# Patient Record
Sex: FEMALE | Race: BLACK OR AFRICAN AMERICAN | NOT HISPANIC OR LATINO | URBAN - METROPOLITAN AREA
[De-identification: names, ages, dates, MRNs, and addresses within clinical notes are randomized per-mention and may not be internally consistent; named-entity substitution may affect disease eponyms.]

---

## 2019-03-26 ENCOUNTER — HOSPITAL ENCOUNTER (EMERGENCY)
Facility: HOSPITAL | Age: 54
Discharge: HOME | End: 2019-03-26
Attending: EMERGENCY MEDICINE
Payer: MEDICAID

## 2019-03-26 VITALS
WEIGHT: 152 LBS | OXYGEN SATURATION: 97 % | HEART RATE: 75 BPM | RESPIRATION RATE: 18 BRPM | DIASTOLIC BLOOD PRESSURE: 100 MMHG | TEMPERATURE: 99 F | BODY MASS INDEX: 25.95 KG/M2 | SYSTOLIC BLOOD PRESSURE: 159 MMHG | HEIGHT: 64 IN

## 2019-03-26 DIAGNOSIS — M54.30 SCIATICA, UNSPECIFIED LATERALITY: Primary | ICD-10-CM

## 2019-03-26 DIAGNOSIS — M25.559 HIP PAIN: ICD-10-CM

## 2019-03-26 DIAGNOSIS — R03.0 ELEVATED BLOOD PRESSURE READING: ICD-10-CM

## 2019-03-26 PROCEDURE — 63600175 PHARM REV CODE 636 W HCPCS: Performed by: PHYSICIAN ASSISTANT

## 2019-03-26 PROCEDURE — 96372 THER/PROPH/DIAG INJ SC/IM: CPT

## 2019-03-26 PROCEDURE — 99284 EMERGENCY DEPT VISIT MOD MDM: CPT | Mod: 25

## 2019-03-26 RX ORDER — TRAMADOL HYDROCHLORIDE 50 MG/1
50 TABLET ORAL EVERY 6 HOURS PRN
Qty: 12 TABLET | Refills: 0 | Status: ON HOLD | OUTPATIENT
Start: 2019-03-26 | End: 2019-06-30 | Stop reason: HOSPADM

## 2019-03-26 RX ORDER — HYDROMORPHONE HYDROCHLORIDE 2 MG/ML
0.5 INJECTION, SOLUTION INTRAMUSCULAR; INTRAVENOUS; SUBCUTANEOUS
Status: COMPLETED | OUTPATIENT
Start: 2019-03-26 | End: 2019-03-26

## 2019-03-26 RX ADMIN — HYDROMORPHONE HYDROCHLORIDE 0.5 MG: 2 INJECTION, SOLUTION INTRAMUSCULAR; INTRAVENOUS; SUBCUTANEOUS at 02:03

## 2019-03-26 NOTE — ED NOTES
"Pt. Daughter at side and states " I want my mother fully check, her speech has dolly slurred and she was c/o weakness to her left side on this weekend". The provider ( ANNIE Angel) made aware of families concerns. Mid-level spoke with pt. And family, re-assessed pt.  "

## 2019-03-26 NOTE — ED TRIAGE NOTES
"Pt c/o RIGHT hip pain, RIGHT shoulder pain x2 weeks. Denies trauma, fall. Also c/o RIGHT knee pain that is chronic (reports hx of torn ligaments in knee). Pt states "I have gas in my back". Pain is 10/10. Pt has been taking goodies and OTC pain pills to some relief but pain returns quickly  "

## 2019-03-26 NOTE — MEDICAL/APP STUDENT
History     Chief Complaint   Patient presents with    Hip Pain     Right hip pain x 1 week.  Denies injury.     CC: Right hip pain    HPI:  Patient is a 54 year old female with history of hypertension, hyperlipidemia, and diabetes mellitus who presents for evaluation of right hip pain. States the pain initially started approximately 2 weeks ago, but has increased in severity over the past two days. Describes the pain as a constant ache with intermittent bursts of sharp pain. She rates the pain as 10/10 today and states it is aggravated by walking and getting up from a sitting position or getting out of bed. Patient does not recall any recent falls or trauma. Reports associated numbness and tingling to the right anterior thigh, but states pain is localized to the right hip and does not radiate. No saddle paresthesia, bladder/bowel incontinence or retention. Denies any recent travel, hormone therapy, or recent surgery/trauma.    Past Medical History:   Diagnosis Date    Diabetes mellitus type I     Hyperlipidemia     Hypertension        Past Surgical History:   Procedure Laterality Date     SECTION         Family History   Problem Relation Age of Onset    Hypertension Mother     Stroke Mother     Hyperlipidemia Mother     Diabetes Mother     Hypertension Sister     Cancer Sister     Hyperlipidemia Sister     Diabetes Sister     Hypertension Brother     Hyperlipidemia Brother     Diabetes Brother     Heart disease Maternal Aunt     Diabetes Maternal Aunt        Social History     Tobacco Use    Smoking status: Current Every Day Smoker     Types: Cigarettes   Substance Use Topics    Alcohol use: Not on file    Drug use: Not on file       Review of Systems   Constitutional: Negative for chills, fatigue and fever.   HENT: Negative for congestion and sore throat.    Eyes: Negative for visual disturbance.   Respiratory: Negative for shortness of breath.    Cardiovascular: Negative for chest  "pain.   Gastrointestinal: Negative for nausea.   Genitourinary: Negative for decreased urine volume, difficulty urinating, dysuria, frequency and urgency.   Musculoskeletal: Positive for arthralgias (right lateral hip pain) and myalgias. Negative for back pain.   Skin: Negative for rash and wound.   Neurological: Negative for dizziness, weakness and light-headedness.   Hematological: Does not bruise/bleed easily.   Psychiatric/Behavioral: Negative.        Physical Exam   BP (!) 179/109 (BP Location: Right arm, Patient Position: Sitting) Comment: took BP meds 30 min pta  Pulse 94   Temp 98.7 °F (37.1 °C) (Oral)   Resp 18   Ht 5' 3.5" (1.613 m)   Wt 68.9 kg (152 lb)   LMP  (Exact Date)   SpO2 98%   Breastfeeding? No   BMI 26.50 kg/m²     Physical Exam    Nursing note and vitals reviewed.  Constitutional: She appears well-developed and well-nourished. No distress.   HENT:   Head: Normocephalic and atraumatic.   Eyes: EOM are normal. Pupils are equal, round, and reactive to light.   Cardiovascular: Normal rate, regular rhythm, normal heart sounds and intact distal pulses. Exam reveals no gallop and no friction rub.    No murmur heard.  Pulmonary/Chest: Breath sounds normal. No respiratory distress.   Abdominal: Soft. Bowel sounds are normal. She exhibits no distension and no mass. There is no tenderness. There is no rebound and no guarding.   Musculoskeletal:   Full active and passive ROM to BLE. 5/5 strength and equal sensation to BLE. No bony tenderness, deformity, or crepitus appreciated. TTP noted to right lateral hip joint. Positive right-sided straight leg raise. No edema, ecchymosis, or erythema appreciated on exam.    Neurological: She is alert and oriented to person, place, and time. She has normal strength.   Skin: Skin is warm and dry. Capillary refill takes less than 2 seconds. No erythema.   Psychiatric: She has a normal mood and affect.         ED Course         "

## 2019-03-26 NOTE — ED PROVIDER NOTES
Encounter Date: 3/26/2019       History     Chief Complaint   Patient presents with    Hip Pain     Right hip pain x 1 week.  Denies injury.     Patient is a 54 year old female with history of hypertension, hyperlipidemia, and diabetes mellitus who presents for evaluation of right hip pain. States the pain initially started approximately 2 weeks ago, but has increased in severity over the past two days. Describes the pain as a constant ache with intermittent bursts of sharp pain. She rates the pain as 10/10 today and states it is aggravated by walking and getting up from a sitting position or getting out of bed. Patient does not recall any recent falls or trauma. Reports associated numbness and tingling to the right anterior thigh, but states pain is localized to the right hip and does not radiate. No saddle paresthesia, bladder/bowel incontinence or retention. Denies any recent travel, hormone therapy, or recent surgery/trauma.        Review of patient's allergies indicates:   Allergen Reactions    Sulfur      Past Medical History:   Diagnosis Date    Diabetes mellitus type I     Hyperlipidemia     Hypertension      Past Surgical History:   Procedure Laterality Date     SECTION       Family History   Problem Relation Age of Onset    Hypertension Mother     Stroke Mother     Hyperlipidemia Mother     Diabetes Mother     Hypertension Sister     Cancer Sister     Hyperlipidemia Sister     Diabetes Sister     Hypertension Brother     Hyperlipidemia Brother     Diabetes Brother     Heart disease Maternal Aunt     Diabetes Maternal Aunt      Social History     Tobacco Use    Smoking status: Current Every Day Smoker     Types: Cigarettes   Substance Use Topics    Alcohol use: Not on file    Drug use: Not on file     Review of Systems  Constitutional: Negative for chills, fatigue and fever.   HENT: Negative for congestion and sore throat.    Eyes: Negative for visual disturbance.    Respiratory: Negative for shortness of breath.    Cardiovascular: Negative for chest pain.   Gastrointestinal: Negative for nausea.   Genitourinary: Negative for decreased urine volume, difficulty urinating, dysuria, frequency and urgency.   Musculoskeletal: Positive for arthralgias (right lateral hip pain) and myalgias. Negative for back pain.   Skin: Negative for rash and wound.   Neurological: Negative for dizziness, weakness and light-headedness.   Hematological: Does not bruise/bleed easily.   Psychiatric/Behavioral: Negative.        Physical Exam     Initial Vitals [03/26/19 1215]   BP Pulse Resp Temp SpO2   (!) 179/109 94 18 98.7 °F (37.1 °C) 98 %      MAP       --         Physical Exam  Nursing note and vitals reviewed.  Constitutional: She appears well-developed and well-nourished. No distress.   HENT:   Head: Normocephalic and atraumatic.   Eyes: EOM are normal. Pupils are equal, round, and reactive to light.   Cardiovascular: Normal rate, regular rhythm, normal heart sounds and intact distal pulses. Exam reveals no gallop and no friction rub.    No murmur heard.  Pulmonary/Chest: Breath sounds normal. No respiratory distress.   Abdominal: Soft. Bowel sounds are normal. She exhibits no distension and no mass. There is no tenderness. There is no rebound and no guarding.   Musculoskeletal:   Full active and passive ROM to BLE. 5/5 strength and equal sensation to BLE. No bony tenderness, deformity, or crepitus appreciated. TTP noted to right lateral hip joint. Positive right-sided straight leg raise. No edema, ecchymosis, or erythema appreciated on exam.    Neurological: She is alert and oriented to person, place, and time. She has normal strength.   Skin: Skin is warm and dry. Capillary refill takes less than 2 seconds. No erythema.   Psychiatric: She has a normal mood and affect.       ED Course   Procedures  Labs Reviewed - No data to display       Imaging Results          X-Ray Hip 2 View Right (Final  result)  Result time 03/26/19 13:38:56    Final result by Celio De Leon MD (03/26/19 13:38:56)                 Impression:      No acute displaced fracture-dislocation identified.      Electronically signed by: Celio De Leon MD  Date:    03/26/2019  Time:    13:38             Narrative:    EXAMINATION:  XR HIP 2 VIEW RIGHT    CLINICAL HISTORY:  Pain in unspecified hip    TECHNIQUE:  AP view of the pelvis and frog leg lateral view of the right hip were performed.    COMPARISON:  None    FINDINGS:  Bones are well mineralized.  Overall alignment is within normal limits.  No displaced fracture, dislocation or destructive osseous process.  Mild degenerative change at the pubic symphysis.  Pelvic phleboliths noted.  No subcutaneous emphysema or radiodense retained foreign body.                              X-Rays:   Independently Interpreted Readings:   Other Readings:  X-ray of the hip reveals no acute fracture, dislocation or bony destructive lesion.          APC / Resident Notes:   This is an urgent evaluation of a 54-year-old female presents to the emergency department complaining of atraumatic right hip pain.    The patient is currently afebrile and nontoxic in appearance.  Her blood pressure is elevated at 179/109.  This patient has a history of hypertension, hyperlipidemia and diabetes.  On physical exam, there is full active and passive ROM to BLE. 5/5 strength and equal sensation to BLE. No bony tenderness, deformity, or crepitus appreciated. TTP noted to right lateral hip joint. Positive right-sided straight leg raise. No edema, ecchymosis, or erythema appreciated on exam.  There is no saddle anesthesia.  There is no midline bony tenderness to palpation.  The remaining physical exam is unremarkable. I carefully considered but doubt acute disc herniation with deficit, cauda equina, spinal fracture.  An x-ray was performed of the right hip which revealed no acute fracture or dislocation.  I believe this patient  has sciatica type pain and I will treat her with pain medication in the emergency department.  I will send her home with a short prescription of pain medication.  She will need to follow up with her primary care physician.  This is discussed with the patient and she verbalized understanding and agreement.  She will also need to talk to her primary care doctor about her elevated blood pressure reading.  She is currently safe and stable for discharge at this time.                 Clinical Impression:       ICD-10-CM ICD-9-CM   1. Sciatica, unspecified laterality M54.30 724.3   2. Hip pain M25.559 719.45   3. Elevated blood pressure reading R03.0 796.2         Disposition:   Disposition: Discharged  Condition: Stable                        Lynette Frederick PA-C  03/26/19 1416

## 2019-04-01 ENCOUNTER — HOSPITAL ENCOUNTER (INPATIENT)
Facility: HOSPITAL | Age: 54
LOS: 8 days | Discharge: NURSING, REHAB OR LONG-TERM CARE | DRG: 064 | End: 2019-04-09
Attending: EMERGENCY MEDICINE | Admitting: PSYCHIATRY & NEUROLOGY
Payer: MEDICAID

## 2019-04-01 DIAGNOSIS — I63.9 EMBOLIC STROKE: ICD-10-CM

## 2019-04-01 DIAGNOSIS — E11.65 UNCONTROLLED TYPE 2 DIABETES MELLITUS WITH HYPERGLYCEMIA: ICD-10-CM

## 2019-04-01 DIAGNOSIS — I63.432 EMBOLIC STROKE INVOLVING LEFT POSTERIOR CEREBRAL ARTERY: Primary | ICD-10-CM

## 2019-04-01 DIAGNOSIS — E11.10 DIABETIC KETOACIDOSIS WITHOUT COMA ASSOCIATED WITH TYPE 2 DIABETES MELLITUS: ICD-10-CM

## 2019-04-01 DIAGNOSIS — F17.200 SMOKER: ICD-10-CM

## 2019-04-01 DIAGNOSIS — R53.1 WEAKNESS: ICD-10-CM

## 2019-04-01 DIAGNOSIS — R29.898 WEAKNESS OF BOTH LOWER EXTREMITIES: ICD-10-CM

## 2019-04-01 PROBLEM — A59.9 TRICHOMONAS INFECTION: Status: ACTIVE | Noted: 2019-04-01

## 2019-04-01 PROBLEM — I10 ESSENTIAL HYPERTENSION: Status: ACTIVE | Noted: 2019-04-01

## 2019-04-01 PROBLEM — E78.2 MIXED HYPERLIPIDEMIA: Status: ACTIVE | Noted: 2019-04-01

## 2019-04-01 PROBLEM — A59.03 TRICHOMONAL CYSTITIS: Status: ACTIVE | Noted: 2019-04-01

## 2019-04-01 LAB
ALBUMIN SERPL BCP-MCNC: 3.5 G/DL (ref 3.5–5.2)
ALP SERPL-CCNC: 116 U/L (ref 55–135)
ALT SERPL W/O P-5'-P-CCNC: 11 U/L (ref 10–44)
AMPHET+METHAMPHET UR QL: NEGATIVE
ANION GAP SERPL CALC-SCNC: 15 MMOL/L (ref 8–16)
AST SERPL-CCNC: 11 U/L (ref 10–40)
BACTERIA #/AREA URNS AUTO: ABNORMAL /HPF
BARBITURATES UR QL SCN>200 NG/ML: NEGATIVE
BASOPHILS # BLD AUTO: 0.07 K/UL (ref 0–0.2)
BASOPHILS NFR BLD: 0.6 % (ref 0–1.9)
BENZODIAZ UR QL SCN>200 NG/ML: NEGATIVE
BILIRUB SERPL-MCNC: 0.5 MG/DL (ref 0.1–1)
BILIRUB UR QL STRIP: NEGATIVE
BUN SERPL-MCNC: 11 MG/DL (ref 6–20)
BZE UR QL SCN: NORMAL
CALCIUM SERPL-MCNC: 10.4 MG/DL (ref 8.7–10.5)
CANNABINOIDS UR QL SCN: NEGATIVE
CHLORIDE SERPL-SCNC: 100 MMOL/L (ref 95–110)
CLARITY UR REFRACT.AUTO: CLEAR
CO2 SERPL-SCNC: 23 MMOL/L (ref 23–29)
COLOR UR AUTO: ABNORMAL
CREAT SERPL-MCNC: 1.3 MG/DL (ref 0.5–1.4)
CREAT UR-MCNC: 33 MG/DL (ref 15–325)
DIFFERENTIAL METHOD: NORMAL
EOSINOPHIL # BLD AUTO: 0 K/UL (ref 0–0.5)
EOSINOPHIL NFR BLD: 0.2 % (ref 0–8)
ERYTHROCYTE [DISTWIDTH] IN BLOOD BY AUTOMATED COUNT: 13.4 % (ref 11.5–14.5)
EST. GFR  (AFRICAN AMERICAN): 53 ML/MIN/1.73 M^2
EST. GFR  (NON AFRICAN AMERICAN): 46 ML/MIN/1.73 M^2
ETHANOL SERPL-MCNC: <10 MG/DL
GLUCOSE SERPL-MCNC: 491 MG/DL (ref 70–110)
GLUCOSE UR QL STRIP: ABNORMAL
HCT VFR BLD AUTO: 42.8 % (ref 37–48.5)
HGB BLD-MCNC: 14.3 G/DL (ref 12–16)
HGB UR QL STRIP: ABNORMAL
IMM GRANULOCYTES # BLD AUTO: 0.03 K/UL (ref 0–0.04)
IMM GRANULOCYTES NFR BLD AUTO: 0.3 % (ref 0–0.5)
INR PPP: 0.9 (ref 0.8–1.2)
KETONES UR QL STRIP: ABNORMAL
LEUKOCYTE ESTERASE UR QL STRIP: ABNORMAL
LYMPHOCYTES # BLD AUTO: 3 K/UL (ref 1–4.8)
LYMPHOCYTES NFR BLD: 27.4 % (ref 18–48)
MCH RBC QN AUTO: 28.1 PG (ref 27–31)
MCHC RBC AUTO-ENTMCNC: 33.4 G/DL (ref 32–36)
MCV RBC AUTO: 84 FL (ref 82–98)
METHADONE UR QL SCN>300 NG/ML: NEGATIVE
MICROSCOPIC COMMENT: ABNORMAL
MONOCYTES # BLD AUTO: 1 K/UL (ref 0.3–1)
MONOCYTES NFR BLD: 9.1 % (ref 4–15)
NEUTROPHILS # BLD AUTO: 6.7 K/UL (ref 1.8–7.7)
NEUTROPHILS NFR BLD: 62.4 % (ref 38–73)
NITRITE UR QL STRIP: NEGATIVE
NRBC BLD-RTO: 0 /100 WBC
OPIATES UR QL SCN: NEGATIVE
PCP UR QL SCN>25 NG/ML: NEGATIVE
PH UR STRIP: 6 [PH] (ref 5–8)
PLATELET # BLD AUTO: 302 K/UL (ref 150–350)
PMV BLD AUTO: 12.3 FL (ref 9.2–12.9)
POTASSIUM SERPL-SCNC: 4.3 MMOL/L (ref 3.5–5.1)
PROT SERPL-MCNC: 7.4 G/DL (ref 6–8.4)
PROT UR QL STRIP: NEGATIVE
PROTHROMBIN TIME: 9.9 SEC (ref 9–12.5)
RBC # BLD AUTO: 5.09 M/UL (ref 4–5.4)
RBC #/AREA URNS AUTO: 24 /HPF (ref 0–4)
SODIUM SERPL-SCNC: 138 MMOL/L (ref 136–145)
SP GR UR STRIP: 1.02 (ref 1–1.03)
SQUAMOUS #/AREA URNS AUTO: 6 /HPF
TOXICOLOGY INFORMATION: NORMAL
TRICHOMONAS UR QL COMP ASSIST: ABNORMAL
URN SPEC COLLECT METH UR: ABNORMAL
WBC # BLD AUTO: 10.77 K/UL (ref 3.9–12.7)
WBC #/AREA URNS AUTO: 22 /HPF (ref 0–5)
YEAST UR QL AUTO: ABNORMAL

## 2019-04-01 PROCEDURE — 80320 DRUG SCREEN QUANTALCOHOLS: CPT

## 2019-04-01 PROCEDURE — A9585 GADOBUTROL INJECTION: HCPCS | Performed by: EMERGENCY MEDICINE

## 2019-04-01 PROCEDURE — 12000002 HC ACUTE/MED SURGE SEMI-PRIVATE ROOM

## 2019-04-01 PROCEDURE — 93010 ELECTROCARDIOGRAM REPORT: CPT | Mod: ,,, | Performed by: INTERNAL MEDICINE

## 2019-04-01 PROCEDURE — 81001 URINALYSIS AUTO W/SCOPE: CPT

## 2019-04-01 PROCEDURE — 93005 ELECTROCARDIOGRAM TRACING: CPT

## 2019-04-01 PROCEDURE — 85025 COMPLETE CBC W/AUTO DIFF WBC: CPT

## 2019-04-01 PROCEDURE — 85610 PROTHROMBIN TIME: CPT

## 2019-04-01 PROCEDURE — 82962 GLUCOSE BLOOD TEST: CPT

## 2019-04-01 PROCEDURE — 25500020 PHARM REV CODE 255: Performed by: EMERGENCY MEDICINE

## 2019-04-01 PROCEDURE — 99291 PR CRITICAL CARE, E/M 30-74 MINUTES: ICD-10-PCS | Mod: ,,, | Performed by: EMERGENCY MEDICINE

## 2019-04-01 PROCEDURE — 99291 CRITICAL CARE FIRST HOUR: CPT | Mod: 25

## 2019-04-01 PROCEDURE — 25000003 PHARM REV CODE 250: Performed by: EMERGENCY MEDICINE

## 2019-04-01 PROCEDURE — 99223 1ST HOSP IP/OBS HIGH 75: CPT | Mod: ,,, | Performed by: PSYCHIATRY & NEUROLOGY

## 2019-04-01 PROCEDURE — 99223 PR INITIAL HOSPITAL CARE,LEVL III: ICD-10-PCS | Mod: ,,, | Performed by: PSYCHIATRY & NEUROLOGY

## 2019-04-01 PROCEDURE — 80053 COMPREHEN METABOLIC PANEL: CPT

## 2019-04-01 PROCEDURE — 99291 CRITICAL CARE FIRST HOUR: CPT | Mod: ,,, | Performed by: EMERGENCY MEDICINE

## 2019-04-01 PROCEDURE — 93010 EKG 12-LEAD: ICD-10-PCS | Mod: ,,, | Performed by: INTERNAL MEDICINE

## 2019-04-01 PROCEDURE — 80307 DRUG TEST PRSMV CHEM ANLYZR: CPT

## 2019-04-01 RX ORDER — PANTOPRAZOLE SODIUM 40 MG/1
40 TABLET, DELAYED RELEASE ORAL DAILY
Status: DISCONTINUED | OUTPATIENT
Start: 2019-04-02 | End: 2019-04-09 | Stop reason: HOSPADM

## 2019-04-01 RX ORDER — ATORVASTATIN CALCIUM 20 MG/1
40 TABLET, FILM COATED ORAL DAILY
Status: DISCONTINUED | OUTPATIENT
Start: 2019-04-02 | End: 2019-04-09 | Stop reason: HOSPADM

## 2019-04-01 RX ORDER — ONDANSETRON 2 MG/ML
4 INJECTION INTRAMUSCULAR; INTRAVENOUS EVERY 12 HOURS PRN
Status: DISCONTINUED | OUTPATIENT
Start: 2019-04-01 | End: 2019-04-09 | Stop reason: HOSPADM

## 2019-04-01 RX ORDER — ASPIRIN 81 MG/1
81 TABLET ORAL DAILY
Status: DISCONTINUED | OUTPATIENT
Start: 2019-04-02 | End: 2019-04-09 | Stop reason: HOSPADM

## 2019-04-01 RX ORDER — INSULIN ASPART 100 [IU]/ML
10 INJECTION, SOLUTION INTRAVENOUS; SUBCUTANEOUS
Status: DISCONTINUED | OUTPATIENT
Start: 2019-04-02 | End: 2019-04-02

## 2019-04-01 RX ORDER — INSULIN ASPART 100 [IU]/ML
1-10 INJECTION, SOLUTION INTRAVENOUS; SUBCUTANEOUS
Status: DISCONTINUED | OUTPATIENT
Start: 2019-04-02 | End: 2019-04-02

## 2019-04-01 RX ORDER — HYDRALAZINE HYDROCHLORIDE 20 MG/ML
10 INJECTION INTRAMUSCULAR; INTRAVENOUS EVERY 8 HOURS PRN
Status: DISCONTINUED | OUTPATIENT
Start: 2019-04-01 | End: 2019-04-09 | Stop reason: HOSPADM

## 2019-04-01 RX ORDER — ONDANSETRON 8 MG/1
8 TABLET, ORALLY DISINTEGRATING ORAL EVERY 8 HOURS PRN
Status: DISCONTINUED | OUTPATIENT
Start: 2019-04-01 | End: 2019-04-09 | Stop reason: HOSPADM

## 2019-04-01 RX ORDER — AMLODIPINE BESYLATE 10 MG/1
10 TABLET ORAL
Status: COMPLETED | OUTPATIENT
Start: 2019-04-01 | End: 2019-04-01

## 2019-04-01 RX ORDER — IBUPROFEN 200 MG
16 TABLET ORAL
Status: DISCONTINUED | OUTPATIENT
Start: 2019-04-01 | End: 2019-04-02

## 2019-04-01 RX ORDER — HEPARIN SODIUM 5000 [USP'U]/ML
5000 INJECTION, SOLUTION INTRAVENOUS; SUBCUTANEOUS EVERY 8 HOURS
Status: DISCONTINUED | OUTPATIENT
Start: 2019-04-02 | End: 2019-04-09 | Stop reason: HOSPADM

## 2019-04-01 RX ORDER — ACETAMINOPHEN 325 MG/1
650 TABLET ORAL EVERY 6 HOURS PRN
Status: DISCONTINUED | OUTPATIENT
Start: 2019-04-01 | End: 2019-04-09 | Stop reason: HOSPADM

## 2019-04-01 RX ORDER — GLUCAGON 1 MG
1 KIT INJECTION
Status: DISCONTINUED | OUTPATIENT
Start: 2019-04-01 | End: 2019-04-02

## 2019-04-01 RX ORDER — IBUPROFEN 200 MG
24 TABLET ORAL
Status: DISCONTINUED | OUTPATIENT
Start: 2019-04-01 | End: 2019-04-02

## 2019-04-01 RX ORDER — SODIUM CHLORIDE 0.9 % (FLUSH) 0.9 %
10 SYRINGE (ML) INJECTION
Status: DISCONTINUED | OUTPATIENT
Start: 2019-04-01 | End: 2019-04-09 | Stop reason: HOSPADM

## 2019-04-01 RX ORDER — IBUPROFEN 200 MG
1 TABLET ORAL DAILY
Status: DISCONTINUED | OUTPATIENT
Start: 2019-04-02 | End: 2019-04-09 | Stop reason: HOSPADM

## 2019-04-01 RX ORDER — GADOBUTROL 604.72 MG/ML
7 INJECTION INTRAVENOUS
Status: COMPLETED | OUTPATIENT
Start: 2019-04-01 | End: 2019-04-01

## 2019-04-01 RX ADMIN — AMLODIPINE BESYLATE 10 MG: 10 TABLET ORAL at 08:04

## 2019-04-01 RX ADMIN — GADOBUTROL 7 ML: 604.72 INJECTION INTRAVENOUS at 09:04

## 2019-04-01 NOTE — ED TRIAGE NOTES
Emily Martinez, a 54 y.o. female presents to the ED w/ complaint of AMS since found today by friend at 1430, pt having slurred speech and urinating on herself since Monday.  Pt denies numbness or weak ness    Triage note:  Chief Complaint   Patient presents with    Multiple Complaints     falling for few days, urinating on self for past few days, slurred speech since sat, seen in er on march 25     Review of patient's allergies indicates:   Allergen Reactions    Sulfur      Past Medical History:   Diagnosis Date    Diabetes mellitus type I     Hyperlipidemia     Hypertension      LOC: Patient name and date of birth verified. The patient is awake, alert and aware of environment with an appropriate affect, the patient is oriented x 1 person and not speaking appropriately.   APPEARANCE: Patient resting comfortably, patient is clean and well groomed, patient's clothing is properly fastened.  SKIN: The skin is warm and dry, color consistent with ethnicity, patient has normal skin turgor and moist mucus membranes, skin intact, no breakdown or bruising noted.  MUSCULOSKELETAL: Patient moving all extremities well, no obvious swelling or deformities noted.   RESPIRATORY: Respirations are spontaneous, patient has a normal effort and rate, no accessory muscle use noted.  CARDIAC: Patient has a normal rate and rhythm, no periphreal edema noted, capillary refill < 3 seconds.  ABDOMEN: Soft and non tender to palpation, no distention noted. Bowel sounds present in all four quadrants.  NEUROLOGIC: Eyes open spontaneously,  follows commands, facial expression symmetrical, bilateral hand grasp equal and even, purposeful motor response noted, normal sensation in all extremities when touched with a finger.

## 2019-04-02 PROBLEM — E11.10 DIABETIC KETOACIDOSIS WITHOUT COMA ASSOCIATED WITH TYPE 2 DIABETES MELLITUS: Status: ACTIVE | Noted: 2019-04-02

## 2019-04-02 PROBLEM — R29.898 WEAKNESS OF BOTH LOWER EXTREMITIES: Status: ACTIVE | Noted: 2019-04-02

## 2019-04-02 LAB
ALBUMIN SERPL BCP-MCNC: 3.4 G/DL (ref 3.5–5.2)
ALLENS TEST: ABNORMAL
ALP SERPL-CCNC: 112 U/L (ref 55–135)
ALT SERPL W/O P-5'-P-CCNC: 12 U/L (ref 10–44)
ANION GAP SERPL CALC-SCNC: 10 MMOL/L (ref 8–16)
ANION GAP SERPL CALC-SCNC: 11 MMOL/L (ref 8–16)
ANION GAP SERPL CALC-SCNC: 11 MMOL/L (ref 8–16)
ANION GAP SERPL CALC-SCNC: 6 MMOL/L (ref 8–16)
ANION GAP SERPL CALC-SCNC: 9 MMOL/L (ref 8–16)
APTT BLDCRRT: <21 SEC (ref 21–32)
ASCENDING AORTA: 2.89 CM
AST SERPL-CCNC: 10 U/L (ref 10–40)
AV INDEX (PROSTH): 0.87
AV MEAN GRADIENT: 4.95 MMHG
AV PEAK GRADIENT: 8.88 MMHG
AV VALVE AREA: 2.99 CM2
AV VELOCITY RATIO: 0.79
B-OH-BUTYR BLD STRIP-SCNC: 4.1 MMOL/L (ref 0–0.5)
BACTERIA #/AREA URNS AUTO: ABNORMAL /HPF
BASOPHILS # BLD AUTO: 0.08 K/UL (ref 0–0.2)
BASOPHILS NFR BLD: 0.7 % (ref 0–1.9)
BILIRUB SERPL-MCNC: 0.6 MG/DL (ref 0.1–1)
BILIRUB UR QL STRIP: NEGATIVE
BNP SERPL-MCNC: 17 PG/ML (ref 0–99)
BSA FOR ECHO PROCEDURE: 1.72 M2
BUN SERPL-MCNC: 5 MG/DL (ref 6–20)
BUN SERPL-MCNC: 6 MG/DL (ref 6–20)
BUN SERPL-MCNC: 7 MG/DL (ref 6–20)
BUN SERPL-MCNC: 9 MG/DL (ref 6–20)
BUN SERPL-MCNC: 9 MG/DL (ref 6–20)
CALCIUM SERPL-MCNC: 10 MG/DL (ref 8.7–10.5)
CALCIUM SERPL-MCNC: 10 MG/DL (ref 8.7–10.5)
CALCIUM SERPL-MCNC: 10.5 MG/DL (ref 8.7–10.5)
CALCIUM SERPL-MCNC: 10.5 MG/DL (ref 8.7–10.5)
CALCIUM SERPL-MCNC: 9.8 MG/DL (ref 8.7–10.5)
CHLORIDE SERPL-SCNC: 103 MMOL/L (ref 95–110)
CHLORIDE SERPL-SCNC: 105 MMOL/L (ref 95–110)
CHLORIDE SERPL-SCNC: 105 MMOL/L (ref 95–110)
CK MB SERPL-MCNC: 2.4 NG/ML (ref 0.1–6.5)
CK MB SERPL-RTO: 4 % (ref 0–5)
CK SERPL-CCNC: 60 U/L (ref 20–180)
CLARITY UR REFRACT.AUTO: CLEAR
CO2 SERPL-SCNC: 24 MMOL/L (ref 23–29)
CO2 SERPL-SCNC: 26 MMOL/L (ref 23–29)
CO2 SERPL-SCNC: 26 MMOL/L (ref 23–29)
CO2 SERPL-SCNC: 29 MMOL/L (ref 23–29)
CO2 SERPL-SCNC: 32 MMOL/L (ref 23–29)
COLOR UR AUTO: ABNORMAL
CREAT SERPL-MCNC: 0.9 MG/DL (ref 0.5–1.4)
CREAT SERPL-MCNC: 0.9 MG/DL (ref 0.5–1.4)
CREAT SERPL-MCNC: 1.2 MG/DL (ref 0.5–1.4)
CV ECHO LV RWT: 0.43 CM
DELSYS: ABNORMAL
DIFFERENTIAL METHOD: ABNORMAL
DOP CALC AO PEAK VEL: 1.49 M/S
DOP CALC AO VTI: 27.27 CM
DOP CALC LVOT AREA: 3.43 CM2
DOP CALC LVOT DIAMETER: 2.09 CM
DOP CALC LVOT PEAK VEL: 1.17 M/S
DOP CALC LVOT STROKE VOLUME: 81.47 CM3
DOP CALCLVOT PEAK VEL VTI: 23.76 CM
E WAVE DECELERATION TIME: 304.57 MSEC
E/A RATIO: 0.64
E/E' RATIO: 7.23
ECHO LV POSTERIOR WALL: 0.95 CM (ref 0.6–1.1)
EOSINOPHIL # BLD AUTO: 0.1 K/UL (ref 0–0.5)
EOSINOPHIL NFR BLD: 0.5 % (ref 0–8)
ERYTHROCYTE [DISTWIDTH] IN BLOOD BY AUTOMATED COUNT: 13.5 % (ref 11.5–14.5)
EST. GFR  (AFRICAN AMERICAN): 58.4 ML/MIN/1.73 M^2
EST. GFR  (AFRICAN AMERICAN): >60 ML/MIN/1.73 M^2
EST. GFR  (AFRICAN AMERICAN): >60 ML/MIN/1.73 M^2
EST. GFR  (NON AFRICAN AMERICAN): 50.6 ML/MIN/1.73 M^2
EST. GFR  (NON AFRICAN AMERICAN): >60 ML/MIN/1.73 M^2
EST. GFR  (NON AFRICAN AMERICAN): >60 ML/MIN/1.73 M^2
ESTIMATED AVG GLUCOSE: ABNORMAL MG/DL (ref 68–131)
ESTIMATED AVG GLUCOSE: ABNORMAL MG/DL (ref 68–131)
FRACTIONAL SHORTENING: 34 % (ref 28–44)
GLUCOSE SERPL-MCNC: 165 MG/DL (ref 70–110)
GLUCOSE SERPL-MCNC: 246 MG/DL (ref 70–110)
GLUCOSE SERPL-MCNC: 276 MG/DL (ref 70–110)
GLUCOSE SERPL-MCNC: 314 MG/DL (ref 70–110)
GLUCOSE SERPL-MCNC: 314 MG/DL (ref 70–110)
GLUCOSE UR QL STRIP: ABNORMAL
HBA1C MFR BLD HPLC: >14 % (ref 4–5.6)
HBA1C MFR BLD HPLC: >14 % (ref 4–5.6)
HCO3 UR-SCNC: 26.9 MMOL/L (ref 24–28)
HCT VFR BLD AUTO: 40.7 % (ref 37–48.5)
HGB BLD-MCNC: 13.3 G/DL (ref 12–16)
HGB UR QL STRIP: ABNORMAL
IMM GRANULOCYTES # BLD AUTO: 0.02 K/UL (ref 0–0.04)
IMM GRANULOCYTES NFR BLD AUTO: 0.2 % (ref 0–0.5)
INR PPP: 0.9 (ref 0.8–1.2)
INTERVENTRICULAR SEPTUM: 1 CM (ref 0.6–1.1)
IVRT: 0.13 MSEC
KETONES UR QL STRIP: ABNORMAL
LA MAJOR: 5.09 CM
LA MINOR: 5.3 CM
LA WIDTH: 3.28 CM
LEFT ATRIUM SIZE: 3.57 CM
LEFT ATRIUM VOLUME INDEX: 30.5 ML/M2
LEFT ATRIUM VOLUME: 51.69 CM3
LEFT INTERNAL DIMENSION IN SYSTOLE: 2.9 CM (ref 2.1–4)
LEFT VENTRICLE DIASTOLIC VOLUME INDEX: 51.9 ML/M2
LEFT VENTRICLE DIASTOLIC VOLUME: 87.84 ML
LEFT VENTRICLE MASS INDEX: 84.3 G/M2
LEFT VENTRICLE SYSTOLIC VOLUME INDEX: 19 ML/M2
LEFT VENTRICLE SYSTOLIC VOLUME: 32.19 ML
LEFT VENTRICULAR INTERNAL DIMENSION IN DIASTOLE: 4.4 CM (ref 3.5–6)
LEFT VENTRICULAR MASS: 142.76 G
LEUKOCYTE ESTERASE UR QL STRIP: ABNORMAL
LV LATERAL E/E' RATIO: 6.71
LV SEPTAL E/E' RATIO: 7.83
LYMPHOCYTES # BLD AUTO: 2.6 K/UL (ref 1–4.8)
LYMPHOCYTES NFR BLD: 22.8 % (ref 18–48)
MCH RBC QN AUTO: 27.8 PG (ref 27–31)
MCHC RBC AUTO-ENTMCNC: 32.7 G/DL (ref 32–36)
MCV RBC AUTO: 85 FL (ref 82–98)
MICROSCOPIC COMMENT: ABNORMAL
MODE: ABNORMAL
MONOCYTES # BLD AUTO: 1.2 K/UL (ref 0.3–1)
MONOCYTES NFR BLD: 10.3 % (ref 4–15)
MV PEAK A VEL: 0.74 M/S
MV PEAK E VEL: 0.47 M/S
NEUTROPHILS # BLD AUTO: 7.4 K/UL (ref 1.8–7.7)
NEUTROPHILS NFR BLD: 65.5 % (ref 38–73)
NITRITE UR QL STRIP: NEGATIVE
NRBC BLD-RTO: 0 /100 WBC
OSMOLALITY SERPL: 312 MOSM/KG (ref 275–295)
PCO2 BLDA: 48.1 MMHG (ref 35–45)
PH SMN: 7.36 [PH] (ref 7.35–7.45)
PH UR STRIP: 6 [PH] (ref 5–8)
PISA TR MAX VEL: 1.71 M/S
PLATELET # BLD AUTO: 363 K/UL (ref 150–350)
PMV BLD AUTO: 11.3 FL (ref 9.2–12.9)
PO2 BLDA: 54 MMHG (ref 40–60)
POC BE: 1 MMOL/L
POC SATURATED O2: 86 % (ref 95–100)
POC TCO2: 28 MMOL/L (ref 24–29)
POCT GLUCOSE: 189 MG/DL (ref 70–110)
POCT GLUCOSE: 201 MG/DL (ref 70–110)
POCT GLUCOSE: 226 MG/DL (ref 70–110)
POCT GLUCOSE: 239 MG/DL (ref 70–110)
POCT GLUCOSE: 266 MG/DL (ref 70–110)
POCT GLUCOSE: 279 MG/DL (ref 70–110)
POCT GLUCOSE: 291 MG/DL (ref 70–110)
POCT GLUCOSE: 299 MG/DL (ref 70–110)
POCT GLUCOSE: 314 MG/DL (ref 70–110)
POCT GLUCOSE: 327 MG/DL (ref 70–110)
POCT GLUCOSE: 329 MG/DL (ref 70–110)
POCT GLUCOSE: 333 MG/DL (ref 70–110)
POCT GLUCOSE: 444 MG/DL (ref 70–110)
POCT GLUCOSE: >500 MG/DL (ref 70–110)
POTASSIUM SERPL-SCNC: 3.4 MMOL/L (ref 3.5–5.1)
POTASSIUM SERPL-SCNC: 3.7 MMOL/L (ref 3.5–5.1)
POTASSIUM SERPL-SCNC: 3.9 MMOL/L (ref 3.5–5.1)
POTASSIUM SERPL-SCNC: 3.9 MMOL/L (ref 3.5–5.1)
POTASSIUM SERPL-SCNC: 4.1 MMOL/L (ref 3.5–5.1)
PROT SERPL-MCNC: 7.2 G/DL (ref 6–8.4)
PROT UR QL STRIP: NEGATIVE
PROTHROMBIN TIME: 9.5 SEC (ref 9–12.5)
PULM VEIN S/D RATIO: 1.84
PV PEAK D VEL: 0.25 M/S
PV PEAK S VEL: 0.46 M/S
RA MAJOR: 4.71 CM
RA PRESSURE: 3 MMHG
RA WIDTH: 3.64 CM
RBC # BLD AUTO: 4.79 M/UL (ref 4–5.4)
RBC #/AREA URNS AUTO: 3 /HPF (ref 0–4)
RIGHT VENTRICULAR END-DIASTOLIC DIMENSION: 3.06 CM
SAMPLE: ABNORMAL
SINUS: 2.8 CM
SITE: ABNORMAL
SODIUM SERPL-SCNC: 139 MMOL/L (ref 136–145)
SODIUM SERPL-SCNC: 140 MMOL/L (ref 136–145)
SODIUM SERPL-SCNC: 140 MMOL/L (ref 136–145)
SODIUM SERPL-SCNC: 141 MMOL/L (ref 136–145)
SODIUM SERPL-SCNC: 143 MMOL/L (ref 136–145)
SP GR UR STRIP: 1.02 (ref 1–1.03)
SQUAMOUS #/AREA URNS AUTO: 1 /HPF
STJ: 2.73 CM
TDI LATERAL: 0.07
TDI SEPTAL: 0.06
TDI: 0.07
TR MAX PG: 11.7 MMHG
TRICUSPID ANNULAR PLANE SYSTOLIC EXCURSION: 2.59 CM
TROPONIN I SERPL DL<=0.01 NG/ML-MCNC: 0.02 NG/ML (ref 0–0.03)
TSH SERPL DL<=0.005 MIU/L-ACNC: 0.99 UIU/ML (ref 0.4–4)
TV REST PULMONARY ARTERY PRESSURE: 15 MMHG
URN SPEC COLLECT METH UR: ABNORMAL
WBC # BLD AUTO: 11.29 K/UL (ref 3.9–12.7)
WBC #/AREA URNS AUTO: 7 /HPF (ref 0–5)
YEAST UR QL AUTO: ABNORMAL

## 2019-04-02 PROCEDURE — 82803 BLOOD GASES ANY COMBINATION: CPT

## 2019-04-02 PROCEDURE — 25000003 PHARM REV CODE 250: Performed by: NURSE PRACTITIONER

## 2019-04-02 PROCEDURE — S4991 NICOTINE PATCH NONLEGEND: HCPCS | Performed by: NURSE PRACTITIONER

## 2019-04-02 PROCEDURE — 96366 THER/PROPH/DIAG IV INF ADDON: CPT

## 2019-04-02 PROCEDURE — 96361 HYDRATE IV INFUSION ADD-ON: CPT

## 2019-04-02 PROCEDURE — 96360 HYDRATION IV INFUSION INIT: CPT | Mod: 59

## 2019-04-02 PROCEDURE — 97530 THERAPEUTIC ACTIVITIES: CPT

## 2019-04-02 PROCEDURE — 97165 OT EVAL LOW COMPLEX 30 MIN: CPT

## 2019-04-02 PROCEDURE — 84443 ASSAY THYROID STIM HORMONE: CPT

## 2019-04-02 PROCEDURE — 99233 PR SUBSEQUENT HOSPITAL CARE,LEVL III: ICD-10-PCS | Mod: ,,, | Performed by: PSYCHIATRY & NEUROLOGY

## 2019-04-02 PROCEDURE — 99222 1ST HOSP IP/OBS MODERATE 55: CPT | Mod: ,,, | Performed by: HOSPITALIST

## 2019-04-02 PROCEDURE — 99232 SBSQ HOSP IP/OBS MODERATE 35: CPT | Mod: ,,, | Performed by: NURSE PRACTITIONER

## 2019-04-02 PROCEDURE — 82550 ASSAY OF CK (CPK): CPT

## 2019-04-02 PROCEDURE — 82553 CREATINE MB FRACTION: CPT

## 2019-04-02 PROCEDURE — 99222 PR INITIAL HOSPITAL CARE,LEVL II: ICD-10-PCS | Mod: ,,, | Performed by: HOSPITALIST

## 2019-04-02 PROCEDURE — 63600175 PHARM REV CODE 636 W HCPCS: Performed by: NURSE PRACTITIONER

## 2019-04-02 PROCEDURE — 20600001 HC STEP DOWN PRIVATE ROOM

## 2019-04-02 PROCEDURE — 81001 URINALYSIS AUTO W/SCOPE: CPT

## 2019-04-02 PROCEDURE — 96365 THER/PROPH/DIAG IV INF INIT: CPT

## 2019-04-02 PROCEDURE — 85610 PROTHROMBIN TIME: CPT

## 2019-04-02 PROCEDURE — 83880 ASSAY OF NATRIURETIC PEPTIDE: CPT

## 2019-04-02 PROCEDURE — 80053 COMPREHEN METABOLIC PANEL: CPT

## 2019-04-02 PROCEDURE — 63600175 PHARM REV CODE 636 W HCPCS: Performed by: STUDENT IN AN ORGANIZED HEALTH CARE EDUCATION/TRAINING PROGRAM

## 2019-04-02 PROCEDURE — 99900035 HC TECH TIME PER 15 MIN (STAT)

## 2019-04-02 PROCEDURE — 99232 PR SUBSEQUENT HOSPITAL CARE,LEVL II: ICD-10-PCS | Mod: ,,, | Performed by: NURSE PRACTITIONER

## 2019-04-02 PROCEDURE — 80048 BASIC METABOLIC PNL TOTAL CA: CPT | Mod: 91

## 2019-04-02 PROCEDURE — 83036 HEMOGLOBIN GLYCOSYLATED A1C: CPT

## 2019-04-02 PROCEDURE — 82010 KETONE BODYS QUAN: CPT

## 2019-04-02 PROCEDURE — 36415 COLL VENOUS BLD VENIPUNCTURE: CPT

## 2019-04-02 PROCEDURE — 92523 SPEECH SOUND LANG COMPREHEN: CPT

## 2019-04-02 PROCEDURE — 82962 GLUCOSE BLOOD TEST: CPT

## 2019-04-02 PROCEDURE — 92610 EVALUATE SWALLOWING FUNCTION: CPT

## 2019-04-02 PROCEDURE — 97162 PT EVAL MOD COMPLEX 30 MIN: CPT

## 2019-04-02 PROCEDURE — 25000003 PHARM REV CODE 250: Performed by: STUDENT IN AN ORGANIZED HEALTH CARE EDUCATION/TRAINING PROGRAM

## 2019-04-02 PROCEDURE — 96376 TX/PRO/DX INJ SAME DRUG ADON: CPT

## 2019-04-02 PROCEDURE — 85730 THROMBOPLASTIN TIME PARTIAL: CPT

## 2019-04-02 PROCEDURE — 25500020 PHARM REV CODE 255: Performed by: EMERGENCY MEDICINE

## 2019-04-02 PROCEDURE — S5571 INSULIN DISPOS PEN 3 ML: HCPCS | Performed by: STUDENT IN AN ORGANIZED HEALTH CARE EDUCATION/TRAINING PROGRAM

## 2019-04-02 PROCEDURE — 84484 ASSAY OF TROPONIN QUANT: CPT

## 2019-04-02 PROCEDURE — 99233 SBSQ HOSP IP/OBS HIGH 50: CPT | Mod: ,,, | Performed by: PSYCHIATRY & NEUROLOGY

## 2019-04-02 PROCEDURE — 83930 ASSAY OF BLOOD OSMOLALITY: CPT

## 2019-04-02 PROCEDURE — 85025 COMPLETE CBC W/AUTO DIFF WBC: CPT

## 2019-04-02 RX ORDER — INSULIN ASPART 100 [IU]/ML
3 INJECTION, SOLUTION INTRAVENOUS; SUBCUTANEOUS
Status: DISCONTINUED | OUTPATIENT
Start: 2019-04-02 | End: 2019-04-02

## 2019-04-02 RX ORDER — IBUPROFEN 200 MG
16 TABLET ORAL
Status: DISCONTINUED | OUTPATIENT
Start: 2019-04-02 | End: 2019-04-09 | Stop reason: HOSPADM

## 2019-04-02 RX ORDER — IBUPROFEN 200 MG
24 TABLET ORAL
Status: DISCONTINUED | OUTPATIENT
Start: 2019-04-02 | End: 2019-04-09 | Stop reason: HOSPADM

## 2019-04-02 RX ORDER — SODIUM CHLORIDE, SODIUM LACTATE, POTASSIUM CHLORIDE, CALCIUM CHLORIDE 600; 310; 30; 20 MG/100ML; MG/100ML; MG/100ML; MG/100ML
INJECTION, SOLUTION INTRAVENOUS CONTINUOUS
Status: DISCONTINUED | OUTPATIENT
Start: 2019-04-02 | End: 2019-04-02

## 2019-04-02 RX ORDER — GLUCAGON 1 MG
1 KIT INJECTION
Status: DISCONTINUED | OUTPATIENT
Start: 2019-04-02 | End: 2019-04-09 | Stop reason: HOSPADM

## 2019-04-02 RX ORDER — DEXTROSE MONOHYDRATE 100 MG/ML
1000 INJECTION, SOLUTION INTRAVENOUS
Status: DISCONTINUED | OUTPATIENT
Start: 2019-04-02 | End: 2019-04-09 | Stop reason: HOSPADM

## 2019-04-02 RX ORDER — INSULIN ASPART 100 [IU]/ML
1-10 INJECTION, SOLUTION INTRAVENOUS; SUBCUTANEOUS
Status: DISCONTINUED | OUTPATIENT
Start: 2019-04-02 | End: 2019-04-09 | Stop reason: HOSPADM

## 2019-04-02 RX ORDER — IPRATROPIUM BROMIDE AND ALBUTEROL SULFATE 2.5; .5 MG/3ML; MG/3ML
3 SOLUTION RESPIRATORY (INHALATION) EVERY 6 HOURS PRN
Status: DISCONTINUED | OUTPATIENT
Start: 2019-04-02 | End: 2019-04-09 | Stop reason: HOSPADM

## 2019-04-02 RX ORDER — METRONIDAZOLE 500 MG/1
2000 TABLET ORAL ONCE
Status: COMPLETED | OUTPATIENT
Start: 2019-04-02 | End: 2019-04-02

## 2019-04-02 RX ORDER — AZITHROMYCIN 250 MG/1
1000 TABLET, FILM COATED ORAL ONCE
Status: COMPLETED | OUTPATIENT
Start: 2019-04-02 | End: 2019-04-02

## 2019-04-02 RX ADMIN — ATORVASTATIN CALCIUM 40 MG: 20 TABLET, FILM COATED ORAL at 09:04

## 2019-04-02 RX ADMIN — SODIUM CHLORIDE 1000 ML: 0.9 INJECTION, SOLUTION INTRAVENOUS at 12:04

## 2019-04-02 RX ADMIN — INSULIN ASPART 2 UNITS: 100 INJECTION, SOLUTION INTRAVENOUS; SUBCUTANEOUS at 05:04

## 2019-04-02 RX ADMIN — INSULIN ASPART 4 UNITS: 100 INJECTION, SOLUTION INTRAVENOUS; SUBCUTANEOUS at 11:04

## 2019-04-02 RX ADMIN — SODIUM CHLORIDE AND POTASSIUM CHLORIDE: .9; .15 SOLUTION INTRAVENOUS at 04:04

## 2019-04-02 RX ADMIN — HEPARIN SODIUM 5000 UNITS: 5000 INJECTION, SOLUTION INTRAVENOUS; SUBCUTANEOUS at 09:04

## 2019-04-02 RX ADMIN — HEPARIN SODIUM 5000 UNITS: 5000 INJECTION, SOLUTION INTRAVENOUS; SUBCUTANEOUS at 02:04

## 2019-04-02 RX ADMIN — METRONIDAZOLE 2000 MG: 500 TABLET ORAL at 03:04

## 2019-04-02 RX ADMIN — AZITHROMYCIN 1000 MG: 250 TABLET, FILM COATED ORAL at 04:04

## 2019-04-02 RX ADMIN — INSULIN DETEMIR 18 UNITS: 100 INJECTION, SOLUTION SUBCUTANEOUS at 04:04

## 2019-04-02 RX ADMIN — HEPARIN SODIUM 5000 UNITS: 5000 INJECTION, SOLUTION INTRAVENOUS; SUBCUTANEOUS at 07:04

## 2019-04-02 RX ADMIN — PANTOPRAZOLE SODIUM 40 MG: 40 TABLET, DELAYED RELEASE ORAL at 09:04

## 2019-04-02 RX ADMIN — INSULIN HUMAN 10 UNITS: 100 INJECTION, SOLUTION PARENTERAL at 12:04

## 2019-04-02 RX ADMIN — SODIUM CHLORIDE, SODIUM LACTATE, POTASSIUM CHLORIDE, AND CALCIUM CHLORIDE: .6; .31; .03; .02 INJECTION, SOLUTION INTRAVENOUS at 02:04

## 2019-04-02 RX ADMIN — IOHEXOL 75 ML: 350 INJECTION, SOLUTION INTRAVENOUS at 01:04

## 2019-04-02 RX ADMIN — ASPIRIN 81 MG: 81 TABLET, COATED ORAL at 09:04

## 2019-04-02 RX ADMIN — SODIUM CHLORIDE 4 UNITS/HR: 9 INJECTION, SOLUTION INTRAVENOUS at 03:04

## 2019-04-02 RX ADMIN — NICOTINE 1 PATCH: 21 PATCH, EXTENDED RELEASE TRANSDERMAL at 09:04

## 2019-04-02 NOTE — ED PROVIDER NOTES
Encounter Date: 2019       History     Chief Complaint   Patient presents with    Multiple Complaints     falling for few days, urinating on self for past few days, slurred speech since sat, seen in er on      This is a 53 yo female with PMHx of HTN, HLP, uncontrolled DM (Hgb A1c 14.4) presented here with daughter complaining of slurred speech since last week, urinary incontinence and lower extremity weakness and problem with walking starting from yesterday. She has been in [facility] ED with RSW and right hip pain problem and received tramadol for possible sciatica and plan to follow up with her primary care physician. Per daughter her pain has not changed. She also mentions weight loss (80#) over last 6 months  She was not able to ambulate and was brought on a WC today. She denies trauma, fever, chills, numbness, back pain, no urinary symptoms except polyuria which she refers to her DM.  She is alert and oriented and there is no FND at my physixcal exam.        Review of patient's allergies indicates:   Allergen Reactions    Sulfur      Past Medical History:   Diagnosis Date    Diabetes mellitus type I     Hyperlipidemia     Hypertension      Past Surgical History:   Procedure Laterality Date     SECTION       Family History   Problem Relation Age of Onset    Hypertension Mother     Stroke Mother     Hyperlipidemia Mother     Diabetes Mother     Hypertension Sister     Cancer Sister     Hyperlipidemia Sister     Diabetes Sister     Hypertension Brother     Hyperlipidemia Brother     Diabetes Brother     Heart disease Maternal Aunt     Diabetes Maternal Aunt      Social History     Tobacco Use    Smoking status: Current Every Day Smoker     Types: Cigarettes   Substance Use Topics    Alcohol use: Not on file    Drug use: Not on file     Review of Systems   Constitutional: Positive for activity change and unexpected weight change. Negative for appetite change, chills, diaphoresis,  fatigue and fever.   HENT: Negative for drooling, tinnitus, trouble swallowing and voice change.    Respiratory: Negative for cough, shortness of breath, wheezing and stridor.    Cardiovascular: Negative for chest pain, palpitations and leg swelling.   Gastrointestinal: Negative for abdominal distention, abdominal pain, constipation, diarrhea, nausea and vomiting.   Endocrine: Positive for polyuria.   Genitourinary: Negative for decreased urine volume, dysuria, flank pain, frequency, hematuria, pelvic pain and urgency.   Musculoskeletal: Positive for arthralgias (right hip) and gait problem. Negative for back pain, myalgias, neck pain and neck stiffness.   Skin: Negative for rash and wound.   Neurological: Positive for speech difficulty and weakness. Negative for dizziness, tremors, facial asymmetry, light-headedness, numbness and headaches.   Psychiatric/Behavioral: Negative for agitation, behavioral problems and confusion.       Physical Exam     Initial Vitals [04/01/19 1807]   BP Pulse Resp Temp SpO2   (!) 190/127 98 16 98.8 °F (37.1 °C) 99 %      MAP       --         Physical Exam    Constitutional: She appears well-developed. She is not diaphoretic. No distress.   HENT:   Head: Atraumatic.   Eyes: Conjunctivae and EOM are normal. Pupils are equal, round, and reactive to light.   Neck: Normal range of motion. Neck supple. No JVD present.   Cardiovascular: Normal rate, regular rhythm and normal heart sounds.   Pulmonary/Chest: Breath sounds normal. No stridor. No respiratory distress. She has no wheezes. She has no rales.   Abdominal: Soft. Bowel sounds are normal. She exhibits no distension. There is no tenderness.   Musculoskeletal: Normal range of motion. She exhibits no edema or tenderness.   Neurological: She is alert and oriented to person, place, and time. She has normal reflexes. She displays normal reflexes. No cranial nerve deficit or sensory deficit.   Motor weakness in lower extremity 4/5 BL   Skin:  Skin is warm and dry. Capillary refill takes less than 2 seconds. No rash noted. No erythema.         ED Course   Procedures  Labs Reviewed   CBC W/ AUTO DIFFERENTIAL   COMPREHENSIVE METABOLIC PANEL   ALCOHOL,MEDICAL (ETHANOL)   DRUG SCREEN PANEL, URINE EMERGENCY   PROTIME-INR     EKG Readings: (Independently Interpreted)   Initial Reading: No STEMI. Rhythm: Normal Sinus Rhythm.       Imaging Results    None       X-Rays:   Independently Interpreted Readings:   Head CT: No acute large vascular territory infarct or intracranial hemorrhage identified.  Further evaluation/follow-up as warranted.    Periventricular white matter hypoattenuation, likely sequela of chronic small vessel ischemic change.    Few more focal areas of hypoattenuation at the right basal ganglia, left internal capsule and left caudate suggesting age-indeterminate lacunar type infarcts.     Medical Decision Making:   Differential Diagnosis:   - Stroke  - UTI  - Delirium 2/2 UTI/opioid        Clinical Tests:   Lab Tests: Ordered and Reviewed  Radiological Study: Ordered and Reviewed                       Clinical Impression:       ICD-10-CM ICD-9-CM   1. Weakness R53.1 780.79         Disposition:   Disposition: Admitted                        Halima Whitfield MD  Resident  04/01/19 2799

## 2019-04-02 NOTE — PLAN OF CARE
Problem: Occupational Therapy Goal  Goal: Occupational Therapy Goal  Goals to be met by: 7 days (4/9/19)     Patient will increase functional independence with ADLs by performing:    UE Dressing with Stand-by Assistance.  LE Dressing with Contact Guard Assistance.  Grooming while standing at sink with Contact Guard Assistance.  Toileting from toilet with Minimal Assistance for hygiene and clothing management.   Sitting at edge of bed x10 minutes with Stand-by Assistance.  Supine to sit with Contact Guard Assistance.  Toilet transfer to toilet with Contact Guard Assistance.  Increased functional strength to WFL for ADLs.  Pt will follow 5/5 two-step commands to complete ADL task.    Outcome: Ongoing (interventions implemented as appropriate)  Eval and POC set 4/2/19

## 2019-04-02 NOTE — PROGRESS NOTES
Stroke book individualized and reviewed with pt and family members in room. Live in family member not present. Instructed pt and family on importance of quitting smoking to prevent future strokes and worsening of health.Also instructed on importance of low salt , low fat diet and medication compliance . Instructed on when to call 911 :sudden severe H/A, numbness, weakness, trouble seeing , speaking,understanding and with movement. Family verbalized understanding, pt needs reinforcement.stroke book at bedside.

## 2019-04-02 NOTE — ASSESSMENT & PLAN NOTE
-UA + for trichomonas.   -Pt received metronidazole 2 g x 1 dose  -Consider HIV test in this patient w/ STI, recent weight loss, UDS + for cocaine  -Discussed with patient importance of informing partners, not naming any to contact at this time

## 2019-04-02 NOTE — SUBJECTIVE & OBJECTIVE
Past Medical History:   Diagnosis Date    Diabetes mellitus type I     Hyperlipidemia     Hypertension      Past Surgical History:   Procedure Laterality Date     SECTION       Family History   Problem Relation Age of Onset    Hypertension Mother     Stroke Mother     Hyperlipidemia Mother     Diabetes Mother     Hypertension Sister     Cancer Sister     Hyperlipidemia Sister     Diabetes Sister     Hypertension Brother     Hyperlipidemia Brother     Diabetes Brother     Heart disease Maternal Aunt     Diabetes Maternal Aunt      Social History     Tobacco Use    Smoking status: Current Every Day Smoker     Packs/day: 1.50     Years: 35.00     Pack years: 52.50     Types: Cigarettes    Smokeless tobacco: Never Used   Substance Use Topics    Alcohol use: Not Currently    Drug use: Not Currently     Review of patient's allergies indicates:   Allergen Reactions    Sulfur        Medications: I have reviewed the current medication administration record.    Current Facility-Administered Medications on File Prior to Encounter   Medication Dose Route Frequency Provider Last Rate Last Dose    cefTRIAXone injection 500 mg  500 mg Intramuscular 1 time in Clinic/HOD Alireza Sosa MD        cefTRIAXone injection 500 mg  500 mg Intramuscular 1 time in Clinic/HOD Alireza Sosa MD        cefTRIAXone injection 500 mg  500 mg Intramuscular 1 time in Clinic/HOD Alireza Sosa MD         Current Outpatient Medications on File Prior to Encounter   Medication Sig Dispense Refill    albuterol (ACCUNEB) 1.25 mg/3 mL Nebu USE ONE VIAL in Nebulizer EVERY 6 HOURS AS NEEDED 75 mL 2    albuterol (ACCUNEB) 1.25 mg/3 mL Nebu INHALE ONE VIAL per Nebulizer EVERY 6 HOURS AS NEEDED 75 mL 1    albuterol (ACCUNEB) 1.25 mg/3 mL Nebu USE ONE VIAL in Nebulizer EVERY 6 HOURS AS NEEDED 75 mL 1    albuterol (ACCUNEB) 1.25 mg/3 mL Nebu INHALE ONE VIAL per Nebulizer EVERY 6 HOURS AS NEEDED 75 mL 1    albuterol  (PROAIR HFA) 90 mcg/actuation inhaler inhale ONE TO TWO puffs EVERY 6 HOURS into lungs AS NEEDED FOR WHEEZING AND SHORTNESS OF BREATH 8.5 g 1    albuterol (PROVENTIL) 2.5 mg /3 mL (0.083 %) nebulizer solution       albuterol (PROVENTIL) 2.5 mg /3 mL (0.083 %) nebulizer solution INHALE ONE VIAL per Nebulizer EVERY 6 HOURS AS NEEDED 180 mL 2    albuterol (VENTOLIN HFA) 90 mcg/actuation inhaler Inhale 1-2 puffs into the lungs every 6 (six) hours as needed for Wheezing or Shortness of Breath. 18 g 1    amlodipine-valsartan (EXFORGE)  mg per tablet Take 1 tablet by mouth once daily. 60 tablet 3    amlodipine-valsartan-hcthiazid -25 mg Tab Take 1 tablet by mouth once daily. 30 tablet 2    amLODIPine-valsartan-hcthiazid -25 mg Tab Take 1 tablet by mouth once daily. 30 tablet 3    azithromycin (Z-JEANNIE) 250 MG tablet Take 1 tablet (250 mg total) by mouth once daily. 1 pack, take as directed 6 tablet 0    azithromycin (Z-JEANNIE) 250 MG tablet Take 1 tablet (250 mg total) by mouth once daily. 1 pack, take as directed 6 tablet 0    azithromycin (Z-JEANNIE) 250 MG tablet Take 1 tablet (250 mg total) by mouth once daily. 1 pack, take as directed 6 tablet 0    blood sugar diagnostic (BLOOD GLUCOSE TEST) Strp 1 each by Misc.(Non-Drug; Combo Route) route 3 (three) times daily. 100 each 3    blood sugar diagnostic (BLOOD GLUCOSE TEST) Strp 1 each by Misc.(Non-Drug; Combo Route) route 3 (three) times daily. 100 each 3    blood sugar diagnostic (TRUE METRIX GLUCOSE TEST STRIP) Strp test THREE TIMES A  each 2    blood-glucose meter (FREESTYLE SYSTEM KIT) kit Use daily- TID 1 each 1    blood-glucose meter (FREESTYLE SYSTEM KIT) kit Use daily to TID as directed 1 each 1    blood-glucose meter (FREESTYLE SYSTEM KIT) kit Use tid as directed 1 each 1    blood-glucose meter (TRUE METRIX GLUCOSE METER) Misc Use as directed 30 each 1    BRISDELLE 7.5 mg Cap       dapagliflozin 5 mg Tab Take 5 mg by mouth once  daily. 30 tablet 3    dicyclomine (BENTYL) 20 mg tablet Take 1 tablet (20 mg total) by mouth every 6 (six) hours. 60 tablet 3    diphenoxylate-atropine 2.5-0.025 mg (LOMOTIL) 2.5-0.025 mg per tablet Take 1 tablet by mouth 3 (three) times daily as needed for Diarrhea. 30 tablet 2    dulaglutide (TRULICITY) 0.75 mg/0.5 mL PnIj Inject 0.75 mg into the skin every 7 days. 0.5 mL 1    etodolac (LODINE) 400 MG tablet Take 1 tablet (400 mg total) by mouth 2 (two) times daily. 30 tablet 0    fluoxetine (PROZAC) 20 MG capsule       fluoxetine (PROZAC) 20 MG tablet Take 1 tablet (20 mg total) by mouth once daily. 30 tablet 2    fluticasone (FLONASE) 50 mcg/actuation nasal spray ONE SPRAY EACH NOSTRIL EVERY DAY 16 g 1    fluticasone (FLONASE) 50 mcg/actuation nasal spray ONE TO TWO SPRAY EACH NOSTRIL EVERY DAY 16 g 0    fluticasone (FLONASE) 50 mcg/actuation nasal spray ONE TO TWO SPRAY EACH NOSTRIL EVERY DAY 16 g 1    fluticasone (FLONASE) 50 mcg/actuation nasal spray USE ONE TO TWO sprays in EACH NOSTRIL DAILY 15.8 mL 2    fluticasone-salmeterol 100-50 mcg/dose (ADVAIR DISKUS) 100-50 mcg/dose diskus inhaler Inhale 1 Puff  by mouth TWICE DAILY 60 each 1    glipiZIDE (GLUCOTROL) 10 MG tablet Take 1 tablet (10 mg total) by mouth 2 (two) times daily before meals. 60 tablet 2    glipiZIDE (GLUCOTROL) 10 MG tablet Take 1 tablet (10 mg total) by mouth 2 (two) times daily before meals. 60 tablet 2    glipiZIDE (GLUCOTROL) 10 MG tablet Take 1 tablet (10 mg total) by mouth 2 (two) times daily. 60 tablet 11    hydrocodone-acetaminophen 10-325mg (NORCO)  mg Tab       hydrocodone-acetaminophen 5-325mg (NORCO) 5-325 mg per tablet Take 1 tablet by mouth daily as needed for Pain. 13 tablet 0    hydrOXYzine HCl (ATARAX) 25 MG tablet Take 1 tablet (25 mg total) by mouth nightly as needed for Itching. 30 tablet 1    hydrOXYzine HCl (ATARAX) 25 MG tablet Take 1 tablet (25 mg total) by mouth nightly as needed for Itching.  "30 tablet 2    hydrOXYzine pamoate (VISTARIL) 50 MG Cap TAKE 1 CAPSULE BY MOUTH EVERY EVENING 30 capsule 2    hydrOXYzine pamoate (VISTARIL) 50 MG Cap Take 1 capsule (50 mg total) by mouth every evening. 30 capsule 1    ibuprofen (ADVIL,MOTRIN) 800 MG tablet Take 1 tablet (800 mg total) by mouth 3 (three) times daily. 30 tablet 2    ibuprofen (ADVIL,MOTRIN) 800 MG tablet TAKE 1 TABLET BY MOUTH THREE TIMES A DAY 30 tablet 1    insulin (LANTUS SOLOSTAR U-100 INSULIN) glargine 100 units/mL (3mL) SubQ pen Inject 82 Units into the skin every evening. 15 mL 2    insulin glargine (LANTUS) 100 unit/mL injection Inject 80 Units into the skin every evening. 10 mL 3    insulin lispro (HUMALOG KWIKPEN INSULIN) 100 unit/mL InPn pen INJECT 40 UNITS under the skin THREE TIMES A DAY WITH MEALS 36 mL 2    insulin lispro (HUMALOG KWIKPEN INSULIN) 100 unit/mL pen INJECT 40 UNITS under the skin THREE TIMES A DAY WITH MEALS 36 mL 2    insulin lispro (HUMALOG KWIKPEN) 100 unit/mL InPn pen INJECT 40 UNITS under the skin THREE TIMES A DAY WITH MEALS 36 mL 2    insulin lispro (HUMALOG KWIKPEN) 100 unit/mL InPn pen INJECT 40 UNITS under the skin THREE TIMES A DAY WITH MEALS 36 mL 2    insulin lispro protam-lispro 100 unit/mL (50-50) InPn Inject into the skin.      insulin needles, disposable, (NOVOFINE 30) 30 x 1/3 " Ndle Inject 1 each into the skin 3 (three) times daily. 100 each 2    INVOKANA 100 mg Tab TAKE 1 TABLET BY MOUTH EVERY DAY 30 tablet 3    ketoconazole (NIZORAL) 2 % cream Apply 1 application topically 2 (two) times daily. Apply to affected area 100 g 0    ketoconazole 2 % Foam Apply 1 application topically 2 (two) times daily. 100 g 0    latanoprost 0.005 % ophthalmic solution       loratadine (CLARITIN) 10 mg tablet Take 1 tablet (10 mg total) by mouth once daily. 30 tablet 2    loratadine (CLARITIN) 10 mg tablet TAKE 1 TABLET BY MOUTH DAILY 30 tablet 2    lorazepam (ATIVAN) 1 MG tablet   3    metFORMIN " "(GLUCOPHAGE) 1000 MG tablet Take 1 tablet (1,000 mg total) by mouth 2 (two) times daily with meals. 60 tablet 3    metFORMIN (GLUCOPHAGE) 1000 MG tablet Take 1 tablet (1,000 mg total) by mouth 2 (two) times daily with meals. 60 tablet 6    metoprolol succinate (TOPROL-XL) 50 MG 24 hr tablet Take 1 tablet (50 mg total) by mouth once daily. 30 tablet 2    naproxen (NAPROSYN) 500 MG tablet Take 1 tablet (500 mg total) by mouth 2 (two) times daily with meals. 30 tablet 1    nicotine (NICODERM CQ) 21 mg/24 hr Place 1 patch onto the skin once daily. 28 patch 1    omeprazole (PRILOSEC) 40 MG capsule Take 1 capsule (40 mg total) by mouth once daily. 30 capsule 2    omeprazole (PRILOSEC) 40 MG capsule Take 1 capsule (40 mg total) by mouth once daily. 30 capsule 2    ondansetron (ZOFRAN-ODT) 8 MG TbDL Take 1 tablet (8 mg total) by mouth every 12 (twelve) hours as needed. 30 tablet 0    oxycodone-acetaminophen (PERCOCET)  mg per tablet   0    promethazine (PHENERGAN) 25 MG tablet Take 1 tablet (25 mg total) by mouth every 8 (eight) hours as needed for Nausea. 30 tablet 2    promethazine (PHENERGAN) 25 MG tablet Take 1 tablet (25 mg total) by mouth every 6 (six) hours as needed for Nausea. 30 tablet 0    rosuvastatin (CRESTOR) 10 MG tablet Take 1 tablet (10 mg total) by mouth once daily. 30 tablet 2    rosuvastatin (CRESTOR) 10 MG tablet Take 1 tablet (10 mg total) by mouth once daily. 30 tablet 11    silver sulfADIAZINE 1% (SILVADENE) 1 % cream Apply topically 2 (two) times daily. 25 g 1    SURE COMFORT INSULIN SYRINGE 1/2 mL 30 x 1/2" Syrg       SURE COMFORT PEN NEEDLE 31 gauge x 3/16" Ndle   2    terconazole (TERAZOL 3) 0.8 % vaginal cream Place 1 applicator vaginally once daily. 20 g 1    TRADJENTA 5 mg Tab tablet Take 5 mg by mouth once daily.  2    traMADol (ULTRAM) 50 mg tablet Take 1 tablet (50 mg total) by mouth every 6 (six) hours as needed for Pain. 12 tablet 0    TRUE METRIX GLUCOSE TEST " "STRIP Strp TEST THREE TIMES A  each 1    ULTICARE PEN NEEDLE 32 gauge x 5/32" Ndle USE THREE TIMES A DAY 30 each 2    varenicline (CHANTIX JEANNIE) 0.5 mg (11)- 1 mg (42) tablet Take by mouth 2 (two) times daily. Take one 0.5mg tablet by mouth once daily for 3 days, then increase to one 0.5mg tablet twice daily for 4 days, then increase to one 1mg tablet twice daily.      diazePAM (VALIUM) 10 MG Tab Take 1 tablet (10 mg total) by mouth once daily. 30 tablet 0    diclofenac sodium 1 % Gel Apply 2 g topically 4 (four) times daily. 100 g 1    SITagliptin (JANUVIA) 100 MG Tab Take 1 tablet (100 mg total) by mouth once daily. 30 tablet 3         Review of Systems   Constitutional: Negative for chills and fever.   HENT: Negative for drooling.    Eyes: Negative for discharge, redness and visual disturbance.   Respiratory: Positive for cough (intermittent) and shortness of breath (intermittent).    Cardiovascular: Negative for chest pain, palpitations and leg swelling.   Gastrointestinal: Negative for abdominal pain, diarrhea, nausea and vomiting.   Endocrine: Negative for cold intolerance and heat intolerance.   Genitourinary: Negative for hematuria.        Urinary incontinence     Musculoskeletal: Positive for arthralgias, back pain and gait problem. Negative for neck pain and neck stiffness.   Skin: Negative for rash.   Allergic/Immunologic: Negative for environmental allergies and food allergies.   Neurological: Positive for facial asymmetry, speech difficulty and weakness. Negative for dizziness, tremors, light-headedness, numbness and headaches.   Hematological: Negative for adenopathy.   Psychiatric/Behavioral: Positive for agitation (intermittent).     Objective:     Vital Signs (Most Recent):  Temp: 98.8 °F (37.1 °C) (04/01/19 1807)  Pulse: 83 (04/01/19 2058)  Resp: 16 (04/01/19 2058)  BP: (!) 218/113 (04/01/19 2019)  SpO2: 98 % (04/01/19 2058)    Vital Signs Range (Last 24H):  Temp:  [98.8 °F (37.1 °C)] "   Pulse:  [83-98]   Resp:  [16-34]   BP: (190-223)/(113-127)   SpO2:  [93 %-99 %]     Physical Exam   Constitutional: She is oriented to person, place, and time. She appears well-developed and well-nourished. No distress.   HENT:   Head: Normocephalic and atraumatic.   Right Ear: External ear normal.   Left Ear: External ear normal.   Nose: Nose normal.   Mouth/Throat: Oropharynx is clear and moist. No oropharyngeal exudate.   Eyes: Pupils are equal, round, and reactive to light. Conjunctivae and EOM are normal. Right eye exhibits no discharge. Left eye exhibits no discharge. No scleral icterus.   Neck: Normal range of motion. Neck supple. No thyromegaly present.   Cardiovascular: Normal rate and regular rhythm.   Pulmonary/Chest: Effort normal and breath sounds normal. She has no rhonchi.   Abdominal: Soft. Bowel sounds are normal. She exhibits no distension. There is no hepatosplenomegaly. There is no tenderness.   Musculoskeletal: She exhibits no edema.   Lymphadenopathy:     She has no cervical adenopathy.   Neurological: She is alert and oriented to person, place, and time.   Skin: Skin is warm and dry. Capillary refill takes less than 2 seconds. She is not diaphoretic.   Psychiatric: She has a normal mood and affect.   Nursing note and vitals reviewed.      Neurological Exam:   LOC: alert  Attention Span: Good   Language: No aphasia  Articulation: Dysarthria  Orientation: Person, Place, Time   Visual Fields: Full  EOM (CN III, IV, VI): Full/intact  Pupils (CN II, III): PERRL  Facial Movement (CN VII): Lower facial weakness on the Right  Motor: Arm left  Normal 5/5  Leg left  Paresis: 4/5  Arm right  Normal 5/5  Leg right Paresis: 4/5  Sensation: Intact to light touch, temperature and vibration      Laboratory:  CMP:   Recent Labs   Lab 04/01/19 2053   CALCIUM 10.4   ALBUMIN 3.5   PROT 7.4      K 4.3   CO2 23      BUN 11   CREATININE 1.3   ALKPHOS 116   ALT 11   AST 11   BILITOT 0.5     CBC:    Recent Labs   Lab 04/01/19  1950   WBC 10.77   RBC 5.09   HGB 14.3   HCT 42.8      MCV 84   MCH 28.1   MCHC 33.4     Lipid Panel: No results for input(s): CHOL, LDLCALC, HDL, TRIG in the last 168 hours.  Coagulation:   Recent Labs   Lab 04/01/19 1950   INR 0.9     Hgb A1C: No results for input(s): HGBA1C in the last 168 hours.  TSH: No results for input(s): TSH in the last 168 hours.    Diagnostic Results:      Brain imaging:  CTH 04/01/2019 No acute large vascular territory infarct or intracranial hemorrhage identified.  Further evaluation/follow-up as warranted.  Periventricular white matter hypoattenuation, likely sequela of chronic small vessel ischemic change.  Few more focal areas of hypoattenuation at the right basal ganglia, left internal capsule and left caudate suggesting age-indeterminate lacunar type infarcts.  Correlate clinically.    MRI Brain 04/01/2019 Advanced involutional change.  Acute infarcts left putamen, posterolateral left thalamus and left corona radiata and deep white matter adjacent to the left ventricular trigone.  Remote lacunar infarcts left cerebellum, right thalamus, right external capsule, right corona radiata, bilateral basal ganglia and bilateral deep frontal white matter.  Supratentorial and pontine white matter T2/flair hyperintense signal foci suggesting sequela of chronic small vessel ischemic change.  Left sphenoid sinus disease.        Vessel Imaging:  CTA Head and Neck pending    Cardiac Evaluation:   2D Echo pending

## 2019-04-02 NOTE — PLAN OF CARE
Problem: SLP Goal  Goal: SLP Goal  Outcome: Ongoing (interventions implemented as appropriate)  Regular diet with thin liquids recommended.    Nikia Rodriguez MA/VALDEMAR-SLP  Speech Language Pathologist  Pager [phone]  4/2/2019

## 2019-04-02 NOTE — CONSULTS
Inpatient consult to Physical Medicine Rehab  Consult performed by: CRISTIANE Miguel  Consult ordered by: Carmen Garg, DIAZ, NP  Reason for consult: assess rehab needs      Consult received.  Reviewed patient history and current admission.  Rehab team following.  Full consult to follow.    DALE Gracia, YVONP-C  Physical Medicine & Rehabilitation   04/02/2019  Spectralink: [phone]

## 2019-04-02 NOTE — HPI
54 y.o. female with significant past medical history of DM, HTN, HLD presented to hospital with multiple medical complaints.  The patient has been having slurred speech, BLE weakness, and urinary incontinence since 3/23.  LE weakness became progressively worse with the patient having difficulty/inability to walk for the last 1-2 days.  MRI brain revealed acute infarctions in posterolateral thalamus, territory of PCA. No tPA due to the patient being outside of the treatment window.  No LVO, no intervention at this time. HM team was consulted because of high blood glucose level and co management.

## 2019-04-02 NOTE — HOSPITAL COURSE
04/01/19:  Admission to [facility].  UDS + for cocaine, +DKA, presents with sxs of dysarthria, BLE weakness.    04/02/19:  DKA ongoing, insulin gtt started.  Will get IM consult ordered o/n.  04/03/19:  Mild R pronator drift on exam. BG > 400, IM adjusting insulin. Restarted BP medications.   04/04/19:  Choreiform movements improving, asking movement disorder MD for input.  Plan for inpatient rehab.  04/05/19:  Pending inpatient rehab placement.  Concern for embolic infarcts, may consider ESUS study enrollment.  04/06/19:  Not a candidate for ESUS 2/2 cocaine.  Still working on BG control, discussed with Hospital Medicine team.  04/07/19:  Patient stable this am, pending inpatient rehab placement.  BG still upper 200s, IM titrating insulin.  04/08/19: No acute events overnight. Neuro exam stable. Blood glucose remain elevated. Patient counseled on the importance of adhering to diabetic diet restrictions.   04/09/19: Plan to discharge to rehab today. Ambulatory referral to Endocrinology ordered.

## 2019-04-02 NOTE — PT/OT/SLP EVAL
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  She presents with performance deficits including weakness, impaired endurance, impaired self care skills, impaired functional mobilty, impaired balance, decreased upper extremity function, decreased lower extremity function, decreased safety awareness, decreased coordination. Pt would continue to benefit from OT to increase functional independence and safety and return to PLOF.    Rehab Prognosis: Good; patient would benefit from acute skilled OT services to address these deficits and reach maximum level of function.       Plan:     Patient to be seen 4 x/week to address the above listed problems via self-care/home management, therapeutic activities, therapeutic exercises, neuromuscular re-education  · Plan of Care Expires: 05/02/19  · Plan of Care Reviewed with: patient, sibling    Subjective     Chief Complaint: Needing to have BM  Patient/Family Comments/goals: Rehab per family    Occupational Profile:  Living Environment: Pt lives with her step-father in apt with 15 steps and tub/shower combo.  Previous level of function: (I) with ADLs and mobility; cares for her step-father, does not drive or work  Equipment Used at Home:  none  Assistance upon Discharge: Unavailable as step-father unable to provide physical assistance and her children work; pt's brother recently moved to town to assist with step-father    Pain/Comfort:  · Pain Rating 1: 0/10  · Pain Rating Post-Intervention 1: 0/10    Patients  cultural, spiritual, Buddhism conflicts given the current situation: no    Objective:     Communicated with: RN prior to session.  Patient found left sidelying with telemetry, bed alarm, peripheral IV upon OT entry to room, brother present.    General Precautions: Standard, aspiration, fall, seizure   Orthopedic Precautions:N/A   Braces: N/A     Occupational Performance:    Bed Mobility:    · Patient completed Scooting with stand by assistance in supine to HOB; used B UEs and overhead bed rails  · Patient completed Supine to Sit with moderate assistance and increased time  · Patient completed Sit to Supine with minimum assistance    Functional Mobility/Transfers:  · Patient completed Sit <> Stand Transfer with moderate assistance with no assistive device from EOB   · Functional Mobility: Few side steps along EOB with moderate assistance hand-held assist; pt easily distracted, had difficulty following commands to take side steps; unsteady and perseverating on walking to the bathroom to have BM; further mobility deferred as pt was unsteady and needed return to bed to use bed pan    Activities of Daily Living:  · Upper Body Dressing: moderate assistance to don gown around back  · Lower Body Dressing: moderate assistance to don/doff socks    Cognitive/Visual Perceptual:  Cognitive/Psychosocial Skills:     -       Oriented to: Person, Place and Situation (not year)  -       Follows Commands/attention: Inattentive, Easily distracted and Follows one-step commands  -       Communication: tangential speech; delayed responses  -       Safety awareness/insight to disability: impaired   Visual/Perceptual: TRISTIAN    Physical Exam:  Balance:    -       fair-poor sitting and standing balance with multidirectional instability especially when sitting  Postural examination/scapula alignment:    -       Rounded shoulders  Motor Planning:    -       impaired  Upper Extremity Range of Motion:     -       Right Upper Extremity: WFL  -        Left Upper Extremity: WFL  Upper Extremity Strength:    -       Right Upper Extremity: WFL based on observation  -       Left Upper Extremity: WFL based on observation   Strength: WFL  Fine Motor Coordination: Impaired opposition; delayed    AMPAC 6 Click ADL:  AMPAC Total Score: 13    Treatment & Education:  OT eval; educated pt and family on OT role and POC, will require ongoing education  Education:    Patient left HOB elevated with all lines intact, call button in reach, bed alarm on, RN notified and family present    GOALS:   Multidisciplinary Problems     Occupational Therapy Goals        Problem: Occupational Therapy Goal    Goal Priority Disciplines Outcome Interventions   Occupational Therapy Goal     OT, PT/OT Ongoing (interventions implemented as appropriate)    Description:  Goals to be met by: 7 days (19)     Patient will increase functional independence with ADLs by performing:    UE Dressing with Stand-by Assistance.  LE Dressing with Contact Guard Assistance.  Grooming while standing at sink with Contact Guard Assistance.  Toileting from toilet with Minimal Assistance for hygiene and clothing management.   Sitting at edge of bed x10 minutes with Stand-by Assistance.  Supine to sit with Contact Guard Assistance.  Toilet transfer to toilet with Contact Guard Assistance.  Increased functional strength to WFL for ADLs.  Pt will follow 5/5 two-step commands to complete ADL task.                      History:     Past Medical History:   Diagnosis Date    Diabetes mellitus type I     Hyperlipidemia     Hypertension        Past Surgical History:   Procedure Laterality Date     SECTION         Time Tracking:     OT Date of Treatment: 19  OT Start Time: 1135  OT Stop Time: 1153  OT Total Time (min): 18 min    Billable Minutes:Evaluation 18 minutes    FLAKITO Lopez  2019

## 2019-04-02 NOTE — ASSESSMENT & PLAN NOTE
- HgA1c was non-measurable (>14.0). No gap, normal bicarb level, normal pH 7.35.  - Started on insulin gtt with POCT glu Q1h.  - Started on diabetic diet given no hx of dysphagia or aspiration.  - Will switch to detemir 18U BID and HDSSI.

## 2019-04-02 NOTE — CONSULTS
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  [facility] for slurred speech, BLE R>L weakness, and urinary incontinence since 3/23.  On arrival, evaluated by Vascular Neurology.  CTH concerning for L lacunar infarcts.  Not tPA or interventional candidate.  MRI brain revealed acute L putamen, L posterolateral thalamic, and L corona radiata infarcts.     Functional History: Patient lives in [place] with her father in a single story home with 6 steps to enter.  She was (I) with ADLs and mobility until 3/23.  Functional decline at that time 2/2 BLE weakness and gait instability with falls.  She is right handed.  DME: none.    Hospital Course: 19:  Evaluated by SLP.  Found to have dysarthria and cognitive-linguistic impairment.  SLP recommendation: regular diet and thin liquids.  PT and OT evaluations pending.      Past Medical History:   Diagnosis Date    Diabetes mellitus type I     Hyperlipidemia     Hypertension      Past Surgical History:   Procedure Laterality Date     SECTION       Review of patient's allergies indicates:   Allergen Reactions    Sulfur        Scheduled Medications:    aspirin  81 mg Oral Daily    atorvastatin  40 mg Oral Daily    heparin (porcine)   5,000 Units Subcutaneous Q8H    nicotine  1 patch Transdermal Daily    pantoprazole  40 mg Oral Daily       PRN Medications: acetaminophen, albuterol-ipratropium, dextrose 10 % in water (D10W), dextrose 10 % in water (D10W), dextrose 50%, dextrose 50%, hydrALAZINE, ondansetron, ondansetron, sodium chloride 0.9%, sodium chloride 0.9%    Family History     Problem Relation (Age of Onset)    Cancer Sister    Diabetes Mother, Sister, Brother, Maternal Aunt    Heart disease Maternal Aunt    Hyperlipidemia Mother, Sister, Brother    Hypertension Mother, Sister, Brother    Stroke Mother        Tobacco Use    Smoking status: Current Every Day Smoker     Packs/day: 1.50     Years: 35.00     Pack years: 52.50     Types: Cigarettes    Smokeless tobacco: Never Used   Substance and Sexual Activity    Alcohol use: Not Currently    Drug use: Not Currently    Sexual activity: Not on file     Review of Systems   Constitutional: Positive for activity change. Negative for chills, fatigue and fever.   HENT: Negative for drooling, hearing loss, trouble swallowing and voice change.    Eyes: Negative for pain and visual disturbance.   Respiratory: Negative for cough, shortness of breath and wheezing.    Cardiovascular: Negative for chest pain and palpitations.   Gastrointestinal: Negative for abdominal distention, nausea and vomiting.   Genitourinary: Negative for difficulty urinating and flank pain.   Musculoskeletal: Positive for gait problem and myalgias. Negative for back pain and neck pain.   Skin: Negative for rash and wound.   Neurological: Positive for speech difficulty and weakness. Negative for dizziness, numbness and headaches.   Psychiatric/Behavioral: Negative for agitation and hallucinations. The patient is not nervous/anxious.      Objective:     Vital Signs (Most Recent):  Temp: 97.2 °F (36.2 °C) (04/02/19 0826)  Pulse: 81 (04/02/19 0826)  Resp: 17 (04/02/19 0826)  BP: (!) 184/106 (04/02/19 0826)  SpO2: 97 %  (19 0804)    Vital Signs (24h Range):  Temp:  [97.2 °F (36.2 °C)-98.9 °F (37.2 °C)] 97.2 °F (36.2 °C)  Pulse:  [] 81  Resp:  [14-34] 17  SpO2:  [93 %-100 %] 97 %  BP: (150-223)/() 184/106     Body mass index is 25.89 kg/m².    Physical Exam   Constitutional: She is oriented to person, place, and time. She appears well-developed and well-nourished. No distress.   HENT:   Head: Normocephalic and atraumatic.   Right Ear: External ear normal.   Left Ear: External ear normal.   Nose: Nose normal.   Eyes: Right eye exhibits no discharge. Left eye exhibits no discharge. No scleral icterus.   Neck: Normal range of motion.   Cardiovascular: Normal rate, regular rhythm and intact distal pulses.   Pulmonary/Chest: Effort normal. No respiratory distress. She has no wheezes.   Abdominal: Soft. She exhibits no distension. There is no tenderness.   Musculoskeletal: Normal range of motion. She exhibits no edema or tenderness.   Neurological: She is alert and oriented to person, place, and time. No sensory deficit. She exhibits normal muscle tone.   -  Mental Status:  AAOx3.  Follows commands.  Answers correct age and .     -  Speech and language:  no aphasia, + dysarthria.    -  Vision:  no hemianopsia or ptosis.    -  Motor:  BUE: 5/5.  BLE weakness R>L.  Exam limited by effort and participation.    Skin: Skin is warm and dry. No rash noted.   Psychiatric: She has a normal mood and affect. Her behavior is normal. Cognition and memory are impaired.   Vitals reviewed.    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      Recommendations  -  Encourage mobility, OOB in chair, and early ambulation as appropriate   -  PT/OT evaluate and treat  -  SLP speech and cognitive evaluate and treat  -  Monitor mood and sleep disturbances  -  Establish consistent sleep-wake cycle  -  Monitor for bowel and bladder dysfunction  -  Monitor for shoulder pain and subluxation  -  Monitor for spasticity  -  DVT prophylaxis  -  Monitor for and prevent skin breakdown and pressure ulcers  · Early mobility, repositioning/weight shifting every 20-30 minutes when sitting, turn patient every 2 hours, proper mattress/overlay and chair cushioning, pressure relief/heel protector boots    Weakness of both lower extremities  -  2/2 stroke  Recommendations  -  PT and OT evaluate and treat  -  Monitor for shoulder pain and subluxation  -  Monitor for spasticity  -  Monitor for skin breakdown and pressure ulcers    Smoker  -  Stroke risk factor  -  Encourage cessation     Uncontrolled type 2 diabetes mellitus with hyperglycemia  -  Stroke risk factor  -  Management per Vascular Neurology--> on insulin gtt    PT and OT evaluations pending.  Will follow for final post-acute recommendation.    Thank you for your consult.     CRISTIANE Turcios  Department of Physical Medicine & Rehab  [facility]

## 2019-04-02 NOTE — PLAN OF CARE
04/02/19 1551   Post-Acute Status   Post-Acute Authorization Placement   Post-Acute Placement Status Referrals Sent       Pt and family chose [facility]. Referral sent through [facility].  SW in contact with CM and Medical staff. Will continue to follow and offer support as needed.     Dane Reed LMSW  [facility]   Ext. [phone]

## 2019-04-02 NOTE — ASSESSMENT & PLAN NOTE
-Patient w/BLE weakness that has gotten progressively worse.  +urinary incontinence, LBP.  -Tylenol prn for pain for now  -MRI L spine w/o contrast pending  -Consulted PT/OT/PM&R, appreciate recs

## 2019-04-02 NOTE — ASSESSMENT & PLAN NOTE
- Home meds list showed she is on Azithro and CTX. However, pt denies taking neither of them.  - Will give one dose of oral Azithro 1g.

## 2019-04-02 NOTE — H&P
[facility]  Vascular Neurology  Comprehensive Stroke Center  History & Physical    Inpatient consult to Vascular (Stroke) Neurology  Consult performed by: Carmen Garg DNP, NP  Consult ordered by: Carmen Garg DNP, NP  Reason for consult: dysarthria, LE weakness        Assessment/Plan:   * Embolic stroke involving left posterior cerebral artery  54 y.o. female with significant past medical history of DM, HTN, HLD presented to hospital with multiple medical complaints.  The patient has been having slurred speech, BLE weakness, and urinary incontinence since 3/23.  LE weakness became progressively worse with the patient having difficulty/inability to walk for the last 1-2 days.  MRI brain revealed acute infarctions.  No tPA due to the patient being outside of the treatment window.  No LVO, no intervention at this time.    Antithrombotics for secondary stroke prevention: Antiplatelets: Aspirin: 81 mg daily    Statins for secondary stroke prevention and hyperlipidemia, if present:   Statins: Atorvastatin- 40 mg daily    Aggressive risk factor modification: HTN, Smoking, DM, HLD, possible illicit substance use/abuse     Rehab efforts: PT/OT/SLP to evaluate and treat, PM&R consult     Diagnostics ordered/pending: CTA Head to assess vasculature , CTA Neck/Arch to assess vasculature, HgbA1C to assess blood glucose levels, Lipid Profile to assess cholesterol levels, TTE to assess cardiac function/status , TSH to assess thyroid function    VTE prophylaxis: Heparin 5000 units SQ every 8 hours  SCDs    BP parameters: Infarct: No intervention, SBP <220       -Hospital Medicine consulted for co-management in this patient with multiple co-morbidities.  Appreciate the consult and recs.        Uncontrolled type 2 diabetes mellitus with hyperglycemia  -Stroke risk factor.  Last A1c in August 2018 was 14.  Patient reports glucose at home has been >500.  -Glucose 491 in the ED.  1L NS bolus, 10 units  "insulin ordered.  -beta-hydroxybuterate, A1c pending  -Patient stated she takes 36 u Lantus in the AM w/Novolog pre meal and SSI (much higher doses noted on home med list)  -Lantus 18 u QAM, Novolog 3 u TID WM, moderate correction SSI.  Titrate prn.    Weakness of both lower extremities  -Patient w/BLE weakness that has gotten progressively worse.  +urinary incontinence and LBP.  -Patient c/o right hip and leg pain, currently prescribed tramadol  -Tylenol prn for pain for now  -MRI L spine pending  -PT/OT/PM&R eval pending      Trichomonas infection  -UA positive for trichomonas.  Review of the patient's medical record revealed the patient had a trichomonas infection in October 2015 with concern for possible resistant infection at that time.  -Flagyl 2g PO x1  -Consider HIV test in this patient w/STI, recent weight loss, UTox prelim + Cocaine    Mixed hyperlipidemia  -Stroke risk factor.  Last LDL in 2018 was 118.  Current LDL pending.  -Patient prescribed Crestor, non-compliant.  Atorvastatin 40 mg daily.    Smoker  -Stroke risk factor.  Patient endorses smoking 1.5 ppd x "at least" 30 years.  -Review of the patient's medical record revealed multiple medications for smoking cessation that the patient has not taken.  -Continue to encourage cessation  -Nicotine patch prn  -Patient had been prescribed Advair, Albuterol nebs/MDI in the past with no documentation of COPD  -SpO2>88%  -Duonebs prn    Essential hypertension  -Stroke risk factor.  SBP<220 for now.  -Resume home meds when appropriate        STROKE DOCUMENTATION          NIH Scale:  Interval: baseline  1a. Level of Consciousness: 0-->Alert, keenly responsive  1b. LOC Questions: 0-->Answers both questions correctly  1c. LOC Commands: 0-->Performs both tasks correctly  2. Best Gaze: 0-->Normal  3. Visual: 0-->No visual loss  4. Facial Palsy: 1-->Minor paralysis (flattened nasolabial fold, asymmetry on smiling)  5a. Motor Arm, Left: 0-->No drift, limb holds 90 " (or 45) degrees for full 10 secs  5b. Motor Arm, Right: 0-->No drift, limb holds 90 (or 45) degrees for full 10 secs  6a. Motor Leg, Left: 2-->Some effort against gravity, leg falls to bed by 5 secs, but has some effort against gravity  6b. Motor Leg, Right: 2-->Some effort against gravity, leg falls to bed by 5 secs, but has some effort against gravity  7. Limb Ataxia: 0-->Absent  8. Sensory: 0-->Normal, no sensory loss  9. Best Language: 0-->No aphasia, normal  10. Dysarthria: 1-->Mild-to-moderate dysarthria, patient slurs at least some words and, at worst, can be understood with some difficulty  11. Extinction and Inattention (formerly Neglect): 0-->No abnormality  Total (NIH Stroke Scale): 6     Modified Geronimo Score: 1  Doyle Coma Scale:15   ABCD2 Score:    [id number] Score:   HAS -BLED Score:   ICH Score:   Hunt & Mueller Classification:      Thrombolysis Candidate? No, Out of window       Interventional Revascularization Candidate?   Is the patient eligible for mechanical endovascular reperfusion (DUDLEY)?  No; No large vessel occlusion    Hemorrhagic change of an Ischemic Stroke: Does this patient have an ischemic stroke with hemorrhagic changes? No         Subjective:     History of Present Illness:  Patient is a 54 y.o. female with significant past medical history of DM, HTN, HLD presented to hospital with multiple medical complaints.  The patient has been having slurred speech, BLE weakness, and urinary incontinence since 3/23.  She was seen in the [facility] ED on 3/26 for RLE weakness/hip pain and received tramadol at that time with a plan to follow up with her PCP.  Her daughter states the patient's pain/weakness has progressed to the patient being unable to walk since yesterday.  The patient endorses having multiple falls prior to her presented to the ED for evaluation on 3/26.  She also c/o significant weight loss over the last 6 months.  The patient denies HA, dizziness, change in vision, numbness, CP,  "or N/V.  She endorses occasional SOB.  Nothing has alleviated her symptoms.    Currently the patient is c/o right hip/leg pain that she describes as "feels heavy".  ED workup included CTH that revealed findings concerning for left lacunar infarcts.  MRI brain obtained and revealed acute left PCA territory infarcts.  The patient will be admitted to Vascular Neurology.                Past Medical History:   Diagnosis Date    Diabetes mellitus type I     Hyperlipidemia     Hypertension      Past Surgical History:   Procedure Laterality Date     SECTION       Family History   Problem Relation Age of Onset    Hypertension Mother     Stroke Mother     Hyperlipidemia Mother     Diabetes Mother     Hypertension Sister     Cancer Sister     Hyperlipidemia Sister     Diabetes Sister     Hypertension Brother     Hyperlipidemia Brother     Diabetes Brother     Heart disease Maternal Aunt     Diabetes Maternal Aunt      Social History     Tobacco Use    Smoking status: Current Every Day Smoker     Packs/day: 1.50     Years: 35.00     Pack years: 52.50     Types: Cigarettes    Smokeless tobacco: Never Used   Substance Use Topics    Alcohol use: Not Currently    Drug use: Not Currently     Review of patient's allergies indicates:   Allergen Reactions    Sulfur        Medications: I have reviewed the current medication administration record.    Current Facility-Administered Medications on File Prior to Encounter   Medication Dose Route Frequency Provider Last Rate Last Dose    cefTRIAXone injection 500 mg  500 mg Intramuscular 1 time in Clinic/HOD Alireza Sosa MD        cefTRIAXone injection 500 mg  500 mg Intramuscular 1 time in Clinic/HOD Alireza Sosa MD        cefTRIAXone injection 500 mg  500 mg Intramuscular 1 time in Clinic/HOD Alireza Sosa MD         Current Outpatient Medications on File Prior to Encounter   Medication Sig Dispense Refill    albuterol (ACCUNEB) 1.25 mg/3 mL Nebu " USE ONE VIAL in Nebulizer EVERY 6 HOURS AS NEEDED 75 mL 2    albuterol (ACCUNEB) 1.25 mg/3 mL Nebu INHALE ONE VIAL per Nebulizer EVERY 6 HOURS AS NEEDED 75 mL 1    albuterol (ACCUNEB) 1.25 mg/3 mL Nebu USE ONE VIAL in Nebulizer EVERY 6 HOURS AS NEEDED 75 mL 1    albuterol (ACCUNEB) 1.25 mg/3 mL Nebu INHALE ONE VIAL per Nebulizer EVERY 6 HOURS AS NEEDED 75 mL 1    albuterol (PROAIR HFA) 90 mcg/actuation inhaler inhale ONE TO TWO puffs EVERY 6 HOURS into lungs AS NEEDED FOR WHEEZING AND SHORTNESS OF BREATH 8.5 g 1    albuterol (PROVENTIL) 2.5 mg /3 mL (0.083 %) nebulizer solution       albuterol (PROVENTIL) 2.5 mg /3 mL (0.083 %) nebulizer solution INHALE ONE VIAL per Nebulizer EVERY 6 HOURS AS NEEDED 180 mL 2    albuterol (VENTOLIN HFA) 90 mcg/actuation inhaler Inhale 1-2 puffs into the lungs every 6 (six) hours as needed for Wheezing or Shortness of Breath. 18 g 1    amlodipine-valsartan (EXFORGE)  mg per tablet Take 1 tablet by mouth once daily. 60 tablet 3    amlodipine-valsartan-hcthiazid -25 mg Tab Take 1 tablet by mouth once daily. 30 tablet 2    amLODIPine-valsartan-hcthiazid -25 mg Tab Take 1 tablet by mouth once daily. 30 tablet 3    azithromycin (Z-JEANNIE) 250 MG tablet Take 1 tablet (250 mg total) by mouth once daily. 1 pack, take as directed 6 tablet 0    azithromycin (Z-JEANNIE) 250 MG tablet Take 1 tablet (250 mg total) by mouth once daily. 1 pack, take as directed 6 tablet 0    azithromycin (Z-JEANNIE) 250 MG tablet Take 1 tablet (250 mg total) by mouth once daily. 1 pack, take as directed 6 tablet 0    blood sugar diagnostic (BLOOD GLUCOSE TEST) Strp 1 each by Misc.(Non-Drug; Combo Route) route 3 (three) times daily. 100 each 3    blood sugar diagnostic (BLOOD GLUCOSE TEST) Strp 1 each by Misc.(Non-Drug; Combo Route) route 3 (three) times daily. 100 each 3    blood sugar diagnostic (TRUE METRIX GLUCOSE TEST STRIP) Strp test THREE TIMES A  each 2    blood-glucose meter  (FREESTYLE SYSTEM KIT) kit Use daily- TID 1 each 1    blood-glucose meter (FREESTYLE SYSTEM KIT) kit Use daily to TID as directed 1 each 1    blood-glucose meter (FREESTYLE SYSTEM KIT) kit Use tid as directed 1 each 1    blood-glucose meter (TRUE METRIX GLUCOSE METER) Misc Use as directed 30 each 1    BRISDELLE 7.5 mg Cap       dapagliflozin 5 mg Tab Take 5 mg by mouth once daily. 30 tablet 3    dicyclomine (BENTYL) 20 mg tablet Take 1 tablet (20 mg total) by mouth every 6 (six) hours. 60 tablet 3    diphenoxylate-atropine 2.5-0.025 mg (LOMOTIL) 2.5-0.025 mg per tablet Take 1 tablet by mouth 3 (three) times daily as needed for Diarrhea. 30 tablet 2    dulaglutide (TRULICITY) 0.75 mg/0.5 mL PnIj Inject 0.75 mg into the skin every 7 days. 0.5 mL 1    etodolac (LODINE) 400 MG tablet Take 1 tablet (400 mg total) by mouth 2 (two) times daily. 30 tablet 0    fluoxetine (PROZAC) 20 MG capsule       fluoxetine (PROZAC) 20 MG tablet Take 1 tablet (20 mg total) by mouth once daily. 30 tablet 2    fluticasone (FLONASE) 50 mcg/actuation nasal spray ONE SPRAY EACH NOSTRIL EVERY DAY 16 g 1    fluticasone (FLONASE) 50 mcg/actuation nasal spray ONE TO TWO SPRAY EACH NOSTRIL EVERY DAY 16 g 0    fluticasone (FLONASE) 50 mcg/actuation nasal spray ONE TO TWO SPRAY EACH NOSTRIL EVERY DAY 16 g 1    fluticasone (FLONASE) 50 mcg/actuation nasal spray USE ONE TO TWO sprays in EACH NOSTRIL DAILY 15.8 mL 2    fluticasone-salmeterol 100-50 mcg/dose (ADVAIR DISKUS) 100-50 mcg/dose diskus inhaler Inhale 1 Puff  by mouth TWICE DAILY 60 each 1    glipiZIDE (GLUCOTROL) 10 MG tablet Take 1 tablet (10 mg total) by mouth 2 (two) times daily before meals. 60 tablet 2    glipiZIDE (GLUCOTROL) 10 MG tablet Take 1 tablet (10 mg total) by mouth 2 (two) times daily before meals. 60 tablet 2    glipiZIDE (GLUCOTROL) 10 MG tablet Take 1 tablet (10 mg total) by mouth 2 (two) times daily. 60 tablet 11    hydrocodone-acetaminophen 10-325mg  "(NORCO)  mg Tab       hydrocodone-acetaminophen 5-325mg (NORCO) 5-325 mg per tablet Take 1 tablet by mouth daily as needed for Pain. 13 tablet 0    hydrOXYzine HCl (ATARAX) 25 MG tablet Take 1 tablet (25 mg total) by mouth nightly as needed for Itching. 30 tablet 1    hydrOXYzine HCl (ATARAX) 25 MG tablet Take 1 tablet (25 mg total) by mouth nightly as needed for Itching. 30 tablet 2    hydrOXYzine pamoate (VISTARIL) 50 MG Cap TAKE 1 CAPSULE BY MOUTH EVERY EVENING 30 capsule 2    hydrOXYzine pamoate (VISTARIL) 50 MG Cap Take 1 capsule (50 mg total) by mouth every evening. 30 capsule 1    ibuprofen (ADVIL,MOTRIN) 800 MG tablet Take 1 tablet (800 mg total) by mouth 3 (three) times daily. 30 tablet 2    ibuprofen (ADVIL,MOTRIN) 800 MG tablet TAKE 1 TABLET BY MOUTH THREE TIMES A DAY 30 tablet 1    insulin (LANTUS SOLOSTAR U-100 INSULIN) glargine 100 units/mL (3mL) SubQ pen Inject 82 Units into the skin every evening. 15 mL 2    insulin glargine (LANTUS) 100 unit/mL injection Inject 80 Units into the skin every evening. 10 mL 3    insulin lispro (HUMALOG KWIKPEN INSULIN) 100 unit/mL InPn pen INJECT 40 UNITS under the skin THREE TIMES A DAY WITH MEALS 36 mL 2    insulin lispro (HUMALOG KWIKPEN INSULIN) 100 unit/mL pen INJECT 40 UNITS under the skin THREE TIMES A DAY WITH MEALS 36 mL 2    insulin lispro (HUMALOG KWIKPEN) 100 unit/mL InPn pen INJECT 40 UNITS under the skin THREE TIMES A DAY WITH MEALS 36 mL 2    insulin lispro (HUMALOG KWIKPEN) 100 unit/mL InPn pen INJECT 40 UNITS under the skin THREE TIMES A DAY WITH MEALS 36 mL 2    insulin lispro protam-lispro 100 unit/mL (50-50) InPn Inject into the skin.      insulin needles, disposable, (NOVOFINE 30) 30 x 1/3 " Ndle Inject 1 each into the skin 3 (three) times daily. 100 each 2    INVOKANA 100 mg Tab TAKE 1 TABLET BY MOUTH EVERY DAY 30 tablet 3    ketoconazole (NIZORAL) 2 % cream Apply 1 application topically 2 (two) times daily. Apply to " affected area 100 g 0    ketoconazole 2 % Foam Apply 1 application topically 2 (two) times daily. 100 g 0    latanoprost 0.005 % ophthalmic solution       loratadine (CLARITIN) 10 mg tablet Take 1 tablet (10 mg total) by mouth once daily. 30 tablet 2    loratadine (CLARITIN) 10 mg tablet TAKE 1 TABLET BY MOUTH DAILY 30 tablet 2    lorazepam (ATIVAN) 1 MG tablet   3    metFORMIN (GLUCOPHAGE) 1000 MG tablet Take 1 tablet (1,000 mg total) by mouth 2 (two) times daily with meals. 60 tablet 3    metFORMIN (GLUCOPHAGE) 1000 MG tablet Take 1 tablet (1,000 mg total) by mouth 2 (two) times daily with meals. 60 tablet 6    metoprolol succinate (TOPROL-XL) 50 MG 24 hr tablet Take 1 tablet (50 mg total) by mouth once daily. 30 tablet 2    naproxen (NAPROSYN) 500 MG tablet Take 1 tablet (500 mg total) by mouth 2 (two) times daily with meals. 30 tablet 1    nicotine (NICODERM CQ) 21 mg/24 hr Place 1 patch onto the skin once daily. 28 patch 1    omeprazole (PRILOSEC) 40 MG capsule Take 1 capsule (40 mg total) by mouth once daily. 30 capsule 2    omeprazole (PRILOSEC) 40 MG capsule Take 1 capsule (40 mg total) by mouth once daily. 30 capsule 2    ondansetron (ZOFRAN-ODT) 8 MG TbDL Take 1 tablet (8 mg total) by mouth every 12 (twelve) hours as needed. 30 tablet 0    oxycodone-acetaminophen (PERCOCET)  mg per tablet   0    promethazine (PHENERGAN) 25 MG tablet Take 1 tablet (25 mg total) by mouth every 8 (eight) hours as needed for Nausea. 30 tablet 2    promethazine (PHENERGAN) 25 MG tablet Take 1 tablet (25 mg total) by mouth every 6 (six) hours as needed for Nausea. 30 tablet 0    rosuvastatin (CRESTOR) 10 MG tablet Take 1 tablet (10 mg total) by mouth once daily. 30 tablet 2    rosuvastatin (CRESTOR) 10 MG tablet Take 1 tablet (10 mg total) by mouth once daily. 30 tablet 11    silver sulfADIAZINE 1% (SILVADENE) 1 % cream Apply topically 2 (two) times daily. 25 g 1    SURE COMFORT INSULIN SYRINGE 1/2 mL  "30 x 1/2" Syrg       SURE COMFORT PEN NEEDLE 31 gauge x 3/16" Ndle   2    terconazole (TERAZOL 3) 0.8 % vaginal cream Place 1 applicator vaginally once daily. 20 g 1    TRADJENTA 5 mg Tab tablet Take 5 mg by mouth once daily.  2    traMADol (ULTRAM) 50 mg tablet Take 1 tablet (50 mg total) by mouth every 6 (six) hours as needed for Pain. 12 tablet 0    TRUE METRIX GLUCOSE TEST STRIP Strp TEST THREE TIMES A  each 1    ULTICARE PEN NEEDLE 32 gauge x 5/32" Ndle USE THREE TIMES A DAY 30 each 2    varenicline (CHANTIX JEANNIE) 0.5 mg (11)- 1 mg (42) tablet Take by mouth 2 (two) times daily. Take one 0.5mg tablet by mouth once daily for 3 days, then increase to one 0.5mg tablet twice daily for 4 days, then increase to one 1mg tablet twice daily.      diazePAM (VALIUM) 10 MG Tab Take 1 tablet (10 mg total) by mouth once daily. 30 tablet 0    diclofenac sodium 1 % Gel Apply 2 g topically 4 (four) times daily. 100 g 1    SITagliptin (JANUVIA) 100 MG Tab Take 1 tablet (100 mg total) by mouth once daily. 30 tablet 3         Review of Systems   Constitutional: Negative for chills and fever.   HENT: Negative for drooling.    Eyes: Negative for discharge, redness and visual disturbance.   Respiratory: Positive for cough (intermittent) and shortness of breath (intermittent).    Cardiovascular: Negative for chest pain, palpitations and leg swelling.   Gastrointestinal: Negative for abdominal pain, diarrhea, nausea and vomiting.   Endocrine: Negative for cold intolerance and heat intolerance.   Genitourinary: Negative for hematuria.        Urinary incontinence     Musculoskeletal: Positive for arthralgias, back pain and gait problem. Negative for neck pain and neck stiffness.   Skin: Negative for rash.   Allergic/Immunologic: Negative for environmental allergies and food allergies.   Neurological: Positive for facial asymmetry, speech difficulty and weakness. Negative for dizziness, tremors, light-headedness, numbness " and headaches.   Hematological: Negative for adenopathy.   Psychiatric/Behavioral: Positive for agitation (intermittent).     Objective:     Vital Signs (Most Recent):  Temp: 98.8 °F (37.1 °C) (04/01/19 1807)  Pulse: 83 (04/01/19 2058)  Resp: 16 (04/01/19 2058)  BP: (!) 218/113 (04/01/19 2019)  SpO2: 98 % (04/01/19 2058)    Vital Signs Range (Last 24H):  Temp:  [98.8 °F (37.1 °C)]   Pulse:  [83-98]   Resp:  [16-34]   BP: (190-223)/(113-127)   SpO2:  [93 %-99 %]     Physical Exam   Constitutional: She is oriented to person, place, and time. She appears well-developed and well-nourished. No distress.   HENT:   Head: Normocephalic and atraumatic.   Right Ear: External ear normal.   Left Ear: External ear normal.   Nose: Nose normal.   Mouth/Throat: Oropharynx is clear and moist. No oropharyngeal exudate.   Eyes: Pupils are equal, round, and reactive to light. Conjunctivae and EOM are normal. Right eye exhibits no discharge. Left eye exhibits no discharge. No scleral icterus.   Neck: Normal range of motion. Neck supple. No thyromegaly present.   Cardiovascular: Normal rate and regular rhythm.   Pulmonary/Chest: Effort normal and breath sounds normal. She has no rhonchi.   Abdominal: Soft. Bowel sounds are normal. She exhibits no distension. There is no hepatosplenomegaly. There is no tenderness.   Musculoskeletal: She exhibits no edema.   Lymphadenopathy:     She has no cervical adenopathy.   Neurological: She is alert and oriented to person, place, and time.   Skin: Skin is warm and dry. Capillary refill takes less than 2 seconds. She is not diaphoretic.   Psychiatric: She has a normal mood and affect.   Nursing note and vitals reviewed.      Neurological Exam:   LOC: alert  Attention Span: Good   Language: No aphasia  Articulation: Dysarthria  Orientation: Person, Place, Time   Visual Fields: Full  EOM (CN III, IV, VI): Full/intact  Pupils (CN II, III): PERRL  Facial Movement (CN VII): Lower facial weakness on the  Right  Motor: Arm left  Normal 5/5  Leg left  Paresis: 4/5  Arm right  Normal 5/5  Leg right Paresis: 4/5  Sensation: Intact to light touch, temperature and vibration      Laboratory:  CMP:   Recent Labs   Lab 04/01/19 2053   CALCIUM 10.4   ALBUMIN 3.5   PROT 7.4      K 4.3   CO2 23      BUN 11   CREATININE 1.3   ALKPHOS 116   ALT 11   AST 11   BILITOT 0.5     CBC:   Recent Labs   Lab 04/01/19 1950   WBC 10.77   RBC 5.09   HGB 14.3   HCT 42.8      MCV 84   MCH 28.1   MCHC 33.4     Lipid Panel: No results for input(s): CHOL, LDLCALC, HDL, TRIG in the last 168 hours.  Coagulation:   Recent Labs   Lab 04/01/19 1950   INR 0.9     Hgb A1C: No results for input(s): HGBA1C in the last 168 hours.  TSH: No results for input(s): TSH in the last 168 hours.    Diagnostic Results:      Brain imaging:  CTH 04/01/2019 No acute large vascular territory infarct or intracranial hemorrhage identified.  Further evaluation/follow-up as warranted.  Periventricular white matter hypoattenuation, likely sequela of chronic small vessel ischemic change.  Few more focal areas of hypoattenuation at the right basal ganglia, left internal capsule and left caudate suggesting age-indeterminate lacunar type infarcts.  Correlate clinically.    MRI Brain 04/01/2019 Advanced involutional change.  Acute infarcts left putamen, posterolateral left thalamus and left corona radiata and deep white matter adjacent to the left ventricular trigone.  Remote lacunar infarcts left cerebellum, right thalamus, right external capsule, right corona radiata, bilateral basal ganglia and bilateral deep frontal white matter.  Supratentorial and pontine white matter T2/flair hyperintense signal foci suggesting sequela of chronic small vessel ischemic change.  Left sphenoid sinus disease.        Vessel Imaging:  CTA Head and Neck pending    Cardiac Evaluation:   2D Echo pending        Carmen Garg DNP, NP  [facility]  Department of  Vascular Neurology   [facility]

## 2019-04-02 NOTE — ASSESSMENT & PLAN NOTE
-B 4.1  -Insulin infusion protocol started  -Monitor, hospital medicine co-management consult placed

## 2019-04-02 NOTE — PT/OT/SLP EVAL
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    Recommendations:                  General Recommendations:  Speech/language therapy  Diet recommendations:  Regular, Thin   Aspiration Precautions: Standard aspiration precautions   General Precautions: Standard, aspiration, fall  Communication strategies:  provide increased time to answer    History:     Past Medical History:   Diagnosis Date    Diabetes mellitus type I     Hyperlipidemia     Hypertension        Past Surgical History:   Procedure Laterality Date     SECTION               Prior diet: regular with thin    Subjective     "I want to eat"  Per pt  Patient goals: eat     Pain/Comfort:  · Pain Rating 1: 0/10  · Pain Rating Post-Intervention 1: 0/10    Objective:     Cognitive Status:    Pt. was oriented x3 with fair-good recall of recent and remote temporal and general information.  Immediate/delayed verbal recall was impaired with pt. Repeating 5 digits and 4 words without difficulty.    Responses to hypothetical verbal problem solving tasks were accurate and complete for simple.  Pt. Did not Compare and contrast objects nor did she  generate multiple solutions to problems(however performance may have been impacted by fatigue).  Thought organization and categorization skills were impaired with pt. Naming 5 animals given one minute when 15-20 are wnl.       Receptive Language:   Comprehension:   She followed 2 step commands and responded to complex yes/no questions with 100% accuracy    Pragmatics:    Poor eye contact    Expressive Language:  Verbal:    Verbal language skilsl were wfl to communicate basic wants and needs.  She named objects with 100% accuracy and recalled nouns in response to questions with 100% accuracy  Motor Speech:  Mod dysarthria with decreased speech intelligibility in " conversation     Voice:   wfl    Visual-Spatial:  tba    Reading:   tba     Written Expression:   tba    Oral Musculature Evaluation  · Oral Musculature: WFL  · Dentition: present and adequate  · Secretion Management: adequate  · Mucosal Quality: good  · Mandibular Strength and Mobility: WFL  · Oral Labial Strength and Mobility: WFL  · Lingual Strength and Mobility: impaired strength, WFL  · Velar Elevation: WFL  · Buccal Strength and Mobility: WFL  · Volitional Cough: strong  · Volitional Swallow: no delay  · Voice Prior to PO Intake: wfl    Bedside Swallow Eval:   Consistencies Assessed:  · Thin liquids 2 teaspoons then self feed 4 ounces of water via cup   · 3teaspoons of applesauce  · 1 cracker self fed    Oral Phase:   · WFL    Pharyngeal Phase:   · no overt clinical signs/symptoms of aspiration  · no overt clinical signs/symptoms of pharyngeal dysphagia    Compensatory Strategies  · None        Assessment:     Emily Martinez is a 54 y.o. female with an SLP diagnosis of Dysarthria and Cognitive-Linguistic Impairment.  She presents with no s/s of aspiration.    Goals:   Multidisciplinary Problems     SLP Goals        Problem: SLP Goal    Goal Priority Disciplines Outcome   SLP Goal     SLP Ongoing (interventions implemented as appropriate)   Description:  Goals due 4/9  1.  Assess functional reading and writing skills  2.  Further assess problelm solving skills to determine need for therapy  3.  Recall speech strategies with min cues  4.  Repeat sentences with 100% intelligibility  5.  Implement speech strategies in simple conversation with mod cues                    Plan:     · Patient to be seen:  4 x/week   · Plan of Care expires:  05/01/19  · Plan of Care reviewed with:  patient, sibling   · SLP Follow-Up:  Yes       Discharge recommendations:  Discharge Facility/Level of Care Needs: rehabilitation facility       Time Tracking:     SLP Treatment Date:   04/02/19  Speech Start Time:  0855  Speech Stop  Time:  0912     Speech Total Time (min):  17 min    Billable Minutes: Eval 9  and Eval Swallow and Oral Function 8    Nikia Rodriguez MA, CCC-SLP  04/02/2019

## 2019-04-02 NOTE — ASSESSMENT & PLAN NOTE
-Patient w/BLE weakness that has gotten progressively worse.  +urinary incontinence and LBP.  -Patient c/o right hip and leg pain, currently prescribed tramadol  -Tylenol prn for pain for now  -MRI L spine pending  -PT/OT/PM&R eval pending

## 2019-04-02 NOTE — ASSESSMENT & PLAN NOTE
-Stroke risk factor.  Last LDL in 2018 was 118.  Current LDL pending.  -Patient prescribed Crestor, non-compliant.    -Continue atorvastatin 40 mg daily.

## 2019-04-02 NOTE — PLAN OF CARE
04/02/19 1115   Discharge Assessment   Assessment Type Discharge Planning Assessment   Confirmed/corrected address and phone number on facesheet? Yes   Assessment information obtained from? Patient   Expected Length of Stay (days)   (TBD)   Communicated expected length of stay with patient/caregiver yes   Prior to hospitilization cognitive status: Alert/Oriented;No Deficits   Prior to hospitalization functional status: Independent   Current cognitive status: Alert/Oriented   Current Functional Status: Assistive Equipment;Needs Assistance   Lives With child(sonali), adult   Able to Return to Prior Arrangements no   Is patient able to care for self after discharge? Unable to determine at this time (comments)   Who are your caregiver(s) and their phone number(s)? Aneta Martinez Daughter [phone]    Patient's perception of discharge disposition home or selfcare   Readmission Within the Last 30 Days no previous admission in last 30 days   Patient currently being followed by outpatient case management? No   Patient currently receives any other outside agency services? No   Equipment Currently Used at Home none   Do you have any problems affording any of your prescribed medications? No   Is the patient taking medications as prescribed? no   Does the patient have transportation home? Yes   Transportation Anticipated family or friend will provide   Does the patient receive services at the [facility]? No   Discharge Plan A Rehab   Discharge Plan B Skilled Nursing Facility   DME Needed Upon Discharge  none   Patient/Family in Agreement with Plan yes     PCP: Alireza Sosa MD  Pharmacy:   [facility] - [place] - [address]  [address]  [place] [address]  Phone: [phone] Fax: [phone]    [facility] [address] [address] [place] - [address] [place] [address]  [place] [address]  Phone: [phone] Fax:  [phone]

## 2019-04-02 NOTE — ASSESSMENT & PLAN NOTE
-  Presented for slurred speech, BLE R>L weakness, and urinary incontinence since 3/23  -  CTH concerning for L lacunar infarcts  -  Not tPA or interventional candidate  -  MRI brain revealed acute L putamen, L posterolateral thalamic, and L corona radiata infarcts  -  Management per Vascular Neurology   See hospital course for functional, cognitive/speech/language, and nutrition/swallow status.      Recommendations  -  Encourage mobility, OOB in chair, and early ambulation as appropriate   -  PT/OT evaluate and treat  -  SLP speech and cognitive evaluate and treat  -  Monitor mood and sleep disturbances  -  Establish consistent sleep-wake cycle  -  Monitor for bowel and bladder dysfunction  -  Monitor for shoulder pain and subluxation  -  Monitor for spasticity  -  DVT prophylaxis  -  Monitor for and prevent skin breakdown and pressure ulcers  · Early mobility, repositioning/weight shifting every 20-30 minutes when sitting, turn patient every 2 hours, proper mattress/overlay and chair cushioning, pressure relief/heel protector boots

## 2019-04-02 NOTE — SUBJECTIVE & OBJECTIVE
Neurologic Chief Complaint: Dysarthria, BLE weakness    Subjective:     Interval History: Patient is seen for follow-up neurological assessment and treatment recommendations:   Patient is having a lot of excess movements this am.  She was UDS + for cocaine, but denies drug use.  Talked to her about risk of strokes with cocaine and that we think this is what happened.  Also spoke to her with only phlebotomist in the room about Trichomonas positive result and need to treat any sexual partners--she denies any recent new sexual partners.  Will continue to assess when more able to cooperate with exam.  Family visiting throughout am.  Complaining of sore R hip, back.  No sharp/burning/electric shock type pains noted.  Mostly concerned about eating and drinking.    HPI, Past Medical, Family, and Social History remains the same as documented in the initial encounter.     Review of Systems   Constitutional: Negative for chills and fever.   HENT: Negative for rhinorrhea and sneezing.    Eyes: Negative for photophobia and visual disturbance.   Respiratory: Negative for cough and shortness of breath.    Gastrointestinal: Negative for nausea and vomiting.   Genitourinary:        Pt cites urinary incontinence--but describes urgency later, for at least 1 week   Musculoskeletal: Positive for arthralgias, gait problem and myalgias.   Skin: Negative for rash and wound.   Neurological: Positive for speech difficulty and weakness.   Hematological: Negative for adenopathy. Does not bruise/bleed easily.   Psychiatric/Behavioral: Negative for agitation and confusion.     Scheduled Meds:   aspirin  81 mg Oral Daily    atorvastatin  40 mg Oral Daily    heparin (porcine)  5,000 Units Subcutaneous Q8H    nicotine  1 patch Transdermal Daily    pantoprazole  40 mg Oral Daily     Continuous Infusions:   insulin (HUMAN R) infusion (adults) 2 Units/hr (04/02/19 0500)    custom IV infusion builder      sodium chloride 0.9%       PRN  Meds:acetaminophen, albuterol-ipratropium, dextrose 10 % in water (D10W), dextrose 10 % in water (D10W), dextrose 50%, dextrose 50%, hydrALAZINE, ondansetron, ondansetron, sodium chloride 0.9%, sodium chloride 0.9%    Objective:     Vital Signs (Most Recent):  Temp: 97.2 °F (36.2 °C) (04/02/19 0826)  Pulse: 83 (04/02/19 1100)  Resp: 17 (04/02/19 0826)  BP: (!) 184/106 (04/02/19 0826)  SpO2: 97 % (04/02/19 0826)  BP Location: Right arm    Vital Signs Range (Last 24H):  Temp:  [97.2 °F (36.2 °C)-98.9 °F (37.2 °C)]   Pulse:  []   Resp:  [14-34]   BP: (150-223)/()   SpO2:  [93 %-100 %]   BP Location: Right arm    Physical Exam   Constitutional: She is oriented to person, place, and time. She appears well-developed and well-nourished.   HENT:   Head: Normocephalic and atraumatic.   Mouth/Throat: Oropharynx is clear and moist. No oropharyngeal exudate.   Eyes: Pupils are equal, round, and reactive to light. Conjunctivae and EOM are normal.   Neck: Normal range of motion. Neck supple.   Cardiovascular: Intact distal pulses.   Pulmonary/Chest: Effort normal and breath sounds normal. No respiratory distress.   Abdominal: Soft. She exhibits no distension.   Musculoskeletal: She exhibits no edema or tenderness.   Neurological: She is alert and oriented to person, place, and time. No cranial nerve deficit.   +Abnormal movement, seems to be athetosis   Skin: Skin is warm and dry. She is not diaphoretic. No erythema.     Neurological Exam:   LOC: alert  Attention Span: Good   Language: No aphasia  Articulation: Dysarthria  Orientation: Person, Place, Time   Visual Fields: Full  EOM (CN III, IV, VI): Full/intact  Pupils (CN II, III): PERRL  Facial Sensation (CN V): Normal  Facial Movement (CN VII): Symmetric facial expression    Motor: Arm left  Normal 5/5  Leg left  Normal 5/5  Arm right  Normal 5/5  Leg right Paresis: 4/5  Cebellar: No evidence of appendicular or axial ataxia  Sensation: Tactile extinction to  bilateral simultaneous stimulation   Tone: Normal tone throughout    Laboratory:  CMP:   Recent Labs   Lab 04/02/19  0310  04/02/19  1200   CALCIUM 10.5  10.5   < > 10.0   ALBUMIN 3.4*  --   --    PROT 7.2  --   --      140   < > 141   K 3.9  3.9   < > 3.4*   CO2 26  26   < > 29     103   < > 103   BUN 9  9   < > 5*   CREATININE 1.2  1.2   < > 0.9   ALKPHOS 112  --   --    ALT 12  --   --    AST 10  --   --    BILITOT 0.6  --   --     < > = values in this interval not displayed.     CBC:   Recent Labs   Lab 04/02/19 0310   WBC 11.29   RBC 4.79   HGB 13.3   HCT 40.7   *   MCV 85   MCH 27.8   MCHC 32.7     Lipid Panel: No results for input(s): CHOL, LDLCALC, HDL, TRIG in the last 168 hours.  Coagulation:   Recent Labs   Lab 04/02/19 0310   INR 0.9   APTT <21.0     Hgb A1C:   Recent Labs   Lab 04/02/19  0042   HGBA1C >14.0*  >14.0*     TSH:   Recent Labs   Lab 04/02/19  0042   TSH 0.988     Diagnostic Results     Brain Imaging   04/01/19 MRI Brain w/ w/o contrast:  Advanced involutional change.  Acute infarcts left putamen, posterolateral left thalamus and left corona radiata and deep white matter adjacent to the left ventricular trigone.  Remote lacunar infarcts left cerebellum, right thalamus, right external capsule, right corona radiata, bilateral basal ganglia and bilateral deep frontal white matter.  Supratentorial and pontine white matter T2/flair hyperintense signal foci suggesting sequela of chronic small vessel ischemic change.  Left sphenoid sinus disease.    04/01/19 CT head w/o contrast:  No acute large vascular territory infarct or intracranial hemorrhage identified.  Further evaluation/follow-up as warranted.  Periventricular white matter hypoattenuation, likely sequela of chronic small vessel ischemic change.  Few more focal areas of hypoattenuation at the right basal ganglia, left internal capsule and left caudate suggesting age-indeterminate lacunar type infarcts.   Correlate clinically.    Vessel Imaging   04/02/19 CTA head, neck:  CT head:  1. Subtle hypoattenuation corresponding with areas of acute infarction identified on recent MRI involving the left basal ganglia, left thalamus, and periventricular white matter adjacent to the left ventricular trigone.  2. Multifocal remote lacunar type infarcts as above.  3. Generalized cerebral volume loss and chronic microvascular ischemic change.  4. Left sphenoid sinus disease.    CTA:  No hemodynamically significant stenosis or large vessel occlusion identified.    Cardiac Imaging   04/02/19 TTE Complete:  · Normal left ventricular systolic function. The estimated ejection fraction is 68%  · Concentric left ventricular remodeling.  · Normal LV diastolic function.  · No wall motion abnormalities.  · Normal right ventricular systolic function.  · Mild mitral sclerosis.  · Normal central venous pressure (3 mm Hg).  · The estimated PA systolic pressure is 15 mm Hg

## 2019-04-02 NOTE — ASSESSMENT & PLAN NOTE
-  2/2 stroke  Recommendations  -  PT and OT evaluate and treat  -  Monitor for shoulder pain and subluxation  -  Monitor for spasticity  -  Monitor for skin breakdown and pressure ulcers

## 2019-04-02 NOTE — SUBJECTIVE & OBJECTIVE
Past Medical History:   Diagnosis Date    Diabetes mellitus type I     Hyperlipidemia     Hypertension      Past Surgical History:   Procedure Laterality Date     SECTION       Review of patient's allergies indicates:   Allergen Reactions    Sulfur        Scheduled Medications:    aspirin  81 mg Oral Daily    atorvastatin  40 mg Oral Daily    heparin (porcine)  5,000 Units Subcutaneous Q8H    nicotine  1 patch Transdermal Daily    pantoprazole  40 mg Oral Daily       PRN Medications: acetaminophen, albuterol-ipratropium, dextrose 10 % in water (D10W), dextrose 10 % in water (D10W), dextrose 50%, dextrose 50%, hydrALAZINE, ondansetron, ondansetron, sodium chloride 0.9%, sodium chloride 0.9%    Family History     Problem Relation (Age of Onset)    Cancer Sister    Diabetes Mother, Sister, Brother, Maternal Aunt    Heart disease Maternal Aunt    Hyperlipidemia Mother, Sister, Brother    Hypertension Mother, Sister, Brother    Stroke Mother        Tobacco Use    Smoking status: Current Every Day Smoker     Packs/day: 1.50     Years: 35.00     Pack years: 52.50     Types: Cigarettes    Smokeless tobacco: Never Used   Substance and Sexual Activity    Alcohol use: Not Currently    Drug use: Not Currently    Sexual activity: Not on file     Review of Systems   Constitutional: Positive for activity change. Negative for chills, fatigue and fever.   HENT: Negative for drooling, hearing loss, trouble swallowing and voice change.    Eyes: Negative for pain and visual disturbance.   Respiratory: Negative for cough, shortness of breath and wheezing.    Cardiovascular: Negative for chest pain and palpitations.   Gastrointestinal: Negative for abdominal distention, nausea and vomiting.   Genitourinary: Negative for difficulty urinating and flank pain.   Musculoskeletal: Positive for gait problem and myalgias. Negative for back pain and neck pain.   Skin: Negative for rash and wound.   Neurological: Positive  for speech difficulty and weakness. Negative for dizziness, numbness and headaches.   Psychiatric/Behavioral: Negative for agitation and hallucinations. The patient is not nervous/anxious.      Objective:     Vital Signs (Most Recent):  Temp: 97.2 °F (36.2 °C) (19)  Pulse: 90 (19 1500)  Resp: 17 (19 08)  BP: (!) 184/106 (19)  SpO2: 97 % (19)    Vital Signs (24h Range):  Temp:  [97.2 °F (36.2 °C)-98.9 °F (37.2 °C)] 97.2 °F (36.2 °C)  Pulse:  [] 90  Resp:  [14-34] 17  SpO2:  [93 %-100 %] 97 %  BP: (150-223)/() 184/106     Body mass index is 25.89 kg/m².    Physical Exam   Constitutional: She is oriented to person, place, and time. She appears well-developed and well-nourished. No distress.   HENT:   Head: Normocephalic and atraumatic.   Right Ear: External ear normal.   Left Ear: External ear normal.   Nose: Nose normal.   Eyes: Right eye exhibits no discharge. Left eye exhibits no discharge. No scleral icterus.   Neck: Normal range of motion.   Cardiovascular: Normal rate, regular rhythm and intact distal pulses.   Pulmonary/Chest: Effort normal. No respiratory distress. She has no wheezes.   Abdominal: Soft. She exhibits no distension. There is no tenderness.   Musculoskeletal: Normal range of motion. She exhibits no edema or tenderness.   Neurological: She is alert and oriented to person, place, and time. No sensory deficit. She exhibits normal muscle tone.   -  Mental Status:  AAOx3.  Follows commands.  Answers correct age and .     -  Speech and language:  no aphasia, + dysarthria.    -  Vision:  no hemianopsia or ptosis.    -  Motor:  BUE: 5/5.  BLE weakness R>L.  Exam limited by effort and participation.    Skin: Skin is warm and dry. No rash noted.   Psychiatric: She has a normal mood and affect. Her behavior is normal. Cognition and memory are impaired.   Vitals reviewed.    NEUROLOGICAL EXAMINATION:     MENTAL STATUS   Oriented to person, place,  and time.       Diagnostic Results:   Labs: Reviewed  X-Ray: Reviewed  CT: Reviewed  MRI: Reviewed

## 2019-04-02 NOTE — ASSESSMENT & PLAN NOTE
-Stroke risk factor.  Last LDL in 2018 was 118.  Current LDL pending.  -Patient prescribed Crestor, non-compliant.  Atorvastatin 40 mg daily.

## 2019-04-02 NOTE — PLAN OF CARE
Problem: Physical Therapy Goal  Goal: Physical Therapy Goal  Outcome: Ongoing (interventions implemented as appropriate)  Initial eval completed.  Results, POC, and therapy recommendations discussed with patient and her brother.   Complete evaluation documentation to follow.    Mobility Recommendations: 1 person assistance for transfers  D/c recommendations: rehab     Jacy Forbes, PT  4/2/2019  [phone] (pager)

## 2019-04-02 NOTE — CONSULTS
[facility] Hospital Medicine  Consult Note    Patient Name: Emily Martinez  MRN: [id number]  Admission Date: 2019  Hospital Length of Stay: 1 days  Attending Physician: Robb Boateng MD   Primary Care Provider: Alireza Sosa MD     [facility] Medicine Team: Networked reference to record PCT  Irlanda Cifuentes MD      Patient information was obtained from patient, relative(s) and ER records.     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 female with significant past medical history of DM, HTN, HLD presented to hospital with multiple medical complaints.  The patient has been having slurred speech, BLE weakness, and urinary incontinence since 3/23.  LE weakness became progressively worse with the patient having difficulty/inability to walk for the last 1-2 days.  MRI brain revealed acute infarctions in posterolateral thalamus, territory of PCA. No tPA due to the patient being outside of the treatment window.  No LVO, no intervention at this time. HM team was consulted because of high blood glucose level and co management.    Past Medical History:   Diagnosis Date    Diabetes mellitus type I     Hyperlipidemia     Hypertension      Past Surgical History:   Procedure Laterality Date     SECTION       Review of patient's allergies indicates:   Allergen Reactions    Sulfur        Scheduled Medications:    aspirin  81 mg Oral Daily    atorvastatin  40 mg Oral Daily    heparin (porcine)  5,000 Units Subcutaneous Q8H    nicotine  1 patch Transdermal Daily     pantoprazole  40 mg Oral Daily       PRN Medications: acetaminophen, albuterol-ipratropium, dextrose 10 % in water (D10W), dextrose 10 % in water (D10W), dextrose 50%, dextrose 50%, hydrALAZINE, ondansetron, ondansetron, sodium chloride 0.9%, sodium chloride 0.9%    Family History     Problem Relation (Age of Onset)    Cancer Sister    Diabetes Mother, Sister, Brother, Maternal Aunt    Heart disease Maternal Aunt    Hyperlipidemia Mother, Sister, Brother    Hypertension Mother, Sister, Brother    Stroke Mother        Tobacco Use    Smoking status: Current Every Day Smoker     Packs/day: 1.50     Years: 35.00     Pack years: 52.50     Types: Cigarettes    Smokeless tobacco: Never Used   Substance and Sexual Activity    Alcohol use: Not Currently    Drug use: Not Currently    Sexual activity: Not on file     Review of Systems   Constitutional: Positive for activity change. Negative for chills, fatigue and fever.   HENT: Negative for drooling, hearing loss, trouble swallowing and voice change.    Eyes: Negative for pain and visual disturbance.   Respiratory: Negative for cough, shortness of breath and wheezing.    Cardiovascular: Negative for chest pain and palpitations.   Gastrointestinal: Negative for abdominal distention, nausea and vomiting.   Genitourinary: Negative for difficulty urinating and flank pain.   Musculoskeletal: Positive for gait problem and myalgias. Negative for back pain and neck pain.   Skin: Negative for rash and wound.   Neurological: Positive for speech difficulty and weakness. Negative for dizziness, numbness and headaches.   Psychiatric/Behavioral: Negative for agitation and hallucinations. The patient is not nervous/anxious.      Objective:     Vital Signs (Most Recent):  Temp: 97.2 °F (36.2 °C) (04/02/19 0826)  Pulse: 90 (04/02/19 1500)  Resp: 17 (04/02/19 0826)  BP: (!) 184/106 (04/02/19 0826)  SpO2: 97 % (04/02/19 0826)    Vital Signs (24h Range):  Temp:  [97.2 °F (36.2 °C)-98.9 °F  (37.2 °C)] 97.2 °F (36.2 °C)  Pulse:  [] 90  Resp:  [14-34] 17  SpO2:  [93 %-100 %] 97 %  BP: (150-223)/() 184/106     Body mass index is 25.89 kg/m².    Physical Exam   Constitutional: She is oriented to person, place, and time. She appears well-developed and well-nourished. No distress.   HENT:   Head: Normocephalic and atraumatic.   Right Ear: External ear normal.   Left Ear: External ear normal.   Nose: Nose normal.   Eyes: Right eye exhibits no discharge. Left eye exhibits no discharge. No scleral icterus.   Neck: Normal range of motion.   Cardiovascular: Normal rate, regular rhythm and intact distal pulses.   Pulmonary/Chest: Effort normal. No respiratory distress. She has no wheezes.   Abdominal: Soft. She exhibits no distension. There is no tenderness.   Musculoskeletal: Normal range of motion. She exhibits no edema or tenderness.   Neurological: She is alert and oriented to person, place, and time. No sensory deficit. She exhibits normal muscle tone.   -  Mental Status:  AAOx3.  Follows commands.  Answers correct age and .     -  Speech and language:  no aphasia, + dysarthria.    -  Vision:  no hemianopsia or ptosis.    -  Motor:  BUE: 5/5.  BLE weakness R>L.  Exam limited by effort and participation.    Skin: Skin is warm and dry. No rash noted.   Psychiatric: She has a normal mood and affect. Her behavior is normal. Cognition and memory are impaired.   Vitals reviewed.    NEUROLOGICAL EXAMINATION:     MENTAL STATUS   Oriented to person, place, and time.       Diagnostic Results:   Labs: Reviewed  X-Ray: Reviewed  CT: Reviewed  MRI: Reviewed    Assessment/Plan:     Trichomonas infection  - Home meds list showed she is on Azithro and CTX. However, pt denies taking neither of them.  - Will give one dose of oral Azithro 1g.    Uncontrolled type 2 diabetes mellitus with hyperglycemia  - HgA1c was non-measurable (>14.0). No gap, normal bicarb level, normal pH 7.35.  - Started on insulin gtt with  POCT glu Q1h.  - Started on diabetic diet given no hx of dysphagia or aspiration.  - Will switch to detemir 18U BID and HDSSI.      VTE Risk Mitigation (From admission, onward)        Ordered     heparin (porcine) injection 5,000 Units  Every 8 hours      04/01/19 2311     IP VTE HIGH RISK PATIENT  Once      04/01/19 2311     Place sequential compression device  Until discontinued      04/01/19 2311              Thank you for your consult. I will follow-up with patient. Please contact us if you have any additional questions.    Irlanda Cifuentes MD  Department of Hospital Medicine   [facility]

## 2019-04-02 NOTE — ASSESSMENT & PLAN NOTE
-Stroke risk factor. Patient reports glucose at home has been >500.  -Glucose 491 in the ED.  1L NS bolus, 10 units insulin ordered.  -Pt states she takes 40 U Lantus bid on rounds mid-morning (40 U tid per home med list)  -DKA confirmed on admission--pt on insulin gtt, IVFs, q4h BMPs with VBG to check pH   -Contacting Hospital Medicine to ensure they are aware of consult, 4/2

## 2019-04-02 NOTE — CARE UPDATE
Beta Hydroxybutyrate 4.1.  A1c >14.  Previously ordered insulin and glucose checks discontinued.  Insulin infusion protocol for DKA ordered.  Also, will need to have discussion with patient regarding positive trichomonas in urine, informing sexual partners, and safe sexual practices as well as further clarification of illicit substance use/abuse in patient who denies ETOH/illicit drug use w/Utox positive for cocaine.    Carmen Garg, DIAZ, NP  Vascular Neurology

## 2019-04-02 NOTE — SUBJECTIVE & OBJECTIVE
Past Medical History:   Diagnosis Date    Diabetes mellitus type I     Hyperlipidemia     Hypertension      Past Surgical History:   Procedure Laterality Date     SECTION       Review of patient's allergies indicates:   Allergen Reactions    Sulfur        Scheduled Medications:    aspirin  81 mg Oral Daily    atorvastatin  40 mg Oral Daily    heparin (porcine)  5,000 Units Subcutaneous Q8H    nicotine  1 patch Transdermal Daily    pantoprazole  40 mg Oral Daily       PRN Medications: acetaminophen, albuterol-ipratropium, dextrose 10 % in water (D10W), dextrose 10 % in water (D10W), dextrose 50%, dextrose 50%, hydrALAZINE, ondansetron, ondansetron, sodium chloride 0.9%, sodium chloride 0.9%    Family History     Problem Relation (Age of Onset)    Cancer Sister    Diabetes Mother, Sister, Brother, Maternal Aunt    Heart disease Maternal Aunt    Hyperlipidemia Mother, Sister, Brother    Hypertension Mother, Sister, Brother    Stroke Mother        Tobacco Use    Smoking status: Current Every Day Smoker     Packs/day: 1.50     Years: 35.00     Pack years: 52.50     Types: Cigarettes    Smokeless tobacco: Never Used   Substance and Sexual Activity    Alcohol use: Not Currently    Drug use: Not Currently    Sexual activity: Not on file     Review of Systems   Constitutional: Positive for activity change. Negative for chills, fatigue and fever.   HENT: Negative for drooling, hearing loss, trouble swallowing and voice change.    Eyes: Negative for pain and visual disturbance.   Respiratory: Negative for cough, shortness of breath and wheezing.    Cardiovascular: Negative for chest pain and palpitations.   Gastrointestinal: Negative for abdominal distention, nausea and vomiting.   Genitourinary: Negative for difficulty urinating and flank pain.   Musculoskeletal: Positive for gait problem and myalgias. Negative for back pain and neck pain.   Skin: Negative for rash and wound.   Neurological: Positive  for speech difficulty and weakness. Negative for dizziness, numbness and headaches.   Psychiatric/Behavioral: Negative for agitation and hallucinations. The patient is not nervous/anxious.      Objective:     Vital Signs (Most Recent):  Temp: 97.2 °F (36.2 °C) (19)  Pulse: 81 (19)  Resp: 17 (19)  BP: (!) 184/106 (19)  SpO2: 97 % (19)    Vital Signs (24h Range):  Temp:  [97.2 °F (36.2 °C)-98.9 °F (37.2 °C)] 97.2 °F (36.2 °C)  Pulse:  [] 81  Resp:  [14-34] 17  SpO2:  [93 %-100 %] 97 %  BP: (150-223)/() 184/106     Body mass index is 25.89 kg/m².    Physical Exam   Constitutional: She is oriented to person, place, and time. She appears well-developed and well-nourished. No distress.   HENT:   Head: Normocephalic and atraumatic.   Right Ear: External ear normal.   Left Ear: External ear normal.   Nose: Nose normal.   Eyes: Right eye exhibits no discharge. Left eye exhibits no discharge. No scleral icterus.   Neck: Normal range of motion.   Cardiovascular: Normal rate, regular rhythm and intact distal pulses.   Pulmonary/Chest: Effort normal. No respiratory distress. She has no wheezes.   Abdominal: Soft. She exhibits no distension. There is no tenderness.   Musculoskeletal: Normal range of motion. She exhibits no edema or tenderness.   Neurological: She is alert and oriented to person, place, and time. No sensory deficit. She exhibits normal muscle tone.   -  Mental Status:  AAOx3.  Follows commands.  Answers correct age and .     -  Speech and language:  no aphasia, + dysarthria.    -  Vision:  no hemianopsia or ptosis.    -  Motor:  BUE: 5/5.  BLE weakness R>L.  Exam limited by effort and participation.    Skin: Skin is warm and dry. No rash noted.   Psychiatric: She has a normal mood and affect. Her behavior is normal. Cognition and memory are impaired.   Vitals reviewed.    NEUROLOGICAL EXAMINATION:     MENTAL STATUS   Oriented to person, place,  and time.       Diagnostic Results:   Labs: Reviewed  X-Ray: Reviewed  CT: Reviewed  MRI: Reviewed

## 2019-04-02 NOTE — ASSESSMENT & PLAN NOTE
-Stroke risk factor.  Last A1c in August 2018 was 14.  Patient reports glucose at home has been >500.  -Glucose 491 in the ED.  1L NS bolus, 10 units insulin ordered.  -beta-hydroxybuterate, A1c pending  -Patient stated she takes 36 u Lantus in the AM w/Novolog pre meal and SSI (much higher doses noted on home med list)  -Lantus 18 u QAM, Novolog 3 u TID WM, moderate correction SSI.  Titrate prn.

## 2019-04-02 NOTE — ASSESSMENT & PLAN NOTE
-Stroke risk factor.  Patient endorses smoking 1.5 ppd x 30 yrs--45 pkyr hx  -Multiple medications prescribed for smoking cessation that pt has not taken.  -Continue to encourage cessation  -Nicotine patch prn  -Patient had been prescribed Advair, Albuterol nebs/MDI in the past with no documentation of COPD  -SpO2 > 88%, Duonebs prn

## 2019-04-02 NOTE — ASSESSMENT & PLAN NOTE
-UA positive for trichomonas.  Review of the patient's medical record revealed the patient had a trichomonas infection in October 2015 with concern for possible resistant infection at that time.  -Flagyl 2g PO x1  -Consider HIV test in this patient w/STI, recent weight loss, UTox prelim + Cocaine

## 2019-04-02 NOTE — HPI
"Patient is a 54 y.o. female with significant past medical history of DM, HTN, HLD presented to hospital with multiple medical complaints.  The patient has been having slurred speech, BLE weakness, and urinary incontinence since 3/23.  She was seen in the [facility] ED on 3/26 for RLE weakness/hip pain and received tramadol at that time with a plan to follow up with her PCP.  Her daughter states the patient's pain/weakness has progressed to the patient being unable to walk since yesterday.  The patient endorses having multiple falls prior to her presented to the ED for evaluation on 3/26.  She also c/o significant weight loss over the last 6 months.  The patient denies HA, dizziness, change in vision, numbness, CP, or N/V.  She endorses occasional SOB.  Nothing has alleviated her symptoms.    Currently the patient is c/o right hip/leg pain that she describes as "feels heavy".  ED workup included CTH that revealed findings concerning for left lacunar infarcts.  MRI brain obtained and revealed acute left PCA territory infarcts. The patient will be admitted to Vascular Neurology.  "

## 2019-04-02 NOTE — ASSESSMENT & PLAN NOTE
"-Stroke risk factor.  Patient endorses smoking 1.5 ppd x "at least" 30 years.  -Review of the patient's medical record revealed multiple medications for smoking cessation that the patient has not taken.  -Continue to encourage cessation  -Nicotine patch prn  -Patient had been prescribed Advair, Albuterol nebs/MDI in the past with no documentation of COPD  -SpO2>88%  -Duonebs prn  "

## 2019-04-02 NOTE — PROGRESS NOTES
[facility]  Vascular Neurology  Comprehensive Stroke Center  Progress Note    Assessment/Plan:     * Embolic stroke involving left posterior cerebral artery  54 y.o. female with significant past medical history of DM, HTN, HLD presented to hospital with multiple medical complaints.  The patient has been having slurred speech, BLE weakness, and urinary incontinence since 3/23.  LE weakness became progressively worse with the patient having difficulty/inability to walk for the last 1-2 days.  MRI brain revealed acute infarctions in posterolateral thalamus, territory of PCA.  No tPA due to the patient being outside of the treatment window.  No LVO, no intervention at this time.      Antithrombotics for secondary stroke prevention: Antiplatelets: Aspirin: 81 mg daily  Statins for secondary stroke prevention and hyperlipidemia, if present:  Atorvastatin- 40 mg daily  Aggressive risk factor modification: HTN, Smoking, DM, HLD, possible illicit substance use/abuse  Rehab efforts: PT/OT/SLP to evaluate and treat, PM&R consult   Diagnostics ordered/pending: MRI Lumbar Spine w/o contrast  VTE prophylaxis: Heparin 5000 units SQ every 8 hours, SCDs  BP parameters: Infarct: No intervention, SBP <220     -Hospital Medicine consulted for co-management in this patient with multiple co-morbidities.  Appreciate the consult and recs.    Diabetic ketoacidosis without coma associated with type 2 diabetes mellitus  -BHB 4.1  -Insulin infusion protocol started  -Monitor, hospital medicine co-management consult placed    Weakness of both lower extremities  -Patient w/BLE weakness that has gotten progressively worse.  +urinary incontinence, LBP.  -Tylenol prn for pain for now  -MRI L spine w/o contrast pending  -Consulted PT/OT/PM&R, appreciate recs    Trichomonas infection  -UA + for trichomonas.   -Pt received metronidazole 2 g x 1 dose  -Consider HIV test in this patient w/ STI, recent weight loss, UDS + for  cocaine  -Discussed with patient importance of informing partners, not naming any to contact at this time    Mixed hyperlipidemia  -Stroke risk factor.  Last LDL in 2018 was 118.  Current LDL pending.  -Patient prescribed Crestor, non-compliant.    -Continue atorvastatin 40 mg daily.    Smoker  -Stroke risk factor.  Patient endorses smoking 1.5 ppd x 30 yrs--45 pkyr hx  -Multiple medications prescribed for smoking cessation that pt has not taken.  -Continue to encourage cessation  -Nicotine patch prn  -Patient had been prescribed Advair, Albuterol nebs/MDI in the past with no documentation of COPD  -SpO2 > 88%, Duonebs prn    Essential hypertension  -Stroke risk factor.  SBP < 220 for now.  -Resume home meds when appropriate.    Uncontrolled type 2 diabetes mellitus with hyperglycemia  -Stroke risk factor. Patient reports glucose at home has been >500.  -Glucose 491 in the ED.  1L NS bolus, 10 units insulin ordered.  -Pt states she takes 40 U Lantus bid on rounds mid-morning (40 U tid per home med list)  -DKA confirmed on admission--pt on insulin gtt, IVFs, q4h BMPs with VBG to check pH   -Contacting Hospital Medicine to ensure they are aware of consult, 4/2 04/01/19:  Admission to [facility] [place].  UDS + for cocaine, +DKA, presents with sxs of dysarthria, BLE weakness.    04/02/19:  DKA ongoing, insulin gtt started.  Will get IM consult ordered o/n.    STROKE DOCUMENTATION      NIH Scale:  1a. Level of Consciousness: 0-->Alert, keenly responsive  1b. LOC Questions: 0-->Answers both questions correctly  1c. LOC Commands: 0-->Performs both tasks correctly  2. Best Gaze: 0-->Normal  3. Visual: 0-->No visual loss  4. Facial Palsy: 0-->Normal symmetrical movements  5a. Motor Arm, Left: 0-->No drift, limb holds 90 (or 45) degrees for full 10 secs  5b. Motor Arm, Right: 0-->No drift, limb holds 90 (or 45) degrees for full 10 secs  6a. Motor Leg, Left: 0-->No drift, leg holds 30 degree position for full 5 secs  6b. Motor Leg,  Right: 2-->Some effort against gravity, leg falls to bed by 5 secs, but has some effort against gravity  7. Limb Ataxia: 0-->Absent  8. Sensory: 0-->Normal, no sensory loss  9. Best Language: 0-->No aphasia, normal  10. Dysarthria: 1-->Mild-to-moderate dysarthria, patient slurs at least some words and, at worst, can be understood with some difficulty  11. Extinction and Inattention (formerly Neglect): 0-->No abnormality  Total (NIH Stroke Scale): 3     Modified Geronimo Score: 1  [place] Coma Scale:14   ABCD2 Score:    [id number] Score:   HAS -BLED Score:   ICH Score:   Hunt & Mueller Classification:      Hemorrhagic change of an Ischemic Stroke: Does this patient have an ischemic stroke with hemorrhagic changes? No     Neurologic Chief Complaint: Dysarthria, BLE weakness    Subjective:     Interval History: Patient is seen for follow-up neurological assessment and treatment recommendations:   Patient is having a lot of excess movements this am.  She was UDS + for cocaine, but denies drug use.  Talked to her about risk of strokes with cocaine and that we think this is what happened.  Also spoke to her with only phlebotomist in the room about Trichomonas positive result and need to treat any sexual partners--she denies any recent new sexual partners.  Will continue to assess when more able to cooperate with exam.  Family visiting throughout am.  Complaining of sore R hip, back.  No sharp/burning/electric shock type pains noted.  Mostly concerned about eating and drinking.    HPI, Past Medical, Family, and Social History remains the same as documented in the initial encounter.     Review of Systems   Constitutional: Negative for chills and fever.   HENT: Negative for rhinorrhea and sneezing.    Eyes: Negative for photophobia and visual disturbance.   Respiratory: Negative for cough and shortness of breath.    Gastrointestinal: Negative for nausea and vomiting.   Genitourinary:        Pt cites urinary incontinence--but  describes urgency later, for at least 1 week   Musculoskeletal: Positive for arthralgias, gait problem and myalgias.   Skin: Negative for rash and wound.   Neurological: Positive for speech difficulty and weakness.   Hematological: Negative for adenopathy. Does not bruise/bleed easily.   Psychiatric/Behavioral: Negative for agitation and confusion.     Scheduled Meds:   aspirin  81 mg Oral Daily    atorvastatin  40 mg Oral Daily    heparin (porcine)  5,000 Units Subcutaneous Q8H    nicotine  1 patch Transdermal Daily    pantoprazole  40 mg Oral Daily     Continuous Infusions:   insulin (HUMAN R) infusion (adults) 2 Units/hr (04/02/19 0500)    custom IV infusion builder      sodium chloride 0.9%       PRN Meds:acetaminophen, albuterol-ipratropium, dextrose 10 % in water (D10W), dextrose 10 % in water (D10W), dextrose 50%, dextrose 50%, hydrALAZINE, ondansetron, ondansetron, sodium chloride 0.9%, sodium chloride 0.9%    Objective:     Vital Signs (Most Recent):  Temp: 97.2 °F (36.2 °C) (04/02/19 0826)  Pulse: 83 (04/02/19 1100)  Resp: 17 (04/02/19 0826)  BP: (!) 184/106 (04/02/19 0826)  SpO2: 97 % (04/02/19 0826)  BP Location: Right arm    Vital Signs Range (Last 24H):  Temp:  [97.2 °F (36.2 °C)-98.9 °F (37.2 °C)]   Pulse:  []   Resp:  [14-34]   BP: (150-223)/()   SpO2:  [93 %-100 %]   BP Location: Right arm    Physical Exam   Constitutional: She is oriented to person, place, and time. She appears well-developed and well-nourished.   HENT:   Head: Normocephalic and atraumatic.   Mouth/Throat: Oropharynx is clear and moist. No oropharyngeal exudate.   Eyes: Pupils are equal, round, and reactive to light. Conjunctivae and EOM are normal.   Neck: Normal range of motion. Neck supple.   Cardiovascular: Intact distal pulses.   Pulmonary/Chest: Effort normal and breath sounds normal. No respiratory distress.   Abdominal: Soft. She exhibits no distension.   Musculoskeletal: She exhibits no edema or  tenderness.   Neurological: She is alert and oriented to person, place, and time. No cranial nerve deficit.   +Abnormal movement, seems to be athetosis   Skin: Skin is warm and dry. She is not diaphoretic. No erythema.     Neurological Exam:   LOC: alert  Attention Span: Good   Language: No aphasia  Articulation: Dysarthria  Orientation: Person, Place, Time   Visual Fields: Full  EOM (CN III, IV, VI): Full/intact  Pupils (CN II, III): PERRL  Facial Sensation (CN V): Normal  Facial Movement (CN VII): Symmetric facial expression    Motor: Arm left  Normal 5/5  Leg left  Normal 5/5  Arm right  Normal 5/5  Leg right Paresis: 4/5  Cebellar: No evidence of appendicular or axial ataxia  Sensation: Tactile extinction to bilateral simultaneous stimulation   Tone: Normal tone throughout    Laboratory:  CMP:   Recent Labs   Lab 04/02/19 0310 04/02/19  1200   CALCIUM 10.5  10.5   < > 10.0   ALBUMIN 3.4*  --   --    PROT 7.2  --   --      140   < > 141   K 3.9  3.9   < > 3.4*   CO2 26  26   < > 29     103   < > 103   BUN 9  9   < > 5*   CREATININE 1.2  1.2   < > 0.9   ALKPHOS 112  --   --    ALT 12  --   --    AST 10  --   --    BILITOT 0.6  --   --     < > = values in this interval not displayed.     CBC:   Recent Labs   Lab 04/02/19 0310   WBC 11.29   RBC 4.79   HGB 13.3   HCT 40.7   *   MCV 85   MCH 27.8   MCHC 32.7     Lipid Panel: No results for input(s): CHOL, LDLCALC, HDL, TRIG in the last 168 hours.  Coagulation:   Recent Labs   Lab 04/02/19 0310   INR 0.9   APTT <21.0     Hgb A1C:   Recent Labs   Lab 04/02/19 0042   HGBA1C >14.0*  >14.0*     TSH:   Recent Labs   Lab 04/02/19  0042   TSH 0.988     Diagnostic Results     Brain Imaging   04/01/19 MRI Brain w/ w/o contrast:  Advanced involutional change.  Acute infarcts left putamen, posterolateral left thalamus and left corona radiata and deep white matter adjacent to the left ventricular trigone.  Remote lacunar infarcts left cerebellum,  right thalamus, right external capsule, right corona radiata, bilateral basal ganglia and bilateral deep frontal white matter.  Supratentorial and pontine white matter T2/flair hyperintense signal foci suggesting sequela of chronic small vessel ischemic change.  Left sphenoid sinus disease.    04/01/19 CT head w/o contrast:  No acute large vascular territory infarct or intracranial hemorrhage identified.  Further evaluation/follow-up as warranted.  Periventricular white matter hypoattenuation, likely sequela of chronic small vessel ischemic change.  Few more focal areas of hypoattenuation at the right basal ganglia, left internal capsule and left caudate suggesting age-indeterminate lacunar type infarcts.  Correlate clinically.    Vessel Imaging   04/02/19 CTA head, neck:  CT head:  1. Subtle hypoattenuation corresponding with areas of acute infarction identified on recent MRI involving the left basal ganglia, left thalamus, and periventricular white matter adjacent to the left ventricular trigone.  2. Multifocal remote lacunar type infarcts as above.  3. Generalized cerebral volume loss and chronic microvascular ischemic change.  4. Left sphenoid sinus disease.    CTA:  No hemodynamically significant stenosis or large vessel occlusion identified.    Cardiac Imaging   04/02/19 TTE Complete:  · Normal left ventricular systolic function. The estimated ejection fraction is 68%  · Concentric left ventricular remodeling.  · Normal LV diastolic function.  · No wall motion abnormalities.  · Normal right ventricular systolic function.  · Mild mitral sclerosis.  · Normal central venous pressure (3 mm Hg).  · The estimated PA systolic pressure is 15 mm Hg    Delmi Corona MD  Comprehensive Stroke Center  Department of Vascular Neurology   [facility]

## 2019-04-02 NOTE — ASSESSMENT & PLAN NOTE
54 y.o. female with significant past medical history of DM, HTN, HLD presented to hospital with multiple medical complaints.  The patient has been having slurred speech, BLE weakness, and urinary incontinence since 3/23.  LE weakness became progressively worse with the patient having difficulty/inability to walk for the last 1-2 days.  MRI brain revealed acute infarctions.  No tPA due to the patient being outside of the treatment window.  No LVO, no intervention at this time.    Antithrombotics for secondary stroke prevention: Antiplatelets: Aspirin: 81 mg daily    Statins for secondary stroke prevention and hyperlipidemia, if present:   Statins: Atorvastatin- 40 mg daily    Aggressive risk factor modification: HTN, Smoking, DM, HLD, possible illicit substance use/abuse     Rehab efforts: PT/OT/SLP to evaluate and treat, PM&R consult     Diagnostics ordered/pending: CTA Head to assess vasculature , CTA Neck/Arch to assess vasculature, HgbA1C to assess blood glucose levels, Lipid Profile to assess cholesterol levels, TTE to assess cardiac function/status , TSH to assess thyroid function    VTE prophylaxis: Heparin 5000 units SQ every 8 hours  SCDs    BP parameters: Infarct: No intervention, SBP <220       -Hospital Medicine consulted for co-management in this patient with multiple co-morbidities.  Appreciate the consult and recs.

## 2019-04-02 NOTE — HOSPITAL COURSE
4/2/19:  Evaluated by PT, OT, and SLP.  Found to have dysarthria and cognitive-linguistic impairment.  SLP recommendation: regular diet and thin liquids.  Bed mobility SBA-modA.  EOB CGA.  Sit to stand and transfers min-modA.  Ambulated 35 ft modA.  UBD modA and LBD modA.

## 2019-04-02 NOTE — HPI
Emily Martinez is a 54-year-old female with PMHx of HTN, HLD, DMII, tobacco use, and recent ED visit on 3/26 for RLE weakness with associated R hip pain, gait instability, and falls.  Patient presented to [facility] on 4/1/19 for slurred speech, BLE R>L weakness, and urinary incontinence since 3/23.  On arrival, evaluated by Vascular Neurology.  CTH concerning for L lacunar infarcts.  Not tPA or interventional candidate.  MRI brain revealed acute L putamen, L posterolateral thalamic, and L corona radiata infarcts.     Functional History: Patient lives in [place] with her father in a single story home with 6 steps to enter.  She was (I) with ADLs and mobility until 3/23.  Functional decline at that time 2/2 BLE weakness and gait instability with falls.  She is right handed.  DME: none.

## 2019-04-02 NOTE — PT/OT/SLP EVAL
Physical Therapy Evaluation     Patient Name: Emily Martinez  MRN: [id number]   Diagnosis: Embolic stroke involving left posterior cerebral artery    Recommendations:   Discharge Recommendations:  rehabilitation facility   Discharge Equipment Recommendations: (TBD)   Barriers to Discharge: decreased caregiver assistance, fall risk, inaccessible home environment    Assessment:   Emily Martinez is a 54 y.o. female admitted with a medical diagnosis of Embolic stroke involving left posterior cerebral artery.  Prior to admit she was caring for her step father in an apartment with 15 steps.  she now presents with the following impairments/functional limitations: weakness, gait instability, impaired balance, impaired cognition, impaired self care skills, impaired functional mobilty, decreased safety awareness, decreased lower extremity function, decreased upper extremity function, impaired coordination, pain, impaired fine motor.   Due to these impairments, she now requires assistance with all OOB mobility.  She is unable to ambulate safely without assistance.  Recommend inpatient rehab to address her below impairments, improve her overall independence, and safely advance to gait and stair training in order to return to her home environment.       Problem List:  weakness, gait instability, impaired balance, impaired cognition, impaired self care skills, impaired functional mobilty, decreased safety awareness, decreased lower extremity function, decreased upper extremity function, impaired coordination, pain, impaired fine motor  Rehab Prognosis:  Fair to good.  The patient would benefit from acute skilled PT services to address these deficits and maximize their functional independence.    History:     Past Medical History:   Diagnosis Date    Diabetes mellitus type I     Hyperlipidemia     Hypertension        Past Surgical History:   Procedure Laterality Date     SECTION         Subjective   Patient  "comments/goals: "I was falling at home"  Pain/Comfort:  ·  Pain Rating 1: (R knee pain (chronic))  · Pain Rating Post-Intervention 1: (did not rate)     Recent Vital Signs: (Last documentation)  Pulse: 81 (04/02/19 0826)  BP: (!) 184/106 (04/02/19 0826)  SpO2: 97 % (04/02/19 0826)     Living Environment:  Home: The patient lives in an apartment with her step father and 15 steps with L railing.  Tub/shower combo.   PLOF:  Independent with mobility without an assistive device, cares for her step father, no driving  DME owned: none    Assistance Available: Upon discharge, patient will not have assistance.  Her step father cannot provide physical assistance and her children work.  Her brother recently moved to [facility] to help with her step father.     Objective:   The patient had peripheral IV, telemetry    General Precautions: aspiration, fall, seizure  Recent Surgery: * No surgery found *    Recent Vital Signs:   Pulse: 81 (04/02/19 0826)  BP: (!) 184/106 (04/02/19 0826)  SpO2: 97 % (04/02/19 0826)    Physical Examination:   The patient was found supine in bed in [facility].  She agreed to therapy and participated well.     Cognitive Function:  - Oriented to: person, place, time not year (2018)  - Level of Alertness: awake, tangential  - Follows Commands/attention: Follows one-step commands  - Communication: dysarthria  - Safety awareness/insight to disability: impaired  Musculoskeletal System  Upper Extremities:   PROM: WFL  Strength: WFL  Lower Extremities:  PROM: WFL  Strength:  Muscle Group R LE L LE Comments   Hip ext NT NT    Hip flexion  2/5 2/5       Knee flexion  4/5 4/5       Knee ext. 5/5 5/5    Ankle DF 5/5 5/5    Ankle PF 5/5 5/5    Neuromuscular System:  · Sensation: grossly intact LT  · Coordination:    Finger to thumb opposition: delayed BUEs   Finger to nose: RUE mild dysmetria   Heel to shin: unable to perform d/t hip flexor weakness  · Choreiform-like movements of RUE and RLE at rest which resolve during " movement.  Posture and gross symmetry: posterior lean  Vision:  Unable to test, did not follow instructions    BALANCE:  Sitting: CGA for balance and d/t frequent movements and concerns for stability/safety at EOB  Standing: min assistance for balance, wide ARISTIDES, high guard position, increased postural sway     FUNCTIONAL MOBILITY ASSESSMENT:  Bed Mobility: performed with HOB flat  · Rolling/Turning R: min assistance for awareness of LUE  · Rolling/Turning L: CGA for placement of LUE  · Supine > sit: min assistance from L sidelying  · Sit > supine: NT  · Scooting EOB: min assistance for safety    Transfers:  · Sit <> stand transfer: min assistance x 3 trials from bed/chair   · Bed <> chair transfer: min assistance stand pivot transfer with bilateral UE support on therapist    Gait:   Gait x 35 feet moderate assistance for balance and instability with gait abnormalities  · Patient demonstrated slightly ataxic gait pattern with wide base of support, high guard position, multidirectional LOB and decreased stability.  Abnormal trunk sway in static stance.   · PT facilitated: balance, safety, verbal cues for safe mobility techniques    Therapeutic Activities, Education, or Exercises:  The therapist educated the patient and her brother on the role of PT, POC, and therapy recommendations of rehab.  The therapist discussed the patients current mobility status, deficits, and level of assistance with patient.  They were educated on proper positioning in supine and in sitting with support of affected R upper extremity in order to increase awareness of extremity and to decrease the effects of immobility, edema, neglect and pain.  PT discussed use of chair alarm and need to call for assistance with mobility. Sit to stand was performed several times to place chair alarm.  Time was provided for active listening, discussion of health disposition, and discussion of safe discharge recommendations. Therapist answered questions to  patient/familys satisfaction within scope of practice.  Patient and family are aware of patient's deficits and therapy progression. White board updated to reflect current level of assistance.    FUNCTIONAL OUTCOME MEASURES:  [facility]  Turning over in bed (including adjusting bedclothes, sheets and blankets)?: 3  Sitting down on and standing up from a chair with arms (e.g., wheelchair, bedside commode, etc.): 3  Moving from lying on back to sitting on the side of the bed?: 3  Moving to and from a bed to a chair (including a wheelchair)?: 3  Need to walk in hospital room?: 2  Climbing 3-5 steps with a railing?: 2  Basic Mobility Total Score: 16    Goals:     Multidisciplinary Problems     Physical Therapy Goals        Problem: Physical Therapy Goal    Goal Priority Disciplines Outcome Goal Variances Interventions   Physical Therapy Goal     PT, PT/OT Ongoing (interventions implemented as appropriate)     Description:    Goals to be met by 4/12    1. Pt will perform rolling to the R and L with SBA.   2. Pt will perform supine to sit from both sides of the bed with SBA.  3. Pt will perform sit to supine with SBA.  4. Pt will perform sit to stand transfers with SBA.    5. Pt will perform bed <> chair transfers with SBA.  6. Pt will perform gait x 150 feet with CGA and least restrictive assistive device  7. Patient will ascend and descend 15 steps with L rail and CGA to safely access home environment.                       Plan:   During this hospitalization, patient will be seen 5 x/week for gait training, therapeutic activities, therapeutic exercises, neuromuscular re-education to address impairments and functional mobility deficits.   · Plan of Care Expires: 05/01/19   Plan of Care Reviewed with: patient, sibling    This plan of care has been discussed with the patient and/or family who were involved in its development and are in agreement with the identified goals and treatment plan.     Clinical Decision Making:    Level of Complexity:     Moderate - 65081     Time Tracking:   PT Received On:  04/02/19  PT Start Time:   0815    PT Stop Time:  0855  PT Total Time (min): 40 min      Billable Minutes: Evaluation 30 and Therapeutic Activity 15    Jacy Forbes, PT  10/02/2018  [phone] (pager)

## 2019-04-02 NOTE — ASSESSMENT & PLAN NOTE
54 y.o. female with significant past medical history of DM, HTN, HLD presented to hospital with multiple medical complaints.  The patient has been having slurred speech, BLE weakness, and urinary incontinence since 3/23.  LE weakness became progressively worse with the patient having difficulty/inability to walk for the last 1-2 days.  MRI brain revealed acute infarctions in posterolateral thalamus, territory of PCA.  No tPA due to the patient being outside of the treatment window.  No LVO, no intervention at this time.      Antithrombotics for secondary stroke prevention: Antiplatelets: Aspirin: 81 mg daily  Statins for secondary stroke prevention and hyperlipidemia, if present:  Atorvastatin- 40 mg daily  Aggressive risk factor modification: HTN, Smoking, DM, HLD, possible illicit substance use/abuse  Rehab efforts: PT/OT/SLP to evaluate and treat, PM&R consult   Diagnostics ordered/pending: MRI Lumbar Spine w/o contrast  VTE prophylaxis: Heparin 5000 units SQ every 8 hours, SCDs  BP parameters: Infarct: No intervention, SBP <220     -Hospital Medicine consulted for co-management in this patient with multiple co-morbidities.  Appreciate the consult and recs.

## 2019-04-03 LAB
ALBUMIN SERPL BCP-MCNC: 3 G/DL (ref 3.5–5.2)
ALP SERPL-CCNC: 99 U/L (ref 55–135)
ALT SERPL W/O P-5'-P-CCNC: 9 U/L (ref 10–44)
ANION GAP SERPL CALC-SCNC: 10 MMOL/L (ref 8–16)
AST SERPL-CCNC: 15 U/L (ref 10–40)
BASOPHILS # BLD AUTO: 0.04 K/UL (ref 0–0.2)
BASOPHILS NFR BLD: 0.5 % (ref 0–1.9)
BILIRUB SERPL-MCNC: 0.5 MG/DL (ref 0.1–1)
BUN SERPL-MCNC: 5 MG/DL (ref 6–20)
CALCIUM SERPL-MCNC: 9.8 MG/DL (ref 8.7–10.5)
CHLORIDE SERPL-SCNC: 103 MMOL/L (ref 95–110)
CO2 SERPL-SCNC: 27 MMOL/L (ref 23–29)
CREAT SERPL-MCNC: 1 MG/DL (ref 0.5–1.4)
DIFFERENTIAL METHOD: NORMAL
EOSINOPHIL # BLD AUTO: 0.1 K/UL (ref 0–0.5)
EOSINOPHIL NFR BLD: 0.8 % (ref 0–8)
ERYTHROCYTE [DISTWIDTH] IN BLOOD BY AUTOMATED COUNT: 13.2 % (ref 11.5–14.5)
EST. GFR  (AFRICAN AMERICAN): >60 ML/MIN/1.73 M^2
EST. GFR  (NON AFRICAN AMERICAN): >60 ML/MIN/1.73 M^2
GLUCOSE SERPL-MCNC: 223 MG/DL (ref 70–110)
HCT VFR BLD AUTO: 39.2 % (ref 37–48.5)
HGB BLD-MCNC: 13.1 G/DL (ref 12–16)
IMM GRANULOCYTES # BLD AUTO: 0.02 K/UL (ref 0–0.04)
IMM GRANULOCYTES NFR BLD AUTO: 0.2 % (ref 0–0.5)
LYMPHOCYTES # BLD AUTO: 3.4 K/UL (ref 1–4.8)
LYMPHOCYTES NFR BLD: 39.6 % (ref 18–48)
MCH RBC QN AUTO: 27.9 PG (ref 27–31)
MCHC RBC AUTO-ENTMCNC: 33.4 G/DL (ref 32–36)
MCV RBC AUTO: 84 FL (ref 82–98)
MONOCYTES # BLD AUTO: 0.8 K/UL (ref 0.3–1)
MONOCYTES NFR BLD: 9.1 % (ref 4–15)
NEUTROPHILS # BLD AUTO: 4.2 K/UL (ref 1.8–7.7)
NEUTROPHILS NFR BLD: 49.8 % (ref 38–73)
NRBC BLD-RTO: 0 /100 WBC
PLATELET # BLD AUTO: 344 K/UL (ref 150–350)
PMV BLD AUTO: 11.7 FL (ref 9.2–12.9)
POCT GLUCOSE: 252 MG/DL (ref 70–110)
POCT GLUCOSE: 255 MG/DL (ref 70–110)
POCT GLUCOSE: 297 MG/DL (ref 70–110)
POCT GLUCOSE: 316 MG/DL (ref 70–110)
POCT GLUCOSE: 411 MG/DL (ref 70–110)
POCT GLUCOSE: 421 MG/DL (ref 70–110)
POCT GLUCOSE: 428 MG/DL (ref 70–110)
POCT GLUCOSE: 429 MG/DL (ref 70–110)
POCT GLUCOSE: 451 MG/DL (ref 70–110)
POTASSIUM SERPL-SCNC: 3.2 MMOL/L (ref 3.5–5.1)
PROT SERPL-MCNC: 6.5 G/DL (ref 6–8.4)
RBC # BLD AUTO: 4.69 M/UL (ref 4–5.4)
SODIUM SERPL-SCNC: 140 MMOL/L (ref 136–145)
WBC # BLD AUTO: 8.45 K/UL (ref 3.9–12.7)

## 2019-04-03 PROCEDURE — 92507 TX SP LANG VOICE COMM INDIV: CPT

## 2019-04-03 PROCEDURE — 63600175 PHARM REV CODE 636 W HCPCS: Performed by: HOSPITALIST

## 2019-04-03 PROCEDURE — 80053 COMPREHEN METABOLIC PANEL: CPT

## 2019-04-03 PROCEDURE — S5571 INSULIN DISPOS PEN 3 ML: HCPCS | Performed by: HOSPITALIST

## 2019-04-03 PROCEDURE — 63600175 PHARM REV CODE 636 W HCPCS: Performed by: STUDENT IN AN ORGANIZED HEALTH CARE EDUCATION/TRAINING PROGRAM

## 2019-04-03 PROCEDURE — 36415 COLL VENOUS BLD VENIPUNCTURE: CPT

## 2019-04-03 PROCEDURE — 99233 PR SUBSEQUENT HOSPITAL CARE,LEVL III: ICD-10-PCS | Mod: ,,, | Performed by: PSYCHIATRY & NEUROLOGY

## 2019-04-03 PROCEDURE — 99232 SBSQ HOSP IP/OBS MODERATE 35: CPT | Mod: ,,, | Performed by: HOSPITALIST

## 2019-04-03 PROCEDURE — 99232 SBSQ HOSP IP/OBS MODERATE 35: CPT | Mod: ,,, | Performed by: NURSE PRACTITIONER

## 2019-04-03 PROCEDURE — 63600175 PHARM REV CODE 636 W HCPCS: Performed by: NURSE PRACTITIONER

## 2019-04-03 PROCEDURE — 99232 PR SUBSEQUENT HOSPITAL CARE,LEVL II: ICD-10-PCS | Mod: ,,, | Performed by: NURSE PRACTITIONER

## 2019-04-03 PROCEDURE — 99233 SBSQ HOSP IP/OBS HIGH 50: CPT | Mod: ,,, | Performed by: PSYCHIATRY & NEUROLOGY

## 2019-04-03 PROCEDURE — 20600001 HC STEP DOWN PRIVATE ROOM

## 2019-04-03 PROCEDURE — 63600175 PHARM REV CODE 636 W HCPCS: Performed by: PSYCHIATRY & NEUROLOGY

## 2019-04-03 PROCEDURE — 25000003 PHARM REV CODE 250: Performed by: NURSE PRACTITIONER

## 2019-04-03 PROCEDURE — 25000003 PHARM REV CODE 250: Performed by: STUDENT IN AN ORGANIZED HEALTH CARE EDUCATION/TRAINING PROGRAM

## 2019-04-03 PROCEDURE — S4991 NICOTINE PATCH NONLEGEND: HCPCS | Performed by: NURSE PRACTITIONER

## 2019-04-03 PROCEDURE — 99232 PR SUBSEQUENT HOSPITAL CARE,LEVL II: ICD-10-PCS | Mod: ,,, | Performed by: HOSPITALIST

## 2019-04-03 PROCEDURE — 85025 COMPLETE CBC W/AUTO DIFF WBC: CPT

## 2019-04-03 PROCEDURE — 97530 THERAPEUTIC ACTIVITIES: CPT

## 2019-04-03 RX ORDER — OLMESARTAN MEDOXOMIL 20 MG/1
40 TABLET ORAL DAILY
Status: DISCONTINUED | OUTPATIENT
Start: 2019-04-03 | End: 2019-04-09 | Stop reason: HOSPADM

## 2019-04-03 RX ORDER — HYDROCHLOROTHIAZIDE 25 MG/1
25 TABLET ORAL DAILY
Status: DISCONTINUED | OUTPATIENT
Start: 2019-04-03 | End: 2019-04-06

## 2019-04-03 RX ORDER — INSULIN ASPART 100 [IU]/ML
5 INJECTION, SOLUTION INTRAVENOUS; SUBCUTANEOUS ONCE
Status: COMPLETED | OUTPATIENT
Start: 2019-04-03 | End: 2019-04-03

## 2019-04-03 RX ORDER — POTASSIUM CHLORIDE 20 MEQ/1
40 TABLET, EXTENDED RELEASE ORAL 2 TIMES DAILY
Status: COMPLETED | OUTPATIENT
Start: 2019-04-03 | End: 2019-04-03

## 2019-04-03 RX ORDER — AMLODIPINE BESYLATE 10 MG/1
10 TABLET ORAL DAILY
Status: DISCONTINUED | OUTPATIENT
Start: 2019-04-03 | End: 2019-04-09 | Stop reason: HOSPADM

## 2019-04-03 RX ORDER — INSULIN ASPART 100 [IU]/ML
5 INJECTION, SOLUTION INTRAVENOUS; SUBCUTANEOUS
Status: DISCONTINUED | OUTPATIENT
Start: 2019-04-03 | End: 2019-04-04

## 2019-04-03 RX ADMIN — INSULIN ASPART 5 UNITS: 100 INJECTION, SOLUTION INTRAVENOUS; SUBCUTANEOUS at 04:04

## 2019-04-03 RX ADMIN — ASPIRIN 81 MG: 81 TABLET, COATED ORAL at 08:04

## 2019-04-03 RX ADMIN — INSULIN ASPART 5 UNITS: 100 INJECTION, SOLUTION INTRAVENOUS; SUBCUTANEOUS at 08:04

## 2019-04-03 RX ADMIN — NICOTINE 1 PATCH: 21 PATCH, EXTENDED RELEASE TRANSDERMAL at 08:04

## 2019-04-03 RX ADMIN — HEPARIN SODIUM 5000 UNITS: 5000 INJECTION, SOLUTION INTRAVENOUS; SUBCUTANEOUS at 10:04

## 2019-04-03 RX ADMIN — ATORVASTATIN CALCIUM 40 MG: 20 TABLET, FILM COATED ORAL at 08:04

## 2019-04-03 RX ADMIN — PANTOPRAZOLE SODIUM 40 MG: 40 TABLET, DELAYED RELEASE ORAL at 08:04

## 2019-04-03 RX ADMIN — POTASSIUM CHLORIDE 40 MEQ: 1500 TABLET, EXTENDED RELEASE ORAL at 01:04

## 2019-04-03 RX ADMIN — INSULIN ASPART 6 UNITS: 100 INJECTION, SOLUTION INTRAVENOUS; SUBCUTANEOUS at 04:04

## 2019-04-03 RX ADMIN — INSULIN ASPART 6 UNITS: 100 INJECTION, SOLUTION INTRAVENOUS; SUBCUTANEOUS at 11:04

## 2019-04-03 RX ADMIN — INSULIN DETEMIR 10 UNITS: 100 INJECTION, SOLUTION SUBCUTANEOUS at 11:04

## 2019-04-03 RX ADMIN — HEPARIN SODIUM 5000 UNITS: 5000 INJECTION, SOLUTION INTRAVENOUS; SUBCUTANEOUS at 05:04

## 2019-04-03 RX ADMIN — INSULIN DETEMIR 18 UNITS: 100 INJECTION, SOLUTION SUBCUTANEOUS at 08:04

## 2019-04-03 RX ADMIN — INSULIN ASPART 10 UNITS: 100 INJECTION, SOLUTION INTRAVENOUS; SUBCUTANEOUS at 08:04

## 2019-04-03 RX ADMIN — HEPARIN SODIUM 5000 UNITS: 5000 INJECTION, SOLUTION INTRAVENOUS; SUBCUTANEOUS at 01:04

## 2019-04-03 RX ADMIN — INSULIN DETEMIR 28 UNITS: 100 INJECTION, SOLUTION SUBCUTANEOUS at 10:04

## 2019-04-03 RX ADMIN — POTASSIUM CHLORIDE 40 MEQ: 1500 TABLET, EXTENDED RELEASE ORAL at 10:04

## 2019-04-03 RX ADMIN — AMLODIPINE BESYLATE 10 MG: 10 TABLET ORAL at 11:04

## 2019-04-03 RX ADMIN — HYDROCHLOROTHIAZIDE 25 MG: 25 TABLET ORAL at 11:04

## 2019-04-03 RX ADMIN — OLMESARTAN MEDOXOMIL 40 MG: 20 TABLET, FILM COATED ORAL at 11:04

## 2019-04-03 RX ADMIN — INSULIN ASPART 5 UNITS: 100 INJECTION, SOLUTION INTRAVENOUS; SUBCUTANEOUS at 11:04

## 2019-04-03 NOTE — ASSESSMENT & PLAN NOTE
- HgA1c was non-measurable (>14.0). No gap, normal bicarb level, normal pH 7.35.  - Started on insulin gtt with POCT glu Q1h.  - Started on diabetic diet given no hx of dysphagia or aspiration. No anion gap, bicarb wnl.  - was switched to detemir 18U BID and MDSSI on 4/2  - 4/3 increased to insulin detemir 28 u BID and aspart 5 u TID w/meals, MDSS  - cont to monitor POCT glc  - replaced K for goal ~4

## 2019-04-03 NOTE — PT/OT/SLP PROGRESS
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   Yes  Keep patient NPO? No   Current Respiratory Status: room air      Pt seen bedside for session. Alert and agreeable to therapy. Pt answered functional problem solving questions with 100% IND, including call light protocol. Connected speech tangential and speech intelligibility ~70% in unknown context. Education provided re: clear speech strategies. Pt able to utilize strategies to repeat 4-5 word sentences with 100% intelligibility. Occasional slurring noted, but did not affect intelligibiltiy. However, pt with poor carry over into conversational speech. At end of session, pt remained bedside with call light and all needs in reach. White board updated.      Assessment:     Emily Martinez is a 54 y.o. female with an SLP diagnosis of Dysarthria and Cognitive-Linguistic Impairment.      Goals:   Multidisciplinary Problems     SLP Goals        Problem: SLP Goal    Goal Priority Disciplines Outcome   SLP Goal     SLP Ongoing (interventions implemented as appropriate)   Description:  Goals due 4/9  1.  Assess functional reading and writing skills  2.  Further assess problelm solving skills to determine need for therapy  3.  Recall speech strategies with min cues  4.  Repeat sentences with 100% intelligibility  5.  Implement speech " strategies in simple conversation with mod cues                    Plan:     · Patient to be seen:  4 x/week   · Plan of Care expires:  05/01/19  · Plan of Care reviewed with:  patient   · SLP Follow-Up:  Yes       Discharge recommendations:  rehabilitation facility   Barriers to Discharge:  Level of Skilled Assistance Needed .    Time Tracking:     SLP Treatment Date:   04/03/19  Speech Start Time:  0845  Speech Stop Time:  0854     Speech Total Time (min):  9 min    Billable Minutes: Speech Therapy Individual 9 minutes    Ebonie Sawant CCC-SLP  04/03/2019

## 2019-04-03 NOTE — PLAN OF CARE
[facility] will come today to evaluate patient     04/03/19 0904   Post-Acute Status   Post-Acute Authorization Placement   Post-Acute Placement Status Patient Evaluation by Facility

## 2019-04-03 NOTE — PLAN OF CARE
Problem: SLP Goal  Goal: SLP Goal  Goals due 4/9  1.  Assess functional reading and writing skills  2.  Further assess problelm solving skills to determine need for therapy  3.  Recall speech strategies with min cues  4.  Repeat sentences with 100% intelligibility  5.  Implement speech strategies in simple conversation with mod cues   Outcome: Ongoing (interventions implemented as appropriate)  Goals remain appropriate. Cont ST per POC. Ebonie Sawant CCC-SLP 4/3/2019 12:27 PM

## 2019-04-03 NOTE — PLAN OF CARE
Problem: Occupational Therapy Goal  Goal: Occupational Therapy Goal  Goals to be met by: 7 days (4/9/19)     Patient will increase functional independence with ADLs by performing:    UE Dressing with Stand-by Assistance.  LE Dressing with Contact Guard Assistance.  Grooming while standing at sink with Contact Guard Assistance.  Toileting from toilet with Minimal Assistance for hygiene and clothing management.   Sitting at edge of bed x10 minutes with Stand-by Assistance.  Supine to sit with Contact Guard Assistance.-MET  Toilet transfer to toilet with Contact Guard Assistance.  Increased functional strength to WFL for ADLs.  Pt will follow 5/5 two-step commands to complete ADL task.     Outcome: Ongoing (interventions implemented as appropriate)  Continue OT plan of care.

## 2019-04-03 NOTE — SUBJECTIVE & OBJECTIVE
Past Medical History:   Diagnosis Date    Diabetes mellitus type I     Hyperlipidemia     Hypertension      Past Surgical History:   Procedure Laterality Date     SECTION       Review of patient's allergies indicates:   Allergen Reactions    Sulfur        Scheduled Medications:    amLODIPine  10 mg Oral Daily    aspirin  81 mg Oral Daily    atorvastatin  40 mg Oral Daily    heparin (porcine)  5,000 Units Subcutaneous Q8H    hydroCHLOROthiazide  25 mg Oral Daily    insulin aspart U-100  5 Units Subcutaneous TIDWM    insulin detemir U-100  28 Units Subcutaneous BID    nicotine  1 patch Transdermal Daily    olmesartan  40 mg Oral Daily    pantoprazole  40 mg Oral Daily    potassium chloride  40 mEq Oral BID       PRN Medications: acetaminophen, albuterol-ipratropium, dextrose 10 % in water (D10W), dextrose 10 % in water (D10W), dextrose 50%, dextrose 50%, glucagon (human recombinant), glucose, glucose, hydrALAZINE, insulin aspart U-100, ondansetron, ondansetron, sodium chloride 0.9%, sodium chloride 0.9%    Family History     Problem Relation (Age of Onset)    Cancer Sister    Diabetes Mother, Sister, Brother, Maternal Aunt    Heart disease Maternal Aunt    Hyperlipidemia Mother, Sister, Brother    Hypertension Mother, Sister, Brother    Stroke Mother        Tobacco Use    Smoking status: Current Every Day Smoker     Packs/day: 1.50     Years: 35.00     Pack years: 52.50     Types: Cigarettes    Smokeless tobacco: Never Used   Substance and Sexual Activity    Alcohol use: Not Currently    Drug use: Not Currently    Sexual activity: Not on file     Review of Systems   Constitutional: Positive for activity change. Negative for fatigue and fever.   HENT: Negative for congestion and trouble swallowing.    Eyes: Negative for pain and visual disturbance.   Respiratory: Negative for cough and shortness of breath.    Cardiovascular: Negative for chest pain and palpitations.   Gastrointestinal:  Negative for abdominal pain, nausea and vomiting.   Genitourinary: Negative for difficulty urinating and flank pain.   Musculoskeletal: Positive for gait problem and myalgias.   Skin: Negative for rash and wound.   Neurological: Positive for speech difficulty (chronic) and weakness. Negative for dizziness, numbness and headaches.   Psychiatric/Behavioral: Negative for agitation. The patient is not nervous/anxious.      Objective:     Vital Signs (Most Recent):  Temp: 96.7 °F (35.9 °C) (19 08)  Pulse: 82 (19 1100)  Resp: 17 (19 0805)  BP: (!) 131/103 (19 08)  SpO2: 97 % (19 08)    Vital Signs (24h Range):  Temp:  [96.7 °F (35.9 °C)-99.4 °F (37.4 °C)] 96.7 °F (35.9 °C)  Pulse:  [77-90] 82  Resp:  [17-18] 17  SpO2:  [95 %-97 %] 97 %  BP: (131-205)/() 131/103     Body mass index is 25.89 kg/m².    Physical Exam   Constitutional: She is oriented to person, place, and time. She appears well-developed and well-nourished. No distress.   HENT:   Head: Normocephalic and atraumatic.   Eyes: Right eye exhibits no discharge. Left eye exhibits no discharge. No scleral icterus.   Neck: Normal range of motion.   Cardiovascular: Normal rate, regular rhythm and intact distal pulses.   Pulmonary/Chest: Effort normal. No respiratory distress. She has no wheezes.   Abdominal: Soft. She exhibits no distension. There is no tenderness.   Musculoskeletal: Normal range of motion. She exhibits no edema.   Neurological: She is alert and oriented to person, place, and time.   -  Mental Status:  AAOx3.  Follows commands.  Answers correct age and .     -  Speech and language:  no aphasia, + dysarthria.    -  Vision:  no hemianopsia or ptosis.    -  Motor:  BUE: 5/5.  BLE weakness R>L.  Exam limited by effort and participation.    Skin: Skin is warm and dry. No rash noted. She is not diaphoretic.   Psychiatric: She has a normal mood and affect. Her behavior is normal. Cognition and memory are impaired.    Vitals reviewed.    NEUROLOGICAL EXAMINATION:     MENTAL STATUS   Oriented to person, place, and time.       Diagnostic Results:   Labs: Reviewed  X-Ray: Reviewed  CT: Reviewed  MRI: Reviewed

## 2019-04-03 NOTE — NURSING
Spoke with on call provider for stroke concerning pt's orders for every 4 hour blood glucose monitoring and MAR orders (AC/HS) for coverage are different. States that it is suppose to be AC/HS but she will address with primary provider. No insulin given at 0400 blood glucose reading.

## 2019-04-03 NOTE — ASSESSMENT & PLAN NOTE
-UA + for trichomonas.   -Pt received metronidazole 2 g x 1 dose  -Hospital medicine treated with Azithro 1g  -Consider HIV test in this patient w/ STI, recent weight loss, UDS + for cocaine  -Discussed with patient importance of informing partners, not naming any to contact at this time

## 2019-04-03 NOTE — ASSESSMENT & PLAN NOTE
54 y.o. female with significant past medical history of DM, HTN, HLD presented to hospital with multiple medical complaints.  The patient has been having slurred speech, BLE weakness, and urinary incontinence since 3/23.  LE weakness became progressively worse with the patient having difficulty/inability to walk for the last 1-2 days.  MRI brain revealed acute infarctions in posterolateral thalamus, territory of PCA.  No tPA due to the patient being outside of the treatment window.  No LVO, no intervention at this time.  Etiology thought to be small vessel disease.     Neurologically stable. Patient with high blood sugars - medically ready for discharge once improvement in glucose. [facility] to come and assess patient today.     Antithrombotics for secondary stroke prevention: Antiplatelets: Aspirin: 81 mg daily  Statins for secondary stroke prevention and hyperlipidemia, if present:  Atorvastatin- 40 mg daily  Aggressive risk factor modification: HTN, Smoking, DM, HLD, possible illicit substance use/abuse  Rehab efforts: PT/OT/SLP to evaluate and treat, PM&R consult  - rehab placement   Diagnostics ordered/pending: NA  VTE prophylaxis: Heparin 5000 units SQ every 8 hours, SCDs  BP parameters: Infarct: SBP <180     -Hospital Medicine consulted for co-management in this patient with multiple co-morbidities.  Appreciate the consult and recs.

## 2019-04-03 NOTE — SUBJECTIVE & OBJECTIVE
Interval History 4/3/2019:  Patient is seen for follow-up rehab evaluation and recommendations: Evaluated by therapy yesterday, good candidate for inpatient rehab.  Barriers for discharge: insurance and facility approval    HPI, Past Medical, Family, and Social History remains the same as documented in the initial encounter.    Scheduled Medications:    amLODIPine  10 mg Oral Daily    aspirin  81 mg Oral Daily    atorvastatin  40 mg Oral Daily    heparin (porcine)  5,000 Units Subcutaneous Q8H    hydroCHLOROthiazide  25 mg Oral Daily    insulin detemir U-100  10 Units Subcutaneous Once    insulin detemir U-100  28 Units Subcutaneous BID    nicotine  1 patch Transdermal Daily    pantoprazole  40 mg Oral Daily     PRN Medications: acetaminophen, albuterol-ipratropium, dextrose 10 % in water (D10W), dextrose 10 % in water (D10W), dextrose 50%, dextrose 50%, glucagon (human recombinant), glucose, glucose, hydrALAZINE, insulin aspart U-100, ondansetron, ondansetron, sodium chloride 0.9%, sodium chloride 0.9%    Review of Systems   Constitutional: Positive for activity change and fatigue. Negative for chills and fever.   HENT: Negative for drooling, hearing loss, trouble swallowing and voice change.    Eyes: Negative for pain and visual disturbance.   Respiratory: Negative for cough, shortness of breath and wheezing.    Cardiovascular: Negative for chest pain and palpitations.   Gastrointestinal: Negative for abdominal distention, nausea and vomiting.   Genitourinary: Negative for difficulty urinating and flank pain.   Musculoskeletal: Positive for gait problem and myalgias. Negative for back pain and neck pain.   Skin: Negative for rash and wound.   Neurological: Positive for speech difficulty and weakness. Negative for dizziness, numbness and headaches.   Psychiatric/Behavioral: Negative for agitation and hallucinations. The patient is not nervous/anxious.      Objective:     Vital Signs (Most Recent):  Temp:  99.3 °F (37.4 °C) (19 0316)  Pulse: 83 (19 0700)  Resp: 18 (19 2336)  BP: (!) 185/96 (19 0500)  SpO2: 95 % (19 0316)    Vital Signs (24h Range):  Temp:  [97.9 °F (36.6 °C)-99.4 °F (37.4 °C)] 99.3 °F (37.4 °C)  Pulse:  [77-90] 83  Resp:  [18] 18  SpO2:  [95 %-97 %] 95 %  BP: (180-205)/() 185/96     Physical Exam   Constitutional: She is oriented to person, place, and time. She appears well-developed and well-nourished. She is sleeping. She is easily aroused. No distress.   HENT:   Head: Normocephalic and atraumatic.   Right Ear: External ear normal.   Left Ear: External ear normal.   Nose: Nose normal.   Eyes: Right eye exhibits no discharge. Left eye exhibits no discharge. No scleral icterus.   Neck: Normal range of motion.   Cardiovascular: Normal rate, regular rhythm and intact distal pulses.   Pulmonary/Chest: Effort normal. No respiratory distress. She has no wheezes.   Abdominal: Soft. She exhibits no distension. There is no tenderness.   Musculoskeletal: Normal range of motion. She exhibits no edema or tenderness.   Neurological: She is oriented to person, place, and time and easily aroused. No sensory deficit. She exhibits normal muscle tone.   -  Mental Status:  Follows commands.  Answers correct age and .     -  Speech and language:  no aphasia, + dysarthria.    -  Motor:  RUE: 5/5.  LUE: 5/5.  RLE: 4/5.  LLE: 5/5.   Skin: Skin is warm and dry. No rash noted.   Psychiatric: She has a normal mood and affect. Her behavior is normal. Cognition and memory are impaired.   Vitals reviewed.    Diagnostic Results:   Labs: Reviewed  X-Ray: Reviewed  CT: Reviewed  MRI: Reviewed        NEUROLOGICAL EXAMINATION:     MENTAL STATUS   Oriented to person, place, and time.

## 2019-04-03 NOTE — PLAN OF CARE
Problem: Fall Injury Risk  Goal: Absence of Fall and Fall-Related Injury    Intervention: Identify and Manage Contributors to Fall Injury Risk  Patient remains A&O 4 with no changes in Neuro assessment.  Patient was educated on the importance of using call light and adhering to diabetic diet.  Patient has verbalized understanding but would need further teaching.  Patient is now quietly laying in bed with family at bed side.  Bed alarm is activated with call light within reach.  Will continue to monitor for changes

## 2019-04-03 NOTE — PROGRESS NOTES
[facility] Hospital Medicine  Progress Note    Patient Name: Emily Martinez  MRN: [id number]  Patient Class: IP- Inpatient   Admission Date: 2019  Length of Stay: 2 days  Attending Physician: Robb Boateng MD  Primary Care Provider: Alireza Sosa MD    [facility] Medicine Team: Networked reference to record PCT  Nimco Partida MD    Subjective:     Principal Problem:Embolic stroke involving left posterior cerebral artery    HPI:  54 y.o. female with significant past medical history of DM, HTN, HLD presented to hospital with multiple medical complaints.  The patient has been having slurred speech, BLE weakness, and urinary incontinence since 3/23.  LE weakness became progressively worse with the patient having difficulty/inability to walk for the last 1-2 days.  MRI brain revealed acute infarctions in posterolateral thalamus, territory of PCA. No tPA due to the patient being outside of the treatment window.  No LVO, no intervention at this time. HM team was consulted because of high blood glucose level and co management.    Hospital Course:  No notes on file    Past Medical History:   Diagnosis Date    Diabetes mellitus type I     Hyperlipidemia     Hypertension      Past Surgical History:   Procedure Laterality Date     SECTION       Review of patient's allergies indicates:   Allergen Reactions    Sulfur        Scheduled Medications:    amLODIPine  10 mg Oral Daily    aspirin  81 mg Oral Daily    atorvastatin  40 mg Oral Daily    heparin (porcine)  5,000 Units Subcutaneous Q8H    hydroCHLOROthiazide  25 mg Oral Daily    insulin aspart U-100  5 Units Subcutaneous TIDWM    insulin detemir U-100  28 Units Subcutaneous BID    nicotine  1 patch Transdermal Daily    olmesartan  40 mg Oral Daily    pantoprazole  40 mg Oral Daily    potassium chloride  40 mEq Oral BID       PRN Medications: acetaminophen, albuterol-ipratropium, dextrose 10 % in water (D10W), dextrose  10 % in water (D10W), dextrose 50%, dextrose 50%, glucagon (human recombinant), glucose, glucose, hydrALAZINE, insulin aspart U-100, ondansetron, ondansetron, sodium chloride 0.9%, sodium chloride 0.9%    Family History     Problem Relation (Age of Onset)    Cancer Sister    Diabetes Mother, Sister, Brother, Maternal Aunt    Heart disease Maternal Aunt    Hyperlipidemia Mother, Sister, Brother    Hypertension Mother, Sister, Brother    Stroke Mother        Tobacco Use    Smoking status: Current Every Day Smoker     Packs/day: 1.50     Years: 35.00     Pack years: 52.50     Types: Cigarettes    Smokeless tobacco: Never Used   Substance and Sexual Activity    Alcohol use: Not Currently    Drug use: Not Currently    Sexual activity: Not on file     Review of Systems   Constitutional: Positive for activity change. Negative for fatigue and fever.   HENT: Negative for congestion and trouble swallowing.    Eyes: Negative for pain and visual disturbance.   Respiratory: Negative for cough and shortness of breath.    Cardiovascular: Negative for chest pain and palpitations.   Gastrointestinal: Negative for abdominal pain, nausea and vomiting.   Genitourinary: Negative for difficulty urinating and flank pain.   Musculoskeletal: Positive for gait problem and myalgias.   Skin: Negative for rash and wound.   Neurological: Positive for speech difficulty (chronic) and weakness. Negative for dizziness, numbness and headaches.   Psychiatric/Behavioral: Negative for agitation. The patient is not nervous/anxious.      Objective:     Vital Signs (Most Recent):  Temp: 96.7 °F (35.9 °C) (04/03/19 0805)  Pulse: 82 (04/03/19 1100)  Resp: 17 (04/03/19 0805)  BP: (!) 131/103 (04/03/19 0805)  SpO2: 97 % (04/03/19 0805)    Vital Signs (24h Range):  Temp:  [96.7 °F (35.9 °C)-99.4 °F (37.4 °C)] 96.7 °F (35.9 °C)  Pulse:  [77-90] 82  Resp:  [17-18] 17  SpO2:  [95 %-97 %] 97 %  BP: (131-205)/() 131/103     Body mass index is 25.89  kg/m².    Physical Exam   Constitutional: She is oriented to person, place, and time. She appears well-developed and well-nourished. No distress.   HENT:   Head: Normocephalic and atraumatic.   Eyes: Right eye exhibits no discharge. Left eye exhibits no discharge. No scleral icterus.   Neck: Normal range of motion.   Cardiovascular: Normal rate, regular rhythm and intact distal pulses.   Pulmonary/Chest: Effort normal. No respiratory distress. She has no wheezes.   Abdominal: Soft. She exhibits no distension. There is no tenderness.   Musculoskeletal: Normal range of motion. She exhibits no edema.   Neurological: She is alert and oriented to person, place, and time.   -  Mental Status:  AAOx3.  Follows commands.  Answers correct age and .     -  Speech and language:  no aphasia, + dysarthria.    -  Vision:  no hemianopsia or ptosis.    -  Motor:  BUE: 5/5.  BLE weakness R>L.  Exam limited by effort and participation.    Skin: Skin is warm and dry. No rash noted. She is not diaphoretic.   Psychiatric: She has a normal mood and affect. Her behavior is normal. Cognition and memory are impaired.   Vitals reviewed.    NEUROLOGICAL EXAMINATION:     MENTAL STATUS   Oriented to person, place, and time.       Diagnostic Results:   Labs: Reviewed  X-Ray: Reviewed  CT: Reviewed  MRI: Reviewed    Assessment/Plan:      Trichomonas infection  - Home meds list showed she is on Azithro and CTX. However, pt denies taking neither of them.  - s/p one dose of oral Azithro 1g on     Uncontrolled type 2 diabetes mellitus with hyperglycemia  - HgA1c was non-measurable (>14.0). No gap, normal bicarb level, normal pH 7.35.  - Started on insulin gtt with POCT glu Q1h.  - Started on diabetic diet given no hx of dysphagia or aspiration. No anion gap, bicarb wnl.  - was switched to detemir 18U BID and MDSSI on   - 4/3 increased to insulin detemir 28 u BID and aspart 5 u TID w/meals, MDSS  - cont to monitor POCT glc  - replaced K for  goal ~4    VTE Risk Mitigation (From admission, onward)        Ordered     heparin (porcine) injection 5,000 Units  Every 8 hours      04/01/19 2311     IP VTE HIGH RISK PATIENT  Once      04/01/19 2311     Place sequential compression device  Until discontinued      04/01/19 2311              Nimco Partida MD  Department of Hospital Medicine   [facility]

## 2019-04-03 NOTE — PROGRESS NOTES
[facility]  Vascular Neurology  Comprehensive Stroke Center  Progress Note    Assessment/Plan:     * Embolic stroke involving left posterior cerebral artery  54 y.o. female with significant past medical history of DM, HTN, HLD presented to hospital with multiple medical complaints.  The patient has been having slurred speech, BLE weakness, and urinary incontinence since 3/23.  LE weakness became progressively worse with the patient having difficulty/inability to walk for the last 1-2 days.  MRI brain revealed acute infarctions in posterolateral thalamus, territory of PCA.  No tPA due to the patient being outside of the treatment window.  No LVO, no intervention at this time.  Etiology thought to be small vessel disease.     Neurologically stable. Patient with high blood sugars - medically ready for discharge once improvement in glucose. [facility] to come and assess patient today.     Antithrombotics for secondary stroke prevention: Antiplatelets: Aspirin: 81 mg daily  Statins for secondary stroke prevention and hyperlipidemia, if present:  Atorvastatin- 40 mg daily  Aggressive risk factor modification: HTN, Smoking, DM, HLD, possible illicit substance use/abuse  Rehab efforts: PT/OT/SLP to evaluate and treat, PM&R consult  - rehab placement   Diagnostics ordered/pending: NA  VTE prophylaxis: Heparin 5000 units SQ every 8 hours, SCDs  BP parameters: Infarct: SBP <180     -Hospital Medicine consulted for co-management in this patient with multiple co-morbidities.  Appreciate the consult and recs.    Diabetic ketoacidosis without coma associated with type 2 diabetes mellitus  -BHB 4.1  -Initially started on Insulin gtt; Currently on Detemir 28 and SSI  - Blood sugars > 400 this AM; Aspart 10 u given   -Hospital medicine following     Weakness of both lower extremities  -Patient w/BLE weakness that has gotten progressively worse.  +urinary incontinence, LBP.  -Tylenol prn for pain for now  -MRI L  spine w/o contrast completed - no significant findings   - Consider etiology likely from uncontrolled diabetes causing neuropathy attributing to bilateral lower extremity weakness  -Consulted PT/OT/PM&R, appreciate recs - recommending rehab       Trichomonas infection  -UA + for trichomonas.   -Pt received metronidazole 2 g x 1 dose  -Hospital medicine treated with Azithro 1g  -Consider HIV test in this patient w/ STI, recent weight loss, UDS + for cocaine  -Discussed with patient importance of informing partners, not naming any to contact at this time    Mixed hyperlipidemia  -Stroke risk factor.  Last LDL in 2018 was 118.  Current LDL pending.  -Patient prescribed Crestor, non-compliant.    -Continue atorvastatin 40 mg daily.    Smoker  -Stroke risk factor.  Patient endorses smoking 1.5 ppd x 30 yrs--45 pkyr hx  -Multiple medications prescribed for smoking cessation that pt has not taken.  -Continue to encourage cessation  -Nicotine patch prn  -Patient had been prescribed Advair, Albuterol nebs/MDI in the past with no documentation of COPD  -SpO2 > 88%, Duonebs prn    Essential hypertension  -Stroke risk factor.  SBP < 180  - Patient on combo medications of norvasc, valsartan, and HCTZ at home  - Resumed home medications (benicar chosen in place of valsartan due to recall)    Uncontrolled type 2 diabetes mellitus with hyperglycemia  -Stroke risk factor. Patient reports glucose at home has been >500.  -Glucose 491 in the ED.  1L NS bolus, 10 units insulin ordered.  -Pt states she takes 40 U Lantus bid on rounds mid-morning (40 U tid per home med list)  -DKA confirmed on admission--pt on insulin gtt, IVFs, q4h BMPs with VBG to check pH   -Hospital medicine consulted - Detemir 28u and SSI; given Aspart 10U this AM for blood sugars >400           04/01/19:  Admission to [facility].  UDS + for cocaine, +DKA, presents with sxs of dysarthria, BLE weakness.    04/02/19:  DKA ongoing, insulin gtt started.  Will get IM consult  ordered o/n.  4/3/19 - Mild right pronator drift on exam. BS eleavted this AM >400. HM adjusting insulin regimen. Patient on combo BP medications at home (norvasc/valsartan/HCTZ). Restared BP medications (benicar to replace valsartan due to recall).     STROKE DOCUMENTATION        NIH Scale:  1a. Level of Consciousness: 0-->Alert, keenly responsive  1b. LOC Questions: 0-->Answers both questions correctly  1c. LOC Commands: 0-->Performs both tasks correctly  2. Best Gaze: 0-->Normal  3. Visual: 0-->No visual loss  4. Facial Palsy: 1-->Minor paralysis (flattened nasolabial fold, asymmetry on smiling)  5a. Motor Arm, Left: 0-->No drift, limb holds 90 (or 45) degrees for full 10 secs  5b. Motor Arm, Right: 0-->No drift, limb holds 90 (or 45) degrees for full 10 secs  6a. Motor Leg, Left: 1-->Drift, leg falls by the end of the 5-sec period but does not hit bed  6b. Motor Leg, Right: 1-->Drift, leg falls by the end of the 5-sec period but does not hit bed  7. Limb Ataxia: 0-->Absent  8. Sensory: 0-->Normal, no sensory loss  9. Best Language: 0-->No aphasia, normal  10. Dysarthria: 1-->Mild-to-moderate dysarthria, patient slurs at least some words and, at worst, can be understood with some difficulty  11. Extinction and Inattention (formerly Neglect): 0-->No abnormality  Total (NIH Stroke Scale): 4       Modified Geronimo Score: 1  Doyle Coma Scale:    ABCD2 Score:    [id number] Score:   HAS -BLED Score:   ICH Score:   Hunt & Mueller Classification:      Hemorrhagic change of an Ischemic Stroke: Does this patient have an ischemic stroke with hemorrhagic changes? No     Neurologic Chief Complaint: right sided weakness     Subjective:     Interval History: Patient is seen for follow-up neurological assessment and treatment recommendations:     Mild right pronator drift on exam. BS eleavted this AM >400. HM adjusting insulin regimen. Patient on combo BP medications at home (norvasc/valsartan/HCTZ). Restared BP medications  (benicar to replace valsartan due to recall).     HPI, Past Medical, Family, and Social History remains the same as documented in the initial encounter.     Review of Systems   Constitutional: Negative for fever.   Respiratory: Negative for shortness of breath.    Cardiovascular: Negative for chest pain.   Gastrointestinal: Negative for nausea and vomiting.   Psychiatric/Behavioral: Negative for agitation and confusion.     Scheduled Meds:   amLODIPine  10 mg Oral Daily    aspirin  81 mg Oral Daily    atorvastatin  40 mg Oral Daily    heparin (porcine)  5,000 Units Subcutaneous Q8H    hydroCHLOROthiazide  25 mg Oral Daily    insulin detemir U-100  10 Units Subcutaneous Once    insulin detemir U-100  28 Units Subcutaneous BID    nicotine  1 patch Transdermal Daily    olmesartan  40 mg Oral Daily    pantoprazole  40 mg Oral Daily     Continuous Infusions:   sodium chloride 0.9%       PRN Meds:acetaminophen, albuterol-ipratropium, dextrose 10 % in water (D10W), dextrose 10 % in water (D10W), dextrose 50%, dextrose 50%, glucagon (human recombinant), glucose, glucose, hydrALAZINE, insulin aspart U-100, ondansetron, ondansetron, sodium chloride 0.9%, sodium chloride 0.9%    Objective:     Vital Signs (Most Recent):  Temp: 99.3 °F (37.4 °C) (04/03/19 0316)  Pulse: 83 (04/03/19 0700)  Resp: 18 (04/02/19 2336)  BP: (!) 185/96 (04/03/19 0500)  SpO2: 95 % (04/03/19 0316)  BP Location: Right leg    Vital Signs Range (Last 24H):  Temp:  [97.9 °F (36.6 °C)-99.4 °F (37.4 °C)]   Pulse:  [77-90]   Resp:  [18]   BP: (180-205)/()   SpO2:  [95 %-97 %]   BP Location: Right leg    Physical Exam   Constitutional: She is oriented to person, place, and time. She appears well-developed.   HENT:   Head: Normocephalic and atraumatic.   Eyes: Pupils are equal, round, and reactive to light. EOM are normal.   Pulmonary/Chest: Effort normal.   Abdominal: Soft.   Neurological: She is alert and oriented to person, place, and time.    Skin: Capillary refill takes less than 2 seconds.   Psychiatric: She has a normal mood and affect.       Neurological Exam:   LOC: alert  Attention Span: Good   Language: No aphasia  Articulation: Dysarthria  Orientation: Person, Place, Time   Visual Fields: Full  EOM (CN III, IV, VI): Full/intact  Pupils (CN II, III): PERRL  Facial Movement (CN VII): Lower facial weakness on the Right  Motor: Arm left  Normal 5/5  Leg left  Normal 5/5  Arm right  Normal -5/5  Leg right Normal 5/5  Cebellar: No evidence of appendicular or axial ataxia  Sensation: Intact to light touch, temperature and vibration    Laboratory:  CMP:   Recent Labs   Lab 04/03/19 0422   CALCIUM 9.8   ALBUMIN 3.0*   PROT 6.5      K 3.2*   CO2 27      BUN 5*   CREATININE 1.0   ALKPHOS 99   ALT 9*   AST 15   BILITOT 0.5     BMP:   Recent Labs   Lab 04/03/19 0422      K 3.2*      CO2 27   BUN 5*   CREATININE 1.0   CALCIUM 9.8     CBC:   Recent Labs   Lab 04/03/19 0422   WBC 8.45   RBC 4.69   HGB 13.1   HCT 39.2      MCV 84   MCH 27.9   MCHC 33.4     Lipid Panel: No results for input(s): CHOL, LDLCALC, HDL, TRIG in the last 168 hours.  Coagulation:   Recent Labs   Lab 04/02/19  0310   INR 0.9   APTT <21.0     Platelet Aggregation Study: No results for input(s): PLTAGG, PLTAGINTERP, PLTAGREGLACO, ADPPLTAGGREG in the last 168 hours.  Hgb A1C:   Recent Labs   Lab 04/02/19  0042   HGBA1C >14.0*  >14.0*     TSH:   Recent Labs   Lab 04/02/19  0042   TSH 0.988       Diagnostic Results     Brain Imaging   MRI brain w wo 4/1/2019    Advanced involutional change.    Acute infarcts left putamen, posterolateral left thalamus and left corona radiata and deep white matter adjacent to the left ventricular trigone.    Remote lacunar infarcts left cerebellum, right thalamus, right external capsule, right corona radiata, bilateral basal ganglia and bilateral deep frontal white matter.    Supratentorial and pontine white matter T2/flair  hyperintense signal foci suggesting sequela of chronic small vessel ischemic change.    Left sphenoid sinus disease.    Vessel Imaging   CTA head and neck 4/2/2019  CT head:    1. Subtle hypoattenuation corresponding with areas of acute infarction identified on recent MRI involving the left basal ganglia, left thalamus, and periventricular white matter adjacent to the left ventricular trigone.  2. Multifocal remote lacunar type infarcts as above.  3. Generalized cerebral volume loss and chronic microvascular ischemic change.  4. Left sphenoid sinus disease.  CTA:    No hemodynamically significant stenosis or large vessel occlusion identified.      Cardiac Imaging   TTE 4/1/2019  · Normal left ventricular systolic function. The estimated ejection fraction is 68%  · Concentric left ventricular remodeling.  · Normal LV diastolic function.  · No wall motion abnormalities.  · Normal right ventricular systolic function.  · Mild mitral sclerosis.  · Normal central venous pressure (3 mm Hg).  · The estimated PA systolic pressure is 15 mm Hg      Megan Ponce NP  Comprehensive Stroke Center  Department of Vascular Neurology   [facility]

## 2019-04-03 NOTE — ASSESSMENT & PLAN NOTE
-Stroke risk factor.  SBP < 180  - Patient on combo medications of norvasc, valsartan, and HCTZ at home  - Resumed home medications (benicar chosen in place of valsartan due to recall)

## 2019-04-03 NOTE — PT/OT/SLP PROGRESS
Occupational Therapy   Treatment    Name: Emily Martinez  MRN: [id number]  Admitting Diagnosis:  Embolic stroke involving left posterior cerebral artery       Recommendations:     Discharge Recommendations: rehabilitation facility  Discharge Equipment Recommendations:  (TBD pending progress/next level of care)  Barriers to discharge:  Inaccessible home environment, Decreased caregiver support    Assessment:     Emily Martinez is a 54 y.o. 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  She presents with performance deficits including weakness, impaired endurance, impaired self care skills, impaired functional mobilty, impaired balance, gait instability, decreased safety awareness, decreased coordination. Pt with increase command following and completion of ADLs and mobility this date. Pt progressing toward goals and would continue to benefit from OT to maximize functional independence and safety. Recommend rehab upon D/C.    Rehab Prognosis:  Good; patient would benefit from acute skilled OT services to address these deficits and reach maximum level of function.       Plan:     Patient to be seen 4 x/week to address the above listed problems via self-care/home management, therapeutic activities, therapeutic exercises, neuromuscular re-education  · Plan of Care Expires: 05/02/19  · Plan of Care Reviewed with: patient    Subjective     Pain/Comfort:  · Pain Rating 1: 0/10  · Pain Rating Post-Intervention 1: 0/10    Objective:     Communicated with: RN prior to session.  Patient found right sidelying with telemetry, bed alarm, peripheral IV upon OT entry to room.    General Precautions: Standard, aspiration, fall, seizure   Orthopedic Precautions:N/A   Braces: N/A     Occupational Performance:     Bed Mobility:    · Patient completed Supine to Sit with contact guard assistance  · Patient completed Sit to Supine with contact guard assistance     Functional  Mobility/Transfers:  · Patient completed Sit <> Stand Transfer with contact guard assistance with rolling walker from EOB and bedside commode  · Functional Mobility: Within room household distance with CGA-Min A using RW    Activities of Daily Living:  · Lower Body Dressing: moderate assistance to don socks while seated EOB; assist with balance and to reach B feet  · Toileting: moderate assistance for standing hygiene and balance; able to complete hygiene while seated on BSC      AMPAC 6 Click ADL: 15    Treatment & Education:  Pt with increased command following this date, although continues to require cues to stay on task and for safety awareness; seated EOB balance varied from close SBA-Min A with multidirectional instability; demo ability to complete full ROM shld flex with B UE x 8 reps with CGA for postural control; completed functional mobility with use of RW and toileting on BSC    Patient left HOB elevated with all lines intact, call button in reach, bed alarm on and RN notifiedEducation:      GOALS:   Multidisciplinary Problems     Occupational Therapy Goals        Problem: Occupational Therapy Goal    Goal Priority Disciplines Outcome Interventions   Occupational Therapy Goal     OT, PT/OT Ongoing (interventions implemented as appropriate)    Description:  Goals to be met by: 7 days (4/9/19)     Patient will increase functional independence with ADLs by performing:    UE Dressing with Stand-by Assistance.  LE Dressing with Contact Guard Assistance.  Grooming while standing at sink with Contact Guard Assistance.  Toileting from toilet with Minimal Assistance for hygiene and clothing management.   Sitting at edge of bed x10 minutes with Stand-by Assistance.  Supine to sit with Contact Guard Assistance.-MET  Toilet transfer to toilet with Contact Guard Assistance.  Increased functional strength to WFL for ADLs.  Pt will follow 5/5 two-step commands to complete ADL task.                       Time Tracking:      OT Date of Treatment: 04/03/19  OT Start Time: 0906  OT Stop Time: 0922  OT Total Time (min): 16 min    Billable Minutes:Therapeutic Activity 16 minutes    FLAKITO Lopez  4/3/2019

## 2019-04-03 NOTE — SUBJECTIVE & OBJECTIVE
Neurologic Chief Complaint: right sided weakness     Subjective:     Interval History: Patient is seen for follow-up neurological assessment and treatment recommendations:     Mild right pronator drift on exam. BS eleavted this AM >400. HM adjusting insulin regimen. Patient on combo BP medications at home (norvasc/valsartan/HCTZ). Restared BP medications (benicar to replace valsartan due to recall).     HPI, Past Medical, Family, and Social History remains the same as documented in the initial encounter.     Review of Systems   Constitutional: Negative for fever.   Respiratory: Negative for shortness of breath.    Cardiovascular: Negative for chest pain.   Gastrointestinal: Negative for nausea and vomiting.   Psychiatric/Behavioral: Negative for agitation and confusion.     Scheduled Meds:   amLODIPine  10 mg Oral Daily    aspirin  81 mg Oral Daily    atorvastatin  40 mg Oral Daily    heparin (porcine)  5,000 Units Subcutaneous Q8H    hydroCHLOROthiazide  25 mg Oral Daily    insulin detemir U-100  10 Units Subcutaneous Once    insulin detemir U-100  28 Units Subcutaneous BID    nicotine  1 patch Transdermal Daily    olmesartan  40 mg Oral Daily    pantoprazole  40 mg Oral Daily     Continuous Infusions:   sodium chloride 0.9%       PRN Meds:acetaminophen, albuterol-ipratropium, dextrose 10 % in water (D10W), dextrose 10 % in water (D10W), dextrose 50%, dextrose 50%, glucagon (human recombinant), glucose, glucose, hydrALAZINE, insulin aspart U-100, ondansetron, ondansetron, sodium chloride 0.9%, sodium chloride 0.9%    Objective:     Vital Signs (Most Recent):  Temp: 99.3 °F (37.4 °C) (04/03/19 0316)  Pulse: 83 (04/03/19 0700)  Resp: 18 (04/02/19 2336)  BP: (!) 185/96 (04/03/19 0500)  SpO2: 95 % (04/03/19 0316)  BP Location: Right leg    Vital Signs Range (Last 24H):  Temp:  [97.9 °F (36.6 °C)-99.4 °F (37.4 °C)]   Pulse:  [77-90]   Resp:  [18]   BP: (180-205)/()   SpO2:  [95 %-97 %]   BP Location:  Right leg    Physical Exam   Constitutional: She is oriented to person, place, and time. She appears well-developed.   HENT:   Head: Normocephalic and atraumatic.   Eyes: Pupils are equal, round, and reactive to light. EOM are normal.   Pulmonary/Chest: Effort normal.   Abdominal: Soft.   Neurological: She is alert and oriented to person, place, and time.   Skin: Capillary refill takes less than 2 seconds.   Psychiatric: She has a normal mood and affect.       Neurological Exam:   LOC: alert  Attention Span: Good   Language: No aphasia  Articulation: Dysarthria  Orientation: Person, Place, Time   Visual Fields: Full  EOM (CN III, IV, VI): Full/intact  Pupils (CN II, III): PERRL  Facial Movement (CN VII): Lower facial weakness on the Right  Motor: Arm left  Normal 5/5  Leg left  Normal 5/5  Arm right  Normal -5/5  Leg right Normal 5/5  Cebellar: No evidence of appendicular or axial ataxia  Sensation: Intact to light touch, temperature and vibration    Laboratory:  CMP:   Recent Labs   Lab 04/03/19 0422   CALCIUM 9.8   ALBUMIN 3.0*   PROT 6.5      K 3.2*   CO2 27      BUN 5*   CREATININE 1.0   ALKPHOS 99   ALT 9*   AST 15   BILITOT 0.5     BMP:   Recent Labs   Lab 04/03/19  0422      K 3.2*      CO2 27   BUN 5*   CREATININE 1.0   CALCIUM 9.8     CBC:   Recent Labs   Lab 04/03/19  0422   WBC 8.45   RBC 4.69   HGB 13.1   HCT 39.2      MCV 84   MCH 27.9   MCHC 33.4     Lipid Panel: No results for input(s): CHOL, LDLCALC, HDL, TRIG in the last 168 hours.  Coagulation:   Recent Labs   Lab 04/02/19  0310   INR 0.9   APTT <21.0     Platelet Aggregation Study: No results for input(s): PLTAGG, PLTAGINTERP, PLTAGREGLACO, ADPPLTAGGREG in the last 168 hours.  Hgb A1C:   Recent Labs   Lab 04/02/19  0042   HGBA1C >14.0*  >14.0*     TSH:   Recent Labs   Lab 04/02/19  0042   TSH 0.988       Diagnostic Results     Brain Imaging   MRI brain w wo 4/1/2019    Advanced involutional change.    Acute  infarcts left putamen, posterolateral left thalamus and left corona radiata and deep white matter adjacent to the left ventricular trigone.    Remote lacunar infarcts left cerebellum, right thalamus, right external capsule, right corona radiata, bilateral basal ganglia and bilateral deep frontal white matter.    Supratentorial and pontine white matter T2/flair hyperintense signal foci suggesting sequela of chronic small vessel ischemic change.    Left sphenoid sinus disease.    Vessel Imaging   CTA head and neck 4/2/2019  CT head:    1. Subtle hypoattenuation corresponding with areas of acute infarction identified on recent MRI involving the left basal ganglia, left thalamus, and periventricular white matter adjacent to the left ventricular trigone.  2. Multifocal remote lacunar type infarcts as above.  3. Generalized cerebral volume loss and chronic microvascular ischemic change.  4. Left sphenoid sinus disease.  CTA:    No hemodynamically significant stenosis or large vessel occlusion identified.      Cardiac Imaging   TTE 4/1/2019  · Normal left ventricular systolic function. The estimated ejection fraction is 68%  · Concentric left ventricular remodeling.  · Normal LV diastolic function.  · No wall motion abnormalities.  · Normal right ventricular systolic function.  · Mild mitral sclerosis.  · Normal central venous pressure (3 mm Hg).  · The estimated PA systolic pressure is 15 mm Hg

## 2019-04-03 NOTE — ASSESSMENT & PLAN NOTE
-BHB 4.1  -Initially started on Insulin gtt; Currently on Detemir 28 and SSI  - Blood sugars > 400 this AM; Aspart 10 u given   -Hospital medicine following

## 2019-04-03 NOTE — ASSESSMENT & PLAN NOTE
- Home meds list showed she is on Azithro and CTX. However, pt denies taking neither of them.  - s/p one dose of oral Azithro 1g on 4/2

## 2019-04-03 NOTE — ASSESSMENT & PLAN NOTE
-Stroke risk factor. Patient reports glucose at home has been >500.  -Glucose 491 in the ED.  1L NS bolus, 10 units insulin ordered.  -Pt states she takes 40 U Lantus bid on rounds mid-morning (40 U tid per home med list)  -DKA confirmed on admission--pt on insulin gtt, IVFs, q4h BMPs with VBG to check pH   -Hospital medicine consulted - Detemir 28u and SSI; given Aspart 10U this AM for blood sugars >400

## 2019-04-03 NOTE — PLAN OF CARE
Problem: Adult Inpatient Plan of Care  Goal: Plan of Care Review  Outcome: Ongoing (interventions implemented as appropriate)  Plan of care and stroke education reviewed with pt. Pt voiced understanding. Pt AAOX4. No c/o during the night. No apparent distress noted. Bed in lowest position and locked, call light within reach, side rails X2, and slip resistant socks on at this time. Will continue to monitor     Problem: Diabetes Comorbidity  Goal: Blood Glucose Level Within Desired Range  Outcome: Ongoing (interventions implemented as appropriate)  Diabetes education provided to pt with questions and concerns discussed. Instructed pt on orders for hyper/hypoglycemic control and blood glucose monitoring. Acknowledged understanding. Will cont to monitor.

## 2019-04-03 NOTE — PROGRESS NOTES
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  Found to have dysarthria and cognitive-linguistic impairment.  SLP recommendation: regular diet and thin liquids.  Bed mobility SBA-modA.  EOB CGA.  Sit to stand and transfers min-modA.  Ambulated 35 ft modA.  UBD modA and LBD modA.     Interval History 4/3/2019:  Patient is seen for follow-up rehab evaluation and recommendations: Evaluated by therapy yesterday, good candidate for inpatient rehab.  Barriers for discharge: insurance and facility approval    HPI, Past Medical, Family, and Social History remains the same as documented in the initial encounter.    Scheduled Medications:    amLODIPine  10 mg Oral Daily    aspirin  81 mg Oral Daily    atorvastatin  40 mg Oral Daily    heparin (porcine)  5,000 Units Subcutaneous Q8H    hydroCHLOROthiazide  25 mg Oral Daily    insulin detemir U-100  10 Units Subcutaneous Once    insulin detemir U-100  28 Units Subcutaneous BID    nicotine  1 patch Transdermal Daily    pantoprazole  40 mg Oral Daily     PRN Medications: acetaminophen, albuterol-ipratropium, dextrose 10 % in water (D10W), dextrose 10 % in water (D10W), dextrose 50%, dextrose 50%, glucagon (human recombinant), glucose, glucose, hydrALAZINE, insulin aspart U-100, ondansetron, ondansetron, sodium chloride 0.9%, sodium chloride 0.9%    Review of Systems   Constitutional: Positive for activity change and fatigue. Negative for chills and fever.   HENT: Negative for drooling, hearing loss, trouble swallowing and voice change.    Eyes: Negative for pain and visual disturbance.   Respiratory: Negative for cough, shortness of breath and wheezing.     Cardiovascular: Negative for chest pain and palpitations.   Gastrointestinal: Negative for abdominal distention, nausea and vomiting.   Genitourinary: Negative for difficulty urinating and flank pain.   Musculoskeletal: Positive for gait problem and myalgias. Negative for back pain and neck pain.   Skin: Negative for rash and wound.   Neurological: Positive for speech difficulty and weakness. Negative for dizziness, numbness and headaches.   Psychiatric/Behavioral: Negative for agitation and hallucinations. The patient is not nervous/anxious.      Objective:     Vital Signs (Most Recent):  Temp: 99.3 °F (37.4 °C) (19 0316)  Pulse: 83 (19 0700)  Resp: 18 (19 2336)  BP: (!) 185/96 (19 0500)  SpO2: 95 % (19 0316)    Vital Signs (24h Range):  Temp:  [97.9 °F (36.6 °C)-99.4 °F (37.4 °C)] 99.3 °F (37.4 °C)  Pulse:  [77-90] 83  Resp:  [18] 18  SpO2:  [95 %-97 %] 95 %  BP: (180-205)/() 185/96     Physical Exam   Constitutional: She is oriented to person, place, and time. She appears well-developed and well-nourished. She is sleeping. She is easily aroused. No distress.   HENT:   Head: Normocephalic and atraumatic.   Right Ear: External ear normal.   Left Ear: External ear normal.   Nose: Nose normal.   Eyes: Right eye exhibits no discharge. Left eye exhibits no discharge. No scleral icterus.   Neck: Normal range of motion.   Cardiovascular: Normal rate, regular rhythm and intact distal pulses.   Pulmonary/Chest: Effort normal. No respiratory distress. She has no wheezes.   Abdominal: Soft. She exhibits no distension. There is no tenderness.   Musculoskeletal: Normal range of motion. She exhibits no edema or tenderness.   Neurological: She is oriented to person, place, and time and easily aroused. No sensory deficit. She exhibits normal muscle tone.   -  Mental Status:  Follows commands.  Answers correct age and .     -  Speech and language:  no aphasia, + dysarthria.    -  Motor:   RUE: 5/5.  LUE: 5/5.  RLE: 4/5.  LLE: 5/5.   Skin: Skin is warm and dry. No rash noted.   Psychiatric: She has a normal mood and affect. Her behavior is normal. Cognition and memory are impaired.   Vitals reviewed.    Diagnostic Results:   Labs: Reviewed  X-Ray: Reviewed  CT: Reviewed  MRI: Reviewed    Assessment/Plan:      * Embolic stroke involving left posterior cerebral artery  -  Presented for slurred speech, BLE R>L weakness, and urinary incontinence since 3/23  -  CTH concerning for L lacunar infarcts  -  Not tPA or interventional candidate  -  MRI brain revealed acute L putamen, L posterolateral thalamic, and L corona radiata infarcts  -  Management per Vascular Neurology   See hospital course for functional, cognitive/speech/language, and nutrition/swallow status.      Recommendations  -  Encourage mobility, OOB in chair, and early ambulation as appropriate   -  PT/OT evaluate and treat  -  SLP speech and cognitive evaluate and treat  -  Monitor mood and sleep disturbances  -  Establish consistent sleep-wake cycle  -  Monitor for bowel and bladder dysfunction  -  Monitor for shoulder pain and subluxation  -  Monitor for spasticity  -  DVT prophylaxis  -  Monitor for and prevent skin breakdown and pressure ulcers  · Early mobility, repositioning/weight shifting every 20-30 minutes when sitting, turn patient every 2 hours, proper mattress/overlay and chair cushioning, pressure relief/heel protector boots    Weakness of both lower extremities  -  2/2 stroke  Recommendations  -  PT and OT evaluate and treat  -  Monitor for shoulder pain and subluxation  -  Monitor for spasticity  -  Monitor for skin breakdown and pressure ulcers    Smoker  -  Stroke risk factor  -  Encourage cessation     Uncontrolled type 2 diabetes mellitus with hyperglycemia  -  Stroke risk factor  -  Management per Vascular Neurology--> on insulin gtt    Recommend Inpatient Rehab when ready for discharge.  Patient and/or family prefer IRF  on [facility] (closer to home).  Continue therapy.  Will follow for change in post-acute recommendation.    CRISTIANE Turcios  Department of Physical Medicine & Rehab   [facility]

## 2019-04-03 NOTE — ASSESSMENT & PLAN NOTE
-Patient w/BLE weakness that has gotten progressively worse.  +urinary incontinence, LBP.  -Tylenol prn for pain for now  -MRI L spine w/o contrast completed - no significant findings   - Consider etiology likely from uncontrolled diabetes causing neuropathy attributing to bilateral lower extremity weakness  -Consulted PT/OT/PM&R, appreciate recs - recommending rehab

## 2019-04-03 NOTE — CONSULTS
Food & Nutrition  Education    Diet Education: Diabetic and Cardiac  Time Spent:  Learners: Pt and family members      Nutrition Education provided with handouts: Meal Planning Using the Plate Method; Cardiac-TLC Nutrition Therapy    Comments: Stroke pathway with uncontrolled diabetes. PMH: T2DM, HTN, HLD. Pt reports having diabetes classes a very long time ago. States that she follows a diabetic diet at home, checks BS, and takes insulin. Daughter will be cooking meals at home after D/C. Daughter not present at this visit.     Educated pt on Diabetic diet. Dicussed CHO serving sizes, non-starchy vegetables, and lean protein. Encouraged pt to stay hydrated, avoid skipping meals, and to eat fresh, unprocessed foods. Also dicussed foods recommend and not recommended on low fat diet. Pt verbalized understanding and states that she will give her daughter the handouts. Encouraged pt to visit with the outpatient diabetes educator if she had additional questions/concerns.    No questions or concerns at this time.     Follow-Up: Yes     Please re-consult as needed.

## 2019-04-04 LAB
ANION GAP SERPL CALC-SCNC: 10 MMOL/L (ref 8–16)
BUN SERPL-MCNC: 6 MG/DL (ref 6–20)
CALCIUM SERPL-MCNC: 9.9 MG/DL (ref 8.7–10.5)
CHLORIDE SERPL-SCNC: 104 MMOL/L (ref 95–110)
CO2 SERPL-SCNC: 27 MMOL/L (ref 23–29)
CREAT SERPL-MCNC: 0.9 MG/DL (ref 0.5–1.4)
EST. GFR  (AFRICAN AMERICAN): >60 ML/MIN/1.73 M^2
EST. GFR  (NON AFRICAN AMERICAN): >60 ML/MIN/1.73 M^2
GLUCOSE SERPL-MCNC: 183 MG/DL (ref 70–110)
POCT GLUCOSE: 274 MG/DL (ref 70–110)
POCT GLUCOSE: 291 MG/DL (ref 70–110)
POCT GLUCOSE: 296 MG/DL (ref 70–110)
POCT GLUCOSE: 311 MG/DL (ref 70–110)
POTASSIUM SERPL-SCNC: 3.8 MMOL/L (ref 3.5–5.1)
SODIUM SERPL-SCNC: 141 MMOL/L (ref 136–145)

## 2019-04-04 PROCEDURE — S4991 NICOTINE PATCH NONLEGEND: HCPCS | Performed by: NURSE PRACTITIONER

## 2019-04-04 PROCEDURE — 63600175 PHARM REV CODE 636 W HCPCS: Performed by: HOSPITALIST

## 2019-04-04 PROCEDURE — 36415 COLL VENOUS BLD VENIPUNCTURE: CPT

## 2019-04-04 PROCEDURE — 99232 PR SUBSEQUENT HOSPITAL CARE,LEVL II: ICD-10-PCS | Mod: ,,, | Performed by: HOSPITALIST

## 2019-04-04 PROCEDURE — 20600001 HC STEP DOWN PRIVATE ROOM

## 2019-04-04 PROCEDURE — 25000003 PHARM REV CODE 250: Performed by: NURSE PRACTITIONER

## 2019-04-04 PROCEDURE — 97535 SELF CARE MNGMENT TRAINING: CPT

## 2019-04-04 PROCEDURE — 99232 SBSQ HOSP IP/OBS MODERATE 35: CPT | Mod: ,,, | Performed by: HOSPITALIST

## 2019-04-04 PROCEDURE — 97116 GAIT TRAINING THERAPY: CPT

## 2019-04-04 PROCEDURE — S5571 INSULIN DISPOS PEN 3 ML: HCPCS | Performed by: HOSPITALIST

## 2019-04-04 PROCEDURE — 80048 BASIC METABOLIC PNL TOTAL CA: CPT

## 2019-04-04 PROCEDURE — 83701 LIPOPROTEIN BLD HR FRACTION: CPT

## 2019-04-04 PROCEDURE — 63600175 PHARM REV CODE 636 W HCPCS: Performed by: NURSE PRACTITIONER

## 2019-04-04 PROCEDURE — 92507 TX SP LANG VOICE COMM INDIV: CPT

## 2019-04-04 PROCEDURE — 99232 PR SUBSEQUENT HOSPITAL CARE,LEVL II: ICD-10-PCS | Mod: ,,, | Performed by: PSYCHIATRY & NEUROLOGY

## 2019-04-04 PROCEDURE — 99232 SBSQ HOSP IP/OBS MODERATE 35: CPT | Mod: ,,, | Performed by: PSYCHIATRY & NEUROLOGY

## 2019-04-04 RX ORDER — INSULIN ASPART 100 [IU]/ML
12 INJECTION, SOLUTION INTRAVENOUS; SUBCUTANEOUS
Status: DISCONTINUED | OUTPATIENT
Start: 2019-04-04 | End: 2019-04-05

## 2019-04-04 RX ADMIN — INSULIN ASPART 6 UNITS: 100 INJECTION, SOLUTION INTRAVENOUS; SUBCUTANEOUS at 03:04

## 2019-04-04 RX ADMIN — OLMESARTAN MEDOXOMIL 40 MG: 20 TABLET, FILM COATED ORAL at 10:04

## 2019-04-04 RX ADMIN — HEPARIN SODIUM 5000 UNITS: 5000 INJECTION, SOLUTION INTRAVENOUS; SUBCUTANEOUS at 05:04

## 2019-04-04 RX ADMIN — ATORVASTATIN CALCIUM 40 MG: 20 TABLET, FILM COATED ORAL at 10:04

## 2019-04-04 RX ADMIN — INSULIN DETEMIR 28 UNITS: 100 INJECTION, SOLUTION SUBCUTANEOUS at 10:04

## 2019-04-04 RX ADMIN — NICOTINE 1 PATCH: 21 PATCH, EXTENDED RELEASE TRANSDERMAL at 10:04

## 2019-04-04 RX ADMIN — INSULIN ASPART 5 UNITS: 100 INJECTION, SOLUTION INTRAVENOUS; SUBCUTANEOUS at 10:04

## 2019-04-04 RX ADMIN — HYDROCHLOROTHIAZIDE 25 MG: 25 TABLET ORAL at 10:04

## 2019-04-04 RX ADMIN — AMLODIPINE BESYLATE 10 MG: 10 TABLET ORAL at 10:04

## 2019-04-04 RX ADMIN — PANTOPRAZOLE SODIUM 40 MG: 40 TABLET, DELAYED RELEASE ORAL at 10:04

## 2019-04-04 RX ADMIN — HEPARIN SODIUM 5000 UNITS: 5000 INJECTION, SOLUTION INTRAVENOUS; SUBCUTANEOUS at 09:04

## 2019-04-04 RX ADMIN — INSULIN ASPART 8 UNITS: 100 INJECTION, SOLUTION INTRAVENOUS; SUBCUTANEOUS at 10:04

## 2019-04-04 RX ADMIN — INSULIN ASPART 12 UNITS: 100 INJECTION, SOLUTION INTRAVENOUS; SUBCUTANEOUS at 03:04

## 2019-04-04 RX ADMIN — HEPARIN SODIUM 5000 UNITS: 5000 INJECTION, SOLUTION INTRAVENOUS; SUBCUTANEOUS at 03:04

## 2019-04-04 RX ADMIN — INSULIN DETEMIR 28 UNITS: 100 INJECTION, SOLUTION SUBCUTANEOUS at 09:04

## 2019-04-04 RX ADMIN — INSULIN ASPART 5 UNITS: 100 INJECTION, SOLUTION INTRAVENOUS; SUBCUTANEOUS at 11:04

## 2019-04-04 RX ADMIN — ASPIRIN 81 MG: 81 TABLET, COATED ORAL at 10:04

## 2019-04-04 NOTE — PLAN OF CARE
Problem: Occupational Therapy Goal  Goal: Occupational Therapy Goal  Goals to be met by: 7 days (4/9/19)     Patient will increase functional independence with ADLs by performing:    UE Dressing with Stand-by Assistance.  LE Dressing with Contact Guard Assistance.  Grooming while standing at sink with Contact Guard Assistance.  Toileting from toilet with Minimal Assistance for hygiene and clothing management.   Sitting at edge of bed x10 minutes with Stand-by Assistance.  Supine to sit with Contact Guard Assistance.-MET  Toilet transfer to toilet with Contact Guard Assistance.  Increased functional strength to WFL for ADLs.  Pt will follow 5/5 two-step commands to complete ADL task.      Goals remain appropriate.  FLAKITO Thao  4/4/2019

## 2019-04-04 NOTE — ASSESSMENT & PLAN NOTE
- HgA1c was non-measurable (>14.0). No gap, normal bicarb level, normal pH 7.35.  - Started on insulin gtt with POCT glu Q1h. Was switched to detemir 18U BID and MDSSI on 4/2  - Started on diabetic diet given no hx of dysphagia or aspiration.  - 4/3 increased to insulin detemir 28 u BID and aspart 5 u TID w/meals, MDSS  - 4/4 increased to insulin detemir 28U BID and aspart 12U TIDWM in addition to MDSSI.  - cont to monitor POCT glc

## 2019-04-04 NOTE — ASSESSMENT & PLAN NOTE
-Patient w/BLE weakness that has gotten progressively worse.  +urinary incontinence, LBP.  -Tylenol prn for pain for now, stable and not requiring further prns  -MRI L spine w/o contrast completed--some R-sided disc protrusion L3-L4 with possible R L3 impingement  -Consulted PT/OT/PM&R, appreciate recs--recommending inpatient rehab

## 2019-04-04 NOTE — ASSESSMENT & PLAN NOTE
-Stroke risk factor. Patient reports glucose at home has been >500.  -DKA confirmed on admission, now with hyperglycemia without anion gap or ketosis   -Hospital medicine consulted--detemir 28 U bid, aspart 12 U tidwm started   -BG over past 24 hours 183-316 mg/dL

## 2019-04-04 NOTE — ASSESSMENT & PLAN NOTE
54 y.o. female with significant past medical history of DM, HTN, HLD presented to hospital with multiple medical complaints.  The patient has been having slurred speech, BLE weakness, and urinary incontinence since 3/23.  LE weakness became progressively worse with the patient having difficulty/inability to walk for the last 1-2 days.  MRI brain revealed acute infarctions in posterolateral thalamus, territory of PCA.  No tPA due to the patient being outside of the treatment window.  No LVO, no intervention at this time.  Etiology thought to be small vessel disease.     Neurologically stable. Patient with high blood sugars - medically ready for discharge once improvement in glucose. [facility] to come and assess patient today.     Antithrombotics for secondary stroke prevention: Antiplatelets: Aspirin: 81 mg daily  Statins for secondary stroke prevention and hyperlipidemia, if present:  Atorvastatin 40 mg daily  Aggressive risk factor modification: HTN, Smoking, DM, HLD, possible illicit substance use/abuse  Rehab efforts: PT/OT/SLP to evaluate and treat, PM&R consult  - rehab placement   Diagnostics ordered/pending: NA  VTE prophylaxis: Heparin 5000 units SQ every 8 hours, SCDs  BP parameters: Infarct: SBP <180

## 2019-04-04 NOTE — PLAN OF CARE
Problem: Adult Inpatient Plan of Care  Goal: Plan of Care Review  Pt AAOx4. Medications and POC reviewed with patient; questions encouraged & answered, patient verbalized understanding. BP remained below 220/100. Please see flowsheets for v/s information. NAEO. O2 & suction at bedside, bed locked in lowest position, siderails up x3, avasys in use, call light in reach, instructed to call for mobility assistance. WCTM.

## 2019-04-04 NOTE — PLAN OF CARE
Problem: SLP Goal  Goal: SLP Goal  Goals due 4/9  1.  Assess functional reading and writing skills  2.  Further assess problelm solving skills to determine need for therapy  3.  Recall speech strategies with min cues  4.  Repeat sentences with 100% intelligibility  5.  Implement speech strategies in simple conversation with mod cues   Outcome: Ongoing (interventions implemented as appropriate)  Goals remain appropriate. Cont ST per POC. Ebonie Sawant CCC-SLP 4/4/2019 1:48 PM

## 2019-04-04 NOTE — SUBJECTIVE & OBJECTIVE
Past Medical History:   Diagnosis Date    Diabetes mellitus type I     Hyperlipidemia     Hypertension      Past Surgical History:   Procedure Laterality Date     SECTION       Review of patient's allergies indicates:   Allergen Reactions    Sulfur        Scheduled Medications:    amLODIPine  10 mg Oral Daily    aspirin  81 mg Oral Daily    atorvastatin  40 mg Oral Daily    heparin (porcine)  5,000 Units Subcutaneous Q8H    hydroCHLOROthiazide  25 mg Oral Daily    insulin aspart U-100  12 Units Subcutaneous TIDWM    insulin detemir U-100  28 Units Subcutaneous BID    nicotine  1 patch Transdermal Daily    olmesartan  40 mg Oral Daily    pantoprazole  40 mg Oral Daily       PRN Medications: acetaminophen, albuterol-ipratropium, dextrose 10 % in water (D10W), dextrose 10 % in water (D10W), dextrose 50%, dextrose 50%, glucagon (human recombinant), glucose, glucose, hydrALAZINE, insulin aspart U-100, ondansetron, ondansetron, sodium chloride 0.9%, sodium chloride 0.9%    Family History     Problem Relation (Age of Onset)    Cancer Sister    Diabetes Mother, Sister, Brother, Maternal Aunt    Heart disease Maternal Aunt    Hyperlipidemia Mother, Sister, Brother    Hypertension Mother, Sister, Brother    Stroke Mother        Tobacco Use    Smoking status: Current Every Day Smoker     Packs/day: 1.50     Years: 35.00     Pack years: 52.50     Types: Cigarettes    Smokeless tobacco: Never Used   Substance and Sexual Activity    Alcohol use: Not Currently    Drug use: Not Currently    Sexual activity: Not on file     Review of Systems   Constitutional: Positive for activity change. Negative for fatigue and fever.   HENT: Negative for congestion and trouble swallowing.    Eyes: Negative for pain and visual disturbance.   Respiratory: Negative for cough and shortness of breath.    Cardiovascular: Negative for chest pain and palpitations.   Gastrointestinal: Negative for abdominal pain, nausea and  vomiting.   Genitourinary: Negative for difficulty urinating and flank pain.   Musculoskeletal: Positive for gait problem and myalgias.   Skin: Negative for rash and wound.   Neurological: Positive for speech difficulty (chronic) and weakness. Negative for dizziness, numbness and headaches.   Psychiatric/Behavioral: Negative for agitation. The patient is not nervous/anxious.      Objective:     Vital Signs (Most Recent):  Temp: 97.4 °F (36.3 °C) (19 1131)  Pulse: 92 (19 1131)  Resp: 16 (19 1131)  BP: 129/88 (19 1131)  SpO2: 98 % (19 113)    Vital Signs (24h Range):  Temp:  [97.4 °F (36.3 °C)-98.6 °F (37 °C)] 97.4 °F (36.3 °C)  Pulse:  [75-92] 92  Resp:  [16-18] 16  SpO2:  [96 %-99 %] 98 %  BP: (129-169)/() 129/88     Body mass index is 25.89 kg/m².    Physical Exam   Constitutional: She is oriented to person, place, and time. She appears well-developed and well-nourished. No distress.   HENT:   Head: Normocephalic and atraumatic.   Eyes: Right eye exhibits no discharge. Left eye exhibits no discharge. No scleral icterus.   Neck: Normal range of motion.   Cardiovascular: Normal rate, regular rhythm and intact distal pulses.   Pulmonary/Chest: Effort normal. No respiratory distress. She has no wheezes.   Abdominal: Soft. She exhibits no distension. There is no tenderness.   Musculoskeletal: Normal range of motion. She exhibits no edema.   Neurological: She is alert and oriented to person, place, and time.   -  Mental Status:  AAOx3.  Follows commands.  Answers correct age and .     -  Speech and language:  no aphasia, + dysarthria.    -  Vision:  no hemianopsia or ptosis.    -  Motor:  BUE: 5/5.  BLE weakness R>L.  Exam limited by effort and participation.    Skin: Skin is warm and dry. No rash noted. She is not diaphoretic.   Psychiatric: She has a normal mood and affect. Her behavior is normal. Cognition and memory are impaired.   Vitals reviewed.    NEUROLOGICAL EXAMINATION:      MENTAL STATUS   Oriented to person, place, and time.       Diagnostic Results:   Labs: Reviewed  X-Ray: Reviewed  CT: Reviewed  MRI: Reviewed

## 2019-04-04 NOTE — PT/OT/SLP PROGRESS
"Occupational Therapy   Treatment    Name: Emily Martinez  MRN: [id number]  Admitting Diagnosis:  Embolic stroke involving left posterior cerebral artery       Recommendations:     Discharge Recommendations: rehabilitation facility  Discharge Equipment Recommendations:  shower chair  Barriers to discharge:  Inaccessible home environment, Decreased caregiver support    Assessment:     Emily Martinez is a 54 y.o. 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  She presents with performance deficits affecting function are weakness, impaired self care skills, impaired balance, impaired endurance, impaired functional mobilty, decreased upper extremity function, impaired cognition, decreased lower extremity function, decreased coordination.     Rehab Prognosis:  Good; patient would benefit from acute skilled OT services to address these deficits and reach maximum level of function.       Plan:     Patient to be seen 4 x/week to address the above listed problems via self-care/home management, neuromuscular re-education, cognitive retraining, therapeutic exercises, therapeutic activities  · Plan of Care Expires: 04/30/19  · Plan of Care Reviewed with: patient    Subjective   Patient:  "I had a stroke.  I can't walk right."  Pain/Comfort:  · Pain Rating 1: 4/10  · Location:  Right hip  · Addressed by: repositioning  · Pain Rating Post-Intervention 1: 4/10    Objective:     Communicated with: Nurse prior to session.  Patient found supine with peripheral IV, bed alarm([employer] camera monitoring) upon OT entry to room.    General Precautions: Standard, aspiration, fall   Orthopedic Precautions:N/A   Braces: N/A     Occupational Performance:     Bed Mobility:    · Patient completed Rolling/Turning to Left with  minimum assistance  · Patient completed Scooting/Bridging with minimum assistance  · Patient completed Supine to Sit with moderate assistance  · Patient completed Sit to Supine " with minimum assistance     Functional Mobility/Transfers:  · Patient completed Sit <> Stand Transfer with minimum assistance  with  no assistive device   · Patient completed Bed <> Chair Transfer using Stand Pivot technique with minimum assistance with no assistive device    Activities of Daily Living:  · Feeding:  set up assistance    · Toileting: minimum assistance with use of 3 in1 commode    [facility] 6 Click ADL: 16    Treatment & Education:  Patient education provided for stroke warning signs, prevention guidelines and personal risk factors.Patient education provided on role of OT and need for rehab upon discharge.    Continued education, patient/ family training recommended.  Patient's functional status and disposition recommendation discussed with stroke team in daily rounds.  White board updated in patient's room.  OT asked if there were any other questions; patient had no further questions.     Patient left supine with all lines intact, call button in reach and bed alarm onEducation:      GOALS:   Multidisciplinary Problems     Occupational Therapy Goals        Problem: Occupational Therapy Goal    Goal Priority Disciplines Outcome Interventions   Occupational Therapy Goal     OT, PT/OT Ongoing (interventions implemented as appropriate)    Description:  Goals to be met by: 7 days (4/9/19)     Patient will increase functional independence with ADLs by performing:    UE Dressing with Stand-by Assistance.  LE Dressing with Contact Guard Assistance.  Grooming while standing at sink with Contact Guard Assistance.  Toileting from toilet with Minimal Assistance for hygiene and clothing management.   Sitting at edge of bed x10 minutes with Stand-by Assistance.  Supine to sit with Contact Guard Assistance.-MET  Toilet transfer to toilet with Contact Guard Assistance.  Increased functional strength to WFL for ADLs.  Pt will follow 5/5 two-step commands to complete ADL task.                       Time Tracking:     OT  Date of Treatment: 04/04/19  OT Start Time: 0748  OT Stop Time: 0759  OT Total Time (min): 11 min    Billable Minutes:Self Care/Home Management 11    FLAKITO Thao  4/4/2019

## 2019-04-04 NOTE — ASSESSMENT & PLAN NOTE
-Stroke risk factor.  Last LDL in 2018 was 118.  Current LDL pending.  -Patient prescribed rosuvastatin, non-compliant.    -Continue atorvastatin 40 mg daily.

## 2019-04-04 NOTE — ASSESSMENT & PLAN NOTE
-Stroke risk factor.  Patient endorses smoking 1.5 ppd x 30 yrs--45 pkyr hx  -Multiple medications prescribed for smoking cessation that pt has not taken.  -Continue to encourage cessation  -Nicotine patch prn

## 2019-04-04 NOTE — ASSESSMENT & PLAN NOTE
-UA + for trichomonas.   -Pt received metronidazole 2 g x 1 dose, IM then treated with Azithro 1 g x 1 dose  -Discussed with patient importance of informing partners, not naming any to contact at this time

## 2019-04-04 NOTE — ASSESSMENT & PLAN NOTE
-Stroke risk factor.  SBP < 180  -Patient on amlodipine, valsartan, HCTZ at home  -Olmesartan 40 mg qd, HCTZ 25 mg qd, amlodipine 10 mg qd started 04/03/19   -BP trending down, likely to continue to improve with resuming amlodipine

## 2019-04-04 NOTE — SUBJECTIVE & OBJECTIVE
Neurologic Chief Complaint: Dysarthria, BLE weakness    Subjective:     Interval History: Patient is seen for follow-up neurological assessment and treatment recommendations:   Patient is improved in terms of choreiform movements in RUE this am, will ask about any medications to use from Movement Disorder perspective.  Likely 2/2 stroke with possible confounding cocaine use recently.  In either case, resolving slowly.  Patient not wanting to do inpatient rehab, but insurance will not cover [facility].  Pt more understanding of need to improve and avoid risk of falls before going home.    HPI, Past Medical, Family, and Social History remains the same as documented in the initial encounter.     Review of Systems   Constitutional: Negative for chills and fever.   HENT: Negative for rhinorrhea and sneezing.    Eyes: Negative for photophobia and visual disturbance.   Respiratory: Negative for cough and shortness of breath.    Gastrointestinal: Negative for nausea and vomiting.   Genitourinary:        Pt cites urinary incontinence--but describes urgency later, for at least 1 week   Musculoskeletal: Positive for arthralgias, gait problem and myalgias.   Skin: Negative for rash and wound.   Neurological: Positive for speech difficulty and weakness.   Hematological: Negative for adenopathy. Does not bruise/bleed easily.   Psychiatric/Behavioral: Negative for agitation and confusion.     Scheduled Meds:   amLODIPine  10 mg Oral Daily    aspirin  81 mg Oral Daily    atorvastatin  40 mg Oral Daily    heparin (porcine)  5,000 Units Subcutaneous Q8H    hydroCHLOROthiazide  25 mg Oral Daily    insulin aspart U-100  12 Units Subcutaneous TIDWM    insulin detemir U-100  28 Units Subcutaneous BID    nicotine  1 patch Transdermal Daily    olmesartan  40 mg Oral Daily    pantoprazole  40 mg Oral Daily     Continuous Infusions:   sodium chloride 0.9%       PRN Meds:acetaminophen, albuterol-ipratropium, dextrose 10 % in water (D10W),  dextrose 10 % in water (D10W), dextrose 50%, dextrose 50%, glucagon (human recombinant), glucose, glucose, hydrALAZINE, insulin aspart U-100, ondansetron, ondansetron, sodium chloride 0.9%, sodium chloride 0.9%    Objective:     Vital Signs (Most Recent):  Temp: 98.6 °F (37 °C) (04/04/19 1536)  Pulse: 94 (04/04/19 1536)  Resp: 16 (04/04/19 1536)  BP: (!) 139/92 (04/04/19 1536)  SpO2: 98 % (04/04/19 1536)  BP Location: Right arm    Vital Signs Range (Last 24H):  Temp:  [97.4 °F (36.3 °C)-98.6 °F (37 °C)]   Pulse:  [75-94]   Resp:  [16-18]   BP: (129-169)/()   SpO2:  [96 %-99 %]   BP Location: Right arm    Physical Exam   Constitutional: She is oriented to person, place, and time. She appears well-developed and well-nourished.   HENT:   Head: Normocephalic and atraumatic.   Mouth/Throat: Oropharynx is clear and moist. No oropharyngeal exudate.   Eyes: Pupils are equal, round, and reactive to light. Conjunctivae and EOM are normal.   Neck: Normal range of motion. Neck supple.   Cardiovascular: Intact distal pulses.   Pulmonary/Chest: Effort normal and breath sounds normal. No respiratory distress.   Abdominal: Soft. She exhibits no distension.   Musculoskeletal: She exhibits no edema or tenderness.   Neurological: She is alert and oriented to person, place, and time. No cranial nerve deficit.   +some continuing choreiform movements in RUE.  Improved from initial exam in terms of decreased frequency, tend to be less pronounced movements as well.   Skin: Skin is warm and dry. She is not diaphoretic. No erythema.     Neurological Exam:   LOC: alert  Attention Span: Good   Language: No aphasia  Articulation: Dysarthria  Orientation: Person, Place, Time   Visual Fields: Full  EOM (CN III, IV, VI): Full/intact  Pupils (CN II, III): PERRL  Facial Sensation (CN V): Normal  Facial Movement (CN VII): Symmetric facial expression    Motor: Arm left  Normal 5/5  Leg left  Normal 5/5  Arm right  Normal 5/5  Leg right Paresis:  4/5  Cebellar: No evidence of appendicular or axial ataxia  Sensation: Tactile extinction to bilateral simultaneous stimulation   Tone: Normal tone throughout    Laboratory:  CMP:   Recent Labs   Lab 04/04/19  0309   CALCIUM 9.9      K 3.8   CO2 27      BUN 6   CREATININE 0.9     CBC:   Recent Labs   Lab 04/03/19  0422   WBC 8.45   RBC 4.69   HGB 13.1   HCT 39.2      MCV 84   MCH 27.9   MCHC 33.4     Lipid Panel: No results for input(s): CHOL, LDLCALC, HDL, TRIG in the last 168 hours.  Coagulation:   Recent Labs   Lab 04/02/19  0310   INR 0.9   APTT <21.0     Hgb A1C:   Recent Labs   Lab 04/02/19  0042   HGBA1C >14.0*  >14.0*     TSH:   Recent Labs   Lab 04/02/19  0042   TSH 0.988     Diagnostic Results     Brain Imaging   04/01/19 MRI Brain w/ w/o contrast:  Advanced involutional change.  Acute infarcts left putamen, posterolateral left thalamus and left corona radiata and deep white matter adjacent to the left ventricular trigone.  Remote lacunar infarcts left cerebellum, right thalamus, right external capsule, right corona radiata, bilateral basal ganglia and bilateral deep frontal white matter.  Supratentorial and pontine white matter T2/flair hyperintense signal foci suggesting sequela of chronic small vessel ischemic change.  Left sphenoid sinus disease.      04/01/19 CT head w/o contrast:  No acute large vascular territory infarct or intracranial hemorrhage identified.  Further evaluation/follow-up as warranted.  Periventricular white matter hypoattenuation, likely sequela of chronic small vessel ischemic change.  Few more focal areas of hypoattenuation at the right basal ganglia, left internal capsule and left caudate suggesting age-indeterminate lacunar type infarcts.  Correlate clinically.    Vessel Imaging   04/02/19 CTA head, neck:  CTA:  No hemodynamically significant stenosis or large vessel occlusion identified.    Cardiac Imaging   04/02/19 TTE Complete:  · Normal left ventricular  systolic function. The estimated ejection fraction is 68%  · Concentric left ventricular remodeling.  · Normal LV diastolic function.  · No wall motion abnormalities.  · Normal right ventricular systolic function.  · Mild mitral sclerosis.  · Normal central venous pressure (3 mm Hg).  · The estimated PA systolic pressure is 15 mm Hg

## 2019-04-04 NOTE — PROGRESS NOTES
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 female with significant past medical history of DM, HTN, HLD presented to hospital with multiple medical complaints.  The patient has been having slurred speech, BLE weakness, and urinary incontinence since 3/23.  LE weakness became progressively worse with the patient having difficulty/inability to walk for the last 1-2 days.  MRI brain revealed acute infarctions in posterolateral thalamus, territory of PCA. No tPA due to the patient being outside of the treatment window.  No LVO, no intervention at this time. HM team was consulted because of high blood glucose level and co management.    Hospital Course:  No notes on file    Past Medical History:   Diagnosis Date    Diabetes mellitus type I     Hyperlipidemia     Hypertension      Past Surgical History:   Procedure Laterality Date     SECTION       Review of patient's allergies indicates:   Allergen Reactions    Sulfur        Scheduled Medications:    amLODIPine  10 mg Oral Daily    aspirin  81 mg Oral Daily    atorvastatin  40 mg Oral Daily    heparin (porcine)  5,000 Units Subcutaneous Q8H    hydroCHLOROthiazide  25 mg Oral Daily    insulin aspart U-100  12 Units Subcutaneous TIDWM    insulin detemir U-100  28 Units Subcutaneous BID    nicotine  1 patch Transdermal Daily    olmesartan  40 mg Oral Daily    pantoprazole  40 mg Oral Daily       PRN Medications: acetaminophen, albuterol-ipratropium, dextrose 10 % in water (D10W), dextrose 10 % in water (D10W), dextrose 50%,  dextrose 50%, glucagon (human recombinant), glucose, glucose, hydrALAZINE, insulin aspart U-100, ondansetron, ondansetron, sodium chloride 0.9%, sodium chloride 0.9%    Family History     Problem Relation (Age of Onset)    Cancer Sister    Diabetes Mother, Sister, Brother, Maternal Aunt    Heart disease Maternal Aunt    Hyperlipidemia Mother, Sister, Brother    Hypertension Mother, Sister, Brother    Stroke Mother        Tobacco Use    Smoking status: Current Every Day Smoker     Packs/day: 1.50     Years: 35.00     Pack years: 52.50     Types: Cigarettes    Smokeless tobacco: Never Used   Substance and Sexual Activity    Alcohol use: Not Currently    Drug use: Not Currently    Sexual activity: Not on file     Review of Systems   Constitutional: Positive for activity change. Negative for fatigue and fever.   HENT: Negative for congestion and trouble swallowing.    Eyes: Negative for pain and visual disturbance.   Respiratory: Negative for cough and shortness of breath.    Cardiovascular: Negative for chest pain and palpitations.   Gastrointestinal: Negative for abdominal pain, nausea and vomiting.   Genitourinary: Negative for difficulty urinating and flank pain.   Musculoskeletal: Positive for gait problem and myalgias.   Skin: Negative for rash and wound.   Neurological: Positive for speech difficulty (chronic) and weakness. Negative for dizziness, numbness and headaches.   Psychiatric/Behavioral: Negative for agitation. The patient is not nervous/anxious.      Objective:     Vital Signs (Most Recent):  Temp: 97.4 °F (36.3 °C) (04/04/19 1131)  Pulse: 92 (04/04/19 1131)  Resp: 16 (04/04/19 1131)  BP: 129/88 (04/04/19 1131)  SpO2: 98 % (04/04/19 1131)    Vital Signs (24h Range):  Temp:  [97.4 °F (36.3 °C)-98.6 °F (37 °C)] 97.4 °F (36.3 °C)  Pulse:  [75-92] 92  Resp:  [16-18] 16  SpO2:  [96 %-99 %] 98 %  BP: (129-169)/() 129/88     Body mass index is 25.89 kg/m².    Physical Exam   Constitutional: She is  oriented to person, place, and time. She appears well-developed and well-nourished. No distress.   HENT:   Head: Normocephalic and atraumatic.   Eyes: Right eye exhibits no discharge. Left eye exhibits no discharge. No scleral icterus.   Neck: Normal range of motion.   Cardiovascular: Normal rate, regular rhythm and intact distal pulses.   Pulmonary/Chest: Effort normal. No respiratory distress. She has no wheezes.   Abdominal: Soft. She exhibits no distension. There is no tenderness.   Musculoskeletal: Normal range of motion. She exhibits no edema.   Neurological: She is alert and oriented to person, place, and time.   -  Mental Status:  AAOx3.  Follows commands.  Answers correct age and .     -  Speech and language:  no aphasia, + dysarthria.    -  Vision:  no hemianopsia or ptosis.    -  Motor:  BUE: 5/5.  BLE weakness R>L.  Exam limited by effort and participation.    Skin: Skin is warm and dry. No rash noted. She is not diaphoretic.   Psychiatric: She has a normal mood and affect. Her behavior is normal. Cognition and memory are impaired.   Vitals reviewed.    NEUROLOGICAL EXAMINATION:     MENTAL STATUS   Oriented to person, place, and time.       Diagnostic Results:   Labs: Reviewed  X-Ray: Reviewed  CT: Reviewed  MRI: Reviewed    Assessment/Plan:      Trichomonas infection  - Home meds list showed she is on Azithro and CTX. However, pt denies taking neither of them.  - s/p one dose of oral Azithro 1g on     Uncontrolled type 2 diabetes mellitus with hyperglycemia  - HgA1c was non-measurable (>14.0). No gap, normal bicarb level, normal pH 7.35.  - Started on insulin gtt with POCT glu Q1h. Was switched to detemir 18U BID and MDSSI on   - Started on diabetic diet given no hx of dysphagia or aspiration.  - /3 increased to insulin detemir 28 u BID and aspart 5 u TID w/meals, MDSS  -  increased to insulin detemir 28U BID and aspart 12U TIDWM in addition to MDSSI.  - cont to monitor POCT glc      VTE  Risk Mitigation (From admission, onward)        Ordered     heparin (porcine) injection 5,000 Units  Every 8 hours      04/01/19 2311     IP VTE HIGH RISK PATIENT  Once      04/01/19 2311     Place sequential compression device  Until discontinued      04/01/19 2311              Irlanda Cifuentes MD  Department of Hospital Medicine   [facility]

## 2019-04-04 NOTE — PROGRESS NOTES
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 female with significant past medical history of DM, HTN, HLD presented to hospital with multiple medical complaints.  The patient has been having slurred speech, BLE weakness, and urinary incontinence since 3/23.  LE weakness became progressively worse with the patient having difficulty/inability to walk for the last 1-2 days.  MRI brain revealed acute infarctions in posterolateral thalamus, territory of PCA.  No tPA due to the patient being outside of the treatment window.  No LVO, no intervention at this time.  Etiology thought to be small vessel disease.     Neurologically stable. Patient with high blood sugars - medically ready for discharge once improvement in glucose. [facility] to come and assess patient today.     Antithrombotics for secondary stroke prevention: Antiplatelets: Aspirin: 81 mg daily  Statins for secondary stroke prevention and hyperlipidemia, if present:  Atorvastatin 40 mg daily  Aggressive risk factor modification: HTN, Smoking, DM, HLD, possible illicit substance use/abuse  Rehab efforts: PT/OT/SLP to evaluate and treat, PM&R consult  - rehab placement   Diagnostics ordered/pending: NA  VTE prophylaxis: Heparin 5000 units SQ every 8 hours, SCDs  BP parameters: Infarct: SBP <180     Diabetic ketoacidosis without coma associated with type 2 diabetes mellitus  -BHB 4.1 on admission  -DKA resolved, IM 6 assisting with BG management    Weakness of both lower extremities  -Patient w/BLE weakness that has gotten progressively worse.  +urinary incontinence, LBP.  -Tylenol prn for pain for now, stable and not requiring further prns  -MRI L spine w/o contrast completed--some R-sided disc protrusion L3-L4 with possible R L3 impingement  -Consulted PT/OT/PM&R, appreciate recs--recommending inpatient rehab    Trichomonas  infection  -UA + for trichomonas.   -Pt received metronidazole 2 g x 1 dose, IM then treated with Azithro 1 g x 1 dose  -Discussed with patient importance of informing partners, not naming any to contact at this time    Mixed hyperlipidemia  -Stroke risk factor.  Last LDL in 2018 was 118.  Current LDL pending.  -Patient prescribed rosuvastatin, non-compliant.    -Continue atorvastatin 40 mg daily.    Smoker  -Stroke risk factor.  Patient endorses smoking 1.5 ppd x 30 yrs--45 pkyr hx  -Multiple medications prescribed for smoking cessation that pt has not taken.  -Continue to encourage cessation  -Nicotine patch prn    Essential hypertension  -Stroke risk factor.  SBP < 180  -Patient on amlodipine, valsartan, HCTZ at home  -Olmesartan 40 mg qd, HCTZ 25 mg qd, amlodipine 10 mg qd started 04/03/19   -BP trending down, likely to continue to improve with resuming amlodipine    Uncontrolled type 2 diabetes mellitus with hyperglycemia  -Stroke risk factor. Patient reports glucose at home has been >500.  -DKA confirmed on admission, now with hyperglycemia without anion gap or ketosis   -Hospital medicine consulted--detemir 28 U bid, aspart 12 U tidwm started   -BG over past 24 hours 183-316 mg/dL    04/01/19:  Admission to [facility].  UDS + for cocaine, +DKA, presents with sxs of dysarthria, BLE weakness.    04/02/19:  DKA ongoing, insulin gtt started.  Will get IM consult ordered o/n.  04/03/19:  Mild R pronator drift on exam. BG > 400, IM adjusting insulin. Restarted BP medications.   04/04/19:  Choreiform movements improving, asking movement disorder MD for input.  Plan for inpatient rehab.    STROKE DOCUMENTATION        NIH Scale:  1a. Level of Consciousness: 0-->Alert, keenly responsive  1b. LOC Questions: 0-->Answers both questions correctly  1c. LOC Commands: 0-->Performs both tasks correctly  2. Best Gaze: 0-->Normal  3. Visual: 0-->No visual loss  4. Facial Palsy: 1-->Minor paralysis (flattened nasolabial fold, asymmetry  on smiling)  5a. Motor Arm, Left: 0-->No drift, limb holds 90 (or 45) degrees for full 10 secs  5b. Motor Arm, Right: 0-->No drift, limb holds 90 (or 45) degrees for full 10 secs  6a. Motor Leg, Left: 0-->No drift, leg holds 30 degree position for full 5 secs  6b. Motor Leg, Right: 1-->Drift, leg falls by the end of the 5-sec period but does not hit bed  7. Limb Ataxia: 0-->Absent  8. Sensory: 0-->Normal, no sensory loss  9. Best Language: 0-->No aphasia, normal  10. Dysarthria: 1-->Mild-to-moderate dysarthria, patient slurs at least some words and, at worst, can be understood with some difficulty  11. Extinction and Inattention (formerly Neglect): 0-->No abnormality  Total (NIH Stroke Scale): 3     Modified Geronimo Score: 1  Doyle Coma Scale:15   ABCD2 Score:    [id number] Score:   HAS -BLED Score:   ICH Score:   Hunt & Mueller Classification:      Hemorrhagic change of an Ischemic Stroke: Does this patient have an ischemic stroke with hemorrhagic changes? No     Neurologic Chief Complaint: Dysarthria, BLE weakness    Subjective:     Interval History: Patient is seen for follow-up neurological assessment and treatment recommendations:   Patient is improved in terms of choreiform movements in RUE this am, will ask about any medications to use from Movement Disorder perspective.  Likely 2/2 stroke with possible confounding cocaine use recently.  In either case, resolving slowly.  Patient not wanting to do inpatient rehab, but insurance will not cover [facility].  Pt more understanding of need to improve and avoid risk of falls before going home.    HPI, Past Medical, Family, and Social History remains the same as documented in the initial encounter.     Review of Systems   Constitutional: Negative for chills and fever.   HENT: Negative for rhinorrhea and sneezing.    Eyes: Negative for photophobia and visual disturbance.   Respiratory: Negative for cough and shortness of breath.    Gastrointestinal: Negative for nausea and  vomiting.   Genitourinary:        Pt cites urinary incontinence--but describes urgency later, for at least 1 week   Musculoskeletal: Positive for arthralgias, gait problem and myalgias.   Skin: Negative for rash and wound.   Neurological: Positive for speech difficulty and weakness.   Hematological: Negative for adenopathy. Does not bruise/bleed easily.   Psychiatric/Behavioral: Negative for agitation and confusion.     Scheduled Meds:   amLODIPine  10 mg Oral Daily    aspirin  81 mg Oral Daily    atorvastatin  40 mg Oral Daily    heparin (porcine)  5,000 Units Subcutaneous Q8H    hydroCHLOROthiazide  25 mg Oral Daily    insulin aspart U-100  12 Units Subcutaneous TIDWM    insulin detemir U-100  28 Units Subcutaneous BID    nicotine  1 patch Transdermal Daily    olmesartan  40 mg Oral Daily    pantoprazole  40 mg Oral Daily     Continuous Infusions:   sodium chloride 0.9%       PRN Meds:acetaminophen, albuterol-ipratropium, dextrose 10 % in water (D10W), dextrose 10 % in water (D10W), dextrose 50%, dextrose 50%, glucagon (human recombinant), glucose, glucose, hydrALAZINE, insulin aspart U-100, ondansetron, ondansetron, sodium chloride 0.9%, sodium chloride 0.9%    Objective:     Vital Signs (Most Recent):  Temp: 98.6 °F (37 °C) (04/04/19 1536)  Pulse: 94 (04/04/19 1536)  Resp: 16 (04/04/19 1536)  BP: (!) 139/92 (04/04/19 1536)  SpO2: 98 % (04/04/19 1536)  BP Location: Right arm    Vital Signs Range (Last 24H):  Temp:  [97.4 °F (36.3 °C)-98.6 °F (37 °C)]   Pulse:  [75-94]   Resp:  [16-18]   BP: (129-169)/()   SpO2:  [96 %-99 %]   BP Location: Right arm    Physical Exam   Constitutional: She is oriented to person, place, and time. She appears well-developed and well-nourished.   HENT:   Head: Normocephalic and atraumatic.   Mouth/Throat: Oropharynx is clear and moist. No oropharyngeal exudate.   Eyes: Pupils are equal, round, and reactive to light. Conjunctivae and EOM are normal.   Neck: Normal range  of motion. Neck supple.   Cardiovascular: Intact distal pulses.   Pulmonary/Chest: Effort normal and breath sounds normal. No respiratory distress.   Abdominal: Soft. She exhibits no distension.   Musculoskeletal: She exhibits no edema or tenderness.   Neurological: She is alert and oriented to person, place, and time. No cranial nerve deficit.   +some continuing choreiform movements in RUE.  Improved from initial exam in terms of decreased frequency, tend to be less pronounced movements as well.   Skin: Skin is warm and dry. She is not diaphoretic. No erythema.     Neurological Exam:   LOC: alert  Attention Span: Good   Language: No aphasia  Articulation: Dysarthria  Orientation: Person, Place, Time   Visual Fields: Full  EOM (CN III, IV, VI): Full/intact  Pupils (CN II, III): PERRL  Facial Sensation (CN V): Normal  Facial Movement (CN VII): Symmetric facial expression    Motor: Arm left  Normal 5/5  Leg left  Normal 5/5  Arm right  Normal 5/5  Leg right Paresis: 4/5  Cebellar: No evidence of appendicular or axial ataxia  Sensation: Tactile extinction to bilateral simultaneous stimulation   Tone: Normal tone throughout    Laboratory:  CMP:   Recent Labs   Lab 04/04/19  0309   CALCIUM 9.9      K 3.8   CO2 27      BUN 6   CREATININE 0.9     CBC:   Recent Labs   Lab 04/03/19  0422   WBC 8.45   RBC 4.69   HGB 13.1   HCT 39.2      MCV 84   MCH 27.9   MCHC 33.4     Lipid Panel: No results for input(s): CHOL, LDLCALC, HDL, TRIG in the last 168 hours.  Coagulation:   Recent Labs   Lab 04/02/19  0310   INR 0.9   APTT <21.0     Hgb A1C:   Recent Labs   Lab 04/02/19  0042   HGBA1C >14.0*  >14.0*     TSH:   Recent Labs   Lab 04/02/19  0042   TSH 0.988     Diagnostic Results     Brain Imaging   04/01/19 MRI Brain w/ w/o contrast:  Advanced involutional change.  Acute infarcts left putamen, posterolateral left thalamus and left corona radiata and deep white matter adjacent to the left ventricular trigone.   Remote lacunar infarcts left cerebellum, right thalamus, right external capsule, right corona radiata, bilateral basal ganglia and bilateral deep frontal white matter.  Supratentorial and pontine white matter T2/flair hyperintense signal foci suggesting sequela of chronic small vessel ischemic change.  Left sphenoid sinus disease.      04/01/19 CT head w/o contrast:  No acute large vascular territory infarct or intracranial hemorrhage identified.  Further evaluation/follow-up as warranted.  Periventricular white matter hypoattenuation, likely sequela of chronic small vessel ischemic change.  Few more focal areas of hypoattenuation at the right basal ganglia, left internal capsule and left caudate suggesting age-indeterminate lacunar type infarcts.  Correlate clinically.    Vessel Imaging   04/02/19 CTA head, neck:  CTA:  No hemodynamically significant stenosis or large vessel occlusion identified.    Cardiac Imaging   04/02/19 TTE Complete:  · Normal left ventricular systolic function. The estimated ejection fraction is 68%  · Concentric left ventricular remodeling.  · Normal LV diastolic function.  · No wall motion abnormalities.  · Normal right ventricular systolic function.  · Mild mitral sclerosis.  · Normal central venous pressure (3 mm Hg).  · The estimated PA systolic pressure is 15 mm Hg    Delmi Corona MD  Comprehensive Stroke Center  Department of Vascular Neurology   [facility]

## 2019-04-04 NOTE — PLAN OF CARE
Problem: Physical Therapy Goal  Goal: Physical Therapy Goal    Goals to be met by 4/12    1. Pt will perform rolling to the R and L with SBA.   2. Pt will perform supine to sit from both sides of the bed with SBA.  3. Pt will perform sit to supine with SBA.  4. Pt will perform sit to stand transfers with SBA.    5. Pt will perform bed <> chair transfers with SBA.  6. Pt will perform gait x 150 feet with CGA and least restrictive assistive device  7. Patient will ascend and descend 15 steps with L rail and CGA to safely access home environment.      Outcome: Ongoing (interventions implemented as appropriate)  Patient participated well in therapy.  POC and goals remain appropriate.  Please refer to the progress note for functional mobility.     Pt is safe to perform transfers and gait with nursing using 1 person assistance for balance and safety.      Jacy Forbes, PT  4/4/2019  [phone] (pager)

## 2019-04-04 NOTE — PT/OT/SLP PROGRESS
"Physical Therapy Treatment    Patient Name: Emily Martinez  MRN: [id number]   Diagnosis: Embolic stroke involving left posterior cerebral artery    Recommendations:     Discharge Recommendations:  rehabilitation facility   Discharge Equipment Recommendations: walker, rolling, shower chair   Barriers to Discharge: decreased caregiver assistance, 13 AIDE    Assessment:   Emily Martinez is a 54 y.o. female admitted with a medical diagnosis of Embolic stroke involving left posterior cerebral artery.  She continues to demonstrate uncoordinated movements of her RUE and RLE which impair her balance and safety during OOB mobility.  She is not safe to walk unassisted.  The use of a Rw improved her stability, but she demonstrated difficulty steering in a straight path.  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  She states she con live with her youngest daughter, but this daughter works during the day.  I continue to recommend inpatient rehab to address her balance, mobility, coordination, and safety impairments.        Problem List:  gait instability, impaired balance, impaired self care skills, impaired functional mobilty, decreased coordination, decreased safety awareness, impaired coordination, decreased upper extremity function, decreased lower extremity function  Rehab Prognosis:  good; patient would benefit from acute skilled PT services to address these deficits and reach maximum level of function.      Subjective   PT communicated with Rn prior to therapy.     Patient comments/goals: "I want to go home.  I can go to my baby daughter."  Pain/Comfort:  · Pain Rating 1: 0/10  · Pain Rating Post-Intervention 1: 0/10    Recent Vital Signs: (Last documentation)  Pulse: 92 (04/04/19 1131)  BP: 129/88 (04/04/19 1131)  SpO2: 98 % (04/04/19 1131)     Objective:   General Precautions: aspiration, fall  Recent Surgery: * No surgery found *    The patient currently has telemetry, bed alarm(AVASYS).    The " patient was found supine in bed in NAD. She agreed to therapy.  She required 3-4 attempts to move from supine to sit d/t balance and UE coordination impairment. Supine to sit CGA for safety during transition.  Once sitting she required CGA for static sitting balance and bilateral UE weight bearing on the bed.  Bed to chair transfer moderate assistance for balance and safety as she performed a complete 360, instead of a stand pivot.  Therapist had to assist patient to lower herself safely in the chair d/t incomplete R step length at the end of the transfer.  Gait training performed in the lee with and without an assistive device. See below for details.  She was left sitting up in chair with chair alarm armed, call bell in hand, and PCT present.     Functional Mobility:    Transfers:   · Sit <> Stand Transfer:  Min assistance    · Bed <> Chair Transfer: moderate assistance d/t safety concerns (technique) and incomplete R step length    Gait:  Gait x 50 feet min assistance using RW  · Pt demonstrated improved balance with RW. Decreased UE coordination resulted in inability to advance RW along straight path. Consistent L drift with Rw. She did not remain centered in the walker, but consistently drifted to the L side until her feet were almost kicking the L leg of the walker.  · PT encouraged or facilitated safety and verbal cues for path and positioning within the walker    Gait x 50 feet min assistance (R hand held assistance) with no Ad  · Pt demonstrated increased postural sway, wider ARISTIDES, discontinuous steps, and increased ataxia    Gait x 50 feet min assistance (L hand held assistance) with no AD  · Pt demonstrated similar gait deviations to previous trial with addition of incomplete R step length (improves with verbal cues)    Gait x 50 feet min to moderate assistance without UE support  · Pt demonstrated gait deviations as described above with the addition of excessive posterior trunk lean and 2-3 episodes of  posterior LOB    Therapeutic Activities, Education, or Exercises:  Time was provided for active listening, discussion of health disposition, and discussion of safe discharge recommendations. Therapist answered questions to patient/familys satisfaction within scope of practice.  Patient and family are aware of patient's deficits and therapy progression. White board updated to reflect current level of assistance.      FUNCTIONAL OUTCOME MEASURES:  Turning over in bed (including adjusting bedclothes, sheets and blankets)?: 3  Sitting down on and standing up from a chair with arms (e.g., wheelchair, bedside commode, etc.): 3  Moving from lying on back to sitting on the side of the bed?: 3  Moving to and from a bed to a chair (including a wheelchair)?: 3  Need to walk in hospital room?: 3  Climbing 3-5 steps with a railing?: 2  Basic Mobility Total Score: 17    Goals:     Multidisciplinary Problems     Physical Therapy Goals        Problem: Physical Therapy Goal    Goal Priority Disciplines Outcome Goal Variances Interventions   Physical Therapy Goal     PT, PT/OT Ongoing (interventions implemented as appropriate)     Description:    Goals to be met by 4/12    1. Pt will perform rolling to the R and L with SBA.   2. Pt will perform supine to sit from both sides of the bed with SBA.  3. Pt will perform sit to supine with SBA.  4. Pt will perform sit to stand transfers with SBA.    5. Pt will perform bed <> chair transfers with SBA.  6. Pt will perform gait x 150 feet with CGA and least restrictive assistive device  7. Patient will ascend and descend 15 steps with L rail and CGA to safely access home environment.                       Plan:   During this hospitalization, patient to be seen 5 x/week to address their physical therapy related impairments and improve their overall level of function.   · Plan of Care Expires: 05/01/19   Plan of Care Reviewed with: patient    This plan of care has been discussed with the  patient and/or family who were involved in its development and are in agreement with the identified goals and treatment plan.     Time Tracking:     PT Received On:  04/04/19  PT Start Time:   1000    PT Stop Time:  1030  PT Total Time (min): 30 min     Billable Minutes: Gait Training 30     Jacy Forbes, PT  4/4/2019  [phone] (pager)

## 2019-04-04 NOTE — PT/OT/SLP PROGRESS
"Speech Language Pathology Treatment    Patient Name:  Emily Martinez   MRN:  [id number]  Admitting Diagnosis: Embolic stroke involving left posterior cerebral artery    Recommendations:                 General Recommendations:  Cognitive-linguistic therapy  Diet recommendations:  Regular, Liquid Diet Level: Thin   Aspiration Precautions: Standard aspiration precautions   General Precautions: Standard, aspiration, fall  Communication strategies:  go to room if call light pushed    Subjective     "I guess I have no choice but to go to rehab"    Pain/Comfort:  · Pain Rating 1: 0/10  · Pain Rating Post-Intervention 1: 0/10    Objective:     Has the patient been evaluated by SLP for swallowing?   Yes  Keep patient NPO? No   Current Respiratory Status: room air      Pt seen in bedside chair for session. Alert and agreeable to therapy. Speech intelligibility greatly improved from previous session. Required min-mod cues to recall clear speech strategies. Intelligibility at conversation level ~90% c/b occasional slurred speech. Answered functional problem solving questions with 100% given supervision. At end of session, pt remained in bedside chair with call light and all needs in reach. White board updated.      Assessment:     Emily Martinez is a 54 y.o. female with an SLP diagnosis of Dysarthria and Cognitive-Linguistic Impairment.      Goals:   Multidisciplinary Problems     SLP Goals        Problem: SLP Goal    Goal Priority Disciplines Outcome   SLP Goal     SLP Ongoing (interventions implemented as appropriate)   Description:  Goals due 4/9  1.  Assess functional reading and writing skills  2.  Further assess problelm solving skills to determine need for therapy  3.  Recall speech strategies with min cues  4.  Repeat sentences with 100% intelligibility  5.  Implement speech strategies in simple conversation with mod cues                    Plan:     · Patient to be seen:  4 x/week   · Plan of Care expires:  " 05/01/19  · Plan of Care reviewed with:  patient   · SLP Follow-Up:  Yes       Discharge recommendations:  rehabilitation facility   Barriers to Discharge:  Level of Skilled Assistance Needed .    Time Tracking:     SLP Treatment Date:   04/04/19  Speech Start Time:  1105  Speech Stop Time:  1113     Speech Total Time (min):  8 min    Billable Minutes: Speech Therapy Individual 8 minutes    Ebonie Sawant CCC-SLP  04/04/2019

## 2019-04-04 NOTE — PLAN OF CARE
Lo with [facility] here to evaluate patient     04/04/19 7058   Post-Acute Status   Post-Acute Authorization Placement   Post-Acute Placement Status Patient Evaluation by Facility

## 2019-04-05 PROBLEM — I63.412 CEREBROVASCULAR ACCIDENT (CVA) DUE TO EMBOLISM OF LEFT MIDDLE CEREBRAL ARTERY: Status: ACTIVE | Noted: 2019-04-01

## 2019-04-05 PROBLEM — I67.9 SMALL VESSEL DISEASE, CEREBROVASCULAR: Status: ACTIVE | Noted: 2019-04-05

## 2019-04-05 PROBLEM — I63.9 STROKE, SMALL VESSEL: Status: ACTIVE | Noted: 2019-04-01

## 2019-04-05 PROBLEM — F14.10 COCAINE ABUSE: Status: ACTIVE | Noted: 2019-04-05

## 2019-04-05 PROBLEM — R25.8 CHOREIFORM MOVEMENT: Status: ACTIVE | Noted: 2019-04-05

## 2019-04-05 LAB
ANION GAP SERPL CALC-SCNC: 9 MMOL/L (ref 8–16)
BUN SERPL-MCNC: 11 MG/DL (ref 6–20)
CALCIUM SERPL-MCNC: 10.1 MG/DL (ref 8.7–10.5)
CHLORIDE SERPL-SCNC: 100 MMOL/L (ref 95–110)
CO2 SERPL-SCNC: 30 MMOL/L (ref 23–29)
CREAT SERPL-MCNC: 1 MG/DL (ref 0.5–1.4)
EST. GFR  (AFRICAN AMERICAN): >60 ML/MIN/1.73 M^2
EST. GFR  (NON AFRICAN AMERICAN): >60 ML/MIN/1.73 M^2
GLUCOSE SERPL-MCNC: 237 MG/DL (ref 70–110)
POCT GLUCOSE: 198 MG/DL (ref 70–110)
POCT GLUCOSE: 213 MG/DL (ref 70–110)
POCT GLUCOSE: 315 MG/DL (ref 70–110)
POCT GLUCOSE: 371 MG/DL (ref 70–110)
POCT GLUCOSE: 67 MG/DL (ref 70–110)
POTASSIUM SERPL-SCNC: 4.4 MMOL/L (ref 3.5–5.1)
SODIUM SERPL-SCNC: 139 MMOL/L (ref 136–145)

## 2019-04-05 PROCEDURE — 25000003 PHARM REV CODE 250: Performed by: NURSE PRACTITIONER

## 2019-04-05 PROCEDURE — 36415 COLL VENOUS BLD VENIPUNCTURE: CPT

## 2019-04-05 PROCEDURE — S4991 NICOTINE PATCH NONLEGEND: HCPCS | Performed by: NURSE PRACTITIONER

## 2019-04-05 PROCEDURE — 80048 BASIC METABOLIC PNL TOTAL CA: CPT

## 2019-04-05 PROCEDURE — 92507 TX SP LANG VOICE COMM INDIV: CPT

## 2019-04-05 PROCEDURE — 20600001 HC STEP DOWN PRIVATE ROOM

## 2019-04-05 PROCEDURE — 97116 GAIT TRAINING THERAPY: CPT

## 2019-04-05 PROCEDURE — 63600175 PHARM REV CODE 636 W HCPCS: Performed by: NURSE PRACTITIONER

## 2019-04-05 PROCEDURE — 99232 SBSQ HOSP IP/OBS MODERATE 35: CPT | Mod: ,,, | Performed by: HOSPITALIST

## 2019-04-05 PROCEDURE — 99233 SBSQ HOSP IP/OBS HIGH 50: CPT | Mod: ,,, | Performed by: PSYCHIATRY & NEUROLOGY

## 2019-04-05 PROCEDURE — 99233 PR SUBSEQUENT HOSPITAL CARE,LEVL III: ICD-10-PCS | Mod: ,,, | Performed by: PSYCHIATRY & NEUROLOGY

## 2019-04-05 PROCEDURE — 63600175 PHARM REV CODE 636 W HCPCS: Performed by: STUDENT IN AN ORGANIZED HEALTH CARE EDUCATION/TRAINING PROGRAM

## 2019-04-05 PROCEDURE — 99232 PR SUBSEQUENT HOSPITAL CARE,LEVL II: ICD-10-PCS | Mod: ,,, | Performed by: HOSPITALIST

## 2019-04-05 PROCEDURE — 63600175 PHARM REV CODE 636 W HCPCS: Performed by: HOSPITALIST

## 2019-04-05 PROCEDURE — S5571 INSULIN DISPOS PEN 3 ML: HCPCS | Performed by: HOSPITALIST

## 2019-04-05 RX ORDER — INSULIN ASPART 100 [IU]/ML
15 INJECTION, SOLUTION INTRAVENOUS; SUBCUTANEOUS
Status: DISCONTINUED | OUTPATIENT
Start: 2019-04-05 | End: 2019-04-05

## 2019-04-05 RX ORDER — INSULIN ASPART 100 [IU]/ML
12 INJECTION, SOLUTION INTRAVENOUS; SUBCUTANEOUS
Status: DISCONTINUED | OUTPATIENT
Start: 2019-04-05 | End: 2019-04-06

## 2019-04-05 RX ORDER — RAMELTEON 8 MG/1
8 TABLET ORAL NIGHTLY PRN
Status: DISCONTINUED | OUTPATIENT
Start: 2019-04-05 | End: 2019-04-06

## 2019-04-05 RX ADMIN — INSULIN ASPART 12 UNITS: 100 INJECTION, SOLUTION INTRAVENOUS; SUBCUTANEOUS at 11:04

## 2019-04-05 RX ADMIN — INSULIN ASPART 4 UNITS: 100 INJECTION, SOLUTION INTRAVENOUS; SUBCUTANEOUS at 07:04

## 2019-04-05 RX ADMIN — OLMESARTAN MEDOXOMIL 40 MG: 20 TABLET, FILM COATED ORAL at 07:04

## 2019-04-05 RX ADMIN — INSULIN ASPART 12 UNITS: 100 INJECTION, SOLUTION INTRAVENOUS; SUBCUTANEOUS at 05:04

## 2019-04-05 RX ADMIN — INSULIN ASPART 5 UNITS: 100 INJECTION, SOLUTION INTRAVENOUS; SUBCUTANEOUS at 10:04

## 2019-04-05 RX ADMIN — NICOTINE 1 PATCH: 21 PATCH, EXTENDED RELEASE TRANSDERMAL at 07:04

## 2019-04-05 RX ADMIN — HEPARIN SODIUM 5000 UNITS: 5000 INJECTION, SOLUTION INTRAVENOUS; SUBCUTANEOUS at 06:04

## 2019-04-05 RX ADMIN — PANTOPRAZOLE SODIUM 40 MG: 40 TABLET, DELAYED RELEASE ORAL at 07:04

## 2019-04-05 RX ADMIN — HEPARIN SODIUM 5000 UNITS: 5000 INJECTION, SOLUTION INTRAVENOUS; SUBCUTANEOUS at 10:04

## 2019-04-05 RX ADMIN — HYDROCHLOROTHIAZIDE 25 MG: 25 TABLET ORAL at 07:04

## 2019-04-05 RX ADMIN — INSULIN DETEMIR 28 UNITS: 100 INJECTION, SOLUTION SUBCUTANEOUS at 10:04

## 2019-04-05 RX ADMIN — ASPIRIN 81 MG: 81 TABLET, COATED ORAL at 07:04

## 2019-04-05 RX ADMIN — INSULIN ASPART 2 UNITS: 100 INJECTION, SOLUTION INTRAVENOUS; SUBCUTANEOUS at 11:04

## 2019-04-05 RX ADMIN — INSULIN ASPART 12 UNITS: 100 INJECTION, SOLUTION INTRAVENOUS; SUBCUTANEOUS at 07:04

## 2019-04-05 RX ADMIN — HEPARIN SODIUM 5000 UNITS: 5000 INJECTION, SOLUTION INTRAVENOUS; SUBCUTANEOUS at 01:04

## 2019-04-05 RX ADMIN — INSULIN DETEMIR 28 UNITS: 100 INJECTION, SOLUTION SUBCUTANEOUS at 07:04

## 2019-04-05 RX ADMIN — AMLODIPINE BESYLATE 10 MG: 10 TABLET ORAL at 07:04

## 2019-04-05 RX ADMIN — ATORVASTATIN CALCIUM 40 MG: 20 TABLET, FILM COATED ORAL at 07:04

## 2019-04-05 NOTE — ASSESSMENT & PLAN NOTE
-Patient w/BLE weakness that has gotten progressively worse.  +urinary incontinence, LBP.  -Tylenol prn for pain for now, stable and not requiring further prns  -MRI L spine w/o contrast--some R-sided disc protrusion L3-L4 with possible R L3 impingement  -Consulted PT/OT/PM&R, appreciate recs--recommending inpatient rehab, pending acceptance

## 2019-04-05 NOTE — SUBJECTIVE & OBJECTIVE
Past Medical History:   Diagnosis Date    Diabetes mellitus type I     Hyperlipidemia     Hypertension      Past Surgical History:   Procedure Laterality Date     SECTION       Review of patient's allergies indicates:   Allergen Reactions    Sulfur        Scheduled Medications:    amLODIPine  10 mg Oral Daily    aspirin  81 mg Oral Daily    atorvastatin  40 mg Oral Daily    heparin (porcine)  5,000 Units Subcutaneous Q8H    hydroCHLOROthiazide  25 mg Oral Daily    insulin aspart U-100  12 Units Subcutaneous TIDWM    insulin detemir U-100  28 Units Subcutaneous BID    nicotine  1 patch Transdermal Daily    olmesartan  40 mg Oral Daily    pantoprazole  40 mg Oral Daily       PRN Medications: acetaminophen, albuterol-ipratropium, dextrose 10 % in water (D10W), dextrose 10 % in water (D10W), dextrose 50%, dextrose 50%, glucagon (human recombinant), glucose, glucose, hydrALAZINE, insulin aspart U-100, ondansetron, ondansetron, sodium chloride 0.9%, sodium chloride 0.9%    Family History     Problem Relation (Age of Onset)    Cancer Sister    Diabetes Mother, Sister, Brother, Maternal Aunt    Heart disease Maternal Aunt    Hyperlipidemia Mother, Sister, Brother    Hypertension Mother, Sister, Brother    Stroke Mother        Tobacco Use    Smoking status: Current Every Day Smoker     Packs/day: 1.50     Years: 35.00     Pack years: 52.50     Types: Cigarettes    Smokeless tobacco: Never Used   Substance and Sexual Activity    Alcohol use: Not Currently    Drug use: Not Currently    Sexual activity: Not on file     Review of Systems   Constitutional: Positive for activity change. Negative for fatigue and fever.   HENT: Negative for congestion and trouble swallowing.    Eyes: Negative for pain and visual disturbance.   Respiratory: Negative for cough and shortness of breath.    Cardiovascular: Negative for chest pain and palpitations.   Gastrointestinal: Negative for abdominal pain, nausea and  vomiting.   Genitourinary: Negative for difficulty urinating and flank pain.   Musculoskeletal: Positive for gait problem and myalgias.   Skin: Negative for rash and wound.   Neurological: Positive for speech difficulty (chronic) and weakness. Negative for dizziness, numbness and headaches.   Psychiatric/Behavioral: Negative for agitation. The patient is not nervous/anxious.      Objective:     Vital Signs (Most Recent):  Temp: 97.9 °F (36.6 °C) (19)  Pulse: 96 (19 1110)  Resp: 16 (19)  BP: (!) 147/102 (19)  SpO2: 97 % (19)    Vital Signs (24h Range):  Temp:  [96.8 °F (36 °C)-98.9 °F (37.2 °C)] 97.9 °F (36.6 °C)  Pulse:  [73-96] 96  Resp:  [14-18] 16  SpO2:  [95 %-98 %] 97 %  BP: (136-165)/() 147/102     Body mass index is 25.89 kg/m².    Physical Exam   Constitutional: She is oriented to person, place, and time. She appears well-developed and well-nourished. No distress.   HENT:   Head: Normocephalic and atraumatic.   Eyes: Right eye exhibits no discharge. Left eye exhibits no discharge. No scleral icterus.   Neck: Normal range of motion.   Cardiovascular: Normal rate, regular rhythm and intact distal pulses.   Pulmonary/Chest: Effort normal. No respiratory distress. She has no wheezes.   Abdominal: Soft. She exhibits no distension. There is no tenderness.   Musculoskeletal: Normal range of motion. She exhibits no edema.   Neurological: She is alert and oriented to person, place, and time.   -  Mental Status:  AAOx3.  Follows commands.  Answers correct age and .     -  Speech and language:  no aphasia, + dysarthria.    -  Vision:  no hemianopsia or ptosis.    -  Motor:  BUE: 5/5.  BLE weakness R>L.  Exam limited by effort and participation.    Skin: Skin is warm and dry. No rash noted. She is not diaphoretic.   Psychiatric: She has a normal mood and affect. Her behavior is normal. Cognition and memory are impaired.   Vitals reviewed.    NEUROLOGICAL  EXAMINATION:     MENTAL STATUS   Oriented to person, place, and time.       Diagnostic Results:   Labs: Reviewed  X-Ray: Reviewed  CT: Reviewed  MRI: Reviewed

## 2019-04-05 NOTE — ASSESSMENT & PLAN NOTE
- HgA1c was non-measurable (>14.0). No gap, normal bicarb level, normal pH 7.35.  - Started on insulin gtt with POCT glu Q1h. Was switched to detemir 18U BID and MDSSI on 4/2  - Started on diabetic diet given no hx of dysphagia or aspiration.  - 4/3 increased to insulin detemir 28 u BID and aspart 5 u TID w/meals, MDSS  - 4/4 increased to insulin detemir 28U BID and aspart 12U TIDWM in addition to MDSSI.  - cont to monitor POCT glc  - 4/5 continue on 28U detemir BID and increase Aspart to 15U TIDWM. Continue on MDSSI.

## 2019-04-05 NOTE — PLAN OF CARE
Problem: SLP Goal  Goal: SLP Goal  Goals due 4/9  1.  Assess functional reading and writing skills  2.  Further assess problelm solving skills to determine need for therapy  3.  Recall speech strategies with min cues  4.  Repeat sentences with 100% intelligibility  5.  Implement speech strategies in simple conversation with mod cues   Outcome: Ongoing (interventions implemented as appropriate)  Pt. Progressing towards goals    Nikia Rodriguez MA/VALDEMAR-SLP  Speech Language Pathologist  Pager [phone]  4/5/2019

## 2019-04-05 NOTE — PLAN OF CARE
Problem: Physical Therapy Goal  Goal: Physical Therapy Goal    Goals to be met by 4/12    1. Pt will perform rolling to the R and L with SBA.   2. Pt will perform supine to sit from both sides of the bed with SBA.  3. Pt will perform sit to supine with SBA.  4. Pt will perform sit to stand transfers with SBA.    5. Pt will perform bed <> chair transfers with SBA.  6. Pt will perform gait x 150 feet with CGA and least restrictive assistive device  7. Patient will ascend and descend 15 steps with L rail and CGA to safely access home environment.      Outcome: Ongoing (interventions implemented as appropriate)  Patient participated well in therapy.  POC and goals remain appropriate.  Please refer to the progress note for functional mobility.     Pt is safe to perform transfers with nursing using 1 person assistance for balance.      Jacy Forbes, PT  4/5/2019  [phone] (pager)

## 2019-04-05 NOTE — SUBJECTIVE & OBJECTIVE
Neurologic Chief Complaint: Dysarthria, BLE weakness    Subjective:     Interval History: Patient is seen for follow-up neurological assessment and treatment recommendations:   Patient with complaints of not sleeping well last night, states that she takes Valium at home.  Don't feel comfortable starting her on this given hx of substance abuse so will trial ramelteon tonight.  Pt knows that she cannot get HHC due to insurance, states that she has to go to inpatient rehab.  Had told [facility] person evaluating her yesterday that she did not want to go, can only do 1 hour of rehab.  She reaffirms that she does not want to go, but knows that she has no other choice.  Otherwise, no new complaints/concerns.  No family at bedside today.      HPI, Past Medical, Family, and Social History remains the same as documented in the initial encounter.  Review of Systems   Constitutional: Negative for chills and fever.   HENT: Negative for rhinorrhea and sneezing.    Eyes: Negative for photophobia and visual disturbance.   Respiratory: Negative for cough and shortness of breath.    Gastrointestinal: Negative for nausea and vomiting.   Genitourinary:        Pt cites urinary incontinence--but describes urgency later, for at least 1 week   Musculoskeletal: Positive for arthralgias, gait problem and myalgias.   Skin: Negative for rash and wound.   Neurological: Positive for speech difficulty and weakness.   Hematological: Negative for adenopathy. Does not bruise/bleed easily.   Psychiatric/Behavioral: Negative for agitation and confusion.     Scheduled Meds:   amLODIPine  10 mg Oral Daily    aspirin  81 mg Oral Daily    atorvastatin  40 mg Oral Daily    heparin (porcine)  5,000 Units Subcutaneous Q8H    hydroCHLOROthiazide  25 mg Oral Daily    insulin aspart U-100  15 Units Subcutaneous TIDWM    insulin detemir U-100  28 Units Subcutaneous BID    nicotine  1 patch Transdermal Daily    olmesartan  40 mg Oral Daily     pantoprazole  40 mg Oral Daily     Continuous Infusions:   sodium chloride 0.9%       PRN Meds:acetaminophen, albuterol-ipratropium, dextrose 10 % in water (D10W), dextrose 10 % in water (D10W), dextrose 50%, dextrose 50%, glucagon (human recombinant), glucose, glucose, hydrALAZINE, insulin aspart U-100, ondansetron, ondansetron, sodium chloride 0.9%, sodium chloride 0.9%    Objective:     Vital Signs (Most Recent):  Temp: 98.4 °F (36.9 °C) (04/05/19 1325)  Pulse: 80 (04/05/19 1325)  Resp: 18 (04/05/19 1325)  BP: 118/76 (04/05/19 1325)  SpO2: 96 % (04/05/19 1325)  BP Location: Left arm    Vital Signs Range (Last 24H):  Temp:  [96.8 °F (36 °C)-98.9 °F (37.2 °C)]   Pulse:  [73-96]   Resp:  [14-18]   BP: (118-165)/()   SpO2:  [95 %-98 %]   BP Location: Left arm    Physical Exam   Constitutional: She is oriented to person, place, and time. She appears well-developed and well-nourished.   HENT:   Head: Normocephalic and atraumatic.   Mouth/Throat: Oropharynx is clear and moist. No oropharyngeal exudate.   Eyes: Pupils are equal, round, and reactive to light. Conjunctivae and EOM are normal.   Neck: Normal range of motion. Neck supple.   Cardiovascular: Intact distal pulses.   Pulmonary/Chest: Effort normal and breath sounds normal. No respiratory distress.   Abdominal: Soft. She exhibits no distension.   Musculoskeletal: She exhibits no edema or tenderness.   Neurological: She is alert and oriented to person, place, and time. No cranial nerve deficit or sensory deficit. She exhibits normal muscle tone.   +very mild choreiform movements RUE > RLE.  Improving daily.  On motor testing, has some poor effort.  In RLE states it is 2/2 pain in low back/hip.   Skin: Skin is warm and dry. She is not diaphoretic. No erythema.     Neurological Exam:   LOC: alert  Attention Span: Good   Language: No aphasia  Articulation: Dysarthria  Orientation: Person, Place, Time   Visual Fields: Full  EOM (CN III, IV, VI):  Full/intact  Pupils (CN II, III): PERRL  Facial Sensation (CN V): Normal  Facial Movement (CN VII): Symmetric facial expression    Motor: Arm left  Normal 5/5  Leg left  Normal 5/5  Arm right  Normal 5/5  Leg right Paresis: 4/5  Cebellar: No evidence of appendicular or axial ataxia  Sensation: Tactile extinction to bilateral simultaneous stimulation   Tone: Normal tone throughout    Laboratory:  CMP:   Recent Labs   Lab 04/05/19  0442   CALCIUM 10.1      K 4.4   CO2 30*      BUN 11   CREATININE 1.0     CBC:   Recent Labs   Lab 04/03/19  0422   WBC 8.45   RBC 4.69   HGB 13.1   HCT 39.2      MCV 84   MCH 27.9   MCHC 33.4     Lipid Panel: No results for input(s): CHOL, LDLCALC, HDL, TRIG in the last 168 hours.  Coagulation:   Recent Labs   Lab 04/02/19  0310   INR 0.9   APTT <21.0     Hgb A1C:   Recent Labs   Lab 04/02/19  0042   HGBA1C >14.0*  >14.0*     TSH:   Recent Labs   Lab 04/02/19  0042   TSH 0.988     Diagnostic Results   Brain Imaging   04/01/19 MRI Brain w/ w/o contrast:  Advanced involutional change.  Acute infarcts left putamen, posterolateral left thalamus and left corona radiata and deep white matter adjacent to the left ventricular trigone.  Remote lacunar infarcts left cerebellum, right thalamus, right external capsule, right corona radiata, bilateral basal ganglia and bilateral deep frontal white matter.  Supratentorial and pontine white matter T2/flair hyperintense signal foci suggesting sequela of chronic small vessel ischemic change.  Left sphenoid sinus disease.           04/01/19 CT head w/o contrast:  No acute large vascular territory infarct or intracranial hemorrhage identified.  Further evaluation/follow-up as warranted.  Periventricular white matter hypoattenuation, likely sequela of chronic small vessel ischemic change.  Few more focal areas of hypoattenuation at the right basal ganglia, left internal capsule and left caudate suggesting age-indeterminate lacunar type  infarcts.  Correlate clinically.    Vessel Imaging   04/02/19 CTA head, neck:  CTA:  No hemodynamically significant stenosis or large vessel occlusion identified.    Cardiac Imaging   04/02/19 TTE Complete:  · Normal left ventricular systolic function. The estimated ejection fraction is 68%  · Concentric left ventricular remodeling.  · Normal LV diastolic function.  · No wall motion abnormalities.  · Normal right ventricular systolic function.  · Mild mitral sclerosis.  · Normal central venous pressure (3 mm Hg).  · The estimated PA systolic pressure is 15 mm Hg

## 2019-04-05 NOTE — PLAN OF CARE
KEITH spoke to Lo at  Rehab-their MD is reviewing patient at this time. KEITH spoke to patient and she states that she is agreeable to  Rehab. Spoke to patient's daughter Aneta as well and she is agreeable to  Rehab. KEITH notified Lo to proceed with insurance auth.     04/05/19 1412   Post-Acute Status   Post-Acute Authorization Placement   Post-Acute Placement Status Pending Post-Acute Clinical Review

## 2019-04-05 NOTE — PLAN OF CARE
Insurance has denied IP Rehab. Peer to peer to be scheduled      04/05/19 0842   Post-Acute Status   Post-Acute Authorization Placement   Post-Acute Placement Status Insurance Denial

## 2019-04-05 NOTE — PT/OT/SLP PROGRESS
"Speech Language Pathology Treatment    Patient Name:  Emily Martinez   MRN:  [id number]  Admitting Diagnosis: Stroke, small vessel    Recommendations:                 General Recommendations:  Speech/language therapy and Cognitive-linguistic therapy  Diet recommendations:  Regular, Liquid Diet Level: Thin   Aspiration Precautions: Standard aspiration precautions   General Precautions: Standard, fall  Communication strategies:  none    Subjective     "they tell me I have to go to rehab"  Per pt  Patient goals: goal  Pain/Comfort:  · Pain Rating 1: 0/10  · Pain Rating Post-Intervention 1: 0/10    Objective:     Has the patient been evaluated by SLP for swallowing?   Yes  Keep patient NPO? No   Current Respiratory Status: room air      Pt. Seen while sitting up in chair with good attention to task.  She was oriented x3 with good recall of recent events.  Pt. Responded to category exclusion tasks in field of 4 with 100% accuracy and responded to functional math and time calculations with 90% accuracy.  She responded to mental manipulation tasks given 3 items with 100% accuracy.  Speech was intelligible in conversation with careful listening.  Speech strategies were reviewed with pt. With good understanding verbalized.      Assessment:     Emily Martinez is a 54 y.o. female with an SLP diagnosis of Dysarthria and Cognitive-Linguistic Impairment.  Goals:   Multidisciplinary Problems     SLP Goals        Problem: SLP Goal    Goal Priority Disciplines Outcome   SLP Goal     SLP Ongoing (interventions implemented as appropriate)   Description:  Goals due 4/9  1.  Assess functional reading and writing skills  2.  Further assess problelm solving skills to determine need for therapy  3.  Recall speech strategies with min cues  4.  Repeat sentences with 100% intelligibility  5.  Implement speech strategies in simple conversation with mod cues                    Plan:     · Patient to be seen:  4 x/week   · Plan of Care expires: "  05/01/19  · Plan of Care reviewed with:  patient   · SLP Follow-Up:  Yes       Discharge recommendations:  rehabilitation facility     Time Tracking:     SLP Treatment Date:   04/05/19  Speech Start Time:  0815  Speech Stop Time:  0830     Speech Total Time (min):  15 min    Billable Minutes: Speech Therapy Individual 15    Nikia Rodriguez MA, CCC-SLP  04/05/2019

## 2019-04-05 NOTE — ASSESSMENT & PLAN NOTE
-Stroke risk factor.  SBP < 180.  -Patient on amlodipine, valsartan, HCTZ at home  -Olmesartan 40 mg qd, HCTZ 25 mg qd, amlodipine 10 mg qd started 04/03/19

## 2019-04-05 NOTE — PROGRESS NOTES
[facility] Hospital Medicine  Progress Note    Patient Name: Emily Martinez  MRN: [id number]  Patient Class: IP- Inpatient   Admission Date: 2019  Length of Stay: 4 days  Attending Physician: Kenton Vaughn MD  Primary Care Provider: Alireza Sosa MD    [facility] Medicine Team: Networked reference to record PCT  Irlanda Cifuentes MD    Subjective:     Principal Problem:Stroke, small vessel    HPI:  54 y.o. female with significant past medical history of DM, HTN, HLD presented to hospital with multiple medical complaints.  The patient has been having slurred speech, BLE weakness, and urinary incontinence since 3/23.  LE weakness became progressively worse with the patient having difficulty/inability to walk for the last 1-2 days.  MRI brain revealed acute infarctions in posterolateral thalamus, territory of PCA. No tPA due to the patient being outside of the treatment window.  No LVO, no intervention at this time. HM team was consulted because of high blood glucose level and co management.    Hospital Course:  No notes on file    Past Medical History:   Diagnosis Date    Diabetes mellitus type I     Hyperlipidemia     Hypertension      Past Surgical History:   Procedure Laterality Date     SECTION       Review of patient's allergies indicates:   Allergen Reactions    Sulfur        Scheduled Medications:    amLODIPine  10 mg Oral Daily    aspirin  81 mg Oral Daily    atorvastatin  40 mg Oral Daily    heparin (porcine)  5,000 Units Subcutaneous Q8H    hydroCHLOROthiazide  25 mg Oral Daily    insulin aspart U-100  12 Units Subcutaneous TIDWM    insulin detemir U-100  28 Units Subcutaneous BID    nicotine  1 patch Transdermal Daily    olmesartan  40 mg Oral Daily    pantoprazole  40 mg Oral Daily       PRN Medications: acetaminophen, albuterol-ipratropium, dextrose 10 % in water (D10W), dextrose 10 % in water (D10W), dextrose 50%, dextrose 50%, glucagon (human  recombinant), glucose, glucose, hydrALAZINE, insulin aspart U-100, ondansetron, ondansetron, sodium chloride 0.9%, sodium chloride 0.9%    Family History     Problem Relation (Age of Onset)    Cancer Sister    Diabetes Mother, Sister, Brother, Maternal Aunt    Heart disease Maternal Aunt    Hyperlipidemia Mother, Sister, Brother    Hypertension Mother, Sister, Brother    Stroke Mother        Tobacco Use    Smoking status: Current Every Day Smoker     Packs/day: 1.50     Years: 35.00     Pack years: 52.50     Types: Cigarettes    Smokeless tobacco: Never Used   Substance and Sexual Activity    Alcohol use: Not Currently    Drug use: Not Currently    Sexual activity: Not on file     Review of Systems   Constitutional: Positive for activity change. Negative for fatigue and fever.   HENT: Negative for congestion and trouble swallowing.    Eyes: Negative for pain and visual disturbance.   Respiratory: Negative for cough and shortness of breath.    Cardiovascular: Negative for chest pain and palpitations.   Gastrointestinal: Negative for abdominal pain, nausea and vomiting.   Genitourinary: Negative for difficulty urinating and flank pain.   Musculoskeletal: Positive for gait problem and myalgias.   Skin: Negative for rash and wound.   Neurological: Positive for speech difficulty (chronic) and weakness. Negative for dizziness, numbness and headaches.   Psychiatric/Behavioral: Negative for agitation. The patient is not nervous/anxious.      Objective:     Vital Signs (Most Recent):  Temp: 97.9 °F (36.6 °C) (04/05/19 0728)  Pulse: 96 (04/05/19 1110)  Resp: 16 (04/05/19 0728)  BP: (!) 147/102 (04/05/19 0728)  SpO2: 97 % (04/05/19 0728)    Vital Signs (24h Range):  Temp:  [96.8 °F (36 °C)-98.9 °F (37.2 °C)] 97.9 °F (36.6 °C)  Pulse:  [73-96] 96  Resp:  [14-18] 16  SpO2:  [95 %-98 %] 97 %  BP: (136-165)/() 147/102     Body mass index is 25.89 kg/m².    Physical Exam   Constitutional: She is oriented to person,  place, and time. She appears well-developed and well-nourished. No distress.   HENT:   Head: Normocephalic and atraumatic.   Eyes: Right eye exhibits no discharge. Left eye exhibits no discharge. No scleral icterus.   Neck: Normal range of motion.   Cardiovascular: Normal rate, regular rhythm and intact distal pulses.   Pulmonary/Chest: Effort normal. No respiratory distress. She has no wheezes.   Abdominal: Soft. She exhibits no distension. There is no tenderness.   Musculoskeletal: Normal range of motion. She exhibits no edema.   Neurological: She is alert and oriented to person, place, and time.   -  Mental Status:  AAOx3.  Follows commands.  Answers correct age and .     -  Speech and language:  no aphasia, + dysarthria.    -  Vision:  no hemianopsia or ptosis.    -  Motor:  BUE: 5/5.  BLE weakness R>L.  Exam limited by effort and participation.    Skin: Skin is warm and dry. No rash noted. She is not diaphoretic.   Psychiatric: She has a normal mood and affect. Her behavior is normal. Cognition and memory are impaired.   Vitals reviewed.    NEUROLOGICAL EXAMINATION:     MENTAL STATUS   Oriented to person, place, and time.       Diagnostic Results:   Labs: Reviewed  X-Ray: Reviewed  CT: Reviewed  MRI: Reviewed    Assessment/Plan:      Trichomonas infection  - Home meds list showed she is on Azithro and CTX. However, pt denies taking neither of them.  - s/p one dose of oral Azithro 1g on     Uncontrolled type 2 diabetes mellitus with hyperglycemia  - HgA1c was non-measurable (>14.0). No gap, normal bicarb level, normal pH 7.35.  - Started on insulin gtt with POCT glu Q1h. Was switched to detemir 18U BID and MDSSI on   - Started on diabetic diet given no hx of dysphagia or aspiration.  - /3 increased to insulin detemir 28 u BID and aspart 5 u TID w/meals, MDSS  -  increased to insulin detemir 28U BID and aspart 12U TIDWM in addition to MDSSI.  - cont to monitor POCT glc  -  continue on 28U detemir  BID and increase Aspart to 15U TIDWM. Continue on MDSSI.      VTE Risk Mitigation (From admission, onward)        Ordered     heparin (porcine) injection 5,000 Units  Every 8 hours      04/01/19 2311     IP VTE HIGH RISK PATIENT  Once      04/01/19 2311     Place sequential compression device  Until discontinued      04/01/19 2311              Irlanda Cifuentes MD  Department of Hospital Medicine   [facility]

## 2019-04-05 NOTE — PLAN OF CARE
04/05/2019      Emily Martinez  [address] [address]          Hospital Medicine Dept.  [facility] [address] [place] [address]  [phone] after hours  [phone] fax Emily Martinez has been hospitalized at the [facility] since 4/1/2019.  Please excuse Aneta Martinez (patient's daughter) from duties on 4/2/19 and 4/3/19.    Please contact me if you have any questions.                  __________________________  Julia Fermin RN Case Manager  04/05/2019

## 2019-04-05 NOTE — PT/OT/SLP PROGRESS
Physical Therapy      Patient Name:  Emily Martinez   MRN:  [id number]    Patient was eating lunch with RN at bedside.  Will follow up for physical therapy in the afternoon.     Jacy Forbes, PT   4/5/2019

## 2019-04-05 NOTE — NURSING
CBG 67, feels nauseous, zhou crackers and apple juice given, Delmi Corona MD notified, will modify orders for insulin as appropriate.

## 2019-04-05 NOTE — PT/OT/SLP PROGRESS
"Physical Therapy Treatment    Patient Name: Emily Martinez  MRN: [id number]   Diagnosis: Stroke, small vessel    Recommendations:     Discharge Recommendations:  rehabilitation facility   Discharge Equipment Recommendations: walker, rolling, shower chair   Barriers to Discharge: decreased caregiver assistance, 15 steps to enter, fall risk    Assessment:   Emily Martinez is a 54 y.o. female admitted with a medical diagnosis of Stroke, small vessel.  Her gait distance is improving, but she continues to demonstrate marked instability.  Her gait pattern is ataxic with discontinuous steps, varying step lengths, and frequent LOB.  She is only sometimes able to regain her balance without external support. Due to her abnormal gait pattern and her balance deficits, she is at high risk for falls.  She only scored 1/24 on Dynamic Gait index with scores <19/24 indicating high risk for falls.  She required a lot of verbal cues, min assistance and excessive time (>10-15 seconds on each step) to safely perform stairs.  She is not safe or independent with mobility at this time.  Therapy continues to recommend inpatient rehab to progress her mobility and decrease her fall risk.      Problem List:  weakness, impaired self care skills, impaired balance, decreased coordination, decreased safety awareness, impaired functional mobilty, decreased upper extremity function, decreased lower extremity function, gait instability, impaired fine motor, impaired coordination  Rehab Prognosis:  good; patient would benefit from acute skilled PT services to address these deficits and reach maximum level of function.      Subjective   PT communicated with RN prior to therapy.     Patient comments/goals: "I hate the steps.  I hat that I have steps at my house."  Pain/Comfort:  · Pain Rating 1: 0/10  · Pain Rating Post-Intervention 1: 0/10    Recent Vital Signs: (Last documentation)  Pulse: 80 (04/05/19 1325)  BP: 118/76 (04/05/19 1325)  SpO2: 96 % " (04/05/19 3180)     Objective:   General Precautions: aspiration, fall  Recent Surgery: * No surgery found *    The patient currently has telemetry, bed alarm(AVASYS).    The patient was found supine in bed in NAD.  She reported recently returning to bed after sitting up ~3hrs this morning. She agreed to therapy.  Supine to sit min assistance for safety and awareness of placement of LUE.  She donned socks sitting EOB with increased postural sway but no LOB during activity.  Sit to stand min assistance with posterior LOB in initial stance.  Min assistance stand pivot transfer to chair with wide ARISTIDES and patient exhibiting instability and high guard position.  Gait 75 feet min assistance, ataxic gait pattern, wide ARISTIDES, increased postural sway, path deviation and LOB.  Lateral instability self corrected, however posterior LOB was uncorrected and resulted in 2-3 bouts of posterior LOB.  Stair training performed to prepare for home environment, see below for details.  Gait x 75 feet min assistance with ataxic gait pattern and increased LOB now that she is fatigued.     Functional Mobility:  See above for gait and transfers    Dynamic Gait Index (DGI):  1. Gait level surface: (0) Severe Impairment: Cannot walk 20ft without assistance, severe gait deviations or imbalance  2. Change in gait speed: (0) Severe Impairment: Cannot change speeds, or loses balance and has to reach for wall or be caught.  3. Gait with horizontal head turns: (0) Severe Impairment: Performs task with severe disruption of gait, i.e., staggers outside 15 path, loses balance, stops, reaches for wall.  4. Gait with vertical head turns: (0) Severe Impairment: Performs task with severe disruption of gait, i.e., staggers outside 15 path, loses balance, stops, reaches for wall.  5. Gait and pivot turn: (0) Severe Impairment: Cannot turn safely, requires assistance to turn and stop.  6. Step over obstacle: (0) Severe Impairment: Cannot perform without  assistance..  7. Step around obstacles: (0) Severe Impairment: Unable to clear cones, walks into one or both cones, or requires physical assistance..  8. Steps: (1) Moderate Impairment: Two feet to a stair, must use rail..   TOTAL SCORE: 1 / 24   < 19/24 = predictive of falls in the elderly   > 22/24 = safe ambulators      Stairs:  The patient ascended 9 steps using bilateral rails with min assistance and step-to gait pattern.   · She had great difficulty placing RLE onto step  The patient descended 9 steps using bilateral rails, then both hands on R rail, then bilateral rails with min assistance and safety concerns.  · She required 6-10 seconds to remove RLE from previous step and place it on the next step (each time)   · Min assistance to control descent    Therapeutic Activities, Education, or Exercises:  Time was provided for active listening, discussion of health disposition, and discussion of safe discharge recommendations. Therapist answered questions to patient/familys satisfaction within scope of practice.  Patient and family are aware of patient's deficits and therapy progression. White board updated to reflect current level of assistance.    FUNCTIONAL OUTCOME MEASURES:  Turning over in bed (including adjusting bedclothes, sheets and blankets)?: 3  Sitting down on and standing up from a chair with arms (e.g., wheelchair, bedside commode, etc.): 3  Moving from lying on back to sitting on the side of the bed?: 3  Moving to and from a bed to a chair (including a wheelchair)?: 3  Need to walk in hospital room?: 3  Climbing 3-5 steps with a railing?: 3  Basic Mobility Total Score: 18    Goals:     Multidisciplinary Problems     Physical Therapy Goals        Problem: Physical Therapy Goal    Goal Priority Disciplines Outcome Goal Variances Interventions   Physical Therapy Goal     PT, PT/OT Ongoing (interventions implemented as appropriate)     Description:    Goals to be met by 4/12    1. Pt will perform  rolling to the R and L with SBA.   2. Pt will perform supine to sit from both sides of the bed with SBA.  3. Pt will perform sit to supine with SBA.  4. Pt will perform sit to stand transfers with SBA.    5. Pt will perform bed <> chair transfers with SBA.  6. Pt will perform gait x 150 feet with CGA and least restrictive assistive device  7. Patient will ascend and descend 15 steps with L rail and CGA to safely access home environment.                       Plan:   During this hospitalization, patient to be seen 5 x/week to address their physical therapy related impairments and improve their overall level of function.   · Plan of Care Expires: 05/01/19   Plan of Care Reviewed with: patient    This plan of care has been discussed with the patient and/or family who were involved in its development and are in agreement with the identified goals and treatment plan.     Time Tracking:     PT Received On:  04/05/19  PT Start Time:   1154    PT Stop Time:  1220  PT Total Time (min): 26 min     Billable Minutes: Gait Training 26     Jacy Forbes, PT  4/5/2019  [phone] (pager)

## 2019-04-05 NOTE — ASSESSMENT & PLAN NOTE
-Counseled on importance of cessation  -Community resource planning on discharge  -No consult to Addiction Psych as pt still denies cocaine use as an issue

## 2019-04-05 NOTE — ASSESSMENT & PLAN NOTE
-Pt with risk factors--HTN, HLD, poorly controlled DM II, cocaine abuse  -MRI findings of lacunes, multiple white matter hyperintensities, global atrophy  -No clear cerebral microbleeds on 2D echo planar imaging available  -Mgmt per above--ASA, atorvastatin, BP control, DM II control, cocaine abstinence, diet/exercise recs

## 2019-04-05 NOTE — ASSESSMENT & PLAN NOTE
-Stroke risk factor. Patient reports glucose at home has been >500.  -DKA confirmed on admission, now with hyperglycemia without anion gap or ketosis   -Hospital medicine consulted--detemir 28 U bid, aspart 15 U tidwm started   -BG over past 24 hours 198-296 mg/dL

## 2019-04-05 NOTE — ASSESSMENT & PLAN NOTE
54 y.o. female with significant past medical history of DM, HTN, HLD presented to hospital with multiple medical complaints.  The patient has been having slurred speech, BLE weakness, and urinary incontinence since 3/23.  LE weakness became progressively worse with the patient having difficulty/inability to walk for the last 1-2 days.  MRI brain revealed acute infarctions in posterolateral thalamus, territory of PCA.  No tPA due to the patient being outside of the treatment window.  No LVO, no intervention at this time.      Etiology unclear.  Pt has risk factors for small vessel disease as well as signs of chronic small vessel disease on imaging, but presentation of this infarct is inconsistent with an etiology of small vessel disease in terms of multiple non-adjacent areas of acute infarction.  Pattern more likely to be seen in patient with embolic etiology which can be associated with cocaine use.     Antithrombotics for secondary stroke prevention: Antiplatelets: Aspirin: 81 mg daily  Statins for secondary stroke prevention and hyperlipidemia, if present:  Atorvastatin 40 mg daily  Aggressive risk factor modification: HTN, Smoking, DM, HLD, illicit substance use  Rehab efforts: PT/OT/SLP to evaluate and treat, PM&R consult  - rehab placement   Diagnostics ordered/pending: NA  VTE prophylaxis: Heparin 5000 units SQ every 8 hours, SCDs  BP parameters: Infarct: SBP <180

## 2019-04-05 NOTE — ASSESSMENT & PLAN NOTE
-Localized to RUE > RLE  -Improving on a daily basis  -Discussed with Dr. Eubanks, will monitor.  Can consider aripiprazole if interfering with PT/OT.

## 2019-04-06 PROBLEM — G47.00 INSOMNIA: Status: ACTIVE | Noted: 2019-04-06

## 2019-04-06 LAB
ANION GAP SERPL CALC-SCNC: 9 MMOL/L (ref 8–16)
BUN SERPL-MCNC: 14 MG/DL (ref 6–20)
CALCIUM SERPL-MCNC: 9.6 MG/DL (ref 8.7–10.5)
CHLORIDE SERPL-SCNC: 101 MMOL/L (ref 95–110)
CO2 SERPL-SCNC: 29 MMOL/L (ref 23–29)
CREAT SERPL-MCNC: 1.2 MG/DL (ref 0.5–1.4)
EST. GFR  (AFRICAN AMERICAN): 58.4 ML/MIN/1.73 M^2
EST. GFR  (NON AFRICAN AMERICAN): 50.6 ML/MIN/1.73 M^2
GLUCOSE SERPL-MCNC: 333 MG/DL (ref 70–110)
POCT GLUCOSE: 286 MG/DL (ref 70–110)
POCT GLUCOSE: 320 MG/DL (ref 70–110)
POCT GLUCOSE: 333 MG/DL (ref 70–110)
POCT GLUCOSE: 430 MG/DL (ref 70–110)
POCT GLUCOSE: 70 MG/DL (ref 70–110)
POCT GLUCOSE: 98 MG/DL (ref 70–110)
POTASSIUM SERPL-SCNC: 4.2 MMOL/L (ref 3.5–5.1)
SODIUM SERPL-SCNC: 139 MMOL/L (ref 136–145)

## 2019-04-06 PROCEDURE — 25000003 PHARM REV CODE 250: Performed by: NURSE PRACTITIONER

## 2019-04-06 PROCEDURE — 99232 SBSQ HOSP IP/OBS MODERATE 35: CPT | Mod: ,,, | Performed by: HOSPITALIST

## 2019-04-06 PROCEDURE — 80048 BASIC METABOLIC PNL TOTAL CA: CPT

## 2019-04-06 PROCEDURE — 36415 COLL VENOUS BLD VENIPUNCTURE: CPT

## 2019-04-06 PROCEDURE — 63600175 PHARM REV CODE 636 W HCPCS: Performed by: NURSE PRACTITIONER

## 2019-04-06 PROCEDURE — 99233 PR SUBSEQUENT HOSPITAL CARE,LEVL III: ICD-10-PCS | Mod: ,,, | Performed by: PSYCHIATRY & NEUROLOGY

## 2019-04-06 PROCEDURE — 99232 PR SUBSEQUENT HOSPITAL CARE,LEVL II: ICD-10-PCS | Mod: ,,, | Performed by: HOSPITALIST

## 2019-04-06 PROCEDURE — S4991 NICOTINE PATCH NONLEGEND: HCPCS | Performed by: NURSE PRACTITIONER

## 2019-04-06 PROCEDURE — 20600001 HC STEP DOWN PRIVATE ROOM

## 2019-04-06 PROCEDURE — 99233 SBSQ HOSP IP/OBS HIGH 50: CPT | Mod: ,,, | Performed by: PSYCHIATRY & NEUROLOGY

## 2019-04-06 RX ORDER — RAMELTEON 8 MG/1
8 TABLET ORAL NIGHTLY
Status: DISCONTINUED | OUTPATIENT
Start: 2019-04-06 | End: 2019-04-09 | Stop reason: HOSPADM

## 2019-04-06 RX ORDER — HYDROCHLOROTHIAZIDE 25 MG/1
50 TABLET ORAL DAILY
Status: DISCONTINUED | OUTPATIENT
Start: 2019-04-06 | End: 2019-04-09 | Stop reason: HOSPADM

## 2019-04-06 RX ORDER — INSULIN ASPART 100 [IU]/ML
15 INJECTION, SOLUTION INTRAVENOUS; SUBCUTANEOUS
Status: DISCONTINUED | OUTPATIENT
Start: 2019-04-06 | End: 2019-04-07

## 2019-04-06 RX ADMIN — INSULIN ASPART 15 UNITS: 100 INJECTION, SOLUTION INTRAVENOUS; SUBCUTANEOUS at 05:04

## 2019-04-06 RX ADMIN — INSULIN ASPART 5 UNITS: 100 INJECTION, SOLUTION INTRAVENOUS; SUBCUTANEOUS at 11:04

## 2019-04-06 RX ADMIN — ACETAMINOPHEN 650 MG: 325 TABLET ORAL at 08:04

## 2019-04-06 RX ADMIN — NICOTINE 1 PATCH: 21 PATCH, EXTENDED RELEASE TRANSDERMAL at 08:04

## 2019-04-06 RX ADMIN — HEPARIN SODIUM 5000 UNITS: 5000 INJECTION, SOLUTION INTRAVENOUS; SUBCUTANEOUS at 01:04

## 2019-04-06 RX ADMIN — PANTOPRAZOLE SODIUM 40 MG: 40 TABLET, DELAYED RELEASE ORAL at 08:04

## 2019-04-06 RX ADMIN — ATORVASTATIN CALCIUM 40 MG: 20 TABLET, FILM COATED ORAL at 08:04

## 2019-04-06 RX ADMIN — OLMESARTAN MEDOXOMIL 40 MG: 20 TABLET, FILM COATED ORAL at 08:04

## 2019-04-06 RX ADMIN — HYDROCHLOROTHIAZIDE 25 MG: 25 TABLET ORAL at 08:04

## 2019-04-06 RX ADMIN — INSULIN DETEMIR 28 UNITS: 100 INJECTION, SOLUTION SUBCUTANEOUS at 08:04

## 2019-04-06 RX ADMIN — INSULIN ASPART 8 UNITS: 100 INJECTION, SOLUTION INTRAVENOUS; SUBCUTANEOUS at 12:04

## 2019-04-06 RX ADMIN — INSULIN ASPART 8 UNITS: 100 INJECTION, SOLUTION INTRAVENOUS; SUBCUTANEOUS at 08:04

## 2019-04-06 RX ADMIN — INSULIN ASPART 15 UNITS: 100 INJECTION, SOLUTION INTRAVENOUS; SUBCUTANEOUS at 12:04

## 2019-04-06 RX ADMIN — HEPARIN SODIUM 5000 UNITS: 5000 INJECTION, SOLUTION INTRAVENOUS; SUBCUTANEOUS at 06:04

## 2019-04-06 RX ADMIN — HEPARIN SODIUM 5000 UNITS: 5000 INJECTION, SOLUTION INTRAVENOUS; SUBCUTANEOUS at 08:04

## 2019-04-06 RX ADMIN — ASPIRIN 81 MG: 81 TABLET, COATED ORAL at 08:04

## 2019-04-06 RX ADMIN — INSULIN ASPART 12 UNITS: 100 INJECTION, SOLUTION INTRAVENOUS; SUBCUTANEOUS at 08:04

## 2019-04-06 RX ADMIN — AMLODIPINE BESYLATE 10 MG: 10 TABLET ORAL at 08:04

## 2019-04-06 NOTE — PLAN OF CARE
Problem: Adult Inpatient Plan of Care  Goal: Plan of Care Review  Outcome: Ongoing (interventions implemented as appropriate)  Pt lying in bed  Quietly with eyes closed. Family at bedside.VSS. Pt able to ambulate to bedside commode with X1 assist. Speech clear able to make needs known. Bed at lowest position, bed alarm set, call bell in reach, instructed to call when assistance is needed.

## 2019-04-06 NOTE — ASSESSMENT & PLAN NOTE
-Stroke risk factor.  Last LDL 08/09/18 was 118.  Current LDL pending--reordered w/ am labs 04/07/19.  -Patient prescribed rosuvastatin, non-compliant.    -Continue atorvastatin 40 mg daily.

## 2019-04-06 NOTE — SUBJECTIVE & OBJECTIVE
Neurologic Chief Complaint: Dysarthria, BLE weakness    Subjective:     Interval History: Patient is seen for follow-up neurological assessment and treatment recommendations:   Patient still complaining of not sleeping well last night, but did not ask for anything as no ramelteon was given.  Will try scheduled rather than prn.  Pt stating she takes diazepam 10 mg qHS to sleep at home.  Not comfortable with giving this due to habit-forming potential in any patient, increased in pt with hx of substance abuse.  One episode of hypoglycemia with pt reporting light-headedness, nausea.  Now BG back up to 300s--spoke to hospital medicine about this, concern for lab error vs fluctuation in diet.  Will watch today, IM team to increase insulin again.    HPI, Past Medical, Family, and Social History remains the same as documented in the initial encounter.  Review of Systems   Constitutional: Negative for chills and fever.   HENT: Negative for rhinorrhea and sneezing.    Eyes: Negative for photophobia and visual disturbance.   Respiratory: Negative for cough and shortness of breath.    Gastrointestinal: Negative for nausea and vomiting.   Genitourinary:        Pt cites urinary incontinence--but describes urgency later, for at least 1 week   Musculoskeletal: Positive for arthralgias, gait problem and myalgias.   Skin: Negative for rash and wound.   Neurological: Positive for speech difficulty and weakness.   Hematological: Negative for adenopathy. Does not bruise/bleed easily.   Psychiatric/Behavioral: Negative for agitation and confusion.     Scheduled Meds:   amLODIPine  10 mg Oral Daily    aspirin  81 mg Oral Daily    atorvastatin  40 mg Oral Daily    heparin (porcine)  5,000 Units Subcutaneous Q8H    hydroCHLOROthiazide  50 mg Oral Daily    insulin aspart U-100  15 Units Subcutaneous TIDWM    insulin detemir U-100  28 Units Subcutaneous BID    nicotine  1 patch Transdermal Daily    olmesartan  40 mg Oral Daily     pantoprazole  40 mg Oral Daily     Continuous Infusions:   sodium chloride 0.9%       PRN Meds:acetaminophen, albuterol-ipratropium, dextrose 10 % in water (D10W), dextrose 10 % in water (D10W), dextrose 50%, dextrose 50%, glucagon (human recombinant), glucose, glucose, hydrALAZINE, insulin aspart U-100, ondansetron, ondansetron, ramelteon, sodium chloride 0.9%, sodium chloride 0.9%    Objective:     Vital Signs (Most Recent):  Temp: 98.5 °F (36.9 °C) (04/06/19 0800)  Pulse: 76 (04/06/19 1108)  Resp: 18 (04/06/19 0800)  BP: (!) 175/88 (04/06/19 0800)  SpO2: 95 % (04/06/19 0800)  BP Location: Left arm    Vital Signs Range (Last 24H):  Temp:  [98.2 °F (36.8 °C)-98.6 °F (37 °C)]   Pulse:  [69-90]   Resp:  [16-20]   BP: (118-188)/(76-98)   SpO2:  [91 %-97 %]   BP Location: Left arm    Physical Exam   Constitutional: She is oriented to person, place, and time. She appears well-developed and well-nourished.   HENT:   Head: Normocephalic and atraumatic.   Mouth/Throat: Oropharynx is clear and moist. No oropharyngeal exudate.   Eyes: Pupils are equal, round, and reactive to light. Conjunctivae and EOM are normal.   Neck: Normal range of motion. Neck supple.   Cardiovascular: Intact distal pulses.   Pulmonary/Chest: Effort normal and breath sounds normal. No respiratory distress.   Abdominal: Soft. She exhibits no distension.   Musculoskeletal: She exhibits no edema or tenderness.   Neurological: She is alert and oriented to person, place, and time. No cranial nerve deficit or sensory deficit. She exhibits normal muscle tone.   Choreiform movements largely resolved.  On motor testing, has some poor effort. Notes pain on testing RLE.   Skin: Skin is warm and dry. She is not diaphoretic. No erythema.     Neurological Exam:   LOC: alert  Attention Span: Good   Language: No aphasia  Articulation: Dysarthria  Orientation: Person, Place, Time   Visual Fields: Full  EOM (CN III, IV, VI): Full/intact  Pupils (CN II, III):  PERRL  Facial Sensation (CN V): Normal  Facial Movement (CN VII): Symmetric facial expression    Motor: Arm left  Normal 5/5  Leg left  Normal 5/5  Arm right  Normal 5/5  Leg right Paresis: 4/5  Cebellar: No evidence of appendicular or axial ataxia  Sensation: Tactile extinction to bilateral simultaneous stimulation   Tone: Normal tone throughout    Laboratory:  CMP:   Recent Labs   Lab 04/06/19  0413   CALCIUM 9.6      K 4.2   CO2 29      BUN 14   CREATININE 1.2     CBC:   Recent Labs   Lab 04/03/19  0422   WBC 8.45   RBC 4.69   HGB 13.1   HCT 39.2      MCV 84   MCH 27.9   MCHC 33.4     Lipid Panel: No results for input(s): CHOL, LDLCALC, HDL, TRIG in the last 168 hours.  Coagulation:   Recent Labs   Lab 04/02/19  0310   INR 0.9   APTT <21.0     Hgb A1C:   Recent Labs   Lab 04/02/19  0042   HGBA1C >14.0*  >14.0*     TSH:   Recent Labs   Lab 04/02/19  0042   TSH 0.988     Diagnostic Results   Brain Imaging   04/01/19 MRI Brain w/ w/o contrast:  Advanced involutional change.  Acute infarcts left putamen, posterolateral left thalamus and left corona radiata and deep white matter adjacent to the left ventricular trigone.  Remote lacunar infarcts left cerebellum, right thalamus, right external capsule, right corona radiata, bilateral basal ganglia and bilateral deep frontal white matter.  Supratentorial and pontine white matter T2/flair hyperintense signal foci suggesting sequela of chronic small vessel ischemic change.  Left sphenoid sinus disease.           04/01/19 CT head w/o contrast:  No acute large vascular territory infarct or intracranial hemorrhage identified.  Further evaluation/follow-up as warranted.  Periventricular white matter hypoattenuation, likely sequela of chronic small vessel ischemic change.  Few more focal areas of hypoattenuation at the right basal ganglia, left internal capsule and left caudate suggesting age-indeterminate lacunar type infarcts.  Correlate  clinically.    Vessel Imaging   04/02/19 CTA head, neck:  CTA:  No hemodynamically significant stenosis or large vessel occlusion identified.    Cardiac Imaging   04/02/19 TTE Complete:  · Normal left ventricular systolic function. The estimated ejection fraction is 68%  · Concentric left ventricular remodeling.  · Normal LV diastolic function.  · No wall motion abnormalities.  · Normal right ventricular systolic function.  · Mild mitral sclerosis.  · Normal central venous pressure (3 mm Hg).  · The estimated PA systolic pressure is 15 mm Hg

## 2019-04-06 NOTE — ASSESSMENT & PLAN NOTE
-UA + for trichomonas.   -Pt received metronidazole 2 g x 1 dose, IM then treated with Azithro 1 g x 1 dose  -Discussed with patient importance of informing partners, not naming any to contact

## 2019-04-06 NOTE — PROGRESS NOTES
[facility]  Vascular Neurology  Comprehensive Stroke Center  Progress Note    Assessment/Plan:     * Cerebrovascular accident (CVA) due to embolism of left middle cerebral artery  54 y.o. female with significant past medical history of DM, HTN, HLD presented to hospital with multiple medical complaints.  The patient has been having slurred speech, BLE weakness, and urinary incontinence since 3/23.  LE weakness became progressively worse with the patient having difficulty/inability to walk for the last 1-2 days.  MRI brain revealed acute infarctions in posterolateral thalamus, territory of PCA.  No tPA due to the patient being outside of the treatment window.  No LVO, no intervention at this time.      Etiology unclear.  Pt has risk factors for small vessel disease as well as signs of chronic small vessel disease on imaging, but presentation of this infarct is inconsistent with an etiology of small vessel disease in terms of multiple non-adjacent areas of acute infarction.  Pattern more likely to be seen in patient with embolic etiology which can be associated with cocaine use. Not a candidate for ESUS study 2/2 exclusion criteria.    Antithrombotics for secondary stroke prevention: Antiplatelets: Aspirin: 81 mg daily  Statins for secondary stroke prevention and hyperlipidemia, if present:  Atorvastatin 40 mg daily  Aggressive risk factor modification: HTN, Smoking, DM, HLD, illicit substance use  Rehab efforts: PT/OT/SLP, PM&R consult --Inpatient Rehab, pending placement  Diagnostics ordered/pending: None  VTE prophylaxis: Heparin 5000 units SQ every 8 hours, SCDs  BP parameters: Infarct: SBP <180     Choreiform movement  -Localized to RUE > RLE. Improving on a daily basis, as of 04/06/19 nearly resolved.  -Discussed with Dr. Eubanks, will monitor.  Can consider aripiprazole if interfering with PT/OT.    Weakness of both lower extremities  -BLE weakness, +urinary incontinence, LBP reported per  pt on admission  -Tylenol prn for pain for now, stable and not requiring further prns  -MRI L spine w/o contrast--some R-sided disc protrusion L3-L4 with possible R L3 impingement  -Consulted PT/OT/PM&R, appreciate recs--recommending inpatient rehab, pending acceptance    Cocaine abuse  -Counseled on importance of cessation  -Community resource planning on discharge  -No consult to Addiction Psych as pt still denies cocaine use as an issue    Small vessel disease, cerebrovascular  -Pt with risk factors--HTN, HLD, poorly controlled DM II, cocaine abuse  -MRI findings of lacunes, multiple white matter hyperintensities, global atrophy  -No clear cerebral microbleeds on 2D echo planar imaging available  -Mgmt per above--ASA, atorvastatin, BP control, DM II control, cocaine abstinence, diet/exercise recs    Mixed hyperlipidemia  -Stroke risk factor.  Last LDL 08/09/18 was 118.  Current LDL pending--reordered w/ am labs 04/07/19.  -Patient prescribed rosuvastatin, non-compliant.    -Continue atorvastatin 40 mg daily.    Smoker  -Stroke risk factor.  Patient endorses smoking 1.5 ppd x 30 yrs--45 pkyr hx  -Multiple medications prescribed for smoking cessation that pt has not taken.  -Continue to encourage cessation  -Nicotine patch prn    Essential hypertension  -Stroke risk factor.  SBP < 180.  -Patient on amlodipine, valsartan, HCTZ at home  -Continue olmesartan 40 mg qd  -Continue amlodipine 10 mg qd   -SBP 170s-180s 04/06/19 am, increased HCTZ to 50 mg qd    Uncontrolled type 2 diabetes mellitus with hyperglycemia  -Stroke risk factor. Patient reports glucose at home has been >500.  -DKA confirmed on admission, now with hyperglycemia without anion gap or ketosis   -Hospital medicine consulted--detemir 31 U bid, aspart 15 U tidwm started   -Questionable hypoglycemic episode yesterday, will repeat POC glucose if this recurs    Trichomonas infection  -UA + for trichomonas.   -Pt received metronidazole 2 g x 1 dose, IM then  treated with Azithro 1 g x 1 dose  -Discussed with patient importance of informing partners, not naming any to contact    Diabetic ketoacidosis without coma associated with type 2 diabetes mellitus  -BHB 4.1 on admission  -DKA resolved, IM 6 assisting with BG management    Insomnia  -Pt states that she takes diazepam 10 mg pill nightly to sleep at home  -Will not prescribe due to habit-forming risk in pt with illicit substance use  -Will schedule ramelteon 8 mg tonight, pt did not ask for it when ordered prn 04/06/19 04/01/19:  [place] [facility] [place]  UDS + for cocaine, +DKA, presents with sxs of dysarthria, BLE weakness.    04/02/19:  DKA ongoing, insulin gtt started.  Will get IM consult ordered o/n.  04/03/19:  Mild R pronator drift on exam. BG > 400, IM adjusting insulin. Restarted BP medications.   04/04/19:  Choreiform movements improving, asking movement disorder MD for input.  Plan for inpatient rehab.  04/05/19:  Pending inpatient rehab placement.  Concern for embolic infarcts, may consider ESUS study enrollment.  04/06/19:  Not a candidate for ESUS 2/2 cocaine.  Still working on BG control, discussed with Hospital Medicine team.    STROKE DOCUMENTATION        NIH Scale:  1a. Level of Consciousness: 0-->Alert, keenly responsive  1b. LOC Questions: 0-->Answers both questions correctly  1c. LOC Commands: 0-->Performs both tasks correctly  2. Best Gaze: 0-->Normal  3. Visual: 0-->No visual loss  4. Facial Palsy: 0-->Normal symmetrical movements  5a. Motor Arm, Left: 0-->No drift, limb holds 90 (or 45) degrees for full 10 secs  5b. Motor Arm, Right: 0-->No drift, limb holds 90 (or 45) degrees for full 10 secs  6a. Motor Leg, Left: 0-->No drift, leg holds 30 degree position for full 5 secs  6b. Motor Leg, Right: 1-->Drift, leg falls by the end of the 5-sec period but does not hit bed  7. Limb Ataxia: 0-->Absent  8. Sensory: 0-->Normal, no sensory loss  9. Best Language: 0-->No aphasia, normal  10. Dysarthria:  0-->Normal  11. Extinction and Inattention (formerly Neglect): 0-->No abnormality  Total (NIH Stroke Scale): 1     Modified Millard Score: 1  Welton Coma Scale:15   ABCD2 Score:    [id number] Score:   HAS -BLED Score:   ICH Score:   Hunt & Mueller Classification:      Hemorrhagic change of an Ischemic Stroke: Does this patient have an ischemic stroke with hemorrhagic changes? No     Neurologic Chief Complaint: Dysarthria, BLE weakness    Subjective:     Interval History: Patient is seen for follow-up neurological assessment and treatment recommendations:   Patient still complaining of not sleeping well last night, but did not ask for anything as no ramelteon was given.  Will try scheduled rather than prn.  Pt stating she takes diazepam 10 mg qHS to sleep at home.  Not comfortable with giving this due to habit-forming potential in any patient, increased in pt with hx of substance abuse.  One episode of hypoglycemia with pt reporting light-headedness, nausea.  Now BG back up to 300s--spoke to hospital medicine about this, concern for lab error vs fluctuation in diet.  Will watch today, IM team to increase insulin again.    HPI, Past Medical, Family, and Social History remains the same as documented in the initial encounter.  Review of Systems   Constitutional: Negative for chills and fever.   HENT: Negative for rhinorrhea and sneezing.    Eyes: Negative for photophobia and visual disturbance.   Respiratory: Negative for cough and shortness of breath.    Gastrointestinal: Negative for nausea and vomiting.   Genitourinary:        Pt cites urinary incontinence--but describes urgency later, for at least 1 week   Musculoskeletal: Positive for arthralgias, gait problem and myalgias.   Skin: Negative for rash and wound.   Neurological: Positive for speech difficulty and weakness.   Hematological: Negative for adenopathy. Does not bruise/bleed easily.   Psychiatric/Behavioral: Negative for agitation and confusion.      Scheduled Meds:   amLODIPine  10 mg Oral Daily    aspirin  81 mg Oral Daily    atorvastatin  40 mg Oral Daily    heparin (porcine)  5,000 Units Subcutaneous Q8H    hydroCHLOROthiazide  50 mg Oral Daily    insulin aspart U-100  15 Units Subcutaneous TIDWM    insulin detemir U-100  28 Units Subcutaneous BID    nicotine  1 patch Transdermal Daily    olmesartan  40 mg Oral Daily    pantoprazole  40 mg Oral Daily     Continuous Infusions:   sodium chloride 0.9%       PRN Meds:acetaminophen, albuterol-ipratropium, dextrose 10 % in water (D10W), dextrose 10 % in water (D10W), dextrose 50%, dextrose 50%, glucagon (human recombinant), glucose, glucose, hydrALAZINE, insulin aspart U-100, ondansetron, ondansetron, ramelteon, sodium chloride 0.9%, sodium chloride 0.9%    Objective:     Vital Signs (Most Recent):  Temp: 98.5 °F (36.9 °C) (04/06/19 0800)  Pulse: 76 (04/06/19 1108)  Resp: 18 (04/06/19 0800)  BP: (!) 175/88 (04/06/19 0800)  SpO2: 95 % (04/06/19 0800)  BP Location: Left arm    Vital Signs Range (Last 24H):  Temp:  [98.2 °F (36.8 °C)-98.6 °F (37 °C)]   Pulse:  [69-90]   Resp:  [16-20]   BP: (118-188)/(76-98)   SpO2:  [91 %-97 %]   BP Location: Left arm    Physical Exam   Constitutional: She is oriented to person, place, and time. She appears well-developed and well-nourished.   HENT:   Head: Normocephalic and atraumatic.   Mouth/Throat: Oropharynx is clear and moist. No oropharyngeal exudate.   Eyes: Pupils are equal, round, and reactive to light. Conjunctivae and EOM are normal.   Neck: Normal range of motion. Neck supple.   Cardiovascular: Intact distal pulses.   Pulmonary/Chest: Effort normal and breath sounds normal. No respiratory distress.   Abdominal: Soft. She exhibits no distension.   Musculoskeletal: She exhibits no edema or tenderness.   Neurological: She is alert and oriented to person, place, and time. No cranial nerve deficit or sensory deficit. She exhibits normal muscle tone.    Choreiform movements largely resolved.  On motor testing, has some poor effort. Notes pain on testing RLE.   Skin: Skin is warm and dry. She is not diaphoretic. No erythema.     Neurological Exam:   LOC: alert  Attention Span: Good   Language: No aphasia  Articulation: Dysarthria  Orientation: Person, Place, Time   Visual Fields: Full  EOM (CN III, IV, VI): Full/intact  Pupils (CN II, III): PERRL  Facial Sensation (CN V): Normal  Facial Movement (CN VII): Symmetric facial expression    Motor: Arm left  Normal 5/5  Leg left  Normal 5/5  Arm right  Normal 5/5  Leg right Paresis: 4/5  Cebellar: No evidence of appendicular or axial ataxia  Sensation: Tactile extinction to bilateral simultaneous stimulation   Tone: Normal tone throughout    Laboratory:  CMP:   Recent Labs   Lab 04/06/19  0413   CALCIUM 9.6      K 4.2   CO2 29      BUN 14   CREATININE 1.2     CBC:   Recent Labs   Lab 04/03/19  0422   WBC 8.45   RBC 4.69   HGB 13.1   HCT 39.2      MCV 84   MCH 27.9   MCHC 33.4     Lipid Panel: No results for input(s): CHOL, LDLCALC, HDL, TRIG in the last 168 hours.  Coagulation:   Recent Labs   Lab 04/02/19  0310   INR 0.9   APTT <21.0     Hgb A1C:   Recent Labs   Lab 04/02/19  0042   HGBA1C >14.0*  >14.0*     TSH:   Recent Labs   Lab 04/02/19  0042   TSH 0.988     Diagnostic Results   Brain Imaging   04/01/19 MRI Brain w/ w/o contrast:  Advanced involutional change.  Acute infarcts left putamen, posterolateral left thalamus and left corona radiata and deep white matter adjacent to the left ventricular trigone.  Remote lacunar infarcts left cerebellum, right thalamus, right external capsule, right corona radiata, bilateral basal ganglia and bilateral deep frontal white matter.  Supratentorial and pontine white matter T2/flair hyperintense signal foci suggesting sequela of chronic small vessel ischemic change.  Left sphenoid sinus disease.           04/01/19 CT head w/o contrast:  No acute large  vascular territory infarct or intracranial hemorrhage identified.  Further evaluation/follow-up as warranted.  Periventricular white matter hypoattenuation, likely sequela of chronic small vessel ischemic change.  Few more focal areas of hypoattenuation at the right basal ganglia, left internal capsule and left caudate suggesting age-indeterminate lacunar type infarcts.  Correlate clinically.    Vessel Imaging   04/02/19 CTA head, neck:  CTA:  No hemodynamically significant stenosis or large vessel occlusion identified.    Cardiac Imaging   04/02/19 TTE Complete:  · Normal left ventricular systolic function. The estimated ejection fraction is 68%  · Concentric left ventricular remodeling.  · Normal LV diastolic function.  · No wall motion abnormalities.  · Normal right ventricular systolic function.  · Mild mitral sclerosis.  · Normal central venous pressure (3 mm Hg).  · The estimated PA systolic pressure is 15 mm Hg    Delmi Corona MD  Comprehensive Stroke Center  Department of Vascular Neurology   [facility]

## 2019-04-06 NOTE — SUBJECTIVE & OBJECTIVE
Past Medical History:   Diagnosis Date    Diabetes mellitus type I     Hyperlipidemia     Hypertension      Past Surgical History:   Procedure Laterality Date     SECTION       Review of patient's allergies indicates:   Allergen Reactions    Sulfur        Scheduled Medications:    amLODIPine  10 mg Oral Daily    aspirin  81 mg Oral Daily    atorvastatin  40 mg Oral Daily    heparin (porcine)  5,000 Units Subcutaneous Q8H    hydroCHLOROthiazide  50 mg Oral Daily    insulin aspart U-100  15 Units Subcutaneous TIDWM    insulin detemir U-100  31 Units Subcutaneous BID    nicotine  1 patch Transdermal Daily    olmesartan  40 mg Oral Daily    pantoprazole  40 mg Oral Daily       PRN Medications: acetaminophen, albuterol-ipratropium, dextrose 10 % in water (D10W), dextrose 10 % in water (D10W), dextrose 50%, dextrose 50%, glucagon (human recombinant), glucose, glucose, hydrALAZINE, insulin aspart U-100, ondansetron, ondansetron, ramelteon, sodium chloride 0.9%, sodium chloride 0.9%    Family History     Problem Relation (Age of Onset)    Cancer Sister    Diabetes Mother, Sister, Brother, Maternal Aunt    Heart disease Maternal Aunt    Hyperlipidemia Mother, Sister, Brother    Hypertension Mother, Sister, Brother    Stroke Mother        Tobacco Use    Smoking status: Current Every Day Smoker     Packs/day: 1.50     Years: 35.00     Pack years: 52.50     Types: Cigarettes    Smokeless tobacco: Never Used   Substance and Sexual Activity    Alcohol use: Not Currently    Drug use: Not Currently    Sexual activity: Not on file     Review of Systems   Constitutional: Positive for activity change. Negative for fatigue and fever.   HENT: Negative for congestion and trouble swallowing.    Eyes: Negative for pain and visual disturbance.   Respiratory: Negative for cough and shortness of breath.    Cardiovascular: Negative for chest pain and palpitations.   Gastrointestinal: Negative for abdominal pain,  nausea and vomiting.   Genitourinary: Negative for difficulty urinating and flank pain.   Musculoskeletal: Positive for gait problem and myalgias.   Skin: Negative for rash and wound.   Neurological: Positive for speech difficulty (chronic) and weakness. Negative for dizziness, numbness and headaches.   Psychiatric/Behavioral: Negative for agitation. The patient is not nervous/anxious.      Objective:     Vital Signs (Most Recent):  Temp: 98.4 °F (36.9 °C) (19 1329)  Pulse: 87 (19 1329)  Resp: 18 (19 1329)  BP: 128/78 (19 1329)  SpO2: 95 % (19 132)    Vital Signs (24h Range):  Temp:  [98.2 °F (36.8 °C)-98.6 °F (37 °C)] 98.4 °F (36.9 °C)  Pulse:  [69-90] 87  Resp:  [16-20] 18  SpO2:  [91 %-97 %] 95 %  BP: (128-188)/(78-98) 128/78     Body mass index is 25.89 kg/m².    Physical Exam   Constitutional: She is oriented to person, place, and time. She appears well-developed and well-nourished. No distress.   HENT:   Head: Normocephalic and atraumatic.   Eyes: Right eye exhibits no discharge. Left eye exhibits no discharge. No scleral icterus.   Neck: Normal range of motion.   Cardiovascular: Normal rate, regular rhythm and intact distal pulses.   Pulmonary/Chest: Effort normal. No respiratory distress. She has no wheezes.   Abdominal: Soft. She exhibits no distension. There is no tenderness.   Musculoskeletal: Normal range of motion. She exhibits no edema.   Neurological: She is alert and oriented to person, place, and time.   -  Mental Status:  AAOx3.  Follows commands.  Answers correct age and .     -  Speech and language:  no aphasia, + dysarthria.    -  Vision:  no hemianopsia or ptosis.    -  Motor:  BUE: 5/5.  BLE weakness R>L.  Exam limited by effort and participation.    Skin: Skin is warm and dry. She is not diaphoretic.   Psychiatric: She has a normal mood and affect. Her behavior is normal.   Vitals reviewed.    NEUROLOGICAL EXAMINATION:     MENTAL STATUS   Oriented to person,  place, and time.       Diagnostic Results:   Labs: Reviewed  X-Ray: Reviewed  CT: Reviewed  MRI: Reviewed

## 2019-04-06 NOTE — ASSESSMENT & PLAN NOTE
54 y.o. female with significant past medical history of DM, HTN, HLD presented to hospital with multiple medical complaints.  The patient has been having slurred speech, BLE weakness, and urinary incontinence since 3/23.  LE weakness became progressively worse with the patient having difficulty/inability to walk for the last 1-2 days.  MRI brain revealed acute infarctions in posterolateral thalamus, territory of PCA.  No tPA due to the patient being outside of the treatment window.  No LVO, no intervention at this time.      Etiology unclear.  Pt has risk factors for small vessel disease as well as signs of chronic small vessel disease on imaging, but presentation of this infarct is inconsistent with an etiology of small vessel disease in terms of multiple non-adjacent areas of acute infarction.  Pattern more likely to be seen in patient with embolic etiology which can be associated with cocaine use. Not a candidate for ESUS study 2/2 exclusion criteria.    Antithrombotics for secondary stroke prevention: Antiplatelets: Aspirin: 81 mg daily  Statins for secondary stroke prevention and hyperlipidemia, if present:  Atorvastatin 40 mg daily  Aggressive risk factor modification: HTN, Smoking, DM, HLD, illicit substance use  Rehab efforts: PT/OT/SLP, PM&R consult --Inpatient Rehab, pending placement  Diagnostics ordered/pending: None  VTE prophylaxis: Heparin 5000 units SQ every 8 hours, SCDs  BP parameters: Infarct: SBP <180

## 2019-04-06 NOTE — ASSESSMENT & PLAN NOTE
- pt poor historian, stated that she takes either 26 or 36 u long acting insulin daily. No meal time insulin. But pt unsure and telling providers different numbers.  - HgA1c was non-measurable (>14.0). No gap, normal bicarb level, normal pH 7.35.  - Started on insulin gtt with POCT glu Q1h. Was switched to detemir 18U BID and MDSSI on 4/2  - Started on diabetic diet given no hx of dysphagia or aspiration.  - increasing insulin regimen based on SS need. Increased to detemir 31 BID and aspart 15 TID w/meals.  - cont to monitor POCT glc

## 2019-04-06 NOTE — ASSESSMENT & PLAN NOTE
-Stroke risk factor.  SBP < 180.  -Patient on amlodipine, valsartan, HCTZ at home  -Continue olmesartan 40 mg qd  -Continue amlodipine 10 mg qd   -SBP 170s-180s 04/06/19 am, increased HCTZ to 50 mg qd

## 2019-04-06 NOTE — PROGRESS NOTES
[facility] Hospital Medicine  Progress Note    Patient Name: Emily Martinez  MRN: [id number]  Patient Class: IP- Inpatient   Admission Date: 2019  Length of Stay: 5 days  Attending Physician: Kenton Vaughn MD  Primary Care Provider: Alireza Sosa MD    [facility] Medicine Team: Networked reference to record PCT  Nimco Partida MD    Subjective:     Principal Problem:Cerebrovascular accident (CVA) due to embolism of left middle cerebral artery    HPI:  54 y.o. female with significant past medical history of DM, HTN, HLD presented to hospital with multiple medical complaints.  The patient has been having slurred speech, BLE weakness, and urinary incontinence since 3/23.  LE weakness became progressively worse with the patient having difficulty/inability to walk for the last 1-2 days.  MRI brain revealed acute infarctions in posterolateral thalamus, territory of PCA. No tPA due to the patient being outside of the treatment window.  No LVO, no intervention at this time. HM team was consulted because of high blood glucose level and co management.    Hospital Course:  No notes on file    Past Medical History:   Diagnosis Date    Diabetes mellitus type I     Hyperlipidemia     Hypertension      Past Surgical History:   Procedure Laterality Date     SECTION       Review of patient's allergies indicates:   Allergen Reactions    Sulfur        Scheduled Medications:    amLODIPine  10 mg Oral Daily    aspirin  81 mg Oral Daily    atorvastatin  40 mg Oral Daily    heparin (porcine)  5,000 Units Subcutaneous Q8H    hydroCHLOROthiazide  50 mg Oral Daily    insulin aspart U-100  15 Units Subcutaneous TIDWM    insulin detemir U-100  31 Units Subcutaneous BID    nicotine  1 patch Transdermal Daily    olmesartan  40 mg Oral Daily    pantoprazole  40 mg Oral Daily       PRN Medications: acetaminophen, albuterol-ipratropium, dextrose 10 % in water (D10W), dextrose 10 % in water (D10W),  dextrose 50%, dextrose 50%, glucagon (human recombinant), glucose, glucose, hydrALAZINE, insulin aspart U-100, ondansetron, ondansetron, ramelteon, sodium chloride 0.9%, sodium chloride 0.9%    Family History     Problem Relation (Age of Onset)    Cancer Sister    Diabetes Mother, Sister, Brother, Maternal Aunt    Heart disease Maternal Aunt    Hyperlipidemia Mother, Sister, Brother    Hypertension Mother, Sister, Brother    Stroke Mother        Tobacco Use    Smoking status: Current Every Day Smoker     Packs/day: 1.50     Years: 35.00     Pack years: 52.50     Types: Cigarettes    Smokeless tobacco: Never Used   Substance and Sexual Activity    Alcohol use: Not Currently    Drug use: Not Currently    Sexual activity: Not on file     Review of Systems   Constitutional: Positive for activity change. Negative for fatigue and fever.   HENT: Negative for congestion and trouble swallowing.    Eyes: Negative for pain and visual disturbance.   Respiratory: Negative for cough and shortness of breath.    Cardiovascular: Negative for chest pain and palpitations.   Gastrointestinal: Negative for abdominal pain, nausea and vomiting.   Genitourinary: Negative for difficulty urinating and flank pain.   Musculoskeletal: Positive for gait problem and myalgias.   Skin: Negative for rash and wound.   Neurological: Positive for speech difficulty (chronic) and weakness. Negative for dizziness, numbness and headaches.   Psychiatric/Behavioral: Negative for agitation. The patient is not nervous/anxious.      Objective:     Vital Signs (Most Recent):  Temp: 98.4 °F (36.9 °C) (04/06/19 1329)  Pulse: 87 (04/06/19 1329)  Resp: 18 (04/06/19 1329)  BP: 128/78 (04/06/19 1329)  SpO2: 95 % (04/06/19 1329)    Vital Signs (24h Range):  Temp:  [98.2 °F (36.8 °C)-98.6 °F (37 °C)] 98.4 °F (36.9 °C)  Pulse:  [69-90] 87  Resp:  [16-20] 18  SpO2:  [91 %-97 %] 95 %  BP: (128-188)/(78-98) 128/78     Body mass index is 25.89 kg/m².    Physical Exam    Constitutional: She is oriented to person, place, and time. She appears well-developed and well-nourished. No distress.   HENT:   Head: Normocephalic and atraumatic.   Eyes: Right eye exhibits no discharge. Left eye exhibits no discharge. No scleral icterus.   Neck: Normal range of motion.   Cardiovascular: Normal rate, regular rhythm and intact distal pulses.   Pulmonary/Chest: Effort normal. No respiratory distress. She has no wheezes.   Abdominal: Soft. She exhibits no distension. There is no tenderness.   Musculoskeletal: Normal range of motion. She exhibits no edema.   Neurological: She is alert and oriented to person, place, and time.   -  Mental Status:  AAOx3.  Follows commands.  Answers correct age and .     -  Speech and language:  no aphasia, + dysarthria.    -  Vision:  no hemianopsia or ptosis.    -  Motor:  BUE: 5/5.  BLE weakness R>L.  Exam limited by effort and participation.    Skin: Skin is warm and dry. She is not diaphoretic.   Psychiatric: She has a normal mood and affect. Her behavior is normal.   Vitals reviewed.    NEUROLOGICAL EXAMINATION:     MENTAL STATUS   Oriented to person, place, and time.       Diagnostic Results:   Labs: Reviewed  X-Ray: Reviewed  CT: Reviewed  MRI: Reviewed    Assessment/Plan:      Trichomonas infection  - Home meds list showed she is on Azithro and CTX. However, pt denies taking neither of them.  - s/p one dose of oral Azithro 1g on     Uncontrolled type 2 diabetes mellitus with hyperglycemia  - pt poor historian, stated that she takes either 26 or 36 u long acting insulin daily. No meal time insulin. But pt unsure and telling providers different numbers.  - HgA1c was non-measurable (>14.0). No gap, normal bicarb level, normal pH 7.35.  - Started on insulin gtt with POCT glu Q1h. Was switched to detemir 18U BID and MDSSI on   - Started on diabetic diet given no hx of dysphagia or aspiration.  - increasing insulin regimen based on SS need. Increased to  detemir 31 BID and aspart 15 TID w/meals.  - cont to monitor POCT glc    VTE Risk Mitigation (From admission, onward)        Ordered     heparin (porcine) injection 5,000 Units  Every 8 hours      04/01/19 2311     IP VTE HIGH RISK PATIENT  Once      04/01/19 2311     Place sequential compression device  Until discontinued      04/01/19 2311              Nimco Partida MD  Department of Hospital Medicine   [facility]

## 2019-04-06 NOTE — ASSESSMENT & PLAN NOTE
-BLE weakness, +urinary incontinence, LBP reported per pt on admission  -Tylenol prn for pain for now, stable and not requiring further prns  -MRI L spine w/o contrast--some R-sided disc protrusion L3-L4 with possible R L3 impingement  -Consulted PT/OT/PM&R, appreciate recs--recommending inpatient rehab, pending acceptance

## 2019-04-06 NOTE — ASSESSMENT & PLAN NOTE
-Localized to RUE > RLE. Improving on a daily basis, as of 04/06/19 nearly resolved.  -Discussed with Dr. Eubanks, will monitor.  Can consider aripiprazole if interfering with PT/OT.

## 2019-04-06 NOTE — ASSESSMENT & PLAN NOTE
-Stroke risk factor. Patient reports glucose at home has been >500.  -DKA confirmed on admission, now with hyperglycemia without anion gap or ketosis   -Hospital medicine consulted--detemir 31 U bid, aspart 15 U tidwm started   -Questionable hypoglycemic episode yesterday, will repeat POC glucose if this recurs

## 2019-04-06 NOTE — ASSESSMENT & PLAN NOTE
-Pt states that she takes diazepam 10 mg pill nightly to sleep at home  -Will not prescribe due to habit-forming risk in pt with illicit substance use  -Will schedule ramelteon 8 mg tonight, pt did not ask for it when ordered prn 04/06/19

## 2019-04-07 PROBLEM — R20.0 NUMBNESS AND TINGLING OF RIGHT LOWER EXTREMITY: Status: ACTIVE | Noted: 2019-04-07

## 2019-04-07 PROBLEM — R20.2 NUMBNESS AND TINGLING OF RIGHT LOWER EXTREMITY: Status: ACTIVE | Noted: 2019-04-07

## 2019-04-07 LAB
ANION GAP SERPL CALC-SCNC: 11 MMOL/L (ref 8–16)
BUN SERPL-MCNC: 17 MG/DL (ref 6–20)
CALCIUM SERPL-MCNC: 9.8 MG/DL (ref 8.7–10.5)
CHLORIDE SERPL-SCNC: 101 MMOL/L (ref 95–110)
CO2 SERPL-SCNC: 26 MMOL/L (ref 23–29)
CREAT SERPL-MCNC: 1.2 MG/DL (ref 0.5–1.4)
EST. GFR  (AFRICAN AMERICAN): 58.4 ML/MIN/1.73 M^2
EST. GFR  (NON AFRICAN AMERICAN): 50.6 ML/MIN/1.73 M^2
GLUCOSE SERPL-MCNC: 294 MG/DL (ref 70–110)
POCT GLUCOSE: 210 MG/DL (ref 70–110)
POCT GLUCOSE: 222 MG/DL (ref 70–110)
POCT GLUCOSE: 260 MG/DL (ref 70–110)
POCT GLUCOSE: 277 MG/DL (ref 70–110)
POTASSIUM SERPL-SCNC: 3.8 MMOL/L (ref 3.5–5.1)
SODIUM SERPL-SCNC: 138 MMOL/L (ref 136–145)

## 2019-04-07 PROCEDURE — 36415 COLL VENOUS BLD VENIPUNCTURE: CPT

## 2019-04-07 PROCEDURE — S4991 NICOTINE PATCH NONLEGEND: HCPCS | Performed by: NURSE PRACTITIONER

## 2019-04-07 PROCEDURE — 20600001 HC STEP DOWN PRIVATE ROOM

## 2019-04-07 PROCEDURE — 99233 SBSQ HOSP IP/OBS HIGH 50: CPT | Mod: ,,, | Performed by: PSYCHIATRY & NEUROLOGY

## 2019-04-07 PROCEDURE — 63600175 PHARM REV CODE 636 W HCPCS: Performed by: NURSE PRACTITIONER

## 2019-04-07 PROCEDURE — 25000003 PHARM REV CODE 250: Performed by: NURSE PRACTITIONER

## 2019-04-07 PROCEDURE — 80048 BASIC METABOLIC PNL TOTAL CA: CPT

## 2019-04-07 PROCEDURE — 99232 SBSQ HOSP IP/OBS MODERATE 35: CPT | Mod: ,,, | Performed by: HOSPITALIST

## 2019-04-07 PROCEDURE — 99233 PR SUBSEQUENT HOSPITAL CARE,LEVL III: ICD-10-PCS | Mod: ,,, | Performed by: PSYCHIATRY & NEUROLOGY

## 2019-04-07 PROCEDURE — 25000003 PHARM REV CODE 250: Performed by: STUDENT IN AN ORGANIZED HEALTH CARE EDUCATION/TRAINING PROGRAM

## 2019-04-07 PROCEDURE — 99232 PR SUBSEQUENT HOSPITAL CARE,LEVL II: ICD-10-PCS | Mod: ,,, | Performed by: HOSPITALIST

## 2019-04-07 RX ORDER — INSULIN ASPART 100 [IU]/ML
17 INJECTION, SOLUTION INTRAVENOUS; SUBCUTANEOUS
Status: DISCONTINUED | OUTPATIENT
Start: 2019-04-07 | End: 2019-04-08

## 2019-04-07 RX ORDER — DICLOFENAC SODIUM 10 MG/G
GEL TOPICAL 4 TIMES DAILY
Status: DISCONTINUED | OUTPATIENT
Start: 2019-04-07 | End: 2019-04-09 | Stop reason: HOSPADM

## 2019-04-07 RX ADMIN — INSULIN ASPART 4 UNITS: 100 INJECTION, SOLUTION INTRAVENOUS; SUBCUTANEOUS at 11:04

## 2019-04-07 RX ADMIN — HYDROCHLOROTHIAZIDE 50 MG: 25 TABLET ORAL at 09:04

## 2019-04-07 RX ADMIN — INSULIN ASPART 6 UNITS: 100 INJECTION, SOLUTION INTRAVENOUS; SUBCUTANEOUS at 09:04

## 2019-04-07 RX ADMIN — DICLOFENAC: 10 GEL TOPICAL at 01:04

## 2019-04-07 RX ADMIN — DICLOFENAC: 10 GEL TOPICAL at 05:04

## 2019-04-07 RX ADMIN — INSULIN ASPART 6 UNITS: 100 INJECTION, SOLUTION INTRAVENOUS; SUBCUTANEOUS at 05:04

## 2019-04-07 RX ADMIN — INSULIN ASPART 15 UNITS: 100 INJECTION, SOLUTION INTRAVENOUS; SUBCUTANEOUS at 11:04

## 2019-04-07 RX ADMIN — OLMESARTAN MEDOXOMIL 40 MG: 20 TABLET, FILM COATED ORAL at 09:04

## 2019-04-07 RX ADMIN — ATORVASTATIN CALCIUM 40 MG: 20 TABLET, FILM COATED ORAL at 09:04

## 2019-04-07 RX ADMIN — INSULIN ASPART 3 UNITS: 100 INJECTION, SOLUTION INTRAVENOUS; SUBCUTANEOUS at 09:04

## 2019-04-07 RX ADMIN — ASPIRIN 81 MG: 81 TABLET, COATED ORAL at 09:04

## 2019-04-07 RX ADMIN — INSULIN ASPART 17 UNITS: 100 INJECTION, SOLUTION INTRAVENOUS; SUBCUTANEOUS at 05:04

## 2019-04-07 RX ADMIN — HEPARIN SODIUM 5000 UNITS: 5000 INJECTION, SOLUTION INTRAVENOUS; SUBCUTANEOUS at 06:04

## 2019-04-07 RX ADMIN — AMLODIPINE BESYLATE 10 MG: 10 TABLET ORAL at 09:04

## 2019-04-07 RX ADMIN — INSULIN ASPART 15 UNITS: 100 INJECTION, SOLUTION INTRAVENOUS; SUBCUTANEOUS at 09:04

## 2019-04-07 RX ADMIN — PANTOPRAZOLE SODIUM 40 MG: 40 TABLET, DELAYED RELEASE ORAL at 09:04

## 2019-04-07 RX ADMIN — NICOTINE 1 PATCH: 21 PATCH, EXTENDED RELEASE TRANSDERMAL at 09:04

## 2019-04-07 RX ADMIN — DICLOFENAC: 10 GEL TOPICAL at 09:04

## 2019-04-07 RX ADMIN — RAMELTEON 8 MG: 8 TABLET, FILM COATED ORAL at 09:04

## 2019-04-07 RX ADMIN — HEPARIN SODIUM 5000 UNITS: 5000 INJECTION, SOLUTION INTRAVENOUS; SUBCUTANEOUS at 09:04

## 2019-04-07 RX ADMIN — HEPARIN SODIUM 5000 UNITS: 5000 INJECTION, SOLUTION INTRAVENOUS; SUBCUTANEOUS at 02:04

## 2019-04-07 NOTE — ASSESSMENT & PLAN NOTE
-Pt states that she takes diazepam 10 mg pill nightly to sleep at home  -Will not prescribe due to habit-forming risk in pt with illicit substance use  -Improved sleep on ramelteon 8 mg qHS, will continue as scheduled medication

## 2019-04-07 NOTE — SUBJECTIVE & OBJECTIVE
Interval History: NAEON. Sugars better controlled today; had one BGL >400 yesterday PM, suspect due to dietary indiscretions. Counseled patient on reporting intake to nursing for proper coverage.     Review of Systems   Constitutional: Negative for fatigue and fever.   Respiratory: Negative for shortness of breath, wheezing and stridor.    Cardiovascular: Negative for chest pain, palpitations and leg swelling.   Gastrointestinal: Negative for abdominal pain, blood in stool, constipation, diarrhea, nausea and vomiting.   Genitourinary: Negative for difficulty urinating.   Skin: Negative for rash and wound.   Allergic/Immunologic: Negative for immunocompromised state.   Neurological: Positive for speech difficulty and weakness. Negative for light-headedness and headaches.     Objective:     Vital Signs (Most Recent):  Temp: 99.1 °F (37.3 °C) (04/07/19 1225)  Pulse: 90 (04/07/19 1225)  Resp: 18 (04/07/19 1225)  BP: (!) 165/83 (04/07/19 1225)  SpO2: 96 % (04/07/19 1225) Vital Signs (24h Range):  Temp:  [98.3 °F (36.8 °C)-99.8 °F (37.7 °C)] 99.1 °F (37.3 °C)  Pulse:  [69-93] 90  Resp:  [18-20] 18  SpO2:  [96 %-98 %] 96 %  BP: (119-165)/(69-91) 165/83     Weight: 66.3 kg (146 lb 2.6 oz)  Body mass index is 25.89 kg/m².  No intake or output data in the 24 hours ending 04/07/19 1442   Physical Exam   Constitutional: She is oriented to person, place, and time. She appears well-developed and well-nourished. No distress.   HENT:   Head: Normocephalic and atraumatic.   Eyes: Right eye exhibits no discharge. Left eye exhibits no discharge. No scleral icterus.   Neck: Normal range of motion.   Cardiovascular: Normal rate, regular rhythm and intact distal pulses.   Pulmonary/Chest: Effort normal. No respiratory distress. She has no wheezes.   Abdominal: Soft. She exhibits no distension. There is no tenderness.   Musculoskeletal: Normal range of motion. She exhibits no edema.   Neurological: She is alert and oriented to person,  place, and time.   -  Mental Status:  AAOx3.  Follows commands.  Answers correct age and .     -  Speech and language:  no aphasia, + dysarthria.    -  Vision:  no hemianopsia or ptosis.    -  Motor:  BUE: 5/5.  BLE weakness R>L.  Exam limited by effort and participation.    Skin: Skin is warm and dry. No rash noted. She is not diaphoretic.   Psychiatric: She has a normal mood and affect. Her behavior is normal. Cognition and memory are impaired.   Vitals reviewed.      Significant Labs:   CBC: No results for input(s): WBC, HGB, HCT, PLT in the last 48 hours.  CMP:   Recent Labs   Lab 19  0413 19  0312    138   K 4.2 3.8    101   CO2 29 26   * 294*   BUN 14 17   CREATININE 1.2 1.2   CALCIUM 9.6 9.8   ANIONGAP 9 11   EGFRNONAA 50.6* 50.6*       Significant Imaging: I have reviewed and interpreted all pertinent imaging results/findings within the past 24 hours.

## 2019-04-07 NOTE — PROGRESS NOTES
[facility] Hospital Medicine  Progress Note    Patient Name: Emily Martinez  MRN: [id number]  Patient Class: IP- Inpatient   Admission Date: 4/1/2019  Length of Stay: 6 days  Attending Physician: Kenton Vaughn MD  Primary Care Provider: Alireza Sosa MD    [facility] Medicine Team: Networked reference to record PCT  Sonja Kimble MD    Subjective:     Principal Problem:Cerebrovascular accident (CVA) due to embolism of left middle cerebral artery    HPI:  54 y.o. female with significant past medical history of DM, HTN, HLD presented to hospital with multiple medical complaints.  The patient has been having slurred speech, BLE weakness, and urinary incontinence since 3/23.  LE weakness became progressively worse with the patient having difficulty/inability to walk for the last 1-2 days.  MRI brain revealed acute infarctions in posterolateral thalamus, territory of PCA. No tPA due to the patient being outside of the treatment window.  No LVO, no intervention at this time. HM team was consulted because of high blood glucose level and co management.    Hospital Course:  No notes on file    Interval History: NAEON. Sugars better controlled today; had one BGL >400 yesterday PM, suspect due to dietary indiscretions. Counseled patient on reporting intake to nursing for proper coverage.     Review of Systems   Constitutional: Negative for fatigue and fever.   Respiratory: Negative for shortness of breath, wheezing and stridor.    Cardiovascular: Negative for chest pain, palpitations and leg swelling.   Gastrointestinal: Negative for abdominal pain, blood in stool, constipation, diarrhea, nausea and vomiting.   Genitourinary: Negative for difficulty urinating.   Skin: Negative for rash and wound.   Allergic/Immunologic: Negative for immunocompromised state.   Neurological: Positive for speech difficulty and weakness. Negative for light-headedness and headaches.     Objective:     Vital Signs (Most  Recent):  Temp: 99.1 °F (37.3 °C) (19 1225)  Pulse: 90 (19 1225)  Resp: 18 (19 1225)  BP: (!) 165/83 (19 1225)  SpO2: 96 % (19 1225) Vital Signs (24h Range):  Temp:  [98.3 °F (36.8 °C)-99.8 °F (37.7 °C)] 99.1 °F (37.3 °C)  Pulse:  [69-93] 90  Resp:  [18-20] 18  SpO2:  [96 %-98 %] 96 %  BP: (119-165)/(69-91) 165/83     Weight: 66.3 kg (146 lb 2.6 oz)  Body mass index is 25.89 kg/m².  No intake or output data in the 24 hours ending 19 1442   Physical Exam   Constitutional: She is oriented to person, place, and time. She appears well-developed and well-nourished. No distress.   HENT:   Head: Normocephalic and atraumatic.   Eyes: Right eye exhibits no discharge. Left eye exhibits no discharge. No scleral icterus.   Neck: Normal range of motion.   Cardiovascular: Normal rate, regular rhythm and intact distal pulses.   Pulmonary/Chest: Effort normal. No respiratory distress. She has no wheezes.   Abdominal: Soft. She exhibits no distension. There is no tenderness.   Musculoskeletal: Normal range of motion. She exhibits no edema.   Neurological: She is alert and oriented to person, place, and time.   -  Mental Status:  AAOx3.  Follows commands.  Answers correct age and .     -  Speech and language:  no aphasia, + dysarthria.    -  Vision:  no hemianopsia or ptosis.    -  Motor:  BUE: 5/5.  BLE weakness R>L.  Exam limited by effort and participation.    Skin: Skin is warm and dry. No rash noted. She is not diaphoretic.   Psychiatric: She has a normal mood and affect. Her behavior is normal. Cognition and memory are impaired.   Vitals reviewed.      Significant Labs:   CBC: No results for input(s): WBC, HGB, HCT, PLT in the last 48 hours.  CMP:   Recent Labs   Lab 19  0413 19  0312    138   K 4.2 3.8    101   CO2 29 26   * 294*   BUN 14 17   CREATININE 1.2 1.2   CALCIUM 9.6 9.8   ANIONGAP 9 11   EGFRNONAA 50.6* 50.6*       Significant Imaging: I have  reviewed and interpreted all pertinent imaging results/findings within the past 24 hours.    Assessment/Plan:      Uncontrolled type 2 diabetes mellitus with hyperglycemia  - Poor historian with poor medical literacy. Reported she takes either 26 or 36 u long acting insulin daily and no meal time insulin. But pt unsure and telling providers different numbers.  - HgA1c has consistently been >14, indicating poor control generally. Currently with no gap, normal bicarb level, normal pH 7.35.  - Started on insulin gtt with POCT glu Q1h. Was switched to detemir 18U BID and MDSSI on 4/2  - Started on diabetic diet given no hx of dysphagia or aspiration.  - increasing insulin regimen based on SS need. Increased to detemir 33 BID and aspart 17 TID w/meals.  - cont to monitor POCT glc  - Will need close follow up upon discharge with PCP/Endocrinologist for further titration of insulin needs. Would also benefit from Diabetes boot camp/education upon discharge.     Trichomonas infection  - Home meds list showed she is on Azithro and CTX. However, pt denies taking neither of them.  - s/p one dose of oral Azithro 1g on 4/2    Smoker  Risk factors for heart disease, stroke, cancer, pulmonary disease discussed with patient. Smoking cessation encouraged. Interested in quitting.   Ambulatory referral for smoking cessation on discharge.    Essential hypertension  Controlled on current medications. Continue Amlodipine, Olmersartan and HCTZ at current dosages.    VTE Risk Mitigation (From admission, onward)        Ordered     heparin (porcine) injection 5,000 Units  Every 8 hours      04/01/19 2311     IP VTE HIGH RISK PATIENT  Once      04/01/19 2311     Place sequential compression device  Until discontinued      04/01/19 2311              Sonja Kimble MD  Department of Hospital Medicine   [facility]

## 2019-04-07 NOTE — ASSESSMENT & PLAN NOTE
Risk factors for heart disease, stroke, cancer, pulmonary disease discussed with patient. Smoking cessation encouraged. Interested in quitting.   Ambulatory referral for smoking cessation on discharge.

## 2019-04-07 NOTE — PROGRESS NOTES
[facility]  Vascular Neurology  Comprehensive Stroke Center  Progress Note    Assessment/Plan:     * Cerebrovascular accident (CVA) due to embolism of left middle cerebral artery  54 y.o. female with significant past medical history of DM, HTN, HLD presented to hospital with multiple medical complaints.  The patient has been having slurred speech, BLE weakness, and urinary incontinence since 3/23.  LE weakness became progressively worse with the patient having difficulty/inability to walk for the last 1-2 days.  MRI brain revealed acute infarctions in posterolateral thalamus, territory of PCA.  No tPA due to the patient being outside of the treatment window.  No LVO, no intervention at this time.      Etiology unclear.  Pt has risk factors for small vessel disease as well as signs of chronic small vessel disease on imaging, but presentation of this infarct is inconsistent with an etiology of small vessel disease in terms of multiple non-adjacent areas of acute infarction.  Pattern more likely to be seen in patient with embolic etiology which can be associated with cocaine use. Not a candidate for ESUS study 2/2 exclusion criteria.    Antithrombotics for secondary stroke prevention: Antiplatelets: Aspirin: 81 mg daily  Statins for secondary stroke prevention and hyperlipidemia, if present:  Atorvastatin 40 mg daily  Aggressive risk factor modification: HTN, Smoking, DM, HLD, illicit substance use  Rehab efforts: PT/OT/SLP, PM&R consult --Inpatient Rehab, pending placement  Diagnostics ordered/pending: None  VTE prophylaxis: Heparin 5000 units SQ every 8 hours, SCDs  BP parameters: Infarct: SBP <180     Choreiform movement  -Localized to RUE > RLE. Largely resolved.  -Discussed with Dr. Eubanks, will monitor.  Can consider aripiprazole if interfering with PT/OT.    Weakness of both lower extremities  -BLE weakness, +urinary urgency, LBP reported per pt on admission  -Tylenol prn for pain for now,  stable and not requiring further prns  -MRI L spine w/o contrast--some R-sided disc protrusion L3-L4 with possible R L3 impingement  -Consulted PT/OT/PM&R, appreciate recs--recommending inpatient rehab, pending acceptance    Cocaine abuse  -Counseled on importance of cessation  -Community resource planning on discharge  -No consult to Addiction Psych as pt still denies cocaine use as an issue    Small vessel disease, cerebrovascular  -Pt with risk factors--HTN, HLD, poorly controlled DM II, cocaine abuse  -MRI findings of lacunes, multiple white matter hyperintensities, global atrophy  -No clear cerebral microbleeds on 2D echo planar imaging available  -Mgmt per above--ASA, atorvastatin, BP control, DM II control, cocaine abstinence, diet/exercise recs    Mixed hyperlipidemia  -Stroke risk factor.  Last LDL 08/09/18 was 118.  Current LDL pending--reordered w/ am labs 04/07/19.  -Patient prescribed rosuvastatin, non-compliant.    -Continue atorvastatin 40 mg daily.    Smoker  -Stroke risk factor.  Patient endorses smoking 1.5 ppd x 30 yrs--45 pkyr hx  -Multiple medications prescribed for smoking cessation that pt has not taken.  -Continue to encourage cessation  -Nicotine patch prn    Essential hypertension  -Stroke risk factor.  SBP < 180.  -Patient on amlodipine, valsartan, HCTZ at home  -Continue olmesartan 40 mg qd  -Continue amlodipine 10 mg qd   -SBP 170s-180s 04/06/19 am, increased HCTZ to 50 mg qd with improved BP    Uncontrolled type 2 diabetes mellitus with hyperglycemia  -Stroke risk factor. Patient reports glucose at home has been >500.  -DKA confirmed on admission, now with hyperglycemia without anion gap or ketosis   -Hospital medicine consulted--detemir 31 U bid, aspart 15 U tidwm started    Trichomonas infection  -UA + for trichomonas.   -Pt received metronidazole 2 g x 1 dose, IM then treated with Azithro 1 g x 1 dose  -Discussed with patient importance of informing partners, not naming any to  contact    Diabetic ketoacidosis without coma associated with type 2 diabetes mellitus  -BHB 4.1 on admission  -DKA resolved, IM 6 assisting with BG management    Insomnia  -Pt states that she takes diazepam 10 mg pill nightly to sleep at home  -Will not prescribe due to habit-forming risk in pt with illicit substance use  -Improved sleep on ramelteon 8 mg qHS, will continue as scheduled medication    Numbness and tingling of right lower extremity  -Pt has subjective numbness in R anterior medial thigh, ~ L2-L3 dermatome  -Discussed symptoms likely corresponding to finding on recent MRI lumbar spine w/ R L3 compression  -Not painful currently    04/01/19:  Admission to [facility].  UDS + for cocaine, +DKA, presents with sxs of dysarthria, BLE weakness.    04/02/19:  DKA ongoing, insulin gtt started.  Will get IM consult ordered o/n.  04/03/19:  Mild R pronator drift on exam. BG > 400, IM adjusting insulin. Restarted BP medications.   04/04/19:  Choreiform movements improving, asking movement disorder MD for input.  Plan for inpatient rehab.  04/05/19:  Pending inpatient rehab placement.  Concern for embolic infarcts, may consider ESUS study enrollment.  04/06/19:  Not a candidate for ESUS 2/2 cocaine.  Still working on BG control, discussed with Hospital Medicine team.  04/07/19:  Patient stable this am, pending inpatient rehab placement.  BG still upper 200s, IM titrating insulin.    STROKE DOCUMENTATION      NIH Scale:  1a. Level of Consciousness: 0-->Alert, keenly responsive  1b. LOC Questions: 0-->Answers both questions correctly  1c. LOC Commands: 0-->Performs both tasks correctly  2. Best Gaze: 0-->Normal  3. Visual: 0-->No visual loss  4. Facial Palsy: 0-->Normal symmetrical movements  5a. Motor Arm, Left: 0-->No drift, limb holds 90 (or 45) degrees for full 10 secs  5b. Motor Arm, Right: 0-->No drift, limb holds 90 (or 45) degrees for full 10 secs  6a. Motor Leg, Left: 0-->No drift, leg holds 30 degree position for  full 5 secs  6b. Motor Leg, Right: 1-->Drift, leg falls by the end of the 5-sec period but does not hit bed  7. Limb Ataxia: 0-->Absent  8. Sensory: 0-->Normal, no sensory loss  9. Best Language: 0-->No aphasia, normal  10. Dysarthria: 0-->Normal  11. Extinction and Inattention (formerly Neglect): 0-->No abnormality  Total (NIH Stroke Scale): 1     Modified Crete Score: 1  Doyle Coma Scale:15   ABCD2 Score:    [id number] Score:   HAS -BLED Score:   ICH Score:   Hunt & Mueller Classification:      Hemorrhagic change of an Ischemic Stroke: Does this patient have an ischemic stroke with hemorrhagic changes? No     Neurologic Chief Complaint: Dysarthria, BLE weakness    Subjective:     Interval History: Patient is seen for follow-up neurological assessment and treatment recommendations:   Patient did have improved sleep last night with taking the ramelteon, is in good spirits this am.  Has new concern of some numbness over the medial R thigh, seems to be primarily L3 distribution.  We discussed that it seemed to correlate to the findings of possible pinched nerve on the MRI lumbar spine that was done.  She also notes spending most of the day in bed with hips flexed, knees flexed and has been having more back/hip pain that she thinks is due to positioning in bed as well as not being able to take ibuprofen--states she was taking 800 mg ibuprofen tablets a few times per day.  Discussed possible placement with inpatient rehab tomorrow if accepted, pt eager to go and knows that if she does really well and shows a lot of progress they will likely let her go home earlier.    HPI, Past Medical, Family, and Social History remains the same as documented in the initial encounter.  Review of Systems   Constitutional: Negative for chills and fever.   HENT: Negative for rhinorrhea and sneezing.    Eyes: Negative for photophobia and visual disturbance.   Respiratory: Negative for cough and shortness of breath.    Gastrointestinal:  Negative for nausea and vomiting.   Genitourinary: Positive for urgency. Negative for frequency.   Musculoskeletal: Positive for arthralgias, gait problem and myalgias.   Skin: Negative for rash and wound.   Neurological: Positive for speech difficulty, weakness and numbness (R anterior medial thigh).   Hematological: Negative for adenopathy. Does not bruise/bleed easily.   Psychiatric/Behavioral: Negative for agitation and confusion.     Scheduled Meds:   amLODIPine  10 mg Oral Daily    aspirin  81 mg Oral Daily    atorvastatin  40 mg Oral Daily    heparin (porcine)  5,000 Units Subcutaneous Q8H    hydroCHLOROthiazide  50 mg Oral Daily    insulin aspart U-100  15 Units Subcutaneous TIDWM    insulin detemir U-100  31 Units Subcutaneous BID    nicotine  1 patch Transdermal Daily    olmesartan  40 mg Oral Daily    pantoprazole  40 mg Oral Daily    ramelteon  8 mg Oral QHS     Continuous Infusions:   sodium chloride 0.9%       PRN Meds:acetaminophen, albuterol-ipratropium, dextrose 10 % in water (D10W), dextrose 10 % in water (D10W), dextrose 50%, dextrose 50%, glucagon (human recombinant), glucose, glucose, hydrALAZINE, insulin aspart U-100, ondansetron, ondansetron, sodium chloride 0.9%, sodium chloride 0.9%    Objective:     Vital Signs (Most Recent):  Temp: 98.4 °F (36.9 °C) (04/07/19 0458)  Pulse: 84 (04/07/19 0830)  Resp: 20 (04/07/19 0830)  BP: 125/74 (04/07/19 0830)  SpO2: 98 % (04/07/19 0830)  BP Location: Left arm    Vital Signs Range (Last 24H):  Temp:  [98.3 °F (36.8 °C)-99.8 °F (37.7 °C)]   Pulse:  [69-93]   Resp:  [18-20]   BP: (119-151)/(69-91)   SpO2:  [95 %-98 %]   BP Location: Left arm    Physical Exam   Constitutional: She is oriented to person, place, and time. She appears well-developed and well-nourished.   HENT:   Head: Normocephalic and atraumatic.   Mouth/Throat: Oropharynx is clear and moist. No oropharyngeal exudate.   Eyes: Pupils are equal, round, and reactive to light.  Conjunctivae and EOM are normal.   Neck: Normal range of motion. Neck supple.   Cardiovascular: Intact distal pulses.   Pulmonary/Chest: Effort normal and breath sounds normal. No respiratory distress.   Abdominal: Soft. She exhibits no distension.   Musculoskeletal: She exhibits no edema or tenderness.   Neurological: She is alert and oriented to person, place, and time. No cranial nerve deficit or sensory deficit. She exhibits normal muscle tone.   Choreiform movements seem to be resolved.  Some dysarthria persisting, may be close to patient's baseline.  RLE anterior medial subjective numbness/tingling in L2-L3 distribution.   Skin: Skin is warm and dry. No erythema.     Neurological Exam:   LOC: alert  Attention Span: Good   Language: No aphasia  Articulation: Dysarthria  Orientation: Person, Place, Time   Visual Fields: Full  EOM (CN III, IV, VI): Full/intact  Pupils (CN II, III): PERRL  Facial Sensation (CN V): Normal  Facial Movement (CN VII): Symmetric facial expression    Motor: Arm left  Normal 5/5  Leg left  Normal 5/5  Arm right  Normal 5/5  Leg right Paresis: 4/5  Cebellar: No evidence of appendicular or axial ataxia  Sensation: Tactile extinction to bilateral simultaneous stimulation   Tone: Normal tone throughout    Laboratory:  CMP:   Recent Labs   Lab 04/07/19  0312   CALCIUM 9.8      K 3.8   CO2 26      BUN 17   CREATININE 1.2     CBC:   Recent Labs   Lab 04/03/19  0422   WBC 8.45   RBC 4.69   HGB 13.1   HCT 39.2      MCV 84   MCH 27.9   MCHC 33.4     Lipid Panel: No results for input(s): CHOL, LDLCALC, HDL, TRIG in the last 168 hours.  Coagulation:   Recent Labs   Lab 04/02/19  0310   INR 0.9   APTT <21.0     Hgb A1C:   Recent Labs   Lab 04/02/19  0042   HGBA1C >14.0*  >14.0*     TSH:   Recent Labs   Lab 04/02/19  0042   TSH 0.988     Diagnostic Results   Brain Imaging   04/01/19 MRI Brain w/ w/o contrast:  Advanced involutional change.  Acute infarcts left putamen,  posterolateral left thalamus and left corona radiata and deep white matter adjacent to the left ventricular trigone.  Remote lacunar infarcts left cerebellum, right thalamus, right external capsule, right corona radiata, bilateral basal ganglia and bilateral deep frontal white matter.  Supratentorial and pontine white matter T2/flair hyperintense signal foci suggesting sequela of chronic small vessel ischemic change.  Left sphenoid sinus disease.           04/01/19 CT head w/o contrast:  No acute large vascular territory infarct or intracranial hemorrhage identified.  Further evaluation/follow-up as warranted.  Periventricular white matter hypoattenuation, likely sequela of chronic small vessel ischemic change.  Few more focal areas of hypoattenuation at the right basal ganglia, left internal capsule and left caudate suggesting age-indeterminate lacunar type infarcts.  Correlate clinically.    Vessel Imaging   04/02/19 CTA head, neck:  CTA:  No hemodynamically significant stenosis or large vessel occlusion identified.    Cardiac Imaging   04/02/19 TTE Complete:  · Normal left ventricular systolic function. The estimated ejection fraction is 68%  · Concentric left ventricular remodeling.  · Normal LV diastolic function.  · No wall motion abnormalities.  · Normal right ventricular systolic function.  · Mild mitral sclerosis.  · Normal central venous pressure (3 mm Hg).  · The estimated PA systolic pressure is 15 mm Hg    Delmi Corona MD  Comprehensive Stroke Center  Department of Vascular Neurology   [facility]

## 2019-04-07 NOTE — ASSESSMENT & PLAN NOTE
-Stroke risk factor. Patient reports glucose at home has been >500.  -DKA confirmed on admission, now with hyperglycemia without anion gap or ketosis   -Hospital medicine consulted--detemir 31 U bid, aspart 15 U tidwm started

## 2019-04-07 NOTE — ASSESSMENT & PLAN NOTE
-BLE weakness, +urinary urgency, LBP reported per pt on admission  -Tylenol prn for pain for now, stable and not requiring further prns  -MRI L spine w/o contrast--some R-sided disc protrusion L3-L4 with possible R L3 impingement  -Consulted PT/OT/PM&R, appreciate recs--recommending inpatient rehab, pending acceptance

## 2019-04-07 NOTE — ASSESSMENT & PLAN NOTE
- Poor historian with poor medical literacy. Reported she takes either 26 or 36 u long acting insulin daily and no meal time insulin. But pt unsure and telling providers different numbers.  - HgA1c has consistently been >14, indicating poor control generally. Currently with no gap, normal bicarb level, normal pH 7.35.  - Started on insulin gtt with POCT glu Q1h. Was switched to detemir 18U BID and MDSSI on 4/2  - Started on diabetic diet given no hx of dysphagia or aspiration.  - increasing insulin regimen based on SS need. Increased to detemir 33 BID and aspart 17 TID w/meals.  - cont to monitor POCT glc  - Will need close follow up upon discharge with PCP/Endocrinologist for further titration of insulin needs. Would also benefit from Diabetes boot camp/education upon discharge.

## 2019-04-07 NOTE — SUBJECTIVE & OBJECTIVE
Neurologic Chief Complaint: Dysarthria, BLE weakness    Subjective:     Interval History: Patient is seen for follow-up neurological assessment and treatment recommendations:   Patient did have improved sleep last night with taking the ramelteon, is in good spirits this am.  Has new concern of some numbness over the medial R thigh, seems to be primarily L3 distribution.  We discussed that it seemed to correlate to the findings of possible pinched nerve on the MRI lumbar spine that was done.  She also notes spending most of the day in bed with hips flexed, knees flexed and has been having more back/hip pain that she thinks is due to positioning in bed as well as not being able to take ibuprofen--states she was taking 800 mg ibuprofen tablets a few times per day.  Discussed possible placement with inpatient rehab tomorrow if accepted, pt eager to go and knows that if she does really well and shows a lot of progress they will likely let her go home earlier.    HPI, Past Medical, Family, and Social History remains the same as documented in the initial encounter.  Review of Systems   Constitutional: Negative for chills and fever.   HENT: Negative for rhinorrhea and sneezing.    Eyes: Negative for photophobia and visual disturbance.   Respiratory: Negative for cough and shortness of breath.    Gastrointestinal: Negative for nausea and vomiting.   Genitourinary: Positive for urgency. Negative for frequency.   Musculoskeletal: Positive for arthralgias, gait problem and myalgias.   Skin: Negative for rash and wound.   Neurological: Positive for speech difficulty, weakness and numbness (R anterior medial thigh).   Hematological: Negative for adenopathy. Does not bruise/bleed easily.   Psychiatric/Behavioral: Negative for agitation and confusion.     Scheduled Meds:   amLODIPine  10 mg Oral Daily    aspirin  81 mg Oral Daily    atorvastatin  40 mg Oral Daily    heparin (porcine)  5,000 Units Subcutaneous Q8H     hydroCHLOROthiazide  50 mg Oral Daily    insulin aspart U-100  15 Units Subcutaneous TIDWM    insulin detemir U-100  31 Units Subcutaneous BID    nicotine  1 patch Transdermal Daily    olmesartan  40 mg Oral Daily    pantoprazole  40 mg Oral Daily    ramelteon  8 mg Oral QHS     Continuous Infusions:   sodium chloride 0.9%       PRN Meds:acetaminophen, albuterol-ipratropium, dextrose 10 % in water (D10W), dextrose 10 % in water (D10W), dextrose 50%, dextrose 50%, glucagon (human recombinant), glucose, glucose, hydrALAZINE, insulin aspart U-100, ondansetron, ondansetron, sodium chloride 0.9%, sodium chloride 0.9%    Objective:     Vital Signs (Most Recent):  Temp: 98.4 °F (36.9 °C) (04/07/19 0458)  Pulse: 84 (04/07/19 0830)  Resp: 20 (04/07/19 0830)  BP: 125/74 (04/07/19 0830)  SpO2: 98 % (04/07/19 0830)  BP Location: Left arm    Vital Signs Range (Last 24H):  Temp:  [98.3 °F (36.8 °C)-99.8 °F (37.7 °C)]   Pulse:  [69-93]   Resp:  [18-20]   BP: (119-151)/(69-91)   SpO2:  [95 %-98 %]   BP Location: Left arm    Physical Exam   Constitutional: She is oriented to person, place, and time. She appears well-developed and well-nourished.   HENT:   Head: Normocephalic and atraumatic.   Mouth/Throat: Oropharynx is clear and moist. No oropharyngeal exudate.   Eyes: Pupils are equal, round, and reactive to light. Conjunctivae and EOM are normal.   Neck: Normal range of motion. Neck supple.   Cardiovascular: Intact distal pulses.   Pulmonary/Chest: Effort normal and breath sounds normal. No respiratory distress.   Abdominal: Soft. She exhibits no distension.   Musculoskeletal: She exhibits no edema or tenderness.   Neurological: She is alert and oriented to person, place, and time. No cranial nerve deficit or sensory deficit. She exhibits normal muscle tone.   Choreiform movements seem to be resolved.  Some dysarthria persisting, may be close to patient's baseline.  RLE anterior medial subjective numbness/tingling in L2-L3  distribution.   Skin: Skin is warm and dry. No erythema.     Neurological Exam:   LOC: alert  Attention Span: Good   Language: No aphasia  Articulation: Dysarthria  Orientation: Person, Place, Time   Visual Fields: Full  EOM (CN III, IV, VI): Full/intact  Pupils (CN II, III): PERRL  Facial Sensation (CN V): Normal  Facial Movement (CN VII): Symmetric facial expression    Motor: Arm left  Normal 5/5  Leg left  Normal 5/5  Arm right  Normal 5/5  Leg right Paresis: 4/5  Cebellar: No evidence of appendicular or axial ataxia  Sensation: Tactile extinction to bilateral simultaneous stimulation   Tone: Normal tone throughout    Laboratory:  CMP:   Recent Labs   Lab 04/07/19  0312   CALCIUM 9.8      K 3.8   CO2 26      BUN 17   CREATININE 1.2     CBC:   Recent Labs   Lab 04/03/19  0422   WBC 8.45   RBC 4.69   HGB 13.1   HCT 39.2      MCV 84   MCH 27.9   MCHC 33.4     Lipid Panel: No results for input(s): CHOL, LDLCALC, HDL, TRIG in the last 168 hours.  Coagulation:   Recent Labs   Lab 04/02/19  0310   INR 0.9   APTT <21.0     Hgb A1C:   Recent Labs   Lab 04/02/19  0042   HGBA1C >14.0*  >14.0*     TSH:   Recent Labs   Lab 04/02/19  0042   TSH 0.988     Diagnostic Results   Brain Imaging   04/01/19 MRI Brain w/ w/o contrast:  Advanced involutional change.  Acute infarcts left putamen, posterolateral left thalamus and left corona radiata and deep white matter adjacent to the left ventricular trigone.  Remote lacunar infarcts left cerebellum, right thalamus, right external capsule, right corona radiata, bilateral basal ganglia and bilateral deep frontal white matter.  Supratentorial and pontine white matter T2/flair hyperintense signal foci suggesting sequela of chronic small vessel ischemic change.  Left sphenoid sinus disease.           04/01/19 CT head w/o contrast:  No acute large vascular territory infarct or intracranial hemorrhage identified.  Further evaluation/follow-up as warranted.   Periventricular white matter hypoattenuation, likely sequela of chronic small vessel ischemic change.  Few more focal areas of hypoattenuation at the right basal ganglia, left internal capsule and left caudate suggesting age-indeterminate lacunar type infarcts.  Correlate clinically.    Vessel Imaging   04/02/19 CTA head, neck:  CTA:  No hemodynamically significant stenosis or large vessel occlusion identified.    Cardiac Imaging   04/02/19 TTE Complete:  · Normal left ventricular systolic function. The estimated ejection fraction is 68%  · Concentric left ventricular remodeling.  · Normal LV diastolic function.  · No wall motion abnormalities.  · Normal right ventricular systolic function.  · Mild mitral sclerosis.  · Normal central venous pressure (3 mm Hg).  · The estimated PA systolic pressure is 15 mm Hg

## 2019-04-07 NOTE — PLAN OF CARE
Problem: Adult Inpatient Plan of Care  Goal: Plan of Care Review  Outcome: Ongoing (interventions implemented as appropriate)  Pt Alert and awake. Pt resp even and unlabored with no signs of distress noted at this time. Pt received a shower by pct. Blood sugars were elevated during the night scheduled and prn insulin giving. Pt transferred X1 assist to bedside commode. Bed alarm set, bed at lowest position, will continue to monitor.

## 2019-04-07 NOTE — ASSESSMENT & PLAN NOTE
-Localized to RUE > RLE. Largely resolved.  -Discussed with Dr. Eubanks, will monitor.  Can consider aripiprazole if interfering with PT/OT.

## 2019-04-07 NOTE — ASSESSMENT & PLAN NOTE
-Stroke risk factor.  SBP < 180.  -Patient on amlodipine, valsartan, HCTZ at home  -Continue olmesartan 40 mg qd  -Continue amlodipine 10 mg qd   -SBP 170s-180s 04/06/19 am, increased HCTZ to 50 mg qd with improved BP

## 2019-04-07 NOTE — ASSESSMENT & PLAN NOTE
-Pt has subjective numbness in R anterior medial thigh, ~ L2-L3 dermatome  -Discussed symptoms likely corresponding to finding on recent MRI lumbar spine w/ R L3 compression  -Not painful currently

## 2019-04-08 LAB
LDLC SERPL-MCNC: 128 MG/DL
POCT GLUCOSE: 137 MG/DL (ref 70–110)
POCT GLUCOSE: 149 MG/DL (ref 70–110)
POCT GLUCOSE: 189 MG/DL (ref 70–110)
POCT GLUCOSE: 232 MG/DL (ref 70–110)
POCT GLUCOSE: 300 MG/DL (ref 70–110)
POCT GLUCOSE: <20 MG/DL (ref 70–110)

## 2019-04-08 PROCEDURE — 99233 PR SUBSEQUENT HOSPITAL CARE,LEVL III: ICD-10-PCS | Mod: ,,, | Performed by: PSYCHIATRY & NEUROLOGY

## 2019-04-08 PROCEDURE — 99232 SBSQ HOSP IP/OBS MODERATE 35: CPT | Mod: ,,, | Performed by: HOSPITALIST

## 2019-04-08 PROCEDURE — 99232 PR SUBSEQUENT HOSPITAL CARE,LEVL II: ICD-10-PCS | Mod: ,,, | Performed by: HOSPITALIST

## 2019-04-08 PROCEDURE — S4991 NICOTINE PATCH NONLEGEND: HCPCS | Performed by: NURSE PRACTITIONER

## 2019-04-08 PROCEDURE — 97535 SELF CARE MNGMENT TRAINING: CPT

## 2019-04-08 PROCEDURE — 25000003 PHARM REV CODE 250: Performed by: NURSE PRACTITIONER

## 2019-04-08 PROCEDURE — 25000003 PHARM REV CODE 250: Performed by: STUDENT IN AN ORGANIZED HEALTH CARE EDUCATION/TRAINING PROGRAM

## 2019-04-08 PROCEDURE — 20600001 HC STEP DOWN PRIVATE ROOM

## 2019-04-08 PROCEDURE — 97116 GAIT TRAINING THERAPY: CPT

## 2019-04-08 PROCEDURE — 63600175 PHARM REV CODE 636 W HCPCS: Performed by: NURSE PRACTITIONER

## 2019-04-08 PROCEDURE — 99233 SBSQ HOSP IP/OBS HIGH 50: CPT | Mod: ,,, | Performed by: PSYCHIATRY & NEUROLOGY

## 2019-04-08 RX ORDER — INSULIN ASPART 100 [IU]/ML
19 INJECTION, SOLUTION INTRAVENOUS; SUBCUTANEOUS
Status: DISCONTINUED | OUTPATIENT
Start: 2019-04-08 | End: 2019-04-09

## 2019-04-08 RX ADMIN — INSULIN ASPART 19 UNITS: 100 INJECTION, SOLUTION INTRAVENOUS; SUBCUTANEOUS at 04:04

## 2019-04-08 RX ADMIN — RAMELTEON 8 MG: 8 TABLET, FILM COATED ORAL at 08:04

## 2019-04-08 RX ADMIN — HYDROCHLOROTHIAZIDE 50 MG: 25 TABLET ORAL at 09:04

## 2019-04-08 RX ADMIN — ASPIRIN 81 MG: 81 TABLET, COATED ORAL at 09:04

## 2019-04-08 RX ADMIN — DICLOFENAC: 10 GEL TOPICAL at 09:04

## 2019-04-08 RX ADMIN — INSULIN ASPART 17 UNITS: 100 INJECTION, SOLUTION INTRAVENOUS; SUBCUTANEOUS at 09:04

## 2019-04-08 RX ADMIN — HEPARIN SODIUM 5000 UNITS: 5000 INJECTION, SOLUTION INTRAVENOUS; SUBCUTANEOUS at 10:04

## 2019-04-08 RX ADMIN — INSULIN ASPART 4 UNITS: 100 INJECTION, SOLUTION INTRAVENOUS; SUBCUTANEOUS at 12:04

## 2019-04-08 RX ADMIN — HEPARIN SODIUM 5000 UNITS: 5000 INJECTION, SOLUTION INTRAVENOUS; SUBCUTANEOUS at 01:04

## 2019-04-08 RX ADMIN — INSULIN ASPART 2 UNITS: 100 INJECTION, SOLUTION INTRAVENOUS; SUBCUTANEOUS at 09:04

## 2019-04-08 RX ADMIN — AMLODIPINE BESYLATE 10 MG: 10 TABLET ORAL at 09:04

## 2019-04-08 RX ADMIN — OLMESARTAN MEDOXOMIL 40 MG: 20 TABLET, FILM COATED ORAL at 09:04

## 2019-04-08 RX ADMIN — ATORVASTATIN CALCIUM 40 MG: 20 TABLET, FILM COATED ORAL at 09:04

## 2019-04-08 RX ADMIN — DICLOFENAC: 10 GEL TOPICAL at 12:04

## 2019-04-08 RX ADMIN — PANTOPRAZOLE SODIUM 40 MG: 40 TABLET, DELAYED RELEASE ORAL at 09:04

## 2019-04-08 RX ADMIN — DICLOFENAC: 10 GEL TOPICAL at 04:04

## 2019-04-08 RX ADMIN — HEPARIN SODIUM 5000 UNITS: 5000 INJECTION, SOLUTION INTRAVENOUS; SUBCUTANEOUS at 05:04

## 2019-04-08 RX ADMIN — INSULIN ASPART 17 UNITS: 100 INJECTION, SOLUTION INTRAVENOUS; SUBCUTANEOUS at 12:04

## 2019-04-08 RX ADMIN — DICLOFENAC: 10 GEL TOPICAL at 08:04

## 2019-04-08 RX ADMIN — NICOTINE 1 PATCH: 21 PATCH, EXTENDED RELEASE TRANSDERMAL at 09:04

## 2019-04-08 NOTE — SIGNIFICANT EVENT
Called by RN regarding patient having unapproved food in her room.  This has been happening when family is visiting.    Patient found to have [employer] candy, chocolate chip cookie, and pizza from restaurant in room.  Family has been bringing her food and she has been asking for specific things that she states she is aware are not part of her diabetic diet.  Counseled on risks of taking in excess sugar, in particular without making primary team aware.  She agrees to let us lock the candy and cookie in the charge RN room and put her pizza in the fridge for her to take on discharge.  She takes 3 miniature [employer] to have for tonight.  Discussed that patient can worsen her arteries and increase her risk for stroke by continuing to disregard diabetic diet, also that she may go back into DKA.  Patient states she is aware of risks and is agreeing to let us take some of the food away but states that she is keeping some.  She understands that this is her right, but she is doing so at her own risk.  Patient then states that she does not want any blood taken tonight, to which I responded that there would be her normal labs and blood glucose checks to ensure she is getting the proper amount of insulin and not having dangerous blood sugar levels.    Will alert IM team 6 that has been managing insulin in this patient to her non-compliance.  Will continue to provide education on risks of not controlling blood sugar. Pt is high risk for non-compliance on discharge.  Discussion witnessed by charge RN Ely.  Appreciate nursing staff's vigilance and alerting team to this issue.    Delmi Corona MD  PGY3, Neurology  Pager:  [phone]

## 2019-04-08 NOTE — PT/OT/SLP PROGRESS
"Occupational Therapy   Treatment    Name: Emily Martinez  MRN: [id number]  Admitting Diagnosis:  Cerebrovascular accident (CVA) due to embolism of left middle cerebral artery       Recommendations:     Discharge Recommendations: rehabilitation facility  Discharge Equipment Recommendations:  shower chair  Barriers to discharge:  Inaccessible home environment, Decreased caregiver support    Assessment:     Emily Martinez is a 54 y.o. female with a medical diagnosis of Cerebrovascular accident (CVA) due to embolism of left middle cerebral artery.  She presents with performance deficits affecting function are weakness, impaired self care skills, impaired endurance, impaired functional mobilty, gait instability, impaired cognition, decreased lower extremity function, decreased upper extremity function, impaired balance, decreased coordination, decreased safety awareness.     Rehab Prognosis:  Good; patient would benefit from acute skilled OT services to address these deficits and reach maximum level of function.       Plan:     Patient to be seen 4 x/week to address the above listed problems via self-care/home management, neuromuscular re-education, cognitive retraining, therapeutic activities, sensory integration, therapeutic exercises  · Plan of Care Expires: 04/30/19  · Plan of Care Reviewed with: patient    Subjective   Patient:  "You came at just the right time.  I was able to call the nurse because I need to go to the bathroom.""[facility] am [facility]."  Pain/Comfort:  · Pain Rating 1: 0/10  · Pain Rating Post-Intervention 1: 0/10    Objective:     Communicated with: Nurse prior to session.  Patient found supine with telemetry, bed alarm(Ava[employer] camera monitoring) upon OT entry to room.  Family not present.    General Precautions: Standard, aspiration, fall   Orthopedic Precautions:N/A   Braces: N/A     Occupational Performance:     Bed Mobility:    · Patient completed Rolling/Turning to " Left with  supervision  · Patient completed Scooting/Bridging with supervision  · Patient completed Supine to Sit with stand by assistance  · Patient completed Sit to Supine with stand by assistance     Functional Mobility/Transfers:  · Patient completed Sit <> Stand Transfer with contact guard assistance  with  no assistive device   · Patient completed Bed <> Chair Transfer using Stand Pivot technique with minimum assistance with no assistive device    Activities of Daily Living:  · Grooming: contact guard assistance while standing  · Upper Body Dressing: minimum assistance while standing to gather clothing  · Lower Body Dressing: minimum assistance    · Toileting: minimum assistance with use of 3 in1 commode      [facility] 6 Click ADL: 18    Treatment & Education:  Patient education provided for stroke warning signs, prevention guidelines and personal risk factors.Patient education provided on role of OT and need for rehab upon discharge.    Continued education, patient/ family training recommended.  Patient's functional status and disposition recommendation discussed with stroke team in daily rounds.  White board updated in patient's room.  OT asked if there were any other questions; patient had no further questions.     Patient left supine with all lines intact, call button in reach and bed alarm onEducation:      GOALS:   Multidisciplinary Problems     Occupational Therapy Goals        Problem: Occupational Therapy Goal    Goal Priority Disciplines Outcome Interventions   Occupational Therapy Goal     OT, PT/OT Ongoing (interventions implemented as appropriate)    Description:  Goals revised 4/8 to be addressed in 7 days with goals expiring 4/15:     Patient will increase functional independence with ADLs by performing:    UE Dressing with Stand-by Assistance.  LE Dressing with SBA.  Grooming while standing at sink with SBA.  Toileting from toilet with SBA for hygiene and clothing management.   Sitting at edge of  bed x10 minutes with Stand-by Assistance.  Toilet transfer to toilet with SBA.  Increased functional strength to WFL for ADLs.  Pt will follow 5/5 two-step commands to complete ADL task.                         Time Tracking:     OT Date of Treatment: 04/08/19  OT Start Time: 0615  OT Stop Time: 0638  OT Total Time (min): 23 min    Billable Minutes:Self Care/Home Management 23    FLAKITO Thao  4/8/2019

## 2019-04-08 NOTE — NURSING
1954 pt was helped to bedside commode. Pt family members were at bedside they left shortly after. After leaving snacks and a plate of food cooked from home for the pt was left at bedside. I educated pt that those snacks( per's. cheeto puffs, plate of corn bread and ribs, pizza, choclate chip cookies should not be eaten due to elevated blood sugars. Pt states that her sugars were in the 200s on today and that was good due to it usually being in the 400s.     2121 blood sugar level- 300. Pt was covered with 3 units of aspart and scheduled dose of levemir of 33 units. Doctor notified 2149 of blood sugar amount and of snacks brought in by family members at bedside.

## 2019-04-08 NOTE — PLAN OF CARE
KEITH spoke to Lo at  Rehab-they have accepted patient and are submitting insurance auth today.      04/08/19 1041   Post-Acute Status   Post-Acute Authorization Placement   Post-Acute Placement Status Pending Payor Review

## 2019-04-08 NOTE — PLAN OF CARE
Left message for Pooja at  Rehab to see if patient has been accepted. Awaiting return call.     04/08/19 0850   Post-Acute Status   Post-Acute Authorization Placement   Post-Acute Placement Status Pending Post-Acute Medical Review

## 2019-04-08 NOTE — PROGRESS NOTES
[facility] Hospital Medicine  Progress Note    Patient Name: Emily Martinez  MRN: [id number]  Patient Class: IP- Inpatient   Admission Date: 4/1/2019  Length of Stay: 7 days  Attending Physician: Kenton Vaughn MD  Primary Care Provider: Alireza Sosa MD    [facility] Medicine Team: Networked reference to record PCT  Solomon Hawk MD    Subjective:     Principal Problem:Cerebrovascular accident (CVA) due to embolism of left middle cerebral artery    HPI:  54 y.o. female with significant past medical history of DM, HTN, HLD presented to hospital with multiple medical complaints.  The patient has been having slurred speech, BLE weakness, and urinary incontinence since 3/23.  LE weakness became progressively worse with the patient having difficulty/inability to walk for the last 1-2 days.  MRI brain revealed acute infarctions in posterolateral thalamus, territory of PCA. No tPA due to the patient being outside of the treatment window.  No LVO, no intervention at this time. HM team was consulted because of high blood glucose level and co management.    Hospital Course:  No notes on file    Interval History: Patient noted to be eating candy, pizza, and snacks overnight and was advised that sugars would not be controlled with dietary indiscretion. BG was 300 last night and 189 this morning. Patient is alert and oriented but sleepy. She answers questions appropriately    Review of Systems   Constitutional: Negative for fatigue and fever.   Respiratory: Negative for shortness of breath, wheezing and stridor.    Cardiovascular: Negative for chest pain, palpitations and leg swelling.   Gastrointestinal: Negative for abdominal pain, blood in stool, constipation, diarrhea, nausea and vomiting.   Genitourinary: Negative for difficulty urinating.   Skin: Negative for rash and wound.   Allergic/Immunologic: Negative for immunocompromised state.   Neurological: Positive for speech difficulty and weakness.  Negative for light-headedness and headaches.     Objective:     Vital Signs (Most Recent):  Temp: 97.8 °F (36.6 °C) (19 07)  Pulse: 79 (19)  Resp: 14 (19)  BP: 127/74 (19)  SpO2: 97 % (19) Vital Signs (24h Range):  Temp:  [97.8 °F (36.6 °C)-99.4 °F (37.4 °C)] 97.8 °F (36.6 °C)  Pulse:  [] 79  Resp:  [14-18] 14  SpO2:  [95 %-99 %] 97 %  BP: (106-165)/(70-87) 127/74     Weight: 66.3 kg (146 lb 2.6 oz)  Body mass index is 25.89 kg/m².  No intake or output data in the 24 hours ending 19 1046   Physical Exam   Constitutional: She is oriented to person, place, and time. She appears well-developed and well-nourished. No distress.   HENT:   Head: Normocephalic and atraumatic.   Eyes: Right eye exhibits no discharge. Left eye exhibits no discharge. No scleral icterus.   Neck: Normal range of motion.   Cardiovascular: Normal rate, regular rhythm and intact distal pulses.   Pulmonary/Chest: Effort normal. No respiratory distress. She has no wheezes.   Abdominal: Soft. She exhibits no distension. There is no tenderness.   Musculoskeletal: Normal range of motion. She exhibits no edema.   Neurological: She is alert and oriented to person, place, and time.   -  Mental Status:  AAOx3.  Follows commands.  Answers correct age and .     -  Speech and language:  no aphasia, + dysarthria.    -  Vision:  no hemianopsia or ptosis.    -  Motor:  BUE: 5/5.  BLE weakness R>L.  Exam limited by effort and participation.    Skin: Skin is warm and dry. No rash noted. She is not diaphoretic.   Psychiatric: She has a normal mood and affect. Her behavior is normal. Cognition and memory are impaired.   Vitals reviewed.      Significant Labs: All pertinent labs within the past 24 hours have been reviewed.    Significant Imaging: I have reviewed all pertinent imaging results/findings within the past 24 hours.    Assessment/Plan:      Trichomonas infection  - Home meds list showed she  is on Azithro and CTX. However, pt denies taking neither of them.  - s/p one dose of oral Azithro 1g on 4/2    Smoker  Risk factors for heart disease, stroke, cancer, pulmonary disease discussed with patient. Smoking cessation encouraged. Interested in quitting.   Ambulatory referral for smoking cessation on discharge.    Essential hypertension  Controlled on current medications. Continue Amlodipine, Olmersartan and HCTZ at current dosages.    Uncontrolled type 2 diabetes mellitus with hyperglycemia  - Poor historian with poor medical literacy. Reported she takes either 26 or 36 u long acting insulin daily and no meal time insulin. But pt unsure and telling providers different numbers.  - HgA1c has consistently been >14, indicating poor control generally. Currently with no gap, normal bicarb level, normal pH 7.35.  - Started on insulin gtt with POCT glu Q1h. Was switched to detemir 18U BID and MDSSI on 4/2  - Started on diabetic diet given no hx of dysphagia or aspiration.  - Currently taking detemir 33 units BID and aspart 17 units TID. Based on insulin needs today, will consider increasing detemir to 35 BID and aspart 19 unit TID w/meals.  - cont to monitor POCT glc  - Will need close follow up upon discharge with PCP/Endocrinologist for further titration of insulin needs. Would also benefit from Diabetes boot camp/education upon discharge.       VTE Risk Mitigation (From admission, onward)        Ordered     heparin (porcine) injection 5,000 Units  Every 8 hours      04/01/19 2311     IP VTE HIGH RISK PATIENT  Once      04/01/19 2311     Place sequential compression device  Until discontinued      04/01/19 2311              Solomon Hawk MD  Department of Hospital Medicine   [facility]

## 2019-04-08 NOTE — SUBJECTIVE & OBJECTIVE
Neurologic Chief Complaint: dysarthria, BLE weakness    Subjective:     Interval History: Patient is seen for follow-up neurological assessment and treatment recommendations: No acute events overnight. Neuro exam stable. Blood glucose remain elevated. Patient counseled on the importance of adhering to diabetic diet restrictions.   HPI, Past Medical, Family, and Social History remains the same as documented in the initial encounter.     Review of Systems   Constitutional: Positive for fatigue.   HENT: Negative for trouble swallowing.    Respiratory: Negative for chest tightness and wheezing.    Cardiovascular: Negative for chest pain and palpitations.   Gastrointestinal: Negative for diarrhea, nausea and vomiting.   Musculoskeletal: Positive for arthralgias, gait problem and myalgias.   Neurological: Positive for speech difficulty, weakness and numbness.   Psychiatric/Behavioral: Positive for decreased concentration.     Scheduled Meds:   amLODIPine  10 mg Oral Daily    aspirin  81 mg Oral Daily    atorvastatin  40 mg Oral Daily    diclofenac sodium   Topical (Top) QID    heparin (porcine)  5,000 Units Subcutaneous Q8H    hydroCHLOROthiazide  50 mg Oral Daily    insulin aspart U-100  17 Units Subcutaneous TIDWM    insulin detemir U-100  33 Units Subcutaneous BID    nicotine  1 patch Transdermal Daily    olmesartan  40 mg Oral Daily    pantoprazole  40 mg Oral Daily    ramelteon  8 mg Oral QHS     Continuous Infusions:   sodium chloride 0.9%       PRN Meds:acetaminophen, albuterol-ipratropium, dextrose 10 % in water (D10W), dextrose 10 % in water (D10W), dextrose 50%, dextrose 50%, glucagon (human recombinant), glucose, glucose, hydrALAZINE, insulin aspart U-100, ondansetron, ondansetron, sodium chloride 0.9%, sodium chloride 0.9%    Objective:     Vital Signs (Most Recent):  Temp: 97.8 °F (36.6 °C) (04/08/19 0749)  Pulse: 87 (04/08/19 1108)  Resp: 14 (04/08/19 0749)  BP: 127/74 (04/08/19 0749)  SpO2: 97  % (04/08/19 0749)  BP Location: Left arm    Vital Signs Range (Last 24H):  Temp:  [97.8 °F (36.6 °C)-99.4 °F (37.4 °C)]   Pulse:  []   Resp:  [14-18]   BP: (106-127)/(70-87)   SpO2:  [95 %-99 %]   BP Location: Left arm    Physical Exam   Constitutional: She appears well-developed and well-nourished.   HENT:   Head: Normocephalic and atraumatic.   Right Ear: External ear normal.   Left Ear: External ear normal.   Nose: Nose normal.   Mouth/Throat: Uvula is midline and oropharynx is clear and moist.   Eyes: Pupils are equal, round, and reactive to light. Conjunctivae and EOM are normal.   Neck: Normal range of motion. Neck supple.   Cardiovascular: Normal rate.   Pulmonary/Chest: Effort normal.   Abdominal: Soft.   Psychiatric: She is inattentive.       Neurological Exam: Exam limited- patient not participative   LOC: drowsy  Attention Span: poor  Language: No aphasia  Articulation: Dysarthria  Orientation: Person, Place, Time   Visual Fields: Full  EOM (CN III, IV, VI): Full/intact  Pupils (CN II, III): PERRL  Facial Sensation (CN V): Normal  Facial Movement (CN VII): Symmetric facial expression    Motor: Arm left  Paresis: 4/5  Leg left  Paresis: 4/5  Arm right  Paresis: 4/5  Leg right Paresis: 4/5        Laboratory:  CMP: No results for input(s): GLUCOSE, CALCIUM, ALBUMIN, PROT, NA, K, CO2, CL, BUN, CREATININE, ALKPHOS, ALT, AST, BILITOT in the last 24 hours.  BMP:   Recent Labs   Lab 04/07/19  0312      K 3.8      CO2 26   BUN 17   CREATININE 1.2   CALCIUM 9.8     CBC:   Recent Labs   Lab 04/03/19  0422   WBC 8.45   RBC 4.69   HGB 13.1   HCT 39.2      MCV 84   MCH 27.9   MCHC 33.4     Lipid Panel: No results for input(s): CHOL, LDLCALC, HDL, TRIG in the last 168 hours.  Coagulation:   Recent Labs   Lab 04/02/19  0310   INR 0.9   APTT <21.0     Platelet Aggregation Study: No results for input(s): PLTAGG, PLTAGINTERP, PLTAGREGLACO, ADPPLTAGGREG in the last 168 hours.  Hgb A1C:   Recent Labs    Lab 04/02/19  0042   HGBA1C >14.0*  >14.0*     TSH:   Recent Labs   Lab 04/02/19  0042   TSH 0.988       Diagnostic Results     Brain Imaging   04/01/19 MRI Brain w/ w/o contrast:  Advanced involutional change.  Acute infarcts left putamen, posterolateral left thalamus and left corona radiata and deep white matter adjacent to the left ventricular trigone.  Remote lacunar infarcts left cerebellum, right thalamus, right external capsule, right corona radiata, bilateral basal ganglia and bilateral deep frontal white matter.  Supratentorial and pontine white matter T2/flair hyperintense signal foci suggesting sequela of chronic small vessel ischemic change.  Left sphenoid sinus disease.             04/01/19 CT head w/o contrast:  No acute large vascular territory infarct or intracranial hemorrhage identified.  Further evaluation/follow-up as warranted.  Periventricular white matter hypoattenuation, likely sequela of chronic small vessel ischemic change.  Few more focal areas of hypoattenuation at the right basal ganglia, left internal capsule and left caudate suggesting age-indeterminate lacunar type infarcts.  Correlate clinically.     Vessel Imaging   04/02/19 CTA head, neck:  CTA:  No hemodynamically significant stenosis or large vessel occlusion identified.     Cardiac Imaging   04/02/19 TTE Complete:  · Normal left ventricular systolic function. The estimated ejection fraction is 68%  · Concentric left ventricular remodeling.  · Normal LV diastolic function.  · No wall motion abnormalities.  · Normal right ventricular systolic function.  · Mild mitral sclerosis.  · Normal central venous pressure (3 mm Hg).  · The estimated PA systolic pressure is 15 mm Hg

## 2019-04-08 NOTE — ASSESSMENT & PLAN NOTE
-Stroke risk factor.  SBP < 180.  -Patient on amlodipine, valsartan, HCTZ at home  -Continue olmesartan 40 mg qd  -Continue amlodipine 10 mg qd   -SBP 170s-180s 04/06/19 am, increased HCTZ to 50 mg qd with improved BP    04/08/2019: Blood pressure at goal with current regimen

## 2019-04-08 NOTE — PT/OT/SLP PROGRESS
"Physical Therapy Treatment    Patient Name: Emily Martinez  MRN: [id number]   Diagnosis: Cerebrovascular accident (CVA) due to embolism of left middle cerebral artery    Recommendations:     Discharge Recommendations:  rehabilitation facility   Discharge Equipment Recommendations: shower chair   Barriers to Discharge: decreased caregiver assistance, inaccessible home environment.     Assessment:   Emily Martinez is a 54 y.o. female admitted with a medical diagnosis of Cerebrovascular accident (CVA) due to embolism of left middle cerebral artery.  She demonstrated increased gait deviations and required more assistance during dynamic gait tasks.  She was not able to initiate momentum for walking laterally to the R or backwards.  She continues to require intensive skilled PT services to improve her mobility and safety.  Recommend inpatient rehab to return to her PLOF.       Problem List:  gait instability, decreased upper extremity function, decreased lower extremity function, impaired balance, impaired coordination, decreased safety awareness, impaired self care skills, impaired functional mobilty, decreased coordination, impaired fine motor  Rehab Prognosis:  good; patient would benefit from acute skilled PT services to address these deficits and reach maximum level of function.      Subjective   PT communicated with RN prior to therapy.     Patient comments/goals: "I didn't sleep well last night. I want to sleep."   Pain/Comfort:  · Pain Rating 1: 0/10  · Pain Rating Post-Intervention 1: 0/10    Recent Vital Signs: (Last documentation)  Pulse: 79 (04/08/19 0749)  BP: 127/74 (04/08/19 0749)  SpO2: 97 % (04/08/19 0749)     Objective:   General Precautions: aspiration, fall  Recent Surgery: * No surgery found *    The patient currently has telemetry, bed alarm.    The patient was found supine in bed in [facility]. She agreed to therapy, but declined sitting in the chair after therapy.  Supine to sit stand by assistance.  " Patient donned gown in sitting.  Bed to BSC transfer CGA.  Patient performed valentino care with set up assistance. Dynamic gait activities were performed in the lee, including obstacle navigation, stepping over objects, changing directions, and walking in different directions. See below for details.     Functional Mobility:  Gait:  Gait 2 x 20 feet min to CGA for balance, ataxic gait pattern (truncal ataxia predominates) with multidirectional LOB.     Navigating obstacles 15 feet x 2 trials  · Min assistance  · Contacted 2/4 objects on the 1st trial  · Avoided 4/4 objects on 2nd trial    R lateral side stepping moderate assistance with very poor advancement of RLE, 5 feet    L lateral stepping min assistance    Retro walking 5-8 steps moderate assistance  · No progression (walking in place and unable to achieve momentum)  · PT had to provide assistance to initiate and maintain posterior progression    Pick 2 objects from the floor; min assistance for safety    Therapeutic Activities, Education, or Exercises:  Time was provided for active listening, discussion of health disposition, and discussion of safe discharge recommendations. Therapist answered questions to patient/familys satisfaction within scope of practice.  Patient and family are aware of patient's deficits and therapy progression. White board updated to reflect current level of assistance.    FUNCTIONAL OUTCOME MEASURES:  Turning over in bed (including adjusting bedclothes, sheets and blankets)?: 4  Sitting down on and standing up from a chair with arms (e.g., wheelchair, bedside commode, etc.): 3  Moving from lying on back to sitting on the side of the bed?: 3  Moving to and from a bed to a chair (including a wheelchair)?: 3  Need to walk in hospital room?: 3  Climbing 3-5 steps with a railing?: 2  Basic Mobility Total Score: 18    Goals:     Multidisciplinary Problems     Physical Therapy Goals        Problem: Physical Therapy Goal    Goal Priority  Disciplines Outcome Goal Variances Interventions   Physical Therapy Goal     PT, PT/OT Revised     Description:    Goals to be met by 4/12    1. Pt will perform rolling to the R and L with SBA. - MET  2. Pt will perform supine to sit from both sides of the bed with SBA. - MET  3. Pt will perform sit to supine with SBA. - MET  4. Pt will perform sit to stand transfers with SBA.    5. Pt will perform bed <> chair transfers with SBA.  6. Pt will perform gait x 150 feet with CGA and least restrictive assistive device  7. Patient will ascend and descend 15 steps with L rail and CGA to safely access home environment.                        Plan:   During this hospitalization, patient to be seen 5 x/week to address their physical therapy related impairments and improve their overall level of function.   · Plan of Care Expires: 05/01/19   Plan of Care Reviewed with: patient    This plan of care has been discussed with the patient and/or family who were involved in its development and are in agreement with the identified goals and treatment plan.     Time Tracking:     PT Received On:  04/08/19  PT Start Time:   0950    PT Stop Time:  1013  PT Total Time (min): 23 min     Billable Minutes: Gait Training 23     Jacy Forbes, PT  4/8/2019  [phone] (pager)

## 2019-04-08 NOTE — ASSESSMENT & PLAN NOTE
-Localized to RUE > RLE. Largely resolved.  -Discussed with Dr. Eubanks, will monitor.  Can consider aripiprazole if interfering with PT/OT<    04/08/19-Mostly resolved

## 2019-04-08 NOTE — SUBJECTIVE & OBJECTIVE
Interval History: Patient noted to be eating candy, pizza, and snacks overnight and was advised that sugars would not be controlled with dietary indiscretion. BG was 300 last night and 189 this morning. Patient is alert and oriented but sleepy. She answers questions appropriately    Review of Systems   Constitutional: Negative for fatigue and fever.   Respiratory: Negative for shortness of breath, wheezing and stridor.    Cardiovascular: Negative for chest pain, palpitations and leg swelling.   Gastrointestinal: Negative for abdominal pain, blood in stool, constipation, diarrhea, nausea and vomiting.   Genitourinary: Negative for difficulty urinating.   Skin: Negative for rash and wound.   Allergic/Immunologic: Negative for immunocompromised state.   Neurological: Positive for speech difficulty and weakness. Negative for light-headedness and headaches.     Objective:     Vital Signs (Most Recent):  Temp: 97.8 °F (36.6 °C) (04/08/19 0749)  Pulse: 79 (04/08/19 0749)  Resp: 14 (04/08/19 0749)  BP: 127/74 (04/08/19 0749)  SpO2: 97 % (04/08/19 0749) Vital Signs (24h Range):  Temp:  [97.8 °F (36.6 °C)-99.4 °F (37.4 °C)] 97.8 °F (36.6 °C)  Pulse:  [] 79  Resp:  [14-18] 14  SpO2:  [95 %-99 %] 97 %  BP: (106-165)/(70-87) 127/74     Weight: 66.3 kg (146 lb 2.6 oz)  Body mass index is 25.89 kg/m².  No intake or output data in the 24 hours ending 04/08/19 1046   Physical Exam   Constitutional: She is oriented to person, place, and time. She appears well-developed and well-nourished. No distress.   HENT:   Head: Normocephalic and atraumatic.   Eyes: Right eye exhibits no discharge. Left eye exhibits no discharge. No scleral icterus.   Neck: Normal range of motion.   Cardiovascular: Normal rate, regular rhythm and intact distal pulses.   Pulmonary/Chest: Effort normal. No respiratory distress. She has no wheezes.   Abdominal: Soft. She exhibits no distension. There is no tenderness.   Musculoskeletal: Normal range of  motion. She exhibits no edema.   Neurological: She is alert and oriented to person, place, and time.   -  Mental Status:  AAOx3.  Follows commands.  Answers correct age and .     -  Speech and language:  no aphasia, + dysarthria.    -  Vision:  no hemianopsia or ptosis.    -  Motor:  BUE: 5/5.  BLE weakness R>L.  Exam limited by effort and participation.    Skin: Skin is warm and dry. No rash noted. She is not diaphoretic.   Psychiatric: She has a normal mood and affect. Her behavior is normal. Cognition and memory are impaired.   Vitals reviewed.      Significant Labs: All pertinent labs within the past 24 hours have been reviewed.    Significant Imaging: I have reviewed all pertinent imaging results/findings within the past 24 hours.

## 2019-04-08 NOTE — PROGRESS NOTES
[facility]  Vascular Neurology  Comprehensive Stroke Center  Progress Note    Assessment/Plan:     * Cerebrovascular accident (CVA) due to embolism of left middle cerebral artery  54 y.o. female with significant past medical history of DM, HTN, HLD presented to hospital with multiple medical complaints.  The patient has been having slurred speech, BLE weakness, and urinary incontinence since 3/23.  LE weakness became progressively worse with the patient having difficulty/inability to walk for the last 1-2 days.  MRI brain revealed acute infarctions in posterolateral thalamus, territory of PCA.  No tPA due to the patient being outside of the treatment window.  No LVO, no intervention at this time.      Etiology unclear.  Pt has risk factors for small vessel disease as well as signs of chronic small vessel disease on imaging, but presentation of this infarct is inconsistent with an etiology of small vessel disease in terms of multiple non-adjacent areas of acute infarction.  Pattern more likely to be seen in patient with embolic etiology which can be associated with cocaine use. Not a candidate for ESUS study 2/2 exclusion criteria.    Antithrombotics for secondary stroke prevention: Antiplatelets: Aspirin: 81 mg daily  Statins for secondary stroke prevention and hyperlipidemia, if present:  Atorvastatin 40 mg daily  Aggressive risk factor modification: HTN, Smoking, DM, HLD, illicit substance use  Rehab efforts: PT/OT/SLP, PM&R consult --Inpatient Rehab, pending placement  Diagnostics ordered/pending: None  VTE prophylaxis: Heparin 5000 units SQ every 8 hours, SCDs  BP parameters: Infarct: SBP <180     Numbness and tingling of right lower extremity  -Pt has subjective numbness in R anterior medial thigh, ~ L2-L3 dermatome  -Discussed symptoms likely corresponding to finding on recent MRI lumbar spine w/ R L3 compression  -Not painful currently    Insomnia  -Pt states that she takes diazepam 10 mg  pill nightly to sleep at home  -Will not prescribe due to habit-forming risk in pt with illicit substance use  -Improved sleep on ramelteon 8 mg qHS, will continue as scheduled medication    Cocaine abuse  -Counseled on importance of cessation  -Community resource planning on discharge  -No consult to Addiction Psych as pt still denies cocaine use as an issue    Small vessel disease, cerebrovascular  -Pt with risk factors--HTN, HLD, poorly controlled DM II, cocaine abuse  -MRI findings of lacunes, multiple white matter hyperintensities, global atrophy  -No clear cerebral microbleeds on 2D echo planar imaging available  -Mgmt per above--ASA, atorvastatin, BP control, DM II control, cocaine abstinence, diet/exercise recs    Choreiform movement  -Localized to RUE > RLE. Largely resolved.  -Discussed with Dr. Eubanks, will monitor.  Can consider aripiprazole if interfering with PT/OT<    04/08/19-Mostly resolved    Diabetic ketoacidosis without coma associated with type 2 diabetes mellitus  -BHB 4.1 on admission  -DKA resolved, IM 6 assisting with BG management    Weakness of both lower extremities  -BLE weakness, +urinary urgency, LBP reported per pt on admission  -Tylenol prn for pain for now, stable and not requiring further prns  -MRI L spine w/o contrast--some R-sided disc protrusion L3-L4 with possible R L3 impingement  -Consulted PT/OT/PM&R, appreciate recs--recommending inpatient rehab, pending acceptance      Trichomonas infection  -UA + for trichomonas.   -Pt received metronidazole 2 g x 1 dose, IM then treated with Azithro 1 g x 1 dose  -Discussed with patient importance of informing partners, not naming any to contact    Mixed hyperlipidemia  -Stroke risk factor.  Last LDL 08/09/18 was 118.  Current LDL pending--reordered w/ am labs 04/07/19.  -Patient prescribed rosuvastatin, non-compliant.    -Continue atorvastatin 40 mg daily.    Smoker  -Stroke risk factor.  Patient endorses smoking 1.5 ppd x 30 yrs--45  pkyr hx  -Multiple medications prescribed for smoking cessation that pt has not taken.  -Continue to encourage cessation  -Nicotine patch prn    Essential hypertension  -Stroke risk factor.  SBP < 180.  -Patient on amlodipine, valsartan, HCTZ at home  -Continue olmesartan 40 mg qd  -Continue amlodipine 10 mg qd   -SBP 170s-180s 04/06/19 am, increased HCTZ to 50 mg qd with improved BP    04/08/2019: Blood pressure at goal with current regimen    Uncontrolled type 2 diabetes mellitus with hyperglycemia  -Stroke risk factor. Patient reports glucose at home has been >500.  -DKA confirmed on admission, now with hyperglycemia without anion gap or ketosis   -Hospital medicine consulted--detemir 31 U bid, aspart 15 U tidwm started    04/08/19: Blood glucose remain elevated. Patient counseled on the importance of adhering to diabetic diet restrictions.        04/01/19:  [place] [facility]  UDS + for cocaine, +DKA, presents with sxs of dysarthria, BLE weakness.    04/02/19:  DKA ongoing, insulin gtt started.  Will get IM consult ordered o/n.  04/03/19:  Mild R pronator drift on exam. BG > 400, IM adjusting insulin. Restarted BP medications.   04/04/19:  Choreiform movements improving, asking movement disorder MD for input.  Plan for inpatient rehab.  04/05/19:  Pending inpatient rehab placement.  Concern for embolic infarcts, may consider ESUS study enrollment.  04/06/19:  Not a candidate for ESUS 2/2 cocaine.  Still working on BG control, discussed with Hospital Medicine team.  04/07/19:  Patient stable this am, pending inpatient rehab placement.  BG still upper 200s, IM titrating insulin.  04/08/19: No acute events overnight. Neuro exam stable. Blood glucose remain elevated. Patient counseled on the importance of adhering to diabetic diet restrictions.     STROKE DOCUMENTATION        NIH Scale:  1a. Level of Consciousness: 0-->Alert, keenly responsive  1b. LOC Questions: 0-->Answers both questions correctly  1c. LOC  Commands: 0-->Performs both tasks correctly  2. Best Gaze: 0-->Normal  3. Visual: 0-->No visual loss  4. Facial Palsy: 0-->Normal symmetrical movements  5a. Motor Arm, Left: 0-->No drift, limb holds 90 (or 45) degrees for full 10 secs  5b. Motor Arm, Right: 0-->No drift, limb holds 90 (or 45) degrees for full 10 secs  6a. Motor Leg, Left: 0-->No drift, leg holds 30 degree position for full 5 secs  6b. Motor Leg, Right: 1-->Drift, leg falls by the end of the 5-sec period but does not hit bed  7. Limb Ataxia: 0-->Absent  8. Sensory: 0-->Normal, no sensory loss  9. Best Language: 0-->No aphasia, normal  10. Dysarthria: 0-->Normal  11. Extinction and Inattention (formerly Neglect): 0-->No abnormality  Total (NIH Stroke Scale): 1       Modified Dillon Score: 1  Doyle Coma Scale:    ABCD2 Score:    [id number] Score:   HAS -BLED Score:   ICH Score:   Hunt & Mueller Classification:      Hemorrhagic change of an Ischemic Stroke: Does this patient have an ischemic stroke with hemorrhagic changes? No     Neurologic Chief Complaint: dysarthria, BLE weakness    Subjective:     Interval History: Patient is seen for follow-up neurological assessment and treatment recommendations: No acute events overnight. Neuro exam stable. Blood glucose remain elevated. Patient counseled on the importance of adhering to diabetic diet restrictions.   HPI, Past Medical, Family, and Social History remains the same as documented in the initial encounter.     Review of Systems   Constitutional: Positive for fatigue.   HENT: Negative for trouble swallowing.    Respiratory: Negative for chest tightness and wheezing.    Cardiovascular: Negative for chest pain and palpitations.   Gastrointestinal: Negative for diarrhea, nausea and vomiting.   Musculoskeletal: Positive for arthralgias, gait problem and myalgias.   Neurological: Positive for speech difficulty, weakness and numbness.   Psychiatric/Behavioral: Positive for decreased concentration.      Scheduled Meds:   amLODIPine  10 mg Oral Daily    aspirin  81 mg Oral Daily    atorvastatin  40 mg Oral Daily    diclofenac sodium   Topical (Top) QID    heparin (porcine)  5,000 Units Subcutaneous Q8H    hydroCHLOROthiazide  50 mg Oral Daily    insulin aspart U-100  17 Units Subcutaneous TIDWM    insulin detemir U-100  33 Units Subcutaneous BID    nicotine  1 patch Transdermal Daily    olmesartan  40 mg Oral Daily    pantoprazole  40 mg Oral Daily    ramelteon  8 mg Oral QHS     Continuous Infusions:   sodium chloride 0.9%       PRN Meds:acetaminophen, albuterol-ipratropium, dextrose 10 % in water (D10W), dextrose 10 % in water (D10W), dextrose 50%, dextrose 50%, glucagon (human recombinant), glucose, glucose, hydrALAZINE, insulin aspart U-100, ondansetron, ondansetron, sodium chloride 0.9%, sodium chloride 0.9%    Objective:     Vital Signs (Most Recent):  Temp: 97.8 °F (36.6 °C) (04/08/19 0749)  Pulse: 87 (04/08/19 1108)  Resp: 14 (04/08/19 0749)  BP: 127/74 (04/08/19 0749)  SpO2: 97 % (04/08/19 0749)  BP Location: Left arm    Vital Signs Range (Last 24H):  Temp:  [97.8 °F (36.6 °C)-99.4 °F (37.4 °C)]   Pulse:  []   Resp:  [14-18]   BP: (106-127)/(70-87)   SpO2:  [95 %-99 %]   BP Location: Left arm    Physical Exam   Constitutional: She appears well-developed and well-nourished.   HENT:   Head: Normocephalic and atraumatic.   Right Ear: External ear normal.   Left Ear: External ear normal.   Nose: Nose normal.   Mouth/Throat: Uvula is midline and oropharynx is clear and moist.   Eyes: Pupils are equal, round, and reactive to light. Conjunctivae and EOM are normal.   Neck: Normal range of motion. Neck supple.   Cardiovascular: Normal rate.   Pulmonary/Chest: Effort normal.   Abdominal: Soft.   Psychiatric: She is inattentive.       Neurological Exam: Exam limited- patient not participative   LOC: drowsy  Attention Span: poor  Language: No aphasia  Articulation: Dysarthria  Orientation:  Person, Place, Time   Visual Fields: Full  EOM (CN III, IV, VI): Full/intact  Pupils (CN II, III): PERRL  Facial Sensation (CN V): Normal  Facial Movement (CN VII): Symmetric facial expression    Motor: Arm left  Paresis: 4/5  Leg left  Paresis: 4/5  Arm right  Paresis: 4/5  Leg right Paresis: 4/5        Laboratory:  CMP: No results for input(s): GLUCOSE, CALCIUM, ALBUMIN, PROT, NA, K, CO2, CL, BUN, CREATININE, ALKPHOS, ALT, AST, BILITOT in the last 24 hours.  BMP:   Recent Labs   Lab 04/07/19  0312      K 3.8      CO2 26   BUN 17   CREATININE 1.2   CALCIUM 9.8     CBC:   Recent Labs   Lab 04/03/19  0422   WBC 8.45   RBC 4.69   HGB 13.1   HCT 39.2      MCV 84   MCH 27.9   MCHC 33.4     Lipid Panel: No results for input(s): CHOL, LDLCALC, HDL, TRIG in the last 168 hours.  Coagulation:   Recent Labs   Lab 04/02/19  0310   INR 0.9   APTT <21.0     Platelet Aggregation Study: No results for input(s): PLTAGG, PLTAGINTERP, PLTAGREGLACO, ADPPLTAGGREG in the last 168 hours.  Hgb A1C:   Recent Labs   Lab 04/02/19  0042   HGBA1C >14.0*  >14.0*     TSH:   Recent Labs   Lab 04/02/19  0042   TSH 0.988       Diagnostic Results     Brain Imaging   04/01/19 MRI Brain w/ w/o contrast:  Advanced involutional change.  Acute infarcts left putamen, posterolateral left thalamus and left corona radiata and deep white matter adjacent to the left ventricular trigone.  Remote lacunar infarcts left cerebellum, right thalamus, right external capsule, right corona radiata, bilateral basal ganglia and bilateral deep frontal white matter.  Supratentorial and pontine white matter T2/flair hyperintense signal foci suggesting sequela of chronic small vessel ischemic change.  Left sphenoid sinus disease.             04/01/19 CT head w/o contrast:  No acute large vascular territory infarct or intracranial hemorrhage identified.  Further evaluation/follow-up as warranted.  Periventricular white matter hypoattenuation, likely sequela  of chronic small vessel ischemic change.  Few more focal areas of hypoattenuation at the right basal ganglia, left internal capsule and left caudate suggesting age-indeterminate lacunar type infarcts.  Correlate clinically.     Vessel Imaging   04/02/19 CTA head, neck:  CTA:  No hemodynamically significant stenosis or large vessel occlusion identified.     Cardiac Imaging   04/02/19 TTE Complete:  · Normal left ventricular systolic function. The estimated ejection fraction is 68%  · Concentric left ventricular remodeling.  · Normal LV diastolic function.  · No wall motion abnormalities.  · Normal right ventricular systolic function.  · Mild mitral sclerosis.  · Normal central venous pressure (3 mm Hg).  · The estimated PA systolic pressure is 15 mm Hg          Emilia Varghese NP  Comprehensive Stroke Center  Department of Vascular Neurology   [facility]

## 2019-04-08 NOTE — ASSESSMENT & PLAN NOTE
-Stroke risk factor. Patient reports glucose at home has been >500.  -DKA confirmed on admission, now with hyperglycemia without anion gap or ketosis   -Hospital medicine consulted--detemir 31 U bid, aspart 15 U tidwm started    04/08/19: Blood glucose remain elevated. Patient counseled on the importance of adhering to diabetic diet restrictions.

## 2019-04-08 NOTE — PLAN OF CARE
Problem: Physical Therapy Goal  Goal: Physical Therapy Goal    Goals to be met by 4/12    1. Pt will perform rolling to the R and L with SBA. - MET  2. Pt will perform supine to sit from both sides of the bed with SBA. - MET  3. Pt will perform sit to supine with SBA. - MET  4. Pt will perform sit to stand transfers with SBA.    5. Pt will perform bed <> chair transfers with SBA.  6. Pt will perform gait x 150 feet with CGA and least restrictive assistive device  7. Patient will ascend and descend 15 steps with L rail and CGA to safely access home environment.      Outcome: Revised  Patient participated well in therapy.  POC and goals remain appropriate.  Please refer to the progress note for functional mobility.     Pt is safe to perform transfers with nursing using 1 person assistance     Jacy Forbes, PT  4/8/2019  [phone] (pager)

## 2019-04-08 NOTE — PLAN OF CARE
Problem: Occupational Therapy Goal  Goal: Occupational Therapy Goal  Goals to be met by: 7 days (4/9/19)     Patient will increase functional independence with ADLs by performing:    UE Dressing with Stand-by Assistance.  LE Dressing with Contact Guard Assistance.  Grooming while standing at sink with Contact Guard Assistance.  Toileting from toilet with Minimal Assistance for hygiene and clothing management.   Sitting at edge of bed x10 minutes with Stand-by Assistance.  Supine to sit with Contact Guard Assistance.-MET  Toilet transfer to toilet with Contact Guard Assistance.  Increased functional strength to WFL for ADLs.  Pt will follow 5/5 two-step commands to complete ADL task.      Goals remain appropriate.  FLAKITO Thao  4/8/2019

## 2019-04-08 NOTE — ASSESSMENT & PLAN NOTE
- Poor historian with poor medical literacy. Reported she takes either 26 or 36 u long acting insulin daily and no meal time insulin. But pt unsure and telling providers different numbers.  - HgA1c has consistently been >14, indicating poor control generally. Currently with no gap, normal bicarb level, normal pH 7.35.  - Started on insulin gtt with POCT glu Q1h. Was switched to detemir 18U BID and MDSSI on 4/2  - Started on diabetic diet given no hx of dysphagia or aspiration.  - Currently taking detemir 33 units BID and aspart 17 units TID. Based on insulin needs today, will consider increasing detemir to 35 BID and aspart 19 unit TID w/meals.  - cont to monitor POCT glc  - Will need close follow up upon discharge with PCP/Endocrinologist for further titration of insulin needs. Would also benefit from Diabetes boot camp/education upon discharge.

## 2019-04-09 VITALS
HEART RATE: 95 BPM | OXYGEN SATURATION: 95 % | DIASTOLIC BLOOD PRESSURE: 76 MMHG | WEIGHT: 146.19 LBS | TEMPERATURE: 99 F | SYSTOLIC BLOOD PRESSURE: 125 MMHG | BODY MASS INDEX: 25.9 KG/M2 | RESPIRATION RATE: 18 BRPM | HEIGHT: 63 IN

## 2019-04-09 PROBLEM — R25.8 CHOREIFORM MOVEMENT: Status: RESOLVED | Noted: 2019-04-05 | Resolved: 2019-04-09

## 2019-04-09 PROBLEM — E11.10 DIABETIC KETOACIDOSIS WITHOUT COMA ASSOCIATED WITH TYPE 2 DIABETES MELLITUS: Status: RESOLVED | Noted: 2019-04-02 | Resolved: 2019-04-09

## 2019-04-09 PROBLEM — A59.9 TRICHOMONAS INFECTION: Status: RESOLVED | Noted: 2019-04-01 | Resolved: 2019-04-09

## 2019-04-09 LAB
POCT GLUCOSE: 168 MG/DL (ref 70–110)
POCT GLUCOSE: 200 MG/DL (ref 70–110)
POCT GLUCOSE: 304 MG/DL (ref 70–110)

## 2019-04-09 PROCEDURE — 25000003 PHARM REV CODE 250: Performed by: NURSE PRACTITIONER

## 2019-04-09 PROCEDURE — S4991 NICOTINE PATCH NONLEGEND: HCPCS | Performed by: NURSE PRACTITIONER

## 2019-04-09 PROCEDURE — 63600175 PHARM REV CODE 636 W HCPCS: Performed by: NURSE PRACTITIONER

## 2019-04-09 PROCEDURE — 99233 SBSQ HOSP IP/OBS HIGH 50: CPT | Mod: ,,, | Performed by: PSYCHIATRY & NEUROLOGY

## 2019-04-09 PROCEDURE — 25000003 PHARM REV CODE 250: Performed by: STUDENT IN AN ORGANIZED HEALTH CARE EDUCATION/TRAINING PROGRAM

## 2019-04-09 PROCEDURE — 99233 PR SUBSEQUENT HOSPITAL CARE,LEVL III: ICD-10-PCS | Mod: ,,, | Performed by: PSYCHIATRY & NEUROLOGY

## 2019-04-09 RX ORDER — ATORVASTATIN CALCIUM 40 MG/1
40 TABLET, FILM COATED ORAL DAILY
Qty: 90 TABLET | Refills: 3 | Status: ON HOLD | OUTPATIENT
Start: 2019-04-10 | End: 2019-06-30 | Stop reason: SDUPTHER

## 2019-04-09 RX ORDER — INSULIN ASPART 100 [IU]/ML
1-10 INJECTION, SOLUTION INTRAVENOUS; SUBCUTANEOUS
Qty: 1 BOX | Refills: 0 | Status: ON HOLD | OUTPATIENT
Start: 2019-04-09 | End: 2019-06-30 | Stop reason: HOSPADM

## 2019-04-09 RX ORDER — ASPIRIN 81 MG/1
81 TABLET ORAL DAILY
Refills: 0 | COMMUNITY
Start: 2019-04-10 | End: 2021-10-21 | Stop reason: SDUPTHER

## 2019-04-09 RX ORDER — RAMELTEON 8 MG/1
8 TABLET ORAL NIGHTLY
Qty: 30 TABLET | Refills: 0 | Status: SHIPPED | OUTPATIENT
Start: 2019-04-09

## 2019-04-09 RX ORDER — DICLOFENAC SODIUM 10 MG/G
GEL TOPICAL 4 TIMES DAILY
Qty: 1 TUBE | Refills: 0 | Status: SHIPPED | OUTPATIENT
Start: 2019-04-09 | End: 2021-10-21 | Stop reason: SDUPTHER

## 2019-04-09 RX ORDER — AMLODIPINE BESYLATE 10 MG/1
10 TABLET ORAL DAILY
Qty: 30 TABLET | Refills: 11 | Status: ON HOLD | OUTPATIENT
Start: 2019-04-10 | End: 2019-06-30 | Stop reason: SDUPTHER

## 2019-04-09 RX ORDER — INSULIN ASPART 100 [IU]/ML
19 INJECTION, SOLUTION INTRAVENOUS; SUBCUTANEOUS 3 TIMES DAILY
Qty: 1 BOX | Refills: 0 | Status: ON HOLD | OUTPATIENT
Start: 2019-04-09 | End: 2019-06-30 | Stop reason: HOSPADM

## 2019-04-09 RX ORDER — HYDROCHLOROTHIAZIDE 50 MG/1
50 TABLET ORAL DAILY
Qty: 30 TABLET | Refills: 11 | Status: SHIPPED | OUTPATIENT
Start: 2019-04-10 | End: 2019-05-07 | Stop reason: SDUPTHER

## 2019-04-09 RX ORDER — INSULIN ASPART 100 [IU]/ML
19 INJECTION, SOLUTION INTRAVENOUS; SUBCUTANEOUS
Status: DISCONTINUED | OUTPATIENT
Start: 2019-04-09 | End: 2019-04-09 | Stop reason: HOSPADM

## 2019-04-09 RX ORDER — INSULIN ASPART 100 [IU]/ML
18 INJECTION, SOLUTION INTRAVENOUS; SUBCUTANEOUS
Status: DISCONTINUED | OUTPATIENT
Start: 2019-04-09 | End: 2019-04-09

## 2019-04-09 RX ORDER — IBUPROFEN 200 MG
1 TABLET ORAL DAILY
Refills: 0 | Status: ON HOLD | COMMUNITY
Start: 2019-04-10 | End: 2019-06-30 | Stop reason: HOSPADM

## 2019-04-09 RX ORDER — OLMESARTAN MEDOXOMIL 40 MG/1
40 TABLET ORAL DAILY
Qty: 90 TABLET | Refills: 3 | Status: ON HOLD | OUTPATIENT
Start: 2019-04-10 | End: 2019-06-30 | Stop reason: HOSPADM

## 2019-04-09 RX ORDER — PANTOPRAZOLE SODIUM 40 MG/1
40 TABLET, DELAYED RELEASE ORAL DAILY
Qty: 30 TABLET | Refills: 11 | Status: SHIPPED | OUTPATIENT
Start: 2019-04-10 | End: 2020-04-09

## 2019-04-09 RX ADMIN — PANTOPRAZOLE SODIUM 40 MG: 40 TABLET, DELAYED RELEASE ORAL at 07:04

## 2019-04-09 RX ADMIN — DICLOFENAC: 10 GEL TOPICAL at 07:04

## 2019-04-09 RX ADMIN — HEPARIN SODIUM 5000 UNITS: 5000 INJECTION, SOLUTION INTRAVENOUS; SUBCUTANEOUS at 06:04

## 2019-04-09 RX ADMIN — INSULIN ASPART 19 UNITS: 100 INJECTION, SOLUTION INTRAVENOUS; SUBCUTANEOUS at 07:04

## 2019-04-09 RX ADMIN — INSULIN ASPART 18 UNITS: 100 INJECTION, SOLUTION INTRAVENOUS; SUBCUTANEOUS at 11:04

## 2019-04-09 RX ADMIN — ATORVASTATIN CALCIUM 40 MG: 20 TABLET, FILM COATED ORAL at 07:04

## 2019-04-09 RX ADMIN — DICLOFENAC: 10 GEL TOPICAL at 12:04

## 2019-04-09 RX ADMIN — AMLODIPINE BESYLATE 10 MG: 10 TABLET ORAL at 07:04

## 2019-04-09 RX ADMIN — ASPIRIN 81 MG: 81 TABLET, COATED ORAL at 07:04

## 2019-04-09 RX ADMIN — NICOTINE 1 PATCH: 21 PATCH, EXTENDED RELEASE TRANSDERMAL at 07:04

## 2019-04-09 RX ADMIN — HEPARIN SODIUM 5000 UNITS: 5000 INJECTION, SOLUTION INTRAVENOUS; SUBCUTANEOUS at 02:04

## 2019-04-09 RX ADMIN — HYDROCHLOROTHIAZIDE 50 MG: 25 TABLET ORAL at 07:04

## 2019-04-09 RX ADMIN — OLMESARTAN MEDOXOMIL 40 MG: 20 TABLET, FILM COATED ORAL at 07:04

## 2019-04-09 RX ADMIN — INSULIN ASPART 2 UNITS: 100 INJECTION, SOLUTION INTRAVENOUS; SUBCUTANEOUS at 11:04

## 2019-04-09 NOTE — PROGRESS NOTES
[facility] Hospital Medicine  Progress Note    Patient Name: Emily Martinez  MRN: [id number]  Patient Class: IP- Inpatient   Admission Date: 4/1/2019  Length of Stay: 8 days  Attending Physician: Kenton Vaughn MD  Primary Care Provider: Alireza Sosa MD    [facility] Medicine Team: Networked reference to record PCT  Solomon Hawk MD    Subjective:     Principal Problem:Cerebrovascular accident (CVA) due to embolism of left middle cerebral artery    HPI:  54 y.o. female with significant past medical history of DM, HTN, HLD presented to hospital with multiple medical complaints.  The patient has been having slurred speech, BLE weakness, and urinary incontinence since 3/23.  LE weakness became progressively worse with the patient having difficulty/inability to walk for the last 1-2 days.  MRI brain revealed acute infarctions in posterolateral thalamus, territory of PCA. No tPA due to the patient being outside of the treatment window.  No LVO, no intervention at this time. HM team was consulted because of high blood glucose level and co management.    Hospital Course:  No notes on file    Interval History:  this morning. Yesterday BG was as low as 134. Patient denied any dietary indiscretions overnight. Plan to d/c to rehab today     Review of Systems   Constitutional: Negative for fatigue and fever.   Respiratory: Negative for shortness of breath, wheezing and stridor.    Cardiovascular: Negative for chest pain, palpitations and leg swelling.   Gastrointestinal: Negative for abdominal pain, blood in stool, constipation, diarrhea, nausea and vomiting.   Genitourinary: Negative for difficulty urinating.   Skin: Negative for rash and wound.   Allergic/Immunologic: Negative for immunocompromised state.   Neurological: Positive for speech difficulty and weakness. Negative for light-headedness and headaches.     Objective:     Vital Signs (Most Recent):  Temp: 99.1 °F (37.3 °C) (04/09/19  07)  Pulse: 86 (19)  Resp: 18 (19)  BP: 122/79 (19)  SpO2: 96 % (19) Vital Signs (24h Range):  Temp:  [98.4 °F (36.9 °C)-99.4 °F (37.4 °C)] 99.1 °F (37.3 °C)  Pulse:  [86-98] 86  Resp:  [17-18] 18  SpO2:  [93 %-96 %] 96 %  BP: ()/(59-88) 122/79     Weight: 66.3 kg (146 lb 2.6 oz)  Body mass index is 25.89 kg/m².    Intake/Output Summary (Last 24 hours) at 2019  Last data filed at 2019 0600  Gross per 24 hour   Intake 240 ml   Output 1250 ml   Net -1010 ml      Physical Exam   Constitutional: She is oriented to person, place, and time. She appears well-developed and well-nourished. No distress.   HENT:   Head: Normocephalic and atraumatic.   Eyes: Right eye exhibits no discharge. Left eye exhibits no discharge. No scleral icterus.   Neck: Normal range of motion.   Cardiovascular: Normal rate, regular rhythm and intact distal pulses.   Pulmonary/Chest: Effort normal. No respiratory distress. She has no wheezes.   Abdominal: Soft. She exhibits no distension. There is no tenderness.   Musculoskeletal: Normal range of motion. She exhibits no edema.   Neurological: She is alert and oriented to person, place, and time.   -  Mental Status:  AAOx3.  Follows commands.  Answers correct age and .     -  Speech and language:  no aphasia, + dysarthria.    -  Vision:  no hemianopsia or ptosis.    -  Motor:  BUE: 5/5.  BLE weakness R>L.  Exam limited by effort and participation.    Skin: Skin is warm and dry. No rash noted. She is not diaphoretic.   Psychiatric: She has a normal mood and affect. Her behavior is normal. Cognition and memory are impaired.   Vitals reviewed.      Significant Labs: All pertinent labs within the past 24 hours have been reviewed.    Significant Imaging: I have reviewed all pertinent imaging results/findings within the past 24 hours.    Assessment/Plan:      Trichomonas infection  - Home meds list showed she is on Azithro and CTX.  However, pt denies taking neither of them.  - s/p one dose of oral Azithro 1g on 4/2    Smoker  Risk factors for heart disease, stroke, cancer, pulmonary disease discussed with patient. Smoking cessation encouraged. Interested in quitting.   Ambulatory referral for smoking cessation on discharge.    Essential hypertension  Controlled on current medications. Continue Amlodipine, Olmersartan and HCTZ at current dosages.  If BP low, would reduce HCTZ dose     Uncontrolled type 2 diabetes mellitus with hyperglycemia  - Poor historian with poor medical literacy. Reported she takes either 26 or 36 u long acting insulin daily and no meal time insulin. But pt unsure and telling providers different numbers.  - HgA1c has consistently been >14, indicating poor control generally. Currently with no gap, normal bicarb level, normal pH 7.35.  - Started on insulin gtt with POCT glu Q1h. Was switched to detemir 18U BID and MDSSI on 4/2  - Started on diabetic diet given no hx of dysphagia or aspiration.  - Due to high fasting BG, will increase detemir to 35 BID. Decrease aspart to 18 unit TID w/meals. Moderate dose SSI.  - At discharge, stop glipizide. Can continue metformin and januvia  - cont to monitor POCT glc  - Will need close follow up upon discharge with PCP/Endocrinologist for further titration of insulin needs. Would also benefit from Diabetes boot camp/education upon discharge.     VTE Risk Mitigation (From admission, onward)        Ordered     heparin (porcine) injection 5,000 Units  Every 8 hours      04/01/19 2311     IP VTE HIGH RISK PATIENT  Once      04/01/19 2311     Place sequential compression device  Until discontinued      04/01/19 2311              Solomon Hawk MD  Department of Hospital Medicine   [facility]

## 2019-04-09 NOTE — PLAN OF CARE
Problem: Adult Inpatient Plan of Care  Goal: Plan of Care Review  Outcome: Ongoing (interventions implemented as appropriate)  Patient educated on Diabetic diet management. Reinforcements needed.   Patient educated on maintaining safety by calling for assistance prior to ambulation. Reinforcement required.

## 2019-04-09 NOTE — PT/OT/SLP PROGRESS
Speech Language Pathology      Emily Martinez  MRN: [id number]    Patient not seen today secondary to Other (Comment).lethargy.   Will follow-up 4/10  Nikia Rodriguez MA, CCC-SLP

## 2019-04-09 NOTE — ASSESSMENT & PLAN NOTE
-BLE weakness, +urinary urgency, LBP reported per pt on admission  -Tylenol prn for pain for now, stable and not requiring further prns  -MRI L spine w/o contrast--some R-sided disc protrusion L3-L4 with possible R L3 impingement  - Discharging to rehab today

## 2019-04-09 NOTE — PROGRESS NOTES
[facility]  Vascular Neurology  Comprehensive Stroke Center  Progress Note    Assessment/Plan:     * Cerebrovascular accident (CVA) due to embolism of left middle cerebral artery  54 y.o. female with significant past medical history of DM, HTN, HLD presented to hospital with multiple medical complaints.  The patient has been having slurred speech, BLE weakness, and urinary incontinence since 3/23.  LE weakness became progressively worse with the patient having difficulty/inability to walk for the last 1-2 days.  MRI brain revealed acute infarctions in posterolateral thalamus, territory of PCA.  No tPA due to the patient being outside of the treatment window.  No LVO, no intervention at this time.      Etiology unclear.  Pt has risk factors for small vessel disease as well as signs of chronic small vessel disease on imaging, but presentation of this infarct is inconsistent with an etiology of small vessel disease in terms of multiple non-adjacent areas of acute infarction.  Pattern more likely to be seen in patient with embolic etiology which can be associated with cocaine use. Not a candidate for ESUS study 2/2 exclusion criteria.    Antithrombotics for secondary stroke prevention: Antiplatelets: Aspirin: 81 mg daily  Statins for secondary stroke prevention and hyperlipidemia, if present:  Atorvastatin 40 mg daily  Aggressive risk factor modification: HTN, Smoking, DM, HLD, illicit substance use  Rehab efforts: Discharging to Rehab today  Diagnostics ordered/pending: None  VTE prophylaxis: Heparin 5000 units SQ every 8 hours, SCDs  BP parameters: Infarct: SBP <180     Numbness and tingling of right lower extremity  -Pt has subjective numbness in R anterior medial thigh, ~ L2-L3 dermatome  -Discussed symptoms likely corresponding to finding on recent MRI lumbar spine w/ R L3 compression  -Not painful currently    Insomnia  -Pt states that she takes diazepam 10 mg pill nightly to sleep at  home  -Will not prescribe due to habit-forming risk in pt with illicit substance use  -Improved sleep on ramelteon 8 mg qHS, will continue as scheduled medication    Cocaine abuse  -Counseled on importance of cessation  -Community resource planning on discharge  -No consult to Addiction Psych as pt still denies cocaine use as an issue    Small vessel disease, cerebrovascular  -Pt with risk factors--HTN, HLD, poorly controlled DM II, cocaine abuse  -MRI findings of lacunes, multiple white matter hyperintensities, global atrophy  -No clear cerebral microbleeds on 2D echo planar imaging available  -Mgmt per above--ASA, atorvastatin, BP control, DM II control, cocaine abstinence, diet/exercise recs    Weakness of both lower extremities  -BLE weakness, +urinary urgency, LBP reported per pt on admission  -Tylenol prn for pain for now, stable and not requiring further prns  -MRI L spine w/o contrast--some R-sided disc protrusion L3-L4 with possible R L3 impingement  - Discharging to rehab today       Mixed hyperlipidemia  -Stroke risk factor.  Last LDL 08/09/18 was 118.  Current LDL pending--reordered w/ am labs 04/07/19.  -Patient prescribed rosuvastatin, non-compliant.    -Continue atorvastatin 40 mg daily.    Smoker  -Stroke risk factor.  Patient endorses smoking 1.5 ppd x 30 yrs--45 pkyr hx  -Multiple medications prescribed for smoking cessation that pt has not taken.  -Continue to encourage cessation  -Nicotine patch prn    Essential hypertension  -Stroke risk factor.  SBP < 180.  -Patient on amlodipine, valsartan, HCTZ at home  -Continue olmesartan 40 mg qd  -Continue amlodipine 10 mg qd   -SBP 170s-180s 04/06/19 am, increased HCTZ to 50 mg qd with improved BP    04/08/2019: Blood pressure at goal with current regimen    Uncontrolled type 2 diabetes mellitus with hyperglycemia  -Stroke risk factor. Patient reports glucose at home has been >500.  -DKA confirmed on admission, now with hyperglycemia without anion gap or  ketosis   -Hospital medicine consulted--detemir 31 U bid, aspart 15 U tidwm started    04/08/19: Blood glucose remain elevated. Patient counseled on the importance of adhering to diabetic diet restrictions.     04/09/19: Blood glucose better controlled overnight. Ambulatory referral for endocrinology ordered.          04/01/19:  [place] [facility]  UDS + for cocaine, +DKA, presents with sxs of dysarthria, BLE weakness.    04/02/19:  DKA ongoing, insulin gtt started.  Will get IM consult ordered o/n.  04/03/19:  Mild R pronator drift on exam. BG > 400, IM adjusting insulin. Restarted BP medications.   04/04/19:  Choreiform movements improving, asking movement disorder MD for input.  Plan for inpatient rehab.  04/05/19:  Pending inpatient rehab placement.  Concern for embolic infarcts, may consider ESUS study enrollment.  04/06/19:  Not a candidate for ESUS 2/2 cocaine.  Still working on BG control, discussed with Hospital Medicine team.  04/07/19:  Patient stable this am, pending inpatient rehab placement.  BG still upper 200s, IM titrating insulin.  04/08/19: No acute events overnight. Neuro exam stable. Blood glucose remain elevated. Patient counseled on the importance of adhering to diabetic diet restrictions.   04/09/19: Plan to discharge to rehab today. Ambulatory referral to Endocrinology ordered.     STROKE DOCUMENTATION        NIH Scale:  1a. Level of Consciousness: 0-->Alert, keenly responsive  1b. LOC Questions: 0-->Answers both questions correctly  1c. LOC Commands: 0-->Performs both tasks correctly  2. Best Gaze: 0-->Normal  3. Visual: 0-->No visual loss  4. Facial Palsy: 0-->Normal symmetrical movements  5a. Motor Arm, Left: 0-->No drift, limb holds 90 (or 45) degrees for full 10 secs  5b. Motor Arm, Right: 0-->No drift, limb holds 90 (or 45) degrees for full 10 secs  6a. Motor Leg, Left: 0-->No drift, leg holds 30 degree position for full 5 secs  6b. Motor Leg, Right: 1-->Drift, leg falls by the end of  the 5-sec period but does not hit bed  7. Limb Ataxia: 0-->Absent  8. Sensory: 0-->Normal, no sensory loss  9. Best Language: 1-->Mild-to-moderate aphasia, some obvious loss of fluency or facility of comprehension, without significant limitation on ideas expressed or form of expression. Reduction of speech and/or comprehension, however, makes conversation. . . (see row details)  10. Dysarthria: 1-->Mild-to-moderate dysarthria, patient slurs at least some words and, at worst, can be understood with some difficulty  11. Extinction and Inattention (formerly Neglect): 0-->No abnormality  Total (NIH Stroke Scale): 3       Modified Trousdale Score: 4  Doyle Coma Scale:    ABCD2 Score:    [id number] Score:   HAS -BLED Score:   ICH Score:   Hunt & Mueller Classification:      Hemorrhagic change of an Ischemic Stroke: Does this patient have an ischemic stroke with hemorrhagic changes? No     Neurologic Chief Complaint: dysarthria, BLE weakness    Subjective:     Interval History: Patient is seen for follow-up neurological assessment and treatment recommendations:   Plan to discharge to rehab today. Ambulatory referral to Endocrinology ordered.     HPI, Past Medical, Family, and Social History remains the same as documented in the initial encounter.     Review of Systems   HENT: Negative for trouble swallowing.    Respiratory: Negative for chest tightness and wheezing.    Cardiovascular: Negative for chest pain and palpitations.   Gastrointestinal: Negative for diarrhea, nausea and vomiting.   Musculoskeletal: Positive for arthralgias, gait problem and myalgias.   Neurological: Positive for speech difficulty, weakness and numbness.   Psychiatric/Behavioral: Positive for decreased concentration.     Scheduled Meds:   amLODIPine  10 mg Oral Daily    aspirin  81 mg Oral Daily    atorvastatin  40 mg Oral Daily    diclofenac sodium   Topical (Top) QID    heparin (porcine)  5,000 Units Subcutaneous Q8H    hydroCHLOROthiazide   50 mg Oral Daily    insulin aspart U-100  19 Units Subcutaneous TIDWM    insulin detemir U-100  35 Units Subcutaneous BID    nicotine  1 patch Transdermal Daily    olmesartan  40 mg Oral Daily    pantoprazole  40 mg Oral Daily    ramelteon  8 mg Oral QHS     Continuous Infusions:   sodium chloride 0.9%       PRN Meds:acetaminophen, albuterol-ipratropium, dextrose 10 % in water (D10W), dextrose 10 % in water (D10W), dextrose 50%, dextrose 50%, glucagon (human recombinant), glucose, glucose, hydrALAZINE, insulin aspart U-100, ondansetron, ondansetron, sodium chloride 0.9%, sodium chloride 0.9%    Objective:     Vital Signs (Most Recent):  Temp: 98.9 °F (37.2 °C) (04/09/19 1116)  Pulse: 95 (04/09/19 1119)  Resp: 18 (04/09/19 1116)  BP: 125/76 (04/09/19 1116)  SpO2: 95 % (04/09/19 1116)  BP Location: Left arm    Vital Signs Range (Last 24H):  Temp:  [98.4 °F (36.9 °C)-99.4 °F (37.4 °C)]   Pulse:  [86-98]   Resp:  [17-18]   BP: ()/(59-88)   SpO2:  [93 %-96 %]   BP Location: Left arm    Physical Exam   Constitutional: She appears well-developed and well-nourished.   HENT:   Head: Normocephalic and atraumatic.   Right Ear: External ear normal.   Left Ear: External ear normal.   Nose: Nose normal.   Mouth/Throat: Uvula is midline and oropharynx is clear and moist.   Eyes: Pupils are equal, round, and reactive to light. Conjunctivae and EOM are normal.   Neck: Normal range of motion. Neck supple.   Cardiovascular: Normal rate.   Pulmonary/Chest: Effort normal.   Abdominal: Soft.   Psychiatric: She is inattentive.       Neurological Exam: Exam limited- patient not participative   LOC: drowsy  Attention Span: poor  Language: No aphasia  Articulation: Dysarthria  Orientation: Person, Place, Time   Visual Fields: Full  EOM (CN III, IV, VI): Full/intact  Pupils (CN II, III): PERRL  Facial Sensation (CN V): Normal  Facial Movement (CN VII): Symmetric facial expression    Motor: Arm left  Paresis: 4/5  Leg left   Paresis: 4/5  Arm right  Paresis: 4/5  Leg right Paresis: 4/5        Laboratory:  CMP: No results for input(s): GLUCOSE, CALCIUM, ALBUMIN, PROT, NA, K, CO2, CL, BUN, CREATININE, ALKPHOS, ALT, AST, BILITOT in the last 24 hours.  BMP:   Recent Labs   Lab 04/07/19  0312      K 3.8      CO2 26   BUN 17   CREATININE 1.2   CALCIUM 9.8     CBC:   Recent Labs   Lab 04/03/19  0422   WBC 8.45   RBC 4.69   HGB 13.1   HCT 39.2      MCV 84   MCH 27.9   MCHC 33.4     Lipid Panel: No results for input(s): CHOL, LDLCALC, HDL, TRIG in the last 168 hours.  Coagulation:   No results for input(s): PT, INR, APTT in the last 168 hours.  Platelet Aggregation Study: No results for input(s): PLTAGG, PLTAGINTERP, PLTAGREGLACO, ADPPLTAGGREG in the last 168 hours.  Hgb A1C:   No results for input(s): HGBA1C in the last 168 hours.  TSH:   No results for input(s): TSH in the last 168 hours.    Diagnostic Results     Brain Imaging   04/01/19 MRI Brain w/ w/o contrast:  Advanced involutional change.  Acute infarcts left putamen, posterolateral left thalamus and left corona radiata and deep white matter adjacent to the left ventricular trigone.  Remote lacunar infarcts left cerebellum, right thalamus, right external capsule, right corona radiata, bilateral basal ganglia and bilateral deep frontal white matter.  Supratentorial and pontine white matter T2/flair hyperintense signal foci suggesting sequela of chronic small vessel ischemic change.  Left sphenoid sinus disease.             04/01/19 CT head w/o contrast:  No acute large vascular territory infarct or intracranial hemorrhage identified.  Further evaluation/follow-up as warranted.  Periventricular white matter hypoattenuation, likely sequela of chronic small vessel ischemic change.  Few more focal areas of hypoattenuation at the right basal ganglia, left internal capsule and left caudate suggesting age-indeterminate lacunar type infarcts.  Correlate clinically.     Vessel  Imaging   04/02/19 CTA head, neck:  CTA:  No hemodynamically significant stenosis or large vessel occlusion identified.     Cardiac Imaging   04/02/19 TTE Complete:  · Normal left ventricular systolic function. The estimated ejection fraction is 68%  · Concentric left ventricular remodeling.  · Normal LV diastolic function.  · No wall motion abnormalities.  · Normal right ventricular systolic function.  · Mild mitral sclerosis.  · Normal central venous pressure (3 mm Hg).  · The estimated PA systolic pressure is 15 mm Hg          Emilia Varghese NP  Comprehensive Stroke Center  Department of Vascular Neurology   [facility]

## 2019-04-09 NOTE — ASSESSMENT & PLAN NOTE
Controlled on current medications. Continue Amlodipine, Olmersartan and HCTZ at current dosages.  If BP low, would reduce HCTZ dose

## 2019-04-09 NOTE — PT/OT/SLP DISCHARGE
Occupational Therapy Discharge Summary    Emily Martinez  MRN: [id number]   Principal Problem: Cerebrovascular accident (CVA) due to embolism of left middle cerebral artery      Patient Discharged from acute Occupational Therapy on 4/9.  Please refer to prior OT note dated 4/9 for functional status.    Assessment:      Patient appropriate for care in another setting.    Objective:     GOALS:   Multidisciplinary Problems     Occupational Therapy Goals        Problem: Occupational Therapy Goal    Goal Priority Disciplines Outcome Interventions   Occupational Therapy Goal     OT, PT/OT Ongoing (interventions implemented as appropriate)    Description:  Goals revised 4/8 to be addressed in 7 days with goals expiring 4/15:     Patient will increase functional independence with ADLs by performing:    UE Dressing with Stand-by Assistance.  LE Dressing with SBA.  Grooming while standing at sink with SBA.  Toileting from toilet with SBA for hygiene and clothing management.   Sitting at edge of bed x10 minutes with Stand-by Assistance.  Toilet transfer to toilet with SBA.  Increased functional strength to WFL for ADLs.  Pt will follow 5/5 two-step commands to complete ADL task.                         Reasons for Discontinuation of Therapy Services  Transfer to alternate level of care.      Plan:     Patient Discharged to: Inpatient Rehab    FLAKITO Thao  4/9/2019

## 2019-04-09 NOTE — PLAN OF CARE
04/09/19 1525   Final Note   Assessment Type Final Discharge Note   Anticipated Discharge Disposition Rehab   Right Care Referral Info   Post Acute Recommendation IRF     [facility]

## 2019-04-09 NOTE — PLAN OF CARE
04/09/19 1447   Post-Acute Status   Post-Acute Authorization Placement   Post-Acute Placement Status Set-up Complete       Pt has been accepted by [facility].  Nurse can call report to [phone]  Rm [phone]. Transport setup for 4:30pm.      Dane Reed LMSW  [facility]   [phone]

## 2019-04-09 NOTE — PLAN OF CARE
Notified by [facility] at [facility] that insurance auth has been received. Notified team for d/c orders.     04/09/19 0800   Post-Acute Status   Post-Acute Authorization Placement   Post-Acute Placement Status Authorization Obtained

## 2019-04-09 NOTE — NURSING
1750: Discovered that patient did eat dinner prior to leaving. She was not covered with insulin because writer was not aware of her eating. AKASH Major at  called and notified.    1642: Transport arrived to transfer patient to . , patient did not receive dinner before leaving, no insulin given. Patient will want dinner when she arrives to facility. AKASH Major at  called and updated.    1512: To be transferred to  at 1630 via transport. Report called to AKASH Major.    0855:  this morning, ordered Pre-meal insulin 19 units, sliding scale of 8 units and detemir 33 units. Has a history of hypoglycemia on lower doses. Usually has sweets at bedside that she eats, does not have any at this time. Concern for hypoglycemia if all insulin given. Patient attempted to refuse all insulin administration, educated and recommended she at least take the pre-meal 19 units and the detemir; verbalized understanding and accepted compromise. Writer has been caring for patient on multiple occasions and believes her insulin regimen is too high. Discussed with Dr. Hawk that she would benefit more from a smaller pre-meal bolus, lower dose of detemir and a higher sliding scale based on blood sugar. States she was on up to 80 units of detemir at home. He decreased the pre-meal insulin from 19 units to 18 units and increased the detemir from 33 units to 35 units. Will continue to monitor.3  Refused labs for today, Dr. Hawk notified.

## 2019-04-09 NOTE — SUBJECTIVE & OBJECTIVE
Interval History:  this morning. Yesterday BG was as low as 134. Patient denied any dietary indiscretions overnight. Plan to d/c to rehab today     Review of Systems   Constitutional: Negative for fatigue and fever.   Respiratory: Negative for shortness of breath, wheezing and stridor.    Cardiovascular: Negative for chest pain, palpitations and leg swelling.   Gastrointestinal: Negative for abdominal pain, blood in stool, constipation, diarrhea, nausea and vomiting.   Genitourinary: Negative for difficulty urinating.   Skin: Negative for rash and wound.   Allergic/Immunologic: Negative for immunocompromised state.   Neurological: Positive for speech difficulty and weakness. Negative for light-headedness and headaches.     Objective:     Vital Signs (Most Recent):  Temp: 99.1 °F (37.3 °C) (04/09/19 0731)  Pulse: 86 (04/09/19 0731)  Resp: 18 (04/09/19 0731)  BP: 122/79 (04/09/19 0731)  SpO2: 96 % (04/09/19 0731) Vital Signs (24h Range):  Temp:  [98.4 °F (36.9 °C)-99.4 °F (37.4 °C)] 99.1 °F (37.3 °C)  Pulse:  [86-98] 86  Resp:  [17-18] 18  SpO2:  [93 %-96 %] 96 %  BP: ()/(59-88) 122/79     Weight: 66.3 kg (146 lb 2.6 oz)  Body mass index is 25.89 kg/m².    Intake/Output Summary (Last 24 hours) at 4/9/2019 0918  Last data filed at 4/9/2019 0600  Gross per 24 hour   Intake 240 ml   Output 1250 ml   Net -1010 ml      Physical Exam   Constitutional: She is oriented to person, place, and time. She appears well-developed and well-nourished. No distress.   HENT:   Head: Normocephalic and atraumatic.   Eyes: Right eye exhibits no discharge. Left eye exhibits no discharge. No scleral icterus.   Neck: Normal range of motion.   Cardiovascular: Normal rate, regular rhythm and intact distal pulses.   Pulmonary/Chest: Effort normal. No respiratory distress. She has no wheezes.   Abdominal: Soft. She exhibits no distension. There is no tenderness.   Musculoskeletal: Normal range of motion. She exhibits no edema.    Neurological: She is alert and oriented to person, place, and time.   -  Mental Status:  AAOx3.  Follows commands.  Answers correct age and .     -  Speech and language:  no aphasia, + dysarthria.    -  Vision:  no hemianopsia or ptosis.    -  Motor:  BUE: 5/5.  BLE weakness R>L.  Exam limited by effort and participation.    Skin: Skin is warm and dry. No rash noted. She is not diaphoretic.   Psychiatric: She has a normal mood and affect. Her behavior is normal. Cognition and memory are impaired.   Vitals reviewed.      Significant Labs: All pertinent labs within the past 24 hours have been reviewed.    Significant Imaging: I have reviewed all pertinent imaging results/findings within the past 24 hours.

## 2019-04-09 NOTE — PLAN OF CARE
[facility]    FACILITY TRANSFER ORDERS      Patient Name: Emily Martinez  YOB: 1965    PCP: Alireza Sosa MD   PCP Address: [address] [place] [address] / [facility]  PCP Phone Number: [phone]  PCP Fax: [phone]    Encounter Date: 04/09/2019    Admit to: [facility]    Vital Signs:  Routine    Diagnoses:   Active Hospital Problems    Diagnosis  POA    *Cerebrovascular accident (CVA) due to embolism of left middle cerebral artery [I63.412]  Yes    Numbness and tingling of right lower extremity [R20.0, R20.2]  No    Insomnia [G47.00]  Yes    Choreiform movement [R25.8]  Yes    Small vessel disease, cerebrovascular [I67.9]  Yes    Cocaine abuse [F14.10]  Yes    Weakness of both lower extremities [R29.898]  Yes    Diabetic ketoacidosis without coma associated with type 2 diabetes mellitus [E11.10]  Yes    Uncontrolled type 2 diabetes mellitus with hyperglycemia [E11.65]  Yes    Essential hypertension [I10]  Yes    Smoker [F17.200]  Yes    Mixed hyperlipidemia [E78.2]  Yes    Trichomonas infection [A59.9]  Yes      Resolved Hospital Problems   No resolved problems to display.       Allergies:  Review of patient's allergies indicates:   Allergen Reactions    Sulfur        Diet: diabetic diet: 2000 calorie    Activities: Activity as tolerated    Nursing: per facility protocol     Labs: per facility protocol     CONSULTS:    Physical Therapy to evaluate and treat. , Occupational Therapy to evaluate and treat., Speech Therapy to evaluate and treat for Language. and  to evaluate for community resources/long-range planning.    MISCELLANEOUS CARE:  Diabetes Care:   SN to perform and educate Diabetic management with blood glucose monitoring: and Fingerstick blood sugar AC and HS    WOUND CARE ORDERS  None    Medications: Review discharge medications with patient and family and provide education.      Current Discharge Medication List      START taking these  medications    Details   amLODIPine (NORVASC) 10 MG tablet Take 1 tablet (10 mg total) by mouth once daily.  Qty: 30 tablet, Refills: 11      aspirin (ECOTRIN) 81 MG EC tablet Take 1 tablet (81 mg total) by mouth once daily.  Refills: 0      atorvastatin (LIPITOR) 40 MG tablet Take 1 tablet (40 mg total) by mouth once daily.  Qty: 90 tablet, Refills: 3      hydroCHLOROthiazide (HYDRODIURIL) 50 MG tablet Take 1 tablet (50 mg total) by mouth once daily.  Qty: 30 tablet, Refills: 11      !! insulin aspart U-100 (NOVOLOG) 100 unit/mL InPn pen Inject 1-10 Units into the skin before meals and at bedtime as needed (Hyperglycemia).  Qty: 1 Box, Refills: 0      !! insulin aspart U-100 (NOVOLOG) 100 unit/mL InPn pen Inject 19 Units into the skin 3 (three) times daily.  Qty: 1 Box, Refills: 0      insulin detemir U-100 (LEVEMIR FLEXTOUCH) 100 unit/mL (3 mL) SubQ InPn pen Inject 35 Units into the skin 2 (two) times daily.  Qty: 1 Box, Refills: 0      !! nicotine (NICODERM CQ) 21 mg/24 hr Place 1 patch onto the skin once daily.  Refills: 0      olmesartan (BENICAR) 40 MG tablet Take 1 tablet (40 mg total) by mouth once daily.  Qty: 90 tablet, Refills: 3      pantoprazole (PROTONIX) 40 MG tablet Take 1 tablet (40 mg total) by mouth once daily.  Qty: 30 tablet, Refills: 11      ramelteon (ROZEREM) 8 mg tablet Take 1 tablet (8 mg total) by mouth every evening.  Qty: 30 tablet, Refills: 0       !! - Potential duplicate medications found. Please discuss with provider.      CONTINUE these medications which have CHANGED    Details   diclofenac sodium (VOLTAREN) 1 % Gel Apply topically 4 (four) times daily.  Qty: 1 Tube, Refills: 0         CONTINUE these medications which have NOT CHANGED    Details   albuterol (ACCUNEB) 1.25 mg/3 mL Nebu USE ONE VIAL in Nebulizer EVERY 6 HOURS AS NEEDED  Qty: 75 mL, Refills: 1      albuterol (PROAIR HFA) 90 mcg/actuation inhaler inhale ONE TO TWO puffs EVERY 6 HOURS into lungs AS NEEDED FOR WHEEZING  AND SHORTNESS OF BREATH  Qty: 8.5 g, Refills: 1      blood-glucose meter (FREESTYLE SYSTEM KIT) kit Use daily- TID  Qty: 1 each, Refills: 1      metFORMIN (GLUCOPHAGE) 1000 MG tablet Take 1 tablet (1,000 mg total) by mouth 2 (two) times daily with meals.  Qty: 60 tablet, Refills: 3      !! nicotine (NICODERM CQ) 21 mg/24 hr Place 1 patch onto the skin once daily.  Qty: 28 patch, Refills: 1    Associated Diagnoses: Needs smoking cessation education; Smoking      traMADol (ULTRAM) 50 mg tablet Take 1 tablet (50 mg total) by mouth every 6 (six) hours as needed for Pain.  Qty: 12 tablet, Refills: 0      SITagliptin (JANUVIA) 100 MG Tab Take 1 tablet (100 mg total) by mouth once daily.  Qty: 30 tablet, Refills: 3       !! - Potential duplicate medications found. Please discuss with provider.      STOP taking these medications       albuterol (PROVENTIL) 2.5 mg /3 mL (0.083 %) nebulizer solution Comments:   Reason for Stopping:         albuterol (PROVENTIL) 2.5 mg /3 mL (0.083 %) nebulizer solution Comments:   Reason for Stopping:         amlodipine-valsartan (EXFORGE)  mg per tablet Comments:   Reason for Stopping:         amlodipine-valsartan-hcthiazid -25 mg Tab Comments:   Reason for Stopping:         amLODIPine-valsartan-hcthiazid -25 mg Tab Comments:   Reason for Stopping:         azithromycin (Z-JEANNIE) 250 MG tablet Comments:   Reason for Stopping:         azithromycin (Z-JEANNIE) 250 MG tablet Comments:   Reason for Stopping:         azithromycin (Z-JEANNIE) 250 MG tablet Comments:   Reason for Stopping:         blood sugar diagnostic (BLOOD GLUCOSE TEST) Strp Comments:   Reason for Stopping:         blood sugar diagnostic (BLOOD GLUCOSE TEST) Strp Comments:   Reason for Stopping:         blood sugar diagnostic (TRUE METRIX GLUCOSE TEST STRIP) Strp Comments:   Reason for Stopping:         blood-glucose meter (TRUE METRIX GLUCOSE METER) Misc Comments:   Reason for Stopping:         BRISDELLE 7.5 mg Cap  Comments:   Reason for Stopping:         dapagliflozin 5 mg Tab Comments:   Reason for Stopping:         dicyclomine (BENTYL) 20 mg tablet Comments:   Reason for Stopping:         diphenoxylate-atropine 2.5-0.025 mg (LOMOTIL) 2.5-0.025 mg per tablet Comments:   Reason for Stopping:         dulaglutide (TRULICITY) 0.75 mg/0.5 mL PnIj Comments:   Reason for Stopping:         etodolac (LODINE) 400 MG tablet Comments:   Reason for Stopping:         fluoxetine (PROZAC) 20 MG capsule Comments:   Reason for Stopping:         fluoxetine (PROZAC) 20 MG tablet Comments:   Reason for Stopping:         fluticasone (FLONASE) 50 mcg/actuation nasal spray Comments:   Reason for Stopping:         fluticasone (FLONASE) 50 mcg/actuation nasal spray Comments:   Reason for Stopping:         fluticasone (FLONASE) 50 mcg/actuation nasal spray Comments:   Reason for Stopping:         fluticasone (FLONASE) 50 mcg/actuation nasal spray Comments:   Reason for Stopping:         fluticasone-salmeterol 100-50 mcg/dose (ADVAIR DISKUS) 100-50 mcg/dose diskus inhaler Comments:   Reason for Stopping:         glipiZIDE (GLUCOTROL) 10 MG tablet Comments:   Reason for Stopping:         glipiZIDE (GLUCOTROL) 10 MG tablet Comments:   Reason for Stopping:         glipiZIDE (GLUCOTROL) 10 MG tablet Comments:   Reason for Stopping:         hydrocodone-acetaminophen 10-325mg (NORCO)  mg Tab Comments:   Reason for Stopping:         hydrocodone-acetaminophen 5-325mg (NORCO) 5-325 mg per tablet Comments:   Reason for Stopping:         hydrOXYzine HCl (ATARAX) 25 MG tablet Comments:   Reason for Stopping:         hydrOXYzine HCl (ATARAX) 25 MG tablet Comments:   Reason for Stopping:         hydrOXYzine pamoate (VISTARIL) 50 MG Cap Comments:   Reason for Stopping:         hydrOXYzine pamoate (VISTARIL) 50 MG Cap Comments:   Reason for Stopping:         ibuprofen (ADVIL,MOTRIN) 800 MG tablet Comments:   Reason for Stopping:         ibuprofen (ADVIL,MOTRIN)  "800 MG tablet Comments:   Reason for Stopping:         insulin (LANTUS SOLOSTAR U-100 INSULIN) glargine 100 units/mL (3mL) SubQ pen Comments:   Reason for Stopping:         insulin glargine (LANTUS) 100 unit/mL injection Comments:   Reason for Stopping:         insulin lispro (HUMALOG KWIKPEN INSULIN) 100 unit/mL InPn pen Comments:   Reason for Stopping:         insulin lispro (HUMALOG KWIKPEN INSULIN) 100 unit/mL pen Comments:   Reason for Stopping:         insulin lispro (HUMALOG KWIKPEN) 100 unit/mL InPn pen Comments:   Reason for Stopping:         insulin lispro (HUMALOG KWIKPEN) 100 unit/mL InPn pen Comments:   Reason for Stopping:         insulin lispro protam-lispro 100 unit/mL (50-50) InPn Comments:   Reason for Stopping:         insulin needles, disposable, (NOVOFINE 30) 30 x 1/3 " Ndle Comments:   Reason for Stopping:         INVOKANA 100 mg Tab Comments:   Reason for Stopping:         ketoconazole (NIZORAL) 2 % cream Comments:   Reason for Stopping:         ketoconazole 2 % Foam Comments:   Reason for Stopping:         latanoprost 0.005 % ophthalmic solution Comments:   Reason for Stopping:         loratadine (CLARITIN) 10 mg tablet Comments:   Reason for Stopping:         loratadine (CLARITIN) 10 mg tablet Comments:   Reason for Stopping:         lorazepam (ATIVAN) 1 MG tablet Comments:   Reason for Stopping:         metoprolol succinate (TOPROL-XL) 50 MG 24 hr tablet Comments:   Reason for Stopping:         naproxen (NAPROSYN) 500 MG tablet Comments:   Reason for Stopping:         omeprazole (PRILOSEC) 40 MG capsule Comments:   Reason for Stopping:         omeprazole (PRILOSEC) 40 MG capsule Comments:   Reason for Stopping:         ondansetron (ZOFRAN-ODT) 8 MG TbDL Comments:   Reason for Stopping:         oxycodone-acetaminophen (PERCOCET)  mg per tablet Comments:   Reason for Stopping:         promethazine (PHENERGAN) 25 MG tablet Comments:   Reason for Stopping:         promethazine (PHENERGAN) " "25 MG tablet Comments:   Reason for Stopping:         rosuvastatin (CRESTOR) 10 MG tablet Comments:   Reason for Stopping:         rosuvastatin (CRESTOR) 10 MG tablet Comments:   Reason for Stopping:         silver sulfADIAZINE 1% (SILVADENE) 1 % cream Comments:   Reason for Stopping:         SURE COMFORT INSULIN SYRINGE 1/2 mL 30 x 1/2" Syrg Comments:   Reason for Stopping:         SURE COMFORT PEN NEEDLE 31 gauge x 3/16" Ndle Comments:   Reason for Stopping:         terconazole (TERAZOL 3) 0.8 % vaginal cream Comments:   Reason for Stopping:         TRADJENTA 5 mg Tab tablet Comments:   Reason for Stopping:         TRUE METRIX GLUCOSE TEST STRIP Strp Comments:   Reason for Stopping:         ULTICARE PEN NEEDLE 32 gauge x 5/32" Ndle Comments:   Reason for Stopping:         varenicline (CHANTIX JEANNIE) 0.5 mg (11)- 1 mg (42) tablet Comments:   Reason for Stopping:         diazePAM (VALIUM) 10 MG Tab Comments:   Reason for Stopping:                    _________________________________  Emilia Varghese NP  04/09/2019          "

## 2019-04-09 NOTE — ASSESSMENT & PLAN NOTE
54 y.o. female with significant past medical history of DM, HTN, HLD presented to hospital with multiple medical complaints.  The patient has been having slurred speech, BLE weakness, and urinary incontinence since 3/23.  LE weakness became progressively worse with the patient having difficulty/inability to walk for the last 1-2 days.  MRI brain revealed acute infarctions in posterolateral thalamus, territory of PCA.  No tPA due to the patient being outside of the treatment window.  No LVO, no intervention at this time.      Etiology unclear.  Pt has risk factors for small vessel disease as well as signs of chronic small vessel disease on imaging, but presentation of this infarct is inconsistent with an etiology of small vessel disease in terms of multiple non-adjacent areas of acute infarction.  Pattern more likely to be seen in patient with embolic etiology which can be associated with cocaine use. Not a candidate for ESUS study 2/2 exclusion criteria.    Antithrombotics for secondary stroke prevention: Antiplatelets: Aspirin: 81 mg daily  Statins for secondary stroke prevention and hyperlipidemia, if present:  Atorvastatin 40 mg daily  Aggressive risk factor modification: HTN, Smoking, DM, HLD, illicit substance use  Rehab efforts: Discharging to Rehab today  Diagnostics ordered/pending: None  VTE prophylaxis: Heparin 5000 units SQ every 8 hours, SCDs  BP parameters: Infarct: SBP <180

## 2019-04-09 NOTE — DISCHARGE SUMMARY
"[facility]  Vascular Neurology  Comprehensive Stroke Center  Discharge Summary     Summary:     Admit Date: 4/1/2019  6:30 PM    Discharge Date and Time:  04/09/2019 5:47 PM    Attending Physician: Kenton Vaughn MD     Discharge Provider: Emilia Varghese NP    History of Present Illness: Patient is a 54 y.o. female with significant past medical history of DM, HTN, HLD presented to hospital with multiple medical complaints.  The patient has been having slurred speech, BLE weakness, and urinary incontinence since 3/23.  She was seen in the [facility] ED on 3/26 for RLE weakness/hip pain and received tramadol at that time with a plan to follow up with her PCP.  Her daughter states the patient's pain/weakness has progressed to the patient being unable to walk since yesterday.  The patient endorses having multiple falls prior to her presented to the ED for evaluation on 3/26.  She also c/o significant weight loss over the last 6 months.  The patient denies HA, dizziness, change in vision, numbness, CP, or N/V.  She endorses occasional SOB.  Nothing has alleviated her symptoms.    Currently the patient is c/o right hip/leg pain that she describes as "feels heavy".  ED workup included CTH that revealed findings concerning for left lacunar infarcts.  MRI brain obtained and revealed acute left PCA territory infarcts. The patient will be admitted to Vascular Neurology.    Hospital Course (synopsis of major diagnoses, care, treatment, and services provided during the course of the hospital stay):     Ms. Emily Martinez was admitted to Vascular Neurology for acute stroke workup on 04/01/2019. On admission pt had BLE weakness and dysarthria. MRI brain revealed acute infarctions in posterolateral thalamus, territory of PCA.  No tPA due to the patient being outside of the treatment window.  No LVO on CTA completed on 04/02/19 , no intervention done. Stroke etiology unclear, patient has multiple risk factors for " small vessel disease, but presentation of this infarct is inconsistent with an etiology of small vessel disease in terms of multiple non-adjacent areas of acute infarction. Pattern more likely to be seen in patient with embolic etiology which can be associated with cocaine use. Hospital stay complicated by diagnosis of DKA on arrival. Blood glucose difficult to control given patient's non compliance with dietary restrictions. She received education on DM management. Patient to follow up with endocrinology as a outpatient.     Patient discharged with recommendations for admission to [facility] inpatient rehab. Family by phone amenable to plan.     Patient with improvement in stroke symptoms since admission. Inpatient acute stroke work up completed and patient stable for discharge. Please see all appropriate medication changes, imaging results, and necessary follow-up below.     04/01/19:  Admission to [facility].  UDS + for cocaine, +DKA, presents with sxs of dysarthria, BLE weakness.    04/02/19:  DKA ongoing, insulin gtt started.  Will get IM consult ordered o/n.  04/03/19:  Mild R pronator drift on exam. BG > 400, IM adjusting insulin. Restarted BP medications.   04/04/19:  Choreiform movements improving, asking movement disorder MD for input.  Plan for inpatient rehab.  04/05/19:  Pending inpatient rehab placement.  Concern for embolic infarcts, may consider ESUS study enrollment.  04/06/19:  Not a candidate for ESUS 2/2 cocaine.  Still working on BG control, discussed with Hospital Medicine team.  04/07/19:  Patient stable this am, pending inpatient rehab placement.  BG still upper 200s, IM titrating insulin.  04/08/19: No acute events overnight. Neuro exam stable. Blood glucose remain elevated. Patient counseled on the importance of adhering to diabetic diet restrictions.   04/09/19: Plan to discharge to rehab today. Ambulatory referral to Endocrinology ordered.     STROKE DOCUMENTATION         NIH Scale:  1a.  Level of Consciousness: 0-->Alert, keenly responsive  1b. LOC Questions: 0-->Answers both questions correctly  1c. LOC Commands: 0-->Performs both tasks correctly  2. Best Gaze: 0-->Normal  3. Visual: 0-->No visual loss  4. Facial Palsy: 0-->Normal symmetrical movements  5a. Motor Arm, Left: 0-->No drift, limb holds 90 (or 45) degrees for full 10 secs  5b. Motor Arm, Right: 0-->No drift, limb holds 90 (or 45) degrees for full 10 secs  6a. Motor Leg, Left: 0-->No drift, leg holds 30 degree position for full 5 secs  6b. Motor Leg, Right: 1-->Drift, leg falls by the end of the 5-sec period but does not hit bed  7. Limb Ataxia: 0-->Absent  8. Sensory: 0-->Normal, no sensory loss  9. Best Language: 1-->Mild-to-moderate aphasia, some obvious loss of fluency or facility of comprehension, without significant limitation on ideas expressed or form of expression. Reduction of speech and/or comprehension, however, makes conversation. . . (see row details)  10. Dysarthria: 1-->Mild-to-moderate dysarthria, patient slurs at least some words and, at worst, can be understood with some difficulty  11. Extinction and Inattention (formerly Neglect): 0-->No abnormality  Total (NIH Stroke Scale): 3        Modified Campbell Score: 4  Doyle Coma Scale:    ABCD2 Score:    [id number] Score:   HAS -BLED Score:   ICH Score:   Hunt & Mueller Classification:       Assessment/Plan:     Diagnostic Results:  Brain Imaging   04/01/19 MRI Brain w/ w/o contrast:  Advanced involutional change.  Acute infarcts left putamen, posterolateral left thalamus and left corona radiata and deep white matter adjacent to the left ventricular trigone.  Remote lacunar infarcts left cerebellum, right thalamus, right external capsule, right corona radiata, bilateral basal ganglia and bilateral deep frontal white matter.  Supratentorial and pontine white matter T2/flair hyperintense signal foci suggesting sequela of chronic small vessel ischemic change.  Left sphenoid  sinus disease.             04/01/19 CT head w/o contrast:  No acute large vascular territory infarct or intracranial hemorrhage identified.  Further evaluation/follow-up as warranted.  Periventricular white matter hypoattenuation, likely sequela of chronic small vessel ischemic change.  Few more focal areas of hypoattenuation at the right basal ganglia, left internal capsule and left caudate suggesting age-indeterminate lacunar type infarcts.  Correlate clinically.     Vessel Imaging   04/02/19 CTA head, neck:  CTA:  No hemodynamically significant stenosis or large vessel occlusion identified.     Cardiac Imaging   04/02/19 TTE Complete:  · Normal left ventricular systolic function. The estimated ejection fraction is 68%  · Concentric left ventricular remodeling.  · Normal LV diastolic function.  · No wall motion abnormalities.  · Normal right ventricular systolic function.  · Mild mitral sclerosis.  · Normal central venous pressure (3 mm Hg).  · The estimated PA systolic pressure is 15 mm Hg        Interventions: None    Complications: None    Disposition: Rehab Facility    Final Active Diagnoses:    Diagnosis Date Noted POA    PRINCIPAL PROBLEM:  Cerebrovascular accident (CVA) due to embolism of left middle cerebral artery [I63.412] 04/01/2019 Yes    Numbness and tingling of right lower extremity [R20.0, R20.2] 04/07/2019 No    Insomnia [G47.00] 04/06/2019 Yes    Small vessel disease, cerebrovascular [I67.9] 04/05/2019 Yes    Cocaine abuse [F14.10] 04/05/2019 Yes    Weakness of both lower extremities [R29.898] 04/02/2019 Yes    Uncontrolled type 2 diabetes mellitus with hyperglycemia [E11.65] 04/01/2019 Yes    Essential hypertension [I10] 04/01/2019 Yes    Smoker [F17.200] 04/01/2019 Yes    Mixed hyperlipidemia [E78.2] 04/01/2019 Yes      Problems Resolved During this Admission:    Diagnosis Date Noted Date Resolved POA    Choreiform movement [R25.8] 04/05/2019 04/09/2019 Yes    Diabetic ketoacidosis  without coma associated with type 2 diabetes mellitus [E11.10] 04/02/2019 04/09/2019 Yes    Trichomonas infection [A59.9] 04/01/2019 04/09/2019 Yes         Recommendations:     Post-discharge complication risks: Falls    Stroke Education given to: patient    Follow-up in Stroke Clinic in  4-6 weeks.     Discharge Plan:  Antithrombotics: Aspirin 81 mg  Statin: Atorvastatin 40 mg  Smoking Cessation  Aggresive risk factor modification:  Hypertension  Smoking  Diabetes  High Cholesterol    Follow Up:  Follow-up Information     Alireza Sosa MD.    Specialty:  Family Medicine  Why:  Outpatient Services  Contact information:  [address] [id number] [phone]             Alireza Sosa MD In 1 week.    Specialty:  Family Medicine  Why:  hospital follow up  Contact information:  [address] [id number] [phone]             [facility] VASCULAR NEUROLOGY In 4 weeks.    Specialty:  Vascular Neurology  Why:  hospital follow up  Contact information:  [address]  [facility] [id number] [phone]                 Patient Instructions:      Ambulatory Referral to Endocrinology   Referral Priority: Routine Referral Type: Consultation   Requested Specialty: Endocrinology   Number of Visits Requested: 1     Ambulatory referral to Endocrinology   Referral Priority: Routine Referral Type: Consultation   Requested Specialty: Endocrinology   Number of Visits Requested: 1     Notify your health care provider if you experience any of the following:  temperature >100.4     Notify your health care provider if you experience any of the following:  persistent nausea and vomiting or diarrhea     Notify your health care provider if you experience any of the following:  severe uncontrolled pain     Notify your health care provider if you experience any of the following:  difficulty breathing or increased cough     Notify your health care provider if you experience any of the  following:  severe persistent headache     Notify your health care provider if you experience any of the following:  worsening rash     Notify your health care provider if you experience any of the following:  persistent dizziness, light-headedness, or visual disturbances     Notify your health care provider if you experience any of the following:  increased confusion or weakness     Activity as tolerated       Medications:  Reconciled Home Medications:      Medication List      START taking these medications    amLODIPine 10 MG tablet  Commonly known as:  NORVASC  Take 1 tablet (10 mg total) by mouth once daily.  Start taking on:  4/10/2019     aspirin 81 MG EC tablet  Commonly known as:  ECOTRIN  Take 1 tablet (81 mg total) by mouth once daily.  Start taking on:  4/10/2019     atorvastatin 40 MG tablet  Commonly known as:  LIPITOR  Take 1 tablet (40 mg total) by mouth once daily.  Start taking on:  4/10/2019     hydroCHLOROthiazide 50 MG tablet  Commonly known as:  HYDRODIURIL  Take 1 tablet (50 mg total) by mouth once daily.  Start taking on:  4/10/2019     * insulin aspart U-100 100 unit/mL Inpn pen  Commonly known as:  NovoLOG  Inject 1-10 Units into the skin before meals and at bedtime as needed (Hyperglycemia).     * insulin aspart U-100 100 unit/mL Inpn pen  Commonly known as:  NovoLOG  Inject 19 Units into the skin 3 (three) times daily.     insulin detemir U-100 100 unit/mL (3 mL) Inpn pen  Commonly known as:  LEVEMIR FLEXTOUCH  Inject 35 Units into the skin 2 (two) times daily.     olmesartan 40 MG tablet  Commonly known as:  BENICAR  Take 1 tablet (40 mg total) by mouth once daily.  Start taking on:  4/10/2019     pantoprazole 40 MG tablet  Commonly known as:  PROTONIX  Take 1 tablet (40 mg total) by mouth once daily.  Start taking on:  4/10/2019     ramelteon 8 mg tablet  Commonly known as:  ROZEREM  Take 1 tablet (8 mg total) by mouth every evening.         * This list has 2 medication(s) that are the  same as other medications prescribed for you. Read the directions carefully, and ask your doctor or other care provider to review them with you.            CHANGE how you take these medications    * albuterol 90 mcg/actuation inhaler  Commonly known as:  PROAIR HFA  inhale ONE TO TWO puffs EVERY 6 HOURS into lungs AS NEEDED FOR WHEEZING AND SHORTNESS OF BREATH  What changed:  Another medication with the same name was removed. Continue taking this medication, and follow the directions you see here.     * albuterol 1.25 mg/3 mL Nebu  Commonly known as:  ACCUNEB  USE ONE VIAL in Nebulizer EVERY 6 HOURS AS NEEDED  What changed:  Another medication with the same name was removed. Continue taking this medication, and follow the directions you see here.     blood-glucose meter kit  Commonly known as:  FREESTYLE SYSTEM KIT  Use daily- TID  What changed:  Another medication with the same name was removed. Continue taking this medication, and follow the directions you see here.     * diclofenac sodium 1 % Gel  Commonly known as:  VOLTAREN  Apply 2 g topically 4 (four) times daily.  What changed:  Another medication with the same name was added. Make sure you understand how and when to take each.     * diclofenac sodium 1 % Gel  Commonly known as:  VOLTAREN  Apply topically 4 (four) times daily.  What changed:  You were already taking a medication with the same name, and this prescription was added. Make sure you understand how and when to take each.     metFORMIN 1000 MG tablet  Commonly known as:  GLUCOPHAGE  Take 1 tablet (1,000 mg total) by mouth 2 (two) times daily with meals.  What changed:  Another medication with the same name was removed. Continue taking this medication, and follow the directions you see here.     * nicotine 21 mg/24 hr  Commonly known as:  NICODERM CQ  Place 1 patch onto the skin once daily.  What changed:  Another medication with the same name was added. Make sure you understand how and when to take  each.     * nicotine 21 mg/24 hr  Commonly known as:  NICODERM CQ  Place 1 patch onto the skin once daily.  Start taking on:  4/10/2019  What changed:  You were already taking a medication with the same name, and this prescription was added. Make sure you understand how and when to take each.         * This list has 6 medication(s) that are the same as other medications prescribed for you. Read the directions carefully, and ask your doctor or other care provider to review them with you.            CONTINUE taking these medications    SITagliptin 100 MG Tab  Commonly known as:  JANUVIA  Take 1 tablet (100 mg total) by mouth once daily.     traMADol 50 mg tablet  Commonly known as:  ULTRAM  Take 1 tablet (50 mg total) by mouth every 6 (six) hours as needed for Pain.        STOP taking these medications    amlodipine-valsartan  mg per tablet  Commonly known as:  EXFORGE     amLODIPine-valsartan-hcthiazid -25 mg Tab  Commonly known as:  EXFORGE HCT     azithromycin 250 MG tablet  Commonly known as:  Z-JEANNIE     blood sugar diagnostic Strp  Commonly known as:  TRUE METRIX GLUCOSE TEST STRIP     BRISDELLE 7.5 mg Cap  Generic drug:  PARoxetine mesylate(menop.sym)     dapagliflozin 5 mg Tab tablet  Commonly known as:  FARXIGA     diazePAM 10 MG Tab  Commonly known as:  VALIUM     dicyclomine 20 mg tablet  Commonly known as:  BENTYL     diphenoxylate-atropine 2.5-0.025 mg 2.5-0.025 mg per tablet  Commonly known as:  LOMOTIL     dulaglutide 0.75 mg/0.5 mL Pnij  Commonly known as:  TRULICITY     etodolac 400 MG tablet  Commonly known as:  LODINE     FLUoxetine 20 MG capsule     FLUoxetine 20 MG tablet     fluticasone 50 mcg/actuation nasal spray  Commonly known as:  FLONASE     fluticasone-salmeterol 100-50 mcg/dose 100-50 mcg/dose diskus inhaler  Commonly known as:  ADVAIR DISKUS     glipiZIDE 10 MG tablet  Commonly known as:  GLUCOTROL     HYDROcodone-acetaminophen  mg per tablet  Commonly known as:  NORCO    "  HYDROcodone-acetaminophen 5-325 mg per tablet  Commonly known as:  NORCO     hydrOXYzine HCl 25 MG tablet  Commonly known as:  ATARAX     hydrOXYzine pamoate 50 MG Cap  Commonly known as:  VISTARIL     ibuprofen 800 MG tablet  Commonly known as:  ADVIL,MOTRIN     insulin glargine 100 unit/mL injection  Commonly known as:  LANTUS     insulin glargine 100 units/mL (3mL) SubQ pen  Commonly known as:  LANTUS SOLOSTAR U-100 INSULIN     insulin lispro 100 unit/mL pen  Commonly known as:  HumaLOG KwikPen Insulin     insulin lispro protamin-lispro 100 unit/mL (50-50) Inpn     INVOKANA 100 mg Tab  Generic drug:  canagliflozin     ketoconazole 2 % cream  Commonly known as:  NIZORAL     ketoconazole 2 % Foam     latanoprost 0.005 % ophthalmic solution     loratadine 10 mg tablet  Commonly known as:  CLARITIN     LORazepam 1 MG tablet  Commonly known as:  ATIVAN     metoprolol succinate 50 MG 24 hr tablet  Commonly known as:  TOPROL-XL     naproxen 500 MG tablet  Commonly known as:  NAPROSYN     omeprazole 40 MG capsule  Commonly known as:  PRILOSEC     ondansetron 8 MG Tbdl  Commonly known as:  ZOFRAN-ODT     oxyCODONE-acetaminophen  mg per tablet  Commonly known as:  PERCOCET     pen needle, diabetic 30 gauge x 1/3" Ndle  Commonly known as:  NOVOFINE 30     promethazine 25 MG tablet  Commonly known as:  PHENERGAN     rosuvastatin 10 MG tablet  Commonly known as:  CRESTOR     silver sulfADIAZINE 1% 1 % cream  Commonly known as:  SILVADENE     SURE COMFORT INSULIN SYRINGE 0.5 mL 30 gauge x 1/2" Syrg  Generic drug:  insulin syringe-needle U-100     SURE COMFORT PEN NEEDLE 31 gauge x 3/16" Ndle  Generic drug:  pen needle, diabetic     terconazole 0.8 % vaginal cream  Commonly known as:  TERAZOL 3     TRADJENTA 5 mg Tab tablet  Generic drug:  linagliptin     TRUE METRIX GLUCOSE TEST STRIP Strp  Generic drug:  blood sugar diagnostic     ULTICARE PEN NEEDLE 32 gauge x 5/32" Ndle  Generic drug:  pen needle, diabetic   "   varenicline 0.5 mg (11)- 1 mg (42) tablet  Commonly known as:  CHANTIX JEANNIE            Emilia Varghese NP  [facility]  Department of Vascular Neurology   [facility]

## 2019-04-09 NOTE — SUBJECTIVE & OBJECTIVE
Neurologic Chief Complaint: dysarthria, BLE weakness    Subjective:     Interval History: Patient is seen for follow-up neurological assessment and treatment recommendations:   Plan to discharge to rehab today. Ambulatory referral to Endocrinology ordered.     HPI, Past Medical, Family, and Social History remains the same as documented in the initial encounter.     Review of Systems   HENT: Negative for trouble swallowing.    Respiratory: Negative for chest tightness and wheezing.    Cardiovascular: Negative for chest pain and palpitations.   Gastrointestinal: Negative for diarrhea, nausea and vomiting.   Musculoskeletal: Positive for arthralgias, gait problem and myalgias.   Neurological: Positive for speech difficulty, weakness and numbness.   Psychiatric/Behavioral: Positive for decreased concentration.     Scheduled Meds:   amLODIPine  10 mg Oral Daily    aspirin  81 mg Oral Daily    atorvastatin  40 mg Oral Daily    diclofenac sodium   Topical (Top) QID    heparin (porcine)  5,000 Units Subcutaneous Q8H    hydroCHLOROthiazide  50 mg Oral Daily    insulin aspart U-100  19 Units Subcutaneous TIDWM    insulin detemir U-100  35 Units Subcutaneous BID    nicotine  1 patch Transdermal Daily    olmesartan  40 mg Oral Daily    pantoprazole  40 mg Oral Daily    ramelteon  8 mg Oral QHS     Continuous Infusions:   sodium chloride 0.9%       PRN Meds:acetaminophen, albuterol-ipratropium, dextrose 10 % in water (D10W), dextrose 10 % in water (D10W), dextrose 50%, dextrose 50%, glucagon (human recombinant), glucose, glucose, hydrALAZINE, insulin aspart U-100, ondansetron, ondansetron, sodium chloride 0.9%, sodium chloride 0.9%    Objective:     Vital Signs (Most Recent):  Temp: 98.9 °F (37.2 °C) (04/09/19 1116)  Pulse: 95 (04/09/19 1119)  Resp: 18 (04/09/19 1116)  BP: 125/76 (04/09/19 1116)  SpO2: 95 % (04/09/19 1116)  BP Location: Left arm    Vital Signs Range (Last 24H):  Temp:  [98.4 °F (36.9 °C)-99.4 °F  (37.4 °C)]   Pulse:  [86-98]   Resp:  [17-18]   BP: ()/(59-88)   SpO2:  [93 %-96 %]   BP Location: Left arm    Physical Exam   Constitutional: She appears well-developed and well-nourished.   HENT:   Head: Normocephalic and atraumatic.   Right Ear: External ear normal.   Left Ear: External ear normal.   Nose: Nose normal.   Mouth/Throat: Uvula is midline and oropharynx is clear and moist.   Eyes: Pupils are equal, round, and reactive to light. Conjunctivae and EOM are normal.   Neck: Normal range of motion. Neck supple.   Cardiovascular: Normal rate.   Pulmonary/Chest: Effort normal.   Abdominal: Soft.   Psychiatric: She is inattentive.       Neurological Exam: Exam limited- patient not participative   LOC: drowsy  Attention Span: poor  Language: No aphasia  Articulation: Dysarthria  Orientation: Person, Place, Time   Visual Fields: Full  EOM (CN III, IV, VI): Full/intact  Pupils (CN II, III): PERRL  Facial Sensation (CN V): Normal  Facial Movement (CN VII): Symmetric facial expression    Motor: Arm left  Paresis: 4/5  Leg left  Paresis: 4/5  Arm right  Paresis: 4/5  Leg right Paresis: 4/5        Laboratory:  CMP: No results for input(s): GLUCOSE, CALCIUM, ALBUMIN, PROT, NA, K, CO2, CL, BUN, CREATININE, ALKPHOS, ALT, AST, BILITOT in the last 24 hours.  BMP:   Recent Labs   Lab 04/07/19  0312      K 3.8      CO2 26   BUN 17   CREATININE 1.2   CALCIUM 9.8     CBC:   Recent Labs   Lab 04/03/19  0422   WBC 8.45   RBC 4.69   HGB 13.1   HCT 39.2      MCV 84   MCH 27.9   MCHC 33.4     Lipid Panel: No results for input(s): CHOL, LDLCALC, HDL, TRIG in the last 168 hours.  Coagulation:   No results for input(s): PT, INR, APTT in the last 168 hours.  Platelet Aggregation Study: No results for input(s): PLTAGG, PLTAGINTERP, PLTAGREGLACO, ADPPLTAGGREG in the last 168 hours.  Hgb A1C:   No results for input(s): HGBA1C in the last 168 hours.  TSH:   No results for input(s): TSH in the last 168  hours.    Diagnostic Results     Brain Imaging   04/01/19 MRI Brain w/ w/o contrast:  Advanced involutional change.  Acute infarcts left putamen, posterolateral left thalamus and left corona radiata and deep white matter adjacent to the left ventricular trigone.  Remote lacunar infarcts left cerebellum, right thalamus, right external capsule, right corona radiata, bilateral basal ganglia and bilateral deep frontal white matter.  Supratentorial and pontine white matter T2/flair hyperintense signal foci suggesting sequela of chronic small vessel ischemic change.  Left sphenoid sinus disease.             04/01/19 CT head w/o contrast:  No acute large vascular territory infarct or intracranial hemorrhage identified.  Further evaluation/follow-up as warranted.  Periventricular white matter hypoattenuation, likely sequela of chronic small vessel ischemic change.  Few more focal areas of hypoattenuation at the right basal ganglia, left internal capsule and left caudate suggesting age-indeterminate lacunar type infarcts.  Correlate clinically.     Vessel Imaging   04/02/19 CTA head, neck:  CTA:  No hemodynamically significant stenosis or large vessel occlusion identified.     Cardiac Imaging   04/02/19 TTE Complete:  · Normal left ventricular systolic function. The estimated ejection fraction is 68%  · Concentric left ventricular remodeling.  · Normal LV diastolic function.  · No wall motion abnormalities.  · Normal right ventricular systolic function.  · Mild mitral sclerosis.  · Normal central venous pressure (3 mm Hg).  · The estimated PA systolic pressure is 15 mm Hg

## 2019-04-09 NOTE — ASSESSMENT & PLAN NOTE
- Poor historian with poor medical literacy. Reported she takes either 26 or 36 u long acting insulin daily and no meal time insulin. But pt unsure and telling providers different numbers.  - HgA1c has consistently been >14, indicating poor control generally. Currently with no gap, normal bicarb level, normal pH 7.35.  - Started on insulin gtt with POCT glu Q1h. Was switched to detemir 18U BID and MDSSI on 4/2  - Started on diabetic diet given no hx of dysphagia or aspiration.  - Due to high fasting BG, will increase detemir to 35 BID. Decrease aspart to 18 unit TID w/meals. Moderate dose SSI.  - At discharge, stop glipizide. Can continue metformin and januvia  - cont to monitor POCT glc  - Will need close follow up upon discharge with PCP/Endocrinologist for further titration of insulin needs. Would also benefit from Diabetes boot camp/education upon discharge.

## 2019-04-09 NOTE — ASSESSMENT & PLAN NOTE
-Stroke risk factor. Patient reports glucose at home has been >500.  -DKA confirmed on admission, now with hyperglycemia without anion gap or ketosis   -Hospital medicine consulted--detemir 31 U bid, aspart 15 U tidwm started    04/08/19: Blood glucose remain elevated. Patient counseled on the importance of adhering to diabetic diet restrictions.     04/09/19: Blood glucose better controlled overnight. Ambulatory referral for endocrinology ordered.

## 2019-04-11 NOTE — PT/OT/SLP DISCHARGE
Physical Therapy Discharge Summary    Name: Emily Martinez  MRN: [id number]   Principal Problem: Cerebrovascular accident (CVA) due to embolism of left middle cerebral artery     Patient Discharged from acute Physical Therapy on 4/9/2019.  Please refer to prior PT noted date on 4/8/2019 for functional status.     Assessment:     Patient appropriate for care in another setting.    Objective:     GOALS:   Multidisciplinary Problems     Physical Therapy Goals        Problem: Physical Therapy Goal    Goal Priority Disciplines Outcome Goal Variances Interventions   Physical Therapy Goal     PT, PT/OT Revised     Description:    Goals to be met by 4/12    1. Pt will perform rolling to the R and L with SBA. - MET  2. Pt will perform supine to sit from both sides of the bed with SBA. - MET  3. Pt will perform sit to supine with SBA. - MET  4. Pt will perform sit to stand transfers with SBA.    5. Pt will perform bed <> chair transfers with SBA.  6. Pt will perform gait x 150 feet with CGA and least restrictive assistive device  7. Patient will ascend and descend 15 steps with L rail and CGA to safely access home environment.                        Reasons for Discontinuation of Therapy Services  Transfer to alternate level of care.      Plan:     Patient Discharged to: Inpatient Rehab.    Jacy Forbes, PT  4/11/2019

## 2019-04-24 ENCOUNTER — TELEPHONE (OUTPATIENT)
Dept: NEUROLOGY | Facility: CLINIC | Age: 54
End: 2019-04-24

## 2019-04-24 NOTE — TELEPHONE ENCOUNTER
----- Message from Julia Fermin RN sent at 4/10/2019  8:00 AM CDT -----  Please schedule a neuro followup appt for 4-6 weeks. Patient was inpatient and seen by Dr Vaughn. Please contact patient with date and time of appt.

## 2019-04-29 ENCOUNTER — HOME CARE VISIT (OUTPATIENT)
Dept: NEUROLOGY | Facility: HOSPITAL | Age: 54
End: 2019-04-29

## 2019-05-05 VITALS — DIASTOLIC BLOOD PRESSURE: 90 MMHG | OXYGEN SATURATION: 97 % | SYSTOLIC BLOOD PRESSURE: 160 MMHG | HEART RATE: 77 BPM

## 2019-05-05 NOTE — PROGRESS NOTES
Ms. Martinez BP is slightly elevated, she has not taken her yet. She denies any personal history of stroke has familial history of stroke. CBG this am was 128. PMH of uncontrolled HTN, DM, And smoking. She still smokes 3-5 cigarettes a day, prior usage was a 1/2-1 pack a day. LHE educated patient on purpose of SM program, stroke recovery of 1-2 yrs, smoking cessation to reduce stroke reoccurrence,  and stroke S/S including what to do if they occur.

## 2019-05-30 ENCOUNTER — HOME CARE VISIT (OUTPATIENT)
Dept: NEUROLOGY | Facility: HOSPITAL | Age: 54
End: 2019-05-30

## 2019-06-04 VITALS
SYSTOLIC BLOOD PRESSURE: 130 MMHG | BODY MASS INDEX: 25.51 KG/M2 | HEART RATE: 79 BPM | DIASTOLIC BLOOD PRESSURE: 90 MMHG | OXYGEN SATURATION: 99 % | WEIGHT: 144 LBS

## 2019-06-04 NOTE — PROGRESS NOTES
Ms. Martinez VS are WNL on today's visit. She denies any ER visits or hospital stays. Patient continues to smoke daily. She has outpatient therapy for PT, OT, and ST services. Patient is careless with medication and forgets to take medications sometimes. LHE reinforced education on medication compliance.

## 2019-06-27 ENCOUNTER — HOSPITAL ENCOUNTER (INPATIENT)
Facility: HOSPITAL | Age: 54
LOS: 3 days | Discharge: HOME HEALTH | DRG: 064 | End: 2019-06-30
Attending: EMERGENCY MEDICINE | Admitting: PSYCHIATRY & NEUROLOGY
Payer: MEDICAID

## 2019-06-27 DIAGNOSIS — N17.9 AKI (ACUTE KIDNEY INJURY): ICD-10-CM

## 2019-06-27 DIAGNOSIS — I63.311 THROMBOTIC STROKE INVOLVING RIGHT MIDDLE CEREBRAL ARTERY: Primary | ICD-10-CM

## 2019-06-27 DIAGNOSIS — I10 ESSENTIAL HYPERTENSION: ICD-10-CM

## 2019-06-27 DIAGNOSIS — I63.9 STROKE: ICD-10-CM

## 2019-06-27 DIAGNOSIS — Z91.199 NONCOMPLIANCE WITH DIET AND MEDICATION REGIMEN: ICD-10-CM

## 2019-06-27 DIAGNOSIS — R73.9 HYPERGLYCEMIA: ICD-10-CM

## 2019-06-27 DIAGNOSIS — E11.65 UNCONTROLLED TYPE 2 DIABETES MELLITUS WITH HYPERGLYCEMIA: ICD-10-CM

## 2019-06-27 DIAGNOSIS — Z91.148 NONCOMPLIANCE WITH DIET AND MEDICATION REGIMEN: ICD-10-CM

## 2019-06-27 DIAGNOSIS — E83.52 HYPERCALCEMIA: ICD-10-CM

## 2019-06-27 PROBLEM — R53.1 RIGHT SIDED WEAKNESS: Status: ACTIVE | Noted: 2019-06-27

## 2019-06-27 LAB
ALBUMIN SERPL BCP-MCNC: 3.7 G/DL (ref 3.5–5.2)
ALLENS TEST: ABNORMAL
ALP SERPL-CCNC: 99 U/L (ref 55–135)
ALT SERPL W/O P-5'-P-CCNC: 15 U/L (ref 10–44)
ANION GAP SERPL CALC-SCNC: 15 MMOL/L (ref 8–16)
AST SERPL-CCNC: 16 U/L (ref 10–40)
BACTERIA #/AREA URNS AUTO: ABNORMAL /HPF
BASOPHILS # BLD AUTO: 0.04 K/UL (ref 0–0.2)
BASOPHILS NFR BLD: 0.5 % (ref 0–1.9)
BILIRUB SERPL-MCNC: 0.4 MG/DL (ref 0.1–1)
BILIRUB UR QL STRIP: NEGATIVE
BUN SERPL-MCNC: 27 MG/DL (ref 6–20)
CALCIUM SERPL-MCNC: 11 MG/DL (ref 8.7–10.5)
CHLORIDE SERPL-SCNC: 88 MMOL/L (ref 95–110)
CHOLEST SERPL-MCNC: 176 MG/DL (ref 120–199)
CHOLEST/HDLC SERPL: 3.1 {RATIO} (ref 2–5)
CLARITY UR REFRACT.AUTO: ABNORMAL
CO2 SERPL-SCNC: 29 MMOL/L (ref 23–29)
COLOR UR AUTO: YELLOW
CREAT SERPL-MCNC: 3.8 MG/DL (ref 0.5–1.4)
CREAT SERPL-MCNC: 4.2 MG/DL (ref 0.5–1.4)
DELSYS: ABNORMAL
DIFFERENTIAL METHOD: ABNORMAL
EOSINOPHIL # BLD AUTO: 0.1 K/UL (ref 0–0.5)
EOSINOPHIL NFR BLD: 0.6 % (ref 0–8)
ERYTHROCYTE [DISTWIDTH] IN BLOOD BY AUTOMATED COUNT: 12.6 % (ref 11.5–14.5)
EST. GFR  (AFRICAN AMERICAN): 12.8 ML/MIN/1.73 M^2
EST. GFR  (NON AFRICAN AMERICAN): 11.1 ML/MIN/1.73 M^2
ESTIMATED AVG GLUCOSE: ABNORMAL MG/DL (ref 68–131)
GLUCOSE SERPL-MCNC: 512 MG/DL (ref 70–110)
GLUCOSE UR QL STRIP: ABNORMAL
HBA1C MFR BLD HPLC: >14 % (ref 4–5.6)
HCO3 UR-SCNC: 28.6 MMOL/L (ref 24–28)
HCT VFR BLD AUTO: 44.8 % (ref 37–48.5)
HDLC SERPL-MCNC: 56 MG/DL (ref 40–75)
HDLC SERPL: 31.8 % (ref 20–50)
HGB BLD-MCNC: 14.9 G/DL (ref 12–16)
HGB UR QL STRIP: NEGATIVE
HYALINE CASTS UR QL AUTO: 10 /LPF
IMM GRANULOCYTES # BLD AUTO: 0.02 K/UL (ref 0–0.04)
IMM GRANULOCYTES NFR BLD AUTO: 0.2 % (ref 0–0.5)
INR PPP: 0.9 (ref 0.8–1.2)
KETONES UR QL STRIP: NEGATIVE
LDLC SERPL CALC-MCNC: 84.6 MG/DL (ref 63–159)
LEUKOCYTE ESTERASE UR QL STRIP: ABNORMAL
LYMPHOCYTES # BLD AUTO: 2.6 K/UL (ref 1–4.8)
LYMPHOCYTES NFR BLD: 30.7 % (ref 18–48)
MAGNESIUM SERPL-MCNC: 1.9 MG/DL (ref 1.6–2.6)
MCH RBC QN AUTO: 28.9 PG (ref 27–31)
MCHC RBC AUTO-ENTMCNC: 33.3 G/DL (ref 32–36)
MCV RBC AUTO: 87 FL (ref 82–98)
MICROSCOPIC COMMENT: ABNORMAL
MONOCYTES # BLD AUTO: 0.6 K/UL (ref 0.3–1)
MONOCYTES NFR BLD: 7.3 % (ref 4–15)
NEUTROPHILS # BLD AUTO: 5.1 K/UL (ref 1.8–7.7)
NEUTROPHILS NFR BLD: 60.7 % (ref 38–73)
NITRITE UR QL STRIP: NEGATIVE
NONHDLC SERPL-MCNC: 120 MG/DL
NRBC BLD-RTO: 0 /100 WBC
OSMOLALITY SERPL: 306 MOSM/KG (ref 275–295)
PCO2 BLDA: 48.3 MMHG (ref 35–45)
PH SMN: 7.38 [PH] (ref 7.35–7.45)
PH UR STRIP: 5 [PH] (ref 5–8)
PLATELET # BLD AUTO: 353 K/UL (ref 150–350)
PMV BLD AUTO: 11.6 FL (ref 9.2–12.9)
PO2 BLDA: 19 MMHG (ref 80–100)
POC BE: 4 MMOL/L
POC PTINR: 0.9 (ref 0.9–1.2)
POC PTWBT: 11 SEC (ref 9.7–14.3)
POC SATURATED O2: 28 % (ref 95–100)
POC TCO2: 30 MMOL/L (ref 23–27)
POTASSIUM SERPL-SCNC: 4.3 MMOL/L (ref 3.5–5.1)
PROT SERPL-MCNC: 7.8 G/DL (ref 6–8.4)
PROT UR QL STRIP: ABNORMAL
PROTHROMBIN TIME: 9.6 SEC (ref 9–12.5)
RBC # BLD AUTO: 5.16 M/UL (ref 4–5.4)
RBC #/AREA URNS AUTO: 3 /HPF (ref 0–4)
SAMPLE: ABNORMAL
SAMPLE: ABNORMAL
SAMPLE: NORMAL
SITE: ABNORMAL
SODIUM SERPL-SCNC: 132 MMOL/L (ref 136–145)
SP GR UR STRIP: 1.02 (ref 1–1.03)
SQUAMOUS #/AREA URNS AUTO: 12 /HPF
TRIGL SERPL-MCNC: 177 MG/DL (ref 30–150)
TSH SERPL DL<=0.005 MIU/L-ACNC: 0.91 UIU/ML (ref 0.4–4)
URN SPEC COLLECT METH UR: ABNORMAL
WBC # BLD AUTO: 8.36 K/UL (ref 3.9–12.7)
WBC #/AREA URNS AUTO: 8 /HPF (ref 0–5)
YEAST UR QL AUTO: ABNORMAL

## 2019-06-27 PROCEDURE — 83930 ASSAY OF BLOOD OSMOLALITY: CPT

## 2019-06-27 PROCEDURE — 36600 WITHDRAWAL OF ARTERIAL BLOOD: CPT

## 2019-06-27 PROCEDURE — 93010 EKG 12-LEAD: ICD-10-PCS | Mod: ,,, | Performed by: INTERNAL MEDICINE

## 2019-06-27 PROCEDURE — 93010 ELECTROCARDIOGRAM REPORT: CPT | Mod: ,,, | Performed by: INTERNAL MEDICINE

## 2019-06-27 PROCEDURE — 83735 ASSAY OF MAGNESIUM: CPT

## 2019-06-27 PROCEDURE — 80061 LIPID PANEL: CPT

## 2019-06-27 PROCEDURE — 94761 N-INVAS EAR/PLS OXIMETRY MLT: CPT

## 2019-06-27 PROCEDURE — 82565 ASSAY OF CREATININE: CPT

## 2019-06-27 PROCEDURE — 99232 SBSQ HOSP IP/OBS MODERATE 35: CPT | Mod: ,,, | Performed by: PSYCHIATRY & NEUROLOGY

## 2019-06-27 PROCEDURE — 80053 COMPREHEN METABOLIC PANEL: CPT

## 2019-06-27 PROCEDURE — 83036 HEMOGLOBIN GLYCOSYLATED A1C: CPT

## 2019-06-27 PROCEDURE — 25000003 PHARM REV CODE 250: Performed by: PHYSICIAN ASSISTANT

## 2019-06-27 PROCEDURE — 99900035 HC TECH TIME PER 15 MIN (STAT)

## 2019-06-27 PROCEDURE — 63600175 PHARM REV CODE 636 W HCPCS: Performed by: PSYCHIATRY & NEUROLOGY

## 2019-06-27 PROCEDURE — 84443 ASSAY THYROID STIM HORMONE: CPT

## 2019-06-27 PROCEDURE — 85025 COMPLETE CBC W/AUTO DIFF WBC: CPT

## 2019-06-27 PROCEDURE — 85610 PROTHROMBIN TIME: CPT

## 2019-06-27 PROCEDURE — 99291 CRITICAL CARE FIRST HOUR: CPT | Mod: 25

## 2019-06-27 PROCEDURE — 20600001 HC STEP DOWN PRIVATE ROOM

## 2019-06-27 PROCEDURE — 99232 PR SUBSEQUENT HOSPITAL CARE,LEVL II: ICD-10-PCS | Mod: ,,, | Performed by: PSYCHIATRY & NEUROLOGY

## 2019-06-27 PROCEDURE — 82803 BLOOD GASES ANY COMBINATION: CPT

## 2019-06-27 PROCEDURE — 81001 URINALYSIS AUTO W/SCOPE: CPT

## 2019-06-27 PROCEDURE — 99291 PR CRITICAL CARE, E/M 30-74 MINUTES: ICD-10-PCS | Mod: ,,, | Performed by: EMERGENCY MEDICINE

## 2019-06-27 PROCEDURE — 99291 CRITICAL CARE FIRST HOUR: CPT | Mod: ,,, | Performed by: EMERGENCY MEDICINE

## 2019-06-27 PROCEDURE — 93005 ELECTROCARDIOGRAM TRACING: CPT

## 2019-06-27 PROCEDURE — 82962 GLUCOSE BLOOD TEST: CPT

## 2019-06-27 RX ORDER — CLOPIDOGREL BISULFATE 75 MG/1
75 TABLET ORAL DAILY
Status: DISCONTINUED | OUTPATIENT
Start: 2019-06-28 | End: 2019-06-30 | Stop reason: HOSPADM

## 2019-06-27 RX ORDER — ASPIRIN 81 MG/1
81 TABLET ORAL DAILY
Status: DISCONTINUED | OUTPATIENT
Start: 2019-06-28 | End: 2019-06-30 | Stop reason: HOSPADM

## 2019-06-27 RX ORDER — SODIUM CHLORIDE 9 MG/ML
INJECTION, SOLUTION INTRAVENOUS CONTINUOUS
Status: DISCONTINUED | OUTPATIENT
Start: 2019-06-27 | End: 2019-06-30 | Stop reason: HOSPADM

## 2019-06-27 RX ORDER — ATORVASTATIN CALCIUM 20 MG/1
40 TABLET, FILM COATED ORAL DAILY
Status: DISCONTINUED | OUTPATIENT
Start: 2019-06-28 | End: 2019-06-30 | Stop reason: HOSPADM

## 2019-06-27 RX ORDER — SODIUM CHLORIDE 0.9 % (FLUSH) 0.9 %
10 SYRINGE (ML) INJECTION
Status: DISCONTINUED | OUTPATIENT
Start: 2019-06-27 | End: 2019-06-30 | Stop reason: HOSPADM

## 2019-06-27 RX ADMIN — INSULIN HUMAN 7 UNITS: 100 INJECTION, SOLUTION PARENTERAL at 06:06

## 2019-06-27 RX ADMIN — SODIUM CHLORIDE 1000 ML: 0.9 INJECTION, SOLUTION INTRAVENOUS at 07:06

## 2019-06-27 NOTE — PROGRESS NOTES
Pt returned to MRI for additional testing / NAD and no acute change noted / pt changed from portable monitor to MRI monitor

## 2019-06-27 NOTE — ASSESSMENT & PLAN NOTE
54 y.o. female with significant past medical history of DM, HTN, HLD, left PCA territory infarcts last April with residual mild R sided weakness and dysarthria, presents to the ED with complains of worsening speech since around 3 pm yesterday and increasing R leg weakness that became apparent today and interferes with her ability to ambulate with her walker.  Neuro exam at times inconsistent.  Concern for a new L subcortical infarct vs recrudescence of previous deficits in the context of occult UTI or metabolic derangements.  Recommend MRI brain to better elucidate her syndrome.'  Plan: admit to our stroke service if MRI confirms acute infarct. Otherwise, consider admission to medicine with general neuro and PT consults as she reports inability to ambulate.

## 2019-06-27 NOTE — ED NOTES
Unable to get blood return from IV for beta hydroxy and VBG. JUAN Diamond looking to obtain blood.

## 2019-06-27 NOTE — ED NOTES
Still unable to obtain bloodwork. Discussed with ALEXIA Pickens, will change order to ABG. Dara grabbing ultrasound machine to obtain IV access and bloodwork.

## 2019-06-27 NOTE — SUBJECTIVE & OBJECTIVE
Past Medical History:   Diagnosis Date    Diabetes mellitus type I     Hyperlipidemia     Hypertension      Past Surgical History:   Procedure Laterality Date     SECTION       Family History   Problem Relation Age of Onset    Hypertension Mother     Stroke Mother     Hyperlipidemia Mother     Diabetes Mother     Hypertension Sister     Cancer Sister     Hyperlipidemia Sister     Diabetes Sister     Hypertension Brother     Hyperlipidemia Brother     Diabetes Brother     Heart disease Maternal Aunt     Diabetes Maternal Aunt      Social History     Tobacco Use    Smoking status: Current Every Day Smoker     Packs/day: 1.50     Years: 35.00     Pack years: 52.50     Types: Cigarettes    Smokeless tobacco: Never Used   Substance Use Topics    Alcohol use: Not Currently    Drug use: Not Currently     Review of patient's allergies indicates:   Allergen Reactions    Sulfur        Medications: I have reviewed the current medication administration record.      (Not in a hospital admission)    Review of Systems   Constitutional: Negative for chills, diaphoresis and fever.   HENT: Negative for hearing loss, tinnitus and trouble swallowing.    Eyes: Negative for photophobia and visual disturbance.   Respiratory: Negative for shortness of breath.    Cardiovascular: Negative for chest pain, palpitations and leg swelling.   Gastrointestinal: Positive for vomiting. Negative for abdominal pain.   Endocrine: Negative for cold intolerance.   Genitourinary: Negative for hematuria.   Musculoskeletal: Positive for gait problem. Negative for neck pain and neck stiffness.   Allergic/Immunologic: Negative for immunocompromised state.   Neurological: Positive for facial asymmetry, speech difficulty and weakness. Negative for dizziness, numbness and headaches.   Hematological: Does not bruise/bleed easily.   Psychiatric/Behavioral: Negative for agitation, behavioral problems and confusion.     Objective:      Vital Signs (Most Recent):  Temp: 98.6 °F (37 °C) (06/27/19 1448)  Pulse: 75 (06/27/19 1533)  Resp: (!) 38 (06/27/19 1533)  BP: 110/70 (06/27/19 1533)  SpO2: 98 % (06/27/19 1533)    Vital Signs Range (Last 24H):  Temp:  [96.8 °F (36 °C)-98.6 °F (37 °C)]   Pulse:  [74-77]   Resp:  [16-38]   BP: ()/(70-77)   SpO2:  [95 %-98 %]     Physical Exam   Constitutional: She is oriented to person, place, and time. She appears well-developed and well-nourished.   HENT:   Head: Normocephalic and atraumatic.   Eyes: Pupils are equal, round, and reactive to light. EOM are normal.   Neck: Neck supple.   Cardiovascular: Normal rate and regular rhythm.   Pulmonary/Chest: No respiratory distress.   Abdominal: She exhibits no mass.   Genitourinary:   Genitourinary Comments: No performed   Musculoskeletal: She exhibits no edema or deformity.   Neurological: She is alert and oriented to person, place, and time. A cranial nerve deficit is present. No sensory deficit. She exhibits normal muscle tone. Coordination normal.   Skin: No rash noted. She is not diaphoretic. No erythema.   Psychiatric: She has a normal mood and affect. Her behavior is normal.   Nursing note and vitals reviewed.      Neurological Exam:   LOC: alert  Attention Span: Good   Language: No aphasia  Articulation: Dysarthria  Orientation: Person, Place, Time   Visual Fields: Full  EOM (CN III, IV, VI): Full/intact  Pupils (CN II, III): PERRL  Facial Sensation (CN V): Normal  Facial Movement (CN VII): Lower facial weakness on the Right  Gag Reflex: present  Reflexes: 2+ throughout  Motor: Arm left  Normal 5/5  Leg left  Normal 5/5  Arm right  Normal 5/5  Leg right Paresis: 4/5  Cebellar: No evidence of appendicular or axial ataxia  Sensation: Intact to light touch, temperature and vibration  Tone: Normal tone throughout      Laboratory:  CMP: No results for input(s): GLUCOSE, CALCIUM, ALBUMIN, PROT, NA, K, CO2, CL, BUN, CREATININE, ALKPHOS, ALT, AST, BILITOT in  the last 24 hours.  CBC: No results for input(s): WBC, RBC, HGB, HCT, PLT, MCV, MCH, MCHC in the last 168 hours.  Lipid Panel: No results for input(s): CHOL, LDLCALC, HDL, TRIG in the last 168 hours.  Coagulation: No results for input(s): PT, INR, APTT in the last 168 hours.  Hgb A1C: No results for input(s): HGBA1C in the last 168 hours.  TSH: No results for input(s): TSH in the last 168 hours.    Diagnostic Results:      Brain imaging:  CTH pending    Vessel Imaging:  None    Cardiac Evaluation:   NSR

## 2019-06-27 NOTE — ED TRIAGE NOTES
Emily Martinez, a 54 y.o. female presents to the ED w/ complaint of right sided weakness and slurred speech around 3pm yesterday, dtrs state pt was slurred when talking on the phone yesterday. CVA in April.    Triage note:  Chief Complaint   Patient presents with    Extremity Weakness     right leg weakness and pain also slurred speech that started at 3pm yesterday per son.  Pt also reports that she has not urinated since yesterday.  Pt with past cva.     Review of patient's allergies indicates:   Allergen Reactions    Sulfur      Past Medical History:   Diagnosis Date    Diabetes mellitus type I     Hyperlipidemia     Hypertension     Right sided weakness 6/27/2019     Adult Physical Assessment  LOC: Emily Martinez, 54 y.o. female verified via two identifiers.  The patient is awake, alert, oriented and speech slurred at this time.   APPEARANCE: Patient resting comfortably and appears to be in no acute distress at this time. Patient is clean and well groomed, patient's clothing is properly fastened.  SKIN:The skin is warm and dry, color consistent with ethnicity, patient has normal skin turgor and moist mucus membranes, skin intact, no breakdown or brusing noted.  MUSCULOSKELETAL: Patient moving all extremities, no obvious swelling or deformities noted. Weakness to left arm and right leg- see neuro note.  RESPIRATORY: Airway is open and patent, respirations are spontaneous, patient has a normal effort and rate, no accessory muscle use noted.  CARDIAC: Patient has a normal rate and rhythm, no periphreal edema noted in any extremity, capillary refill < 3 seconds in all extremities  ABDOMEN: Soft and non tender to palpation, no abdominal distention noted. Bowel sounds present in all four quadrants.  NEUROLOGIC: Eyes open spontaneously, behavior appropriate to situation, follows commands, facial expression asymmetrical, left facial droop, bilateral hand grasp equal and even, purposeful motor response noted,  normal sensation in all extremities when touched with a finger.

## 2019-06-27 NOTE — HPI
Mrs Martinez is a 54 y.o. female with significant past medical history of DM, HTN, HLD, left PCA territory infarcts last April with residual mild R sided weakness and dysarthria, presents to the ED with complains of worsening speech since around 3 pm yesterday and increasing R leg weakness that became apparent today and interferes with her ability to ambulate with her walker.  Denies associated HA, vertigo, double vision, trouble swallowing, confusion, or visual disturbances.  Further, no recent fever or UTI symptoms.

## 2019-06-27 NOTE — ED TRIAGE NOTES
Emily Martinez, a 54 y.o. female presents to the ED w/ complaint of right leg and left arm weakness. Pt has flat nasolabial fold and slurred speech. Left facial droop and slurred speech, stated onset 3pm yesterday. Pt dtr states she spoke on the phone with pt yesterday and could not understand slurred speech.  Pt has difficulty walking. Previous stroke in April.    Triage note:  Chief Complaint   Patient presents with    Extremity Weakness     right leg weakness and pain also slurred speech that started at 3pm yesterday per son.  Pt also reports that she has not urinated since yesterday.  Pt with past cva.     Review of patient's allergies indicates:   Allergen Reactions    Sulfur      Past Medical History:   Diagnosis Date    Diabetes mellitus type I     Hyperlipidemia     Hypertension      Adult Physical Assessment  LOC: Emily Martinez, 54 y.o. female verified via two identifiers.  The patient is awake, alert, oriented and speaking appropriately at this time.  APPEARANCE: Patient resting comfortably and appears to be in no acute distress at this time. Patient is clean and well groomed, patient's clothing is properly fastened.  SKIN:The skin is warm and dry, color consistent with ethnicity, patient has normal skin turgor and moist mucus membranes, skin intact, no breakdown or brusing noted.  MUSCULOSKELETAL: Patient moving all extremities well, no obvious swelling or deformities noted. See neuro assessment for left arm and right leg weakness. Good +5 and equal  strength. Inability to stand alone.  RESPIRATORY: Airway is open and patent, respirations are spontaneous, patient has a normal effort and rate, no accessory muscle use noted.  CARDIAC: Patient has a normal rate and rhythm, no periphreal edema noted in any extremity, capillary refill < 3 seconds in all extremities  ABDOMEN: Soft and non tender to palpation, no abdominal distention noted. Bowel sounds present in all four quadrants.  NEUROLOGIC:  Eyes open spontaneously, behavior appropriate to situation, follows commands, facial expression asymmetrical, left facial droop, bilateral hand grasp equal and even, purposeful motor response noted, normal sensation in all extremities when touched with a finger.Pt notes tingling to right thigh.

## 2019-06-27 NOTE — ED PROVIDER NOTES
Encounter Date: 2019       History     Chief Complaint   Patient presents with    Extremity Weakness     right leg weakness and pain also slurred speech that started at 3pm yesterday per son.  Pt also reports that she has not urinated since yesterday.  Pt with past cva.     54 year old female with medical history of T2DM, HLD, HTN, Hx of CVA presenting to the ED with the chief complaint of slurred speech and extremity weakness. History provided by patient and family members at bedside. Patient's daughter reports first noticing patient's slurred speech yesterday around 3pm while talking to her on the phone. Patient reports developing bilateral arm weakness and right leg weakness yesterday evening. She reports associated difficulty ambulating and right anterior thigh pain. She saw her PCP today for a routine follow-up who referred her to the ED for further evaluation. Patient reports having bilateral leg weakness and slurred speech with her previous stroke in 2019 that resolved. Patient's family members state she appeared at her baseline health 2 days ago and that her speech was normal at that time. She reports falling 3 days ago, but not clear if she experienced head trauma. Patient reports having difficulty urinating today, but states she was able to urinate without difficulties after being placed in her ED room.         Review of patient's allergies indicates:   Allergen Reactions    Sulfur      Past Medical History:   Diagnosis Date    Diabetes mellitus type I     Hyperlipidemia     Hypertension     Right sided weakness 2019     Past Surgical History:   Procedure Laterality Date     SECTION       Family History   Problem Relation Age of Onset    Hypertension Mother     Stroke Mother     Hyperlipidemia Mother     Diabetes Mother     Hypertension Sister     Cancer Sister     Hyperlipidemia Sister     Diabetes Sister     Hypertension Brother     Hyperlipidemia Brother      Diabetes Brother     Heart disease Maternal Aunt     Diabetes Maternal Aunt      Social History     Tobacco Use    Smoking status: Current Every Day Smoker     Packs/day: 1.50     Years: 35.00     Pack years: 52.50     Types: Cigarettes    Smokeless tobacco: Never Used   Substance Use Topics    Alcohol use: Not Currently    Drug use: Not Currently     Review of Systems   Constitutional: Negative for chills, diaphoresis and fever.   HENT: Negative for congestion, sore throat and trouble swallowing.    Eyes: Negative for pain and redness.   Respiratory: Negative for cough and shortness of breath.    Cardiovascular: Negative for chest pain.   Gastrointestinal: Negative for abdominal pain, constipation, diarrhea, nausea and vomiting.   Genitourinary: Positive for difficulty urinating. Negative for dysuria and hematuria.   Musculoskeletal: Positive for myalgias (R thigh).   Skin: Negative for wound.   Neurological: Positive for speech difficulty and weakness (B/L arms, R leg). Negative for light-headedness and headaches.     Physical Exam     Initial Vitals [06/27/19 1448]   BP Pulse Resp Temp SpO2   106/77 77 16 98.6 °F (37 °C) 98 %      MAP       --         Physical Exam    Constitutional: She appears well-developed and well-nourished. She is not diaphoretic. No distress.   HENT:   Head: Normocephalic and atraumatic.   Mouth/Throat: Oropharynx is clear and moist. No oropharyngeal exudate.   Eyes: EOM are normal. Pupils are equal, round, and reactive to light.   Neck: Normal range of motion. Neck supple.   Cardiovascular: Normal rate and regular rhythm.   Pulmonary/Chest: Breath sounds normal. No respiratory distress. She has no wheezes.   Abdominal: Soft. There is no tenderness.   Neurological: She is alert and oriented to person, place, and time.   Follows commands appropriately. Slurred speech. No facial droop. Slight dysdiadochokinesia with L hand movements. 3/5 strength BLE. Negative pronator drift.   Skin:  Skin is warm and dry.       ED Course   Procedures  Labs Reviewed   CBC W/ AUTO DIFFERENTIAL - Abnormal; Notable for the following components:       Result Value    Platelets 353 (*)     All other components within normal limits   COMPREHENSIVE METABOLIC PANEL - Abnormal; Notable for the following components:    Sodium 132 (*)     Chloride 88 (*)     Glucose 512 (*)     BUN, Bld 27 (*)     Creatinine 4.2 (*)     Calcium 11.0 (*)     eGFR if  12.8 (*)     eGFR if non  11.1 (*)     All other components within normal limits   LIPID PANEL - Abnormal; Notable for the following components:    Triglycerides 177 (*)     All other components within normal limits   URINALYSIS, REFLEX TO URINE CULTURE - Abnormal; Notable for the following components:    Appearance, UA Cloudy (*)     Protein, UA 1+ (*)     Glucose, UA 3+ (*)     Leukocytes, UA Trace (*)     All other components within normal limits    Narrative:     Preferred Collection Type->Urine, Clean Catch  yellow and grey   URINALYSIS MICROSCOPIC - Abnormal; Notable for the following components:    WBC, UA 8 (*)     Hyaline Casts, UA 10 (*)     All other components within normal limits    Narrative:     Preferred Collection Type->Urine, Clean Catch  yellow and grey   HEMOGLOBIN A1C - Abnormal; Notable for the following components:    Hemoglobin A1C >14.0 (*)     All other components within normal limits    Narrative:     add on ghgb [id number] per emeka tello  06/27/2019  17:05   add on OSMO #[id number] per Celio Rodrigues PA-C @ 17:36  06/27/2019   (green used)   OSMOLALITY, SERUM - Abnormal; Notable for the following components:    Osmolality 306 (*)     All other components within normal limits    Narrative:     add on ghgb [id number] per emeka tello  06/27/2019  17:05   add on OSMO #[id number] per Celio Rodrigues PA-C @ 17:36  06/27/2019   (green used)   ISTAT CREATININE - Abnormal; Notable for the following components:    POC  Creatinine 3.8 (*)     All other components within normal limits   ISTAT PROCEDURE - Abnormal; Notable for the following components:    POC PCO2 48.3 (*)     POC PO2 19 (*)     POC HCO3 28.6 (*)     POC SATURATED O2 28 (*)     POC TCO2 30 (*)     All other components within normal limits   MAGNESIUM   PROTIME-INR   TSH   OSMOLALITY, SERUM   BETA - HYDROXYBUTYRATE, SERUM   URINALYSIS   HEMOGLOBIN A1C   POCT GLUCOSE, HAND-HELD DEVICE   ISTAT PROCEDURE        ECG Results          ECG 12 lead (In process)  Result time 06/27/19 15:25:44    In process by Interface, Lab In [facility] (06/27/19 15:25:44)                 Narrative:    Test Reason : I63.9,    Vent. Rate : 072 BPM     Atrial Rate : 072 BPM     P-R Int : 128 ms          QRS Dur : 080 ms      QT Int : 434 ms       P-R-T Axes : 063 022 037 degrees     QTc Int : 475 ms    Normal sinus rhythm  Normal ECG  When compared with ECG of 01-APR-2019 20:00,  No significant change was found    Referred By: System System           Confirmed By:                             Imaging Results          MRA Brain (In process)  Result time 06/27/19 18:35:32               MRA Neck (In process)                MRI Brain Without Contrast (Final result)  Result time 06/27/19 16:37:58    Final result by Johny Valle DO (06/27/19 16:37:58)                 Impression:      Interval development of scattered foci of new signal abnormality most compatible with acute/recent infarcts as detailed above.  Largest focus in the right centrum semiovale extending to the basal ganglia with punctate foci within the left centrum semiovale and left posterior temporal occipital periventricular white matter.    Probable evolving infarcts bilateral basal ganglia with superimposed T2 flair signal abnormality elsewhere supratentorial white matter and vega suggestive for underlying chronic microvascular ischemic change.    Small remote left cerebellar infarction again seen.    No evidence for hemorrhagic  conversion or hydrocephalus.  Clinical correlation and follow-up advised.      Electronically signed by: Johny Valle DO  Date:    06/27/2019  Time:    16:37             Narrative:    EXAMINATION:  MRI BRAIN WITHOUT CONTRAST    CLINICAL HISTORY:  Stroke;    TECHNIQUE:  Sagittal and axial T1, axial T2, axial FLAIR, axial gradient and axial diffusion imaging of the whole brain without contrast.    COMPARISON:  04/01/2019    FINDINGS:  Interval development of a small sized focus of restricted diffusion within the right centrum semiovale extending to the basal ganglia measuring approximately 1.4 cm in greatest transverse dimension.  There is corresponding T2 flair signal hyperintensity most compatible with acute/recent infarction.  There is a smaller focus of restricted diffusion within the left deep parietal white matter and left occipital white matter.  In light of multiplicity concerning for embolic infarcts.  There is evolution of previous prior basal gangliar and left thalamic infarcts with heterogeneous diffusion hyperintensity and T2 FLAIR hyperintensity.  There is no significant mass effect or midline shift.  Alternative differential including new and prior areas of demyelination felt less likely.  There is superimposed numerous punctate and confluent regions of T2 FLAIR signal hyperintensity elsewhere in the supratentorial white matter with ill-defined component in the vega concerning for underlying mild moderate degree of chronic ischemic change.  There is a small remote left cerebellar infarction which is unchanged.    Major intracranial T2 flow voids are present.  No abnormal intra or extra-axial fluid collection.  No abnormal parenchymal susceptibility to suggest parenchymal hemorrhage.    In addition there are several scattered remote basal gangliar and right thalamic lacunar type infarcts.    Case discussed with ANNIE Rodrigues on 06/27/2019 at 16:36                               X-Ray Chest AP Portable (Final  result)  Result time 06/27/19 15:55:11    Final result by Florentino Sepulveda MD (06/27/19 15:55:11)                 Impression:      No acute abnormality.      Electronically signed by: Florentino Sepulveda MD  Date:    06/27/2019  Time:    15:55             Narrative:    EXAMINATION:  XR CHEST AP PORTABLE    CLINICAL HISTORY:  Stroke;.    TECHNIQUE:  Single frontal portable view of the chest was performed.    COMPARISON:  04/01/2019    FINDINGS:  Support devices: None    The lungs are clear, with normal appearance of pulmonary vasculature and no pleural effusion or pneumothorax.    The cardiac silhouette is normal in size. The hilar and mediastinal contours are unremarkable.    Bones are intact.                                 Medical Decision Making:   History:   Old Medical Records: I decided to obtain old medical records.  Old Records Summarized: records from clinic visits and records from previous admission(s).  Clinical Tests:   Lab Tests: Ordered and Reviewed  Radiological Study: Ordered and Reviewed  Medical Tests: Ordered and Reviewed  Other:   I have discussed this case with another health care provider.       <> Summary of the Discussion: Vascular Neurology       APC / Resident Notes:   54 year old female with medical history of T2DM, HLD, HTN, Hx of CVA presenting to the ED c/o extremity weakness and slurred speech. Onset of symptoms yesterday at 3pm. DDx includes but not limited to  ischemic stroke, TIA, intracranial hemorrhage, SDH, brain mass, intracranial hypertension, cerebral hypoperfusion, complex migraine, electrolyte disturbance, DKA, HHS, UTI. Discussed with Vascular Neurology on patient's arrival and they will come see the patient. She is not a TPA candidate as she is outside the administration window. Recommending MRI brain initially instead of CT head.     Work-up shows WBC 8.3, H/H 15/44, Plt 353, Na 132, K 4.3, Calcium 11, CO2 29, Glu elevated 512, Crt elevated 4.2 (baseline 1.2)  MRI brain shows  interval development of scattered foci of new signal abnormality most compatible with acute/recent infarcts     Patient will be admitted to stroke service for ongoing management. Patient given fluids and insulin IV in the ED for hyperglycemia. Patient expresses understanding and agreeable to the plan. I have discussed the care of this patient with my supervising physician.            Attending Attestation:     Physician Attestation Statement for NP/PA:   I have conducted a face to face encounter with this patient in addition to the NP/PA, due to Medical Complexity    Other NP/PA Attestation Additions:    History of Present Illness: 54-year-old female with history of CVA in 2019 with presentation at that time right-sided weakness family noticed new onset of slurred speech last night at 4:00 p.m. went to see her primary care physician today and she was referred to the emergency department  No headache, no fever    She reports she fell on Wednesday, unknown head trauma    Past medical history of hypertension, diabetes, CVA, hypercholesterolemia   Physical Exam: Patient comfortable  Slurred speech, no facial droop  5/5 muscle strength in the upper extremities  3/5 muscle strength in both lower extremity is she complains of pain in the thighs  Sensation grossly intact  Clear to auscultation bilaterally  Regular rate and rhythm  Abdomen soft nondistended nontender   Medical Decision Makin-year-old female with new onset slurred speech approximately 4:00 p.m. Yesterday  Vascular Neurology consulted recs for MRI no CT    Lab review with creatinine elevated at 4.2, calcium 11  Blood glucose 512, VBG pH of 7.3, anion gap 15 bicarbonate 29 plasma osmolarity 306.  Hemoglobin A1c more than 14.   UA negative for ketones trace leukocytes and negative nitrites  Suspicion at 80 eye is induced by hyperglycemia.  IV regular insulin bolus, IV fluids  MRI brain with Interval development of scattered foci of new signal  abnormality most compatible with acute/recent infarcts as detailed above.  Largest focus in the right centrum semiovale extending to the basal ganglia with punctate foci within the left centrum semiovale and left posterior temporal occipital periventricular white matter.    Patient admitted to vascular Neurology    Diagnosis  Acute CVA  YAJAIRA  Hyperglycemia  Hypercalcemia     Attending Critical Care:   Critical Care Times:   Direct Patient Care (initial evaluation, reassessments, and time considering the case)................................................................10 minutes.   Additional History from reviewing old medical records or taking additional history from the family, EMS, PCP, etc.......................5 minutes.   Ordering, Reviewing, and Interpreting Diagnostic Studies...............................................................................................................5 minutes.   Documentation..................................................................................................................................................................................5 minutes.   Consultation with other Physicians. .................................................................................................................................................5 minutes.   ==============================================================  · Total Critical Care Time - exclusive of procedural time: 30 minutes.  ==============================================================  Critical care reasons: CVA, Yajaira, Hyperglycemia.   Critical care was time spent personally by me on the following activities: obtaining history from patient or relative, examination of patient, review of old charts, development of treatment plan with patient or relative, ordering and performing treatments and interventions, evaluation of patient's response to treatment, discussion with consultants and re-evaluation of patient's  marcie.   Critical Care Condition: potentially life-threatening                  Clinical Impression:       ICD-10-CM ICD-9-CM   1. Stroke I63.9 434.91   2. Hyperglycemia R73.9 790.29   3. Hypercalcemia E83.52 275.42   4. NICOLASA (acute kidney injury) N17.9 584.9         Disposition:   Disposition: Admitted  Condition: Critical                        Celio Rodrigues PA-C  06/27/19 [id number]       Mally Rocha MD  06/28/19 1011

## 2019-06-28 PROBLEM — G93.6 CYTOTOXIC CEREBRAL EDEMA: Status: ACTIVE | Noted: 2019-06-28

## 2019-06-28 PROBLEM — I63.311 THROMBOTIC STROKE INVOLVING RIGHT MIDDLE CEREBRAL ARTERY: Status: ACTIVE | Noted: 2019-06-27

## 2019-06-28 PROBLEM — N17.9 AKI (ACUTE KIDNEY INJURY): Status: ACTIVE | Noted: 2019-06-28

## 2019-06-28 LAB
ALBUMIN SERPL BCP-MCNC: 3.3 G/DL (ref 3.5–5.2)
ALP SERPL-CCNC: 87 U/L (ref 55–135)
ALT SERPL W/O P-5'-P-CCNC: 13 U/L (ref 10–44)
AMPHET+METHAMPHET UR QL: NEGATIVE
ANION GAP SERPL CALC-SCNC: 12 MMOL/L (ref 8–16)
ANION GAP SERPL CALC-SCNC: 13 MMOL/L (ref 8–16)
APTT BLDCRRT: 21.7 SEC (ref 21–32)
AST SERPL-CCNC: 14 U/L (ref 10–40)
BARBITURATES UR QL SCN>200 NG/ML: NEGATIVE
BASOPHILS # BLD AUTO: 0.04 K/UL (ref 0–0.2)
BASOPHILS NFR BLD: 0.5 % (ref 0–1.9)
BENZODIAZ UR QL SCN>200 NG/ML: NEGATIVE
BILIRUB SERPL-MCNC: 0.4 MG/DL (ref 0.1–1)
BUN SERPL-MCNC: 27 MG/DL (ref 6–20)
BUN SERPL-MCNC: 28 MG/DL (ref 6–20)
BZE UR QL SCN: NEGATIVE
CALCIUM SERPL-MCNC: 10.2 MG/DL (ref 8.7–10.5)
CALCIUM SERPL-MCNC: 10.5 MG/DL (ref 8.7–10.5)
CANNABINOIDS UR QL SCN: NEGATIVE
CHLORIDE SERPL-SCNC: 102 MMOL/L (ref 95–110)
CHLORIDE SERPL-SCNC: 96 MMOL/L (ref 95–110)
CK MB SERPL-MCNC: 1.5 NG/ML (ref 0.1–6.5)
CK MB SERPL-RTO: 3.9 % (ref 0–5)
CK SERPL-CCNC: 38 U/L (ref 20–180)
CO2 SERPL-SCNC: 24 MMOL/L (ref 23–29)
CO2 SERPL-SCNC: 25 MMOL/L (ref 23–29)
CREAT SERPL-MCNC: 2.3 MG/DL (ref 0.5–1.4)
CREAT SERPL-MCNC: 3.4 MG/DL (ref 0.5–1.4)
CREAT UR-MCNC: 54 MG/DL (ref 15–325)
DIFFERENTIAL METHOD: NORMAL
EOSINOPHIL # BLD AUTO: 0.1 K/UL (ref 0–0.5)
EOSINOPHIL NFR BLD: 0.8 % (ref 0–8)
ERYTHROCYTE [DISTWIDTH] IN BLOOD BY AUTOMATED COUNT: 12.5 % (ref 11.5–14.5)
EST. GFR  (AFRICAN AMERICAN): 16.6 ML/MIN/1.73 M^2
EST. GFR  (AFRICAN AMERICAN): 26.6 ML/MIN/1.73 M^2
EST. GFR  (NON AFRICAN AMERICAN): 14.4 ML/MIN/1.73 M^2
EST. GFR  (NON AFRICAN AMERICAN): 23.1 ML/MIN/1.73 M^2
ETHANOL UR-MCNC: <10 MG/DL
GLUCOSE SERPL-MCNC: 309 MG/DL (ref 70–110)
GLUCOSE SERPL-MCNC: 376 MG/DL (ref 70–110)
HCT VFR BLD AUTO: 40.1 % (ref 37–48.5)
HGB BLD-MCNC: 13.4 G/DL (ref 12–16)
IMM GRANULOCYTES # BLD AUTO: 0.01 K/UL (ref 0–0.04)
IMM GRANULOCYTES NFR BLD AUTO: 0.1 % (ref 0–0.5)
INR PPP: 0.9 (ref 0.8–1.2)
LYMPHOCYTES # BLD AUTO: 3 K/UL (ref 1–4.8)
LYMPHOCYTES NFR BLD: 36.6 % (ref 18–48)
MAGNESIUM SERPL-MCNC: 2 MG/DL (ref 1.6–2.6)
MCH RBC QN AUTO: 28.4 PG (ref 27–31)
MCHC RBC AUTO-ENTMCNC: 33.4 G/DL (ref 32–36)
MCV RBC AUTO: 85 FL (ref 82–98)
METHADONE UR QL SCN>300 NG/ML: NEGATIVE
MONOCYTES # BLD AUTO: 0.7 K/UL (ref 0.3–1)
MONOCYTES NFR BLD: 8.6 % (ref 4–15)
NEUTROPHILS # BLD AUTO: 4.4 K/UL (ref 1.8–7.7)
NEUTROPHILS NFR BLD: 53.4 % (ref 38–73)
NRBC BLD-RTO: 0 /100 WBC
OPIATES UR QL SCN: NEGATIVE
PCP UR QL SCN>25 NG/ML: NEGATIVE
PHOSPHATE SERPL-MCNC: 4.1 MG/DL (ref 2.7–4.5)
PLATELET # BLD AUTO: 316 K/UL (ref 150–350)
PMV BLD AUTO: 11.5 FL (ref 9.2–12.9)
POCT GLUCOSE: 135 MG/DL (ref 70–110)
POCT GLUCOSE: 207 MG/DL (ref 70–110)
POCT GLUCOSE: 212 MG/DL (ref 70–110)
POCT GLUCOSE: 231 MG/DL (ref 70–110)
POCT GLUCOSE: 323 MG/DL (ref 70–110)
POCT GLUCOSE: 331 MG/DL (ref 70–110)
POCT GLUCOSE: 377 MG/DL (ref 70–110)
POCT GLUCOSE: 430 MG/DL (ref 70–110)
POCT GLUCOSE: 447 MG/DL (ref 70–110)
POCT GLUCOSE: 480 MG/DL (ref 70–110)
POCT GLUCOSE: 491 MG/DL (ref 70–110)
POCT GLUCOSE: 492 MG/DL (ref 70–110)
POCT GLUCOSE: >500 MG/DL (ref 70–110)
POTASSIUM SERPL-SCNC: 3.9 MMOL/L (ref 3.5–5.1)
POTASSIUM SERPL-SCNC: 5.3 MMOL/L (ref 3.5–5.1)
PROT SERPL-MCNC: 6.5 G/DL (ref 6–8.4)
PROTHROMBIN TIME: 9.6 SEC (ref 9–12.5)
RBC # BLD AUTO: 4.72 M/UL (ref 4–5.4)
SODIUM SERPL-SCNC: 134 MMOL/L (ref 136–145)
SODIUM SERPL-SCNC: 138 MMOL/L (ref 136–145)
TOXICOLOGY INFORMATION: NORMAL
TROPONIN I SERPL DL<=0.01 NG/ML-MCNC: <0.006 NG/ML (ref 0–0.03)
WBC # BLD AUTO: 8.28 K/UL (ref 3.9–12.7)

## 2019-06-28 PROCEDURE — 85730 THROMBOPLASTIN TIME PARTIAL: CPT

## 2019-06-28 PROCEDURE — 63600175 PHARM REV CODE 636 W HCPCS: Performed by: NURSE PRACTITIONER

## 2019-06-28 PROCEDURE — 92610 EVALUATE SWALLOWING FUNCTION: CPT

## 2019-06-28 PROCEDURE — 85610 PROTHROMBIN TIME: CPT

## 2019-06-28 PROCEDURE — 99232 PR SUBSEQUENT HOSPITAL CARE,LEVL II: ICD-10-PCS | Mod: ,,, | Performed by: NURSE PRACTITIONER

## 2019-06-28 PROCEDURE — 97162 PT EVAL MOD COMPLEX 30 MIN: CPT

## 2019-06-28 PROCEDURE — 80053 COMPREHEN METABOLIC PANEL: CPT

## 2019-06-28 PROCEDURE — 85025 COMPLETE CBC W/AUTO DIFF WBC: CPT

## 2019-06-28 PROCEDURE — 97166 OT EVAL MOD COMPLEX 45 MIN: CPT

## 2019-06-28 PROCEDURE — 80307 DRUG TEST PRSMV CHEM ANLYZR: CPT

## 2019-06-28 PROCEDURE — 92523 SPEECH SOUND LANG COMPREHEN: CPT

## 2019-06-28 PROCEDURE — 20600001 HC STEP DOWN PRIVATE ROOM

## 2019-06-28 PROCEDURE — 99232 SBSQ HOSP IP/OBS MODERATE 35: CPT | Mod: ,,, | Performed by: NURSE PRACTITIONER

## 2019-06-28 PROCEDURE — 84484 ASSAY OF TROPONIN QUANT: CPT

## 2019-06-28 PROCEDURE — 97535 SELF CARE MNGMENT TRAINING: CPT

## 2019-06-28 PROCEDURE — 80048 BASIC METABOLIC PNL TOTAL CA: CPT

## 2019-06-28 PROCEDURE — 82553 CREATINE MB FRACTION: CPT

## 2019-06-28 PROCEDURE — 84100 ASSAY OF PHOSPHORUS: CPT

## 2019-06-28 PROCEDURE — 25000003 PHARM REV CODE 250: Performed by: PSYCHIATRY & NEUROLOGY

## 2019-06-28 PROCEDURE — 25000003 PHARM REV CODE 250: Performed by: NURSE PRACTITIONER

## 2019-06-28 PROCEDURE — 36415 COLL VENOUS BLD VENIPUNCTURE: CPT

## 2019-06-28 PROCEDURE — 99233 SBSQ HOSP IP/OBS HIGH 50: CPT | Mod: ,,, | Performed by: PSYCHIATRY & NEUROLOGY

## 2019-06-28 PROCEDURE — 83735 ASSAY OF MAGNESIUM: CPT

## 2019-06-28 PROCEDURE — 99233 PR SUBSEQUENT HOSPITAL CARE,LEVL III: ICD-10-PCS | Mod: ,,, | Performed by: PSYCHIATRY & NEUROLOGY

## 2019-06-28 PROCEDURE — 82550 ASSAY OF CK (CPK): CPT

## 2019-06-28 RX ORDER — GLUCAGON 1 MG
1 KIT INJECTION
Status: DISCONTINUED | OUTPATIENT
Start: 2019-06-28 | End: 2019-06-30

## 2019-06-28 RX ORDER — IBUPROFEN 200 MG
16 TABLET ORAL
Status: DISCONTINUED | OUTPATIENT
Start: 2019-06-28 | End: 2019-06-30

## 2019-06-28 RX ORDER — INSULIN ASPART 100 [IU]/ML
5 INJECTION, SOLUTION INTRAVENOUS; SUBCUTANEOUS
Status: DISCONTINUED | OUTPATIENT
Start: 2019-06-28 | End: 2019-06-28

## 2019-06-28 RX ORDER — IBUPROFEN 200 MG
24 TABLET ORAL
Status: DISCONTINUED | OUTPATIENT
Start: 2019-06-28 | End: 2019-06-28

## 2019-06-28 RX ORDER — IBUPROFEN 200 MG
24 TABLET ORAL
Status: DISCONTINUED | OUTPATIENT
Start: 2019-06-28 | End: 2019-06-30

## 2019-06-28 RX ORDER — INSULIN ASPART 100 [IU]/ML
0-5 INJECTION, SOLUTION INTRAVENOUS; SUBCUTANEOUS
Status: DISCONTINUED | OUTPATIENT
Start: 2019-06-28 | End: 2019-06-30

## 2019-06-28 RX ORDER — IBUPROFEN 200 MG
16 TABLET ORAL
Status: DISCONTINUED | OUTPATIENT
Start: 2019-06-28 | End: 2019-06-28

## 2019-06-28 RX ORDER — GLUCAGON 1 MG
1 KIT INJECTION
Status: DISCONTINUED | OUTPATIENT
Start: 2019-06-28 | End: 2019-06-28

## 2019-06-28 RX ORDER — INSULIN ASPART 100 [IU]/ML
4-6 INJECTION, SOLUTION INTRAVENOUS; SUBCUTANEOUS
Status: DISCONTINUED | OUTPATIENT
Start: 2019-06-29 | End: 2019-06-29

## 2019-06-28 RX ORDER — INSULIN ASPART 100 [IU]/ML
0-5 INJECTION, SOLUTION INTRAVENOUS; SUBCUTANEOUS
Status: DISCONTINUED | OUTPATIENT
Start: 2019-06-28 | End: 2019-06-28

## 2019-06-28 RX ORDER — IBUPROFEN 200 MG
1 TABLET ORAL DAILY
Status: DISCONTINUED | OUTPATIENT
Start: 2019-06-29 | End: 2019-06-30 | Stop reason: HOSPADM

## 2019-06-28 RX ADMIN — SODIUM CHLORIDE 1.5 UNITS/HR: 9 INJECTION, SOLUTION INTRAVENOUS at 09:06

## 2019-06-28 RX ADMIN — SODIUM CHLORIDE 2.7 UNITS/HR: 9 INJECTION, SOLUTION INTRAVENOUS at 11:06

## 2019-06-28 RX ADMIN — CLOPIDOGREL 75 MG: 75 TABLET, FILM COATED ORAL at 09:06

## 2019-06-28 RX ADMIN — ASPIRIN 81 MG: 81 TABLET, COATED ORAL at 09:06

## 2019-06-28 RX ADMIN — SODIUM CHLORIDE 0.3 UNITS/HR: 9 INJECTION, SOLUTION INTRAVENOUS at 08:06

## 2019-06-28 RX ADMIN — ATORVASTATIN CALCIUM 40 MG: 20 TABLET, FILM COATED ORAL at 09:06

## 2019-06-28 NOTE — PROGRESS NOTES
[facility]  Vascular Neurology  Comprehensive Stroke Center  Progress Note    Assessment/Plan:     * Thrombotic stroke involving right middle cerebral artery  54 y.o. female with significant past medical history of DM, HTN, HLD, left PCA territory infarcts last April with residual mild R sided weakness and dysarthria, presents to the ED with complains of worsening speech since around 3 pm yesterday and increasing R leg weakness that became apparent today and interferes with her ability to ambulate with her walker.  Neuro exam at times inconsistent.  Concern for a new L subcortical infarct vs recrudescence of previous deficits in the context of occult UTI or metabolic derangements.    Stroke work up completed and patient with scattered foci within the right hemisphere primarily R centrum semi ovale extending to the basal ganglia w/ punctate foci in the L hemisphere. On exam left upper extremity pronator drift, left leg drift, and dysarthria. Left sided weakness more notable than right sided weakness. Etiology likely small vessel disease in this patient with multiple stroke risk factors.     Antithrombotics for secondary stroke prevention: Antiplatelets: Aspirin: 81 mg daily  Clopidogrel: 75 mg daily X 30 days     Statins for secondary stroke prevention and hyperlipidemia, if present:   Statins: Atorvastatin- 40 mg daily    Aggressive risk factor modification: HTN, Smoking, HLD, drug abuse     Rehab efforts: The patient has been evaluated by a stroke team provider and the therapy needs have been fully considered based off the presenting complaints and exam findings. The following therapy evaluations are needed: PT evaluate and treat, OT evaluate and treat, SLP evaluate and treat, PM&R evaluate for appropriate placement    Diagnostics ordered/pending: Other: Tox screen     VTE prophylaxis: Heparin 5000 units SQ every 8 hours    BP parameters: Infarct: SBP <180        NICOLASA (acute kidney injury)  NICOLASA  on admission   Creatinine on admission 4.2   Fluids ordered trending down now 3.4  Will repeat BMP this afternoon if trending up will order FENA labs   Creatinine last admission 1.2     Cytotoxic cerebral edema  Area of cytotoxic cerebral edema identified when reviewing brain imaging in the territory of the R middle cerebral artery. There is not mass effect associated with it. We will continue to monitor the patients clinical exam for any worsening of symptoms which may indicate expansion of the stroke or the area of the edema resulting in the clinical change. The pattern is suggestive of small vessel etiology      Cocaine abuse  Stroke risk factor  Tox screen last admission positive for cocaine - resources given at discharge  Pt states she no longer uses  Tox screen pending results from this admission     Mixed hyperlipidemia  Stroke risk factor  LDL 84.6  Continue Atorvastatin 40 mg   Per chart review questionable medication compliance     Essential hypertension  Stroke risk factor   SBP <180   BP within goal past 24 hours     Uncontrolled type 2 diabetes mellitus with hyperglycemia  Stroke risk factor  Hgb A1C > 14  Endocrine consult - insulin gtt ordered          6/29 - Patient with generalized weakness all extremities; decreased left hand  strength. Hgb A1C >14. Endocrine consulted and patient placed on Insulin gtt. Creatinine trending down 4.2 -->3.4 - continuing IV fluids.     STROKE DOCUMENTATION   Acute Stroke Times   Last Known Normal Date: 06/26/19  Last Known Normal Time: 1500  Symptom Onset Date: 06/26/19  Symptom Onset Time: 1500  Stroke Team Called Date: 06/27/19  Stroke Team Called Time: 1520  Stroke Team Arrival Date: 06/27/19  Stroke Team Arrival Time: 1530  CT Interpretation Time: (CT pending)  Decision to Treat Time for Alteplase: (No iv alteplase candidate)  Decision to Treat Time for IR: (No IR candidate)    NIH Scale:  1a. Level of Consciousness: 0-->Alert, keenly responsive  1b. LOC  Questions: 0-->Answers both questions correctly  1c. LOC Commands: 0-->Performs both tasks correctly  2. Best Gaze: 0-->Normal  3. Visual: 0-->No visual loss  4. Facial Palsy: 1-->Minor paralysis (flattened nasolabial fold, asymmetry on smiling)  5a. Motor Arm, Left: 1-->Drift, limb holds 90 (or 45) degrees, but drifts down before full 10 seconds, does not hit bed or other support  5b. Motor Arm, Right: 1-->Drift, limb holds 90 (or 45) degrees, but drifts down before full 10 secs, does not hit bed or other support  6a. Motor Leg, Left: 1-->Drift, leg falls by the end of the 5-sec period but does not hit bed  6b. Motor Leg, Right: 1-->Drift, leg falls by the end of the 5-sec period but does not hit bed  7. Limb Ataxia: 0-->Absent  8. Sensory: 0-->Normal, no sensory loss  9. Best Language: 0-->No aphasia, normal  10. Dysarthria: 1-->Mild-to-moderate dysarthria, patient slurs at least some words and, at worst, can be understood with some difficulty  11. Extinction and Inattention (formerly Neglect): 0-->No abnormality  Total (NIH Stroke Scale): 6       Modified Robards Score: 3  Doyle Coma Scale:    ABCD2 Score:    [id number] Score:   HAS -BLED Score:   ICH Score:   Hunt & Mueller Classification:      Hemorrhagic change of an Ischemic Stroke: Does this patient have an ischemic stroke with hemorrhagic changes? No     Neurologic Chief Complaint: right sided weakness     Subjective:     Interval History: Patient is seen for follow-up neurological assessment and treatment recommendations:     Patient with generalized weakness all extremities; decreased left hand  strength. Hgb A1C >14. Endocrine consulted and patient placed on Insulin gtt. Creatinine trending down 4.2 -->3.4 - continuing IV fluids.     HPI, Past Medical, Family, and Social History remains the same as documented in the initial encounter.     Review of Systems   Constitutional: Negative for fever.   Respiratory: Negative for shortness of breath.     Cardiovascular: Negative for chest pain.   Gastrointestinal: Negative for nausea and vomiting.   Neurological: Positive for speech difficulty and weakness.   Psychiatric/Behavioral: Negative for agitation and confusion.     Scheduled Meds:   aspirin  81 mg Oral Daily    atorvastatin  40 mg Oral Daily    clopidogrel  75 mg Oral Daily     Continuous Infusions:   sodium chloride 0.9%      insulin (HUMAN R) infusion (adults) 1.5 Units/hr (06/28/19 0935)    sodium chloride 0.9%       PRN Meds:Dextrose 10% Bolus, Dextrose 10% Bolus, sodium chloride 0.9%, sodium chloride 0.9%    Objective:     Vital Signs (Most Recent):  Temp: 98 °F (36.7 °C) (06/28/19 0745)  Pulse: 69 (06/28/19 0745)  Resp: 18 (06/28/19 0745)  BP: (!) 122/94 (06/28/19 0745)  SpO2: 95 % (06/28/19 0745)  BP Location: Right arm    Vital Signs Range (Last 24H):  Temp:  [96.8 °F (36 °C)-98.7 °F (37.1 °C)]   Pulse:  [69-83]   Resp:  [16-19]   BP: ()/(60-94)   SpO2:  [95 %-100 %]   BP Location: Right arm    Physical Exam   Constitutional: She is oriented to person, place, and time. She appears well-developed.   HENT:   Head: Normocephalic.   Eyes: Pupils are equal, round, and reactive to light. EOM are normal.   Cardiovascular: Normal rate.   Pulmonary/Chest: Effort normal.   Neurological: She is alert and oriented to person, place, and time.   Skin: Skin is warm. Capillary refill takes less than 2 seconds.   Psychiatric: She has a normal mood and affect.   Vitals reviewed.      Neurological Exam:   LOC: alert  Attention Span: Good   Language: No aphasia  Articulation: Dysarthria  Orientation: Person, Place, Time   Visual Fields: Full  EOM (CN III, IV, VI): Full/intact  Pupils (CN II, III): PERRL  Facial Movement (CN VII): Symmetric facial expression    Motor: Arm left  Paresis: 4/5  Leg left  Paresis: 4/5  Arm right  Paresis: 4/5  Leg right Paresis: 4/5  Cebellar: No evidence of appendicular or axial ataxia  Sensation: Intact to light touch,  temperature and vibration    Laboratory:  CMP:   Recent Labs   Lab 06/28/19  0608   CALCIUM 10.2   ALBUMIN 3.3*   PROT 6.5   *   K 3.9   CO2 25   CL 96   BUN 28*   CREATININE 3.4*   ALKPHOS 87   ALT 13   AST 14   BILITOT 0.4     BMP:   Recent Labs   Lab 06/28/19  0608   *   K 3.9   CL 96   CO2 25   BUN 28*   CREATININE 3.4*   CALCIUM 10.2     CBC:   Recent Labs   Lab 06/28/19  0608   WBC 8.28   RBC 4.72   HGB 13.4   HCT 40.1      MCV 85   MCH 28.4   MCHC 33.4     Lipid Panel:   Recent Labs   Lab 06/27/19  1520   CHOL 176   LDLCALC 84.6   HDL 56   TRIG 177*     Coagulation:   Recent Labs   Lab 06/28/19  0608   INR 0.9   APTT 21.7     Platelet Aggregation Study: No results for input(s): PLTAGG, PLTAGINTERP, PLTAGREGLACO, ADPPLTAGGREG in the last 168 hours.  Hgb A1C:   Recent Labs   Lab 06/27/19  1520   HGBA1C >14.0*     TSH:   Recent Labs   Lab 06/27/19  1520   TSH 0.908       Diagnostic Results     Brain Imaging   MRI brain 6/28  Interval development of scattered foci of new signal abnormality most compatible with acute/recent infarcts as detailed above.  Largest focus in the right centrum semiovale extending to the basal ganglia with punctate foci within the left centrum semiovale and left posterior temporal occipital periventricular white matter.    Probable evolving infarcts bilateral basal ganglia with superimposed T2 flair signal abnormality elsewhere supratentorial white matter and vega suggestive for underlying chronic microvascular ischemic change.    Small remote left cerebellar infarction again seen.    No evidence for hemorrhagic conversion or hydrocephalus.  Clinical correlation and follow-up advised.      Vessel Imaging   MRA H/N  Significantly limited examination secondary to patient motion artifact.  No significant arterial abnormalities within the confines of the study.  Please consider CTA of the head and neck for further evaluation.    Cardiac Imaging   Echo 4/2/19 (previous  admission)  · Normal left ventricular systolic function. The estimated ejection fraction is 68%  · Concentric left ventricular remodeling.  · Normal LV diastolic function.  · No wall motion abnormalities.  · Normal right ventricular systolic function.  · Mild mitral sclerosis.  · Normal central venous pressure (3 mm Hg).  · The estimated PA systolic pressure is 15 mm Hg           Megan Ponce NP  [facility]  Department of Vascular Neurology   [facility]

## 2019-06-28 NOTE — ED NOTES
Pt refusing CBG at this time before transport to new room. Pt insisting on food at this time, pt instructed that she still requires FADI screening.

## 2019-06-28 NOTE — ASSESSMENT & PLAN NOTE
54 y.o. female with significant past medical history of DM, HTN, HLD, left PCA territory infarcts last April with residual mild R sided weakness and dysarthria, presents to the ED with complains of worsening speech since around 3 pm yesterday and increasing R leg weakness that became apparent today and interferes with her ability to ambulate with her walker.  Neuro exam at times inconsistent.  Concern for a new L subcortical infarct vs recrudescence of previous deficits in the context of occult UTI or metabolic derangements.    Stroke work up completed and patient with scattered foci within the right hemisphere primarily R centrum semi ovale extending to the basal ganglia w/ punctate foci in the L hemisphere. On exam left upper extremity pronator drift, left leg drift, and dysarthria. Left sided weakness more notable than right sided weakness. Etiology likely small vessel disease in this patient with multiple stroke risk factors.     Antithrombotics for secondary stroke prevention: Antiplatelets: Aspirin: 81 mg daily  Clopidogrel: 75 mg daily X 30 days     Statins for secondary stroke prevention and hyperlipidemia, if present:   Statins: Atorvastatin- 40 mg daily    Aggressive risk factor modification: HTN, Smoking, HLD, drug abuse     Rehab efforts: The patient has been evaluated by a stroke team provider and the therapy needs have been fully considered based off the presenting complaints and exam findings. The following therapy evaluations are needed: PT evaluate and treat, OT evaluate and treat, SLP evaluate and treat, PM&R evaluate for appropriate placement    Diagnostics ordered/pending: Other: Tox screen     VTE prophylaxis: Heparin 5000 units SQ every 8 hours    BP parameters: Infarct: SBP <180

## 2019-06-28 NOTE — ASSESSMENT & PLAN NOTE
Stroke risk factor  Tox screen last admission positive for cocaine - resources given at discharge  Pt states she no longer uses  Tox screen pending results from this admission

## 2019-06-28 NOTE — PLAN OF CARE
Problem: Occupational Therapy Goal  Goal: Occupational Therapy Goal  Goals to be met by: 7/5     Patient will increase functional independence with ADLs by performing:    UE Dressing with Set-up Assistance and Contact Guard Assistance.  LE Dressing (pants, brief) with Set-up Assistance and Contact Guard Assistance.  Grooming while standing at sink with Contact Guard Assistance.  Toileting from toilet with Stand-by Assistance for hygiene and clothing management.   Toilet transfer to toilet with Contact Guard Assistance.  Functional mobility at short household distance for ADL task with Supervision and RW.     Outcome: Ongoing (interventions implemented as appropriate)  OT eval completed. Rec outpatient OT for d/c planning. FLAKITO Lance 6/28/2019

## 2019-06-28 NOTE — ASSESSMENT & PLAN NOTE
NICOALSA on admission   Creatinine on admission 4.2   Fluids ordered trending down now 3.4  Will repeat BMP this afternoon if trending up will order FENA labs   Creatinine last admission 1.2

## 2019-06-28 NOTE — HOSPITAL COURSE
Emily Martinez is a 54 y.o. female with PMHx of DM, HTN, HLD, left PCA territory infarcts (last April, residual mild R sided weakness and dysarthria) who was admitted to stroke unit for new R MCA small vessel infarct. She was evaluated by PT/OT and SLP who recommended discharge home with outpatient therapy. SLP approved her for a regular consistency diet with thin liquids, which she tolerated. Her hospital stay was complicated by NICOLASA and BG >500 (A1C >14). NICOLASA was treated with gentle fluids and improved within 48 hours (Cr 4.2>1.1). Endocrinology was consulted for glucose management and patient was placed on an insulin gtt. She was transitioned off insulin to aspart 12 TID WM, detemir 30 daily, and SSI. Patient was educated on insulin administration, diabetes management, and dietary changes. Ambulatory referral order sent for diabetes education. She was also educated on her stroke risk factors and the importance of medication compliance for stroke prevention. Patient was counseled on the importance of smoking cessation for stroke prevention. For secondary stroke prevention she will continue DAPT for 30 days then asa monotherapy and atorvastatin 40 daily. She will be discharged home with outpatient therapy and will follow up in stroke clinic and endocrinology clinic.

## 2019-06-28 NOTE — PLAN OF CARE
06/28/19 1141   Post-Acute Status   Post-Acute Authorization Other   Other Status No Post-Acute Service Needs       Pt has no needs noted. Can't get HH due to Medicaid. Can only have outpt therapy.  SW in contact with CM and Medical staff. Will continue to follow and offer support as needed.     Dane Reed, SILVER  [facility]   Ext. [phone]

## 2019-06-28 NOTE — ASSESSMENT & PLAN NOTE
"-Per stroke team, "scattered foci within the right hemisphere primarily R centrum semi ovale extending to the basal ganglia w/ punctate foci in the L hemisphere."    See hospital course for functional, cognitive/speech/language, and nutrition/swallow status.      Recommendations  -  Encourage mobility, OOB in chair at least 3 hours per day, and early ambulation as appropriate   -  PT/OT evaluate and treat  -  SLP speech and cognitive evaluate and treat  -  Monitor sleep disturbances and establish consistent sleep-wake cycle  -  Monitor for bowel and bladder dysfunction  -  Monitor for shoulder pain, subluxation, & spasticity  -  Monitor for and prevent skin breakdown and pressure ulcers  · Early mobility, repositioning/weight shifting every 20-30 minutes when sitting, turn patient every 2 hours, proper mattress/overlay and chair cushioning, pressure relief/heel protector boots  -  DVT prophylaxis (if appropriate)  -  Reviewed discharge options (IP rehab, SNF, HH therapy, and OP therapy)    "

## 2019-06-28 NOTE — ED NOTES
Telemetry Verification   Patient placed on Telemetry Box  Verified with War Room  Box # [id number]   Monitor Tech War room   Rate 78   Rhythm NSR

## 2019-06-28 NOTE — SUBJECTIVE & OBJECTIVE
Neurologic Chief Complaint: right sided weakness     Subjective:     Interval History: Patient is seen for follow-up neurological assessment and treatment recommendations:     Patient with generalized weakness all extremities; decreased left hand  strength. Hgb A1C >14. Endocrine consulted and patient placed on Insulin gtt. Creatinine trending down 4.2 -->3.4 - continuing IV fluids.     HPI, Past Medical, Family, and Social History remains the same as documented in the initial encounter.     Review of Systems   Constitutional: Negative for fever.   Respiratory: Negative for shortness of breath.    Cardiovascular: Negative for chest pain.   Gastrointestinal: Negative for nausea and vomiting.   Neurological: Positive for speech difficulty and weakness.   Psychiatric/Behavioral: Negative for agitation and confusion.     Scheduled Meds:   aspirin  81 mg Oral Daily    atorvastatin  40 mg Oral Daily    clopidogrel  75 mg Oral Daily     Continuous Infusions:   sodium chloride 0.9%      insulin (HUMAN R) infusion (adults) 1.5 Units/hr (06/28/19 0935)    sodium chloride 0.9%       PRN Meds:Dextrose 10% Bolus, Dextrose 10% Bolus, sodium chloride 0.9%, sodium chloride 0.9%    Objective:     Vital Signs (Most Recent):  Temp: 98 °F (36.7 °C) (06/28/19 0745)  Pulse: 69 (06/28/19 0745)  Resp: 18 (06/28/19 0745)  BP: (!) 122/94 (06/28/19 0745)  SpO2: 95 % (06/28/19 0745)  BP Location: Right arm    Vital Signs Range (Last 24H):  Temp:  [96.8 °F (36 °C)-98.7 °F (37.1 °C)]   Pulse:  [69-83]   Resp:  [16-19]   BP: ()/(60-94)   SpO2:  [95 %-100 %]   BP Location: Right arm    Physical Exam   Constitutional: She is oriented to person, place, and time. She appears well-developed.   HENT:   Head: Normocephalic.   Eyes: Pupils are equal, round, and reactive to light. EOM are normal.   Cardiovascular: Normal rate.   Pulmonary/Chest: Effort normal.   Neurological: She is alert and oriented to person, place, and time.   Skin:  Skin is warm. Capillary refill takes less than 2 seconds.   Psychiatric: She has a normal mood and affect.   Vitals reviewed.      Neurological Exam:   LOC: alert  Attention Span: Good   Language: No aphasia  Articulation: Dysarthria  Orientation: Person, Place, Time   Visual Fields: Full  EOM (CN III, IV, VI): Full/intact  Pupils (CN II, III): PERRL  Facial Movement (CN VII): Symmetric facial expression    Motor: Arm left  Paresis: 4/5  Leg left  Paresis: 4/5  Arm right  Paresis: 4/5  Leg right Paresis: 4/5  Cebellar: No evidence of appendicular or axial ataxia  Sensation: Intact to light touch, temperature and vibration    Laboratory:  CMP:   Recent Labs   Lab 06/28/19  0608   CALCIUM 10.2   ALBUMIN 3.3*   PROT 6.5   *   K 3.9   CO2 25   CL 96   BUN 28*   CREATININE 3.4*   ALKPHOS 87   ALT 13   AST 14   BILITOT 0.4     BMP:   Recent Labs   Lab 06/28/19  0608   *   K 3.9   CL 96   CO2 25   BUN 28*   CREATININE 3.4*   CALCIUM 10.2     CBC:   Recent Labs   Lab 06/28/19  0608   WBC 8.28   RBC 4.72   HGB 13.4   HCT 40.1      MCV 85   MCH 28.4   MCHC 33.4     Lipid Panel:   Recent Labs   Lab 06/27/19  1520   CHOL 176   LDLCALC 84.6   HDL 56   TRIG 177*     Coagulation:   Recent Labs   Lab 06/28/19  0608   INR 0.9   APTT 21.7     Platelet Aggregation Study: No results for input(s): PLTAGG, PLTAGINTERP, PLTAGREGLACO, ADPPLTAGGREG in the last 168 hours.  Hgb A1C:   Recent Labs   Lab 06/27/19  1520   HGBA1C >14.0*     TSH:   Recent Labs   Lab 06/27/19  1520   TSH 0.908       Diagnostic Results     Brain Imaging   MRI brain 6/28  Interval development of scattered foci of new signal abnormality most compatible with acute/recent infarcts as detailed above.  Largest focus in the right centrum semiovale extending to the basal ganglia with punctate foci within the left centrum semiovale and left posterior temporal occipital periventricular white matter.    Probable evolving infarcts bilateral basal ganglia with  superimposed T2 flair signal abnormality elsewhere supratentorial white matter and vega suggestive for underlying chronic microvascular ischemic change.    Small remote left cerebellar infarction again seen.    No evidence for hemorrhagic conversion or hydrocephalus.  Clinical correlation and follow-up advised.      Vessel Imaging   MRA H/N  Significantly limited examination secondary to patient motion artifact.  No significant arterial abnormalities within the confines of the study.  Please consider CTA of the head and neck for further evaluation.    Cardiac Imaging   Echo 4/2/19 (previous admission)  · Normal left ventricular systolic function. The estimated ejection fraction is 68%  · Concentric left ventricular remodeling.  · Normal LV diastolic function.  · No wall motion abnormalities.  · Normal right ventricular systolic function.  · Mild mitral sclerosis.  · Normal central venous pressure (3 mm Hg).  · The estimated PA systolic pressure is 15 mm Hg

## 2019-06-28 NOTE — CONSULTS
Inpatient consult to Physical Medicine Rehab  Consult performed by: Daja Marsh NP  Consult ordered by: Doug Erazo MD  Reason for consult: assess rehab needs        Reviewed patient history and current admission.  Rehab team following.  Full consult to follow.    DALE Saul, FNP-C  Physical Medicine & Rehabilitation   06/28/2019  Spectralink: [id number]

## 2019-06-28 NOTE — CONSULTS
"[facility]  Endocrinology  Diabetes Consult Note    Consult Requested by: Jacy Lehman MD   Reason for admit: Thrombotic stroke involving right middle cerebral artery    HISTORY OF PRESENT ILLNESS:  Reason for Consult: Management of T2DM, Hyperglycemia     Surgical Procedure and Date:   N/A  Diabetes diagnosis year:   ~ 10 years ago.  Home Diabetes Medications:    Patient seems to be non-complaint with DM regimen, and is unsure on correct administration. She reports taking and provides the following medication:     -Basaglar 19 HS    -Admelog 10 BID  How often checking glucose at home? 1-3 x day   BG readings on regimen: 300's - 500's  Hypoglycemia on the regimen?  Yes "about once a month."  Missed doses on regimen?  No    Diabetes Complications include:     Hyperglycemia, Hypoglycemia  and Diabetic retinopathy     Complicating diabetes co morbidities:   History of CVA and Residual deficits from CVA       HPI:   Patient is a 54 y.o. female with a diagnosis of DM, HTN, HLD, left PCA territory infarcts last April with residual mild R sided weakness and dysarthria.  Patient presented to [facility] on  with worsening dysarthria and RLE weakness.  MRI brain revealed concern for left centrum semiovale and left posterior temporal occipital periventricular white matter possible infarcts. Stroke team concerned for a new L subcortical infarct vs recrudescence of previous deficits in the context of occult UTI or metabolic derangements.  Patient lives in [place] with daughter in a single story home with 15 steps to enter. Endocrinology consulted to manage DM2/Hyperglycemia.      Lab Results   Component Value Date    LABA1C 13.0 (H) 2016    HGBA1C >14.0 (H) 2019       Interval HPI:   Overnight events:   BG >500 overnight and at time of consult on SQ insulin regimen. Patient denies symptoms at time of consult.     Eatin%  Nausea: No  Hypoglycemia and intervention: No  Fever: No  TPN and/or " TF: No  If yes, type of TF/TPN and rate: None    PMH, PSH, FH, SH  reviewed     Review of Systems  Constitutional: Negative for weight changes.  Eyes: Positive for visual disturbance.  Respiratory: Negative for cough.   Cardiovascular: Negative for chest pain.  Gastrointestinal: Negative for nausea.  Endocrine: Positive for polyuria, polydipsia.  Musculoskeletal: Negative for back pain.  Skin: Negative for rash.  Neurological: Negative for syncope.  Psychiatric/Behavioral: Negative for depression.      Current Medications and/or Treatments Impacting Glycemic Control  Immunotherapy:    Immunosuppressants     None        Steroids:   Hormones (From admission, onward)    None        Pressors:    Autonomic Drugs (From admission, onward)    None        Hyperglycemia/Diabetes Medications:   Antihyperglycemics (From admission, onward)    Start     Stop Route Frequency Ordered    06/28/19 0930  insulin regular (Humulin R) 100 Units in sodium chloride 0.9% 100 mL infusion     Question:  Insulin Rate Adjustment (DO NOT MODIFY ANSWER)  Answer:  \\[email or web address]\Images\Pharmacy\InsulinInfusions\InsulinRegAdj [id number].pdf    -- IV Continuous 06/28/19 0822             PHYSICAL EXAMINATION:  Vitals:    06/28/19 1130   BP:    Pulse: 88   Resp:    Temp:      Body mass index is 26.57 kg/m².    Physical Exam   Constitutional: Well developed, well nourished, NAD.  ENT:  normal hearing.  Neck: Supple; trachea midline;   Cardiovascular: Normal heart sounds, no LE edema. DP +2 bilaterally.  Lungs: Normal effort; lungs anterior bilaterally clear to auscultation.  Abdomen: Soft, no masses, no hernias.  MS: No clubbing or cyanosis of nails noted;  Skin: No rashes, lesions, or ulcers; no nodules. Injection sites are ok. No lipo hypertropthy or atrophy.  Psychiatric: Good judgement and insight; normal mood and affect.  Neurological: Cranial nerves are grossly intact.  Foot: nails in good condition, no amputations noted.        Labs Reviewed and  Include   Recent Labs   Lab 06/28/19  0608   *   CALCIUM 10.2   ALBUMIN 3.3*   PROT 6.5   *   K 3.9   CO2 25   CL 96   BUN 28*   CREATININE 3.4*   ALKPHOS 87   ALT 13   AST 14   BILITOT 0.4     Lab Results   Component Value Date    WBC 8.28 06/28/2019    HGB 13.4 06/28/2019    HCT 40.1 06/28/2019    MCV 85 06/28/2019     06/28/2019     Recent Labs   Lab 06/27/19  1520   TSH 0.908     Lab Results   Component Value Date    HGBA1C >14.0 (H) 06/27/2019       Nutritional status:   Body mass index is 26.57 kg/m².  Lab Results   Component Value Date    ALBUMIN 3.3 (L) 06/28/2019    ALBUMIN 3.7 06/27/2019    ALBUMIN 3.0 (L) 04/03/2019     No results found for: PREALBUMIN    Estimated Creatinine Clearance: 17.1 mL/min (A) (based on SCr of 3.4 mg/dL (H)).    Accu-Checks  Recent Labs     06/27/19  1521 06/27/19  1714 06/27/19  1812 06/28/19  0928 06/28/19  1039 06/28/19  1138 06/28/19  1246 06/28/19  1339   POCTGLUCOSE >500* >500* >500* 491* 492* 480* 447* 430*        ASSESSMENT and PLAN    * Thrombotic stroke involving right middle cerebral artery  Managed per primary team  Avoid hypoglycemia        Uncontrolled type 2 diabetes mellitus with hyperglycemia  BG goal 140 - 180   No signs of DKA. No GAP. Patient tolerating po intake. Electrolytes WNL. Will continue to monitor.     Continue IV insulin infusion protocol. Will switch to transition IV insulin infusion once BG is trending closer towards goal.   Requires intensive BG monitoring while on protocol.     Discharge planning: TBD        NICOLASA (acute kidney injury)  Caution with insulin stacking          Plan discussed with patient and RN at bedside.     Justus Ignacio NP  Endocrinology  [facility]

## 2019-06-28 NOTE — HOSPITAL COURSE
06/28/2019: OT-Sit to stand CGA & RW and transfers Richard & RW.  UBD and LBD Richard. Toileting CGA. Feeding Mod (I). Passed bedside swallow evaluation.  SLP recommending regular diet and thin liquids.  PT/SLP cog eval pending.

## 2019-06-28 NOTE — PT/OT/SLP EVAL
"Physical Therapy Evaluation    Patient Name:  Emily Martinez   MRN:  [id number]    Recommendations:     Discharge Recommendations:  outpatient PT   Discharge Equipment Recommendations: walker, rolling   Barriers to discharge: None    Assessment:     Emily Martinez is a 54 y.o. female admitted with a medical diagnosis of Thrombotic stroke involving right middle cerebral artery.  She presents with the following impairments/functional limitations:  weakness, impaired endurance, impaired self care skills, gait instability, impaired functional mobilty, impaired balance, decreased coordination, decreased upper extremity function, decreased lower extremity function, decreased safety awareness, impaired coordination, impaired fine motor. The patient demonstrates L hemiparesis, dysarthria, with impaired safety awareness and balance increasing her risk of falls. She ambulated 30' with RW and contact guard assist. She is safe to ambulate with RN assist using RW and would benefit from RN mobility protocol.     Rehab Prognosis: Good; patient would benefit from acute skilled PT services to address these deficits and reach maximum level of function.    Recent Surgery: * No surgery found *      Plan:     During this hospitalization, patient to be seen 3 x/week to address the identified rehab impairments via gait training, therapeutic activities, therapeutic exercises, neuromuscular re-education and progress toward the following goals:    · Plan of Care Expires:  07/28/19    Subjective     Chief Complaint: "I need to get up and go to the bathroom"  Patient/Family Comments/goals:   Pain/Comfort:  · Pain Rating 1: 0/10    Patients cultural, spiritual, Confucianist conflicts given the current situation: no    Living Environment:  The patient will be staying with her daughter, granddaughter and grandson upon discharge, they live in a [facility] with 15 steps to enter with rails on both sides. Her daughter doesn't work and typically assists the " patient with the stairs. The patient reports 3-4 falls in the past 3 months.  Prior to admission, patients level of function was independent with use of rollator.  Equipment used at home: rollator, bedside commode(shower chair does not fit in shower).  DME owned (not currently used): none.  Upon discharge, patient will have assistance from daughter.    Objective:     Communicated with RN prior to session.  Patient found patient sitting on the edge of the bed with telemetry, peripheral IV, PureWick  upon PT entry to room.    General Precautions: Standard, aspiration, fall, NPO   Orthopedic Precautions:N/A   Braces: N/A     Exams:    Cognitive Exam  Patient is A&O x4 and follows 100% of one -step commands    Fine Motor Coordination   -       Impaired accuracy and speed L leg vs R, L ataxia upper and lower extremity noted     Postural Exam Patient presented with the following abnormalities:    -       Rounded shoulders  -       Forward head  -       Kyphosis   Sensation    -       Light touch intact bilateral lower extremities   Skin Integrity/Edema     -       Skin integrity: visibly intact  -       Edema: NA   R LE ROM WFL   R LE Strength 4/5 hip flexion, knee ext/flex, and ankle DF/PF   L LE ROM WFL   L LE Strength  3-/5 hip flexion, 3+/5 knee ext/flex, and 4-/5 ankle DF/PF      Balance          Static Sitting supervision assistance   Dynamic Sitting stand by assistance   Static Standing contact guard assist    Dynamic Standing contact guard assist  Wide ARISTIDES eyes open: WFL  Wide ARIST[place]ES EC: 3 sec, unable to maintain EC, increased postural sway  NBOS EO: unable to maintain, increased postural sway             Functional Mobility:    Bed Mobility  Rolling to r: contact guard assist   Supine to Sit:  contact guard assist   Transfers Sit to Stand:  contact guard assist, from low toilet minimum assistance, cues for hand placement and sequencing   Gait  Gait Distance: 30 ft with RW  Assistance Level: contact guard  assist  Description: L hip externally rotated, decreased L stride length, decreased weight shift to L side, decreased L foot progression with turns. Cues for sequencing, cues for posture, patient unable to follow cues for sequencing        Therapeutic Activities and Exercises:   Patient educated on role of therapy, goals of session, benefits of out of bed mobility. Patient agreeable to mobilize with therapy.  Discussed PT plan of care during hospitalization. Patient educated that they need to call for assistance to mobilize out of bed. Whiteboard updated as appropriate. Patient educated on how their diagnosis impacts their mobility within PT scope of practice.   Patient educated on discharge recommendations- home with outpatient therapy, patient in agreement with plan of care.     AM-PAC 6 CLICK MOBILITY  Total Score:21     Patient left up in chair with all lines intact, call button in reach, chair alarm on and RN notified.    GOALS:   Multidisciplinary Problems     Physical Therapy Goals        Problem: Physical Therapy Goal    Goal Priority Disciplines Outcome Goal Variances Interventions   Physical Therapy Goal     PT, PT/OT Ongoing (interventions implemented as appropriate)     Description:  Goals to be met by: 2019 (10 days)     Patient will increase functional independence with mobility by performin. Supine to sit with Modified [place]  2. Sit to supine with Modified [place]  3. Sit to stand transfer with Modified [place]  4. Gait  x 200 feet with Supervision using least restrictive device.   5. Ascend/Descend 15 steps with bilateral rails with Contact Guard Assistance with least restrictive device.   6. Stand for 30 sec in narrow ARISTIDES with contact guard assist with no loss of balance to improve balance and decrease risk of falls.                        History:     Past Medical History:   Diagnosis Date    Diabetes mellitus type I     Hyperlipidemia     Hypertension     Right  sided weakness 2019       Past Surgical History:   Procedure Laterality Date     SECTION         Time Tracking:     PT Received On: 19  PT Start Time: 820     PT Stop Time: 846  PT Total Time (min): 26 min     Billable Minutes: Evaluation 20 Co-eval with OT      Sirena Hair, PT  2019

## 2019-06-28 NOTE — PLAN OF CARE
PCP: Alireza Sosa MD  Pharmacy:   [facility] [address] [place] [address] AT [facility] & [address] [address]  Phone: [phone] Fax: [phone]         06/28/19 1045   Discharge Assessment   Assessment Type Discharge Planning Assessment   Confirmed/corrected address and phone number on facesheet? Yes   Assessment information obtained from? Patient   Expected Length of Stay (days)   (TBD)   Communicated expected length of stay with patient/caregiver yes   Prior to hospitilization cognitive status: Alert/Oriented;No Deficits   Prior to hospitalization functional status: Independent   Current cognitive status: Alert/Oriented;No Deficits   Current Functional Status: Independent   Lives With child(sonali), adult   Able to Return to Prior Arrangements yes   Is patient able to care for self after discharge? Yes   Who are your caregiver(s) and their phone number(s)? Aneta Martinez [phone]    Patient's perception of discharge disposition home or selfcare   Readmission Within the Last 30 Days no previous admission in last 30 days   Patient currently being followed by outpatient case management? No   Patient currently receives any other outside agency services? No   Equipment Currently Used at Home none   Do you have any problems affording any of your prescribed medications? No   Is the patient taking medications as prescribed? yes   Does the patient have transportation home? Yes   Transportation Anticipated family or friend will provide   Does the patient receive services at the [facility]mad[facility] Clinic? No   Discharge Plan A Home;Home with family   Discharge Plan B Home;Home with family   DME Needed Upon Discharge  none   Patient/Family in Agreement with Plan yes

## 2019-06-28 NOTE — ASSESSMENT & PLAN NOTE
BG goal 140 - 180   No signs of DKA. No GAP. Patient tolerating po intake. Electrolytes WNL. Will continue to monitor.     Continue IV insulin infusion protocol. Will switch to transition IV insulin infusion once BG is trending closer towards goal.   Requires intensive BG monitoring while on protocol.     Discharge planning: TBD
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
## 2019-06-28 NOTE — PLAN OF CARE
Problem: Physical Therapy Goal  Goal: Physical Therapy Goal  Goals to be met by: 2019 (10 days)     Patient will increase functional independence with mobility by performin. Supine to sit with Modified [place]  2. Sit to supine with Modified [place]  3. Sit to stand transfer with Modified [place]  4. Gait  x 200 feet with Supervision using least restrictive device.   5. Ascend/Descend 15 steps with bilateral rails with Contact Guard Assistance with least restrictive device.   6. Stand for 30 sec in narrow ARISTIDES with contact guard assist with no loss of balance to improve balance and decrease risk of falls.      Outcome: Ongoing (interventions implemented as appropriate)  Evaluation completed, initiated plan of care.   Sirena Hair, PT  2019

## 2019-06-28 NOTE — ASSESSMENT & PLAN NOTE
Stroke risk factor  LDL 84.6  Continue Atorvastatin 40 mg   Per chart review questionable medication compliance

## 2019-06-28 NOTE — PLAN OF CARE
Problem: SLP Goal  Goal: SLP Goal  Outcome: Ongoing (interventions implemented as appropriate)  Regular diet with thin liquids recommended.    Nikia Rodriguez MA/VALDEMAR-SLP  Speech Language Pathologist  Pager [phone]  6/28/2019

## 2019-06-28 NOTE — SUBJECTIVE & OBJECTIVE
Interval HPI:   Overnight events:   BG >500 overnight and at time of consult on SQ insulin regimen. Patient denies symptoms at time of consult.     Eatin%  Nausea: No  Hypoglycemia and intervention: No  Fever: No  TPN and/or TF: No  If yes, type of TF/TPN and rate: None    PMH, PSH, FH, SH  reviewed     Review of Systems  Constitutional: Negative for weight changes.  Eyes: Positive for visual disturbance.  Respiratory: Negative for cough.   Cardiovascular: Negative for chest pain.  Gastrointestinal: Negative for nausea.  Endocrine: Positive for polyuria, polydipsia.  Musculoskeletal: Negative for back pain.  Skin: Negative for rash.  Neurological: Negative for syncope.  Psychiatric/Behavioral: Negative for depression.      Current Medications and/or Treatments Impacting Glycemic Control  Immunotherapy:    Immunosuppressants     None        Steroids:   Hormones (From admission, onward)    None        Pressors:    Autonomic Drugs (From admission, onward)    None        Hyperglycemia/Diabetes Medications:   Antihyperglycemics (From admission, onward)    Start     Stop Route Frequency Ordered    19 0930  insulin regular (Humulin R) 100 Units in sodium chloride 0.9% 100 mL infusion     Question:  Insulin Rate Adjustment (DO NOT MODIFY ANSWER)  Answer:  \\[email or web address]\epic\Images\Pharmacy\InsulinInfusions\InsulinRegAdj [id number].pdf    -- IV Continuous 19 0822             PHYSICAL EXAMINATION:  Vitals:    19 1130   BP:    Pulse: 88   Resp:    Temp:      Body mass index is 26.57 kg/m².    Physical Exam   Constitutional: Well developed, well nourished, NAD.  ENT:  normal hearing.  Neck: Supple; trachea midline;   Cardiovascular: Normal heart sounds, no LE edema. DP +2 bilaterally.  Lungs: Normal effort; lungs anterior bilaterally clear to auscultation.  Abdomen: Soft, no masses, no hernias.  MS: No clubbing or cyanosis of nails noted;  Skin: No rashes, lesions, or ulcers; no nodules. Injection sites  are ok. No lipo hypertropthy or atrophy.  Psychiatric: Good judgement and insight; normal mood and affect.  Neurological: Cranial nerves are grossly intact.  Foot: nails in good condition, no amputations noted.

## 2019-06-28 NOTE — HPI
Emily Martinez is a 54-year-old female with PMHx of DM, HTN, HLD, left PCA territory infarcts last April with residual mild R sided weakness and dysarthria.  Patient presented to [facility] on 6/27 with worsening dysarthria and RLE weakness.  MRI brain revealed concern for left centrum semiovale and left posterior temporal occipital periventricular white matter possible infarcts. Stroke team concerned for a new L subcortical infarct vs recrudescence of previous deficits in the context of occult UTI or metabolic derangements. Stroke team following.     Functional History: Patient lives in [place] with daughter in a single story home with 15 steps to enter.  Prior to admission, prn (A) with ADLs and Mod (I) with rollator for mobility. DME: rollator.

## 2019-06-28 NOTE — PT/OT/SLP EVAL
"Speech Language Pathology Evaluation  Cognitive/Bedside Swallow    Patient Name:  Emily Martinez   MRN:  [id number]  Admitting Diagnosis: <principal problem not specified>    Recommendations:                  General Recommendations:  Speech/language therapy  Diet recommendations:  Regular, Thin   Aspiration Precautions: HOB to 90 degrees and Standard aspiration precautions   General Precautions: Standard, aspiration, fall  Communication strategies:      History:     Past Medical History:   Diagnosis Date    Diabetes mellitus type I     Hyperlipidemia     Hypertension     Right sided weakness 2019       Past Surgical History:   Procedure Laterality Date     SECTION         Social History: Patient lives with family    Prior diet: regular with thin        Subjective     "I am hungry"  Patient goals: eat     Pain/Comfort:  · Pain Rating 1: 0/10  · Pain Rating Post-Intervention 1: 0/10    Objective:     Cognitive Status:    Pt. was oriented x3 with good recall of recent and remote temporal and general information.  Immediate/delayed verbal recall was wfl with pt. Repeating 6 digits and 5 words without difficulty.    Responses to hypothetical verbal problem solving tasks were accurate and complete.  Pt. Compared and contrasted objects and generated multiple solutions to problems.  Thought organization and categorization skills were wfl with pt. Naming 16 animals given one minute when 15-20 are wnl.  Pt. Responded to functional math and time calculations with 100% accuracy       Receptive Language:   Comprehension:   Pt. Responded to complex yes/no questions and multi step commands with 100% accuracy and no delays in responding.        Pragmatics:    wfl    Expressive Language:  Verbal:    Verbal language skills were wfl with no evidence of aphasia.  Pt. Expressed their thoughts coherently in conversation with no evidence of confusion or word finding deficits'      Motor Speech:  Moderate dysarthria " characterized by imprecise articulation with fair intelligibility in conversation.      Voice:   wfl    Visual-Spatial:  wfl    Reading:   Pt. Orally read sentences at midline with 100% accuracy     Written Expression:   tba    Oral Musculature Evaluation  · Oral Musculature: general weakness  · Dentition: scattered dentition  · Mucosal Quality: good  · Mandibular Strength and Mobility: WFL  · Oral Labial Strength and Mobility: WFL  · Lingual Strength and Mobility: impaired strength  · Velar Elevation: WFL  · Buccal Strength and Mobility: WFL  · Volitional Cough: strong  · Volitional Swallow: no delay  · Voice Prior to PO Intake: wfl    Bedside Swallow Eval:   Consistencies Assessed:  · Thin liquids 1 cup of water self fed with and without a straw  · Puree 4 ounces of applesauce self fed  · Solids 1/3 cracker     Oral Phase:   · WFL    Pharyngeal Phase:   · no overt clinical signs/symptoms of aspiration  · no overt clinical signs/symptoms of pharyngeal dysphagia    Compensatory Strategies  · None    Treatment:     Assessment:     Emily Martinez is a 54 y.o. female with an SLP diagnosis of Dysphagia and Dysarthria.    Goals:   Multidisciplinary Problems     SLP Goals        Problem: SLP Goal    Goal Priority Disciplines Outcome   SLP Goal     SLP Ongoing (interventions implemented as appropriate)   Description:  Goals due 7/5  1.  Tolerate regular diet with thin liquids with no s/s of aspiration  2.  Recall speech strategies with min-no cues  3.  Require min cues to implement speech strategies in conversation  4.  Repeat sentences with 100% intelligibility.                      Plan:     · Patient to be seen:  3 x/week   · Plan of Care expires:  07/27/19  · Plan of Care reviewed with:  patient   · SLP Follow-Up:  Yes       Discharge recommendations:  Discharge Facility/Level of Care Needs: home health speech therapy   Barriers to Discharge:  None    Time Tracking:     SLP Treatment Date:   06/28/19  Speech Start  Time:  0732  Speech Stop Time:  0750     Speech Total Time (min):  18 min    Billable Minutes: Eval 10  and Eval Swallow and Oral Function 8    Nikia Rodriguez MA, CCC-SLP  06/28/2019

## 2019-06-28 NOTE — CONSULTS
[facility]  Physical Medicine & Rehab  Consult Note    Patient Name: Emily Martinez  MRN: [id number]  Admission Date: 2019  Hospital Length of Stay: 1 days  Attending Physician: Jacy Lehman MD     Inpatient consult to Physical Medicine & Rehabilitation  Consult performed by: Daja Marsh NP  Consult requested by:  Jacy Lehman MD    Reason for Consult:  assess rehabilitation needs  Consults  Subjective:     Principal Problem: Thrombotic stroke involving right middle cerebral artery    HPI: Emily Martinez is a 54-year-old female with PMHx of DM, HTN, HLD, left PCA territory infarcts last April with residual mild R sided weakness and dysarthria.  Patient presented to [facility] on  with worsening dysarthria and RLE weakness.  MRI brain revealed concern for left centrum semiovale and left posterior temporal occipital periventricular white matter possible infarcts. Stroke team concerned for a new L subcortical infarct vs recrudescence of previous deficits in the context of occult UTI or metabolic derangements. Stroke team following.     Functional History: Patient lives in [place] with daughter in a single story home with 15 steps to enter.  Prior to admission, prn (A) with ADLs and Mod (I) with rollator for mobility. DME: rollator.    Hospital Course:   2019: OT-Sit to stand CGA & RW and transfers Richard & RW.  UBD and LBD Richard. Toileting CGA. Feeding Mod (I). Passed bedside swallow evaluation.  SLP recommending regular diet and thin liquids.  PT/SLP cog eval pending.     Past Medical History:   Diagnosis Date    Diabetes mellitus type I     Hyperlipidemia     Hypertension     Right sided weakness 2019     Past Surgical History:   Procedure Laterality Date     SECTION       Review of patient's allergies indicates:   Allergen Reactions    Sulfur        Scheduled Medications:    aspirin  81 mg Oral Daily    atorvastatin  40 mg Oral Daily    clopidogrel  75 mg Oral  Daily       PRN Medications: Dextrose 10% Bolus, Dextrose 10% Bolus, sodium chloride 0.9%, sodium chloride 0.9%    Family History     Problem Relation (Age of Onset)    Cancer Sister    Diabetes Mother, Sister, Brother, Maternal Aunt    Heart disease Maternal Aunt    Hyperlipidemia Mother, Sister, Brother    Hypertension Mother, Sister, Brother    Stroke Mother        Tobacco Use    Smoking status: Current Every Day Smoker     Packs/day: 1.50     Years: 35.00     Pack years: 52.50     Types: Cigarettes    Smokeless tobacco: Never Used   Substance and Sexual Activity    Alcohol use: Not Currently    Drug use: Not Currently    Sexual activity: Not on file     Review of Systems   Reason unable to perform ROS: limited 2/2 dysarthria.   Constitutional: Positive for activity change.   HENT: Positive for trouble swallowing.    Musculoskeletal: Positive for gait problem.   Neurological: Positive for speech difficulty and weakness.     Objective:     Vital Signs (Most Recent):  Temp: 98 °F (36.7 °C) (06/28/19 1122)  Pulse: 88 (06/28/19 1130)  Resp: 18 (06/28/19 1122)  BP: 126/82 (06/28/19 1122)  SpO2: 96 % (06/28/19 1122)    Vital Signs (24h Range):  Temp:  [96.8 °F (36 °C)-98.7 °F (37.1 °C)] 98 °F (36.7 °C)  Pulse:  [69-88] 88  Resp:  [16-19] 18  SpO2:  [95 %-100 %] 96 %  BP: ()/(60-94) 126/82     Body mass index is 26.57 kg/m².    Physical Exam   Constitutional: She is oriented to person, place, and time. She appears well-developed and well-nourished.   HENT:   Head: Normocephalic and atraumatic.   Eyes: Right eye exhibits no discharge. Left eye exhibits no discharge.   Neck: Neck supple.   Cardiovascular: Normal rate and intact distal pulses.   Pulmonary/Chest: Effort normal. No respiratory distress.   Abdominal: Soft. There is no tenderness.   Musculoskeletal: She exhibits no edema or deformity.   L> R sided weakness    Neurological: She is alert and oriented to person, place, and time.   Follows  "commands  dysarthria   Skin: Skin is warm and dry.   Psychiatric: Her speech is slurred. She expresses impulsivity.   Vitals reviewed.      Diagnostic Results:   Labs: Reviewed  ECG: Reviewed  X-Ray: Reviewed  MRI: Reviewed    Assessment/Plan:     * Thrombotic stroke involving right middle cerebral artery  -Per stroke team, "scattered foci within the right hemisphere primarily R centrum semi ovale extending to the basal ganglia w/ punctate foci in the L hemisphere."    See hospital course for functional, cognitive/speech/language, and nutrition/swallow status.      Recommendations  -  Encourage mobility, OOB in chair at least 3 hours per day, and early ambulation as appropriate   -  PT/OT evaluate and treat  -  SLP speech and cognitive evaluate and treat  -  Monitor sleep disturbances and establish consistent sleep-wake cycle  -  Monitor for bowel and bladder dysfunction  -  Monitor for shoulder pain, subluxation, & spasticity  -  Monitor for and prevent skin breakdown and pressure ulcers  · Early mobility, repositioning/weight shifting every 20-30 minutes when sitting, turn patient every 2 hours, proper mattress/overlay and chair cushioning, pressure relief/heel protector boots  -  DVT prophylaxis (if appropriate)  -  Reviewed discharge options (IP rehab, SNF, HH therapy, and OP therapy)    NICOLASA (acute kidney injury)  -IVFs  -stroke team managing     Mixed hyperlipidemia  --stroke risk factor    Essential hypertension  -stroke risk factor      PT evaluation pending. Will follow progress and discuss with rehab team for post acute care/rehab recommendation.      Thank you for your consult.     Daja Marsh NP  Department of Physical Medicine & Rehab  [facility]    "

## 2019-06-28 NOTE — HPI
"Reason for Consult: Management of T2DM, Hyperglycemia     Surgical Procedure and Date:   N/A  Diabetes diagnosis year:   ~ 10 years ago.  Home Diabetes Medications:    Patient seems to be non-complaint with DM regimen, and is unsure on correct administration. She reports taking and provides the following medication:   Metfromin 1000 mg BID    -Basaglar 19 HS    -Admelog 10 BID  How often checking glucose at home? 1-3 x day   BG readings on regimen: 300's - 500's  Hypoglycemia on the regimen?  Yes "about once a month."  Missed doses on regimen?  No    Diabetes Complications include:     Hyperglycemia, Hypoglycemia  and Diabetic retinopathy     Complicating diabetes co morbidities:   History of CVA and Residual deficits from CVA       HPI:   Patient is a 54 y.o. female with a diagnosis of DM, HTN, HLD, left PCA territory infarcts last April with residual mild R sided weakness and dysarthria.  Patient [place] to [facility] [place]27 with worsening dysarthria and RLE weakness.  MRI brain revealed concern for left centrum semiovale and left posterior temporal occipital periventricular white matter possible infarcts. Stroke team concerned for a new L subcortical infarct vs recrudescence of previous deficits in the context of occult UTI or metabolic derangements.  Patient lives in [place] with daughter in a single story home with 15 steps to enter. Endocrinology consulted to manage DM2/Hyperglycemia.      Lab Results   Component Value Date    LABA1C 13.0 (H) 04/13/2016    HGBA1C >14.0 (H) 06/27/2019       "

## 2019-06-28 NOTE — PT/OT/SLP EVAL
Occupational Therapy   Evaluation    Name: Emily Martinez  MRN: [id number]  Admitting Diagnosis:  Right sided weakness    Recommendations:     Discharge Recommendations: outpatient OT vs home health (depending on ride)  Discharge Equipment Recommendations:  walker, rolling  Barriers to discharge:  Inaccessible home environment    Assessment:     Emily Martinez is a 54 y.o. female whom was admitted with Right sided weakness.  She presents with L hemiparesis and impaired safety with mobility and dynamic ADLs/self care tasks. She is a fall risk. Pt reported she is at her baseline prior to this admit. She would greatly benefit from cont therapy services at post acute level of care to max functional outcomes and best safety. Performance deficits affecting function: impaired endurance, impaired sensation, impaired self care skills, impaired functional mobilty, gait instability, impaired balance, decreased safety awareness, decreased upper extremity function, decreased lower extremity function, impaired coordination, impaired fine motor.      Rehab Prognosis: Good; patient would benefit from acute skilled OT services to address these deficits and reach maximum level of function.       Plan:     Patient to be seen 3 x/week to address the above listed problems via self-care/home management, therapeutic activities, therapeutic exercises, neuromuscular re-education, cognitive retraining  · Plan of Care Expires: 07/27/19  · Plan of Care Reviewed with: patient    Subjective     Chief Complaint: they won't let me get up  Patient/Family Comments/goals: home    Occupational Profile:  Living Environment: Pt reported she will stay with daughter upon d/c. ~15 AIDE. Tub/shower combo.   Previous level of function: L residual weakness. Using rollator for functional mobility. Requiring occasional assist for ADLs/self care. Mainly home bound- enjoys being outside with her grandchildren. Reported 3-4 falls in past few months. No longer  driving- daughters drive her to MD appointment   Roles and Routines: mother, grand mother, care taker to self, home and community dweller  Equipment Used at Home:  bedside commode, rollator, shower chair  Assistance upon Discharge: yes, reported daughters and grand children     Pain/Comfort:  · Pain Rating 1: 0/10  · Pain Rating Post-Intervention 1: 0/10    Patients cultural, spiritual, Amish conflicts given the current situation: no    Objective:     Communicated with: RN prior to session.  Completed with PT. Patient found seated EOB with peripheral IV, telemetry, PureWick upon OT entry to room.    General Precautions: Standard, aspiration, fall, NPO   Orthopedic Precautions:N/A   Braces: N/A     Occupational Performance:  Functional Mobility/Transfers:  · Patient completed Sit <> Stand Transfer with contact guard assistance  with  rolling walker   · Patient completed Bed <> Chair Transfer using Step Transfer technique with contact guard assistance with rolling walker  · Patient completed Toilet Transfer Step Transfer technique with minimum assistance with  rolling walker and grab bars   · Requiring min(A) to stand from toilet (low) with RW and grab bar  · Functional Mobility: household distances with rolling walker and CGA  · Verbal cues for best safety  · Mild impulsivity     Activities of Daily Living:  · Feeding:  modified independence with set up  · Grooming: seated in chair with set up and supervision     · Upper Body Dressing: minimum assistance to don gown as jacket  · Lower Body Dressing: minimum assistance to don pull up brief  · Toileting: contact guard assistance and set up - perineal care from toilet    Cognitive/Visual Perceptual:  Cognitive/Psychosocial Skills:     -       Oriented to: Person, Place, Time and Situation   -       Follows Commands/attention:Follows multistep  commands  -       Communication: dysarthria  -       Memory: No Deficits noted  -       Safety awareness/insight to  disability: Impulsivity; decline in overall insight and safety   -       Mood/Affect/Coping skills/emotional control: Cooperative and Anxious  Visual/Perceptual:      -Intact  tracking /scanning; mild L inattention noted    Physical Exam:  Postural examination/scapula alignment:    -       Rounded shoulders  -       Forward head  Skin integrity: Dry  Edema:  None noted  Sensation:    -       Impaired  proprioception L UE  Motor Planning:    -       Impaired L UE; mild drift  Dominant hand:    -       R  Upper Extremity Range of Motion:     -       Right Upper Extremity: WFL  -       Left Upper Extremity: WFL  Upper Extremity Strength:    -       Right Upper Extremity: 4+/5  -       Left Upper Extremity: 3+/5   Strength:    -       Right Upper Extremity: WFL  -       Left Upper Extremity: 3+/5; poor sustained grasp  Fine Motor Coordination:    -       Intact  Left hand, finger to nose (delayed), Right hand, finger to nose, Left hand, manipulation of objects (delayed; requiring added time) and Right hand, manipulation of objects  Gross motor coordination:   L hemiplegia/paresis    AMPAC 6 Click ADL:  AMPAC Total Score: 18   Body mass index is 26.57 kg/m².  Vitals:    06/28/19 0745   BP: (!) 122/94   Pulse: 69   Resp: 18   Temp: 98 °F (36.7 °C)       Treatment & Education:  -Pt alert and agreeable to therapy eval ; edu on OT role in care  -edu on safety in acute setting; use of call light for staff assistance  -edu on safety with mobility and toileting tasks  -Communication board updated; questions/concerns addressed within OT scope of practice  Education:    Patient left up in chair with all lines intact, call button in reach, chair alarm on and RN notified    GOALS:   Multidisciplinary Problems     Occupational Therapy Goals        Problem: Occupational Therapy Goal    Goal Priority Disciplines Outcome Interventions   Occupational Therapy Goal     OT, PT/OT Ongoing (interventions implemented as appropriate)     Description:  Goals to be met by:      Patient will increase functional independence with ADLs by performing:    UE Dressing with Set-up Assistance and Contact Guard Assistance.  LE Dressing (pants, brief) with Set-up Assistance and Contact Guard Assistance.  Grooming while standing at sink with Contact Guard Assistance.  Toileting from toilet with Stand-by Assistance for hygiene and clothing management.   Toilet transfer to toilet with Contact Guard Assistance.  Functional mobility at short household distance for ADL task with Supervision and RW.                       History:     Past Medical History:   Diagnosis Date    Diabetes mellitus type I     Hyperlipidemia     Hypertension     Right sided weakness 2019       Past Surgical History:   Procedure Laterality Date     SECTION         Time Tracking:     OT Date of Treatment: 19  OT Start Time: 819  OT Stop Time: 843  OT Total Time (min): 24 min    Billable Minutes:Evaluation 16  Self Care/Home Management 8    FLAKITO Lance  2019

## 2019-06-28 NOTE — ASSESSMENT & PLAN NOTE
Area of cytotoxic cerebral edema identified when reviewing brain imaging in the territory of the R middle cerebral artery. There is not mass effect associated with it. We will continue to monitor the patients clinical exam for any worsening of symptoms which may indicate expansion of the stroke or the area of the edema resulting in the clinical change. The pattern is suggestive of small vessel etiology

## 2019-06-28 NOTE — SUBJECTIVE & OBJECTIVE
Past Medical History:   Diagnosis Date    Diabetes mellitus type I     Hyperlipidemia     Hypertension     Right sided weakness 2019     Past Surgical History:   Procedure Laterality Date     SECTION       Review of patient's allergies indicates:   Allergen Reactions    Sulfur        Scheduled Medications:    aspirin  81 mg Oral Daily    atorvastatin  40 mg Oral Daily    clopidogrel  75 mg Oral Daily       PRN Medications: Dextrose 10% Bolus, Dextrose 10% Bolus, sodium chloride 0.9%, sodium chloride 0.9%    Family History     Problem Relation (Age of Onset)    Cancer Sister    Diabetes Mother, Sister, Brother, Maternal Aunt    Heart disease Maternal Aunt    Hyperlipidemia Mother, Sister, Brother    Hypertension Mother, Sister, Brother    Stroke Mother        Tobacco Use    Smoking status: Current Every Day Smoker     Packs/day: 1.50     Years: 35.00     Pack years: 52.50     Types: Cigarettes    Smokeless tobacco: Never Used   Substance and Sexual Activity    Alcohol use: Not Currently    Drug use: Not Currently    Sexual activity: Not on file     Review of Systems   Reason unable to perform ROS: limited 2/2 dysarthria.   Constitutional: Positive for activity change.   HENT: Positive for trouble swallowing.    Musculoskeletal: Positive for gait problem.   Neurological: Positive for speech difficulty and weakness.     Objective:     Vital Signs (Most Recent):  Temp: 98 °F (36.7 °C) (19 1122)  Pulse: 88 (19 1130)  Resp: 18 (19 1122)  BP: 126/82 (19 1122)  SpO2: 96 % (19 1122)    Vital Signs (24h Range):  Temp:  [96.8 °F (36 °C)-98.7 °F (37.1 °C)] 98 °F (36.7 °C)  Pulse:  [69-88] 88  Resp:  [16-19] 18  SpO2:  [95 %-100 %] 96 %  BP: ()/(60-94) 126/82     Body mass index is 26.57 kg/m².    Physical Exam   Constitutional: She is oriented to person, place, and time. She appears well-developed and well-nourished.   HENT:   Head: Normocephalic and atraumatic.    Eyes: Right eye exhibits no discharge. Left eye exhibits no discharge.   Neck: Neck supple.   Cardiovascular: Normal rate and intact distal pulses.   Pulmonary/Chest: Effort normal. No respiratory distress.   Abdominal: Soft. There is no tenderness.   Musculoskeletal: She exhibits no edema or deformity.   R sided weakness    Neurological: She is alert and oriented to person, place, and time.   Follows commands  dysarthria   Skin: Skin is warm and dry.   Psychiatric: She has a normal mood and affect. Her behavior is normal. Her speech is slurred. She expresses impulsivity.   Vitals reviewed.    NEUROLOGICAL EXAMINATION:     MENTAL STATUS   Oriented to person, place, and time.   Speech: slurred       Diagnostic Results:   Labs: Reviewed  ECG: Reviewed  X-Ray: Reviewed  MRI: Reviewed

## 2019-06-29 LAB
ALBUMIN SERPL BCP-MCNC: 3 G/DL (ref 3.5–5.2)
ALP SERPL-CCNC: 80 U/L (ref 55–135)
ALT SERPL W/O P-5'-P-CCNC: 12 U/L (ref 10–44)
ANION GAP SERPL CALC-SCNC: 8 MMOL/L (ref 8–16)
AST SERPL-CCNC: 18 U/L (ref 10–40)
BASOPHILS # BLD AUTO: 0.05 K/UL (ref 0–0.2)
BASOPHILS NFR BLD: 0.7 % (ref 0–1.9)
BILIRUB SERPL-MCNC: 0.3 MG/DL (ref 0.1–1)
BUN SERPL-MCNC: 17 MG/DL (ref 6–20)
CALCIUM SERPL-MCNC: 10.1 MG/DL (ref 8.7–10.5)
CHLORIDE SERPL-SCNC: 102 MMOL/L (ref 95–110)
CO2 SERPL-SCNC: 29 MMOL/L (ref 23–29)
CREAT SERPL-MCNC: 1.3 MG/DL (ref 0.5–1.4)
DIFFERENTIAL METHOD: NORMAL
EOSINOPHIL # BLD AUTO: 0 K/UL (ref 0–0.5)
EOSINOPHIL NFR BLD: 0.6 % (ref 0–8)
ERYTHROCYTE [DISTWIDTH] IN BLOOD BY AUTOMATED COUNT: 12.5 % (ref 11.5–14.5)
EST. GFR  (AFRICAN AMERICAN): 53 ML/MIN/1.73 M^2
EST. GFR  (NON AFRICAN AMERICAN): 46 ML/MIN/1.73 M^2
GLUCOSE SERPL-MCNC: 217 MG/DL (ref 70–110)
HCT VFR BLD AUTO: 37.7 % (ref 37–48.5)
HGB BLD-MCNC: 12.9 G/DL (ref 12–16)
IMM GRANULOCYTES # BLD AUTO: 0.01 K/UL (ref 0–0.04)
IMM GRANULOCYTES NFR BLD AUTO: 0.1 % (ref 0–0.5)
LYMPHOCYTES # BLD AUTO: 3 K/UL (ref 1–4.8)
LYMPHOCYTES NFR BLD: 43 % (ref 18–48)
MCH RBC QN AUTO: 28.6 PG (ref 27–31)
MCHC RBC AUTO-ENTMCNC: 34.2 G/DL (ref 32–36)
MCV RBC AUTO: 84 FL (ref 82–98)
MONOCYTES # BLD AUTO: 0.6 K/UL (ref 0.3–1)
MONOCYTES NFR BLD: 8.7 % (ref 4–15)
NEUTROPHILS # BLD AUTO: 3.3 K/UL (ref 1.8–7.7)
NEUTROPHILS NFR BLD: 46.9 % (ref 38–73)
NRBC BLD-RTO: 0 /100 WBC
PLATELET # BLD AUTO: 342 K/UL (ref 150–350)
PMV BLD AUTO: 11.1 FL (ref 9.2–12.9)
POCT GLUCOSE: 162 MG/DL (ref 70–110)
POCT GLUCOSE: 197 MG/DL (ref 70–110)
POCT GLUCOSE: 199 MG/DL (ref 70–110)
POCT GLUCOSE: 204 MG/DL (ref 70–110)
POCT GLUCOSE: 224 MG/DL (ref 70–110)
POCT GLUCOSE: 280 MG/DL (ref 70–110)
POCT GLUCOSE: 281 MG/DL (ref 70–110)
POCT GLUCOSE: 313 MG/DL (ref 70–110)
POCT GLUCOSE: 321 MG/DL (ref 70–110)
POCT GLUCOSE: 361 MG/DL (ref 70–110)
POCT GLUCOSE: 389 MG/DL (ref 70–110)
POCT GLUCOSE: 409 MG/DL (ref 70–110)
POCT GLUCOSE: 92 MG/DL (ref 70–110)
POCT GLUCOSE: 95 MG/DL (ref 70–110)
POTASSIUM SERPL-SCNC: 3.8 MMOL/L (ref 3.5–5.1)
PROT SERPL-MCNC: 6.3 G/DL (ref 6–8.4)
RBC # BLD AUTO: 4.51 M/UL (ref 4–5.4)
SODIUM SERPL-SCNC: 139 MMOL/L (ref 136–145)
WBC # BLD AUTO: 7.05 K/UL (ref 3.9–12.7)

## 2019-06-29 PROCEDURE — 25000003 PHARM REV CODE 250: Performed by: PSYCHIATRY & NEUROLOGY

## 2019-06-29 PROCEDURE — 63600175 PHARM REV CODE 636 W HCPCS: Performed by: NURSE PRACTITIONER

## 2019-06-29 PROCEDURE — 80053 COMPREHEN METABOLIC PANEL: CPT

## 2019-06-29 PROCEDURE — 20600001 HC STEP DOWN PRIVATE ROOM

## 2019-06-29 PROCEDURE — 99233 SBSQ HOSP IP/OBS HIGH 50: CPT | Mod: ,,, | Performed by: NURSE PRACTITIONER

## 2019-06-29 PROCEDURE — 99233 PR SUBSEQUENT HOSPITAL CARE,LEVL III: ICD-10-PCS | Mod: ,,, | Performed by: NURSE PRACTITIONER

## 2019-06-29 PROCEDURE — 99233 PR SUBSEQUENT HOSPITAL CARE,LEVL III: ICD-10-PCS | Mod: ,,, | Performed by: PSYCHIATRY & NEUROLOGY

## 2019-06-29 PROCEDURE — 36415 COLL VENOUS BLD VENIPUNCTURE: CPT

## 2019-06-29 PROCEDURE — 25000003 PHARM REV CODE 250: Performed by: NURSE PRACTITIONER

## 2019-06-29 PROCEDURE — S4991 NICOTINE PATCH NONLEGEND: HCPCS | Performed by: NURSE PRACTITIONER

## 2019-06-29 PROCEDURE — 85025 COMPLETE CBC W/AUTO DIFF WBC: CPT

## 2019-06-29 PROCEDURE — 99233 SBSQ HOSP IP/OBS HIGH 50: CPT | Mod: ,,, | Performed by: PSYCHIATRY & NEUROLOGY

## 2019-06-29 PROCEDURE — 94761 N-INVAS EAR/PLS OXIMETRY MLT: CPT

## 2019-06-29 RX ORDER — INSULIN ASPART 100 [IU]/ML
10 INJECTION, SOLUTION INTRAVENOUS; SUBCUTANEOUS
Status: DISCONTINUED | OUTPATIENT
Start: 2019-06-29 | End: 2019-06-30

## 2019-06-29 RX ADMIN — INSULIN ASPART 6 UNITS: 100 INJECTION, SOLUTION INTRAVENOUS; SUBCUTANEOUS at 08:06

## 2019-06-29 RX ADMIN — INSULIN ASPART 5 UNITS: 100 INJECTION, SOLUTION INTRAVENOUS; SUBCUTANEOUS at 11:06

## 2019-06-29 RX ADMIN — SODIUM CHLORIDE 1.4 UNITS/HR: 9 INJECTION, SOLUTION INTRAVENOUS at 05:06

## 2019-06-29 RX ADMIN — SODIUM CHLORIDE: 0.9 INJECTION, SOLUTION INTRAVENOUS at 08:06

## 2019-06-29 RX ADMIN — INSULIN ASPART 10 UNITS: 100 INJECTION, SOLUTION INTRAVENOUS; SUBCUTANEOUS at 04:06

## 2019-06-29 RX ADMIN — SODIUM CHLORIDE: 0.9 INJECTION, SOLUTION INTRAVENOUS at 10:06

## 2019-06-29 RX ADMIN — INSULIN ASPART 1 UNITS: 100 INJECTION, SOLUTION INTRAVENOUS; SUBCUTANEOUS at 08:06

## 2019-06-29 RX ADMIN — INSULIN ASPART 3 UNITS: 100 INJECTION, SOLUTION INTRAVENOUS; SUBCUTANEOUS at 11:06

## 2019-06-29 RX ADMIN — ASPIRIN 81 MG: 81 TABLET, COATED ORAL at 08:06

## 2019-06-29 RX ADMIN — ATORVASTATIN CALCIUM 40 MG: 20 TABLET, FILM COATED ORAL at 08:06

## 2019-06-29 RX ADMIN — SODIUM CHLORIDE 1.4 UNITS/HR: 9 INJECTION, SOLUTION INTRAVENOUS at 03:06

## 2019-06-29 RX ADMIN — INSULIN ASPART 10 UNITS: 100 INJECTION, SOLUTION INTRAVENOUS; SUBCUTANEOUS at 11:06

## 2019-06-29 RX ADMIN — NICOTINE 1 PATCH: 21 PATCH, EXTENDED RELEASE TRANSDERMAL at 08:06

## 2019-06-29 RX ADMIN — CLOPIDOGREL 75 MG: 75 TABLET, FILM COATED ORAL at 08:06

## 2019-06-29 NOTE — ASSESSMENT & PLAN NOTE
BG goal 140 - 180   No signs of DKA. No GAP. Patient tolerating po intake. Electrolytes WNL. Will continue to monitor.     Increase transition IV insulin infusion with stepdown parameters. Initial rate starting at 1.5  Increase novolog to 10 units TIDWM  Low Dose SQ Insulin Correction Scale.  BG monitoring every 2 hours.     Discharge planning: SANDRA

## 2019-06-29 NOTE — PROGRESS NOTES
"[facility]  Endocrinology  Progress Note    Admit Date: 2019     Reason for Consult: Management of T2DM, Hyperglycemia     Surgical Procedure and Date:   N/A  Diabetes diagnosis year:   ~ 10 years ago.  Home Diabetes Medications:    Patient seems to be non-complaint with DM regimen, and is unsure on correct administration. She reports taking and provides the following medication:     -Basaglar 19 HS    -Admelog 10 BID  How often checking glucose at home? 1-3 x day   BG readings on regimen: 300's - 500's  Hypoglycemia on the regimen?  Yes "about once a month."  Missed doses on regimen?  No    Diabetes Complications include:     Hyperglycemia, Hypoglycemia  and Diabetic retinopathy     Complicating diabetes co morbidities:   History of CVA and Residual deficits from CVA       HPI:   Patient is a 54 y.o. female with a diagnosis of DM, HTN, HLD, left PCA territory infarcts last April with residual mild R sided weakness and dysarthria.  Patient presented to [facility] on  with worsening dysarthria and RLE weakness.  MRI brain revealed concern for left centrum semiovale and left posterior temporal occipital periventricular white matter possible infarcts. Stroke team concerned for a new L subcortical infarct vs recrudescence of previous deficits in the context of occult UTI or metabolic derangements.  Patient lives in [place] with daughter in a single story home with 15 steps to enter. Endocrinology consulted to manage DM2/Hyperglycemia.      Lab Results   Component Value Date    LABA1C 13.0 (H) 2016    HGBA1C >14.0 (H) 2019       Interval HPI:   Overnight events:  BG remains labile and uncontrolled on current IV insulin infusion rate. Otherwise NAEON.     Eatin%  Nausea: No  Hypoglycemia and intervention: No  Fever: No  TPN and/or TF: No  If yes, type of TF/TPN and rate: None    /66 (BP Location: Right arm, Patient Position: Lying)   Pulse 76   Temp 97.8 °F (36.6 °C) " "(Oral)   Resp 18   Ht 5' 3" (1.6 m)   Wt 68 kg (150 lb)   SpO2 96%   Breastfeeding? No   BMI 26.57 kg/m²      Labs Reviewed and Include    Recent Labs   Lab 06/29/19  0656   *   CALCIUM 10.1   ALBUMIN 3.0*   PROT 6.3      K 3.8   CO2 29      BUN 17   CREATININE 1.3   ALKPHOS 80   ALT 12   AST 18   BILITOT 0.3     Lab Results   Component Value Date    WBC 7.05 06/29/2019    HGB 12.9 06/29/2019    HCT 37.7 06/29/2019    MCV 84 06/29/2019     06/29/2019     Recent Labs   Lab 06/27/19  1520   TSH 0.908     Lab Results   Component Value Date    HGBA1C >14.0 (H) 06/27/2019       Nutritional status:   Body mass index is 26.57 kg/m².  Lab Results   Component Value Date    ALBUMIN 3.0 (L) 06/29/2019    ALBUMIN 3.3 (L) 06/28/2019    ALBUMIN 3.7 06/27/2019     No results found for: PREALBUMIN    Estimated Creatinine Clearance: 44.7 mL/min (based on SCr of 1.3 mg/dL).    Accu-Checks  Recent Labs     06/28/19  1737 06/28/19  1839 06/28/19  1944 06/28/19  2050 06/28/19  2248 06/29/19  0103 06/29/19  0303 06/29/19  0500 06/29/19  0652 06/29/19  0852   POCTGLUCOSE 331* 231* 207* 135* 212* 313* 321* 280* 224* 409*       Current Medications and/or Treatments Impacting Glycemic Control  Immunotherapy:    Immunosuppressants     None        Steroids:   Hormones (From admission, onward)    None        Pressors:    Autonomic Drugs (From admission, onward)    None        Hyperglycemia/Diabetes Medications:   Antihyperglycemics (From admission, onward)    Start     Stop Route Frequency Ordered    06/29/19 0715  insulin aspart U-100 pen 4-6 Units      -- SubQ 3 times daily with meals 06/28/19 2034 06/28/19 2138  insulin aspart U-100 pen 0-5 Units      -- SubQ As needed (PRN) 06/28/19 2038 06/28/19 2039  insulin regular (Humulin R) 100 Units in sodium chloride 0.9% 100 mL infusion      -- IV Continuous 06/28/19 2039          ASSESSMENT and PLAN    * Thrombotic stroke involving right middle cerebral " artery  Managed per primary team  Avoid hypoglycemia        Uncontrolled type 2 diabetes mellitus with hyperglycemia  BG goal 140 - 180   No signs of DKA. No GAP. Patient tolerating po intake. Electrolytes WNL. Will continue to monitor.     Increase transition IV insulin infusion with stepdown parameters. Initial rate starting at 1.5  Increase novolog to 10 units TIDWM  Low Dose SQ Insulin Correction Scale.  BG monitoring every 2 hours.     Discharge planning: TBD        NICOLASA (acute kidney injury)  Caution with insulin stacking            Justus Ignacio NP  Endocrinology  [facility]

## 2019-06-29 NOTE — SUBJECTIVE & OBJECTIVE
"Interval HPI:   Overnight events:  BG remains labile and uncontrolled on current IV insulin infusion rate. Otherwise NAEON.     Eatin%  Nausea: No  Hypoglycemia and intervention: No  Fever: No  TPN and/or TF: No  If yes, type of TF/TPN and rate: None    /66 (BP Location: Right arm, Patient Position: Lying)   Pulse 76   Temp 97.8 °F (36.6 °C) (Oral)   Resp 18   Ht 5' 3" (1.6 m)   Wt 68 kg (150 lb)   SpO2 96%   Breastfeeding? No   BMI 26.57 kg/m²     Labs Reviewed and Include    Recent Labs   Lab 19  0656   *   CALCIUM 10.1   ALBUMIN 3.0*   PROT 6.3      K 3.8   CO2 29      BUN 17   CREATININE 1.3   ALKPHOS 80   ALT 12   AST 18   BILITOT 0.3     Lab Results   Component Value Date    WBC 7.05 2019    HGB 12.9 2019    HCT 37.7 2019    MCV 84 2019     2019     Recent Labs   Lab 19  1520   TSH 0.908     Lab Results   Component Value Date    HGBA1C >14.0 (H) 2019       Nutritional status:   Body mass index is 26.57 kg/m².  Lab Results   Component Value Date    ALBUMIN 3.0 (L) 2019    ALBUMIN 3.3 (L) 2019    ALBUMIN 3.7 2019     No results found for: PREALBUMIN    Estimated Creatinine Clearance: 44.7 mL/min (based on SCr of 1.3 mg/dL).    Accu-Checks  Recent Labs     19  1737 19  1839 19  1944 19  2050 19  2248 19  0103 19  0303 19  0500 19  0652 19  0852   POCTGLUCOSE 331* 231* 207* 135* 212* 313* 321* 280* 224* 409*       Current Medications and/or Treatments Impacting Glycemic Control  Immunotherapy:    Immunosuppressants     None        Steroids:   Hormones (From admission, onward)    None        Pressors:    Autonomic Drugs (From admission, onward)    None        Hyperglycemia/Diabetes Medications:   Antihyperglycemics (From admission, onward)    Start     Stop Route Frequency Ordered    19 0715  insulin aspart U-100 pen 4-6 Units      -- " SubQ 3 times daily with meals 06/28/19 2034 06/28/19 2138  insulin aspart U-100 pen 0-5 Units      -- SubQ As needed (PRN) 06/28/19 2038 06/28/19 2039  insulin regular (Humulin R) 100 Units in sodium chloride 0.9% 100 mL infusion      -- IV Continuous 06/28/19 2039

## 2019-06-29 NOTE — ASSESSMENT & PLAN NOTE
Stroke risk factor  Hgb A1C > 14  Endocrine consult, appreciate assistance  Currently on transition insulin gtt

## 2019-06-29 NOTE — SUBJECTIVE & OBJECTIVE
Neurologic Chief Complaint: right sided weakness     Subjective:     Interval History: Patient is seen for follow-up neurological assessment and treatment recommendations: NAEON, no new complaints    HPI, Past Medical, Family, and Social History remains the same as documented in the initial encounter.     Review of Systems   Constitutional: Negative for fever.   Respiratory: Negative for shortness of breath.    Cardiovascular: Negative for chest pain.   Gastrointestinal: Negative for nausea and vomiting.   Neurological: Positive for speech difficulty and weakness.     Scheduled Meds:   aspirin  81 mg Oral Daily    atorvastatin  40 mg Oral Daily    clopidogrel  75 mg Oral Daily    insulin aspart U-100  10 Units Subcutaneous TIDWM    nicotine  1 patch Transdermal Daily     Continuous Infusions:   sodium chloride 0.9% 75 mL/hr at 06/29/19 0825    insulin (HUMAN R) infusion (adults) 1.5 Units/hr (06/29/19 1011)    sodium chloride 0.9%       PRN Meds:Dextrose 10% Bolus, Dextrose 10% Bolus, glucagon (human recombinant), glucose, glucose, insulin aspart U-100, sodium chloride 0.9%, sodium chloride 0.9%    Objective:     Vital Signs (Most Recent):  Temp: 97.8 °F (36.6 °C) (06/29/19 0756)  Pulse: 76 (06/29/19 0756)  Resp: 18 (06/29/19 0756)  BP: 130/66 (06/29/19 0756)  SpO2: 96 % (06/29/19 0756)  BP Location: Right arm    Vital Signs Range (Last 24H):  Temp:  [97.2 °F (36.2 °C)-98.6 °F (37 °C)]   Pulse:  [65-88]   Resp:  [18-20]   BP: (126-154)/(66-94)   SpO2:  [93 %-96 %]   BP Location: Right arm    Physical Exam   Constitutional: She is oriented to person, place, and time. She appears well-developed and well-nourished. No distress.   HENT:   Head: Normocephalic and atraumatic.   Eyes: Pupils are equal, round, and reactive to light. EOM are normal.   Cardiovascular: Normal rate.   Pulmonary/Chest: Effort normal.   Neurological: She is alert and oriented to person, place, and time.   Skin: Skin is warm and dry.    Vitals reviewed.      Neurological Exam:   LOC: alert  Attention Span: Good   Language: No aphasia  Articulation: Dysarthria  Orientation: Person, Place, Time   Visual Fields: Full  EOM (CN III, IV, VI): Full/intact  Pupils (CN II, III): PERRL  Facial Movement (CN VII): Lower facial weakness on the Left  Motor: Arm left  Paresis: 4/5  Leg left  Paresis: 4/5  Arm right  Normal 5/5  Leg right Normal 5/5  Cebellar: No evidence of appendicular or axial ataxia  Sensation: Intact to light touch, temperature and vibration    Laboratory:  CMP:   Recent Labs   Lab 06/29/19  0656   CALCIUM 10.1   ALBUMIN 3.0*   PROT 6.3      K 3.8   CO2 29      BUN 17   CREATININE 1.3   ALKPHOS 80   ALT 12   AST 18   BILITOT 0.3     BMP:   Recent Labs   Lab 06/29/19  0656      K 3.8      CO2 29   BUN 17   CREATININE 1.3   CALCIUM 10.1     CBC:   Recent Labs   Lab 06/29/19  0656   WBC 7.05   RBC 4.51   HGB 12.9   HCT 37.7      MCV 84   MCH 28.6   MCHC 34.2     Lipid Panel:   Recent Labs   Lab 06/27/19  1520   CHOL 176   LDLCALC 84.6   HDL 56   TRIG 177*     Coagulation:   Recent Labs   Lab 06/28/19  0608   INR 0.9   APTT 21.7     Platelet Aggregation Study: No results for input(s): PLTAGG, PLTAGINTERP, PLTAGREGLACO, ADPPLTAGGREG in the last 168 hours.  Hgb A1C:   Recent Labs   Lab 06/27/19  1520   HGBA1C >14.0*     TSH:   Recent Labs   Lab 06/27/19  1520   TSH 0.908       Diagnostic Results     Brain Imaging   MRI brain 6/28  Interval development of scattered foci of new signal abnormality most compatible with acute/recent infarcts as detailed above.  Largest focus in the right centrum semiovale extending to the basal ganglia with punctate foci within the left centrum semiovale and left posterior temporal occipital periventricular white matter.    Probable evolving infarcts bilateral basal ganglia with superimposed T2 flair signal abnormality elsewhere supratentorial white matter and vega suggestive for  underlying chronic microvascular ischemic change.    Small remote left cerebellar infarction again seen.    No evidence for hemorrhagic conversion or hydrocephalus.  Clinical correlation and follow-up advised.      Vessel Imaging   MRA H/N  Significantly limited examination secondary to patient motion artifact.  No significant arterial abnormalities within the confines of the study.  Please consider CTA of the head and neck for further evaluation.    Cardiac Imaging   Echo 4/2/19 (previous admission)  · Normal left ventricular systolic function. The estimated ejection fraction is 68%  · Concentric left ventricular remodeling.  · Normal LV diastolic function.  · No wall motion abnormalities.  · Normal right ventricular systolic function.  · Mild mitral sclerosis.  · Normal central venous pressure (3 mm Hg).  · The estimated PA systolic pressure is 15 mm Hg

## 2019-06-29 NOTE — NURSING
Notified on Call endocrinology NP of pt's current BG of 135. New orders to decrease insulin gtt to 0.3units and check BG Q2 hours.

## 2019-06-29 NOTE — ASSESSMENT & PLAN NOTE
Stroke risk factor  Tox screen last admission positive for cocaine - resources given at discharge  Pt states she no longer uses  Tox screen negative this admission

## 2019-06-29 NOTE — ASSESSMENT & PLAN NOTE
54 y.o. female with significant past medical history of DM, HTN, HLD, left PCA territory infarcts last April with residual mild R sided weakness and dysarthria, presents to the ED with complains of worsening speech since around 3 pm yesterday and increasing R leg weakness that became apparent today and interferes with her ability to ambulate with her walker.    MRI with scattered foci within the right hemisphere primarily R centrum semi ovale extending to the basal ganglia w/ punctate foci in the L hemisphere. On exam left upper extremity pronator drift, left leg drift, and dysarthria. Left sided weakness more notable than right sided weakness. Etiology likely small vessel disease in this patient with multiple stroke risk factors.     Antithrombotics for secondary stroke prevention: Antiplatelets: Aspirin: 81 mg daily  Clopidogrel: 75 mg daily X 30 days     Statins for secondary stroke prevention and hyperlipidemia, if present:   Statins: Atorvastatin- 40 mg daily    Aggressive risk factor modification: HTN, Smoking, HLD, drug abuse     Rehab efforts: The patient has been evaluated by a stroke team provider and the therapy needs have been fully considered based off the presenting complaints and exam findings. The following therapy evaluations are needed: PT evaluate and treat, OT evaluate and treat, SLP evaluate and treat, PM&R evaluate for appropriate placement    Diagnostics ordered/pending: None     VTE prophylaxis: Heparin 5000 units SQ every 8 hours    BP parameters: Infarct: SBP <180

## 2019-06-29 NOTE — CARE UPDATE
Discontinue iip      - BG rapidly trending downward.     Start  transition IV insulin infusion with stepdown parameters. Initial rate starting at 1 unit/hr. 05.units/kg    Novolog 4-6 units TIDWM. 0.5 units/kg    Low Dose SQ Insulin Correction Scale.    BG monitoring every 2 hours.     ADDENDUM:    0237: BG now trending above goal.        -Increase  transition IV insulin infusion with stepdown parameters. Initial rate starting at 0.8 (aprox 0.5 units/kg).

## 2019-06-30 ENCOUNTER — TELEPHONE (OUTPATIENT)
Dept: ENDOCRINOLOGY | Facility: HOSPITAL | Age: 54
End: 2019-06-30

## 2019-06-30 VITALS
OXYGEN SATURATION: 94 % | DIASTOLIC BLOOD PRESSURE: 101 MMHG | SYSTOLIC BLOOD PRESSURE: 131 MMHG | HEART RATE: 78 BPM | BODY MASS INDEX: 26.57 KG/M2 | HEIGHT: 63 IN | WEIGHT: 149.94 LBS | RESPIRATION RATE: 18 BRPM | TEMPERATURE: 98 F

## 2019-06-30 DIAGNOSIS — E11.65 UNCONTROLLED TYPE 2 DIABETES MELLITUS WITH HYPERGLYCEMIA: Primary | ICD-10-CM

## 2019-06-30 PROBLEM — Z91.148 NONCOMPLIANCE WITH DIET AND MEDICATION REGIMEN: Status: ACTIVE | Noted: 2019-06-30

## 2019-06-30 PROBLEM — Z91.199 NONCOMPLIANCE WITH DIET AND MEDICATION REGIMEN: Status: ACTIVE | Noted: 2019-06-30

## 2019-06-30 LAB
ALBUMIN SERPL BCP-MCNC: 3.1 G/DL (ref 3.5–5.2)
ALP SERPL-CCNC: 77 U/L (ref 55–135)
ALT SERPL W/O P-5'-P-CCNC: 17 U/L (ref 10–44)
ANION GAP SERPL CALC-SCNC: 10 MMOL/L (ref 8–16)
AST SERPL-CCNC: 30 U/L (ref 10–40)
BASOPHILS # BLD AUTO: 0.05 K/UL (ref 0–0.2)
BASOPHILS NFR BLD: 0.6 % (ref 0–1.9)
BILIRUB SERPL-MCNC: 0.3 MG/DL (ref 0.1–1)
BUN SERPL-MCNC: 11 MG/DL (ref 6–20)
CALCIUM SERPL-MCNC: 9.8 MG/DL (ref 8.7–10.5)
CHLORIDE SERPL-SCNC: 105 MMOL/L (ref 95–110)
CO2 SERPL-SCNC: 24 MMOL/L (ref 23–29)
CREAT SERPL-MCNC: 1.1 MG/DL (ref 0.5–1.4)
DIFFERENTIAL METHOD: NORMAL
EOSINOPHIL # BLD AUTO: 0.1 K/UL (ref 0–0.5)
EOSINOPHIL NFR BLD: 0.8 % (ref 0–8)
ERYTHROCYTE [DISTWIDTH] IN BLOOD BY AUTOMATED COUNT: 12.3 % (ref 11.5–14.5)
EST. GFR  (AFRICAN AMERICAN): >60 ML/MIN/1.73 M^2
EST. GFR  (NON AFRICAN AMERICAN): 56.3 ML/MIN/1.73 M^2
GLUCOSE SERPL-MCNC: 205 MG/DL (ref 70–110)
HCT VFR BLD AUTO: 38.7 % (ref 37–48.5)
HGB BLD-MCNC: 13.6 G/DL (ref 12–16)
IMM GRANULOCYTES # BLD AUTO: 0.04 K/UL (ref 0–0.04)
IMM GRANULOCYTES NFR BLD AUTO: 0.5 % (ref 0–0.5)
LYMPHOCYTES # BLD AUTO: 3 K/UL (ref 1–4.8)
LYMPHOCYTES NFR BLD: 35.3 % (ref 18–48)
MCH RBC QN AUTO: 28.9 PG (ref 27–31)
MCHC RBC AUTO-ENTMCNC: 35.1 G/DL (ref 32–36)
MCV RBC AUTO: 82 FL (ref 82–98)
MONOCYTES # BLD AUTO: 0.7 K/UL (ref 0.3–1)
MONOCYTES NFR BLD: 8.1 % (ref 4–15)
NEUTROPHILS # BLD AUTO: 4.7 K/UL (ref 1.8–7.7)
NEUTROPHILS NFR BLD: 54.7 % (ref 38–73)
NRBC BLD-RTO: 0 /100 WBC
PLATELET # BLD AUTO: 329 K/UL (ref 150–350)
PMV BLD AUTO: 11.3 FL (ref 9.2–12.9)
POCT GLUCOSE: 149 MG/DL (ref 70–110)
POCT GLUCOSE: 152 MG/DL (ref 70–110)
POCT GLUCOSE: 185 MG/DL (ref 70–110)
POCT GLUCOSE: 232 MG/DL (ref 70–110)
POCT GLUCOSE: 234 MG/DL (ref 70–110)
POTASSIUM SERPL-SCNC: 4.1 MMOL/L (ref 3.5–5.1)
PROT SERPL-MCNC: 6.7 G/DL (ref 6–8.4)
RBC # BLD AUTO: 4.71 M/UL (ref 4–5.4)
SODIUM SERPL-SCNC: 139 MMOL/L (ref 136–145)
WBC # BLD AUTO: 8.53 K/UL (ref 3.9–12.7)

## 2019-06-30 PROCEDURE — S4991 NICOTINE PATCH NONLEGEND: HCPCS | Performed by: NURSE PRACTITIONER

## 2019-06-30 PROCEDURE — 25000003 PHARM REV CODE 250: Performed by: PSYCHIATRY & NEUROLOGY

## 2019-06-30 PROCEDURE — 80053 COMPREHEN METABOLIC PANEL: CPT

## 2019-06-30 PROCEDURE — 85025 COMPLETE CBC W/AUTO DIFF WBC: CPT

## 2019-06-30 PROCEDURE — 25000003 PHARM REV CODE 250: Performed by: HOSPITALIST

## 2019-06-30 PROCEDURE — 99232 SBSQ HOSP IP/OBS MODERATE 35: CPT | Mod: ,,, | Performed by: NURSE PRACTITIONER

## 2019-06-30 PROCEDURE — 99233 PR SUBSEQUENT HOSPITAL CARE,LEVL III: ICD-10-PCS | Mod: ,,, | Performed by: PSYCHIATRY & NEUROLOGY

## 2019-06-30 PROCEDURE — 63600175 PHARM REV CODE 636 W HCPCS: Performed by: NURSE PRACTITIONER

## 2019-06-30 PROCEDURE — S5571 INSULIN DISPOS PEN 3 ML: HCPCS | Performed by: NURSE PRACTITIONER

## 2019-06-30 PROCEDURE — 36415 COLL VENOUS BLD VENIPUNCTURE: CPT

## 2019-06-30 PROCEDURE — 63600175 PHARM REV CODE 636 W HCPCS: Performed by: HOSPITALIST

## 2019-06-30 PROCEDURE — 25000003 PHARM REV CODE 250: Performed by: NURSE PRACTITIONER

## 2019-06-30 PROCEDURE — 25000003 PHARM REV CODE 250: Performed by: PHYSICIAN ASSISTANT

## 2019-06-30 PROCEDURE — 99232 PR SUBSEQUENT HOSPITAL CARE,LEVL II: ICD-10-PCS | Mod: ,,, | Performed by: NURSE PRACTITIONER

## 2019-06-30 PROCEDURE — 99233 SBSQ HOSP IP/OBS HIGH 50: CPT | Mod: ,,, | Performed by: PSYCHIATRY & NEUROLOGY

## 2019-06-30 RX ORDER — INSULIN ASPART 100 [IU]/ML
0-5 INJECTION, SOLUTION INTRAVENOUS; SUBCUTANEOUS
Status: DISCONTINUED | OUTPATIENT
Start: 2019-06-30 | End: 2019-06-30 | Stop reason: HOSPADM

## 2019-06-30 RX ORDER — INSULIN ASPART 100 [IU]/ML
12 INJECTION, SOLUTION INTRAVENOUS; SUBCUTANEOUS 3 TIMES DAILY
Qty: 10.8 ML | Refills: 1 | Status: SHIPPED | OUTPATIENT
Start: 2019-06-30 | End: 2020-06-29

## 2019-06-30 RX ORDER — INSULIN ASPART 100 [IU]/ML
0-5 INJECTION, SOLUTION INTRAVENOUS; SUBCUTANEOUS
Qty: 1 BOX | Refills: 0 | Status: SHIPPED | OUTPATIENT
Start: 2019-06-30 | End: 2019-07-11

## 2019-06-30 RX ORDER — OLMESARTAN MEDOXOMIL 20 MG/1
20 TABLET ORAL DAILY
Status: DISCONTINUED | OUTPATIENT
Start: 2019-07-01 | End: 2019-06-30 | Stop reason: HOSPADM

## 2019-06-30 RX ORDER — INSULIN PUMP SYRINGE, 3 ML
EACH MISCELLANEOUS
Qty: 1 EACH | Refills: 0 | Status: SHIPPED | OUTPATIENT
Start: 2019-06-30 | End: 2020-06-29

## 2019-06-30 RX ORDER — AMLODIPINE BESYLATE 10 MG/1
5 TABLET ORAL DAILY
Qty: 30 TABLET | Refills: 1 | Status: SHIPPED | OUTPATIENT
Start: 2019-06-30 | End: 2019-06-30 | Stop reason: SDUPTHER

## 2019-06-30 RX ORDER — ACETAMINOPHEN 325 MG/1
650 TABLET ORAL EVERY 6 HOURS PRN
Status: DISCONTINUED | OUTPATIENT
Start: 2019-06-30 | End: 2019-06-30 | Stop reason: HOSPADM

## 2019-06-30 RX ORDER — IBUPROFEN 200 MG
16 TABLET ORAL
Status: DISCONTINUED | OUTPATIENT
Start: 2019-06-30 | End: 2019-06-30 | Stop reason: HOSPADM

## 2019-06-30 RX ORDER — AMLODIPINE BESYLATE 10 MG/1
5 TABLET ORAL DAILY
Qty: 30 TABLET | Refills: 1 | Status: SHIPPED | OUTPATIENT
Start: 2019-06-30 | End: 2019-07-11

## 2019-06-30 RX ORDER — IBUPROFEN 200 MG
24 TABLET ORAL
Status: DISCONTINUED | OUTPATIENT
Start: 2019-06-30 | End: 2019-06-30 | Stop reason: HOSPADM

## 2019-06-30 RX ORDER — OLMESARTAN MEDOXOMIL 20 MG/1
40 TABLET ORAL DAILY
Status: DISCONTINUED | OUTPATIENT
Start: 2019-06-30 | End: 2019-06-30

## 2019-06-30 RX ORDER — GLUCAGON 1 MG
1 KIT INJECTION
Status: DISCONTINUED | OUTPATIENT
Start: 2019-06-30 | End: 2019-06-30 | Stop reason: HOSPADM

## 2019-06-30 RX ORDER — ATORVASTATIN CALCIUM 40 MG/1
40 TABLET, FILM COATED ORAL DAILY
Qty: 30 TABLET | Refills: 1 | Status: SHIPPED | OUTPATIENT
Start: 2019-06-30 | End: 2019-06-30 | Stop reason: SDUPTHER

## 2019-06-30 RX ORDER — CLOPIDOGREL BISULFATE 75 MG/1
75 TABLET ORAL DAILY
Qty: 30 TABLET | Refills: 1 | Status: SHIPPED | OUTPATIENT
Start: 2019-07-01 | End: 2019-07-11

## 2019-06-30 RX ORDER — INSULIN ASPART 100 [IU]/ML
12 INJECTION, SOLUTION INTRAVENOUS; SUBCUTANEOUS
Status: DISCONTINUED | OUTPATIENT
Start: 2019-06-30 | End: 2019-06-30 | Stop reason: HOSPADM

## 2019-06-30 RX ORDER — OLMESARTAN MEDOXOMIL 20 MG/1
20 TABLET ORAL DAILY
Qty: 30 TABLET | Refills: 1 | Status: SHIPPED | OUTPATIENT
Start: 2019-07-01 | End: 2019-07-11

## 2019-06-30 RX ORDER — LANCETS
EACH MISCELLANEOUS
Qty: 200 EACH | Refills: 0 | Status: SHIPPED | OUTPATIENT
Start: 2019-06-30

## 2019-06-30 RX ORDER — INSULIN ASPART 100 [IU]/ML
10 INJECTION, SOLUTION INTRAVENOUS; SUBCUTANEOUS
Status: DISCONTINUED | OUTPATIENT
Start: 2019-06-30 | End: 2019-06-30

## 2019-06-30 RX ORDER — ATORVASTATIN CALCIUM 40 MG/1
40 TABLET, FILM COATED ORAL DAILY
Qty: 30 TABLET | Refills: 1 | Status: SHIPPED | OUTPATIENT
Start: 2019-06-30 | End: 2021-08-04

## 2019-06-30 RX ADMIN — INSULIN ASPART 2 UNITS: 100 INJECTION, SOLUTION INTRAVENOUS; SUBCUTANEOUS at 11:06

## 2019-06-30 RX ADMIN — OLMESARTAN MEDOXOMIL 40 MG: 20 TABLET, FILM COATED ORAL at 08:06

## 2019-06-30 RX ADMIN — INSULIN ASPART 10 UNITS: 100 INJECTION, SOLUTION INTRAVENOUS; SUBCUTANEOUS at 07:06

## 2019-06-30 RX ADMIN — INSULIN ASPART 2 UNITS: 100 INJECTION, SOLUTION INTRAVENOUS; SUBCUTANEOUS at 01:06

## 2019-06-30 RX ADMIN — ATORVASTATIN CALCIUM 40 MG: 20 TABLET, FILM COATED ORAL at 08:06

## 2019-06-30 RX ADMIN — CLOPIDOGREL 75 MG: 75 TABLET, FILM COATED ORAL at 08:06

## 2019-06-30 RX ADMIN — ACETAMINOPHEN 650 MG: 325 TABLET ORAL at 03:06

## 2019-06-30 RX ADMIN — INSULIN ASPART 10 UNITS: 100 INJECTION, SOLUTION INTRAVENOUS; SUBCUTANEOUS at 11:06

## 2019-06-30 RX ADMIN — INSULIN DETEMIR 30 UNITS: 100 INJECTION, SOLUTION SUBCUTANEOUS at 08:06

## 2019-06-30 RX ADMIN — INSULIN ASPART 1 UNITS: 100 INJECTION, SOLUTION INTRAVENOUS; SUBCUTANEOUS at 03:06

## 2019-06-30 RX ADMIN — NICOTINE 1 PATCH: 21 PATCH, EXTENDED RELEASE TRANSDERMAL at 08:06

## 2019-06-30 RX ADMIN — ASPIRIN 81 MG: 81 TABLET, COATED ORAL at 08:06

## 2019-06-30 RX ADMIN — SODIUM CHLORIDE 1.3 UNITS/HR: 9 INJECTION, SOLUTION INTRAVENOUS at 12:06

## 2019-06-30 NOTE — NURSING
Pt was discharged via wheelchair to home with family. Discharge instructions given and diabetes videos and instructions given. Patient IV and tele box removed. No further questions or complaints.

## 2019-06-30 NOTE — PLAN OF CARE
Problem: Adult Inpatient Plan of Care  Goal: Plan of Care Review  Outcome: Ongoing (interventions implemented as appropriate)  POC reviewed with pt and family (phone). All questions and concerns addressed. Pt and family verbalized understanding. Vital signs stable and Neuro assessment remains unchanged. Pt taken off insulin drip.  Pt progressing towards goals. Ready to leave. Individualized stroke book in view at bedside. For more info see flowsheets. Will continue to monitor.

## 2019-06-30 NOTE — PROGRESS NOTES
"[facility]  Endocrinology  Progress Note    Admit Date: 2019     Reason for Consult: Management of T2DM, Hyperglycemia     Surgical Procedure and Date:   N/A  Diabetes diagnosis year:   ~ 10 years ago.  Home Diabetes Medications:    Patient seems to be non-complaint with DM regimen, and is unsure on correct administration. She reports taking and provides the following medication:   Metfromin 1000 mg BID    -Basaglar 19 HS    -Admelog 10 BID  How often checking glucose at home? 1-3 x day   BG readings on regimen: 300's - 500's  Hypoglycemia on the regimen?  Yes "about once a month."  Missed doses on regimen?  No    Diabetes Complications include:     Hyperglycemia, Hypoglycemia  and Diabetic retinopathy     Complicating diabetes co morbidities:   History of CVA and Residual deficits from CVA       HPI:   Patient is a 54 y.o. female with a diagnosis of DM, HTN, HLD, left PCA territory infarcts last April with residual mild R sided weakness and dysarthria.  Patient presented to [facility] on  with worsening dysarthria and RLE weakness.  MRI brain revealed concern for left centrum semiovale and left posterior temporal occipital periventricular white matter possible infarcts. Stroke team concerned for a new L subcortical infarct vs recrudescence of previous deficits in the context of occult UTI or metabolic derangements.  Patient lives in [place] with daughter in a single story home with 15 steps to enter. Endocrinology consulted to manage DM2/Hyperglycemia.      Lab Results   Component Value Date    LABA1C 13.0 (H) 2016    HGBA1C >14.0 (H) 2019       Interval HPI:   Overnight events:   BG is now within or slightly above goal after receiving IV insulin therapy. NAEON.     Eatin%  Nausea: No  Hypoglycemia and intervention: No  Fever: No  TPN and/or TF: No  If yes, type of TF/TPN and rate: None    BP (!) 165/104   Pulse 83   Temp 97.8 °F (36.6 °C)   Resp 18   Ht 5' 3" (1.6 m)  "  Wt 68 kg (149 lb 14.6 oz)   LMP  (LMP Unknown)   SpO2 96%   Breastfeeding? No   BMI 26.56 kg/m²      Labs Reviewed and Include    No results for input(s): GLU, CALCIUM, ALBUMIN, PROT, NA, K, CO2, CL, BUN, CREATININE, ALKPHOS, ALT, AST, BILITOT in the last 24 hours.  Lab Results   Component Value Date    WBC 7.05 06/29/2019    HGB 12.9 06/29/2019    HCT 37.7 06/29/2019    MCV 84 06/29/2019     06/29/2019     Recent Labs   Lab 06/27/19  1520   TSH 0.908     Lab Results   Component Value Date    HGBA1C >14.0 (H) 06/27/2019       Nutritional status:   Body mass index is 26.56 kg/m².  Lab Results   Component Value Date    ALBUMIN 3.0 (L) 06/29/2019    ALBUMIN 3.3 (L) 06/28/2019    ALBUMIN 3.7 06/27/2019     No results found for: PREALBUMIN    Estimated Creatinine Clearance: 44.7 mL/min (based on SCr of 1.3 mg/dL).    Accu-Checks  Recent Labs     06/29/19  1603 06/29/19  1747 06/29/19  2017 06/29/19  2207 06/29/19  2241 06/29/19  2354 06/30/19  0157 06/30/19  0357 06/30/19  0550 06/30/19  0728   POCTGLUCOSE 199* 204* 162* 95 92 281* 232* 185* 149* 152*       Current Medications and/or Treatments Impacting Glycemic Control  Immunotherapy:    Immunosuppressants     None        Steroids:   Hormones (From admission, onward)    None        Pressors:    Autonomic Drugs (From admission, onward)    None        Hyperglycemia/Diabetes Medications:   Antihyperglycemics (From admission, onward)    Start     Stop Route Frequency Ordered    06/29/19 1130  insulin aspart U-100 pen 10 Units      -- SubQ 3 times daily with meals 06/29/19 0911    06/28/19 2138  insulin aspart U-100 pen 0-5 Units      -- SubQ As needed (PRN) 06/28/19 2038 06/28/19 2039  insulin regular (Humulin R) 100 Units in sodium chloride 0.9% 100 mL infusion      -- IV Continuous 06/28/19 2039          ASSESSMENT and PLAN    * Thrombotic stroke involving right middle cerebral artery  Managed per primary team  Avoid hypoglycemia        Uncontrolled type  2 diabetes mellitus with hyperglycemia  BG goal 140 - 180   No signs of DKA. No GAP. Patient tolerating po intake. Electrolytes WNL. Will continue to monitor.     discontinue transition IV insulin infusion with stepdown parameters. Initial rate starting at 1.5  Start Levemir 30 units daily.   Increase novolog to 12 units TIDWM  Low Dose SQ Insulin Correction Scale.  BG monitoring every 2 hours.     Bedside nurse to instruct on insulin pen use and blood sugar monitor. Have patient administer own injections after education completed supervised by nurse.    Have patient watch insulin educatoin video's via i-pad if available on unit (care plan template 349)      Discharge planning:     Discharge Teaching:     Reviewed topics related to DM including: the need for insulin, how insulin works, what makes it a high risk medication, the importance of immediate follow up with either PCP or endocrine provider, importance of and how to check BG, how to record BG on logs, how to administer insulin, appropriate insulin administration sites, importance of rotating injection sites, hyper/hypoglycemia, how and when to treat hypoglycemia, when to hold insulin, how the correction scale works, importance of storing unused insulin in the refrigerator, and when to seek medical attention.  Patient verbalized understanding, answered all questions to patient's satisfaction.       - Basaglar 30 units daily   - Admelog 12 units TIDWM   - Metformin 1000 mg BID   - [facility] DM education appointment   - [facility] Discharge clinic follow-up appointment.     After discussing evaluation results, patient agrees with proposed plan of care, has no further concerns, and agrees to keep BG logs and follow-up with [facility] discharge clinic.         Noncompliance with diet and medication regimen  Most likely cause of BG excursions during admission and elevated A1c.     Lab Results   Component Value Date    LABA1C 13.0 (H) 04/13/2016    HGBA1C >14.0 (H) 06/27/2019                Justus Ignacio NP  Endocrinology  [facility]

## 2019-06-30 NOTE — ASSESSMENT & PLAN NOTE
Most likely cause of BG excursions during admission and elevated A1c.     Lab Results   Component Value Date    LABA1C 13.0 (H) 04/13/2016    HGBA1C >14.0 (H) 06/27/2019

## 2019-06-30 NOTE — PROGRESS NOTES
[facility]  Vascular Neurology  Comprehensive Stroke Center  Progress Note    Assessment/Plan:     * Thrombotic stroke involving right middle cerebral artery  54 y.o. female with significant past medical history of DM, HTN, HLD, left PCA territory infarcts last April with residual mild R sided weakness and dysarthria, presents to the ED with complains of worsening speech since around 3 pm yesterday and increasing R leg weakness that became apparent today and interferes with her ability to ambulate with her walker.    MRI with scattered foci within the right hemisphere primarily R centrum semi ovale extending to the basal ganglia w/ punctate foci in the L hemisphere. On exam left upper extremity pronator drift, left leg drift, and dysarthria. Left sided weakness more notable than right sided weakness. Etiology likely small vessel disease in this patient with multiple stroke risk factors.     Antithrombotics for secondary stroke prevention: Antiplatelets: Aspirin: 81 mg daily  Clopidogrel: 75 mg daily X 30 days     Statins for secondary stroke prevention and hyperlipidemia, if present:   Statins: Atorvastatin- 40 mg daily    Aggressive risk factor modification: HTN, Smoking, HLD, drug abuse     Rehab efforts: The patient has been evaluated by a stroke team provider and the therapy needs have been fully considered based off the presenting complaints and exam findings. The following therapy evaluations are needed: PT evaluate and treat, OT evaluate and treat, SLP evaluate and treat, PM&R evaluate for appropriate placement    Diagnostics ordered/pending: None     VTE prophylaxis: Heparin 5000 units SQ every 8 hours    BP parameters: Infarct: SBP <160        NICOLASA (acute kidney injury)  NICOLASA on admission   Initial Cr 4.2   Remains on fluids  Resolving, Cr 1.3 on 6/29    Cytotoxic cerebral edema  Area of cytotoxic cerebral edema identified when reviewing brain imaging in the territory of the R middle  cerebral artery. There is not mass effect associated with it. We will continue to monitor the patients clinical exam for any worsening of symptoms which may indicate expansion of the stroke or the area of the edema resulting in the clinical change. The pattern is suggestive of small vessel etiology      Cocaine abuse  Stroke risk factor  Tox screen last admission positive for cocaine - resources given at discharge  Pt states she no longer uses  Tox screen negative this admission     Mixed hyperlipidemia  Stroke risk factor  LDL 84.6  Continue Atorvastatin 40 mg   Per chart review questionable medication compliance     Essential hypertension  Stroke risk factor   Starting home olmesartan    Uncontrolled type 2 diabetes mellitus with hyperglycemia  Stroke risk factor  Hgb A1C > 14  Endocrine consult, appreciate assistance  Currently on transition insulin gtt           6/28 - Patient with generalized weakness all extremities; decreased left hand  strength. Hgb A1C >14. Endocrine consulted and patient placed on Insulin gtt. Creatinine trending down 4.2 -->3.4 - continuing IV fluids.   6/29 - NICOLASA improving, remains on insulin gtt  6/30 - possible discharge today pending endocrine recs    STROKE DOCUMENTATION   Acute Stroke Times   Last Known Normal Date: 06/26/19  Last Known Normal Time: 1500  Symptom Onset Date: 06/26/19  Symptom Onset Time: 1500  Stroke Team Called Date: 06/27/19  Stroke Team Called Time: 1520  Stroke Team Arrival Date: 06/27/19  Stroke Team Arrival Time: 1530  CT Interpretation Time: (CT pending)  Decision to Treat Time for Alteplase: (No iv alteplase candidate)  Decision to Treat Time for IR: (No IR candidate)    NIH Scale:  1a. Level of Consciousness: 0-->Alert, keenly responsive  1b. LOC Questions: 0-->Answers both questions correctly  1c. LOC Commands: 0-->Performs both tasks correctly  2. Best Gaze: 0-->Normal  3. Visual: 0-->No visual loss  4. Facial Palsy: 1-->Minor paralysis (flattened  nasolabial fold, asymmetry on smiling)  5a. Motor Arm, Left: 1-->Drift, limb holds 90 (or 45) degrees, but drifts down before full 10 seconds, does not hit bed or other support  5b. Motor Arm, Right: 0-->No drift, limb holds 90 (or 45) degrees for full 10 secs  6a. Motor Leg, Left: 1-->Drift, leg falls by the end of the 5-sec period but does not hit bed  6b. Motor Leg, Right: 0-->No drift, leg holds 30 degree position for full 5 secs  7. Limb Ataxia: 0-->Absent  8. Sensory: 0-->Normal, no sensory loss  9. Best Language: 0-->No aphasia, normal  10. Dysarthria: 1-->Mild-to-moderate dysarthria, patient slurs at least some words and, at worst, can be understood with some difficulty  11. Extinction and Inattention (formerly Neglect): 0-->No abnormality  Total (NIH Stroke Scale): 4       Modified Geronimo Score: 3  [place] Coma Scale:    ABCD2 Score:    [id number] Score:   HAS -BLED Score:   ICH Score:   Hunt & Mueller Classification:      Hemorrhagic change of an Ischemic Stroke: Does this patient have an ischemic stroke with hemorrhagic changes? No     Neurologic Chief Complaint: right sided weakness     Subjective:     Interval History: Patient is seen for follow-up neurological assessment and treatment recommendations: NAEON, no new complaints    HPI, Past Medical, Family, and Social History remains the same as documented in the initial encounter.     Review of Systems   Constitutional: Negative for fever.   Respiratory: Negative for shortness of breath.    Cardiovascular: Negative for chest pain.   Gastrointestinal: Negative for nausea and vomiting.   Neurological: Positive for speech difficulty and weakness.     Scheduled Meds:   aspirin  81 mg Oral Daily    atorvastatin  40 mg Oral Daily    clopidogrel  75 mg Oral Daily    insulin aspart U-100  10 Units Subcutaneous TIDWM    insulin detemir U-100  30 Units Subcutaneous Daily    nicotine  1 patch Transdermal Daily     Continuous Infusions:   sodium chloride 0.9%  75 mL/hr at 06/29/19 2208    sodium chloride 0.9%       PRN Meds:acetaminophen, Dextrose 10% Bolus, Dextrose 10% Bolus, Dextrose 10% Bolus, Dextrose 10% Bolus, glucagon (human recombinant), glucose, glucose, insulin aspart U-100, sodium chloride 0.9%, sodium chloride 0.9%    Objective:     Vital Signs (Most Recent):  Temp: 97.8 °F (36.6 °C) (06/30/19 0732)  Pulse: 83 (06/30/19 0733)  Resp: 18 (06/30/19 0732)  BP: (!) 165/104 (06/30/19 0732)  SpO2: 96 % (06/30/19 0732)  BP Location: Right arm    Vital Signs Range (Last 24H):  Temp:  [96.4 °F (35.8 °C)-99.4 °F (37.4 °C)]   Pulse:  [68-89]   Resp:  [18]   BP: (127-165)/()   SpO2:  [94 %-98 %]   BP Location: Right arm    Physical Exam   Constitutional: She is oriented to person, place, and time. She appears well-developed and well-nourished. No distress.   HENT:   Head: Normocephalic and atraumatic.   Eyes: Pupils are equal, round, and reactive to light. EOM are normal.   Cardiovascular: Normal rate.   Pulmonary/Chest: Effort normal.   Neurological: She is alert and oriented to person, place, and time.   Skin: Skin is warm and dry.   Vitals reviewed.      Neurological Exam:   LOC: alert  Attention Span: Good   Language: No aphasia  Articulation: Dysarthria  Orientation: Person, Place, Time   Visual Fields: Full  EOM (CN III, IV, VI): Full/intact  Pupils (CN II, III): PERRL  Facial Movement (CN VII): Lower facial weakness on the Left  Motor: Arm left  Paresis: 4/5  Leg left  Paresis: 4/5  Arm right  Normal 5/5  Leg right Normal 5/5  Cebellar: No evidence of appendicular or axial ataxia  Sensation: Intact to light touch, temperature and vibration    Laboratory:  CMP:   No results for input(s): GLUCOSE, CALCIUM, ALBUMIN, PROT, NA, K, CO2, CL, BUN, CREATININE, ALKPHOS, ALT, AST, BILITOT in the last 24 hours.  BMP:   Recent Labs   Lab 06/29/19  0656      K 3.8      CO2 29   BUN 17   CREATININE 1.3   CALCIUM 10.1     CBC:   Recent Labs   Lab 06/29/19  0656    WBC 7.05   RBC 4.51   HGB 12.9   HCT 37.7      MCV 84   MCH 28.6   MCHC 34.2     Lipid Panel:   Recent Labs   Lab 06/27/19  1520   CHOL 176   LDLCALC 84.6   HDL 56   TRIG 177*     Coagulation:   Recent Labs   Lab 06/28/19  0608   INR 0.9   APTT 21.7     Platelet Aggregation Study: No results for input(s): PLTAGG, PLTAGINTERP, PLTAGREGLACO, ADPPLTAGGREG in the last 168 hours.  Hgb A1C:   Recent Labs   Lab 06/27/19  1520   HGBA1C >14.0*     TSH:   Recent Labs   Lab 06/27/19  1520   TSH 0.908       Diagnostic Results     Brain Imaging   MRI brain 6/28  Interval development of scattered foci of new signal abnormality most compatible with acute/recent infarcts as detailed above.  Largest focus in the right centrum semiovale extending to the basal ganglia with punctate foci within the left centrum semiovale and left posterior temporal occipital periventricular white matter.    Probable evolving infarcts bilateral basal ganglia with superimposed T2 flair signal abnormality elsewhere supratentorial white matter and vega suggestive for underlying chronic microvascular ischemic change.    Small remote left cerebellar infarction again seen.    No evidence for hemorrhagic conversion or hydrocephalus.  Clinical correlation and follow-up advised.      Vessel Imaging   MRA H/N  Significantly limited examination secondary to patient motion artifact.  No significant arterial abnormalities within the confines of the study.  Please consider CTA of the head and neck for further evaluation.    Cardiac Imaging   Echo 4/2/19 (previous admission)  · Normal left ventricular systolic function. The estimated ejection fraction is 68%  · Concentric left ventricular remodeling.  · Normal LV diastolic function.  · No wall motion abnormalities.  · Normal right ventricular systolic function.  · Mild mitral sclerosis.  · Normal central venous pressure (3 mm Hg).  · The estimated PA systolic pressure is 15 mm Hg                 Elvia M  ALEXIA Vann  Comprehensive Stroke Center  Department of Vascular Neurology   [facility]

## 2019-06-30 NOTE — TELEPHONE ENCOUNTER
Lab Results   Component Value Date    LABA1C 13.0 (H) 04/13/2016    HGBA1C >14.0 (H) 06/27/2019     Patient is noncompliant and uncontrolled. She was admitted for stroke. Changes were made to MDI regimen. Will need to establish care with outpatient provider. Will see in discharge clinic if not able to obtain appointment within 2 weeks post discharge.

## 2019-06-30 NOTE — ASSESSMENT & PLAN NOTE
BG goal 140 - 180   No signs of DKA. No GAP. Patient tolerating po intake. Electrolytes WNL. Will continue to monitor.     discontinue transition IV insulin infusion with stepdown parameters. Initial rate starting at 1.5  Start Levemir 30 units daily.   Increase novolog to 12 units TIDWM  Low Dose SQ Insulin Correction Scale.  BG monitoring every 2 hours.     Bedside nurse to instruct on insulin pen use and blood sugar monitor. Have patient administer own injections after education completed supervised by nurse.    Have patient watch insulin educatoin video's via i-pad if available on unit (care plan template 349)      Discharge planning:     Discharge Teaching:     Reviewed topics related to DM including: the need for insulin, how insulin works, what makes it a high risk medication, the importance of immediate follow up with either PCP or endocrine provider, importance of and how to check BG, how to record BG on logs, how to administer insulin, appropriate insulin administration sites, importance of rotating injection sites, hyper/hypoglycemia, how and when to treat hypoglycemia, when to hold insulin, how the correction scale works, importance of storing unused insulin in the refrigerator, and when to seek medical attention.  Patient verbalized understanding, answered all questions to patient's satisfaction.       - Basaglar 30 units daily   - Admelog 12 units TIDWM   - Metformin 1000 mg BID   - [facility] DM education appointment   - [facility] Discharge clinic follow-up appointment.     After discussing evaluation results, patient agrees with proposed plan of care, has no further concerns, and agrees to keep BG logs and follow-up with [facility] discharge clinic.

## 2019-06-30 NOTE — ASSESSMENT & PLAN NOTE
54 y.o. female with significant past medical history of DM, HTN, HLD, left PCA territory infarcts last April with residual mild R sided weakness and dysarthria, presents to the ED with complains of worsening speech since around 3 pm yesterday and increasing R leg weakness that became apparent today and interferes with her ability to ambulate with her walker.    MRI with scattered foci within the right hemisphere primarily R centrum semi ovale extending to the basal ganglia w/ punctate foci in the L hemisphere. On exam left upper extremity pronator drift, left leg drift, and dysarthria. Left sided weakness more notable than right sided weakness. Etiology likely small vessel disease in this patient with multiple stroke risk factors.     Antithrombotics for secondary stroke prevention: Antiplatelets: Aspirin: 81 mg daily  Clopidogrel: 75 mg daily X 30 days     Statins for secondary stroke prevention and hyperlipidemia, if present:   Statins: Atorvastatin- 40 mg daily    Aggressive risk factor modification: HTN, Smoking, HLD, drug abuse     Rehab efforts: The patient has been evaluated by a stroke team provider and the therapy needs have been fully considered based off the presenting complaints and exam findings. The following therapy evaluations are needed: PT evaluate and treat, OT evaluate and treat, SLP evaluate and treat, PM&R evaluate for appropriate placement    Diagnostics ordered/pending: None     VTE prophylaxis: Heparin 5000 units SQ every 8 hours    BP parameters: Infarct: SBP <160

## 2019-06-30 NOTE — SUBJECTIVE & OBJECTIVE
"Interval HPI:   Overnight events:   BG is now within or slightly above goal after receiving IV insulin therapy. NAEON.     Eatin%  Nausea: No  Hypoglycemia and intervention: No  Fever: No  TPN and/or TF: No  If yes, type of TF/TPN and rate: None    BP (!) 165/104   Pulse 83   Temp 97.8 °F (36.6 °C)   Resp 18   Ht 5' 3" (1.6 m)   Wt 68 kg (149 lb 14.6 oz)   LMP  (LMP Unknown)   SpO2 96%   Breastfeeding? No   BMI 26.56 kg/m²     Labs Reviewed and Include    No results for input(s): GLU, CALCIUM, ALBUMIN, PROT, NA, K, CO2, CL, BUN, CREATININE, ALKPHOS, ALT, AST, BILITOT in the last 24 hours.  Lab Results   Component Value Date    WBC 7.05 2019    HGB 12.9 2019    HCT 37.7 2019    MCV 84 2019     2019     Recent Labs   Lab 19  1520   TSH 0.908     Lab Results   Component Value Date    HGBA1C >14.0 (H) 2019       Nutritional status:   Body mass index is 26.56 kg/m².  Lab Results   Component Value Date    ALBUMIN 3.0 (L) 2019    ALBUMIN 3.3 (L) 2019    ALBUMIN 3.7 2019     No results found for: PREALBUMIN    Estimated Creatinine Clearance: 44.7 mL/min (based on SCr of 1.3 mg/dL).    Accu-Checks  Recent Labs     19  1603 19  1747 19  2017 19  2207 19  2241 19  2354 19  0157 19  0357 19  0550 19  0728   POCTGLUCOSE 199* 204* 162* 95 92 281* 232* 185* 149* 152*       Current Medications and/or Treatments Impacting Glycemic Control  Immunotherapy:    Immunosuppressants     None        Steroids:   Hormones (From admission, onward)    None        Pressors:    Autonomic Drugs (From admission, onward)    None        Hyperglycemia/Diabetes Medications:   Antihyperglycemics (From admission, onward)    Start     Stop Route Frequency Ordered    19 1130  insulin aspart U-100 pen 10 Units      -- SubQ 3 times daily with meals 19 0911    19 2138  insulin aspart U-100 pen 0-5 " Units      -- SubQ As needed (PRN) 06/28/19 2038 06/28/19 2039  insulin regular (Humulin R) 100 Units in sodium chloride 0.9% 100 mL infusion      -- IV Continuous 06/28/19 2039

## 2019-06-30 NOTE — PLAN OF CARE
Problem: Adult Inpatient Plan of Care  Goal: Plan of Care Review  Outcome: Ongoing (interventions implemented as appropriate)  POC reviewed with pt and family (phone). All questions and concerns addressed. Pt and family verbalized understanding. Vital signs stable and Neuro assessment remains unchanged. Pt still on insulin drip but BS better controlled although unable to titrate down. Pt progressing towards goals. Individualized stroke book in view at bedside. For more info see flowsheets. Will continue to monitor.

## 2019-06-30 NOTE — DISCHARGE SUMMARY
[facility]  Vascular Neurology  Comprehensive Stroke Center  Discharge Summary     Summary:     Admit Date: 6/27/2019  2:50 PM    Discharge Date and Time: 6/30/2019  1:10 PM    Attending Physician: Jacy Lehman MD    Discharge Provider: Elvia Vann PA-C    History of Present Illness: Mrs Martinez is a 54 y.o. female with significant past medical history of DM, HTN, HLD, left PCA territory infarcts last April with residual mild R sided weakness and dysarthria, presents to the ED with complains of worsening speech since around 3 pm yesterday and increasing R leg weakness that became apparent today and interferes with her ability to ambulate with her walker.  Denies associated HA, vertigo, double vision, trouble swallowing, confusion, or visual disturbances.  Further, no recent fever or UTI symptoms.      Hospital Course (synopsis of major diagnoses, care, treatment, and services provided during the course of the hospital stay): Emily Martinez is a 54 y.o. female with PMHx of DM, HTN, HLD, left PCA territory infarcts (last April, residual mild R sided weakness and dysarthria) who was admitted to stroke unit for new R MCA small vessel infarct. She was evaluated by PT/OT and SLP who recommended discharge home with outpatient therapy. SLP approved her for a regular consistency diet with thin liquids, which she tolerated. Her hospital stay was complicated by NICOLASA and BG >500 (A1C >14). NICOLASA was treated with gentle fluids and improved within 48 hours (Cr 4.2>1.1). Endocrinology was consulted for glucose management and patient was placed on an insulin gtt. She was transitioned off insulin to aspart 12 TID WM, detemir 30 daily, and SSI. Patient was educated on insulin administration, diabetes management, and dietary changes. Ambulatory referral order sent for diabetes education. She was also educated on her stroke risk factors and the importance of medication compliance for stroke prevention. Patient  was counseled on the importance of smoking cessation for stroke prevention. For secondary stroke prevention she will continue DAPT for 30 days then asa monotherapy and atorvastatin 40 daily. She will be discharged home with outpatient therapy and will follow up in stroke clinic and endocrinology clinic.             Stroke Etiology: Probable Small Artery Occlusion (CANDIE)    STROKE DOCUMENTATION   Acute Stroke Times   Last Known Normal Date: 06/26/19  Last Known Normal Time: 1500  Symptom Onset Date: 06/26/19  Symptom Onset Time: 1500  Stroke Team Called Date: 06/27/19  Stroke Team Called Time: 1520  Stroke Team Arrival Date: 06/27/19  Stroke Team Arrival Time: 1530  CT Interpretation Time: (CT pending)  Decision to Treat Time for Alteplase: (No iv alteplase candidate)  Decision to Treat Time for IR: (No IR candidate)     NIH Scale:  1a. Level of Consciousness: 0-->Alert, keenly responsive  1b. LOC Questions: 0-->Answers both questions correctly  1c. LOC Commands: 0-->Performs both tasks correctly  2. Best Gaze: 0-->Normal  3. Visual: 0-->No visual loss  4. Facial Palsy: 1-->Minor paralysis (flattened nasolabial fold, asymmetry on smiling)  5a. Motor Arm, Left: 1-->Drift, limb holds 90 (or 45) degrees, but drifts down before full 10 seconds, does not hit bed or other support  5b. Motor Arm, Right: 0-->No drift, limb holds 90 (or 45) degrees for full 10 secs  6a. Motor Leg, Left: 1-->Drift, leg falls by the end of the 5-sec period but does not hit bed  6b. Motor Leg, Right: 0-->No drift, leg holds 30 degree position for full 5 secs  7. Limb Ataxia: 0-->Absent  8. Sensory: 0-->Normal, no sensory loss  9. Best Language: 0-->No aphasia, normal  10. Dysarthria: 1-->Mild-to-moderate dysarthria, patient slurs at least some words and, at worst, can be understood with some difficulty  11. Extinction and Inattention (formerly Neglect): 0-->No abnormality  Total (NIH Stroke Scale): 4        Modified Geronimo Score: 3  Doyle Coma  Scale:    ABCD2 Score:    [id number] Score:   HAS -BLED Score:   ICH Score:   Hunt & Mueller Classification:       Assessment/Plan:     Diagnostic Results:    Brain Imaging   MRI brain 6/28  Interval development of scattered foci of new signal abnormality most compatible with acute/recent infarcts as detailed above.  Largest focus in the right centrum semiovale extending to the basal ganglia with punctate foci within the left centrum semiovale and left posterior temporal occipital periventricular white matter.    Probable evolving infarcts bilateral basal ganglia with superimposed T2 flair signal abnormality elsewhere supratentorial white matter and vega suggestive for underlying chronic microvascular ischemic change.    Small remote left cerebellar infarction again seen.    No evidence for hemorrhagic conversion or hydrocephalus.  Clinical correlation and follow-up advised.       Vessel Imaging   MRA H/N  Significantly limited examination secondary to patient motion artifact.  No significant arterial abnormalities within the confines of the study.  Please consider CTA of the head and neck for further evaluation.     Cardiac Imaging   Echo 4/2/19 (previous admission)  · Normal left ventricular systolic function. The estimated ejection fraction is 68%  · Concentric left ventricular remodeling.  · Normal LV diastolic function.  · No wall motion abnormalities.  · Normal right ventricular systolic function.  · Mild mitral sclerosis.  · Normal central venous pressure (3 mm Hg).  · The estimated PA systolic pressure is 15 mm Hg     Interventions: None    Complications: None    Disposition: Home-Health Care [facility]    Final Active Diagnoses:    Diagnosis Date Noted POA    PRINCIPAL PROBLEM:  Thrombotic stroke involving right middle cerebral artery [I63.311] 06/27/2019 Unknown    Noncompliance with diet and medication regimen [Z91.11, Z91.14] 06/30/2019 Not Applicable    Cytotoxic cerebral edema [G93.6] 06/28/2019 Yes    NICOLASA  (acute kidney injury) [N17.9] 06/28/2019 Yes    Cocaine abuse [F14.10] 04/05/2019 Yes    Uncontrolled type 2 diabetes mellitus with hyperglycemia [E11.65] 04/01/2019 Yes    Mixed hyperlipidemia [E78.2] 04/01/2019 Yes    Essential hypertension [I10] 04/01/2019 Yes      Problems Resolved During this Admission:     No new Assessment & Plan notes have been filed under this hospital service since the last note was generated.  Service: Vascular Neurology      Recommendations:     Post-discharge complication risks: Falls, Seizure, Urinary tract infections    Stroke Education given to: patient and family    Follow-up in Stroke Clinic in 4-6 weeks.     Discharge Plan:  Antithrombotics: Aspirin 81mg, Clopidogrel 75mg for 30 days then asa monotherapy  Statin: Atorvastatin 40mg  Aggresive risk factor modification:  Hypertension  Smoking  Diabetes  High Cholesterol    Follow Up:  Follow-up Information     Alireza Sosa MD.    Specialty:  Family Medicine  Why:  Outpatient Services  Contact information:  [address] [facility] [address] [phone]             [facility] VASCULAR NEUROLOGY In 4 weeks.    Specialty:  Vascular Neurology  Why:  Someone will contact you to schedule your stroke follow up appointment  Contact information:  [address]  [facility] [id number] [phone]           [facility] ENDOCRINOLOGY.    Specialty:  Endocrinology  Why:  Someone will contact you to schedule your diabetes follow up appointment  Contact information:  [address]  [facility] [id number] [phone]                 Patient Instructions:      Ambulatory consult to Diabetic Education   Referral Priority: Routine Referral Type: Consultation   Referral Reason: Specialty Services Required   Requested Specialty: Diabetes   Number of Visits Requested: 1 Expiration Date: 06/30/20     Diet diabetic     Call 911 for any of the following:   Order Comments: Call 911  right away if any of the following  warning signs come on suddenly, even if the symptoms only last for a few minutes. With stroke, timing is very important.   - Warning Signs of Stroke:  - Weakness: You may feel a sudden weakness, tingling or loss of feeling on one side of your face or body.  - Vision Problems: You may have sudden double vision or trouble seeing in one or both eyes.  - Speech Problems: You may have sudden trouble talking, slured speech, or problems understanding others.  - Headache: You may have sudden, severe headache.  - Movement Problems: You may experience dizziness, a feeling of spinning, a loss of balance, a feeling of falling or blackouts.       Medications:  Reconciled Home Medications:      Medication List      START taking these medications    amLODIPine 10 MG tablet  Commonly known as:  NORVASC  Take 0.5 tablets (5 mg total) by mouth once daily.     atorvastatin 40 MG tablet  Commonly known as:  LIPITOR  Take 1 tablet (40 mg total) by mouth once daily.     blood sugar diagnostic Strp  To check BG 3 times daily, to use with insurance preferred meter     clopidogrel 75 mg tablet  Commonly known as:  PLAVIX  Take 1 tablet (75 mg total) by mouth once daily.  Start taking on:  7/1/2019     [facility]  To check BG 3 times daily, to use with insurance preferred meter        CHANGE how you take these medications    * blood-glucose meter kit  Commonly known as:  FREESTYLE SYSTEM KIT  Use daily- TID  What changed:  Another medication with the same name was added. Make sure you understand how and when to take each.     * blood-glucose meter kit  To check BG 3 times daily, to use with insurance preferred meter  What changed:  You were already taking a medication with the same name, and this prescription was added. Make sure you understand how and when to take each.     * insulin aspart U-100 100 unit/mL (3 mL) Inpn pen  Commonly known as:  NovoLOG  Inject 12 Units into the skin 3 (three) times daily.  What changed:    · how much to  take  · when to take this  · reasons to take this     * insulin aspart U-100 100 unit/mL (3 mL) Inpn pen  Commonly known as:  NovoLOG  Inject 0-5 Units into the skin before meals and at bedtime as needed (Hyperglycemia).  What changed:    · how much to take  · when to take this  · reasons to take this     insulin detemir U-100 100 unit/mL (3 mL) Inpn pen  Commonly known as:  LEVEMIR FLEXTOUCH  Inject 30 Units into the skin once daily.  Start taking on:  7/1/2019  What changed:    · how much to take  · when to take this     nicotine 21 mg/24 hr  Commonly known as:  NICODERM CQ  Place 1 patch onto the skin once daily.  What changed:  Another medication with the same name was removed. Continue taking this medication, and follow the directions you see here.     olmesartan 20 MG tablet  Commonly known as:  BENICAR  Take 1 tablet (20 mg total) by mouth once daily.  Start taking on:  7/1/2019  What changed:    · medication strength  · how much to take         * This list has 4 medication(s) that are the same as other medications prescribed for you. Read the directions carefully, and ask your doctor or other care provider to review them with you.            CONTINUE taking these medications    * albuterol 90 mcg/actuation inhaler  Commonly known as:  PROAIR HFA  inhale ONE TO TWO puffs EVERY 6 HOURS into lungs AS NEEDED FOR WHEEZING AND SHORTNESS OF BREATH     * albuterol 1.25 mg/3 mL Nebu  Commonly known as:  ACCUNEB  USE ONE VIAL in Nebulizer EVERY 6 HOURS AS NEEDED     aspirin 81 MG EC tablet  Commonly known as:  ECOTRIN  Take 1 tablet (81 mg total) by mouth once daily.     diclofenac sodium 1 % Gel  Commonly known as:  VOLTAREN  Apply topically 4 (four) times daily.     gabapentin 100 MG capsule  Commonly known as:  NEURONTIN  Take 1 capsule (100 mg total) by mouth every evening.     metFORMIN 1000 MG tablet  Commonly known as:  GLUCOPHAGE  TAKE 1 TABLET BY MOUTH TWICE DAILY     pantoprazole 40 MG tablet  Commonly known  as:  PROTONIX  Take 1 tablet (40 mg total) by mouth once daily.     ramelteon 8 mg tablet  Commonly known as:  ROZEREM  Take 1 tablet (8 mg total) by mouth every evening.         * This list has 2 medication(s) that are the same as other medications prescribed for you. Read the directions carefully, and ask your doctor or other care provider to review them with you.            STOP taking these medications    diphenhydrAMINE 25 mg capsule  Commonly known as:  BENADRYL     hydroCHLOROthiazide 50 MG tablet  Commonly known as:  HYDRODIURIL     insulin glargine 100 units/mL (3mL) SubQ pen  Commonly known as:  BASAGLAR KWIKPEN U-100 INSULIN     insulin lispro 100 unit/mL pen  Commonly known as:  ADMELOG SOLOSTAR U-100 INSULIN     losartan 100 MG tablet  Commonly known as:  COZAAR     SITagliptin 100 MG Tab  Commonly known as:  DINESHUVIA     traMADol 50 mg tablet  Commonly known as:  ANTONIO Vann PA-C  Comprehensive Stroke Center  Department of Vascular Neurology   [facility]

## 2019-06-30 NOTE — PLAN OF CARE
06/30/2019      Emily Martinez  [address] [address]          Vascular Neurology Dept.  [facility] [address] [place] [address] [phone] after hours   Diagnosis:  Thrombotic stroke involving right middle cerebral artery                               Please evaluate and treat patient for her occupational therapy and physical therapy needs.         __________________________  Elvia Vann PA-C  06/30/2019

## 2019-06-30 NOTE — PLAN OF CARE
Problem: Adult Inpatient Plan of Care  Goal: Plan of Care Review  Outcome: Ongoing (interventions implemented as appropriate)  Pt remains free from falls and injuries. Insulin gtts infusing. Blood glucose monitored; supplemental insulin given/gtt adjusted as ordered. Pt repositioned independently. No neuro changes noted. Plan of care discussed with pt. VSS, no acute issues overnight.

## 2019-06-30 NOTE — SUBJECTIVE & OBJECTIVE
Neurologic Chief Complaint: right sided weakness     Subjective:     Interval History: Patient is seen for follow-up neurological assessment and treatment recommendations: NAEON, no new complaints    HPI, Past Medical, Family, and Social History remains the same as documented in the initial encounter.     Review of Systems   Constitutional: Negative for fever.   Respiratory: Negative for shortness of breath.    Cardiovascular: Negative for chest pain.   Gastrointestinal: Negative for nausea and vomiting.   Neurological: Positive for speech difficulty and weakness.     Scheduled Meds:   aspirin  81 mg Oral Daily    atorvastatin  40 mg Oral Daily    clopidogrel  75 mg Oral Daily    insulin aspart U-100  10 Units Subcutaneous TIDWM    insulin detemir U-100  30 Units Subcutaneous Daily    nicotine  1 patch Transdermal Daily     Continuous Infusions:   sodium chloride 0.9% 75 mL/hr at 06/29/19 2208    sodium chloride 0.9%       PRN Meds:acetaminophen, Dextrose 10% Bolus, Dextrose 10% Bolus, Dextrose 10% Bolus, Dextrose 10% Bolus, glucagon (human recombinant), glucose, glucose, insulin aspart U-100, sodium chloride 0.9%, sodium chloride 0.9%    Objective:     Vital Signs (Most Recent):  Temp: 97.8 °F (36.6 °C) (06/30/19 0732)  Pulse: 83 (06/30/19 0733)  Resp: 18 (06/30/19 0732)  BP: (!) 165/104 (06/30/19 0732)  SpO2: 96 % (06/30/19 0732)  BP Location: Right arm    Vital Signs Range (Last 24H):  Temp:  [96.4 °F (35.8 °C)-99.4 °F (37.4 °C)]   Pulse:  [68-89]   Resp:  [18]   BP: (127-165)/()   SpO2:  [94 %-98 %]   BP Location: Right arm    Physical Exam   Constitutional: She is oriented to person, place, and time. She appears well-developed and well-nourished. No distress.   HENT:   Head: Normocephalic and atraumatic.   Eyes: Pupils are equal, round, and reactive to light. EOM are normal.   Cardiovascular: Normal rate.   Pulmonary/Chest: Effort normal.   Neurological: She is alert and oriented to person, place,  and time.   Skin: Skin is warm and dry.   Vitals reviewed.      Neurological Exam:   LOC: alert  Attention Span: Good   Language: No aphasia  Articulation: Dysarthria  Orientation: Person, Place, Time   Visual Fields: Full  EOM (CN III, IV, VI): Full/intact  Pupils (CN II, III): PERRL  Facial Movement (CN VII): Lower facial weakness on the Left  Motor: Arm left  Paresis: 4/5  Leg left  Paresis: 4/5  Arm right  Normal 5/5  Leg right Normal 5/5  Cebellar: No evidence of appendicular or axial ataxia  Sensation: Intact to light touch, temperature and vibration    Laboratory:  CMP:   No results for input(s): GLUCOSE, CALCIUM, ALBUMIN, PROT, NA, K, CO2, CL, BUN, CREATININE, ALKPHOS, ALT, AST, BILITOT in the last 24 hours.  BMP:   Recent Labs   Lab 06/29/19  0656      K 3.8      CO2 29   BUN 17   CREATININE 1.3   CALCIUM 10.1     CBC:   Recent Labs   Lab 06/29/19  0656   WBC 7.05   RBC 4.51   HGB 12.9   HCT 37.7      MCV 84   MCH 28.6   MCHC 34.2     Lipid Panel:   Recent Labs   Lab 06/27/19  1520   CHOL 176   LDLCALC 84.6   HDL 56   TRIG 177*     Coagulation:   Recent Labs   Lab 06/28/19  0608   INR 0.9   APTT 21.7     Platelet Aggregation Study: No results for input(s): PLTAGG, PLTAGINTERP, PLTAGREGLACO, ADPPLTAGGREG in the last 168 hours.  Hgb A1C:   Recent Labs   Lab 06/27/19  1520   HGBA1C >14.0*     TSH:   Recent Labs   Lab 06/27/19  1520   TSH 0.908       Diagnostic Results     Brain Imaging   MRI brain 6/28  Interval development of scattered foci of new signal abnormality most compatible with acute/recent infarcts as detailed above.  Largest focus in the right centrum semiovale extending to the basal ganglia with punctate foci within the left centrum semiovale and left posterior temporal occipital periventricular white matter.    Probable evolving infarcts bilateral basal ganglia with superimposed T2 flair signal abnormality elsewhere supratentorial white matter and vega suggestive for underlying  chronic microvascular ischemic change.    Small remote left cerebellar infarction again seen.    No evidence for hemorrhagic conversion or hydrocephalus.  Clinical correlation and follow-up advised.      Vessel Imaging   MRA H/N  Significantly limited examination secondary to patient motion artifact.  No significant arterial abnormalities within the confines of the study.  Please consider CTA of the head and neck for further evaluation.    Cardiac Imaging   Echo 4/2/19 (previous admission)  · Normal left ventricular systolic function. The estimated ejection fraction is 68%  · Concentric left ventricular remodeling.  · Normal LV diastolic function.  · No wall motion abnormalities.  · Normal right ventricular systolic function.  · Mild mitral sclerosis.  · Normal central venous pressure (3 mm Hg).  · The estimated PA systolic pressure is 15 mm Hg

## 2019-07-01 LAB
AMPHETAMINES SERPL QL: NEGATIVE
BARBITURATES SERPL QL SCN: NEGATIVE
BENZODIAZ SERPL QL SCN: NEGATIVE
BZE SERPL QL: NEGATIVE
CARBOXYTHC SERPL QL SCN: NEGATIVE
ETHANOL SERPL QL SCN: NEGATIVE
METHADONE SERPL QL SCN: NEGATIVE
OPIATES SERPL QL SCN: NEGATIVE
PCP SERPL QL SCN: NEGATIVE
PROPOXYPH SERPL QL: NEGATIVE

## 2019-07-01 NOTE — PLAN OF CARE
07/01/19 0749   Final Note   Assessment Type Final Discharge Note   Anticipated Discharge Disposition Home   Right Care Referral Info   Post Acute Recommendation No Care

## 2019-07-08 ENCOUNTER — HOME CARE VISIT (OUTPATIENT)
Dept: NEUROLOGY | Facility: HOSPITAL | Age: 54
End: 2019-07-08

## 2019-07-08 VITALS — OXYGEN SATURATION: 98 % | DIASTOLIC BLOOD PRESSURE: 84 MMHG | HEART RATE: 84 BPM | SYSTOLIC BLOOD PRESSURE: 140 MMHG

## 2019-07-08 NOTE — PROGRESS NOTES
Patient VS are WNL on today's visit. Recent hospital stay for 2nd stroke. Daughter is present today for in home stroke visit. Daughter reports patient has been non compliant with diet, smoking cessation, and ETOH use. LHE reinforced in great detail present stroke RF, stroke reoccurrence, and consequences of non compliance.

## 2019-07-14 NOTE — PROGRESS NOTES
Subjective:      Patient ID: Emily Martinez is a 54 y.o. female.    Chief Complaint:  No chief complaint on file.      History of Present Illness  This is a follow up visit after recent hospitalization  Endocrinology was consulted for management of hyperglycemia:    Discharge date: 6/30/19  Discharge regimen specific to hyperglycemia: Basaglar 30 units daily, Admelog 12 units with meals, Metformin 1000 mg BID    Was able to fill prescription for medications and supplies - yes    Etiology of the hyperglycemia: known T2DM     Diabetes was diagnosed: 2009 (approximately)  Known complications: Hyperglycemia, Hypoglycemia  and Diabetic retinopathy      Current regimen: Basaglar 20 units q HS, Admelog 15 units plus correction scale 150/50, Metformin 1000 mg BID    Missed doses? No    Prior medications not tolerated or Failed treatments: Metformin previously - but wasn't taking appropriately       # times a day testing - 3-4x per day      No log provided - patient reported to be:     Pre breakfast - 180s  Pre lunch - low 200s  Pre dinner - 190-200  qhs - 190s    Hypoglycemic event? None     Typical meals:   Breakfast - grits, eggs, sausage, and toast  Lunch - varies; home cooked meals  Dinner  - varies; home cooked meals  Snacks - chips      Education - last visit: scheduled 7/30/19 with Rosalind Rai    No Polyuria polydipsia nocturia or vision changes    With regards to reason for admit:  Doing better       Review of Systems   Gastrointestinal: Negative for nausea.   Endocrine: Negative for polydipsia.       Objective:   Physical Exam   Skin:   Injection sites are ok. No lipo hypertropthy or atrophy.   Vitals reviewed.      There is no height or weight on file to calculate BMI.    Lab Review:   Lab Results   Component Value Date    HGBA1C >14.0 (H) 06/27/2019     Lab Results   Component Value Date    CHOL 176 06/27/2019    HDL 56 06/27/2019    LDLCALC 84.6 06/27/2019    TRIG 177 (H) 06/27/2019    CHOLHDL 31.8  06/27/2019     Lab Results   Component Value Date     06/30/2019    K 4.1 06/30/2019     06/30/2019    CO2 24 06/30/2019     (H) 06/30/2019    BUN 11 06/30/2019    CREATININE 1.1 06/30/2019    CALCIUM 9.8 06/30/2019    PROT 6.7 06/30/2019    ALBUMIN 3.1 (L) 06/30/2019    BILITOT 0.3 06/30/2019    ALKPHOS 77 06/30/2019    AST 30 06/30/2019    ALT 17 06/30/2019    ANIONGAP 10 06/30/2019    ESTGFRAFRICA >60.0 06/30/2019    EGFRNONAA 56.3 (A) 06/30/2019    TSH 0.908 06/27/2019       Assessment and Plan     Problem List Items Addressed This Visit        Neuro    Cerebrovascular accident (CVA) due to embolism of left middle cerebral artery - Primary    Current Assessment & Plan     Optimize BG control            Endocrine    Uncontrolled type 2 diabetes mellitus with hyperglycemia    Current Assessment & Plan     -- Reviewed goals of therapy are to get the best control we can without hypoglycemia  -- Refer to diabetes education  Medication changes: Increase Basaglar to 25 units q HS, Increase Admelog to 18 units with meals plus correction scale 150/50, continue Metformin 1000 mg BID    Reviewed hyper/hypoglycemia, patient teaching provided on how to appropriate identify and treat hypoglycemia - verbalized understanding    Patient to follow up with DM education on 7/30/19 with Rosalind Rai    Patient to follow up with PCP for ongoing DM management, will get A1C in 3 months  -- Reviewed patient's current insulin regimen. Clarified proper insulin dose and timing in relation to meals, etc. Insulin injection sites and proper rotation instructed.    -- Advised frequent self blood glucose monitoring.  Patient encouraged to document glucose results and bring them to every clinic visit    -- Hypoglycemia precautions discussed. Instructed on precautions before driving.    -- Call for Bg repeatedly < 90 or > 180.   -- Close adherence to lifestyle changes recommended.   -- Periodic follow ups for eye evaluations,  foot care and dental care suggested.               Overweight (BMI 25.0-29.9)    Current Assessment & Plan     may contribute to insulin resistance

## 2019-07-14 NOTE — ASSESSMENT & PLAN NOTE
-- Reviewed goals of therapy are to get the best control we can without hypoglycemia  -- Refer to diabetes education  Medication changes: Increase Basaglar to 25 units q HS, Increase Admelog to 18 units with meals plus correction scale 150/50, continue Metformin 1000 mg BID    Reviewed hyper/hypoglycemia, patient teaching provided on how to appropriate identify and treat hypoglycemia - verbalized understanding    Patient to follow up with DM education on 7/30/19 with Rosalind Rai    Patient to follow up with PCP for ongoing DM management, will get A1C in 3 months  -- Reviewed patient's current insulin regimen. Clarified proper insulin dose and timing in relation to meals, etc. Insulin injection sites and proper rotation instructed.    -- Advised frequent self blood glucose monitoring.  Patient encouraged to document glucose results and bring them to every clinic visit    -- Hypoglycemia precautions discussed. Instructed on precautions before driving.    -- Call for Bg repeatedly < 90 or > 180.   -- Close adherence to lifestyle changes recommended.   -- Periodic follow ups for eye evaluations, foot care and dental care suggested.

## 2019-07-15 ENCOUNTER — OFFICE VISIT (OUTPATIENT)
Dept: ENDOCRINOLOGY | Facility: CLINIC | Age: 54
End: 2019-07-15
Payer: MEDICAID

## 2019-07-15 VITALS
RESPIRATION RATE: 16 BRPM | DIASTOLIC BLOOD PRESSURE: 90 MMHG | SYSTOLIC BLOOD PRESSURE: 122 MMHG | HEART RATE: 78 BPM | HEIGHT: 63 IN | BODY MASS INDEX: 25.78 KG/M2 | WEIGHT: 145.5 LBS

## 2019-07-15 DIAGNOSIS — I63.412 CEREBROVASCULAR ACCIDENT (CVA) DUE TO EMBOLISM OF LEFT MIDDLE CEREBRAL ARTERY: Primary | ICD-10-CM

## 2019-07-15 DIAGNOSIS — E11.65 UNCONTROLLED TYPE 2 DIABETES MELLITUS WITH HYPERGLYCEMIA: ICD-10-CM

## 2019-07-15 DIAGNOSIS — E66.3 OVERWEIGHT (BMI 25.0-29.9): ICD-10-CM

## 2019-07-15 PROCEDURE — 99214 OFFICE O/P EST MOD 30 MIN: CPT | Mod: S$PBB,,, | Performed by: INTERNAL MEDICINE

## 2019-07-15 PROCEDURE — 99213 OFFICE O/P EST LOW 20 MIN: CPT | Mod: PBBFAC

## 2019-07-15 PROCEDURE — 99999 PR PBB SHADOW E&M-EST. PATIENT-LVL III: CPT | Mod: PBBFAC,,,

## 2019-07-15 PROCEDURE — 99999 PR PBB SHADOW E&M-EST. PATIENT-LVL III: ICD-10-PCS | Mod: PBBFAC,,,

## 2019-07-15 PROCEDURE — 99214 PR OFFICE/OUTPT VISIT, EST, LEVL IV, 30-39 MIN: ICD-10-PCS | Mod: S$PBB,,, | Performed by: INTERNAL MEDICINE

## 2019-07-18 ENCOUNTER — TELEPHONE (OUTPATIENT)
Dept: NEUROLOGY | Facility: CLINIC | Age: 54
End: 2019-07-18

## 2019-07-18 NOTE — TELEPHONE ENCOUNTER
----- Message from Julia Fermin RN sent at 7/1/2019  7:49 AM CDT -----  Please schedule a neuro followup appt for 4-6 weeks. Patient was inpatient and seen by Dr Lehman.

## 2019-08-09 ENCOUNTER — HOME CARE VISIT (OUTPATIENT)
Dept: NEUROLOGY | Facility: HOSPITAL | Age: 54
End: 2019-08-09

## 2019-08-12 NOTE — PROGRESS NOTES
Ms. Martinez denies any ER visits or hospital stays since last SM visit. Recent PCP visit completed pain addressed. Reinforced education on stroke RF.

## 2019-09-11 ENCOUNTER — HOME CARE VISIT (OUTPATIENT)
Dept: NEUROLOGY | Facility: HOSPITAL | Age: 54
End: 2019-09-11

## 2019-09-11 VITALS — HEART RATE: 79 BPM | OXYGEN SATURATION: 97 % | SYSTOLIC BLOOD PRESSURE: 140 MMHG | DIASTOLIC BLOOD PRESSURE: 82 MMHG

## 2019-09-11 NOTE — PROGRESS NOTES
Patient seen at [facility], due to 3rd stroke. Anticipated discharge on Friday 9/13. Ms. Martinez's participation is distant and mildly confused.  nurse reassured the team will continue to perform monthly visits.

## 2019-10-15 ENCOUNTER — HOME CARE VISIT (OUTPATIENT)
Dept: NEUROLOGY | Facility: HOSPITAL | Age: 54
End: 2019-10-15

## 2019-10-18 VITALS
BODY MASS INDEX: 24.94 KG/M2 | SYSTOLIC BLOOD PRESSURE: 138 MMHG | WEIGHT: 140.81 LBS | DIASTOLIC BLOOD PRESSURE: 80 MMHG | OXYGEN SATURATION: 98 % | HEART RATE: 95 BPM

## 2019-10-18 NOTE — PROGRESS NOTES
Mrs. Martinez VS are WNL on today's visit. She denies any ER visits or hospital stays. LHE reviewed education on medication compliance.

## 2019-11-18 ENCOUNTER — HOME CARE VISIT (OUTPATIENT)
Dept: NEUROLOGY | Facility: HOSPITAL | Age: 54
End: 2019-11-18

## 2019-11-18 VITALS
SYSTOLIC BLOOD PRESSURE: 140 MMHG | DIASTOLIC BLOOD PRESSURE: 92 MMHG | HEART RATE: 75 BPM | WEIGHT: 144.63 LBS | BODY MASS INDEX: 25.61 KG/M2 | OXYGEN SATURATION: 98 %
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
## 2019-11-20 DIAGNOSIS — I63.9 IMPENDING CEREBROVASCULAR ACCIDENT: Primary | ICD-10-CM

## 2019-11-20 DIAGNOSIS — R53.1 WEAKNESS GENERALIZED: ICD-10-CM

## 2019-11-20 DIAGNOSIS — R53.81 DEBILITY: ICD-10-CM

## 2019-11-27 ENCOUNTER — DOCUMENTATION ONLY (OUTPATIENT)
Dept: REHABILITATION | Facility: HOSPITAL | Age: 54
End: 2019-11-27

## 2019-12-18 ENCOUNTER — DOCUMENTATION ONLY (OUTPATIENT)
Dept: REHABILITATION | Facility: HOSPITAL | Age: 54
End: 2019-12-18

## 2019-12-18 ENCOUNTER — HOME CARE VISIT (OUTPATIENT)
Dept: NEUROLOGY | Facility: HOSPITAL | Age: 54
End: 2019-12-18

## 2019-12-18 NOTE — PROGRESS NOTES
Emily did not arrive for her w/c eval today.  Vendor contacted patient re: missed visit and the need to rescheduled.  2nd no show.
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
## 2020-01-08 NOTE — PROGRESS NOTES
[facility] Occupational Therapy  Wheelchair Evaluation     Date: 1/9/2020  Patient: Emily Martinez  Chart Number: [id number]    Therapy Diagnosis:  1. Decreased strength, endurance, and mobility       Physician: Alireza Sosa MD    Physician Orders: Power Wheelchair Evaluation   Medical Diagnosis:   Impending cerebrovascular accident   Weakness generalized    Debility     Evaluation Date: 1/9/2020  Plan of Care Expiration Period: 1/9/2020  Insurance Authorization period Expiration: 1/9/2020  Date of Return to MD: unknown  Visit # / Visits Authortized: 1 / 1    Time In:11:20 am  Time Out: 12:22 am  Total Billable (one on one) Time: 62 minutes    Precautions: Standard, Diabetes and HTN      Subjective     History of Current Condition: Pt reports having several strokes since April 2019 affecting her left side, she has since moved in with her daughter. Pt states that she frequently falls approx 2 x a week.     Involved Side: left   Dominant Side: Right  Date of Onset: April 2019  Surgical Procedure: n/a  Imaging: CT scan films   Narrative     EXAMINATION:  CTA HEAD AND NECK (XPD)    CLINICAL HISTORY:  Focal neuro deficit, new, fixed or worsening, >6 hours    TECHNIQUE:  Non contrast low dose axial images were obtained through the head. CT angiogram was performed from the level of the jaimee to the top of the head following the IV administration of 75mL of Omnipaque 350.   Sagittal and coronal reconstructions and maximum intensity projection reconstructions were performed. Arterial stenosis percentages are based on NASCET measurement criteria.    COMPARISON:  MRI brain and CT head 04/01/2019    FINDINGS:  Intracranial Compartment:    Generalized cerebral volume loss with compensatory widening of the sulci and ventricular system.  No evidence of hydrocephalus.  No extra-axial blood or fluid collections.    Subtle hypoattenuation corresponding to areas of acute infarction identified on recent MRI  involving the left basal ganglia, left thalamus, and periventricular white matter adjacent to the left ventricular trigone.    Punctate hypodensities consistent with remote lacunar type infarcts of the bilateral basal ganglia, right thalamus, and left cerebellum.  Patchy hypoattenuation of the supratentorial white matter consistent with chronic microvascular ischemic change.    No evidence of acute intracranial hemorrhage.    Skull/Extracranial Contents (limited evaluation): No fracture. Moderate mucosal thickening of the left sphenoid sinus.  Remaining paranasal sinuses are clear.  Mastoid air cells are clear.    Non-Vascular Structures of the Neck/Thoracic Inlet (limited evaluation): Age-appropriate degenerative changes of the cervical spine.    Aorta: Normal 3 vessel arch.    Extracranial carotid circulation: Punctate area of calcific atherosclerosis of the left carotid bifurcation.  No hemodynamically significant stenosis, aneurysmal dilatation, or dissection.    Extracranial vertebral circulation: No hemodynamically significant stenosis, aneurysmal dilatation, or dissection.    Intracranial Arteries: No focal high-grade stenosis, occlusion, or aneurysm.    Venous structures (limited evaluation): Normal.      Impression       CT head:    1. Subtle hypoattenuation corresponding with areas of acute infarction identified on recent MRI involving the left basal ganglia, left thalamus, and periventricular white matter adjacent to the left ventricular trigone.  2. Multifocal remote lacunar type infarcts as above.  3. Generalized cerebral volume loss and chronic microvascular ischemic change.  4. Left sphenoid sinus disease.  CTA:    No hemodynamically significant stenosis or large vessel occlusion identified.    Electronically signed by resident: Krishna Trammell  Date: 04/02/2019  Time: 01:45    Electronically signed by: Barrie Zhou MD  Date: 04/02/2019  Time: 02:54                    Previous Therapy: Pt reports she  rec'd outpatient therapy at [facility] for 6 months inclusive of PT, OT and speech    Patient's Goals for Therapy: Pt would like to be able to move about in the community                                                    Safe and efficient use of a wheelchair.    Pain:  Pain Related Behaviors Observed: yes antalgic gait right hip pain  Functional Pain Scale Rating 0-10:   10/10 at worst  Location: right hip  Description: just a pain  Aggravating Factors: results from falls  Easing Factors: takes pain medicine    Occupation:  Pt has not ever worked    Functional Limitations/Social History:    Prior Level of Function: was living alone and performed all household work, cooking  Current Level of Function: She performs self care with intermittent help to get out of the tub, able to complete her self care, limited with regular pace of ambulation    Home/Living environment : lives with their daughter and her family   Home Access: she is presently living with her youngest daughter apt with 15 steps to enter, she is planing to move in with her older daughter into a one story home, no steps to enter  DME: rollator, [employer]     Leisure: go to [employer]  Driving status: never licensed to drive    Past Medical History/Physical Systems Review:   Emily Martinez  has a past medical history of Diabetes mellitus type I, Hyperlipidemia, Hypertension, and Right sided weakness.  Hx of long term smoking.    Emily Martinez  has a past surgical history that includes  section.    Emily has a current medication list which includes the following prescription(s): albuterol, albuterol, amitriptyline, amlodipine, amlodipine, aspirin, atorvastatin, atorvastatin, blood sugar diagnostic, blood-glucose meter, blood-glucose meter, clopidogrel, diaper,brief,adult,disposable, diazepam, diclofenac sodium, food supplemt, lactose-reduced, gabapentin, hydroxyzine hcl, hydroxyzine hcl, insulin aspart u-100, insulin aspart u-100, insulin  detemir u-100, lancets, metformin, nicotine, nicotine, olmesartan, pantoprazole, ramelteon, silver sulfadiazine 1%, ticagrelor, tramadol, and wheelchair.    Review of patient's allergies indicates:   Allergen Reactions    Sulfur         Objective     Cognitive Exam:  Oriented: Person, Place, Time and Situation  Behaviors: normal, cooperative  Memory: able to recall 2 words only after 1 and 3 minutes   Safety awareness/insight to disability: aware of diagnosis, treatment, and prognosis  Comments:     Visual/Perceptual: tracking is intact, she states she needs to f/u with opthalmology     Physical Exam:  Postural examination/scapula alignment/trunk/head: Head forward  Obliquity/scoliosis: none noted  Head control: WFL  Spacticity: none noted   Joint integrity: intact no subluxation   Skin integrity: intact  Edema: none noted     Upper Extremity ROM:  WFL bilateral UE's    Fist: normal opposition to all tips and 5th MCP    Upper Extremity Strength:   Strength 1/9/2020 1/9/2020   **within available ROM** Left Right   Shoulder flex 4/5 4/5   Shoulder abd 4/5 4/5   Shoulder ER 3/5 4/5   Shoulder IR 3+/5 4/5   Shoulder Extension 4+/5 4+/5   Shoulder Horizontal adduction 3/5 3/5   Elbow flex 4+/5 4+/5   Elbow ext 3+/5 4-/5   Wrist flex 4/5 4+/5   Wrist ext 4+/5 4+/5   Supination 4+/5 3+/5   Pronation 4+/5 3+/5            Lower Extremity ROM: AROM of BLE WFL    Lower Extremity Strength:   Right LE  Left LE    Knee extension: 5/5 Knee extension: 4/5   Knee flexion: 4+/5 Knee flexion: 4/5   Hip flexion: 4+/5 Hip flexion: 4/5   Hip extension:  3/5 Hip extension: 4-/5   Hip abduction: 4+/5 Hip abduction: 4+/5   Hip adduction: 4/5 Hip adduction 4/5   Ankle dorsiflexion: 4+/5 Ankle dorsiflexion: 3+/5   Ankle plantarflexion: 4+/5 Ankle plantarflexion: 4/5       Gross motor coordination:   - RONALDO: impaired  - Finger to Nose: intact   - Finger Flicks: impaired left     Tone:  Modified Ludy Scale:   0 - No increase in muscle  tone    Comments:     Sensation:  Emily  reports no sensory deficits at this time    Balance:   Static Sit - GOOD: Takes MODERATE challenges from all directions  Dynamic sit- GOOD: Takes MODERATE challenges from all directions  Static Stand - FAIR+: Able to take MINIMAL challenges from all directions  Dynamic stand - POOR+: Needs MINIMAL assist to maintain    Endurance Deficit: moderate       Pt.will use  wheelchair 4-6 hours a day when she is experiencing fatigue. Emily Martinez is able to do pressure reliefs.      Ruben  from [employer] performed seating measures in my presence.                    Functional Status      Functional Mobility:  Bed mobility: Mod I  Roll to left: Mod I  Roll to right: Mod I  Supine to sit: Mod I  Sit to supine: Mod I  Transfers to bed: Mod I  Transfers to toilet: Mod I  Car transfers: Mod I  Wheelchair mobility: can propel with right leg and arm some difficulty with directional changes    ADL's:  Feeding: Mod I  Grooming:I  Hygiene: I  UB Dressing: I  LB Dressing: Mod I  Toileting: Mod I  Bathing: Independent with intermittent assistance to egress from tub if fatigued    IADL's:  Homecare: assists with some tasks  Cooking: minimally performing   Laundry: Mod I  Yard work: n/a  Use of telephone: I  Money management: I  Medication management: nurse sets a week at a time but is able to complete herself  Handwriting: nt  Technology Use:n/a    Comments:       Today's Treatment   Wheelchair eval only.     Patient Education provided:   - WC Eval process  - Role of OT, goals for OT, insurance limitations with patient.  - Additional Education provided: Pt was educated re: her insurance only covering a standard w/c or if qualified a power wheelchair.   -It is important for her to continue ambulating with her assistive device but cautioned her re: her frequent falls when a wheelchair could improve her safe mobility around the house.    Assessment     Emily Martinez is a 54 y.o. with a  medical diagnosis of impending cerebrovascular accident, weakness generalized and debility   referred to occupational therapy for a power wheelchair.     Patient has mobility limitation that significantly impairs safe, timely, consistent participation in one or more MRADL. yes  A mobility assistive device will effectively improve ability to participate in or aid participation in MRADL's.  yes   A cane or walker will provide patient the ability to safely and consistently perform MRADLs in a timely manner for limited time frame due to fatigue   The patient's home environment will support use of recommended mobility device. yes  The patient has sufficient UE strength to effectively self propel a manual wheelchair for al MRADL's. No but able to propel with bilateral LE and right UE  The patient demonstrates ability/potential ability to use recommended equipment. yes  The patient is willing and motivated to use the recommended equipment. yes     A lightweight  manual w/c will be required secondary to her strength limitations,  Adjustable axle plate to decrease the base will improve her ability to propel with her present abilities.  Is recommended to meet the following goals:  Improve mobility  Improve postural alignment  Decrease chance of injury;Improve safety  Decrease stress on pulmonary system  Improve patient's ability to go into community    Accessories needed to extend life expectancy of this product      Plan of care discussed with patient: Yes  Pt's spiritual, cultural and educational needs considered and patient is agreeable to the plan of care and goals as stated below:       Medical Necessity is demonstrated by the following  Profile and History Assessment of Occupational Performance Level of Clinical Decision Making Complexity Score   Occupational Profile:   Emily Martinez is a 54 y.o. female who lives with their family and is currently unemployed-has never worked. Emily Martinez has difficulty  with  bathing  shopping and housework/household chores  affecting his/her daily functional abilities. His/her main goal for therapy is assist with mobility.     Comorbidities:   diabetes, history of CVA and HTN    Medical and Therapy History Review:   Brief               Performance Deficits    Physical:  Muscle Power/Strength  Muscle Endurance  Pain    Cognitive:  Safety Awareness/Insight to Disability    Psychosocial:    No Deficits     Clinical Decision Making:  low    Assessment Process:  Detailed Assessments    Modification/Need for Assistance:  Minimal-Moderate Modifications/Assistance    Intervention Selection:  Limited Treatment Options       low  Based on PMHX, co morbidities , data from assessments and functional level of assistance required with task and clinical presentation directly impacting function.       The following goals were discussed with the patient and patient is in agreement with them as to be addressed in the treatment plan.     Goals:   1.  Patient will verbalize knowledge and understanding of W/C evaluation process.   2.  Patient will verbalize knowledge and understanding of purpose, function, and functional benefits of recommended W/C.       Plan     OT to work with vendorRuben from [employer] to obtain recommended wheelchair.     FLAKITO Cazares

## 2020-01-09 ENCOUNTER — CLINICAL SUPPORT (OUTPATIENT)
Dept: REHABILITATION | Facility: HOSPITAL | Age: 55
End: 2020-01-09
Payer: MEDICAID

## 2020-01-09 DIAGNOSIS — R68.89 DECREASED STRENGTH, ENDURANCE, AND MOBILITY: ICD-10-CM

## 2020-01-09 DIAGNOSIS — Z74.09 DECREASED STRENGTH, ENDURANCE, AND MOBILITY: ICD-10-CM

## 2020-01-09 DIAGNOSIS — R53.1 DECREASED STRENGTH, ENDURANCE, AND MOBILITY: ICD-10-CM

## 2020-01-09 PROCEDURE — 97165 OT EVAL LOW COMPLEX 30 MIN: CPT | Mod: PN

## 2020-01-22 ENCOUNTER — HOME CARE VISIT (OUTPATIENT)
Dept: NEUROLOGY | Facility: HOSPITAL | Age: 55
End: 2020-01-22

## 2020-02-21 ENCOUNTER — HOME CARE VISIT (OUTPATIENT)
Dept: NEUROLOGY | Facility: HOSPITAL | Age: 55
End: 2020-02-21

## 2020-02-24 NOTE — PROGRESS NOTES
Patient denies any ER visits or hospital stays since last SM visit. SM  nurse reinforced medication compliance.

## 2020-03-24 ENCOUNTER — HOME CARE VISIT (OUTPATIENT)
Dept: NEUROLOGY | Facility: HOSPITAL | Age: 55
End: 2020-03-24

## 2020-03-24 VITALS
WEIGHT: 150.63 LBS | SYSTOLIC BLOOD PRESSURE: 134 MMHG | OXYGEN SATURATION: 97 % | HEART RATE: 74 BPM | BODY MASS INDEX: 26.68 KG/M2 | DIASTOLIC BLOOD PRESSURE: 70 MMHG

## 2020-03-24 NOTE — PROGRESS NOTES
Ms. Martinez VS are WNL on today's visit. She denies any ER visits or hospital stays. SM nurse reinforced S/S to report to medical professionals.

## 2020-04-28 ENCOUNTER — HOME CARE VISIT (OUTPATIENT)
Dept: NEUROLOGY | Facility: HOSPITAL | Age: 55
End: 2020-04-28

## 2020-04-28 NOTE — PROGRESS NOTES
Ms. Martinez Phone Visit completed. She denies any ER visits or hospital stays. Patient reports constipation. PCP called in medication to address constipation. Positive BM on today.  nurse reinforced education on daily hydration with water to reduce constipation occurrences.

## 2020-05-28 ENCOUNTER — HOME CARE VISIT (OUTPATIENT)
Dept: NEUROLOGY | Facility: HOSPITAL | Age: 55
End: 2020-05-28

## 2020-05-28 NOTE — PROGRESS NOTES
Patient D/C from  program. Educated on stroke S/S, RF present for stroke, behavioral modifications needed to manage stroke RF, and medication compliance as it relates to stroke occurrence.

## 2022-04-04 DIAGNOSIS — M25.561 RIGHT KNEE PAIN, UNSPECIFIED CHRONICITY: Primary | ICD-10-CM

## 2022-04-05 ENCOUNTER — OFFICE VISIT (OUTPATIENT)
Dept: ORTHOPEDICS | Facility: CLINIC | Age: 57
End: 2022-04-05
Payer: MEDICAID

## 2022-04-05 ENCOUNTER — HOSPITAL ENCOUNTER (OUTPATIENT)
Dept: RADIOLOGY | Facility: HOSPITAL | Age: 57
Discharge: HOME | End: 2022-04-05
Attending: ORTHOPAEDIC SURGERY
Payer: MEDICAID

## 2022-04-05 VITALS
HEIGHT: 63 IN | WEIGHT: 194 LBS | HEART RATE: 75 BPM | DIASTOLIC BLOOD PRESSURE: 101 MMHG | SYSTOLIC BLOOD PRESSURE: 168 MMHG | BODY MASS INDEX: 34.38 KG/M2

## 2022-04-05 DIAGNOSIS — M25.569 KNEE PAIN, UNSPECIFIED CHRONICITY, UNSPECIFIED LATERALITY: ICD-10-CM

## 2022-04-05 DIAGNOSIS — M25.519 SHOULDER PAIN, UNSPECIFIED CHRONICITY, UNSPECIFIED LATERALITY: ICD-10-CM

## 2022-04-05 DIAGNOSIS — M25.511 RIGHT SHOULDER PAIN, UNSPECIFIED CHRONICITY: Primary | ICD-10-CM

## 2022-04-05 DIAGNOSIS — M25.561 CHRONIC PAIN OF RIGHT KNEE: ICD-10-CM

## 2022-04-05 DIAGNOSIS — G89.29 CHRONIC PAIN OF RIGHT KNEE: ICD-10-CM

## 2022-04-05 DIAGNOSIS — M25.561 RIGHT KNEE PAIN, UNSPECIFIED CHRONICITY: ICD-10-CM

## 2022-04-05 DIAGNOSIS — M17.11 PRIMARY OSTEOARTHRITIS OF RIGHT KNEE: Primary | ICD-10-CM

## 2022-04-05 DIAGNOSIS — M25.511 RIGHT SHOULDER PAIN, UNSPECIFIED CHRONICITY: ICD-10-CM

## 2022-04-05 DIAGNOSIS — M62.81 QUADRICEPS WEAKNESS: ICD-10-CM

## 2022-04-05 PROCEDURE — 73030 X-RAY EXAM OF SHOULDER: CPT | Mod: TC,FY,RT

## 2022-04-05 PROCEDURE — 3080F PR MOST RECENT DIASTOLIC BLOOD PRESSURE >= 90 MM HG: ICD-10-PCS | Mod: CPTII,,, | Performed by: ORTHOPAEDIC SURGERY

## 2022-04-05 PROCEDURE — 73562 X-RAY EXAM OF KNEE 3: CPT | Mod: 26,LT,, | Performed by: RADIOLOGY

## 2022-04-05 PROCEDURE — 1159F MED LIST DOCD IN RCRD: CPT | Mod: CPTII,,, | Performed by: ORTHOPAEDIC SURGERY

## 2022-04-05 PROCEDURE — 3077F PR MOST RECENT SYSTOLIC BLOOD PRESSURE >= 140 MM HG: ICD-10-PCS | Mod: CPTII,,, | Performed by: ORTHOPAEDIC SURGERY

## 2022-04-05 PROCEDURE — 20610 DRAIN/INJ JOINT/BURSA W/O US: CPT | Mod: S$PBB,RT,, | Performed by: ORTHOPAEDIC SURGERY

## 2022-04-05 PROCEDURE — 3077F SYST BP >= 140 MM HG: CPT | Mod: CPTII,,, | Performed by: ORTHOPAEDIC SURGERY

## 2022-04-05 PROCEDURE — 99999 PR PBB SHADOW E&M-EST. PATIENT-LVL V: ICD-10-PCS | Mod: PBBFAC,,, | Performed by: ORTHOPAEDIC SURGERY

## 2022-04-05 PROCEDURE — 20610 DRAIN/INJ JOINT/BURSA W/O US: CPT | Mod: PBBFAC,RT | Performed by: ORTHOPAEDIC SURGERY

## 2022-04-05 PROCEDURE — 73030 XR SHOULDER COMPLETE 2 OR MORE VIEWS RIGHT: ICD-10-PCS | Mod: 26,RT,, | Performed by: RADIOLOGY

## 2022-04-05 PROCEDURE — 73562 XR KNEE 3 VIEW LEFT: ICD-10-PCS | Mod: 26,LT,, | Performed by: RADIOLOGY

## 2022-04-05 PROCEDURE — 73030 X-RAY EXAM OF SHOULDER: CPT | Mod: 26,RT,, | Performed by: RADIOLOGY

## 2022-04-05 PROCEDURE — 1159F PR MEDICATION LIST DOCUMENTED IN MEDICAL RECORD: ICD-10-PCS | Mod: CPTII,,, | Performed by: ORTHOPAEDIC SURGERY

## 2022-04-05 PROCEDURE — 99204 OFFICE O/P NEW MOD 45 MIN: CPT | Mod: 25,S$PBB,, | Performed by: ORTHOPAEDIC SURGERY

## 2022-04-05 PROCEDURE — 73562 X-RAY EXAM OF KNEE 3: CPT | Mod: TC,FY,LT

## 2022-04-05 PROCEDURE — 3080F DIAST BP >= 90 MM HG: CPT | Mod: CPTII,,, | Performed by: ORTHOPAEDIC SURGERY

## 2022-04-05 PROCEDURE — 3008F BODY MASS INDEX DOCD: CPT | Mod: CPTII,,, | Performed by: ORTHOPAEDIC SURGERY

## 2022-04-05 PROCEDURE — 3008F PR BODY MASS INDEX (BMI) DOCUMENTED: ICD-10-PCS | Mod: CPTII,,, | Performed by: ORTHOPAEDIC SURGERY

## 2022-04-05 PROCEDURE — 99215 OFFICE O/P EST HI 40 MIN: CPT | Mod: PBBFAC,PN,25 | Performed by: ORTHOPAEDIC SURGERY

## 2022-04-05 PROCEDURE — 20610 LARGE JOINT ASPIRATION/INJECTION: R KNEE: ICD-10-PCS | Mod: S$PBB,RT,, | Performed by: ORTHOPAEDIC SURGERY

## 2022-04-05 PROCEDURE — 99204 PR OFFICE/OUTPT VISIT, NEW, LEVL IV, 45-59 MIN: ICD-10-PCS | Mod: 25,S$PBB,, | Performed by: ORTHOPAEDIC SURGERY

## 2022-04-05 PROCEDURE — 99999 PR PBB SHADOW E&M-EST. PATIENT-LVL V: CPT | Mod: PBBFAC,,, | Performed by: ORTHOPAEDIC SURGERY

## 2022-04-05 RX ORDER — BUPIVACAINE HYDROCHLORIDE 5 MG/ML
5 INJECTION, SOLUTION PERINEURAL
Status: DISCONTINUED | OUTPATIENT
Start: 2022-04-05 | End: 2022-04-05 | Stop reason: HOSPADM

## 2022-04-05 RX ORDER — OLOPATADINE HYDROCHLORIDE 1 MG/ML
1 SOLUTION/ DROPS OPHTHALMIC 2 TIMES DAILY
COMMUNITY
Start: 2021-11-12

## 2022-04-05 RX ORDER — KETOROLAC TROMETHAMINE 30 MG/ML
30 INJECTION, SOLUTION INTRAMUSCULAR; INTRAVENOUS
Status: DISCONTINUED | OUTPATIENT
Start: 2022-04-05 | End: 2022-04-05 | Stop reason: HOSPADM

## 2022-04-05 RX ORDER — DICLOFENAC SODIUM 75 MG/1
75 TABLET, DELAYED RELEASE ORAL 2 TIMES DAILY
Qty: 28 TABLET | Refills: 0 | Status: SHIPPED | OUTPATIENT
Start: 2022-04-05 | End: 2022-05-26 | Stop reason: SDUPTHER

## 2022-04-05 RX ORDER — LIDOCAINE HYDROCHLORIDE 10 MG/ML
4 INJECTION INFILTRATION; PERINEURAL
Status: DISCONTINUED | OUTPATIENT
Start: 2022-04-05 | End: 2022-04-05 | Stop reason: HOSPADM

## 2022-04-05 RX ADMIN — KETOROLAC TROMETHAMINE 30 MG: 30 INJECTION, SOLUTION INTRAMUSCULAR; INTRAVENOUS at 01:04

## 2022-04-05 RX ADMIN — LIDOCAINE HYDROCHLORIDE 4 ML: 10 INJECTION, SOLUTION INFILTRATION; PERINEURAL at 01:04

## 2022-04-05 RX ADMIN — BUPIVACAINE HYDROCHLORIDE 5 ML: 5 INJECTION, SOLUTION PERINEURAL at 01:04

## 2022-04-05 NOTE — PROGRESS NOTES
Subjective:      Patient ID: Emily Martinez is a 57 y.o. female.    Chief Complaint: Pain of the Right Knee      Patient ID: Emily Martinez is a 57 y.o. female.    Chief Complaint: Pain of the Right Knee      KNEE PAIN: Complains of pain to the rightknee.   PAIN LOCATED: lateral and posterior  ONSET: 3 years ago.  QUALITY:  Patient states the pain is worsening  HISTORY: Previous knee injury/surgery: no  Hx:   ASSOCIATED SYMPTOM AND TRIGGERS:Standing/Weightbearing, walking, trouble w stairs, stiffness, swelling, limping, stiffness w sitting  and Knee gives way  USES ASSISTIVE DEVICE: none  RELIEVED BY:rest, heat, ice, medication: Aleve, Tylenol used but not effective  PATIENT DENIES: bruising, redness, deformity         Social History     Occupational History    Not on file   Tobacco Use    Smoking status: Current Every Day Smoker     Packs/day: 1.50     Years: 35.00     Pack years: 52.50     Types: Cigarettes    Smokeless tobacco: Never Used   Substance and Sexual Activity    Alcohol use: Not Currently    Drug use: Not Currently    Sexual activity: Not on file      Review of Systems   Constitutional: Negative for diaphoresis.   HENT: Negative for ear discharge, nosebleeds and stridor.    Eyes: Negative for photophobia.   Cardiovascular: Negative for syncope.   Respiratory: Negative for hemoptysis, shortness of breath and wheezing.    Neurological: Negative for tremors.   Psychiatric/Behavioral: Negative.          Objective:    General    Constitutional: She is oriented to person, place, and time. She appears well-developed.   HENT:   Head: Normocephalic and atraumatic.   Right Ear: External ear normal.   Left Ear: External ear normal.   Eyes: EOM are normal.   Cardiovascular: Intact distal pulses.    Neurological: She is alert and oriented to person, place, and time.   Psychiatric: She has a normal mood and affect. Her behavior is normal. Judgment and thought content normal.     General Musculoskeletal  Exam   Gait: antalgic and abnormal       Right Knee Exam     Inspection   Swelling: present  Effusion: present    Tenderness   The patient is tender to palpation of the medial joint line.    Crepitus   The patient has crepitus of the patella.    Range of Motion   Extension: 0   Flexion:  120 abnormal     Tests   Meniscus   Dru:  Medial - negative Lateral - negative  Ligament Examination   MCL - Valgus: normal (0 to 2mm)  LCL - Varus: normal    Other   Sensation: normal    Left Knee Exam     Range of Motion   Extension: 0   Flexion: 120     Muscle Strength   Right Lower Extremity   Quadriceps:  4/5   Hamstrin/5          Assessment:       1. Primary osteoarthritis of right knee    2. Knee pain, unspecified chronicity, unspecified laterality    3. Shoulder pain, unspecified chronicity, unspecified laterality          Plan:       we injected the right knee w 1cc of ketorolac, marcaine and lidocaine under sterile conditions with the patient's informed consent for moderate knee oa. we will try a course of PT before ordering an MRI. Patient should f/u with me after approx 1 mo of PT to consider an MRI at that point for possible meniscal pathology

## 2022-04-05 NOTE — PROCEDURES
Large Joint Aspiration/Injection: R knee    Date/Time: 4/5/2022 1:00 PM  Performed by: Anmol Muller MD  Authorized by: Anmol Muller MD     Consent Done?:  Yes (Verbal)  Indications:  Arthritis  Site marked: the procedure site was marked    Timeout: prior to procedure the correct patient, procedure, and site was verified    Prep: patient was prepped and draped in usual sterile fashion      Local anesthesia used?: Yes    Local anesthetic:  Topical anesthetic and lidocaine 1% without epinephrine    Details:  Needle Size:  22 G  Ultrasonic Guidance for needle placement?: No    Approach:  Anteromedial  Location:  Knee  Site:  R knee  Medications:  4 mL LIDOcaine HCL 10 mg/ml (1%) 10 mg/mL (1 %); 5 mL BUPivacaine 0.5 % (5 mg/mL); 30 mg ketorolac 30 mg/mL (1 mL)  Patient tolerance:  Patient tolerated the procedure well with no immediate complications

## 2022-05-04 PROBLEM — M25.561 RIGHT KNEE PAIN: Chronic | Status: ACTIVE | Noted: 2022-04-05

## 2022-05-04 PROBLEM — R26.9 GAIT DIFFICULTY: Status: ACTIVE | Noted: 2022-05-04

## 2022-05-04 PROBLEM — M62.81 MUSCLE WEAKNESS OF LOWER EXTREMITY: Status: ACTIVE | Noted: 2020-01-09

## 2024-12-30 ENCOUNTER — HOSPITAL ENCOUNTER (INPATIENT)
Facility: HOSPITAL | Age: 59
LOS: 19 days | Discharge: NURSING, REHAB OR LONG-TERM CARE | DRG: 377 | End: 2025-01-18
Attending: EMERGENCY MEDICINE | Admitting: FAMILY MEDICINE
Payer: MEDICAID

## 2024-12-30 DIAGNOSIS — A04.72 C. DIFFICILE COLITIS: ICD-10-CM

## 2024-12-30 DIAGNOSIS — T14.8XXA MULTIPLE WOUNDS OF SKIN: ICD-10-CM

## 2024-12-30 DIAGNOSIS — K57.92 DIVERTICULITIS: ICD-10-CM

## 2024-12-30 DIAGNOSIS — I16.0 HYPERTENSIVE URGENCY: ICD-10-CM

## 2024-12-30 DIAGNOSIS — E86.0 DEHYDRATION: Primary | ICD-10-CM

## 2024-12-30 DIAGNOSIS — E87.5 HYPERKALEMIA: ICD-10-CM

## 2024-12-30 DIAGNOSIS — Z79.899 MEDICATION MANAGEMENT: ICD-10-CM

## 2024-12-30 DIAGNOSIS — R82.90 ABNORMAL URINALYSIS: ICD-10-CM

## 2024-12-30 DIAGNOSIS — R53.81 DEBILITY: ICD-10-CM

## 2024-12-30 DIAGNOSIS — R53.1 LEFT-SIDED WEAKNESS: ICD-10-CM

## 2024-12-30 DIAGNOSIS — Z71.89 ADVANCE CARE PLANNING: ICD-10-CM

## 2024-12-30 DIAGNOSIS — E11.9 TYPE 2 DIABETES MELLITUS WITHOUT COMPLICATION, UNSPECIFIED WHETHER LONG TERM INSULIN USE: ICD-10-CM

## 2024-12-30 DIAGNOSIS — Z51.5 PALLIATIVE CARE ENCOUNTER: ICD-10-CM

## 2024-12-30 DIAGNOSIS — I63.9 CEREBROVASCULAR ACCIDENT (CVA), UNSPECIFIED MECHANISM: ICD-10-CM

## 2024-12-30 DIAGNOSIS — G47.00 INSOMNIA, UNSPECIFIED TYPE: ICD-10-CM

## 2024-12-30 DIAGNOSIS — R07.9 CHEST PAIN: ICD-10-CM

## 2024-12-30 PROBLEM — I69.30 HISTORY OF STROKE WITH RESIDUAL EFFECTS: Status: ACTIVE | Noted: 2024-12-30

## 2024-12-30 PROBLEM — N39.0 UTI (URINARY TRACT INFECTION): Status: ACTIVE | Noted: 2024-12-30

## 2024-12-30 PROBLEM — R13.10 DYSPHAGIA: Status: ACTIVE | Noted: 2024-12-30

## 2024-12-30 LAB
ACANTHOCYTES BLD QL SMEAR: PRESENT
ALBUMIN SERPL BCP-MCNC: 1.7 G/DL (ref 3.5–5.2)
ALP SERPL-CCNC: 127 U/L (ref 40–150)
ALT SERPL W/O P-5'-P-CCNC: 17 U/L (ref 10–44)
ANION GAP SERPL CALC-SCNC: 12 MMOL/L (ref 8–16)
ANISOCYTOSIS BLD QL SMEAR: SLIGHT
APTT PPP: 28.5 SEC (ref 21–32)
AST SERPL-CCNC: 22 U/L (ref 10–40)
BACTERIA #/AREA URNS AUTO: ABNORMAL /HPF
BASOPHILS # BLD AUTO: 0.02 K/UL (ref 0–0.2)
BASOPHILS NFR BLD: 0.2 % (ref 0–1.9)
BILIRUB SERPL-MCNC: 0.3 MG/DL (ref 0.1–1)
BILIRUB UR QL STRIP: NEGATIVE
BUN SERPL-MCNC: 24 MG/DL (ref 8–23)
BURR CELLS BLD QL SMEAR: ABNORMAL
CALCIUM SERPL-MCNC: 7.7 MG/DL (ref 8.7–10.5)
CHLORIDE SERPL-SCNC: 111 MMOL/L (ref 95–110)
CLARITY UR REFRACT.AUTO: ABNORMAL
CO2 SERPL-SCNC: 24 MMOL/L (ref 23–29)
COLOR UR AUTO: YELLOW
CREAT SERPL-MCNC: 1 MG/DL (ref 0.5–1.4)
CRP SERPL-MCNC: 59.4 MG/L (ref 0–8.2)
DACRYOCYTES BLD QL SMEAR: ABNORMAL
DIFFERENTIAL METHOD BLD: ABNORMAL
DOHLE BOD BLD QL SMEAR: PRESENT
EOSINOPHIL # BLD AUTO: 0 K/UL (ref 0–0.5)
EOSINOPHIL NFR BLD: 0.1 % (ref 0–8)
ERYTHROCYTE [DISTWIDTH] IN BLOOD BY AUTOMATED COUNT: 27.9 % (ref 11.5–14.5)
EST. GFR  (NO RACE VARIABLE): >60 ML/MIN/1.73 M^2
GIANT PLATELETS BLD QL SMEAR: PRESENT
GLUCOSE SERPL-MCNC: 120 MG/DL (ref 70–110)
GLUCOSE UR QL STRIP: NEGATIVE
HCT VFR BLD AUTO: 34.1 % (ref 37–48.5)
HCV AB SERPL QL IA: NORMAL
HGB BLD-MCNC: 12.1 G/DL (ref 12–16)
HGB UR QL STRIP: ABNORMAL
HIV 1+2 AB+HIV1 P24 AG SERPL QL IA: NORMAL
HYPOCHROMIA BLD QL SMEAR: ABNORMAL
IMM GRANULOCYTES # BLD AUTO: 0.06 K/UL (ref 0–0.04)
IMM GRANULOCYTES NFR BLD AUTO: 0.6 % (ref 0–0.5)
INR PPP: 1.1 (ref 0.8–1.2)
KETONES UR QL STRIP: ABNORMAL
LACTATE SERPL-SCNC: 2 MMOL/L (ref 0.5–2.2)
LACTATE SERPL-SCNC: 2.1 MMOL/L (ref 0.5–2.2)
LEUKOCYTE ESTERASE UR QL STRIP: ABNORMAL
LIPASE SERPL-CCNC: 4 U/L (ref 4–60)
LYMPHOCYTES # BLD AUTO: 1.7 K/UL (ref 1–4.8)
LYMPHOCYTES NFR BLD: 17.5 % (ref 18–48)
MCH RBC QN AUTO: 26.1 PG (ref 27–31)
MCHC RBC AUTO-ENTMCNC: 35.5 G/DL (ref 32–36)
MCV RBC AUTO: 74 FL (ref 82–98)
MICROSCOPIC COMMENT: ABNORMAL
MONOCYTES # BLD AUTO: 0.7 K/UL (ref 0.3–1)
MONOCYTES NFR BLD: 7.1 % (ref 4–15)
NEUTROPHILS # BLD AUTO: 7.3 K/UL (ref 1.8–7.7)
NEUTROPHILS NFR BLD: 74.5 % (ref 38–73)
NITRITE UR QL STRIP: NEGATIVE
NRBC BLD-RTO: 0 /100 WBC
OVALOCYTES BLD QL SMEAR: ABNORMAL
PH UR STRIP: 6 [PH] (ref 5–8)
PLATELET # BLD AUTO: 436 K/UL (ref 150–450)
PLATELET BLD QL SMEAR: ABNORMAL
PMV BLD AUTO: 8.6 FL (ref 9.2–12.9)
POCT GLUCOSE: 90 MG/DL (ref 70–110)
POIKILOCYTOSIS BLD QL SMEAR: SLIGHT
POLYCHROMASIA BLD QL SMEAR: ABNORMAL
POTASSIUM SERPL-SCNC: 3.7 MMOL/L (ref 3.5–5.1)
PROT SERPL-MCNC: 5.6 G/DL (ref 6–8.4)
PROT UR QL STRIP: ABNORMAL
PROTHROMBIN TIME: 12.1 SEC (ref 9–12.5)
RBC # BLD AUTO: 4.63 M/UL (ref 4–5.4)
RBC #/AREA URNS AUTO: 0 /HPF (ref 0–4)
SCHISTOCYTES BLD QL SMEAR: ABNORMAL
SCHISTOCYTES BLD QL SMEAR: PRESENT
SMUDGE CELLS BLD QL SMEAR: PRESENT
SODIUM SERPL-SCNC: 147 MMOL/L (ref 136–145)
SP GR UR STRIP: 1.01 (ref 1–1.03)
SPHEROCYTES BLD QL SMEAR: ABNORMAL
TARGETS BLD QL SMEAR: ABNORMAL
TOXIC GRANULES BLD QL SMEAR: PRESENT
URN SPEC COLLECT METH UR: ABNORMAL
WBC # BLD AUTO: 9.78 K/UL (ref 3.9–12.7)
WBC #/AREA URNS AUTO: 38 /HPF (ref 0–5)
WBC TOXIC VACUOLES BLD QL SMEAR: PRESENT

## 2024-12-30 PROCEDURE — G0378 HOSPITAL OBSERVATION PER HR: HCPCS

## 2024-12-30 PROCEDURE — 87088 URINE BACTERIA CULTURE: CPT | Performed by: EMERGENCY MEDICINE

## 2024-12-30 PROCEDURE — 25000003 PHARM REV CODE 250: Performed by: FAMILY MEDICINE

## 2024-12-30 PROCEDURE — 63600175 PHARM REV CODE 636 W HCPCS: Performed by: FAMILY MEDICINE

## 2024-12-30 PROCEDURE — 96372 THER/PROPH/DIAG INJ SC/IM: CPT | Performed by: FAMILY MEDICINE

## 2024-12-30 PROCEDURE — 83036 HEMOGLOBIN GLYCOSYLATED A1C: CPT | Performed by: EMERGENCY MEDICINE

## 2024-12-30 PROCEDURE — 99223 1ST HOSP IP/OBS HIGH 75: CPT | Mod: ,,, | Performed by: COLON & RECTAL SURGERY

## 2024-12-30 PROCEDURE — 25500020 PHARM REV CODE 255: Performed by: EMERGENCY MEDICINE

## 2024-12-30 PROCEDURE — 83690 ASSAY OF LIPASE: CPT | Performed by: EMERGENCY MEDICINE

## 2024-12-30 PROCEDURE — 85610 PROTHROMBIN TIME: CPT | Performed by: FAMILY MEDICINE

## 2024-12-30 PROCEDURE — 86140 C-REACTIVE PROTEIN: CPT | Performed by: EMERGENCY MEDICINE

## 2024-12-30 PROCEDURE — 83605 ASSAY OF LACTIC ACID: CPT | Mod: 91 | Performed by: FAMILY MEDICINE

## 2024-12-30 PROCEDURE — 87086 URINE CULTURE/COLONY COUNT: CPT | Performed by: EMERGENCY MEDICINE

## 2024-12-30 PROCEDURE — 86803 HEPATITIS C AB TEST: CPT | Performed by: PHYSICIAN ASSISTANT

## 2024-12-30 PROCEDURE — 99285 EMERGENCY DEPT VISIT HI MDM: CPT | Mod: 25

## 2024-12-30 PROCEDURE — 87186 SC STD MICRODIL/AGAR DIL: CPT | Performed by: EMERGENCY MEDICINE

## 2024-12-30 PROCEDURE — 80053 COMPREHEN METABOLIC PANEL: CPT | Performed by: EMERGENCY MEDICINE

## 2024-12-30 PROCEDURE — 96361 HYDRATE IV INFUSION ADD-ON: CPT

## 2024-12-30 PROCEDURE — 87389 HIV-1 AG W/HIV-1&-2 AB AG IA: CPT | Performed by: PHYSICIAN ASSISTANT

## 2024-12-30 PROCEDURE — 85025 COMPLETE CBC W/AUTO DIFF WBC: CPT | Performed by: EMERGENCY MEDICINE

## 2024-12-30 PROCEDURE — 85730 THROMBOPLASTIN TIME PARTIAL: CPT | Performed by: FAMILY MEDICINE

## 2024-12-30 PROCEDURE — 11000001 HC ACUTE MED/SURG PRIVATE ROOM

## 2024-12-30 PROCEDURE — 87040 BLOOD CULTURE FOR BACTERIA: CPT | Performed by: EMERGENCY MEDICINE

## 2024-12-30 PROCEDURE — 83605 ASSAY OF LACTIC ACID: CPT | Performed by: EMERGENCY MEDICINE

## 2024-12-30 PROCEDURE — 81001 URINALYSIS AUTO W/SCOPE: CPT | Performed by: EMERGENCY MEDICINE

## 2024-12-30 PROCEDURE — 63600175 PHARM REV CODE 636 W HCPCS: Performed by: EMERGENCY MEDICINE

## 2024-12-30 PROCEDURE — 96374 THER/PROPH/DIAG INJ IV PUSH: CPT

## 2024-12-30 RX ORDER — ATORVASTATIN CALCIUM 40 MG/1
80 TABLET, FILM COATED ORAL DAILY
Status: DISCONTINUED | OUTPATIENT
Start: 2024-12-31 | End: 2024-12-31

## 2024-12-30 RX ORDER — SODIUM CHLORIDE 0.9 % (FLUSH) 0.9 %
10 SYRINGE (ML) INJECTION
Status: DISCONTINUED | OUTPATIENT
Start: 2024-12-30 | End: 2025-01-18 | Stop reason: HOSPADM

## 2024-12-30 RX ORDER — MORPHINE SULFATE 4 MG/ML
4 INJECTION, SOLUTION INTRAMUSCULAR; INTRAVENOUS EVERY 4 HOURS PRN
Status: DISCONTINUED | OUTPATIENT
Start: 2024-12-30 | End: 2025-01-02

## 2024-12-30 RX ORDER — NALOXONE HCL 0.4 MG/ML
0.02 VIAL (ML) INJECTION
Status: DISCONTINUED | OUTPATIENT
Start: 2024-12-30 | End: 2025-01-18 | Stop reason: HOSPADM

## 2024-12-30 RX ORDER — INSULIN ASPART 100 [IU]/ML
0-10 INJECTION, SOLUTION INTRAVENOUS; SUBCUTANEOUS
Status: DISCONTINUED | OUTPATIENT
Start: 2024-12-30 | End: 2025-01-18 | Stop reason: HOSPADM

## 2024-12-30 RX ORDER — NAPROXEN SODIUM 220 MG/1
81 TABLET, FILM COATED ORAL DAILY
Status: DISCONTINUED | OUTPATIENT
Start: 2024-12-31 | End: 2025-01-01

## 2024-12-30 RX ORDER — MEROPENEM 1 G/1
1 INJECTION, POWDER, FOR SOLUTION INTRAVENOUS
Status: COMPLETED | OUTPATIENT
Start: 2024-12-30 | End: 2024-12-30

## 2024-12-30 RX ORDER — SODIUM CHLORIDE 9 MG/ML
INJECTION, SOLUTION INTRAVENOUS CONTINUOUS
Status: DISCONTINUED | OUTPATIENT
Start: 2024-12-30 | End: 2024-12-31

## 2024-12-30 RX ORDER — ONDANSETRON HYDROCHLORIDE 2 MG/ML
4 INJECTION, SOLUTION INTRAVENOUS EVERY 8 HOURS PRN
Status: DISCONTINUED | OUTPATIENT
Start: 2024-12-30 | End: 2025-01-08

## 2024-12-30 RX ORDER — TRAZODONE HYDROCHLORIDE 50 MG/1
50 TABLET ORAL NIGHTLY
Status: DISCONTINUED | OUTPATIENT
Start: 2024-12-30 | End: 2025-01-05

## 2024-12-30 RX ORDER — MEROPENEM 1 G/1
1 INJECTION, POWDER, FOR SOLUTION INTRAVENOUS
Status: DISCONTINUED | OUTPATIENT
Start: 2024-12-30 | End: 2025-01-02

## 2024-12-30 RX ORDER — ALBUTEROL SULFATE 90 UG/1
2 INHALANT RESPIRATORY (INHALATION) EVERY 6 HOURS PRN
Status: DISCONTINUED | OUTPATIENT
Start: 2024-12-30 | End: 2025-01-18 | Stop reason: HOSPADM

## 2024-12-30 RX ORDER — INSULIN GLARGINE 100 [IU]/ML
5 INJECTION, SOLUTION SUBCUTANEOUS DAILY
Status: DISCONTINUED | OUTPATIENT
Start: 2024-12-31 | End: 2025-01-13

## 2024-12-30 RX ORDER — GABAPENTIN 250 MG/5ML
250 SOLUTION ORAL EVERY 8 HOURS
Status: DISCONTINUED | OUTPATIENT
Start: 2024-12-30 | End: 2025-01-11

## 2024-12-30 RX ORDER — IBUPROFEN 200 MG
24 TABLET ORAL
Status: DISCONTINUED | OUTPATIENT
Start: 2024-12-30 | End: 2025-01-03

## 2024-12-30 RX ORDER — SODIUM CHLORIDE 0.9 % (FLUSH) 0.9 %
10 SYRINGE (ML) INJECTION EVERY 12 HOURS PRN
Status: DISCONTINUED | OUTPATIENT
Start: 2024-12-30 | End: 2025-01-18 | Stop reason: HOSPADM

## 2024-12-30 RX ORDER — GLUCAGON 1 MG
1 KIT INJECTION
Status: DISCONTINUED | OUTPATIENT
Start: 2024-12-30 | End: 2025-01-18 | Stop reason: HOSPADM

## 2024-12-30 RX ORDER — HEPARIN SODIUM 5000 [USP'U]/ML
5000 INJECTION, SOLUTION INTRAVENOUS; SUBCUTANEOUS EVERY 8 HOURS
Status: DISCONTINUED | OUTPATIENT
Start: 2024-12-30 | End: 2025-01-09

## 2024-12-30 RX ORDER — ACETAMINOPHEN 650 MG/20.3ML
650 LIQUID ORAL EVERY 4 HOURS PRN
Status: DISCONTINUED | OUTPATIENT
Start: 2024-12-30 | End: 2025-01-05

## 2024-12-30 RX ORDER — IBUPROFEN 200 MG
16 TABLET ORAL
Status: DISCONTINUED | OUTPATIENT
Start: 2024-12-30 | End: 2025-01-03

## 2024-12-30 RX ORDER — TALC
6 POWDER (GRAM) TOPICAL NIGHTLY PRN
Status: DISCONTINUED | OUTPATIENT
Start: 2024-12-30 | End: 2025-01-02

## 2024-12-30 RX ADMIN — MEROPENEM 1 G: 1 INJECTION, POWDER, FOR SOLUTION INTRAVENOUS at 05:12

## 2024-12-30 RX ADMIN — SODIUM CHLORIDE: 9 INJECTION, SOLUTION INTRAVENOUS at 10:12

## 2024-12-30 RX ADMIN — HEPARIN SODIUM 5000 UNITS: 5000 INJECTION INTRAVENOUS; SUBCUTANEOUS at 10:12

## 2024-12-30 RX ADMIN — SODIUM CHLORIDE, POTASSIUM CHLORIDE, SODIUM LACTATE AND CALCIUM CHLORIDE 1000 ML: 600; 310; 30; 20 INJECTION, SOLUTION INTRAVENOUS at 02:12

## 2024-12-30 RX ADMIN — MEROPENEM 1 G: 1 INJECTION INTRAVENOUS at 08:12

## 2024-12-30 RX ADMIN — TRAZODONE HYDROCHLORIDE 50 MG: 50 TABLET ORAL at 08:12

## 2024-12-30 RX ADMIN — SODIUM CHLORIDE, POTASSIUM CHLORIDE, SODIUM LACTATE AND CALCIUM CHLORIDE 1000 ML: 600; 310; 30; 20 INJECTION, SOLUTION INTRAVENOUS at 06:12

## 2024-12-30 RX ADMIN — IOHEXOL 100 ML: 350 INJECTION, SOLUTION INTRAVENOUS at 04:12

## 2024-12-30 RX ADMIN — GABAPENTIN 250 MG: 250 SOLUTION ORAL at 10:12

## 2024-12-30 NOTE — ED PROVIDER NOTES
Encounter Date: 2024       History     Chief Complaint   Patient presents with    Diarrhea     Loose stools x1 week     59-year-old female, history of a stroke with left hemiplegia, bed-bound, diabetes, hypertension, recurrent infections, multiple recent admissions in the last several months in [place] [facility] [place], most recently last week for UTI, discharged with Keflex, living at home with her daughter, now brought in for diarrhea.  Daughter states the patient has had diarrhea for about a week and a half.  She is having about 4-5 episodes a day, watery, nonbloody.  She has intermittently also vomiting.  Patient has a PEG tube for supplemental nutrition but apparently can also take p.o. nutrition but daughter says she has had minimal p.o. intake because she has said she is not hungry.  No fevers noted.  Daughter is frustrated as she feels like when patient gets admitted, she is temporarily better and then very quickly becomes ill again.  [place] of [place] has been [place] [facility] [place] but they decided to come to [facility] today as they were hopeful that we would be able to get her better.      Of note, urine culture from 6 days ago is growing out ESBL E coli, greater than 100,000 colonies    The history is provided by the patient and a relative.     Review of patient's allergies indicates:   Allergen Reactions    Sulfur      Past Medical History:   Diagnosis Date    Diabetes mellitus type I     Hyperlipidemia     Hypertension     Right sided weakness 2019     Past Surgical History:   Procedure Laterality Date     SECTION       Family History   Problem Relation Name Age of Onset    Hypertension Mother      Stroke Mother      Hyperlipidemia Mother      Diabetes Mother      Hypertension Sister      Cancer Sister      Hyperlipidemia Sister      Diabetes Sister      Hypertension Brother      Hyperlipidemia Brother      Diabetes Brother      Heart disease Maternal Aunt      Diabetes Maternal Aunt       Social  History     Tobacco Use    Smoking status: Every Day     Current packs/day: 1.50     Average packs/day: 1.5 packs/day for 35.0 years (52.5 ttl pk-yrs)     Types: Cigarettes    Smokeless tobacco: Never   Substance Use Topics    Alcohol use: Not Currently    Drug use: Not Currently     Review of Systems    Physical Exam     Initial Vitals [12/30/24 1212]   BP Pulse Resp Temp SpO2   120/70 83 20 97.7 °F (36.5 °C) 98 %      MAP       --         Physical Exam    Nursing note and vitals reviewed.  Constitutional: She appears cachectic. She is cooperative. She appears ill.   HENT:   MM dry   Eyes: Conjunctivae are normal.   Neck: Neck supple.   Cardiovascular:  Normal rate.           Pulmonary/Chest: No respiratory distress.   Abdominal: She exhibits no distension. There is no abdominal tenderness.   Peg tube There is no rebound and no guarding.   Musculoskeletal:         General: Tenderness present.      Cervical back: Neck supple.     Neurological: She is alert.   Left hemiplegia, left UE and LE contracted/stiff  Alert and can answer simple questions appropriately   Skin:   Multiple wounds, see nurses documentation   Psychiatric: She has a normal mood and affect.         ED Course   Procedures  Labs Reviewed   CBC W/ AUTO DIFFERENTIAL - Abnormal       Result Value    WBC 9.78      RBC 4.63      Hemoglobin 12.1      Hematocrit 34.1 (*)     MCV 74 (*)     MCH 26.1 (*)     MCHC 35.5      RDW 27.9 (*)     Platelets 436      MPV 8.6 (*)     Immature Granulocytes 0.6 (*)     Gran # (ANC) 7.3      Immature Grans (Abs) 0.06 (*)     Lymph # 1.7      Mono # 0.7      Eos # 0.0      Baso # 0.02      nRBC 0      Gran % 74.5 (*)     Lymph % 17.5 (*)     Mono % 7.1      Eosinophil % 0.1      Basophil % 0.2      Platelet Estimate Appears normal      Aniso Slight      Poik Slight      Poly Occasional      Hypo Occasional      Ovalocytes Occasional      Target Cells Occasional      Tear Drop Cells Occasional      [place] Cells Occasional       Spherocytes Occasional      Acanthocytes Present      Schistocytes Present      Dohle Bodies Present      Toxic Granulation Present      Smudge Cells Present      Large/Giant Platelets Present      Fragmented Cells Occasional      Vacuolated Granulocytes Present      Differential Method Automated      Narrative:     Release to patient->Immediate   COMPREHENSIVE METABOLIC PANEL - Abnormal    Sodium 147 (*)     Potassium 3.7      Chloride 111 (*)     CO2 24      Glucose 120 (*)     BUN 24 (*)     Creatinine 1.0      Calcium 7.7 (*)     Total Protein 5.6 (*)     Albumin 1.7 (*)     Total Bilirubin 0.3      Alkaline Phosphatase 127      AST 22      ALT 17      eGFR >60.0      Anion Gap 12      Narrative:     Release to patient->Immediate   URINALYSIS, REFLEX TO URINE CULTURE - Abnormal    Specimen UA Urine, Clean Catch      Color, UA Yellow      Appearance, UA Hazy (*)     pH, UA 6.0      Specific Gravity, UA 1.015      Protein, UA Trace (*)     Glucose, UA Negative      Ketones, UA 1+ (*)     Bilirubin (UA) Negative      Occult Blood UA Trace (*)     Nitrite, UA Negative      Leukocytes, UA 3+ (*)     Narrative:     Specimen Source->Urine   URINALYSIS MICROSCOPIC - Abnormal    RBC, UA 0      WBC, UA 38 (*)     Bacteria Many (*)     Microscopic Comment SEE COMMENT      Narrative:     Specimen Source->Urine   CLOSTRIDIUM DIFFICILE   CULTURE, URINE   HEPATITIS C ANTIBODY    Hepatitis C Ab Non-reactive      Narrative:     Release to patient->Immediate   HIV 1 / 2 ANTIBODY    HIV 1/2 Ag/Ab Non-reactive      Narrative:     Release to patient->Immediate   LIPASE    Lipase 4      Narrative:     Release to patient->Immediate   LACTIC ACID, PLASMA    Lactate (Lactic Acid) 2.1     LACTIC ACID, PLASMA    Lactate (Lactic Acid) 2.0     PROTIME-INR    Prothrombin Time 12.1      INR 1.1     APTT    aPTT 28.5     POCT GLUCOSE, HAND-HELD DEVICE   POCT GLUCOSE    POCT Glucose 90     ISTAT CHEM8          Imaging Results              CT  Head Without Contrast (Final result)  Result time 12/30/24 17:01:07      Final result by Sebastian Whitfield MD (12/30/24 17:01:07)                   Impression:      1. No acute intracranial process.  See above comments.  2. Prominent involutional changes and chronic microvascular ischemic changes in the periventricular white matter.  Small areas of probable remote lacunar infarction in the bilateral basal ganglia and small probable chronic right parietal cortical infarct.  MRI follow-up may better characterize if there is high clinical suspicion.  See above comments.      Electronically signed by: Sebastian Whitfield  Date:    12/30/2024  Time:    17:01               Narrative:    EXAMINATION:  CT HEAD WITHOUT CONTRAST    CLINICAL HISTORY:  Mental status change, unknown cause;    TECHNIQUE:  Low dose axial CT images obtained throughout the head without intravenous contrast. Sagittal and coronal reconstructions were performed.    COMPARISON:  06/27/2019    FINDINGS:  Patient is rotated to the right obscuring visualization.    Small probable remote areas of lacunar infarction following the basal ganglia bilaterally.    Prominent probable chronic microvascular ischemic changes in the periventricular and subcortical white matter.    No evidence of acute calvarial fracture.  Small probable chronic right parietal cortical infarction.                                        CT Abdomen Pelvis With IV Contrast NO Oral Contrast (Final result)  Result time 12/30/24 18:56:09      Final result by Celio De Leon MD (12/30/24 18:56:09)                   Impression:      1. Findings concerning for acute diverticulitis of the mid sigmoid colon with adjacent suspected contained perforation.  No associated rim enhancement to suggest drainable abscess at this time.  No bowel obstruction.  Consider follow-up with imaging or endoscopy after therapy according to colorectal screening guidelines.  2. Apparent circumferential wall thickening of  the distal rectum/anus which could be related to underdistention versus nonspecific proctitis.  No significant perirectal inflammatory change or evidence perirectal or perianal drainable abscess.  Mild presacral edema, nonspecific.  3. Left more than right basilar patchy nonspecific pulmonary mosaic attenuation with bronchial wall thickening which can be seen with small vessels disease including pulmonary edema or small airways process.  Additionally, there is left more than right basilar patchy clusters of small nodules suggesting infectious or inflammatory small airways process versus aspiration.  No large consolidation.  Attention on follow-up.  4. Suspected hepatic steatosis.  5. Small hiatal hernia.  Peg tube.  Other incidental  6. Other incidental/nonemergent findings in the body of the report.  This report was flagged in Epic as abnormal.      Electronically signed by: Celio De Leon MD  Date:    12/30/2024  Time:    18:56               Narrative:    EXAMINATION:  CT ABDOMEN PELVIS WITH IV CONTRAST    CLINICAL HISTORY:  Abdominal pain, acute, nonlocalized;    TECHNIQUE:  Low dose axial images, sagittal and coronal reformations were obtained from the lung bases to the pubic symphysis following the IV administration of 100 mL of Omnipaque 350 .  Oral contrast was not given.    COMPARISON:  Chest radiograph 06/27/2019, report only for outside facility CT abdomen and pelvis 10/28/2024 and renal ultrasound 06/12/2024    FINDINGS:  Beam hardening with streak artifact from overlying monitoring leads and adjacent bilateral upper extremities with respiratory motion somewhat limits evaluation.    Lingular platelike scarring versus atelectasis.  Mild dependent atelectasis.  Patchy nonspecific pulmonary mosaic attenuation with bronchial wall thickening seen in the bilateral lower lobes few scattered clusters of small nodules also seen within the lower lobes, more so on the left no consolidation or pleural effusion.  Heart  is normal in size without significant pericardial fluid.    Small hiatal hernia.  Peg tube balloon within the anterior aspect of the mid gastric body.  Stomach is mildly distended with mostly ingested fluid contents without wall thickening or adjacent inflammatory change.  Duodenum is within normal limits.    Portal vasculature is patent.  Liver is normal in size with diffuse parenchymal hypoattenuation suggesting fatty infiltration, without discrete mass seen.    Pancreas diffusely atrophic.  No pancreatic mass or adjacent inflammatory change.  Gallbladder, spleen are within normal limits.  Non masslike thickening of the bilateral adrenal glands.  No significant biliary dilatation.    Bilateral kidneys are normal in size, shape and location with symmetric normal enhancement.  No hydronephrosis or significant perinephric stranding.  Ureters are normal in course and caliber.  Urinary bladder is well distended without wall thickening or adjacent inflammatory change noting small volume non dependent intraluminal gas.  Uterus not identified and likely surgically absent or atrophic.  No adnexal mass or significant volume free pelvic fluid.  Pelvic phleboliths noted.    Mild presacral edema, nonspecific.  There is apparent circumferential wall thickening of the distal rectum/anus.  No perirectal or perianal abscess or discrete mass seen.  No significant perirectal inflammatory change.  Numerous scattered colonic diverticula short-segment abnormal circumferential wall thickening of the mid sigmoid colon with mild adjacent inflammatory change concerning for acute diverticulitis.  Small collection of gas foci with fat stranding just superior to this bowel loop of sigmoid colon concerning for contained perforation.  No rim enhancing component identified.  No intraperitoneal free air seen elsewhere.  No bowel obstruction. Appendix and terminal ileum are within normal limits.  No bowel pneumatosis or portal venous gas.    No  ascites.  No lymphadenopathy by CT criteria.    Mild scattered calcified and noncalcified plaque of the abdominal aorta extending into its iliac branches.  No aortic aneurysm or dissection.    Mild body wall subcutaneous edema suggesting mild anasarca.    Osseous structures show generalized osteopenia, chronic minimal to mild anterior wedge deformity of a few lower thoracic vertebral bodies and age-related degenerative change.  No acute destructive osseous process seen.  Small sclerotic focus at the right femoral head likely a bone island.                                       Medications   sodium chloride 0.9% flush 10 mL (has no administration in time range)   melatonin tablet 6 mg (has no administration in time range)   acetaminophen oral solution 650 mg (has no administration in time range)   aspirin chewable tablet 81 mg (81 mg Oral Given 12/31/24 0808)   gabapentin 250 mg/5 mL (5 mL) solution 250 mg (250 mg Per G Tube Given 12/31/24 1516)   multivitamin tablet (1 tablet Oral Given 12/31/24 0808)   traZODone tablet 50 mg (50 mg Per G Tube Given 12/30/24 2005)   meropenem injection 1 g (1 g Intravenous Given 12/31/24 1216)   sodium chloride 0.9% flush 10 mL (has no administration in time range)   naloxone 0.4 mg/mL injection 0.02 mg (has no administration in time range)   glucose chewable tablet 16 g (has no administration in time range)   glucose chewable tablet 24 g (has no administration in time range)   dextrose 50% injection 12.5 g (has no administration in time range)   dextrose 50% injection 25 g (has no administration in time range)   glucagon (human recombinant) injection 1 mg (has no administration in time range)   heparin (porcine) injection 5,000 Units (5,000 Units Subcutaneous Given 12/31/24 1338)   morphine injection 4 mg (has no administration in time range)   ondansetron injection 4 mg (has no administration in time range)   insulin aspart U-100 pen 0-10 Units (has no administration in time  range)   insulin glargine U-100 (Lantus) pen 5 Units (0 Units Subcutaneous Hold 12/31/24 0808)   albuterol inhaler 2 puff (has no administration in time range)   atorvastatin tablet 80 mg (80 mg Oral Given 12/31/24 0808)   D5W infusion ( Intravenous New Bag 12/31/24 1426)   dextrose 5 % in lactated ringers infusion (has no administration in time range)   lactated ringers bolus 1,000 mL (0 mLs Intravenous Stopped 12/30/24 1516)   iohexoL (OMNIPAQUE 350) injection 100 mL (100 mLs Intravenous Given 12/30/24 1648)   meropenem injection 1 g (1 g Intravenous Given 12/30/24 1754)   lactated ringers bolus 1,000 mL (1,000 mLs Intravenous New Bag 12/30/24 1831)     Medical Decision Making  Diarrhea.  Patient with multiple risk factors for an infectious diarrhea including her recurrent hospitalizations, recent antibiotic use.  She also appears very dehydrated.  In addition, patient with a ESBL E coli infection last week.  Seems to only be on Keflex.  Will need to brought own to meropenem based on sensitivities.    Plan  -labs including C diff cultures  -IV fluids  -CT abd/pelvis given report of abd pain though difficult to reproduce on exam  -anticipate need for admission  -would benefit from palliative care consultation to address goals of care, given patient's recurrent hospitalizations, progressive overall decline    Amount and/or Complexity of Data Reviewed  External Data Reviewed: labs, radiology and notes.  Labs: ordered. Decision-making details documented in ED Course.  Radiology: ordered and independent interpretation performed. Decision-making details documented in ED Course.  ECG/medicine tests: ordered and independent interpretation performed. Decision-making details documented in ED Course.    Risk  OTC drugs.  Prescription drug management.  Decision regarding hospitalization.              Attending Attestation:         Attending Critical Care:   Critical Care Times:    ==============================================================  Total Critical Care Time - exclusive of procedural time: 40 minutes.  ==============================================================  Critical care was necessary to treat or prevent imminent or life-threatening deterioration of the following conditions: hypotension and dehydration.   Critical care was time spent personally by me on the following activities: obtaining history from patient or relative, examination of patient, review of old charts, review of x-rays / CT sent with the patient, ordering lab, x-rays, and/or EKG, development of treatment plan with patient or relative, ordering and performing treatments and interventions, evaluation of patient's response to treatment, discussion with consultants and re-evaluation of patient's conition.   Critical Care Condition: potentially life-threatening                                  Clinical Impression:  Final diagnoses:  [E86.0] Dehydration (Primary)  [K57.92] Diverticulitis          ED Disposition Condition    Observation Stable                Roxana Ziegler MD  12/31/24 [id number]

## 2024-12-30 NOTE — ED TRIAGE NOTES
Patient comes into the emergency department by EMS with complaints of diarrhea. Per family member, patient has been having diarrhea x 1 week. Pt states it hurts when she uses the bathroom. Pt is bed bound at baseline and covered in wounds.

## 2024-12-31 PROBLEM — F17.200 TOBACCO DEPENDENCY: Status: RESOLVED | Noted: 2024-12-31 | Resolved: 2024-12-31

## 2024-12-31 PROBLEM — E87.0 HYPERNATREMIA: Status: ACTIVE | Noted: 2024-12-31

## 2024-12-31 PROBLEM — F17.200 TOBACCO DEPENDENCY: Status: ACTIVE | Noted: 2024-12-31

## 2024-12-31 LAB
ALBUMIN SERPL BCP-MCNC: 1.4 G/DL (ref 3.5–5.2)
ALBUMIN SERPL BCP-MCNC: 1.5 G/DL (ref 3.5–5.2)
ALP SERPL-CCNC: 113 U/L (ref 40–150)
ALT SERPL W/O P-5'-P-CCNC: 14 U/L (ref 10–44)
ANION GAP SERPL CALC-SCNC: 12 MMOL/L (ref 8–16)
ANION GAP SERPL CALC-SCNC: 13 MMOL/L (ref 8–16)
AST SERPL-CCNC: 19 U/L (ref 10–40)
BILIRUB SERPL-MCNC: 0.3 MG/DL (ref 0.1–1)
BUN SERPL-MCNC: 23 MG/DL (ref 8–23)
BUN SERPL-MCNC: 23 MG/DL (ref 8–23)
CALCIUM SERPL-MCNC: 7.4 MG/DL (ref 8.7–10.5)
CALCIUM SERPL-MCNC: 7.8 MG/DL (ref 8.7–10.5)
CHLORIDE SERPL-SCNC: 114 MMOL/L (ref 95–110)
CHLORIDE SERPL-SCNC: 115 MMOL/L (ref 95–110)
CO2 SERPL-SCNC: 21 MMOL/L (ref 23–29)
CO2 SERPL-SCNC: 22 MMOL/L (ref 23–29)
CREAT SERPL-MCNC: 0.8 MG/DL (ref 0.5–1.4)
CREAT SERPL-MCNC: 0.8 MG/DL (ref 0.5–1.4)
CRP SERPL-MCNC: 66.4 MG/L (ref 0–8.2)
EST. GFR  (NO RACE VARIABLE): >60 ML/MIN/1.73 M^2
EST. GFR  (NO RACE VARIABLE): >60 ML/MIN/1.73 M^2
ESTIMATED AVG GLUCOSE: 120 MG/DL (ref 68–131)
GLUCOSE SERPL-MCNC: 71 MG/DL (ref 70–110)
GLUCOSE SERPL-MCNC: 88 MG/DL (ref 70–110)
HBA1C MFR BLD: 5.8 % (ref 4–5.6)
MAGNESIUM SERPL-MCNC: 1.8 MG/DL (ref 1.6–2.6)
PHOSPHATE SERPL-MCNC: 2.5 MG/DL (ref 2.7–4.5)
PHOSPHATE SERPL-MCNC: 2.5 MG/DL (ref 2.7–4.5)
POCT GLUCOSE: 113 MG/DL (ref 70–110)
POCT GLUCOSE: 115 MG/DL (ref 70–110)
POCT GLUCOSE: 85 MG/DL (ref 70–110)
POTASSIUM SERPL-SCNC: 3.5 MMOL/L (ref 3.5–5.1)
POTASSIUM SERPL-SCNC: 3.6 MMOL/L (ref 3.5–5.1)
PROT SERPL-MCNC: 4.8 G/DL (ref 6–8.4)
SODIUM SERPL-SCNC: 148 MMOL/L (ref 136–145)
SODIUM SERPL-SCNC: 149 MMOL/L (ref 136–145)

## 2024-12-31 PROCEDURE — 80053 COMPREHEN METABOLIC PANEL: CPT | Performed by: FAMILY MEDICINE

## 2024-12-31 PROCEDURE — 25000003 PHARM REV CODE 250: Performed by: FAMILY MEDICINE

## 2024-12-31 PROCEDURE — 84100 ASSAY OF PHOSPHORUS: CPT | Performed by: FAMILY MEDICINE

## 2024-12-31 PROCEDURE — 83735 ASSAY OF MAGNESIUM: CPT | Performed by: FAMILY MEDICINE

## 2024-12-31 PROCEDURE — 86140 C-REACTIVE PROTEIN: CPT | Performed by: STUDENT IN AN ORGANIZED HEALTH CARE EDUCATION/TRAINING PROGRAM

## 2024-12-31 PROCEDURE — 99223 1ST HOSP IP/OBS HIGH 75: CPT | Mod: ,,, | Performed by: INTERNAL MEDICINE

## 2024-12-31 PROCEDURE — 63600175 PHARM REV CODE 636 W HCPCS: Performed by: FAMILY MEDICINE

## 2024-12-31 PROCEDURE — 25000003 PHARM REV CODE 250: Performed by: HOSPITALIST

## 2024-12-31 PROCEDURE — 11000001 HC ACUTE MED/SURG PRIVATE ROOM

## 2024-12-31 PROCEDURE — 80069 RENAL FUNCTION PANEL: CPT | Performed by: HOSPITALIST

## 2024-12-31 PROCEDURE — 21400001 HC TELEMETRY ROOM

## 2024-12-31 PROCEDURE — 63600175 PHARM REV CODE 636 W HCPCS: Performed by: HOSPITALIST

## 2024-12-31 PROCEDURE — 94761 N-INVAS EAR/PLS OXIMETRY MLT: CPT

## 2024-12-31 PROCEDURE — 27000207 HC ISOLATION

## 2024-12-31 RX ORDER — ATORVASTATIN CALCIUM 40 MG/1
80 TABLET, FILM COATED ORAL DAILY
Status: DISCONTINUED | OUTPATIENT
Start: 2024-12-31 | End: 2025-01-01

## 2024-12-31 RX ORDER — DEXTROSE, SODIUM CHLORIDE, SODIUM LACTATE, POTASSIUM CHLORIDE, AND CALCIUM CHLORIDE 5; .6; .31; .03; .02 G/100ML; G/100ML; G/100ML; G/100ML; G/100ML
INJECTION, SOLUTION INTRAVENOUS CONTINUOUS
Status: DISCONTINUED | OUTPATIENT
Start: 2024-12-31 | End: 2024-12-31

## 2024-12-31 RX ORDER — DEXTROSE MONOHYDRATE 50 MG/ML
INJECTION, SOLUTION INTRAVENOUS CONTINUOUS
Status: ACTIVE | OUTPATIENT
Start: 2024-12-31 | End: 2024-12-31

## 2024-12-31 RX ORDER — DEXTROSE, SODIUM CHLORIDE, SODIUM LACTATE, POTASSIUM CHLORIDE, AND CALCIUM CHLORIDE 5; .6; .31; .03; .02 G/100ML; G/100ML; G/100ML; G/100ML; G/100ML
INJECTION, SOLUTION INTRAVENOUS CONTINUOUS
Status: DISCONTINUED | OUTPATIENT
Start: 2024-12-31 | End: 2025-01-02

## 2024-12-31 RX ADMIN — MEROPENEM 1 G: 1 INJECTION INTRAVENOUS at 12:12

## 2024-12-31 RX ADMIN — SODIUM CHLORIDE, SODIUM LACTATE, POTASSIUM CHLORIDE, CALCIUM CHLORIDE AND DEXTROSE MONOHYDRATE: 5; 600; 310; 30; 20 INJECTION, SOLUTION INTRAVENOUS at 10:12

## 2024-12-31 RX ADMIN — TRAZODONE HYDROCHLORIDE 50 MG: 50 TABLET ORAL at 08:12

## 2024-12-31 RX ADMIN — MEROPENEM 1 G: 1 INJECTION INTRAVENOUS at 05:12

## 2024-12-31 RX ADMIN — ATORVASTATIN CALCIUM 80 MG: 40 TABLET, FILM COATED ORAL at 08:12

## 2024-12-31 RX ADMIN — THERA TABS 1 TABLET: TAB at 08:12

## 2024-12-31 RX ADMIN — ASPIRIN 81 MG CHEWABLE TABLET 81 MG: 81 TABLET CHEWABLE at 08:12

## 2024-12-31 RX ADMIN — MEROPENEM 1 G: 1 INJECTION INTRAVENOUS at 08:12

## 2024-12-31 RX ADMIN — HEPARIN SODIUM 5000 UNITS: 5000 INJECTION INTRAVENOUS; SUBCUTANEOUS at 05:12

## 2024-12-31 RX ADMIN — GABAPENTIN 250 MG: 250 SOLUTION ORAL at 05:12

## 2024-12-31 RX ADMIN — DEXTROSE MONOHYDRATE: 50 INJECTION, SOLUTION INTRAVENOUS at 02:12

## 2024-12-31 RX ADMIN — HEPARIN SODIUM 5000 UNITS: 5000 INJECTION INTRAVENOUS; SUBCUTANEOUS at 01:12

## 2024-12-31 RX ADMIN — GABAPENTIN 250 MG: 250 SOLUTION ORAL at 03:12

## 2024-12-31 RX ADMIN — SODIUM CHLORIDE, SODIUM LACTATE, POTASSIUM CHLORIDE, CALCIUM CHLORIDE AND DEXTROSE MONOHYDRATE: 5; 600; 310; 30; 20 INJECTION, SOLUTION INTRAVENOUS at 08:12

## 2024-12-31 RX ADMIN — HEPARIN SODIUM 5000 UNITS: 5000 INJECTION INTRAVENOUS; SUBCUTANEOUS at 08:12

## 2024-12-31 RX ADMIN — GABAPENTIN 250 MG: 250 SOLUTION ORAL at 08:12

## 2024-12-31 NOTE — CONSULTS
[facility]  Infectious Disease  Consult Note    Patient Name: Emily Martinez  MRN: [id number]  Admission Date: 12/30/2024  Hospital Length of Stay: 0 days  Attending Physician: Abraham Feliz MD  Primary Care Provider: Alireza Sosa MD     Isolation Status: No active isolations    Patient information was obtained from patient, past medical records, and ER records.      Inpatient consult to Infectious Diseases  Consult performed by: Yon Castillo MD  Consult ordered by: Abraham Feliz MD      Inpatient consult to Infectious Diseases  Consult performed by: Yon Castillo MD  Consult ordered by: Celio Taylor MD        Assessment/Plan:     * Diverticulitis  60 yo woman with diverticulitis and small perforation, on meropenem. Meropenem should cover tall of the usual GI suspects.    Recommendations  Continue meropenem and follow patient clinically    UTI (urinary tract infection)  History of recent ESBL infection, discharged on Keflex prior to culture results.    Recommendations  Continue meropenem  FU urine culture  If no Pseudomonas isolated, okay to de-escalate to ertapenem    Thank you for your consult. I will follow-up with patient in a few days. Please contact us if you have any additional questions.    Yon Castillo MD  Infectious Disease  [facility]    Subjective:     Principal Problem: Diverticulitis    HPI: Ms. Martinez is a 59-year-old woman, new to [facility], with history of a stroke with left hemiplegia, bed-bound, diabetes, hypertension, recurrent infections, dysphagia sp PEG, multiple skin wounds, multiple recent admissions in the last several months in the [facility] system, most recently last week for UTI, discharged with Keflex. She reportedly never really improved and began having diarrhea. In the ED, she was afebrile and had UA with 38 WBCs. CT abdomen was performed and demonstrated acute diverticulitis with area of concern for contained perforation.  "The patient was admitted and ID is consulted for "Recent ESBL on urine culture. Also now with diverticulitis." In the meantime, the patient was started on meropenem.    This morning she feels cold but denies abdominal pain.     Blood culture () -> pending  Urine culture () -> pending    Urine culture () @ [facility] ->     Culture, Urine Greater than 100,000 CFU/mL Escherichia coli, ESBL Abnormal    Resulting Agency [facility]   Susceptibility    Organism Antibiotic Method Susceptibility   Escherichia coli, ESBL Ciprofloxacin [facility] >=4 mcg/ml: Resistant   Escherichia coli, ESBL Ertapenem [facility] <=0.12 mcg/ml: Susceptible   Escherichia coli, ESBL Gentamicin [facility] <=1 mcg/ml: Susceptible   Escherichia coli, ESBL Meropenem [facility] <=0.25 mcg/ml: Susceptible   Escherichia coli, ESBL Nitrofurantoin [facility] <=16 mcg/ml: Susceptible   Escherichia coli, ESBL Tetracycline [facility] >=16 mcg/ml: Resistant   Escherichia coli, ESBL Tobramycin [facility] >=16 mcg/ml: Resistant   Escherichia coli, ESBL Trimethoprim/Sulfamethoxazole [facility] >=320 mcg/ml: Resistant   Escherichia coli, ESBL ESBL [facility] MANUAL Positive           Past Medical History:   Diagnosis Date    Diabetes mellitus type I     Hyperlipidemia     Hypertension     Right sided weakness 2019       Past Surgical History:   Procedure Laterality Date     SECTION         Review of patient's allergies indicates:   Allergen Reactions    Sulfur        Medications:  Medications Prior to Admission   Medication Sig    acetaminophen (TYLENOL) 650 MG TbSR Take 1 tablet (650 mg total) by mouth every 8 (eight) hours. For back pain    albuterol (ACCUNEB) 1.25 mg/3 mL Nebu USE ONE VIAL in Nebulizer EVERY 6 HOURS AS NEEDED    albuterol (PROAIR HFA) 90 mcg/actuation inhaler inhale ONE TO TWO puffs EVERY 6 HOURS into lungs AS NEEDED FOR WHEEZING AND SHORTNESS OF BREATH    amitriptyline (ELAVIL) 10 MG " tablet TAKE 1 TABLET(10 MG) BY MOUTH EVERY NIGHT AS NEEDED FOR INSOMNIA    amitriptyline (ELAVIL) 10 MG tablet TAKE 1 TABLET(10 MG) BY MOUTH EVERY NIGHT AS NEEDED FOR INSOMNIA    amitriptyline (ELAVIL) 25 MG tablet TAKE 1 TABLET(25 MG) BY MOUTH EVERY NIGHT AS NEEDED FOR INSOMNIA    amLODIPine (NORVASC) 10 MG tablet TAKE 1 TABLET(10 MG) BY MOUTH EVERY DAY    aspirin (ECOTRIN) 81 MG EC tablet Take 1 tablet (81 mg total) by mouth once daily.    atorvastatin (LIPITOR) 40 MG tablet TAKE 1 TABLET(40 MG) BY MOUTH EVERY DAY    atorvastatin (LIPITOR) 40 MG tablet TAKE 1 TABLET(40 MG) BY MOUTH EVERY DAY    blood sugar diagnostic (TRUE METRIX GLUCOSE TEST STRIP) Strp TO CHECK BLOOD GLUCOSE THREE TIMES DAILY    blood-glucose meter (FREESTYLE SYSTEM KIT) kit Use daily- TID    blood-glucose meter kit Use as instructed    capsicum 0.075% (ZOSTRIX) 0.075 % topical cream Apply topically 3 (three) times daily.    capsicum 0.075% (ZOSTRIX) 0.075 % topical cream Apply topically 3 (three) times daily.    cephALEXin (KEFLEX) 500 MG capsule Take 1 capsule (500 mg total) by mouth every 8 (eight) hours.    cephALEXin (KEFLEX) 500 MG capsule Take 1 capsule (500 mg total) by mouth every 6 (six) hours.    clopidogreL (PLAVIX) 75 mg tablet Take 1 tablet (75 mg total) by mouth once daily.    diaper,brief,adult,disposable Misc 1 each by Misc.(Non-Drug; Combo Route) route 3 (three) times daily.    diazePAM (VALIUM) 5 MG tablet Take 1 tablet (5 mg total) by mouth 2 (two) times daily.    diclofenac (VOLTAREN) 75 MG EC tablet TAKE 1 TABLET(75 MG) BY MOUTH TWICE DAILY FOR 14 DAYS    diphenhydrAMINE (BENADRYL) 50 MG capsule Take 1 capsule (50 mg total) by mouth nightly as needed for Itching.     mg capsule TAKE ONE CAPSULE BY MOUTH TWICE DAILY    flash glucose sensor (FREESTYLE LEATHA 14 DAY SENSOR) Kit 1 each by Misc.(Non-Drug; Combo Route) route once daily.    fluticasone propionate (FLONASE) 50 mcg/actuation nasal spray 1 spray (50 mcg total)  by Each Nostril route once daily.    food supplemt, lactose-reduced (ENSURE MAX PROTEIN) Liqd Take 118 mLs by mouth 3 (three) times daily.    gabapentin (NEURONTIN) 100 MG capsule Take 1 capsule (100 mg total) by mouth every evening.    gabapentin (NEURONTIN) 300 MG capsule Take 1 capsule (300 mg total) by mouth 2 (two) times daily.    glipiZIDE 5 MG TR24 Take 1 tablet (5 mg total) by mouth daily with breakfast.    hydroCHLOROthiazide (HYDRODIURIL) 25 MG tablet Take 1 tablet (25 mg total) by mouth once daily.    hydrocortisone 2.5 % cream Apply topically 2 (two) times daily.    hydrOXYzine HCl (ATARAX) 25 MG tablet Take 1-2 tablets (25-50 mg total) by mouth 2 (two) times daily as needed for Itching.    hydrOXYzine HCL (ATARAX) 25 MG tablet Take 1-2 tablets (25-50 mg total) by mouth 2 (two) times daily as needed for Anxiety.    hydrOXYzine pamoate (VISTARIL) 25 MG Cap Take 1 capsule (25 mg total) by mouth every evening.    ibuprofen (ADVIL,MOTRIN) 800 MG tablet TAKE 1 TABLET(800 MG) BY MOUTH THREE TIMES DAILY WITH FOOD    ibuprofen (ADVIL,MOTRIN) 800 MG tablet Take 1 tablet (800 mg total) by mouth 3 (three) times daily. TAKE 1 TABLET BY MOUTH DAILY AS NEEDED    insulin aspart U-100 (NOVOLOG) 100 unit/mL (3 mL) InPn pen Inject 5-10 Units into the skin 3 (three) times daily with meals.    insulin detemir U-100, Levemir, (LEVEMIR FLEXPEN) 100 unit/mL (3 mL) InPn pen Inject 30 Units into the skin every evening.    lancets (ONETOUCH ULTRASOFT LANCETS) Misc Use 1 TID as directed for diabetes checking    lancets Misc To check BG 3 times daily, to use with insurance preferred meter    latanoprost 0.005 % ophthalmic solution Place 1 drop into both eyes every evening.    metFORMIN (GLUCOPHAGE) 1000 MG tablet TAKE 1 TABLET(1000 MG) BY MOUTH TWICE DAILY    metFORMIN (GLUCOPHAGE) 1000 MG tablet Take 1 tablet (1,000 mg total) by mouth 2 (two) times a day.    miconazole nitrate 200 mg/5 gram (4 %) Crea Place 1 application vaginally  "once daily.    mupirocin (BACTROBAN) 2 % ointment Apply topically 2 (two) times daily.    mupirocin (BACTROBAN) 2 % ointment Apply topically 2 (two) times daily. AAA    mupirocin (BACTROBAN) 2 % ointment Apply topically 3 (three) times daily.    nicotine (NICODERM CQ) 21 mg/24 hr Place 1 patch onto the skin once daily.    nicotine (NICODERM CQ) 21 mg/24 hr Place 1 patch onto the skin once daily.    olmesartan (BENICAR) 40 MG tablet Take 1 tablet (40 mg total) by mouth once daily.    olopatadine (PATANOL) 0.1 % ophthalmic solution Place 1 drop into both eyes 2 (two) times daily.    omeprazole (PRILOSEC) 40 MG capsule Take 1 capsule (40 mg total) by mouth once daily.    omeprazole (PRILOSEC) 40 MG capsule TAKE 1 CAPSULE(40 MG) BY MOUTH EVERY DAY    pantoprazole (PROTONIX) 40 MG tablet Take 1 tablet (40 mg total) by mouth once daily.    pen needle, diabetic (BD ULTRA-FINE ORIG PEN NEEDLE) 29 gauge x 1/2" Ndle USE TO TEST THREE TIMES DAILY MUST LAST 33 DAYS    plecanatide (TRULANCE) 3 mg Tab Take 3 mg by mouth once daily.    ramelteon (ROZEREM) 8 mg tablet Take 1 tablet (8 mg total) by mouth every evening.    semaglutide (OZEMPIC) 1 mg/dose (2 mg/1.5 mL) PnIj Inject 1 mg into the skin every 7 days.    silver sulfADIAZINE 1% (SILVADENE) 1 % cream Apply topically 2 (two) times daily.    ticagrelor (BRILINTA) 90 mg tablet TAKE 1 TABLET(90 MG) BY MOUTH TWICE DAILY    traMADoL (ULTRAM) 50 mg tablet Take 1 tablet (50 mg total) by mouth every 24 hours as needed for Pain.    triamcinolone acetonide 0.1% (KENALOG) 0.1 % ointment APPLY TOPICALLY TO THE AFFECTED AREA TWICE DAILY    walker (ULTRA-LIGHT ROLLATOR) Misc 1 each by Misc.(Non-Drug; Combo Route) route once daily at 6am.    wheelchair Kelsey 1 each by Misc.(Non-Drug; Combo Route) route 2 (two) times daily. Power Wheel Chair     Antibiotics (From admission, onward)      Start     Stop Route Frequency Ordered    12/30/24 2045  meropenem injection 1 g         -- IV Every 8 " hours (non-standard times) 12/30/24 1931          Antifungals (From admission, onward)      None          Antivirals (From admission, onward)      None             Immunization History   Administered Date(s) Administered    Influenza - Quadrivalent - PF *Preferred* (6 months and older) 10/21/2021       Family History       Problem Relation (Age of Onset)    Cancer Sister    Diabetes Mother, Sister, Brother, Maternal Aunt    Heart disease Maternal Aunt    Hyperlipidemia Mother, Sister, Brother    Hypertension Mother, Sister, Brother    Stroke Mother          Social History     Socioeconomic History    Marital status: Single   Tobacco Use    Smoking status: Every Day     Current packs/day: 1.50     Average packs/day: 1.5 packs/day for 35.0 years (52.5 ttl pk-yrs)     Types: Cigarettes    Smokeless tobacco: Never   Substance and Sexual Activity    Alcohol use: Not Currently    Drug use: Not Currently     Social Drivers of Health     Financial Resource Strain: Low Risk  (12/31/2024)    Overall Financial Resource Strain (CARDIA)     Difficulty of Paying Living Expenses: Not very hard   Food Insecurity: Patient Declined (12/31/2024)    Hunger Vital Sign     Worried About Running Out of Food in the Last Year: Patient declined     Ran Out of Food in the Last Year: Patient declined   Transportation Needs: No Transportation Needs (12/31/2024)    TRANSPORTATION NEEDS     Transportation : No   Physical Activity: Inactive (3/27/2024)    [facility]    Exercise Vital Sign     Days of Exercise per Week: 1 day     Minutes of Exercise per Session: 0 min   Stress: No Stress Concern Present (12/31/2024)    [employer] - Occupational Stress Questionnaire     Feeling of Stress : Only a little   Housing Stability: Low Risk  (12/31/2024)    Housing Stability Vital Sign     Unable to Pay for Housing in the Last Year: No     Homeless in the Last Year: No     Review of Systems   All  other systems reviewed and are negative.    Objective:     Vital Signs (Most Recent):  Temp: 96.2 °F (35.7 °C) (12/31/24 0819)  Pulse: 84 (12/31/24 0819)  Resp: 18 (12/31/24 0529)  BP: 102/73 (12/31/24 0819)  SpO2: 98 % (12/31/24 0819) Vital Signs (24h Range):  Temp:  [96.1 °F (35.6 °C)-98 °F (36.7 °C)] 96.2 °F (35.7 °C)  Pulse:  [40-95] 84  Resp:  [10-20] 18  SpO2:  [94 %-98 %] 98 %  BP: ()/(55-91) 102/73     Weight: 63.5 kg (140 lb)  Body mass index is 24.8 kg/m².    Estimated Creatinine Clearance: 53 mL/min (based on SCr of 1 mg/dL).     Physical Exam  Vitals and nursing note reviewed.   Constitutional:       Appearance: Normal appearance.   HENT:      Head: Normocephalic.   Eyes:      Pupils: Pupils are equal, round, and reactive to light.   Cardiovascular:      Rate and Rhythm: Normal rate.   Pulmonary:      Breath sounds: No wheezing.   Abdominal:      General: There is no distension.      Tenderness: There is no abdominal tenderness. There is no guarding.   Neurological:      Mental Status: She is alert.      Comments: hemiparesis          Significant Labs: BMP:   Recent Labs   Lab 12/31/24  0815   GLU 71   *   K 3.6   *   CO2 21*   BUN 23   CREATININE 0.8   CALCIUM 7.4*   MG 1.8     CBC:   Recent Labs   Lab 12/30/24  1412   WBC 9.78   HGB 12.1   HCT 34.1*        Microbiology Results (last 7 days)       Procedure Component Value Units Date/Time    Blood culture #2 **CANNOT BE ORDERED STAT** [[id number]] Collected: 12/30/24 1413    Order Status: Completed Specimen: Blood from Peripheral, Antecubital, Right Updated: 12/31/24 0115     Blood Culture, Routine No Growth to date    Blood culture #1 **CANNOT BE ORDERED STAT** [[id number]] Collected: 12/30/24 1418    Order Status: Completed Specimen: Blood from Peripheral, Hand, Right Updated: 12/31/24 0115     Blood Culture, Routine No Growth to date    Urine culture [[id number]] Collected: 12/30/24 1815    Order Status: No result Specimen:  Urine Updated: 12/30/24 1903    Clostridium difficile EIA [[id number]]     Order Status: No result Specimen: Stool             Significant Imaging: I have reviewed all pertinent imaging results/findings within the past 24 hours.

## 2024-12-31 NOTE — SUBJECTIVE & OBJECTIVE
Past Medical History:   Diagnosis Date    Diabetes mellitus type I     Hyperlipidemia     Hypertension     Right sided weakness 2019       Past Surgical History:   Procedure Laterality Date     SECTION         Review of patient's allergies indicates:   Allergen Reactions    Sulfur        Medications:  Medications Prior to Admission   Medication Sig    acetaminophen (TYLENOL) 650 MG TbSR Take 1 tablet (650 mg total) by mouth every 8 (eight) hours. For back pain    albuterol (ACCUNEB) 1.25 mg/3 mL Nebu USE ONE VIAL in Nebulizer EVERY 6 HOURS AS NEEDED    albuterol (PROAIR HFA) 90 mcg/actuation inhaler inhale ONE TO TWO puffs EVERY 6 HOURS into lungs AS NEEDED FOR WHEEZING AND SHORTNESS OF BREATH    amitriptyline (ELAVIL) 10 MG tablet TAKE 1 TABLET(10 MG) BY MOUTH EVERY NIGHT AS NEEDED FOR INSOMNIA    amitriptyline (ELAVIL) 10 MG tablet TAKE 1 TABLET(10 MG) BY MOUTH EVERY NIGHT AS NEEDED FOR INSOMNIA    amitriptyline (ELAVIL) 25 MG tablet TAKE 1 TABLET(25 MG) BY MOUTH EVERY NIGHT AS NEEDED FOR INSOMNIA    amLODIPine (NORVASC) 10 MG tablet TAKE 1 TABLET(10 MG) BY MOUTH EVERY DAY    aspirin (ECOTRIN) 81 MG EC tablet Take 1 tablet (81 mg total) by mouth once daily.    atorvastatin (LIPITOR) 40 MG tablet TAKE 1 TABLET(40 MG) BY MOUTH EVERY DAY    atorvastatin (LIPITOR) 40 MG tablet TAKE 1 TABLET(40 MG) BY MOUTH EVERY DAY    blood sugar diagnostic (TRUE METRIX GLUCOSE TEST STRIP) Strp TO CHECK BLOOD GLUCOSE THREE TIMES DAILY    blood-glucose meter (FREESTYLE SYSTEM KIT) kit Use daily- TID    blood-glucose meter kit Use as instructed    capsicum 0.075% (ZOSTRIX) 0.075 % topical cream Apply topically 3 (three) times daily.    capsicum 0.075% (ZOSTRIX) 0.075 % topical cream Apply topically 3 (three) times daily.    cephALEXin (KEFLEX) 500 MG capsule Take 1 capsule (500 mg total) by mouth every 8 (eight) hours.    cephALEXin (KEFLEX) 500 MG capsule Take 1 capsule (500 mg total) by mouth every 6 (six) hours.     clopidogreL (PLAVIX) 75 mg tablet Take 1 tablet (75 mg total) by mouth once daily.    diaper,brief,adult,disposable Misc 1 each by Misc.(Non-Drug; Combo Route) route 3 (three) times daily.    diazePAM (VALIUM) 5 MG tablet Take 1 tablet (5 mg total) by mouth 2 (two) times daily.    diclofenac (VOLTAREN) 75 MG EC tablet TAKE 1 TABLET(75 MG) BY MOUTH TWICE DAILY FOR 14 DAYS    diphenhydrAMINE (BENADRYL) 50 MG capsule Take 1 capsule (50 mg total) by mouth nightly as needed for Itching.     mg capsule TAKE ONE CAPSULE BY MOUTH TWICE DAILY    flash glucose sensor (FREESTYLE LEATHA 14 DAY SENSOR) Kit 1 each by Misc.(Non-Drug; Combo Route) route once daily.    fluticasone propionate (FLONASE) 50 mcg/actuation nasal spray 1 spray (50 mcg total) by Each Nostril route once daily.    food supplemt, lactose-reduced (ENSURE MAX PROTEIN) Liqd Take 118 mLs by mouth 3 (three) times daily.    gabapentin (NEURONTIN) 100 MG capsule Take 1 capsule (100 mg total) by mouth every evening.    gabapentin (NEURONTIN) 300 MG capsule Take 1 capsule (300 mg total) by mouth 2 (two) times daily.    glipiZIDE 5 MG TR24 Take 1 tablet (5 mg total) by mouth daily with breakfast.    hydroCHLOROthiazide (HYDRODIURIL) 25 MG tablet Take 1 tablet (25 mg total) by mouth once daily.    hydrocortisone 2.5 % cream Apply topically 2 (two) times daily.    hydrOXYzine HCl (ATARAX) 25 MG tablet Take 1-2 tablets (25-50 mg total) by mouth 2 (two) times daily as needed for Itching.    hydrOXYzine HCL (ATARAX) 25 MG tablet Take 1-2 tablets (25-50 mg total) by mouth 2 (two) times daily as needed for Anxiety.    hydrOXYzine pamoate (VISTARIL) 25 MG Cap Take 1 capsule (25 mg total) by mouth every evening.    ibuprofen (ADVIL,MOTRIN) 800 MG tablet TAKE 1 TABLET(800 MG) BY MOUTH THREE TIMES DAILY WITH FOOD    ibuprofen (ADVIL,MOTRIN) 800 MG tablet Take 1 tablet (800 mg total) by mouth 3 (three) times daily. TAKE 1 TABLET BY MOUTH DAILY AS NEEDED    insulin aspart  "U-100 (NOVOLOG) 100 unit/mL (3 mL) InPn pen Inject 5-10 Units into the skin 3 (three) times daily with meals.    insulin detemir U-100, Levemir, (LEVEMIR FLEXPEN) 100 unit/mL (3 mL) InPn pen Inject 30 Units into the skin every evening.    lancets (ONETOUCH ULTRASOFT LANCETS) Misc Use 1 TID as directed for diabetes checking    lancets Misc To check BG 3 times daily, to use with insurance preferred meter    latanoprost 0.005 % ophthalmic solution Place 1 drop into both eyes every evening.    metFORMIN (GLUCOPHAGE) 1000 MG tablet TAKE 1 TABLET(1000 MG) BY MOUTH TWICE DAILY    metFORMIN (GLUCOPHAGE) 1000 MG tablet Take 1 tablet (1,000 mg total) by mouth 2 (two) times a day.    miconazole nitrate 200 mg/5 gram (4 %) Crea Place 1 application vaginally once daily.    mupirocin (BACTROBAN) 2 % ointment Apply topically 2 (two) times daily.    mupirocin (BACTROBAN) 2 % ointment Apply topically 2 (two) times daily. AAA    mupirocin (BACTROBAN) 2 % ointment Apply topically 3 (three) times daily.    nicotine (NICODERM CQ) 21 mg/24 hr Place 1 patch onto the skin once daily.    nicotine (NICODERM CQ) 21 mg/24 hr Place 1 patch onto the skin once daily.    olmesartan (BENICAR) 40 MG tablet Take 1 tablet (40 mg total) by mouth once daily.    olopatadine (PATANOL) 0.1 % ophthalmic solution Place 1 drop into both eyes 2 (two) times daily.    omeprazole (PRILOSEC) 40 MG capsule Take 1 capsule (40 mg total) by mouth once daily.    omeprazole (PRILOSEC) 40 MG capsule TAKE 1 CAPSULE(40 MG) BY MOUTH EVERY DAY    pantoprazole (PROTONIX) 40 MG tablet Take 1 tablet (40 mg total) by mouth once daily.    pen needle, diabetic (BD ULTRA-FINE ORIG PEN NEEDLE) 29 gauge x 1/2" Ndle USE TO TEST THREE TIMES DAILY MUST LAST 33 DAYS    plecanatide (TRULANCE) 3 mg Tab Take 3 mg by mouth once daily.    ramelteon (ROZEREM) 8 mg tablet Take 1 tablet (8 mg total) by mouth every evening.    semaglutide (OZEMPIC) 1 mg/dose (2 mg/1.5 mL) PnJey Inject 1 mg into " the skin every 7 days.    silver sulfADIAZINE 1% (SILVADENE) 1 % cream Apply topically 2 (two) times daily.    ticagrelor (BRILINTA) 90 mg tablet TAKE 1 TABLET(90 MG) BY MOUTH TWICE DAILY    traMADoL (ULTRAM) 50 mg tablet Take 1 tablet (50 mg total) by mouth every 24 hours as needed for Pain.    triamcinolone acetonide 0.1% (KENALOG) 0.1 % ointment APPLY TOPICALLY TO THE AFFECTED AREA TWICE DAILY    walker (ULTRA-LIGHT ROLLATOR) Misc 1 each by Misc.(Non-Drug; Combo Route) route once daily at 6am.    wheelchair Kelsey 1 each by Misc.(Non-Drug; Combo Route) route 2 (two) times daily. Power Wheel Chair     Antibiotics (From admission, onward)      Start     Stop Route Frequency Ordered    12/30/24 2045  meropenem injection 1 g         -- IV Every 8 hours (non-standard times) 12/30/24 1931          Antifungals (From admission, onward)      None          Antivirals (From admission, onward)      None             Immunization History   Administered Date(s) Administered    Influenza - Quadrivalent - PF *Preferred* (6 months and older) 10/21/2021       Family History       Problem Relation (Age of Onset)    Cancer Sister    Diabetes Mother, Sister, Brother, Maternal Aunt    Heart disease Maternal Aunt    Hyperlipidemia Mother, Sister, Brother    Hypertension Mother, Sister, Brother    Stroke Mother          Social History     Socioeconomic History    Marital status: Single   Tobacco Use    Smoking status: Every Day     Current packs/day: 1.50     Average packs/day: 1.5 packs/day for 35.0 years (52.5 ttl pk-yrs)     Types: Cigarettes    Smokeless tobacco: Never   Substance and Sexual Activity    Alcohol use: Not Currently    Drug use: Not Currently     Social Drivers of Health     Financial Resource Strain: Low Risk  (12/31/2024)    Overall Financial Resource Strain (CARDIA)     Difficulty of Paying Living Expenses: Not very hard   Food Insecurity: Patient Declined (12/31/2024)    Hunger Vital Sign     Worried About Running Out  of Food in the Last Year: Patient declined     Ran Out of Food in the Last Year: Patient declined   Transportation Needs: No Transportation Needs (12/31/2024)    TRANSPORTATION NEEDS     Transportation : No   Physical Activity: Inactive (3/27/2024)    [facility] from [facility], [facility]    Exercise Vital Sign     Days of Exercise per Week: 1 day     Minutes of Exercise per Session: 0 min   Stress: No Stress Concern Present (12/31/2024)    [employer] [place] [employer] - Occupational Stress Questionnaire     Feeling of Stress : Only a little   Housing Stability: Low Risk  (12/31/2024)    Housing Stability Vital Sign     Unable to Pay for Housing in the Last Year: No     Homeless in the Last Year: No     Review of Systems   All other systems reviewed and are negative.    Objective:     Vital Signs (Most Recent):  Temp: 96.2 °F (35.7 °C) (12/31/24 0819)  Pulse: 84 (12/31/24 0819)  Resp: 18 (12/31/24 0529)  BP: 102/73 (12/31/24 0819)  SpO2: 98 % (12/31/24 0819) Vital Signs (24h Range):  Temp:  [96.1 °F (35.6 °C)-98 °F (36.7 °C)] 96.2 °F (35.7 °C)  Pulse:  [40-95] 84  Resp:  [10-20] 18  SpO2:  [94 %-98 %] 98 %  BP: ()/(55-91) 102/73     Weight: 63.5 kg (140 lb)  Body mass index is 24.8 kg/m².    Estimated Creatinine Clearance: 53 mL/min (based on SCr of 1 mg/dL).     Physical Exam  Vitals and nursing note reviewed.   Constitutional:       Appearance: Normal appearance.   HENT:      Head: Normocephalic.   Eyes:      Pupils: Pupils are equal, round, and reactive to light.   Cardiovascular:      Rate and Rhythm: Normal rate.   Pulmonary:      Breath sounds: No wheezing.   Abdominal:      General: There is no distension.      Tenderness: There is no abdominal tenderness. There is no guarding.   Neurological:      Mental Status: She is alert.      Comments: hemiparesis          Significant Labs: BMP:   Recent Labs   Lab 12/31/24 0815   GLU 71   *   K 3.6   *   CO2 21*   BUN 23   CREATININE 0.8    CALCIUM 7.4*   MG 1.8     CBC:   Recent Labs   Lab 12/30/24  1412   WBC 9.78   HGB 12.1   HCT 34.1*        Microbiology Results (last 7 days)       Procedure Component Value Units Date/Time    Blood culture #2 **CANNOT BE ORDERED STAT** [[id number]] Collected: 12/30/24 1413    Order Status: Completed Specimen: Blood from Peripheral, Antecubital, Right Updated: 12/31/24 0115     Blood Culture, Routine No Growth to date    Blood culture #1 **CANNOT BE ORDERED STAT** [[id number]] Collected: 12/30/24 1418    Order Status: Completed Specimen: Blood from Peripheral, Hand, Right Updated: 12/31/24 0115     Blood Culture, Routine No Growth to date    Urine culture [[id number]] Collected: 12/30/24 1815    Order Status: No result Specimen: Urine Updated: 12/30/24 1903    Clostridium difficile EIA [[id number]]     Order Status: No result Specimen: Stool             Significant Imaging: I have reviewed all pertinent imaging results/findings within the past 24 hours.

## 2024-12-31 NOTE — PLAN OF CARE
Problem: Acute Kidney Injury/Impairment  Goal: Fluid and Electrolyte Balance  Outcome: Progressing  Goal: Improved Oral Intake  Outcome: Progressing  Goal: Effective Renal Function  Outcome: Progressing     Problem: Fall Injury Risk  Goal: Absence of Fall and Fall-Related Injury  Outcome: Progressing       Pt declined being turned times 3 throughout the night. Patient educated on why she needs to be turned. Allowed turning times one. Call light within reach. Bed in low position. Plan of care continued.

## 2024-12-31 NOTE — ASSESSMENT & PLAN NOTE
Hypernatremia is likely due to Dehydration. monitor with hydration   Recent Labs     12/30/24  1412 12/31/24  0815   * 149*    started on D5W infusion

## 2024-12-31 NOTE — ED NOTES
Pt had a bowel movement but was not enough to collect for stool sample. Torito MCKEON, made aware via secure chat.

## 2024-12-31 NOTE — ASSESSMENT & PLAN NOTE
58 yo woman with diverticulitis and small perforation, on meropenem. Meropenem should cover tall of the usual GI suspects.    Recommendations  Continue meropenem and follow patient clinically

## 2024-12-31 NOTE — CONSULTS
[facility] - Emergency Dept  Colorectal Surgery  Consult Note    Patient Name: Emily Martinez  MRN: [id number]  Admission Date: 12/30/2024  Hospital Length of Stay: 0 days  Attending Physician: Celio Taylor MD  Primary Care Provider: Alireza Sosa MD    Inpatient consult to Colorectal Surgery  Consult performed by: Jack Reardon MD  Consult ordered by: Celio Taylor MD        Subjective:     History of Present Illness:   60 y/o F with multiple co-morbidities including HTN, DM and prior CVA with significant functional deficits including dysphagia, left hemiplagia. Patient with recent admission for UTI treated with antibiotics, now with diarrhea.Patient with frequent admissions for failure to thrive. Dependent on support for ADLs - has help from daughter/granddaughter.    Patient herself denies any abdominal pain. Denies fever, chills, dysuria.     Unclear if patient has had a colonoscopy previously - she states recent colonoscopy, but no clear record of it.    Current Facility-Administered Medications   Medication    0.9% NaCl infusion    acetaminophen oral solution 650 mg    albuterol inhaler 2 puff    [START ON 12/31/2024] aspirin chewable tablet 81 mg    [START ON 12/31/2024] atorvastatin tablet 80 mg    dextrose 50% injection 12.5 g    dextrose 50% injection 25 g    gabapentin 250 mg/5 mL (5 mL) solution 250 mg    glucagon (human recombinant) injection 1 mg    glucose chewable tablet 16 g    glucose chewable tablet 24 g    heparin (porcine) injection 5,000 Units    insulin aspart U-100 pen 0-10 Units    [START ON 12/31/2024] insulin glargine U-100 (Lantus) pen 5 Units    melatonin tablet 6 mg    meropenem injection 1 g    morphine injection 4 mg    [START ON 12/31/2024] multivitamin tablet    naloxone 0.4 mg/mL injection 0.02 mg    ondansetron injection 4 mg    sodium chloride 0.9% flush 10 mL    sodium chloride 0.9% flush 10 mL    traZODone tablet 50 mg     Current Outpatient Medications    Medication Sig    acetaminophen (TYLENOL) 650 MG TbSR Take 1 tablet (650 mg total) by mouth every 8 (eight) hours. For back pain    albuterol (ACCUNEB) 1.25 mg/3 mL Nebu USE ONE VIAL in Nebulizer EVERY 6 HOURS AS NEEDED    albuterol (PROAIR HFA) 90 mcg/actuation inhaler inhale ONE TO TWO puffs EVERY 6 HOURS into lungs AS NEEDED FOR WHEEZING AND SHORTNESS OF BREATH    amitriptyline (ELAVIL) 10 MG tablet TAKE 1 TABLET(10 MG) BY MOUTH EVERY NIGHT AS NEEDED FOR INSOMNIA    amitriptyline (ELAVIL) 10 MG tablet TAKE 1 TABLET(10 MG) BY MOUTH EVERY NIGHT AS NEEDED FOR INSOMNIA    amitriptyline (ELAVIL) 25 MG tablet TAKE 1 TABLET(25 MG) BY MOUTH EVERY NIGHT AS NEEDED FOR INSOMNIA    amLODIPine (NORVASC) 10 MG tablet TAKE 1 TABLET(10 MG) BY MOUTH EVERY DAY    aspirin (ECOTRIN) 81 MG EC tablet Take 1 tablet (81 mg total) by mouth once daily.    atorvastatin (LIPITOR) 40 MG tablet TAKE 1 TABLET(40 MG) BY MOUTH EVERY DAY    atorvastatin (LIPITOR) 40 MG tablet TAKE 1 TABLET(40 MG) BY MOUTH EVERY DAY    blood sugar diagnostic (TRUE METRIX GLUCOSE TEST STRIP) Strp TO CHECK BLOOD GLUCOSE THREE TIMES DAILY    blood-glucose meter (FREESTYLE SYSTEM KIT) kit Use daily- TID    blood-glucose meter kit Use as instructed    capsicum 0.075% (ZOSTRIX) 0.075 % topical cream Apply topically 3 (three) times daily.    capsicum 0.075% (ZOSTRIX) 0.075 % topical cream Apply topically 3 (three) times daily.    cephALEXin (KEFLEX) 500 MG capsule Take 1 capsule (500 mg total) by mouth every 8 (eight) hours.    cephALEXin (KEFLEX) 500 MG capsule Take 1 capsule (500 mg total) by mouth every 6 (six) hours.    clopidogreL (PLAVIX) 75 mg tablet Take 1 tablet (75 mg total) by mouth once daily.    diaper,brief,adult,disposable Misc 1 each by Misc.(Non-Drug; Combo Route) route 3 (three) times daily.    diazePAM (VALIUM) 5 MG tablet Take 1 tablet (5 mg total) by mouth 2 (two) times daily.    diclofenac (VOLTAREN) 75 MG EC tablet TAKE 1 TABLET(75 MG) BY MOUTH  TWICE DAILY FOR 14 DAYS    diphenhydrAMINE (BENADRYL) 50 MG capsule Take 1 capsule (50 mg total) by mouth nightly as needed for Itching.     mg capsule TAKE ONE CAPSULE BY MOUTH TWICE DAILY    flash glucose sensor (FREESTYLE LEATHA 14 DAY SENSOR) Kit 1 each by Misc.(Non-Drug; Combo Route) route once daily.    fluticasone propionate (FLONASE) 50 mcg/actuation nasal spray 1 spray (50 mcg total) by Each Nostril route once daily.    food supplemt, lactose-reduced (ENSURE MAX PROTEIN) Liqd Take 118 mLs by mouth 3 (three) times daily.    gabapentin (NEURONTIN) 100 MG capsule Take 1 capsule (100 mg total) by mouth every evening.    gabapentin (NEURONTIN) 300 MG capsule Take 1 capsule (300 mg total) by mouth 2 (two) times daily.    glipiZIDE 5 MG TR24 Take 1 tablet (5 mg total) by mouth daily with breakfast.    hydroCHLOROthiazide (HYDRODIURIL) 25 MG tablet Take 1 tablet (25 mg total) by mouth once daily.    hydrocortisone 2.5 % cream Apply topically 2 (two) times daily.    hydrOXYzine HCl (ATARAX) 25 MG tablet Take 1-2 tablets (25-50 mg total) by mouth 2 (two) times daily as needed for Itching.    hydrOXYzine HCL (ATARAX) 25 MG tablet Take 1-2 tablets (25-50 mg total) by mouth 2 (two) times daily as needed for Anxiety.    hydrOXYzine pamoate (VISTARIL) 25 MG Cap Take 1 capsule (25 mg total) by mouth every evening.    ibuprofen (ADVIL,MOTRIN) 800 MG tablet TAKE 1 TABLET(800 MG) BY MOUTH THREE TIMES DAILY WITH FOOD    ibuprofen (ADVIL,MOTRIN) 800 MG tablet Take 1 tablet (800 mg total) by mouth 3 (three) times daily. TAKE 1 TABLET BY MOUTH DAILY AS NEEDED    insulin aspart U-100 (NOVOLOG) 100 unit/mL (3 mL) InPn pen Inject 5-10 Units into the skin 3 (three) times daily with meals.    insulin detemir U-100, Levemir, (LEVEMIR FLEXPEN) 100 unit/mL (3 mL) InPn pen Inject 30 Units into the skin every evening.    lancets (ONETOUCH ULTRASOFT LANCETS) Misc Use 1 TID as directed for diabetes checking    lancets Misc To check BG  "3 times daily, to use with insurance preferred meter    latanoprost 0.005 % ophthalmic solution Place 1 drop into both eyes every evening.    metFORMIN (GLUCOPHAGE) 1000 MG tablet TAKE 1 TABLET(1000 MG) BY MOUTH TWICE DAILY    metFORMIN (GLUCOPHAGE) 1000 MG tablet Take 1 tablet (1,000 mg total) by mouth 2 (two) times a day.    miconazole nitrate 200 mg/5 gram (4 %) Crea Place 1 application vaginally once daily.    mupirocin (BACTROBAN) 2 % ointment Apply topically 2 (two) times daily.    mupirocin (BACTROBAN) 2 % ointment Apply topically 2 (two) times daily. AAA    mupirocin (BACTROBAN) 2 % ointment Apply topically 3 (three) times daily.    nicotine (NICODERM CQ) 21 mg/24 hr Place 1 patch onto the skin once daily.    nicotine (NICODERM CQ) 21 mg/24 hr Place 1 patch onto the skin once daily.    olmesartan (BENICAR) 40 MG tablet Take 1 tablet (40 mg total) by mouth once daily.    olopatadine (PATANOL) 0.1 % ophthalmic solution Place 1 drop into both eyes 2 (two) times daily.    omeprazole (PRILOSEC) 40 MG capsule Take 1 capsule (40 mg total) by mouth once daily.    omeprazole (PRILOSEC) 40 MG capsule TAKE 1 CAPSULE(40 MG) BY MOUTH EVERY DAY    pantoprazole (PROTONIX) 40 MG tablet Take 1 tablet (40 mg total) by mouth once daily.    pen needle, diabetic (BD ULTRA-FINE ORIG PEN NEEDLE) 29 gauge x 1/2" Ndle USE TO TEST THREE TIMES DAILY MUST LAST 33 DAYS    plecanatide (TRULANCE) 3 mg Tab Take 3 mg by mouth once daily.    ramelteon (ROZEREM) 8 mg tablet Take 1 tablet (8 mg total) by mouth every evening.    semaglutide (OZEMPIC) 1 mg/dose (2 mg/1.5 mL) PnIj Inject 1 mg into the skin every 7 days.    silver sulfADIAZINE 1% (SILVADENE) 1 % cream Apply topically 2 (two) times daily.    ticagrelor (BRILINTA) 90 mg tablet TAKE 1 TABLET(90 MG) BY MOUTH TWICE DAILY    traMADoL (ULTRAM) 50 mg tablet Take 1 tablet (50 mg total) by mouth every 24 hours as needed for Pain.    triamcinolone acetonide 0.1% (KENALOG) 0.1 % ointment " APPLY TOPICALLY TO THE AFFECTED AREA TWICE DAILY    walker (ULTRA-LIGHT ROLLATOR) Misc 1 each by Misc.(Non-Drug; Combo Route) route once daily at 6am.    wheelchair Kelsey 1 each by Misc.(Non-Drug; Combo Route) route 2 (two) times daily. Power Wheel Chair       Review of patient's allergies indicates:   Allergen Reactions    Sulfur        Past Medical History:   Diagnosis Date    Diabetes mellitus type I     Hyperlipidemia     Hypertension     Right sided weakness 2019     Past Surgical History:   Procedure Laterality Date     SECTION       Family History       Problem Relation (Age of Onset)    Cancer Sister    Diabetes Mother, Sister, Brother, Maternal Aunt    Heart disease Maternal Aunt    Hyperlipidemia Mother, Sister, Brother    Hypertension Mother, Sister, Brother    Stroke Mother          Tobacco Use    Smoking status: Every Day     Current packs/day: 1.50     Average packs/day: 1.5 packs/day for 35.0 years (52.5 ttl pk-yrs)     Types: Cigarettes    Smokeless tobacco: Never   Substance and Sexual Activity    Alcohol use: Not Currently    Drug use: Not Currently    Sexual activity: Not on file     Review of Systems  Objective:     Vital Signs (Most Recent):  Temp: 98 °F (36.7 °C) (24)  Pulse: 61 (24)  Resp: 10 (24)  BP: 104/74 (24)  SpO2: 96 % (24) Vital Signs (24h Range):  Temp:  [97.7 °F (36.5 °C)-98 °F (36.7 °C)] 98 °F (36.7 °C)  Pulse:  [40-95] 61  Resp:  [10-20] 10  SpO2:  [95 %-98 %] 96 %  BP: ()/(70-82) 104/74     Weight: 93 kg (205 lb)  Body mass index is 36.31 kg/m².    Physical Exam    General: frail-appearing female, non-toxic appearing, awake, interactive  Resp: non-labored breathing   Abdomen: soft, NT, ND. PEG tube in place. Faint pfannenstiel scar noted  Ext: contracted LUE/LLE    Significant Labs:  BMP (Last 3 Results):   Recent Labs   Lab 24  1412   *   *   K 3.7   *   CO2 24   BUN 24*   CREATININE  1.0   CALCIUM 7.7*     CBC (Last 3 Results):   Recent Labs   Lab 12/30/24  1412   WBC 9.78   RBC 4.63   HGB 12.1   HCT 34.1*      MCV 74*   MCH 26.1*   MCHC 35.5     Coagulation:   Recent Labs   Lab 12/30/24 2022   LABPROT 12.1   INR 1.1   APTT 28.5     Recent Labs   Lab 12/30/24  1815   COLORU Yellow   SPECGRAV 1.015   PHUR 6.0   PROTEINUA Trace*   BACTERIA Many*   NITRITE Negative   LEUKOCYTESUR 3+*       Significant Diagnostics:  CT: I have reviewed all pertinent results/findings within the past 24 hours:     1. Findings concerning for acute diverticulitis of the mid sigmoid colon with adjacent suspected contained perforation.  No associated rim enhancement to suggest drainable abscess at this time.  No bowel obstruction.  Consider follow-up with imaging or endoscopy after therapy according to colorectal screening guidelines.  2. Apparent circumferential wall thickening of the distal rectum/anus which could be related to underdistention versus nonspecific proctitis.  No significant perirectal inflammatory change or evidence perirectal or perianal drainable abscess.  Mild presacral edema, nonspecific.  3. Left more than right basilar patchy nonspecific pulmonary mosaic attenuation with bronchial wall thickening which can be seen with small vessels disease including pulmonary edema or small airways process.  Additionally, there is left more than right basilar patchy clusters of small nodules suggesting infectious or inflammatory small airways process versus aspiration.  No large consolidation.  Attention on follow-up.  4. Suspected hepatic steatosis.  5. Small hiatal hernia.  Peg tube.  Other incidental  6. Other incidental/nonemergent findings in the body of the report.    Assessment/Plan:     Active Diagnoses:    Diagnosis Date Noted POA    PRINCIPAL PROBLEM:  Diverticulitis [K57.92] 12/30/2024 Unknown    History of stroke with residual effects [I69.30] 12/30/2024 Not Applicable    Multiple wounds of skin  [T14.8XXA] 12/30/2024 Unknown    UTI (urinary tract infection) [N39.0] 12/30/2024 Unknown    Dysphagia [R13.10] 12/30/2024 Unknown    Insomnia [G47.00] 04/06/2019 Yes    Mixed hyperlipidemia [E78.2] 04/01/2019 Yes      Problems Resolved During this Admission:       58 y/o F with multiple medical co-morbidities including prior CVA with significant deficits, now with radiologic evidence of sigmoid diverticulitis and possible contained perforation.    - Patient is hemodynamically normal with benign abdominal exam, and reassuring labwork. No acute surgical intervention indicated.   - Maintain NPO status, with IVF hydration and IV Abx.  - Will trend CRP  - rule out C.diff  - ongoing care per primary team, CRS will continue to follow    Will be helpful to obtain collateral information from patient's family tomorrow, including possible colonoscopy records.    Jack Reardon MD  Colorectal Surgery  [facility] - [facility] Dept

## 2024-12-31 NOTE — PROGRESS NOTES
Patient Name: Emily Martinez  Date: 12/31/2024  Service: Colon and Rectal Surgery    Subjective:  No acute events overnight. Appears comfortable, denies pain.    Medications:    Current Facility-Administered Medications:     acetaminophen oral solution 650 mg, 650 mg, Per G Tube, Q4H PRN, Celio Taylor MD    albuterol inhaler 2 puff, 2 puff, Inhalation, Q6H PRN **AND** MDI Q6H PRN, , , Q6H PRN, Celio Taylor MD    aspirin chewable tablet 81 mg, 81 mg, Oral, Daily, Celio Taylor MD    atorvastatin tablet 80 mg, 80 mg, Per G Tube, Daily, Celio Taylor MD    dextrose 5 % in lactated ringers infusion, , Intravenous, Continuous, Abraham Feliz MD    dextrose 50% injection 12.5 g, 12.5 g, Intravenous, PRN, Celio Taylor MD    dextrose 50% injection 25 g, 25 g, Intravenous, PRN, Celio Taylor MD    gabapentin 250 mg/5 mL (5 mL) solution 250 mg, 250 mg, Per G Tube, Q8H, Celio Taylor MD, 250 mg at 12/31/24 0528    glucagon (human recombinant) injection 1 mg, 1 mg, Intramuscular, PRN, Celio Taylor MD    glucose chewable tablet 16 g, 16 g, Oral, PRN, Celio Taylor MD    glucose chewable tablet 24 g, 24 g, Oral, PRN, Celio Taylor MD    heparin (porcine) injection 5,000 Units, 5,000 Units, Subcutaneous, Q8H, Celio Taylor MD, 5,000 Units at 12/31/24 0525    insulin aspart U-100 pen 0-10 Units, 0-10 Units, Subcutaneous, QID (AC + HS) PRN, Celio Taylor MD    insulin glargine U-100 (Lantus) pen 5 Units, 5 Units, Subcutaneous, Daily, Celio Taylor MD    melatonin tablet 6 mg, 6 mg, Oral, Nightly PRN, Roxana Ziegler MD    meropenem injection 1 g, 1 g, Intravenous, Q8H, Celio Taylor MD, 1 g at 12/31/24 0525    morphine injection 4 mg, 4 mg, Intravenous, Q4H PRN, Celio Taylor MD    multivitamin tablet, 1 tablet, Oral, Daily, Celio Taylor MD    naloxone 0.4 mg/mL injection 0.02 mg, 0.02 mg, Intravenous, PRN, Celio Taylor  MD HESHAM    ondansetron injection 4 mg, 4 mg, Intravenous, Q8H PRN, Celio Taylor MD    sodium chloride 0.9% flush 10 mL, 10 mL, Intravenous, PRN, Roxana Ziegler MD    sodium chloride 0.9% flush 10 mL, 10 mL, Intravenous, Q12H PRN, Celio Taylor MD    traZODone tablet 50 mg, 50 mg, Per G Tube, QHS, Celio Taylor MD, 50 mg at 12/30/24 2005    Vital Signs:  Vitals:    12/31/24 0529   BP: 99/73   Pulse: 81   Resp: 18   Temp: 97.2 °F (36.2 °C)       In/Out:  Intake/Output - Last 3 Shifts         12/29 0700 12/30 0659 12/30 0700 12/31 0659 12/31 0700 01/01 0659    IV Piggyback  999     Total Intake(mL/kg)  999 (15.7)     Net  +999                    Physical Exam:  General: drowsy but responsive, in no apparent distress  HEENT: Sclera anicteric, trachea midline  Lungs: Normal respiratory rate and effort on room air  Abdomen: Soft, non distended, non tender  Extremities: Warm, well perfused, no edema  Neuro: Baseline deficits not assessed  Psych: Appropriate affect    Laboratory Studies:  Complete Blood Count:  Lab Results   Component Value Date/Time    WBC 9.78 12/30/2024 02:12 PM    HGB 12.1 12/30/2024 02:12 PM    HCT 34.1 (L) 12/30/2024 02:12 PM    RBC 4.63 12/30/2024 02:12 PM     12/30/2024 02:12 PM       Basic Chemistry Panel:  Lab Results   Component Value Date/Time     (H) 12/30/2024 02:12 PM    K 3.7 12/30/2024 02:12 PM     (H) 12/30/2024 02:12 PM    CO2 24 12/30/2024 02:12 PM    BUN 24 (H) 12/30/2024 02:12 PM    CREATININE 1.0 12/30/2024 02:12 PM    GLUCOSE 193 (H) 05/17/2023 05:00 AM    CALCIUM 7.7 (L) 12/30/2024 02:12 PM       Lab Results   Component Value Date/Time    CRP 59.4 (H) 12/30/2024 02:12 PM       Assessment:  Emily Martniez is a 60 y/o F with multiple medical co-morbidities including prior CVA with significant deficits, now with radiologic evidence of sigmoid diverticulitis and possible contained perforation.     Plan:  - continue to trend CRP and CBC,  pending collection for today  - if downtrending and passing flatus, okay to start on clears and downtitrate IVF  - continue on abx for gut ya coverage, ID  - cdif no results     Haily Preston MD  Colon and Rectal Surgery Fellow

## 2024-12-31 NOTE — HPI
Emily Martinez, 60 y/o F PMHx of CVA with residual left-sided hemiplegia, bed-bound, diabetes, HTN, dysphagia s/p PEG, and multiple skin wounds. [place] [facility] [place] UTI on ceftriaxone; d/c on keflex; however Cx grew ESBL > 100,000 CFU. Re-presented 12/31 with reports from daughter of 4-5 episodes/day of watery/non-bloody stools with intermittent vomiting. CT Abdomen noted acute sigmoid diverticulitis with areas of contained perforation. CRS consulted, recommending no intervention; ID consulted with recs to continue IV meropenem, f/u urine cultures, de-escalate to ertapenem if no pseudomonas (EED 1/9). On 1/8, patient began to have bowel movements with clots.  CTA Abdo w/o signs of active bleeding, early rectosigmoid proctocolitis, possible pseudomembranous colitis. CRS and ID re-consulted. ID consulted for treatment of C.diff.

## 2024-12-31 NOTE — ASSESSMENT & PLAN NOTE
- sp PEG for nutrition and meds  ;  okay for comfort po feeds per recent SLP recs  - NPO as above at this time  - IVFs  - plan to resume tube feeds once appropriate

## 2024-12-31 NOTE — HOSPITAL COURSE
12/31 CT head -No acute intracranial process. Prominent involutional changes and chronic microvascular ischemic changes in the periventricular white matter.  Small areas of probable remote lacunar infarction in the bilateral basal ganglia and small probable chronic right parietal cortical infarct. CT abdomen - racute diverticulitis of the mid sigmoid colon with adjacent suspected contained perforation.  No associated rim enhancement to suggest drainable abscess at this time.  No bowel obstruction. Apparent circumferential wall thickening of the distal rectum/anus which could be related to underdistention versus nonspecific proctitis.  No significant perirectal inflammatory change or evidence perirectal or perianal drainable abscess.  Left more than right basilar patchy nonspecific pulmonary mosaic attenuation with bronchial wall thickening pulmonary edema or small airways process / right basilar patchy clusters of small nodules suggesting infectious or inflammatory small airways process versus aspiration.  No large consolidation. s/p CRS eval - hemodynamically normal with benign abdominal exam, and reassuring labwork. No acute surgical intervention indicated. continue NPO status, with IVF hydration and IV meropenem.  trend CRP 59-->66. CRS eval -  continue to trend CRP  if downtrending and passing flatus, okay to start on clears and downtitrate IVFC.  1/1 UC GNRs.  ID eval - continue meropenem. If no Pseudomonas isolated, okay to de-escalate to ertapenem. C diff assay pending.    Advancing TF per CRS recs to home regimen. De-escalated to ertapenem for 7-10day treatment course (EED 01/09/25) for diverticulitis and ESBL Ecoli cystitis. Not acute care at home candidate. Transaminitis with negative Liver US, suspect shock liver after hypotension on 01/04 given improvement in LFTs and US Liver negative for acute abnormality. Continued lethargy evaluated with CT head and EEG which were negative for acute findings.  Reported BM with clots and worsening hypotension prompted Hgb check (stable), CTA AP to evaluate for lower GIB and/or intra-abdominal infective source, IVF bolus and CRS re-consult, blood cultures re-ordered, but pt already on Ertapenem. CTA abdomen showed changes associated with megacolon. CRS saw patient. Poor surgical candidate. c diff antigen positive, c diff toxin neg, c diff toxin PCR positive. On oral vancomycin. Improved hemodynamically stability, oxygenation, leukocytosis, and stools not bloody, but diarrhea is constant (not by volume, just constantly streaming out of rectum). Developed DVT in right brachial vein. Tolerated heparin drip for two days. Now on rivaroxaban. Poor prognosis. Recurring admissions related to failure to thrive and infection. Palliative care saw patient and family wishes to proceed with current care and Full code pending continued family discussions. Mild hyperkalemia treated with calcium gluconate, albuterol with improvement and without EKG changes on telemetry. Pt discharged to LTAC for continued care of buttock/sacral wounds, rectal tube in place in stable condition for continued PT/OT.

## 2024-12-31 NOTE — SUBJECTIVE & OBJECTIVE
Past Medical History:   Diagnosis Date    Diabetes mellitus type I     Hyperlipidemia     Hypertension     Right sided weakness 2019       Past Surgical History:   Procedure Laterality Date     SECTION         Review of patient's allergies indicates:   Allergen Reactions    Sulfur        No current facility-administered medications on file prior to encounter.     Current Outpatient Medications on File Prior to Encounter   Medication Sig    acetaminophen (TYLENOL) 650 MG TbSR Take 1 tablet (650 mg total) by mouth every 8 (eight) hours. For back pain    albuterol (ACCUNEB) 1.25 mg/3 mL Nebu USE ONE VIAL in Nebulizer EVERY 6 HOURS AS NEEDED    albuterol (PROAIR HFA) 90 mcg/actuation inhaler inhale ONE TO TWO puffs EVERY 6 HOURS into lungs AS NEEDED FOR WHEEZING AND SHORTNESS OF BREATH    amitriptyline (ELAVIL) 10 MG tablet TAKE 1 TABLET(10 MG) BY MOUTH EVERY NIGHT AS NEEDED FOR INSOMNIA    amitriptyline (ELAVIL) 10 MG tablet TAKE 1 TABLET(10 MG) BY MOUTH EVERY NIGHT AS NEEDED FOR INSOMNIA    amitriptyline (ELAVIL) 25 MG tablet TAKE 1 TABLET(25 MG) BY MOUTH EVERY NIGHT AS NEEDED FOR INSOMNIA    amLODIPine (NORVASC) 10 MG tablet TAKE 1 TABLET(10 MG) BY MOUTH EVERY DAY    aspirin (ECOTRIN) 81 MG EC tablet Take 1 tablet (81 mg total) by mouth once daily.    atorvastatin (LIPITOR) 40 MG tablet TAKE 1 TABLET(40 MG) BY MOUTH EVERY DAY    atorvastatin (LIPITOR) 40 MG tablet TAKE 1 TABLET(40 MG) BY MOUTH EVERY DAY    blood sugar diagnostic (TRUE METRIX GLUCOSE TEST STRIP) Strp TO CHECK BLOOD GLUCOSE THREE TIMES DAILY    blood-glucose meter (FREESTYLE SYSTEM KIT) kit Use daily- TID    blood-glucose meter kit Use as instructed    capsicum 0.075% (ZOSTRIX) 0.075 % topical cream Apply topically 3 (three) times daily.    capsicum 0.075% (ZOSTRIX) 0.075 % topical cream Apply topically 3 (three) times daily.    cephALEXin (KEFLEX) 500 MG capsule Take 1 capsule (500 mg total) by mouth every 8 (eight) hours.    cephALEXin  (KEFLEX) 500 MG capsule Take 1 capsule (500 mg total) by mouth every 6 (six) hours.    clopidogreL (PLAVIX) 75 mg tablet Take 1 tablet (75 mg total) by mouth once daily.    diaper,brief,adult,disposable Misc 1 each by Misc.(Non-Drug; Combo Route) route 3 (three) times daily.    diazePAM (VALIUM) 5 MG tablet Take 1 tablet (5 mg total) by mouth 2 (two) times daily.    diclofenac (VOLTAREN) 75 MG EC tablet TAKE 1 TABLET(75 MG) BY MOUTH TWICE DAILY FOR 14 DAYS    diphenhydrAMINE (BENADRYL) 50 MG capsule Take 1 capsule (50 mg total) by mouth nightly as needed for Itching.     mg capsule TAKE ONE CAPSULE BY MOUTH TWICE DAILY    flash glucose sensor (FREESTYLE LEATHA 14 DAY SENSOR) Kit 1 each by Misc.(Non-Drug; Combo Route) route once daily.    fluticasone propionate (FLONASE) 50 mcg/actuation nasal spray 1 spray (50 mcg total) by Each Nostril route once daily.    food supplemt, lactose-reduced (ENSURE MAX PROTEIN) Liqd Take 118 mLs by mouth 3 (three) times daily.    gabapentin (NEURONTIN) 100 MG capsule Take 1 capsule (100 mg total) by mouth every evening.    gabapentin (NEURONTIN) 300 MG capsule Take 1 capsule (300 mg total) by mouth 2 (two) times daily.    glipiZIDE 5 MG TR24 Take 1 tablet (5 mg total) by mouth daily with breakfast.    hydroCHLOROthiazide (HYDRODIURIL) 25 MG tablet Take 1 tablet (25 mg total) by mouth once daily.    hydrocortisone 2.5 % cream Apply topically 2 (two) times daily.    hydrOXYzine HCl (ATARAX) 25 MG tablet Take 1-2 tablets (25-50 mg total) by mouth 2 (two) times daily as needed for Itching.    hydrOXYzine HCL (ATARAX) 25 MG tablet Take 1-2 tablets (25-50 mg total) by mouth 2 (two) times daily as needed for Anxiety.    hydrOXYzine pamoate (VISTARIL) 25 MG Cap Take 1 capsule (25 mg total) by mouth every evening.    ibuprofen (ADVIL,MOTRIN) 800 MG tablet TAKE 1 TABLET(800 MG) BY MOUTH THREE TIMES DAILY WITH FOOD    ibuprofen (ADVIL,MOTRIN) 800 MG tablet Take 1 tablet (800 mg total) by  "mouth 3 (three) times daily. TAKE 1 TABLET BY MOUTH DAILY AS NEEDED    insulin aspart U-100 (NOVOLOG) 100 unit/mL (3 mL) InPn pen Inject 5-10 Units into the skin 3 (three) times daily with meals.    insulin detemir U-100, Levemir, (LEVEMIR FLEXPEN) 100 unit/mL (3 mL) InPn pen Inject 30 Units into the skin every evening.    lancets (ONETOUCH ULTRASOFT LANCETS) Misc Use 1 TID as directed for diabetes checking    lancets Misc To check BG 3 times daily, to use with insurance preferred meter    latanoprost 0.005 % ophthalmic solution Place 1 drop into both eyes every evening.    metFORMIN (GLUCOPHAGE) 1000 MG tablet TAKE 1 TABLET(1000 MG) BY MOUTH TWICE DAILY    metFORMIN (GLUCOPHAGE) 1000 MG tablet Take 1 tablet (1,000 mg total) by mouth 2 (two) times a day.    miconazole nitrate 200 mg/5 gram (4 %) Crea Place 1 application vaginally once daily.    mupirocin (BACTROBAN) 2 % ointment Apply topically 2 (two) times daily.    mupirocin (BACTROBAN) 2 % ointment Apply topically 2 (two) times daily. AAA    mupirocin (BACTROBAN) 2 % ointment Apply topically 3 (three) times daily.    nicotine (NICODERM CQ) 21 mg/24 hr Place 1 patch onto the skin once daily.    nicotine (NICODERM CQ) 21 mg/24 hr Place 1 patch onto the skin once daily.    olmesartan (BENICAR) 40 MG tablet Take 1 tablet (40 mg total) by mouth once daily.    olopatadine (PATANOL) 0.1 % ophthalmic solution Place 1 drop into both eyes 2 (two) times daily.    omeprazole (PRILOSEC) 40 MG capsule Take 1 capsule (40 mg total) by mouth once daily.    omeprazole (PRILOSEC) 40 MG capsule TAKE 1 CAPSULE(40 MG) BY MOUTH EVERY DAY    pantoprazole (PROTONIX) 40 MG tablet Take 1 tablet (40 mg total) by mouth once daily.    pen needle, diabetic (BD ULTRA-FINE ORIG PEN NEEDLE) 29 gauge x 1/2" Ndle USE TO TEST THREE TIMES DAILY MUST LAST 33 DAYS    plecanatide (TRULANCE) 3 mg Tab Take 3 mg by mouth once daily.    ramelteon (ROZEREM) 8 mg tablet Take 1 tablet (8 mg total) by mouth " every evening.    semaglutide (OZEMPIC) 1 mg/dose (2 mg/1.5 mL) PnIj Inject 1 mg into the skin every 7 days.    silver sulfADIAZINE 1% (SILVADENE) 1 % cream Apply topically 2 (two) times daily.    ticagrelor (BRILINTA) 90 mg tablet TAKE 1 TABLET(90 MG) BY MOUTH TWICE DAILY    traMADoL (ULTRAM) 50 mg tablet Take 1 tablet (50 mg total) by mouth every 24 hours as needed for Pain.    triamcinolone acetonide 0.1% (KENALOG) 0.1 % ointment APPLY TOPICALLY TO THE AFFECTED AREA TWICE DAILY    walker (ULTRA-LIGHT ROLLATOR) Misc 1 each by Misc.(Non-Drug; Combo Route) route once daily at 6am.    wheelchair Kelsey 1 each by Misc.(Non-Drug; Combo Route) route 2 (two) times daily. Power Wheel Chair     Family History       Problem Relation (Age of Onset)    Cancer Sister    Diabetes Mother, Sister, Brother, Maternal Aunt    Heart disease Maternal Aunt    Hyperlipidemia Mother, Sister, Brother    Hypertension Mother, Sister, Brother    Stroke Mother          Tobacco Use    Smoking status: Every Day     Current packs/day: 1.50     Average packs/day: 1.5 packs/day for 35.0 years (52.5 ttl pk-yrs)     Types: Cigarettes    Smokeless tobacco: Never   Substance and Sexual Activity    Alcohol use: Not Currently    Drug use: Not Currently    Sexual activity: Not on file     Review of Systems   Reason unable to perform ROS: limited by condition.   Constitutional:  Negative for chills and fever.   Respiratory:  Negative for cough and shortness of breath.    Cardiovascular:  Negative for chest pain.   Gastrointestinal:  Positive for diarrhea. Negative for abdominal pain, nausea and vomiting.   Musculoskeletal:  Positive for arthralgias.   Skin:  Positive for wound. Negative for rash.   Neurological:  Negative for weakness and headaches.     Objective:     Vital Signs (Most Recent):  Temp: 98 °F (36.7 °C) (12/30/24 1949)  Pulse: 83 (12/30/24 1949)  Resp: 10 (12/30/24 1815)  BP: 104/74 (12/30/24 1949)  SpO2: 96 % (12/30/24 1949) Vital Signs (24h  Range):  Temp:  [97.7 °F (36.5 °C)-98 °F (36.7 °C)] 98 °F (36.7 °C)  Pulse:  [83-95] 83  Resp:  [10-20] 10  SpO2:  [95 %-98 %] 96 %  BP: ()/(70-82) 104/74     Weight: 93 kg (205 lb)  Body mass index is 36.31 kg/m².     Physical Exam  Constitutional:       General: She is not in acute distress.     Appearance: She is not ill-appearing, toxic-appearing or diaphoretic.      Comments: Chronically ill appearing, cachectic   HENT:      Head: Normocephalic and atraumatic.      Comments: Bilateral temporal wasting.     Nose: Nose normal.   Eyes:      General: No scleral icterus.     Extraocular Movements: Extraocular movements intact.      Pupils: Pupils are equal, round, and reactive to light.   Cardiovascular:      Rate and Rhythm: Normal rate and regular rhythm.   Pulmonary:      Effort: Pulmonary effort is normal. No respiratory distress.      Breath sounds: No wheezing or rales.   Abdominal:      General: Abdomen is flat. There is no distension.      Palpations: Abdomen is soft.      Tenderness: There is no abdominal tenderness. There is no guarding.   Musculoskeletal:      Comments: Left hemiplegia, left UE and LE contracted/stiff     Skin:     General: Skin is warm and dry.      Comments: Multiple wounds photos in chart   Neurological:      Mental Status: She is alert and oriented to person, place, and time. Mental status is at baseline.   Psychiatric:      Comments: Flat affect              CRANIAL NERVES     CN III, IV, VI   Pupils are equal, round, and reactive to light.       Significant Labs: All pertinent labs within the past 24 hours have been reviewed.  CBC:   Recent Labs   Lab 12/30/24  1412   WBC 9.78   HGB 12.1   HCT 34.1*        CMP:   Recent Labs   Lab 12/30/24  1412   *   K 3.7   *   CO2 24   *   BUN 24*   CREATININE 1.0   CALCIUM 7.7*   PROT 5.6*   ALBUMIN 1.7*   BILITOT 0.3   ALKPHOS 127   AST 22   ALT 17   ANIONGAP 12     Lactic Acid:   Recent Labs   Lab 12/30/24  1412  12/30/24 2022   LACTATE 2.1 2.0     Lipase:   Recent Labs   Lab 12/30/24  1412   LIPASE 4     Urine Studies:   Recent Labs   Lab 12/30/24  1815   COLORU Yellow   APPEARANCEUA Hazy*   PHUR 6.0   SPECGRAV 1.015   PROTEINUA Trace*   GLUCUA Negative   KETONESU 1+*   BILIRUBINUA Negative   OCCULTUA Trace*   NITRITE Negative   LEUKOCYTESUR 3+*   RBCUA 0   WBCUA 38*   BACTERIA Many*       Significant Imaging: I have reviewed all pertinent imaging results/findings within the past 24 hours.

## 2024-12-31 NOTE — ASSESSMENT & PLAN NOTE
- CT with diverticulitis with contained perforation  - VSS without leukocytosis  ;  non-surgical abdomen on exam  - maintain npo for now  - CRS consult in am   (stat consult if clinical decompensation or concerning abdominal symptoms/signs)  - continue Meropenem  - ID consulted ; appreciate recs  - further management pending clinical course and future study review

## 2024-12-31 NOTE — ASSESSMENT & PLAN NOTE
- recent outside culture with ESBL  - start meropenem  - ID consulted  - follow up cultures  - further management pending clinical course    12/31  continue IV meropenem.  trend CRP 59  C.diff assay pending.  ID eval - continue meropenem. If no Pseudomonas isolated, okay to de-escalate to ertapenem.

## 2024-12-31 NOTE — ASSESSMENT & PLAN NOTE
- chronic left hemiplegia, bed bound, sp PEG though able to tolerate po comfort feed  - continue home ASA and statin  - turn q2h

## 2024-12-31 NOTE — ASSESSMENT & PLAN NOTE
History of recent ESBL infection, discharged on Keflex prior to culture results.    Recommendations  Continue meropenem  FU urine culture  If no Pseudomonas isolated, okay to de-escalate to ertapenem

## 2024-12-31 NOTE — HPI
59-year-old female, history of a stroke with left hemiplegia, bed-bound, diabetes, hypertension, recurrent infections, dysphagia sp PEG, multiple skin wounds, multiple recent admissions in the last several months in the [facility] system, most recently last week for UTI, discharged with Keflex, living at home with her daughter, now brought in for diarrhea.  Daughter states the patient has had diarrhea for about a week and a half.  She is having about 4-5 episodes a day, watery, nonbloody.  She has intermittently also vomiting.  Patient has a PEG tube for nutrition but per most recent SLP note can also take p.o. nutrition but daughter says she has had minimal p.o. intake because she has said she is not hungry.  No fevers noted.  Daughter is frustrated as she feels like when patient gets admitted, she is temporarily better and then very quickly becomes ill again.  All of their care has been in the [facility] system but they decided to come to [facility] today as they were hopeful that we would be able to get her better.       Of note, urine culture from 6 days ago is growing out ESBL E coli, greater than 100,000 colonies    In the ED patient afebrile and hemodynamically stable saturating well on room air at rest. No leukocytosis. UA still concerning for UTI and patient started on meropenem. CT abdomen performed and noted acute diverticulitis with area of concern for contained perforation. Patient without abdominal tenderness. Patient admitted to the care of medicine for further evaluation and management.

## 2024-12-31 NOTE — ASSESSMENT & PLAN NOTE
- chronic left hemiplegia, bed bound, sp PEG though able to tolerate po comfort feed  - continue home ASA and statin  - turn q2h      12/31 CT head -No acute intracranial process. Prominent involutional changes and chronic microvascular ischemic changes in the periventricular white matter.  Small areas of probable remote lacunar infarction in the bilateral basal ganglia and small probable chronic right parietal cortical infarct.

## 2024-12-31 NOTE — H&P
[facility]  [facility]  History & Physical    Patient Name: Emily Martinez  MRN: [id number]  Patient Class: OP- Observation  Admission Date: 12/30/2024  Attending Physician: Celio Taylor MD   Primary Care Provider: Alireza Sosa MD         Patient information was obtained from patient, past medical records, and ER records.     Subjective:     Principal Problem:Diverticulitis    Chief Complaint:   Chief Complaint   Patient presents with    Diarrhea     Loose stools x1 week        HPI: 59-year-old female, history of a stroke with left hemiplegia, bed-bound, diabetes, hypertension, recurrent infections, dysphagia sp PEG, multiple skin wounds, multiple recent admissions in the last several months in the [facility] system, most recently last week for UTI, discharged with Keflex, living at home with her daughter, now brought in for diarrhea.  Daughter states the patient has had diarrhea for about a week and a half.  She is having about 4-5 episodes a day, watery, nonbloody.  She has intermittently also vomiting.  Patient has a PEG tube for nutrition but per most recent SLP note can also take p.o. nutrition but daughter says she has had minimal p.o. intake because she has said she is not hungry.  No fevers noted.  Daughter is frustrated as she feels like when patient gets admitted, she is temporarily better and then very quickly becomes ill again.  All of their care has been in the [facility] system but they decided to come to [facility] today as they were hopeful that we would be able to get her better.       Of note, urine culture from 6 days ago is growing out ESBL E coli, greater than 100,000 colonies    In the ED patient afebrile and hemodynamically stable saturating well on room air at rest. No leukocytosis. UA still concerning for UTI and patient started on meropenem. CT abdomen performed and noted acute diverticulitis with area of concern for contained perforation. Patient without abdominal  tenderness. Patient admitted to the care of medicine for further evaluation and management.    Past Medical History:   Diagnosis Date    Diabetes mellitus type I     Hyperlipidemia     Hypertension     Right sided weakness 2019       Past Surgical History:   Procedure Laterality Date     SECTION         Review of patient's allergies indicates:   Allergen Reactions    Sulfur        No current facility-administered medications on file prior to encounter.     Current Outpatient Medications on File Prior to Encounter   Medication Sig    acetaminophen (TYLENOL) 650 MG TbSR Take 1 tablet (650 mg total) by mouth every 8 (eight) hours. For back pain    albuterol (ACCUNEB) 1.25 mg/3 mL Nebu USE ONE VIAL in Nebulizer EVERY 6 HOURS AS NEEDED    albuterol (PROAIR HFA) 90 mcg/actuation inhaler inhale ONE TO TWO puffs EVERY 6 HOURS into lungs AS NEEDED FOR WHEEZING AND SHORTNESS OF BREATH    amitriptyline (ELAVIL) 10 MG tablet TAKE 1 TABLET(10 MG) BY MOUTH EVERY NIGHT AS NEEDED FOR INSOMNIA    amitriptyline (ELAVIL) 10 MG tablet TAKE 1 TABLET(10 MG) BY MOUTH EVERY NIGHT AS NEEDED FOR INSOMNIA    amitriptyline (ELAVIL) 25 MG tablet TAKE 1 TABLET(25 MG) BY MOUTH EVERY NIGHT AS NEEDED FOR INSOMNIA    amLODIPine (NORVASC) 10 MG tablet TAKE 1 TABLET(10 MG) BY MOUTH EVERY DAY    aspirin (ECOTRIN) 81 MG EC tablet Take 1 tablet (81 mg total) by mouth once daily.    atorvastatin (LIPITOR) 40 MG tablet TAKE 1 TABLET(40 MG) BY MOUTH EVERY DAY    atorvastatin (LIPITOR) 40 MG tablet TAKE 1 TABLET(40 MG) BY MOUTH EVERY DAY    blood sugar diagnostic (TRUE METRIX GLUCOSE TEST STRIP) Strp TO CHECK BLOOD GLUCOSE THREE TIMES DAILY    blood-glucose meter (FREESTYLE SYSTEM KIT) kit Use daily- TID    blood-glucose meter kit Use as instructed    capsicum 0.075% (ZOSTRIX) 0.075 % topical cream Apply topically 3 (three) times daily.    capsicum 0.075% (ZOSTRIX) 0.075 % topical cream Apply topically 3 (three) times daily.    cephALEXin  (KEFLEX) 500 MG capsule Take 1 capsule (500 mg total) by mouth every 8 (eight) hours.    cephALEXin (KEFLEX) 500 MG capsule Take 1 capsule (500 mg total) by mouth every 6 (six) hours.    clopidogreL (PLAVIX) 75 mg tablet Take 1 tablet (75 mg total) by mouth once daily.    diaper,brief,adult,disposable Misc 1 each by Misc.(Non-Drug; Combo Route) route 3 (three) times daily.    diazePAM (VALIUM) 5 MG tablet Take 1 tablet (5 mg total) by mouth 2 (two) times daily.    diclofenac (VOLTAREN) 75 MG EC tablet TAKE 1 TABLET(75 MG) BY MOUTH TWICE DAILY FOR 14 DAYS    diphenhydrAMINE (BENADRYL) 50 MG capsule Take 1 capsule (50 mg total) by mouth nightly as needed for Itching.     mg capsule TAKE ONE CAPSULE BY MOUTH TWICE DAILY    flash glucose sensor (FREESTYLE LEATHA 14 DAY SENSOR) Kit 1 each by Misc.(Non-Drug; Combo Route) route once daily.    fluticasone propionate (FLONASE) 50 mcg/actuation nasal spray 1 spray (50 mcg total) by Each Nostril route once daily.    food supplemt, lactose-reduced (ENSURE MAX PROTEIN) Liqd Take 118 mLs by mouth 3 (three) times daily.    gabapentin (NEURONTIN) 100 MG capsule Take 1 capsule (100 mg total) by mouth every evening.    gabapentin (NEURONTIN) 300 MG capsule Take 1 capsule (300 mg total) by mouth 2 (two) times daily.    glipiZIDE 5 MG TR24 Take 1 tablet (5 mg total) by mouth daily with breakfast.    hydroCHLOROthiazide (HYDRODIURIL) 25 MG tablet Take 1 tablet (25 mg total) by mouth once daily.    hydrocortisone 2.5 % cream Apply topically 2 (two) times daily.    hydrOXYzine HCl (ATARAX) 25 MG tablet Take 1-2 tablets (25-50 mg total) by mouth 2 (two) times daily as needed for Itching.    hydrOXYzine HCL (ATARAX) 25 MG tablet Take 1-2 tablets (25-50 mg total) by mouth 2 (two) times daily as needed for Anxiety.    hydrOXYzine pamoate (VISTARIL) 25 MG Cap Take 1 capsule (25 mg total) by mouth every evening.    ibuprofen (ADVIL,MOTRIN) 800 MG tablet TAKE 1 TABLET(800 MG) BY MOUTH  "THREE TIMES DAILY WITH FOOD    ibuprofen (ADVIL,MOTRIN) 800 MG tablet Take 1 tablet (800 mg total) by mouth 3 (three) times daily. TAKE 1 TABLET BY MOUTH DAILY AS NEEDED    insulin aspart U-100 (NOVOLOG) 100 unit/mL (3 mL) InPn pen Inject 5-10 Units into the skin 3 (three) times daily with meals.    insulin detemir U-100, Levemir, (LEVEMIR FLEXPEN) 100 unit/mL (3 mL) InPn pen Inject 30 Units into the skin every evening.    lancets (ONETOUCH ULTRASOFT LANCETS) Misc Use 1 TID as directed for diabetes checking    lancets Misc To check BG 3 times daily, to use with insurance preferred meter    latanoprost 0.005 % ophthalmic solution Place 1 drop into both eyes every evening.    metFORMIN (GLUCOPHAGE) 1000 MG tablet TAKE 1 TABLET(1000 MG) BY MOUTH TWICE DAILY    metFORMIN (GLUCOPHAGE) 1000 MG tablet Take 1 tablet (1,000 mg total) by mouth 2 (two) times a day.    miconazole nitrate 200 mg/5 gram (4 %) Crea Place 1 application vaginally once daily.    mupirocin (BACTROBAN) 2 % ointment Apply topically 2 (two) times daily.    mupirocin (BACTROBAN) 2 % ointment Apply topically 2 (two) times daily. AAA    mupirocin (BACTROBAN) 2 % ointment Apply topically 3 (three) times daily.    nicotine (NICODERM CQ) 21 mg/24 hr Place 1 patch onto the skin once daily.    nicotine (NICODERM CQ) 21 mg/24 hr Place 1 patch onto the skin once daily.    olmesartan (BENICAR) 40 MG tablet Take 1 tablet (40 mg total) by mouth once daily.    olopatadine (PATANOL) 0.1 % ophthalmic solution Place 1 drop into both eyes 2 (two) times daily.    omeprazole (PRILOSEC) 40 MG capsule Take 1 capsule (40 mg total) by mouth once daily.    omeprazole (PRILOSEC) 40 MG capsule TAKE 1 CAPSULE(40 MG) BY MOUTH EVERY DAY    pantoprazole (PROTONIX) 40 MG tablet Take 1 tablet (40 mg total) by mouth once daily.    pen needle, diabetic (BD ULTRA-FINE ORIG PEN NEEDLE) 29 gauge x 1/2" Ndle USE TO TEST THREE TIMES DAILY MUST LAST 33 DAYS    plecanatide (TRULANCE) 3 mg Tab " Take 3 mg by mouth once daily.    ramelteon (ROZEREM) 8 mg tablet Take 1 tablet (8 mg total) by mouth every evening.    semaglutide (OZEMPIC) 1 mg/dose (2 mg/1.5 mL) PnIj Inject 1 mg into the skin every 7 days.    silver sulfADIAZINE 1% (SILVADENE) 1 % cream Apply topically 2 (two) times daily.    ticagrelor (BRILINTA) 90 mg tablet TAKE 1 TABLET(90 MG) BY MOUTH TWICE DAILY    traMADoL (ULTRAM) 50 mg tablet Take 1 tablet (50 mg total) by mouth every 24 hours as needed for Pain.    triamcinolone acetonide 0.1% (KENALOG) 0.1 % ointment APPLY TOPICALLY TO THE AFFECTED AREA TWICE DAILY    walker (ULTRA-LIGHT ROLLATOR) Misc 1 each by Misc.(Non-Drug; Combo Route) route once daily at 6am.    wheelchair Kelsey 1 each by Misc.(Non-Drug; Combo Route) route 2 (two) times daily. Power Wheel Chair     Family History       Problem Relation (Age of Onset)    Cancer Sister    Diabetes Mother, Sister, Brother, Maternal Aunt    Heart disease Maternal Aunt    Hyperlipidemia Mother, Sister, Brother    Hypertension Mother, Sister, Brother    Stroke Mother          Tobacco Use    Smoking status: Every Day     Current packs/day: 1.50     Average packs/day: 1.5 packs/day for 35.0 years (52.5 ttl pk-yrs)     Types: Cigarettes    Smokeless tobacco: Never   Substance and Sexual Activity    Alcohol use: Not Currently    Drug use: Not Currently    Sexual activity: Not on file     Review of Systems   Reason unable to perform ROS: limited by condition.   Constitutional:  Negative for chills and fever.   Respiratory:  Negative for cough and shortness of breath.    Cardiovascular:  Negative for chest pain.   Gastrointestinal:  Positive for diarrhea. Negative for abdominal pain, nausea and vomiting.   Musculoskeletal:  Positive for arthralgias.   Skin:  Positive for wound. Negative for rash.   Neurological:  Negative for weakness and headaches.     Objective:     Vital Signs (Most Recent):  Temp: 98 °F (36.7 °C) (12/30/24 1949)  Pulse: 83 (12/30/24  1949)  Resp: 10 (12/30/24 1815)  BP: 104/74 (12/30/24 1949)  SpO2: 96 % (12/30/24 1949) Vital Signs (24h Range):  Temp:  [97.7 °F (36.5 °C)-98 °F (36.7 °C)] 98 °F (36.7 °C)  Pulse:  [83-95] 83  Resp:  [10-20] 10  SpO2:  [95 %-98 %] 96 %  BP: ()/(70-82) 104/74     Weight: 93 kg (205 lb)  Body mass index is 36.31 kg/m².     Physical Exam  Constitutional:       General: She is not in acute distress.     Appearance: She is not ill-appearing, toxic-appearing or diaphoretic.      Comments: Chronically ill appearing, cachectic   HENT:      Head: Normocephalic and atraumatic.      Comments: Bilateral temporal wasting.     Nose: Nose normal.   Eyes:      General: No scleral icterus.     Extraocular Movements: Extraocular movements intact.      Pupils: Pupils are equal, round, and reactive to light.   Cardiovascular:      Rate and Rhythm: Normal rate and regular rhythm.   Pulmonary:      Effort: Pulmonary effort is normal. No respiratory distress.      Breath sounds: No wheezing or rales.   Abdominal:      General: Abdomen is flat. There is no distension.      Palpations: Abdomen is soft.      Tenderness: There is no abdominal tenderness. There is no guarding.   Musculoskeletal:      Comments: Left hemiplegia, left UE and LE contracted/stiff     Skin:     General: Skin is warm and dry.      Comments: Multiple wounds photos in chart   Neurological:      Mental Status: She is alert and oriented to person, place, and time. Mental status is at baseline.   Psychiatric:      Comments: Flat affect              CRANIAL NERVES     CN III, IV, VI   Pupils are equal, round, and reactive to light.       Significant Labs: All pertinent labs within the past 24 hours have been reviewed.  CBC:   Recent Labs   Lab 12/30/24  1412   WBC 9.78   HGB 12.1   HCT 34.1*        CMP:   Recent Labs   Lab 12/30/24  1412   *   K 3.7   *   CO2 24   *   BUN 24*   CREATININE 1.0   CALCIUM 7.7*   PROT 5.6*   ALBUMIN 1.7*    BILITOT 0.3   ALKPHOS 127   AST 22   ALT 17   ANIONGAP 12     Lactic Acid:   Recent Labs   Lab 12/30/24  1412 12/30/24 2022   LACTATE 2.1 2.0     Lipase:   Recent Labs   Lab 12/30/24  1412   LIPASE 4     Urine Studies:   Recent Labs   Lab 12/30/24  1815   COLORU Yellow   APPEARANCEUA Hazy*   PHUR 6.0   SPECGRAV 1.015   PROTEINUA Trace*   GLUCUA Negative   KETONESU 1+*   BILIRUBINUA Negative   OCCULTUA Trace*   NITRITE Negative   LEUKOCYTESUR 3+*   RBCUA 0   WBCUA 38*   BACTERIA Many*       Significant Imaging: I have reviewed all pertinent imaging results/findings within the past 24 hours.  Assessment/Plan:     * Diverticulitis  - CT with diverticulitis with contained perforation  - VSS without leukocytosis  ;  non-surgical abdomen on exam  - maintain npo for now  - CRS consult in am   (stat consult if clinical decompensation or concerning abdominal symptoms/signs)  - continue Meropenem  - ID consulted ; appreciate recs  - further management pending clinical course and future study review      Dysphagia  - sp PEG for nutrition and meds  ;  okay for comfort po feeds per recent SLP recs  - NPO as above at this time  - IVFs  - plan to resume tube feeds once appropriate      UTI (urinary tract infection)  - recent outside culture with ESBL  - start meropenem  - ID consulted  - follow up cultures  - further management pending clinical course      Multiple wounds of skin  - Wound care consulted  - turn q2  - waffle mattress overlay  - clean and dressed        History of stroke with residual effects  - chronic left hemiplegia, bed bound, sp PEG though able to tolerate po comfort feed  - continue home ASA and statin  - turn q2h      Insomnia  - home trazadone      Mixed hyperlipidemia  - home statin        VTE Risk Mitigation (From admission, onward)           Ordered     heparin (porcine) injection 5,000 Units  Every 8 hours         12/30/24 1937     IP VTE HIGH RISK PATIENT  Once         12/30/24 1937     Place  sequential compression device  Until discontinued         12/30/24 1937     Place sequential compression device  Until discontinued         12/30/24 1929                       On 12/30/2024, patient should be placed in hospital observation services under my care.             Celio Taylor MD  Department of Hospital Medicine  [facility] - Emergency Dept

## 2024-12-31 NOTE — PLAN OF CARE
[facility] - Med Surg  Initial Discharge Assessment       Primary Care Provider: Alireza Sosa MD    Admission Diagnosis: Dehydration [E86.0]  Chest pain [R07.9]  Diverticulitis [K57.92]    Admission Date: 12/30/2024  Expected Discharge Date: 1/2/2025    Transition of Care Barriers: None    Payor: MEDICAID / Plan: [place] [employer] / Product Type: Managed Medicaid /     Extended Emergency Contact Information  Primary Emergency Contact: Aneta Martinez   [facility]  Home Phone: [phone]  Work Phone: [phone]  Mobile Phone: [phone]  Relation: Daughter  Secondary Emergency Contact: jose manuel viera  Mobile Phone: [phone]  Relation: Daughter  Preferred language: English   needed? No    Discharge Plan A: Home, Home with family, Home Health, Other (PCA services with [facility].)  Discharge Plan B: Home, Home with family, Other (PCA services.)      [id number] [address] AT [address] [address]  Phone: [phone] Fax: [phone]      Initial Assessment (most recent)       Adult Discharge Assessment - 12/31/24 1520          Discharge Assessment    Assessment Type Discharge Planning Assessment     Confirmed/corrected address, phone number and insurance Yes     Confirmed Demographics Correct on Facesheet     Source of Information family     When was your last doctors appointment? --   Unsure when patient was last seen by PCP.    Communicated ANA with patient/caregiver Yes     Reason For Admission Diverticulitis     People in Home child(sonali), adult     Facility Arrived From: N/A     Do you expect to return to your current living situation? Yes     Do you have help at home or someone to help you manage your care at home? Yes     Who are your caregiver(s) and their phone number(s)? BelgicaDaughter [phone]     Prior to hospitilization cognitive status: Alert/Oriented     Current  cognitive status: Alert/Oriented     Walking or Climbing Stairs Difficulty yes     Walking or Climbing Stairs ambulation difficulty, dependent;stair climbing difficulty, dependent;transferring difficulty, dependent     Mobility Management bedbound, stroke.     Dressing/Bathing Difficulty yes     Dressing/Bathing bathing difficulty, dependent;dressing difficulty, dependent     Dressing/Bathing Management Pt bedbound and requires help with ADLs.     Equipment Currently Used at Home hospital bed;wheelchair;lift device;feeding device;other (see comments)   PEG tube-    Readmission within 30 days? No     Patient currently being followed by outpatient case management? No     Do you currently have service(s) that help you manage your care at home? Yes     Name and Contact number of agency [facility] PCA services 30 hours a week.     Is the pt/caregiver preference to resume services with current agency Yes     Do you take prescription medications? Yes     Do you have prescription coverage? Yes     Coverage [place] [employer]     Do you have any problems affording any of your prescribed medications? No     Is the patient taking medications as prescribed? yes     Who is going to help you get home at discharge? Pt will need ambulance transport to home.     How do you get to doctors appointments? family or friend will provide     Are you on dialysis? No     Do you take coumadin? No     Discharge Plan A Home;Home with family;Home Health;Other   PCA services with [facility].    Discharge Plan B Home;Home with family;Other   PCA services.    DME Needed Upon Discharge  none     Discharge Plan discussed with: Adult children     Transition of Care Barriers None        Physical Activity    On average, how many days per week do you engage in moderate to strenuous exercise (like a brisk walk)? 0 days     On average, how many minutes do you engage in exercise at this level? 0 min        Financial Resource Strain     How hard is it for you to pay for the very basics like food, housing, medical care, and heating? Not hard at all        Housing Stability    In the last 12 months, was there a time when you were not able to pay the mortgage or rent on time? No     At any time in the past 12 months, were you homeless or living in a shelter (including now)? No        Transportation Needs    Has the lack of transportation kept you from medical appointments, meetings, work or from getting things needed for daily living? No        Food Insecurity    Within the past 12 months, you worried that your food would run out before you got the money to buy more. Never true     Within the past 12 months, the food you bought just didn't last and you didn't have money to get more. Never true        Stress    Do you feel stress - tense, restless, nervous, or anxious, or unable to sleep at night because your mind is troubled all the time - these days? Only a little        Social Isolation    How often do you feel lonely or isolated from those around you?  Never        Alcohol Use    Q1: How often do you have a drink containing alcohol? Never     Q2: How many drinks containing alcohol do you have on a typical day when you are drinking? Patient does not drink     Q3: How often do you have six or more drinks on one occasion? Never        Utilities    In the past 12 months has the electric, gas, oil, or water company threatened to shut off services in your home? No        Health Literacy    How often do you need to have someone help you when you read instructions, pamphlets, or other written material from your doctor or pharmacy? Sometimes        OTHER    Name(s) of People in Home [phone]                      [facility] completed initial assessment with pt and pt's daughter.  Pt has been at other hospital in the last 30 days.  Pt's daughter stated her admissions has been related to her eating ahd Diverticulitis.  Pt will need ambulance  transport home.     Pt lives with her daughter, (Aneta) is an H.  Per daughter, pt is bedbound due to stroke.  Pt does not ambulate. Pt's daughter sometimes places her in a wheelchair.  DME:  wheelchair, hospital bed,and kayce lift.  Pt has a Peg Tube and daughter thinks tube feeds are supplied by [employer] but not sure.  SW will follow up.  Pt has good family support.      Pt does not receive coumadin or dialysis.  Pt does receive HH services from [facility] PCA services.  Pt received 30 hours of PCA services per week.      Disp:  Pt not medically ready for d/c.  Colon and Rectal, ID, consulted.      Discharge Plan A and Plan B have been determined by review of patient's clinical status, future medical and therapeutic needs, and coverage/benefits for post-acute care in coordination with multidisciplinary team members.    Albina Santos LMSW  Part-Time-  [facility]  Ext. [phone]

## 2024-12-31 NOTE — ASSESSMENT & PLAN NOTE
- CT with diverticulitis with contained perforation  - VSS without leukocytosis  ;  non-surgical abdomen on exam  - maintain npo for now  - CRS consult in am   (stat consult if clinical decompensation or concerning abdominal symptoms/signs)  - continue Meropenem  - ID consulted ; appreciate recs  - further management pending clinical course and future study review    12/31  CT abdomen - racute diverticulitis of the mid sigmoid colon with adjacent suspected contained perforation.  No associated rim enhancement to suggest drainable abscess at this time.  No bowel obstruction. Apparent circumferential wall thickening of the distal rectum/anus which could be related to underdistention versus nonspecific proctitis.  No significant perirectal inflammatory change or evidence perirectal or perianal drainable abscess.  Left more than right basilar patchy nonspecific pulmonary mosaic attenuation with bronchial wall thickening pulmonary edema or small airways process / right basilar patchy clusters of small nodules suggesting infectious or inflammatory small airways process versus aspiration.  No large consolidation. s/p CRS eval - hemodynamically normal with benign abdominal exam, and reassuring labwork. No acute surgical intervention indicated. continue NPO status, with IVF hydration and IV meropenem.  trend CRP 59  C.diff assay pending. s/p CRS eval - hemodynamically normal with benign abdominal exam, and reassuring labwork. No acute surgical intervention indicated. continue NPO status, with IVF hydration and IV meropenem.

## 2024-12-31 NOTE — ASSESSMENT & PLAN NOTE
- recent outside culture with ESBL  - start meropenem  - ID consulted  - follow up cultures  - further management pending clinical course

## 2024-12-31 NOTE — PROGRESS NOTES
[place] [facility] Medicine  Progress Note    Patient Name: Emily Martinez  MRN: [id number]  Patient Class: IP- Inpatient   Admission Date: 12/30/2024  Length of Stay: 0 days  Attending Physician: Abraham Feliz MD  Primary Care Provider: Alireza Sosa MD        Subjective     Principal Problem:Diverticulitis        HPI:  59-year-old female, history of a stroke with left hemiplegia, bed-bound, diabetes, hypertension, recurrent infections, dysphagia sp PEG, multiple skin wounds, multiple recent admissions in the last several months in the [facility], most recently last week for UTI, discharged with Keflex, living at home with her daughter, now brought in for diarrhea.  Daughter states the patient has had diarrhea for about a week and a half.  She is having about 4-5 episodes a day, watery, nonbloody.  She has intermittently also vomiting.  Patient has a PEG tube for nutrition but per most recent SLP note can also take p.o. nutrition but daughter says she has had minimal p.o. intake because she has said she is not hungry.  No fevers noted.  Daughter is frustrated as she feels like when patient gets admitted, she is temporarily better and then very quickly becomes ill again.  All of their care has been in the [facility] but they decided to come to [facility] today as they were hopeful that we would be able to get her better.       Of note, urine culture from 6 days ago is growing out ESBL E coli, greater than 100,000 colonies    In the ED patient afebrile and hemodynamically stable saturating well on room air at rest. No leukocytosis. UA still concerning for UTI and patient started on meropenem. CT abdomen performed and noted acute diverticulitis with area of concern for contained perforation. Patient without abdominal tenderness. Patient admitted to the [facility] for further evaluation and management.    Overview/Hospital Course:  12/31 CT head -No acute intracranial process. Prominent  involutional changes and chronic microvascular ischemic changes in the periventricular white matter.  Small areas of probable remote lacunar infarction in the bilateral basal ganglia and small probable chronic right parietal cortical infarct. CT abdomen - racute diverticulitis of the mid sigmoid colon with adjacent suspected contained perforation.  No associated rim enhancement to suggest drainable abscess at this time.  No bowel obstruction. Apparent circumferential wall thickening of the distal rectum/anus which could be related to underdistention versus nonspecific proctitis.  No significant perirectal inflammatory change or evidence perirectal or perianal drainable abscess.  Left more than right basilar patchy nonspecific pulmonary mosaic attenuation with bronchial wall thickening pulmonary edema or small airways process / right basilar patchy clusters of small nodules suggesting infectious or inflammatory small airways process versus aspiration.  No large consolidation. s/p CRS eval - hemodynamically normal with benign abdominal exam, and reassuring labwork. No acute surgical intervention indicated. continue NPO status, with IVF hydration and IV meropenem.  trend CRP 59-->66. CRS eval -  continue to trend CRP  if downtrending and passing flatus, okay to start on clears and downtitrate IVFC. ID eval - continue meropenem. If no Pseudomonas isolated, okay to de-escalate to ertapenem. diff assay pending          Review of Systems:   Pain scale:   Constitutional:  fever,  chills, headache, vision loss, hearing loss, weight loss, Generalized weakness, falls, loss of smell, loss of taste, poor appetite,  sore throat  Respiratory: cough, shortness of breath.   Cardiovascular: chest pain, dizziness, palpitations, orthopnea, swelling of feet, syncope  Gastrointestinal: nausea, vomiting, abdominal pain, diarrhea, black stool,  blood in stool, change in bowel habits, constipation  Genitourinary: hematuria, dysuria,  urgency, frequency  Integument/Breast: rash,  pruritis  Hematologic/Lymphatic: easy bruising, lymphadenopathy  Musculoskeletal: arthralgias , myalgias, back pain, neck pain, knee pain  Neurological: confusion, seizures, tremors, slurred speech  Behavioral/Psych:  depression, anxiety, auditory or visual hallucinations     OBJECTIVE:     Physical Exam:  Body mass index is 24.8 kg/m².    Constitutional: Appears well-developed and well-nourished.   Head: Normocephalic and atraumatic.   Neck: Normal range of motion. Neck supple.   Cardiovascular: Normal heart rate.  Regular heart rhythm.  Pulmonary/Chest: Effort normal.   Abdominal: No distension.  No tenderness. PEG tube in place  Musculoskeletal: contractures LUE/LLE  Neurological: Alert and oriented to person, place, ansers simple questions   Skin: Skin is warm and dry.   Psychiatric: Normal mood and affect. Behavior is normal.                  Vital Signs  Temp: 96.2 °F (35.7 °C) (12/31/24 0819)  Pulse: 91 (12/31/24 1227)  Resp: 18 (12/31/24 0529)  BP: 92/71 (12/31/24 1227)  SpO2: 98 % (12/31/24 0819)     24 Hour VS Range    Temp:  [96.1 °F (35.6 °C)-98 °F (36.7 °C)]   Pulse:  [40-95]   Resp:  [10-18]   BP: ()/(55-91)   SpO2:  [94 %-98 %]     Intake/Output Summary (Last 24 hours) at 12/31/2024 1421  Last data filed at 12/30/2024 1516  Gross per 24 hour   Intake 999 ml   Output --   Net 999 ml         I/O This Shift:  No intake/output data recorded.    Wt Readings from Last 3 Encounters:   12/30/24 63.5 kg (140 lb)   03/19/24 93.4 kg (205 lb 12.8 oz)   02/15/24 94.9 kg (209 lb 3.2 oz)       I have personally reviewed the vitals and recorded Intake/Output     Laboratory/Diagnostic Data:    CBC/Anemia Labs: Coags:    Recent Labs   Lab 12/30/24  1412   WBC 9.78   HGB 12.1   HCT 34.1*      MCV 74*   RDW 27.9*    Recent Labs   Lab 12/30/24 2022   INR 1.1   APTT 28.5        Chemistries: ABG:   Recent Labs   Lab 12/30/24  1412 12/31/24  0815   * 149*   K  "3.7 3.6   * 115*   CO2 24 21*   BUN 24* 23   CREATININE 1.0 0.8   CALCIUM 7.7* 7.4*   PROT 5.6* 4.8*   BILITOT 0.3 0.3   ALKPHOS 127 113   ALT 17 14   AST 22 19   MG  --  1.8   PHOS  --  2.5*    No results for input(s): "PH", "PCO2", "PO2", "HCO3", "POCSATURATED", "BE" in the last 168 hours.     POCT Glucose: HbA1c:    Recent Labs   Lab 12/30/24 2101 12/31/24  0814   POCTGLUCOSE 90 85    Hemoglobin A1C   Date Value Ref Range Status   12/30/2024 5.8 (H) 4.0 - 5.6 % Final     Comment:     ADA Screening Guidelines:  5.7-6.4%  Consistent with prediabetes  >or=6.5%  Consistent with diabetes    High levels of fetal hemoglobin interfere with the HbA1C  assay. Heterozygous hemoglobin variants (HbS, HgC, etc)do  not significantly interfere with this assay.   However, presence of multiple variants may affect accuracy.     07/24/2024 5.8 (H) 4.7 - 5.6 % Final   07/15/2024 5.9 (H) 4.7 - 5.6 % Final   04/02/2024 13.5 (H) 4.7 - 5.6 % Final   10/13/2020 7.8 (H) 4.8 - 5.6 % Final     Comment:              Prediabetes: 5.7 - 6.4           Diabetes: >6.4           Glycemic control for adults with diabetes: <7.0     06/30/2020 7.5 (H) 4.8 - 5.6 % Final     Comment:              Prediabetes: 5.7 - 6.4           Diabetes: >6.4           Glycemic control for adults with diabetes: <7.0          Cardiac Enzymes: Ejection Fractions:    No results for input(s): "CPK", "CPKMB", "MB", "TROPONINI" in the last 72 hours. No results found for: "EF"       Recent Labs   Lab 12/30/24  1815   COLORU Yellow   APPEARANCEUA Hazy*   PHUR 6.0   SPECGRAV 1.015   PROTEINUA Trace*   GLUCUA Negative   KETONESU 1+*   BILIRUBINUA Negative   OCCULTUA Trace*   NITRITE Negative   LEUKOCYTESUR 3+*   RBCUA 0   WBCUA 38*   BACTERIA Many*       Lactate (Lactic Acid) (mmol/L)   Date Value   12/30/2024 2.0   12/30/2024 2.1     BNP (pg/mL)   Date Value   04/02/2019 17     CRP (mg/L)   Date Value   12/31/2024 66.4 (H)   12/30/2024 59.4 (H)     No results found for: " ""DDIMER"  No results found for: "FERRITIN"  No results found for: "LDH"  Troponin I (ng/mL)   Date Value   06/28/2019 <0.006   04/02/2019 0.019     CPK (U/L)   Date Value   06/28/2019 38   04/02/2019 60     CK (U/L)   Date Value   04/02/2024 102   05/14/2023 85     Results for orders placed or performed in visit on 02/15/24   Vitamin D    Collection Time: 02/16/24  7:41 AM   Result Value Ref Range    Vit D, 25-Hydroxy 10.1 (L) 30.0 - 100.0 ng/mL   Results for orders placed or performed in visit on 12/29/22   Vitamin D    Collection Time: 12/29/22  7:43 AM   Result Value Ref Range    Vit D, 25-Hydroxy 17.3 (L) 30.0 - 100.0 ng/mL   Results for orders placed or performed in visit on 11/30/21   Vitamin D    Collection Time: 11/29/21 11:00 PM   Result Value Ref Range    Vit D, 25-Hydroxy 10.0 (L) 30.0 - 100.0 ng/mL     No results found for: "[id number]"    Microbiology labs for the last week  Microbiology Results (last 7 days)       Procedure Component Value Units Date/Time    Blood culture #2 **CANNOT BE ORDERED STAT** [[id number]] Collected: 12/30/24 1413    Order Status: Completed Specimen: Blood from Peripheral, Antecubital, Right Updated: 12/31/24 0115     Blood Culture, Routine No Growth to date    Blood culture #1 **CANNOT BE ORDERED STAT** [[id number]] Collected: 12/30/24 1418    Order Status: Completed Specimen: Blood from Peripheral, Hand, Right Updated: 12/31/24 0115     Blood Culture, Routine No Growth to date    Urine culture [[id number]] Collected: 12/30/24 1815    Order Status: No result Specimen: Urine Updated: 12/30/24 1903    Clostridium difficile EIA [[id number]]     Order Status: No result Specimen: Stool             Reviewed and noted in plan where applicable- Please see chart for full lab data.    Lines/Drains:       Peripheral IV - Single Lumen 12/30/24 1415 20 G Right Antecubital (Active)   Site Assessment Clean;Dry;Intact;No redness;No swelling;No warmth;No drainage 12/31/24 0000   Line Status " Infusing 12/31/24 0000   Dressing Status Intact;Dry;Clean 12/31/24 0000   Dressing Intervention Integrity maintained 12/31/24 0000   Number of days: 0       Imaging      Results for orders placed during the hospital encounter of 04/01/19    Transthoracic echo (TTE) complete (Cupid Only)    Interpretation Summary  · Normal left ventricular systolic function. The estimated ejection fraction is 68%  · Concentric left ventricular remodeling.  · Normal LV diastolic function.  · No wall motion abnormalities.  · Normal right ventricular systolic function.  · Mild mitral sclerosis.  · Normal central venous pressure (3 mm Hg).  · The estimated PA systolic pressure is 15 mm Hg      CT Abdomen Pelvis With IV Contrast NO Oral Contrast  Narrative: EXAMINATION:  CT ABDOMEN PELVIS WITH IV CONTRAST    CLINICAL HISTORY:  Abdominal pain, acute, nonlocalized;    TECHNIQUE:  Low dose axial images, sagittal and coronal reformations were obtained from the lung bases to the pubic symphysis following the IV administration of 100 mL of Omnipaque 350 .  Oral contrast was not given.    COMPARISON:  Chest radiograph 06/27/2019, report only for outside facility CT abdomen and pelvis 10/28/2024 and renal ultrasound 06/12/2024    FINDINGS:  Beam hardening with streak artifact from overlying monitoring leads and adjacent bilateral upper extremities with respiratory motion somewhat limits evaluation.    Lingular platelike scarring versus atelectasis.  Mild dependent atelectasis.  Patchy nonspecific pulmonary mosaic attenuation with bronchial wall thickening seen in the bilateral lower lobes few scattered clusters of small nodules also seen within the lower lobes, more so on the left no consolidation or pleural effusion.  Heart is normal in size without significant pericardial fluid.    Small hiatal hernia.  Peg tube balloon within the anterior aspect of the mid gastric body.  Stomach is mildly distended with mostly ingested fluid contents without  wall thickening or adjacent inflammatory change.  Duodenum is within normal limits.    Portal vasculature is patent.  Liver is normal in size with diffuse parenchymal hypoattenuation suggesting fatty infiltration, without discrete mass seen.    Pancreas diffusely atrophic.  No pancreatic mass or adjacent inflammatory change.  Gallbladder, spleen are within normal limits.  Non masslike thickening of the bilateral adrenal glands.  No significant biliary dilatation.    Bilateral kidneys are normal in size, shape and location with symmetric normal enhancement.  No hydronephrosis or significant perinephric stranding.  Ureters are normal in course and caliber.  Urinary bladder is well distended without wall thickening or adjacent inflammatory change noting small volume non dependent intraluminal gas.  Uterus not identified and likely surgically absent or atrophic.  No adnexal mass or significant volume free pelvic fluid.  Pelvic phleboliths noted.    Mild presacral edema, nonspecific.  There is apparent circumferential wall thickening of the distal rectum/anus.  No perirectal or perianal abscess or discrete mass seen.  No significant perirectal inflammatory change.  Numerous scattered colonic diverticula short-segment abnormal circumferential wall thickening of the mid sigmoid colon with mild adjacent inflammatory change concerning for acute diverticulitis.  Small collection of gas foci with fat stranding just superior to this bowel loop of sigmoid colon concerning for contained perforation.  No rim enhancing component identified.  No intraperitoneal free air seen elsewhere.  No bowel obstruction. Appendix and terminal ileum are within normal limits.  No bowel pneumatosis or portal venous gas.    No ascites.  No lymphadenopathy by CT criteria.    Mild scattered calcified and noncalcified plaque of the abdominal aorta extending into its iliac branches.  No aortic aneurysm or dissection.    Mild body wall subcutaneous edema  suggesting mild anasarca.    Osseous structures show generalized osteopenia, chronic minimal to mild anterior wedge deformity of a few lower thoracic vertebral bodies and age-related degenerative change.  No acute destructive osseous process seen.  Small sclerotic focus at the right femoral head likely a bone island.  Impression: 1. Findings concerning for acute diverticulitis of the mid sigmoid colon with adjacent suspected contained perforation.  No associated rim enhancement to suggest drainable abscess at this time.  No bowel obstruction.  Consider follow-up with imaging or endoscopy after therapy according to colorectal screening guidelines.  2. Apparent circumferential wall thickening of the distal rectum/anus which could be related to underdistention versus nonspecific proctitis.  No significant perirectal inflammatory change or evidence perirectal or perianal drainable abscess.  Mild presacral edema, nonspecific.  3. Left more than right basilar patchy nonspecific pulmonary mosaic attenuation with bronchial wall thickening which can be seen with small vessels disease including pulmonary edema or small airways process.  Additionally, there is left more than right basilar patchy clusters of small nodules suggesting infectious or inflammatory small airways process versus aspiration.  No large consolidation.  Attention on follow-up.  4. Suspected hepatic steatosis.  5. Small hiatal hernia.  Peg tube.  Other incidental  6. Other incidental/nonemergent findings in the body of the report.  This report was flagged in Epic as abnormal.    Electronically signed by: Celio De Leon MD  Date:    12/30/2024  Time:    18:56  CT Head Without Contrast  Narrative: EXAMINATION:  CT HEAD WITHOUT CONTRAST    CLINICAL HISTORY:  Mental status change, unknown cause;    TECHNIQUE:  Low dose axial CT images obtained throughout the head without intravenous contrast. Sagittal and coronal reconstructions were  performed.    COMPARISON:  06/27/2019    FINDINGS:  Patient is rotated to the right obscuring visualization.    Small probable remote areas of lacunar infarction following the basal ganglia bilaterally.    Prominent probable chronic microvascular ischemic changes in the periventricular and subcortical white matter.    No evidence of acute calvarial fracture.  Small probable chronic right parietal cortical infarction.  Impression: 1. No acute intracranial process.  See above comments.  2. Prominent involutional changes and chronic microvascular ischemic changes in the periventricular white matter.  Small areas of probable remote lacunar infarction in the bilateral basal ganglia and small probable chronic right parietal cortical infarct.  MRI follow-up may better characterize if there is high clinical suspicion.  See above comments.    Electronically signed by: Sebastian Tinoco  Date:    12/30/2024  Time:    17:01      Labs, Imaging, EKG and Diagnostic results from 12/31/2024 were reviewed.    Medications:  Medication list was reviewed and changes noted under Assessment/Plan.  No current facility-administered medications on file prior to encounter.     Current Outpatient Medications on File Prior to Encounter   Medication Sig Dispense Refill    acetaminophen (TYLENOL) 650 MG TbSR Take 1 tablet (650 mg total) by mouth every 8 (eight) hours. For back pain 30 tablet 1    albuterol (ACCUNEB) 1.25 mg/3 mL Nebu USE ONE VIAL in Nebulizer EVERY 6 HOURS AS NEEDED 75 mL 1    albuterol (PROAIR HFA) 90 mcg/actuation inhaler inhale ONE TO TWO puffs EVERY 6 HOURS into lungs AS NEEDED FOR WHEEZING AND SHORTNESS OF BREATH 8.5 g 1    amitriptyline (ELAVIL) 10 MG tablet TAKE 1 TABLET(10 MG) BY MOUTH EVERY NIGHT AS NEEDED FOR INSOMNIA 30 tablet 1    amitriptyline (ELAVIL) 10 MG tablet TAKE 1 TABLET(10 MG) BY MOUTH EVERY NIGHT AS NEEDED FOR INSOMNIA 30 tablet 1    amitriptyline (ELAVIL) 25 MG tablet TAKE 1 TABLET(25 MG) BY MOUTH EVERY  NIGHT AS NEEDED FOR INSOMNIA 30 tablet 0    amLODIPine (NORVASC) 10 MG tablet TAKE 1 TABLET(10 MG) BY MOUTH EVERY DAY 60 tablet 4    aspirin (ECOTRIN) 81 MG EC tablet Take 1 tablet (81 mg total) by mouth once daily.  0    atorvastatin (LIPITOR) 40 MG tablet TAKE 1 TABLET(40 MG) BY MOUTH EVERY DAY 30 tablet 3    atorvastatin (LIPITOR) 40 MG tablet TAKE 1 TABLET(40 MG) BY MOUTH EVERY DAY 30 tablet 3    blood sugar diagnostic (TRUE METRIX GLUCOSE TEST STRIP) Strp TO CHECK BLOOD GLUCOSE THREE TIMES DAILY 200 strip 2    blood-glucose meter (FREESTYLE SYSTEM KIT) kit Use daily- TID 1 each 1    blood-glucose meter kit Use as instructed 1 each 1    capsicum 0.075% (ZOSTRIX) 0.075 % topical cream Apply topically 3 (three) times daily. 60 g 0    capsicum 0.075% (ZOSTRIX) 0.075 % topical cream Apply topically 3 (three) times daily. 120 g 0    cephALEXin (KEFLEX) 500 MG capsule Take 1 capsule (500 mg total) by mouth every 8 (eight) hours. 15 capsule 0    cephALEXin (KEFLEX) 500 MG capsule Take 1 capsule (500 mg total) by mouth every 6 (six) hours. 30 capsule 0    clopidogreL (PLAVIX) 75 mg tablet Take 1 tablet (75 mg total) by mouth once daily. 30 tablet 2    diaper,brief,adult,disposable Misc 1 each by Misc.(Non-Drug; Combo Route) route 3 (three) times daily. 100 each 1    diazePAM (VALIUM) 5 MG tablet Take 1 tablet (5 mg total) by mouth 2 (two) times daily. 30 tablet 0    diclofenac (VOLTAREN) 75 MG EC tablet TAKE 1 TABLET(75 MG) BY MOUTH TWICE DAILY FOR 14 DAYS 28 tablet 0    diphenhydrAMINE (BENADRYL) 50 MG capsule Take 1 capsule (50 mg total) by mouth nightly as needed for Itching. 60 capsule 0     mg capsule TAKE ONE CAPSULE BY MOUTH TWICE DAILY 60 capsule 0    flash glucose sensor (FREESTYLE LEATHA 14 DAY SENSOR) Kit 1 each by Misc.(Non-Drug; Combo Route) route once daily. 2 kit 3    fluticasone propionate (FLONASE) 50 mcg/actuation nasal spray 1 spray (50 mcg total) by Each Nostril route once daily. 5 mL 0    food  supplemt, lactose-reduced (ENSURE MAX PROTEIN) Liqd Take 118 mLs by mouth 3 (three) times daily. 41046 mL 0    gabapentin (NEURONTIN) 100 MG capsule Take 1 capsule (100 mg total) by mouth every evening. 30 capsule 2    gabapentin (NEURONTIN) 300 MG capsule Take 1 capsule (300 mg total) by mouth 2 (two) times daily. 60 capsule 2    glipiZIDE 5 MG TR24 Take 1 tablet (5 mg total) by mouth daily with breakfast. 90 tablet 1    hydroCHLOROthiazide (HYDRODIURIL) 25 MG tablet Take 1 tablet (25 mg total) by mouth once daily. 30 tablet 3    hydrocortisone 2.5 % cream Apply topically 2 (two) times daily. 20 g 0    hydrOXYzine HCl (ATARAX) 25 MG tablet Take 1-2 tablets (25-50 mg total) by mouth 2 (two) times daily as needed for Itching. 30 tablet 1    hydrOXYzine HCL (ATARAX) 25 MG tablet Take 1-2 tablets (25-50 mg total) by mouth 2 (two) times daily as needed for Anxiety. 30 tablet 3    hydrOXYzine pamoate (VISTARIL) 25 MG Cap Take 1 capsule (25 mg total) by mouth every evening. 30 capsule 0    ibuprofen (ADVIL,MOTRIN) 800 MG tablet TAKE 1 TABLET(800 MG) BY MOUTH THREE TIMES DAILY WITH FOOD 30 tablet 1    ibuprofen (ADVIL,MOTRIN) 800 MG tablet Take 1 tablet (800 mg total) by mouth 3 (three) times daily. TAKE 1 TABLET BY MOUTH DAILY AS NEEDED 90 tablet 1    insulin aspart U-100 (NOVOLOG) 100 unit/mL (3 mL) InPn pen Inject 5-10 Units into the skin 3 (three) times daily with meals. 9 mL 4    insulin detemir U-100, Levemir, (LEVEMIR FLEXPEN) 100 unit/mL (3 mL) InPn pen Inject 30 Units into the skin every evening. 9 mL 3    lancets (ONETOUCH ULTRASOFT LANCETS) Misc Use 1 TID as directed for diabetes checking 100 each 3    lancets Misc To check BG 3 times daily, to use with insurance preferred meter 200 each 0    latanoprost 0.005 % ophthalmic solution Place 1 drop into both eyes every evening. 5 mL 0    metFORMIN (GLUCOPHAGE) 1000 MG tablet TAKE 1 TABLET(1000 MG) BY MOUTH TWICE DAILY 60 tablet 5    metFORMIN (GLUCOPHAGE) 1000 MG  "tablet Take 1 tablet (1,000 mg total) by mouth 2 (two) times a day. 60 tablet 5    miconazole nitrate 200 mg/5 gram (4 %) Crea Place 1 application vaginally once daily. 25 g 1    mupirocin (BACTROBAN) 2 % ointment Apply topically 2 (two) times daily. 22 g 1    mupirocin (BACTROBAN) 2 % ointment Apply topically 2 (two) times daily. AAA 20 g 0    mupirocin (BACTROBAN) 2 % ointment Apply topically 3 (three) times daily. 20 g 0    nicotine (NICODERM CQ) 21 mg/24 hr Place 1 patch onto the skin once daily. 28 patch 1    nicotine (NICODERM CQ) 21 mg/24 hr Place 1 patch onto the skin once daily. 30 patch 2    olmesartan (BENICAR) 40 MG tablet Take 1 tablet (40 mg total) by mouth once daily. 30 tablet 3    olopatadine (PATANOL) 0.1 % ophthalmic solution Place 1 drop into both eyes 2 (two) times daily.      omeprazole (PRILOSEC) 40 MG capsule Take 1 capsule (40 mg total) by mouth once daily. 90 capsule 3    omeprazole (PRILOSEC) 40 MG capsule TAKE 1 CAPSULE(40 MG) BY MOUTH EVERY DAY 90 capsule 3    pantoprazole (PROTONIX) 40 MG tablet Take 1 tablet (40 mg total) by mouth once daily. 30 tablet 3    pen needle, diabetic (BD ULTRA-FINE ORIG PEN NEEDLE) 29 gauge x 1/2" Ndle USE TO TEST THREE TIMES DAILY MUST LAST 33 DAYS 100 each 4    plecanatide (TRULANCE) 3 mg Tab Take 3 mg by mouth once daily. 30 tablet 2    ramelteon (ROZEREM) 8 mg tablet Take 1 tablet (8 mg total) by mouth every evening. 30 tablet 0    semaglutide (OZEMPIC) 1 mg/dose (2 mg/1.5 mL) PnIj Inject 1 mg into the skin every 7 days. 4 each 2    silver sulfADIAZINE 1% (SILVADENE) 1 % cream Apply topically 2 (two) times daily. 25 g 1    ticagrelor (BRILINTA) 90 mg tablet TAKE 1 TABLET(90 MG) BY MOUTH TWICE DAILY 60 tablet 6    traMADoL (ULTRAM) 50 mg tablet Take 1 tablet (50 mg total) by mouth every 24 hours as needed for Pain. 10 tablet 0    triamcinolone acetonide 0.1% (KENALOG) 0.1 % ointment APPLY TOPICALLY TO THE AFFECTED AREA TWICE DAILY 80 g 0    walker " (ULTRA-LIGHT ROLLATOR) Misc 1 each by Misc.(Non-Drug; Combo Route) route once daily at 6am. 1 each 0    wheelchair Kelsey 1 each by Misc.(Non-Drug; Combo Route) route 2 (two) times daily. Power Wheel Chair 1 each 0     Scheduled Medications:  Current Facility-Administered Medications   Medication Dose Route Frequency    aspirin  81 mg Oral Daily    atorvastatin  80 mg Oral Daily    gabapentin  250 mg Per G Tube Q8H    heparin (porcine)  5,000 Units Subcutaneous Q8H    insulin glargine U-100  5 Units Subcutaneous Daily    meropenem IV (PEDS and ADULTS)  1 g Intravenous Q8H    multivitamin  1 tablet Oral Daily    traZODone  50 mg Per G Tube QHS     PRN:   Current Facility-Administered Medications:     acetaminophen, 650 mg, Per G Tube, Q4H PRN    albuterol, 2 puff, Inhalation, Q6H PRN **AND** MDI Q6H PRN, , , Q6H PRN    dextrose 50%, 12.5 g, Intravenous, PRN    dextrose 50%, 25 g, Intravenous, PRN    glucagon (human recombinant), 1 mg, Intramuscular, PRN    glucose, 16 g, Oral, PRN    glucose, 24 g, Oral, PRN    insulin aspart U-100, 0-10 Units, Subcutaneous, QID (AC + HS) PRN    melatonin, 6 mg, Oral, Nightly PRN    morphine, 4 mg, Intravenous, Q4H PRN    naloxone, 0.02 mg, Intravenous, PRN    ondansetron, 4 mg, Intravenous, Q8H PRN    sodium chloride 0.9%, 10 mL, Intravenous, PRN    sodium chloride 0.9%, 10 mL, Intravenous, Q12H PRN  Infusions:    D5W   Intravenous Continuous        dextrose 5% lactated ringers   Intravenous Continuous         Estimated Creatinine Clearance: 66.2 mL/min (based on SCr of 0.8 mg/dL).             Assessment and Plan     * Diverticulitis  - CT with diverticulitis with contained perforation  - VSS without leukocytosis  ;  non-surgical abdomen on exam  - maintain npo for now  - CRS consult in am   (stat consult if clinical decompensation or concerning abdominal symptoms/signs)  - continue Meropenem  - ID consulted ; appreciate recs  - further management pending clinical course and future  study review    12/31  CT abdomen - racute diverticulitis of the mid sigmoid colon with adjacent suspected contained perforation.  No associated rim enhancement to suggest drainable abscess at this time.  No bowel obstruction. Apparent circumferential wall thickening of the distal rectum/anus which could be related to underdistention versus nonspecific proctitis.  No significant perirectal inflammatory change or evidence perirectal or perianal drainable abscess.  Left more than right basilar patchy nonspecific pulmonary mosaic attenuation with bronchial wall thickening pulmonary edema or small airways process / right basilar patchy clusters of small nodules suggesting infectious or inflammatory small airways process versus aspiration.  No large consolidation. s/p CRS eval - hemodynamically normal with benign abdominal exam, and reassuring labwork. No acute surgical intervention indicated. continue NPO status, with IVF hydration and IV meropenem.  trend CRP 59  C.diff assay pending. s/p CRS eval - hemodynamically normal with benign abdominal exam, and reassuring labwork. No acute surgical intervention indicated. continue NPO status, with IVF hydration and IV meropenem.       Hypernatremia  Hypernatremia is likely due to Dehydration. monitor with hydration   Recent Labs     12/30/24  1412 12/31/24  0815   * 149*    started on D5W infusion       Dysphagia  - sp PEG for nutrition and meds  ;  okay for comfort po feeds per recent SLP recs  - NPO as above at this time  - IVFs  - plan to resume tube feeds once appropriate      UTI (urinary tract infection)  - recent outside culture with ESBL  - start meropenem  - ID consulted  - follow up cultures  - further management pending clinical course    12/31  continue IV meropenem.  trend CRP 59  C.diff assay pending.  ID eval - continue meropenem. If no Pseudomonas isolated, okay to de-escalate to ertapenem.    Multiple wounds of skin  - Wound care consulted  - turn q2  -  waffle mattress overlay  - clean and dressed        History of stroke with residual effects  - chronic left hemiplegia, bed bound, sp PEG though able to tolerate po comfort feed  - continue home ASA and statin  - turn q2h      12/31 CT head -No acute intracranial process. Prominent involutional changes and chronic microvascular ischemic changes in the periventricular white matter.  Small areas of probable remote lacunar infarction in the bilateral basal ganglia and small probable chronic right parietal cortical infarct.     Insomnia  - home trazadone      Mixed hyperlipidemia  - home statin        VTE Risk Mitigation (From admission, onward)           Ordered     heparin (porcine) injection 5,000 Units  Every 8 hours         12/30/24 1937     IP VTE HIGH RISK PATIENT  Once         12/30/24 1937     Place sequential compression device  Until discontinued         12/30/24 1937     Place sequential compression device  Until discontinued         12/30/24 1929                    Discharge Planning   ANA: 1/2/2025     Code Status: Full Code   Medical Readiness for Discharge Date:                            Abraham Feliz MD  Department of Hospital Medicine   [facility] - Med Surg

## 2025-01-01 LAB
ALBUMIN SERPL BCP-MCNC: 1.4 G/DL (ref 3.5–5.2)
ALP SERPL-CCNC: 122 U/L (ref 40–150)
ALT SERPL W/O P-5'-P-CCNC: 13 U/L (ref 10–44)
ANION GAP SERPL CALC-SCNC: 10 MMOL/L (ref 8–16)
AST SERPL-CCNC: 26 U/L (ref 10–40)
BASOPHILS # BLD AUTO: 0.02 K/UL (ref 0–0.2)
BASOPHILS NFR BLD: 0.2 % (ref 0–1.9)
BILIRUB SERPL-MCNC: 0.4 MG/DL (ref 0.1–1)
BUN SERPL-MCNC: 21 MG/DL (ref 8–23)
CALCIUM SERPL-MCNC: 7.5 MG/DL (ref 8.7–10.5)
CHLORIDE SERPL-SCNC: 110 MMOL/L (ref 95–110)
CO2 SERPL-SCNC: 23 MMOL/L (ref 23–29)
CREAT SERPL-MCNC: 0.8 MG/DL (ref 0.5–1.4)
DIFFERENTIAL METHOD BLD: ABNORMAL
EOSINOPHIL # BLD AUTO: 0 K/UL (ref 0–0.5)
EOSINOPHIL NFR BLD: 0.3 % (ref 0–8)
ERYTHROCYTE [DISTWIDTH] IN BLOOD BY AUTOMATED COUNT: 24.6 % (ref 11.5–14.5)
EST. GFR  (NO RACE VARIABLE): >60 ML/MIN/1.73 M^2
GLUCOSE SERPL-MCNC: 110 MG/DL (ref 70–110)
HCT VFR BLD AUTO: 33.7 % (ref 37–48.5)
HGB BLD-MCNC: 11.1 G/DL (ref 12–16)
IMM GRANULOCYTES # BLD AUTO: 0.09 K/UL (ref 0–0.04)
IMM GRANULOCYTES NFR BLD AUTO: 0.9 % (ref 0–0.5)
LYMPHOCYTES # BLD AUTO: 1.7 K/UL (ref 1–4.8)
LYMPHOCYTES NFR BLD: 17.2 % (ref 18–48)
MAGNESIUM SERPL-MCNC: 1.9 MG/DL (ref 1.6–2.6)
MCH RBC QN AUTO: 23.1 PG (ref 27–31)
MCHC RBC AUTO-ENTMCNC: 32.9 G/DL (ref 32–36)
MCV RBC AUTO: 70 FL (ref 82–98)
MONOCYTES # BLD AUTO: 0.7 K/UL (ref 0.3–1)
MONOCYTES NFR BLD: 6.7 % (ref 4–15)
NEUTROPHILS # BLD AUTO: 7.3 K/UL (ref 1.8–7.7)
NEUTROPHILS NFR BLD: 74.7 % (ref 38–73)
NRBC BLD-RTO: 0 /100 WBC
PHOSPHATE SERPL-MCNC: 2.6 MG/DL (ref 2.7–4.5)
PLATELET # BLD AUTO: 420 K/UL (ref 150–450)
PMV BLD AUTO: 9.1 FL (ref 9.2–12.9)
POCT GLUCOSE: 109 MG/DL (ref 70–110)
POCT GLUCOSE: 111 MG/DL (ref 70–110)
POCT GLUCOSE: 122 MG/DL (ref 70–110)
POCT GLUCOSE: 125 MG/DL (ref 70–110)
POCT GLUCOSE: 82 MG/DL (ref 70–110)
POTASSIUM SERPL-SCNC: 3.6 MMOL/L (ref 3.5–5.1)
PROT SERPL-MCNC: 5 G/DL (ref 6–8.4)
RBC # BLD AUTO: 4.8 M/UL (ref 4–5.4)
SODIUM SERPL-SCNC: 143 MMOL/L (ref 136–145)
WBC # BLD AUTO: 9.83 K/UL (ref 3.9–12.7)

## 2025-01-01 PROCEDURE — 25000003 PHARM REV CODE 250: Performed by: FAMILY MEDICINE

## 2025-01-01 PROCEDURE — 63600175 PHARM REV CODE 636 W HCPCS: Performed by: FAMILY MEDICINE

## 2025-01-01 PROCEDURE — 21400001 HC TELEMETRY ROOM

## 2025-01-01 PROCEDURE — 84100 ASSAY OF PHOSPHORUS: CPT | Performed by: FAMILY MEDICINE

## 2025-01-01 PROCEDURE — 11000001 HC ACUTE MED/SURG PRIVATE ROOM

## 2025-01-01 PROCEDURE — 83735 ASSAY OF MAGNESIUM: CPT | Performed by: FAMILY MEDICINE

## 2025-01-01 PROCEDURE — 27000207 HC ISOLATION

## 2025-01-01 PROCEDURE — 80053 COMPREHEN METABOLIC PANEL: CPT | Performed by: FAMILY MEDICINE

## 2025-01-01 PROCEDURE — 85025 COMPLETE CBC W/AUTO DIFF WBC: CPT | Performed by: HOSPITALIST

## 2025-01-01 RX ORDER — NAPROXEN SODIUM 220 MG/1
81 TABLET, FILM COATED ORAL DAILY
Status: DISCONTINUED | OUTPATIENT
Start: 2025-01-02 | End: 2025-01-18 | Stop reason: HOSPADM

## 2025-01-01 RX ORDER — IBUPROFEN 200 MG
1 TABLET ORAL DAILY
Status: CANCELLED | OUTPATIENT
Start: 2025-01-01

## 2025-01-01 RX ORDER — ATORVASTATIN CALCIUM 40 MG/1
80 TABLET, FILM COATED ORAL DAILY
Status: DISCONTINUED | OUTPATIENT
Start: 2025-01-02 | End: 2025-01-05

## 2025-01-01 RX ADMIN — MEROPENEM 1 G: 1 INJECTION INTRAVENOUS at 12:01

## 2025-01-01 RX ADMIN — MEROPENEM 1 G: 1 INJECTION INTRAVENOUS at 05:01

## 2025-01-01 RX ADMIN — HEPARIN SODIUM 5000 UNITS: 5000 INJECTION INTRAVENOUS; SUBCUTANEOUS at 05:01

## 2025-01-01 RX ADMIN — GABAPENTIN 250 MG: 250 SOLUTION ORAL at 10:01

## 2025-01-01 RX ADMIN — HEPARIN SODIUM 5000 UNITS: 5000 INJECTION INTRAVENOUS; SUBCUTANEOUS at 10:01

## 2025-01-01 RX ADMIN — GABAPENTIN 250 MG: 250 SOLUTION ORAL at 12:01

## 2025-01-01 RX ADMIN — HEPARIN SODIUM 5000 UNITS: 5000 INJECTION INTRAVENOUS; SUBCUTANEOUS at 12:01

## 2025-01-01 RX ADMIN — TRAZODONE HYDROCHLORIDE 50 MG: 50 TABLET ORAL at 10:01

## 2025-01-01 RX ADMIN — GABAPENTIN 250 MG: 250 SOLUTION ORAL at 05:01

## 2025-01-01 NOTE — CONSULTS
[facility] - Med Surg  Adult Nutrition  Consult Note    SUMMARY     Recommendations  --Continuous TF recommendation: Glucerna 1.5 @ 50 mL/hr to provide 1200 mL total volume, 1800 kcal, 150 g CHO, 99 g protein, 19 g fiber, 910 mL free water, 120% DRI         --150 mL flush q4 hrs to provide additional 900 mL free water (Total 1810mL)    Goals: Provide nutrition within 48 hours  Nutrition Goal Status: new  Communication of RD Recs:  (POC)    Assessment and Plan    Nutrition Problem  Difficulty swallowing     Related to (etiology):   Dysphagia     Signs and Symptoms (as evidenced by):   PEG tube     Interventions/Recommendations (treatment strategy):  Collaboration of nutrition care with other providers   EN    Nutrition Diagnosis Status:   New         Malnutrition Assessment    NFPE at follow-up    Reason for Assessment    Reason For Assessment: consult (PEG tube feeds)  Diagnosis:  (Diverticulitis)  General Information Comments:  59-year-old female, history of a stroke with left hemiplegia, bed-bound, diabetes, hypertension, recurrent infections, dysphagia sp PEG, multiple skin wounds, multiple recent [place] in the last [place] [facility] most recently last week for UTI, discharged with Keflex, living at home with her daughter, now brought in for diarrhea.  Daughter states the patient has had diarrhea for about a week and a half.  She is having about 4-5 episodes a day, watery, nonbloody.  She has intermittently also vomiting.  Patient has a PEG tube for nutrition but per most recent SLP note can also take p.o. nutrition but daughter says she has had minimal p.o. intake because she has said she is not hungry.  RD consult for PEG feed recs. Attempted to ask pt about home TF regimen - pt unsure. Secured chatted nurse, unsure as well. Looked at previous RD notes - appears pt has been on Glucerna 1.5 the last few months. Noted 32.2% weight loss x 1 year. NFPE to be performed at follow-up upon improved  "mentation.     Nutrition Discharge Planning: Pending clinical course    Nutrition Related Social Determinants of Health: SDOH: Adequate food in home environment    Nutrition/Diet History    Spiritual, Cultural Beliefs, [employer] Practices, Values that Affect Care: no  Food Allergies: NKFA  Factors Affecting Nutritional Intake: NPO    Anthropometrics    Temp: 98 °F (36.7 °C)  Height Method: Estimated  Height: 5' 3" (160 cm)  Height (inches): 63 in  Weight Method: Bed Scale  Weight: 63.5 kg (139 lb 15.9 oz)  Weight (lb): 139.99 lb  Ideal Body Weight (IBW), Female: 115 lb  % Ideal Body Weight, Female (lb): 121.73 %  BMI (Calculated): 24.8  BMI Grade: 18.5-24.9 - normal       Lab/Procedures/Meds    Pertinent Labs Reviewed: reviewed - hemoglobin 11.1, hematocrit 33.7, MCV 70, MCH 23.1, Ca 7.5, P 2.6, CRP 66.4    Pertinent Medications Reviewed: reviewed - aspirin, atorvastatin, heparin, insulin, MVI    Estimated/Assessed Needs    Weight Used For Calorie Calculations: 63.5 kg (139 lb 15.9 oz)  Energy Calorie Requirements (kcal): 8358-4949 kcal/day (30-35 kcal/kg)  Energy Need Method: Kcal/kg  Protein Requirements: 76-95 g/day (1.2-1.5 g/kg)  Weight Used For Protein Calculations: 63.5 kg (139 lb 15.9 oz)     Estimated Fluid Requirement Method: RDA Method  RDA Method (mL): 1905  CHO Requirement: 238-278 g/day      Nutrition Prescription Ordered    Current Diet Order: NPO    Evaluation of Received Nutrient/Fluid Intake    Comments: LBM 12/31  % Intake of Estimated Energy Needs: TRISTIAN   % Meal Intake: NPO     Nutrition Risk    Level of Risk/Frequency of Follow-up: high     Monitor and Evaluation    Food and Nutrient Intake: enteral nutrition intake  Food and Nutrient Adminstration: enteral and parenteral nutrition administration  Knowledge/Beliefs/Attitudes: food and nutrition knowledge/skill  Physical Activity and Function: nutrition-related ADLs and IADLs  Anthropometric Measurements: weight, weight change, body mass " index  Biochemical Data, Medical Tests and Procedures: electrolyte and renal panel, gastrointestinal profile, glucose/endocrine profile, inflammatory profile, lipid profile  Nutrition-Focused Physical Findings: overall appearance       Nutrition Follow-Up    RD Follow-up?: Yes    Jazzmine Dempsey, Registration Eligible, Provisional LDN

## 2025-01-01 NOTE — ASSESSMENT & PLAN NOTE
- recent outside culture with ESBL  - start meropenem  - ID consulted  - follow up cultures  - further management pending clinical course    12/31  continue IV meropenem.  trend CRP 59  C.diff assay pending.    1/1 UC GNRs.ID eval - continue meropenem. If no Pseudomonas isolated, okay to de-escalate to ertapenem.    - Ertapenem per ID recs EED 01/09/25

## 2025-01-01 NOTE — PLAN OF CARE
Recommendations  --Continuous TF recommendation: Glucerna 1.5 @ 50 mL/hr to provide 1200 mL total volume, 1800 kcal, 150 g CHO, 99 g protein, 19 g fiber, 910 mL free water, 120% DRI         --150 mL flush q4 hrs to provide additional 900 mL free water (Total 1810mL)    Goals: Provide nutrition within 48 hours  Nutrition Goal Status: new  Communication of RD Recs:  (POC)

## 2025-01-01 NOTE — PROGRESS NOTES
[place] [facility] Medicine  Progress Note    Patient Name: Emily Martinez  MRN: [id number]  Patient Class: IP- Inpatient   Admission Date: 12/30/2024  Length of Stay: 1 days  Attending Physician: Abraham Feliz MD  Primary Care Provider: Alireza Sosa MD        Subjective     Principal Problem:Diverticulitis        HPI:  59-year-old female, history of a stroke with left hemiplegia, bed-bound, diabetes, hypertension, recurrent infections, dysphagia sp PEG, multiple skin wounds, multiple recent admissions in the last several months in the [facility] system, most recently last week for UTI, discharged with Keflex, living at home with her daughter, now brought in for diarrhea.  Daughter states the patient has had diarrhea for about a week and a half.  She is having about 4-5 episodes a day, watery, nonbloody.  She has intermittently also vomiting.  Patient has a PEG tube for nutrition but per most recent SLP note can also take p.o. nutrition but daughter says she has had minimal p.o. intake because she has said she is not hungry.  No fevers noted.  Daughter is frustrated as she feels like when patient gets admitted, she is temporarily better and then very quickly becomes ill again.  All of their care has been in the [facility] system but they decided to come to [facility] today as they were hopeful that we would be able to get her better.       Of note, urine culture from 6 days ago is growing out ESBL E coli, greater than 100,000 colonies    In the ED patient afebrile and hemodynamically stable saturating well on room air at rest. No leukocytosis. UA still concerning for UTI and patient started on meropenem. CT abdomen performed and noted acute diverticulitis with area of concern for contained perforation. Patient without abdominal tenderness. Patient admitted to the [facility] for further evaluation and management.    Overview/Hospital Course:  12/31 CT head -No acute intracranial process. Prominent  involutional changes and chronic microvascular ischemic changes in the periventricular white matter.  Small areas of probable remote lacunar infarction in the bilateral basal ganglia and small probable chronic right parietal cortical infarct. CT abdomen - racute diverticulitis of the mid sigmoid colon with adjacent suspected contained perforation.  No associated rim enhancement to suggest drainable abscess at this time.  No bowel obstruction. Apparent circumferential wall thickening of the distal rectum/anus which could be related to underdistention versus nonspecific proctitis.  No significant perirectal inflammatory change or evidence perirectal or perianal drainable abscess.  Left more than right basilar patchy nonspecific pulmonary mosaic attenuation with bronchial wall thickening pulmonary edema or small airways process / right basilar patchy clusters of small nodules suggesting infectious or inflammatory small airways process versus aspiration.  No large consolidation. s/p CRS eval - hemodynamically normal with benign abdominal exam, and reassuring labwork. No acute surgical intervention indicated. continue NPO status, with IVF hydration and IV meropenem.  trend CRP 59-->66. CRS eval -  continue to trend CRP  if downtrending and passing flatus, okay to start on clears and downtitrate IVFC.  1/1 UC GNRs.  ID eval - continue meropenem. If no Pseudomonas isolated, okay to de-escalate to ertapenem. C diff assay pending          Review of Systems:   Pain scale:   Constitutional:  fever,  chills, headache, vision loss, hearing loss, weight loss, Generalized weakness, falls, loss of smell, loss of taste, poor appetite,  sore throat  Respiratory: cough, shortness of breath.   Cardiovascular: chest pain, dizziness, palpitations, orthopnea, swelling of feet, syncope  Gastrointestinal: nausea, vomiting, abdominal pain, diarrhea, black stool,  blood in stool, change in bowel habits, constipation  Genitourinary: hematuria,  dysuria, urgency, frequency  Integument/Breast: rash,  pruritis  Hematologic/Lymphatic: easy bruising, lymphadenopathy  Musculoskeletal: arthralgias , myalgias, back pain, neck pain, knee pain  Neurological: confusion, seizures, tremors, slurred speech  Behavioral/Psych:  depression, anxiety, auditory or visual hallucinations     OBJECTIVE:     Physical Exam:  Body mass index is 24.8 kg/m².    Constitutional: Appears well-developed and well-nourished.   Head: Normocephalic and atraumatic.   Neck: Normal range of motion. Neck supple.   Cardiovascular: Normal heart rate.  Regular heart rhythm.  Pulmonary/Chest: Effort normal.   Abdominal: No distension.  No tenderness. PEG tube in place  Musculoskeletal: contractures LUE/LLE  Neurological: somnolent today. baseline- Alert and oriented to person, place, ansers simple questions   Skin: Skin is warm and dry.   Psychiatric: Normal mood and affect. Behavior is normal.                  Vital Signs  Temp: 98 °F (36.7 °C) (01/01/25 1125)  Pulse: 78 (01/01/25 1125)  Resp: 18 (01/01/25 1125)  BP: 107/64 (01/01/25 1125)  SpO2: (!) 92 % (01/01/25 1125)     24 Hour VS Range    Temp:  [97.4 °F (36.3 °C)-98.6 °F (37 °C)]   Pulse:  [76-88]   Resp:  [18]   BP: ()/(60-74)   SpO2:  [92 %-96 %]     Intake/Output Summary (Last 24 hours) at 1/1/2025 1253  Last data filed at 1/1/2025 0556  Gross per 24 hour   Intake 730.21 ml   Output --   Net 730.21 ml         I/O This Shift:  No intake/output data recorded.    Wt Readings from Last 3 Encounters:   01/01/25 63.5 kg (139 lb 15.9 oz)   03/19/24 93.4 kg (205 lb 12.8 oz)   02/15/24 94.9 kg (209 lb 3.2 oz)       I have personally reviewed the vitals and recorded Intake/Output     Laboratory/Diagnostic Data:    CBC/Anemia Labs: Coags:    Recent Labs   Lab 12/30/24  1412 01/01/25  0811   WBC 9.78 9.83   HGB 12.1 11.1*   HCT 34.1* 33.7*    420   MCV 74* 70*   RDW 27.9* 24.6*    Recent Labs   Lab 12/30/24 2022   INR 1.1   APTT 28.5     "    Chemistries: ABG:   Recent Labs   Lab 12/30/24  1412 12/31/24  0815 12/31/24  1422 01/01/25  0811   * 149* 148* 143   K 3.7 3.6 3.5 3.6   * 115* 114* 110   CO2 24 21* 22* 23   BUN 24* 23 23 21   CREATININE 1.0 0.8 0.8 0.8   CALCIUM 7.7* 7.4* 7.8* 7.5*   PROT 5.6* 4.8*  --  5.0*   BILITOT 0.3 0.3  --  0.4   ALKPHOS 127 113  --  122   ALT 17 14  --  13   AST 22 19  --  26   MG  --  1.8  --  1.9   PHOS  --  2.5* 2.5* 2.6*    No results for input(s): "PH", "PCO2", "PO2", "HCO3", "POCSATURATED", "BE" in the last 168 hours.     POCT Glucose: HbA1c:    Recent Labs   Lab 12/31/24  0814 12/31/24  1226 12/31/24  1633 01/01/25  0333 01/01/25  0748 01/01/25  1127   POCTGLUCOSE 85 115* 113* 111* 122* 125*    Hemoglobin A1C   Date Value Ref Range Status   12/30/2024 5.8 (H) 4.0 - 5.6 % Final     Comment:     ADA Screening Guidelines:  5.7-6.4%  Consistent with prediabetes  >or=6.5%  Consistent with diabetes    High levels of fetal hemoglobin interfere with the HbA1C  assay. Heterozygous hemoglobin variants (HbS, HgC, etc)do  not significantly interfere with this assay.   However, presence of multiple variants may affect accuracy.     07/24/2024 5.8 (H) 4.7 - 5.6 % Final   07/15/2024 5.9 (H) 4.7 - 5.6 % Final   04/02/2024 13.5 (H) 4.7 - 5.6 % Final   10/13/2020 7.8 (H) 4.8 - 5.6 % Final     Comment:              Prediabetes: 5.7 - 6.4           Diabetes: >6.4           Glycemic control for adults with diabetes: <7.0     06/30/2020 7.5 (H) 4.8 - 5.6 % Final     Comment:              Prediabetes: 5.7 - 6.4           Diabetes: >6.4           Glycemic control for adults with diabetes: <7.0          Cardiac Enzymes: Ejection Fractions:    No results for input(s): "CPK", "CPKMB", "MB", "TROPONINI" in the last 72 hours. No results found for: "EF"       Recent Labs   Lab 12/30/24  1815   COLORU Yellow   APPEARANCEUA Hazy*   PHUR 6.0   SPECGRAV 1.015   PROTEINUA Trace*   GLUCUA Negative   KETONESU 1+*   BILIRUBINUA Negative " "  OCCULTUA Trace*   NITRITE Negative   LEUKOCYTESUR 3+*   RBCUA 0   WBCUA 38*   BACTERIA Many*       Lactate (Lactic Acid) (mmol/L)   Date Value   12/30/2024 2.0   12/30/2024 2.1     BNP (pg/mL)   Date Value   04/02/2019 17     CRP (mg/L)   Date Value   12/31/2024 66.4 (H)   12/30/2024 59.4 (H)     No results found for: "DDIMER"  No results found for: "FERRITIN"  No results found for: "LDH"  Troponin I (ng/mL)   Date Value   06/28/2019 <0.006   04/02/2019 0.019     CPK (U/L)   Date Value   06/28/2019 38   04/02/2019 60     CK (U/L)   Date Value   04/02/2024 102   05/14/2023 85     Results for orders placed or performed in visit on 02/15/24   Vitamin D    Collection Time: 02/16/24  7:41 AM   Result Value Ref Range    Vit D, 25-Hydroxy 10.1 (L) 30.0 - 100.0 ng/mL   Results for orders placed or performed in visit on 12/29/22   Vitamin D    Collection Time: 12/29/22  7:43 AM   Result Value Ref Range    Vit D, 25-Hydroxy 17.3 (L) 30.0 - 100.0 ng/mL   Results for orders placed or performed in visit on 11/30/21   Vitamin D    Collection Time: 11/29/21 11:00 PM   Result Value Ref Range    Vit D, 25-Hydroxy 10.0 (L) 30.0 - 100.0 ng/mL     No results found for: "[id number]"    Microbiology labs for the last week  Microbiology Results (last 7 days)       Procedure Component Value Units Date/Time    Blood culture #2 **CANNOT BE ORDERED STAT** [[id number]] Collected: 12/30/24 1413    Order Status: Completed Specimen: Blood from Peripheral, Antecubital, Right Updated: 12/31/24 2012     Blood Culture, Routine No Growth to date      No Growth to date    Blood culture #1 **CANNOT BE ORDERED STAT** [[id number]] Collected: 12/30/24 1418    Order Status: Completed Specimen: Blood from Peripheral, Hand, Right Updated: 12/31/24 2012     Blood Culture, Routine No Growth to date      No Growth to date    Urine culture [[id number]]  (Abnormal) Collected: 12/30/24 1815    Order Status: Completed Specimen: Urine Updated: 12/31/24 1733     " Urine Culture, Routine GRAM NEGATIVE GIANNI  >100,000 cfu/ml  Identification and susceptibility pending      Narrative:      Specimen Source->Urine    Clostridium difficile EIA [[id number]]     Order Status: No result Specimen: Stool             Reviewed and noted in plan where applicable- Please see chart for full lab data.    Lines/Drains:       Peripheral IV - Single Lumen 12/30/24 1415 20 G Right Antecubital (Active)   Site Assessment Clean;Dry;Intact;No redness;No swelling;No warmth;No drainage 12/31/24 0000   Line Status Infusing 12/31/24 0000   Dressing Status Intact;Dry;Clean 12/31/24 0000   Dressing Intervention Integrity maintained 12/31/24 0000   Number of days: 0       Imaging      Results for orders placed during the hospital encounter of 04/01/19    Transthoracic echo (TTE) complete (Cupid Only)    Interpretation Summary  · Normal left ventricular systolic function. The estimated ejection fraction is 68%  · Concentric left ventricular remodeling.  · Normal LV diastolic function.  · No wall motion abnormalities.  · Normal right ventricular systolic function.  · Mild mitral sclerosis.  · Normal central venous pressure (3 mm Hg).  · The estimated PA systolic pressure is 15 mm Hg      CT Abdomen Pelvis With IV Contrast NO Oral Contrast  Narrative: EXAMINATION:  CT ABDOMEN PELVIS WITH IV CONTRAST    CLINICAL HISTORY:  Abdominal pain, acute, nonlocalized;    TECHNIQUE:  Low dose axial images, sagittal and coronal reformations were obtained from the lung bases to the pubic symphysis following the IV administration of 100 mL of Omnipaque 350 .  Oral contrast was not given.    COMPARISON:  Chest radiograph 06/27/2019, report only for outside facility CT abdomen and pelvis 10/28/2024 and renal ultrasound 06/12/2024    FINDINGS:  Beam hardening with streak artifact from overlying monitoring leads and adjacent bilateral upper extremities with respiratory motion somewhat limits evaluation.    Lingular platelike  scarring versus atelectasis.  Mild dependent atelectasis.  Patchy nonspecific pulmonary mosaic attenuation with bronchial wall thickening seen in the bilateral lower lobes few scattered clusters of small nodules also seen within the lower lobes, more so on the left no consolidation or pleural effusion.  Heart is normal in size without significant pericardial fluid.    Small hiatal hernia.  Peg tube balloon within the anterior aspect of the mid gastric body.  Stomach is mildly distended with mostly ingested fluid contents without wall thickening or adjacent inflammatory change.  Duodenum is within normal limits.    Portal vasculature is patent.  Liver is normal in size with diffuse parenchymal hypoattenuation suggesting fatty infiltration, without discrete mass seen.    Pancreas diffusely atrophic.  No pancreatic mass or adjacent inflammatory change.  Gallbladder, spleen are within normal limits.  Non masslike thickening of the bilateral adrenal glands.  No significant biliary dilatation.    Bilateral kidneys are normal in size, shape and location with symmetric normal enhancement.  No hydronephrosis or significant perinephric stranding.  Ureters are normal in course and caliber.  Urinary bladder is well distended without wall thickening or adjacent inflammatory change noting small volume non dependent intraluminal gas.  Uterus not identified and likely surgically absent or atrophic.  No adnexal mass or significant volume free pelvic fluid.  Pelvic phleboliths noted.    Mild presacral edema, nonspecific.  There is apparent circumferential wall thickening of the distal rectum/anus.  No perirectal or perianal abscess or discrete mass seen.  No significant perirectal inflammatory change.  Numerous scattered colonic diverticula short-segment abnormal circumferential wall thickening of the mid sigmoid colon with mild adjacent inflammatory change concerning for acute diverticulitis.  Small collection of gas foci with fat  stranding just superior to this bowel loop of sigmoid colon concerning for contained perforation.  No rim enhancing component identified.  No intraperitoneal free air seen elsewhere.  No bowel obstruction. Appendix and terminal ileum are within normal limits.  No bowel pneumatosis or portal venous gas.    No ascites.  No lymphadenopathy by CT criteria.    Mild scattered calcified and noncalcified plaque of the abdominal aorta extending into its iliac branches.  No aortic aneurysm or dissection.    Mild body wall subcutaneous edema suggesting mild anasarca.    Osseous structures show generalized osteopenia, chronic minimal to mild anterior wedge deformity of a few lower thoracic vertebral bodies and age-related degenerative change.  No acute destructive osseous process seen.  Small sclerotic focus at the right femoral head likely a bone island.  Impression: 1. Findings concerning for acute diverticulitis of the mid sigmoid colon with adjacent suspected contained perforation.  No associated rim enhancement to suggest drainable abscess at this time.  No bowel obstruction.  Consider follow-up with imaging or endoscopy after therapy according to colorectal screening guidelines.  2. Apparent circumferential wall thickening of the distal rectum/anus which could be related to underdistention versus nonspecific proctitis.  No significant perirectal inflammatory change or evidence perirectal or perianal drainable abscess.  Mild presacral edema, nonspecific.  3. Left more than right basilar patchy nonspecific pulmonary mosaic attenuation with bronchial wall thickening which can be seen with small vessels disease including pulmonary edema or small airways process.  Additionally, there is left more than right basilar patchy clusters of small nodules suggesting infectious or inflammatory small airways process versus aspiration.  No large consolidation.  Attention on follow-up.  4. Suspected hepatic steatosis.  5. Small hiatal  hernia.  Peg tube.  Other incidental  6. Other incidental/nonemergent findings in the body of the report.  This report was flagged in Epic as abnormal.    Electronically signed by: Celio De Leon MD  Date:    12/30/2024  Time:    18:56  CT Head Without Contrast  Narrative: EXAMINATION:  CT HEAD WITHOUT CONTRAST    CLINICAL HISTORY:  Mental status change, unknown cause;    TECHNIQUE:  Low dose axial CT images obtained throughout the head without intravenous contrast. Sagittal and coronal reconstructions were performed.    COMPARISON:  06/27/2019    FINDINGS:  Patient is rotated to the right obscuring visualization.    Small probable remote areas of lacunar infarction following the basal ganglia bilaterally.    Prominent probable chronic microvascular ischemic changes in the periventricular and subcortical white matter.    No evidence of acute calvarial fracture.  Small probable chronic right parietal cortical infarction.  Impression: 1. No acute intracranial process.  See above comments.  2. Prominent involutional changes and chronic microvascular ischemic changes in the periventricular white matter.  Small areas of probable remote lacunar infarction in the bilateral basal ganglia and small probable chronic right parietal cortical infarct.  MRI follow-up may better characterize if there is high clinical suspicion.  See above comments.    Electronically signed by: Sebastian Tinoco  Date:    12/30/2024  Time:    17:01      Labs, Imaging, EKG and Diagnostic results from 1/1/2025 were reviewed.    Medications:  Medication list was reviewed and changes noted under Assessment/Plan.  No current facility-administered medications on file prior to encounter.     Current Outpatient Medications on File Prior to Encounter   Medication Sig Dispense Refill    acetaminophen (TYLENOL) 650 MG TbSR Take 1 tablet (650 mg total) by mouth every 8 (eight) hours. For back pain 30 tablet 1    albuterol (ACCUNEB) 1.25 mg/3 mL Nebu USE ONE VIAL in  Nebulizer EVERY 6 HOURS AS NEEDED 75 mL 1    albuterol (PROAIR HFA) 90 mcg/actuation inhaler inhale ONE TO TWO puffs EVERY 6 HOURS into lungs AS NEEDED FOR WHEEZING AND SHORTNESS OF BREATH 8.5 g 1    amitriptyline (ELAVIL) 10 MG tablet TAKE 1 TABLET(10 MG) BY MOUTH EVERY NIGHT AS NEEDED FOR INSOMNIA 30 tablet 1    amitriptyline (ELAVIL) 10 MG tablet TAKE 1 TABLET(10 MG) BY MOUTH EVERY NIGHT AS NEEDED FOR INSOMNIA 30 tablet 1    amitriptyline (ELAVIL) 25 MG tablet TAKE 1 TABLET(25 MG) BY MOUTH EVERY NIGHT AS NEEDED FOR INSOMNIA 30 tablet 0    amLODIPine (NORVASC) 10 MG tablet TAKE 1 TABLET(10 MG) BY MOUTH EVERY DAY 60 tablet 4    aspirin (ECOTRIN) 81 MG EC tablet Take 1 tablet (81 mg total) by mouth once daily.  0    atorvastatin (LIPITOR) 40 MG tablet TAKE 1 TABLET(40 MG) BY MOUTH EVERY DAY 30 tablet 3    atorvastatin (LIPITOR) 40 MG tablet TAKE 1 TABLET(40 MG) BY MOUTH EVERY DAY 30 tablet 3    blood sugar diagnostic (TRUE METRIX GLUCOSE TEST STRIP) Strp TO CHECK BLOOD GLUCOSE THREE TIMES DAILY 200 strip 2    blood-glucose meter (FREESTYLE SYSTEM KIT) kit Use daily- TID 1 each 1    blood-glucose meter kit Use as instructed 1 each 1    capsicum 0.075% (ZOSTRIX) 0.075 % topical cream Apply topically 3 (three) times daily. 60 g 0    capsicum 0.075% (ZOSTRIX) 0.075 % topical cream Apply topically 3 (three) times daily. 120 g 0    cephALEXin (KEFLEX) 500 MG capsule Take 1 capsule (500 mg total) by mouth every 8 (eight) hours. 15 capsule 0    cephALEXin (KEFLEX) 500 MG capsule Take 1 capsule (500 mg total) by mouth every 6 (six) hours. 30 capsule 0    clopidogreL (PLAVIX) 75 mg tablet Take 1 tablet (75 mg total) by mouth once daily. 30 tablet 2    diaper,brief,adult,disposable Misc 1 each by Misc.(Non-Drug; Combo Route) route 3 (three) times daily. 100 each 1    diazePAM (VALIUM) 5 MG tablet Take 1 tablet (5 mg total) by mouth 2 (two) times daily. 30 tablet 0    diclofenac (VOLTAREN) 75 MG EC tablet TAKE 1 TABLET(75 MG)  BY MOUTH TWICE DAILY FOR 14 DAYS 28 tablet 0    diphenhydrAMINE (BENADRYL) 50 MG capsule Take 1 capsule (50 mg total) by mouth nightly as needed for Itching. 60 capsule 0     mg capsule TAKE ONE CAPSULE BY MOUTH TWICE DAILY 60 capsule 0    flash glucose sensor (FREESTYLE LEATHA 14 DAY SENSOR) Kit 1 each by Misc.(Non-Drug; Combo Route) route once daily. 2 kit 3    fluticasone propionate (FLONASE) 50 mcg/actuation nasal spray 1 spray (50 mcg total) by Each Nostril route once daily. 5 mL 0    food supplemt, lactose-reduced (ENSURE MAX PROTEIN) Liqd Take 118 mLs by mouth 3 (three) times daily. 05928 mL 0    gabapentin (NEURONTIN) 100 MG capsule Take 1 capsule (100 mg total) by mouth every evening. 30 capsule 2    gabapentin (NEURONTIN) 300 MG capsule Take 1 capsule (300 mg total) by mouth 2 (two) times daily. 60 capsule 2    glipiZIDE 5 MG TR24 Take 1 tablet (5 mg total) by mouth daily with breakfast. 90 tablet 1    hydroCHLOROthiazide (HYDRODIURIL) 25 MG tablet Take 1 tablet (25 mg total) by mouth once daily. 30 tablet 3    hydrocortisone 2.5 % cream Apply topically 2 (two) times daily. 20 g 0    hydrOXYzine HCl (ATARAX) 25 MG tablet Take 1-2 tablets (25-50 mg total) by mouth 2 (two) times daily as needed for Itching. 30 tablet 1    hydrOXYzine HCL (ATARAX) 25 MG tablet Take 1-2 tablets (25-50 mg total) by mouth 2 (two) times daily as needed for Anxiety. 30 tablet 3    hydrOXYzine pamoate (VISTARIL) 25 MG Cap Take 1 capsule (25 mg total) by mouth every evening. 30 capsule 0    ibuprofen (ADVIL,MOTRIN) 800 MG tablet TAKE 1 TABLET(800 MG) BY MOUTH THREE TIMES DAILY WITH FOOD 30 tablet 1    ibuprofen (ADVIL,MOTRIN) 800 MG tablet Take 1 tablet (800 mg total) by mouth 3 (three) times daily. TAKE 1 TABLET BY MOUTH DAILY AS NEEDED 90 tablet 1    insulin aspart U-100 (NOVOLOG) 100 unit/mL (3 mL) InPn pen Inject 5-10 Units into the skin 3 (three) times daily with meals. 9 mL 4    insulin detemir U-100, Levemir, (LEVEMIR  "FLEXPEN) 100 unit/mL (3 mL) InPn pen Inject 30 Units into the skin every evening. 9 mL 3    lancets (ONETOUCH ULTRASOFT LANCETS) Misc Use 1 TID as directed for diabetes checking 100 each 3    lancets Misc To check BG 3 times daily, to use with insurance preferred meter 200 each 0    latanoprost 0.005 % ophthalmic solution Place 1 drop into both eyes every evening. 5 mL 0    metFORMIN (GLUCOPHAGE) 1000 MG tablet TAKE 1 TABLET(1000 MG) BY MOUTH TWICE DAILY 60 tablet 5    metFORMIN (GLUCOPHAGE) 1000 MG tablet Take 1 tablet (1,000 mg total) by mouth 2 (two) times a day. 60 tablet 5    miconazole nitrate 200 mg/5 gram (4 %) Crea Place 1 application vaginally once daily. 25 g 1    mupirocin (BACTROBAN) 2 % ointment Apply topically 2 (two) times daily. 22 g 1    mupirocin (BACTROBAN) 2 % ointment Apply topically 2 (two) times daily. AAA 20 g 0    mupirocin (BACTROBAN) 2 % ointment Apply topically 3 (three) times daily. 20 g 0    nicotine (NICODERM CQ) 21 mg/24 hr Place 1 patch onto the skin once daily. 28 patch 1    nicotine (NICODERM CQ) 21 mg/24 hr Place 1 patch onto the skin once daily. 30 patch 2    olmesartan (BENICAR) 40 MG tablet Take 1 tablet (40 mg total) by mouth once daily. 30 tablet 3    olopatadine (PATANOL) 0.1 % ophthalmic solution Place 1 drop into both eyes 2 (two) times daily.      omeprazole (PRILOSEC) 40 MG capsule Take 1 capsule (40 mg total) by mouth once daily. 90 capsule 3    omeprazole (PRILOSEC) 40 MG capsule TAKE 1 CAPSULE(40 MG) BY MOUTH EVERY DAY 90 capsule 3    pantoprazole (PROTONIX) 40 MG tablet Take 1 tablet (40 mg total) by mouth once daily. 30 tablet 3    pen needle, diabetic (BD ULTRA-FINE ORIG PEN NEEDLE) 29 gauge x 1/2" Ndle USE TO TEST THREE TIMES DAILY MUST LAST 33 DAYS 100 each 4    plecanatide (TRULANCE) 3 mg Tab Take 3 mg by mouth once daily. 30 tablet 2    ramelteon (ROZEREM) 8 mg tablet Take 1 tablet (8 mg total) by mouth every evening. 30 tablet 0    semaglutide (OZEMPIC) 1 " mg/dose (2 mg/1.5 mL) PnIj Inject 1 mg into the skin every 7 days. 4 each 2    silver sulfADIAZINE 1% (SILVADENE) 1 % cream Apply topically 2 (two) times daily. 25 g 1    ticagrelor (BRILINTA) 90 mg tablet TAKE 1 TABLET(90 MG) BY MOUTH TWICE DAILY 60 tablet 6    traMADoL (ULTRAM) 50 mg tablet Take 1 tablet (50 mg total) by mouth every 24 hours as needed for Pain. 10 tablet 0    triamcinolone acetonide 0.1% (KENALOG) 0.1 % ointment APPLY TOPICALLY TO THE AFFECTED AREA TWICE DAILY 80 g 0    walker (ULTRA-LIGHT ROLLATOR) Misc 1 each by Misc.(Non-Drug; Combo Route) route once daily at 6am. 1 each 0    wheelchair Kelsey 1 each by Misc.(Non-Drug; Combo Route) route 2 (two) times daily. Power Wheel Chair 1 each 0     Scheduled Medications:  Current Facility-Administered Medications   Medication Dose Route Frequency    [START ON 1/2/2025] aspirin  81 mg Per G Tube Daily    [START ON 1/2/2025] atorvastatin  80 mg Per G Tube Daily    gabapentin  250 mg Per G Tube Q8H    heparin (porcine)  5,000 Units Subcutaneous Q8H    insulin glargine U-100  5 Units Subcutaneous Daily    meropenem IV (PEDS and ADULTS)  1 g Intravenous Q8H    [START ON 1/2/2025] multivitamin  1 tablet Per G Tube Daily    traZODone  50 mg Per G Tube QHS     PRN:   Current Facility-Administered Medications:     acetaminophen, 650 mg, Per G Tube, Q4H PRN    albuterol, 2 puff, Inhalation, Q6H PRN **AND** MDI Q6H PRN, , , Q6H PRN    dextrose 50%, 12.5 g, Intravenous, PRN    dextrose 50%, 25 g, Intravenous, PRN    glucagon (human recombinant), 1 mg, Intramuscular, PRN    glucose, 16 g, Oral, PRN    glucose, 24 g, Oral, PRN    insulin aspart U-100, 0-10 Units, Subcutaneous, QID (AC + HS) PRN    melatonin, 6 mg, Oral, Nightly PRN    morphine, 4 mg, Intravenous, Q4H PRN    naloxone, 0.02 mg, Intravenous, PRN    ondansetron, 4 mg, Intravenous, Q8H PRN    sodium chloride 0.9%, 10 mL, Intravenous, PRN    sodium chloride 0.9%, 10 mL, Intravenous, Q12H PRN  Infusions:     dextrose 5% lactated ringers   Intravenous Continuous 50 mL/hr at 01/01/25 0556 Rate Verify at 01/01/25 0556     Estimated Creatinine Clearance: 66.2 mL/min (based on SCr of 0.8 mg/dL).             Assessment and Plan     * Diverticulitis  - CT with diverticulitis with contained perforation  - VSS without leukocytosis  ;  non-surgical abdomen on exam  - maintain npo for now  - CRS consult in am   (stat consult if clinical decompensation or concerning abdominal symptoms/signs)  - continue Meropenem  - ID consulted ; appreciate recs  - further management pending clinical course and future study review    12/31  CT abdomen - racute diverticulitis of the mid sigmoid colon with adjacent suspected contained perforation.  No associated rim enhancement to suggest drainable abscess at this time.  No bowel obstruction. Apparent circumferential wall thickening of the distal rectum/anus which could be related to underdistention versus nonspecific proctitis.  No significant perirectal inflammatory change or evidence perirectal or perianal drainable abscess.  Left more than right basilar patchy nonspecific pulmonary mosaic attenuation with bronchial wall thickening pulmonary edema or small airways process / right basilar patchy clusters of small nodules suggesting infectious or inflammatory small airways process versus aspiration.  No large consolidation. s/p CRS eval - hemodynamically normal with benign abdominal exam, and reassuring labwork. No acute surgical intervention indicated. continue NPO status, with IVF hydration and IV meropenem.  trend CRP 59  C.diff assay pending. s/p CRS eval - hemodynamically normal with benign abdominal exam, and reassuring labwork. No acute surgical intervention indicated. continue NPO status, with IVF hydration and IV meropenem.       Hypernatremia  Hypernatremia is likely due to Dehydration. monitor with hydration   Recent Labs     12/30/24  1412 12/31/24  0815   * 149*    started on D5W  infusion       Dysphagia  - sp PEG for nutrition and meds  ;  okay for comfort po feeds per recent SLP recs  - NPO as above at this time  - IVFs  - plan to resume tube feeds once appropriate      UTI (urinary tract infection)  - recent outside culture with ESBL  - start meropenem  - ID consulted  - follow up cultures  - further management pending clinical course    12/31  continue IV meropenem.  trend CRP 59  C.diff assay pending.    1/1 UC GNRs.ID eval - continue meropenem. If no Pseudomonas isolated, okay to de-escalate to ertapenem.    Multiple wounds of skin  - Wound care consulted  - turn q2  - waffle mattress overlay  - clean and dressed        History of stroke with residual effects  - chronic left hemiplegia, bed bound, sp PEG though able to tolerate po comfort feed  - continue home ASA and statin  - turn q2h      12/31 CT head -No acute intracranial process. Prominent involutional changes and chronic microvascular ischemic changes in the periventricular white matter.  Small areas of probable remote lacunar infarction in the bilateral basal ganglia and small probable chronic right parietal cortical infarct.     Insomnia  - home trazadone      Mixed hyperlipidemia  - home statin        VTE Risk Mitigation (From admission, onward)           Ordered     heparin (porcine) injection 5,000 Units  Every 8 hours         12/30/24 1937     IP VTE HIGH RISK PATIENT  Once         12/30/24 1937     Place sequential compression device  Until discontinued         12/30/24 1937     Place sequential compression device  Until discontinued         12/30/24 1929                    Discharge Planning   ANA: 1/3/2025     Code Status: Full Code   Medical Readiness for Discharge Date:   Discharge Plan A: Home, Home with family, Home Health, Other (PCA services with [facility].)                        Abrahma Feliz MD  Department of Hospital Medicine   [facility] - Med Surg

## 2025-01-01 NOTE — PLAN OF CARE
Problem: Skin Injury Risk Increased  Goal: Skin Health and Integrity  Outcome: Progressing     Problem: Infection  Goal: Absence of Infection Signs and Symptoms  Outcome: Progressing     Problem: Adult Inpatient Plan of Care  Goal: Plan of Care Review  Outcome: Progressing  Goal: Patient-Specific Goal (Individualized)  Outcome: Progressing  Goal: Absence of Hospital-Acquired Illness or Injury  Outcome: Progressing  Goal: Optimal Comfort and Wellbeing  Outcome: Progressing  Goal: Readiness for Transition of Care  Outcome: Progressing     Problem: Diabetes Comorbidity  Goal: Blood Glucose Level Within Targeted Range  Outcome: Progressing     Problem: Acute Kidney Injury/Impairment  Goal: Fluid and Electrolyte Balance  Outcome: Progressing  Goal: Improved Oral Intake  Outcome: Progressing  Goal: Effective Renal Function  Outcome: Progressing     Problem: Wound  Goal: Optimal Coping  Outcome: Progressing  Goal: Optimal Functional Ability  Outcome: Progressing  Goal: Absence of Infection Signs and Symptoms  Outcome: Progressing  Goal: Improved Oral Intake  Outcome: Progressing  Goal: Optimal Pain Control and Function  Outcome: Progressing  Goal: Skin Health and Integrity  Outcome: Progressing  Goal: Optimal Wound Healing  Outcome: Progressing     Problem: Fall Injury Risk  Goal: Absence of Fall and Fall-Related Injury  Outcome: Progressing

## 2025-01-01 NOTE — PROGRESS NOTES
[place] [facility] Surg  Colorectal Surgery  Progress Note    Patient Name: Emily Martinez  MRN: [id number]  Admission Date: 12/30/2024  Hospital Length of Stay: 1 days  Attending Physician: Abraham Feliz MD    Subjective:     Interval History:     Subjective:  No acute events overnight. Appears comfortable, somnolent       Post-Op Info:  * No surgery found *          Medications:  Continuous Infusions:   dextrose 5% lactated ringers   Intravenous Continuous 50 mL/hr at 01/01/25 0556 Rate Verify at 01/01/25 0556     Scheduled Meds:   [START ON 1/2/2025] aspirin  81 mg Per G Tube Daily    [START ON 1/2/2025] atorvastatin  80 mg Per G Tube Daily    gabapentin  250 mg Per G Tube Q8H    heparin (porcine)  5,000 Units Subcutaneous Q8H    insulin glargine U-100  5 Units Subcutaneous Daily    meropenem IV (PEDS and ADULTS)  1 g Intravenous Q8H    [START ON 1/2/2025] multivitamin  1 tablet Per G Tube Daily    traZODone  50 mg Per G Tube QHS     PRN Meds:   [START ON 1/2/2025] aspirin chewable tablet 81 mg    [START ON 1/2/2025] atorvastatin tablet 80 mg    gabapentin 250 mg/5 mL (5 mL) solution 250 mg    heparin (porcine) injection 5,000 Units    insulin glargine U-100 (Lantus) pen 5 Units    meropenem injection 1 g    [START ON 1/2/2025] multivitamin tablet    traZODone tablet 50 mg        Objective:     Vital Signs (Most Recent):  Temp: 98 °F (36.7 °C) (01/01/25 1125)  Pulse: 78 (01/01/25 1125)  Resp: 18 (01/01/25 1125)  BP: 107/64 (01/01/25 1125)  SpO2: (!) 92 % (01/01/25 1125) Vital Signs (24h Range):  Temp:  [97.4 °F (36.3 °C)-98.6 °F (37 °C)] 98 °F (36.7 °C)  Pulse:  [76-91] 78  Resp:  [18] 18  SpO2:  [92 %-96 %] 92 %  BP: ()/(60-74) 107/64     Intake/Output - Last 3 Shifts         12/30 0700 12/31 0659 12/31 0700 01/01 0659 01/01 0700 01/02 0659    I.V. (mL/kg)  710.2 (11.2)     Other  20     IV Piggyback 999      Total Intake(mL/kg) 999 (15.7) 730.2 (11.5)     Net +999 +730.2            Stool Occurrence  1  x               General: drowsy but responsive, in no apparent distress  HEENT: Sclera anicteric, trachea midline  Lungs: Normal respiratory rate and effort on room air  Abdomen: Soft, non distended, non tender  Extremities: Warm, well perfused, no edema  Neuro: Baseline deficits not assessed  Psych: Appropriate affect    Significant Labs:  BMP (Last 3 Results):   Recent Labs   Lab 12/31/24  0815 12/31/24  1422 01/01/25  0811   GLU 71 88 110   * 148* 143   K 3.6 3.5 3.6   * 114* 110   CO2 21* 22* 23   BUN 23 23 21   CREATININE 0.8 0.8 0.8   CALCIUM 7.4* 7.8* 7.5*   MG 1.8  --  1.9     CBC:   Recent Labs   Lab 01/01/25  0811   WBC 9.83   RBC 4.80   HGB 11.1*   HCT 33.7*      MCV 70*   MCH 23.1*   MCHC 32.9       Significant Diagnostics:  Results for orders placed or performed during the hospital encounter of 12/30/24 (from the past 2160 hours)   CT Abdomen Pelvis With IV Contrast NO Oral Contrast    Narrative    EXAMINATION:  CT ABDOMEN PELVIS WITH IV CONTRAST    CLINICAL HISTORY:  Abdominal pain, acute, nonlocalized;    TECHNIQUE:  Low dose axial images, sagittal and coronal reformations were obtained from the lung bases to the pubic symphysis following the IV administration of 100 mL of Omnipaque 350 .  Oral contrast was not given.    COMPARISON:  Chest radiograph 06/27/2019, report only for outside facility CT abdomen and pelvis 10/28/2024 and renal ultrasound 06/12/2024    FINDINGS:  Beam hardening with streak artifact from overlying monitoring leads and adjacent bilateral upper extremities with respiratory motion somewhat limits evaluation.    Lingular platelike scarring versus atelectasis.  Mild dependent atelectasis.  Patchy nonspecific pulmonary mosaic attenuation with bronchial wall thickening seen in the bilateral lower lobes few scattered clusters of small nodules also seen within the lower lobes, more so on the left no consolidation or pleural effusion.  Heart is normal in size without  significant pericardial fluid.    Small hiatal hernia.  Peg tube balloon within the anterior aspect of the mid gastric body.  Stomach is mildly distended with mostly ingested fluid contents without wall thickening or adjacent inflammatory change.  Duodenum is within normal limits.    Portal vasculature is patent.  Liver is normal in size with diffuse parenchymal hypoattenuation suggesting fatty infiltration, without discrete mass seen.    Pancreas diffusely atrophic.  No pancreatic mass or adjacent inflammatory change.  Gallbladder, spleen are within normal limits.  Non masslike thickening of the bilateral adrenal glands.  No significant biliary dilatation.    Bilateral kidneys are normal in size, shape and location with symmetric normal enhancement.  No hydronephrosis or significant perinephric stranding.  Ureters are normal in course and caliber.  Urinary bladder is well distended without wall thickening or adjacent inflammatory change noting small volume non dependent intraluminal gas.  Uterus not identified and likely surgically absent or atrophic.  No adnexal mass or significant volume free pelvic fluid.  Pelvic phleboliths noted.    Mild presacral edema, nonspecific.  There is apparent circumferential wall thickening of the distal rectum/anus.  No perirectal or perianal abscess or discrete mass seen.  No significant perirectal inflammatory change.  Numerous scattered colonic diverticula short-segment abnormal circumferential wall thickening of the mid sigmoid colon with mild adjacent inflammatory change concerning for acute diverticulitis.  Small collection of gas foci with fat stranding just superior to this bowel loop of sigmoid colon concerning for contained perforation.  No rim enhancing component identified.  No intraperitoneal free air seen elsewhere.  No bowel obstruction. Appendix and terminal ileum are within normal limits.  No bowel pneumatosis or portal venous gas.    No ascites.  No lymphadenopathy  by CT criteria.    Mild scattered calcified and noncalcified plaque of the abdominal aorta extending into its iliac branches.  No aortic aneurysm or dissection.    Mild body wall subcutaneous edema suggesting mild anasarca.    Osseous structures show generalized osteopenia, chronic minimal to mild anterior wedge deformity of a few lower thoracic vertebral bodies and age-related degenerative change.  No acute destructive osseous process seen.  Small sclerotic focus at the right femoral head likely a bone island.      Impression    1. Findings concerning for acute diverticulitis of the mid sigmoid colon with adjacent suspected contained perforation.  No associated rim enhancement to suggest drainable abscess at this time.  No bowel obstruction.  Consider follow-up with imaging or endoscopy after therapy according to colorectal screening guidelines.  2. Apparent circumferential wall thickening of the distal rectum/anus which could be related to underdistention versus nonspecific proctitis.  No significant perirectal inflammatory change or evidence perirectal or perianal drainable abscess.  Mild presacral edema, nonspecific.  3. Left more than right basilar patchy nonspecific pulmonary mosaic attenuation with bronchial wall thickening which can be seen with small vessels disease including pulmonary edema or small airways process.  Additionally, there is left more than right basilar patchy clusters of small nodules suggesting infectious or inflammatory small airways process versus aspiration.  No large consolidation.  Attention on follow-up.  4. Suspected hepatic steatosis.  5. Small hiatal hernia.  Peg tube.  Other incidental  6. Other incidental/nonemergent findings in the body of the report.  This report was flagged in Epic as abnormal.      Electronically signed by: Celio De Leon MD  Date:    12/30/2024  Time:    18:56   CT Head Without Contrast    Narrative    EXAMINATION:  CT HEAD WITHOUT CONTRAST    CLINICAL  HISTORY:  Mental status change, unknown cause;    TECHNIQUE:  Low dose axial CT images obtained throughout the head without intravenous contrast. Sagittal and coronal reconstructions were performed.    COMPARISON:  06/27/2019    FINDINGS:  Patient is rotated to the right obscuring visualization.    Small probable remote areas of lacunar infarction following the basal ganglia bilaterally.    Prominent probable chronic microvascular ischemic changes in the periventricular and subcortical white matter.    No evidence of acute calvarial fracture.  Small probable chronic right parietal cortical infarction.      Impression    1. No acute intracranial process.  See above comments.  2. Prominent involutional changes and chronic microvascular ischemic changes in the periventricular white matter.  Small areas of probable remote lacunar infarction in the bilateral basal ganglia and small probable chronic right parietal cortical infarct.  MRI follow-up may better characterize if there is high clinical suspicion.  See above comments.      Electronically signed by: Sebastian Tinoco  Date:    12/30/2024  Time:    17:01         Assessment/Plan:     Active Diagnoses:    Diagnosis Date Noted POA    PRINCIPAL PROBLEM:  Diverticulitis [K57.92] 12/30/2024 Yes    Hypernatremia [E87.0] 12/31/2024 Unknown    History of stroke with residual effects [I69.30] 12/30/2024 Not Applicable    Multiple wounds of skin [T14.8XXA] 12/30/2024 Unknown    UTI (urinary tract infection) [N39.0] 12/30/2024 Yes    Dysphagia [R13.10] 12/30/2024 Unknown    Insomnia [G47.00] 04/06/2019 Yes    Mixed hyperlipidemia [E78.2] 04/01/2019 Yes      Problems Resolved During this Admission:    Diagnosis Date Noted Date Resolved POA    Tobacco dependency [F17.200] 12/31/2024 12/31/2024 Unknown     Emily Martinez is a 58 y/o F with multiple medical co-morbidities including prior CVA with significant deficits, now with radiologic evidence of sigmoid diverticulitis and  possible contained perforation.      Plan:  - continue to trend CRP and CBC  - Advance feeds as tolerated  - Remainder of care per primary team    Noman Valentin MD  Colorectal Surgery  [facility] - [facility] Surg

## 2025-01-02 PROBLEM — E83.39 HYPOPHOSPHATEMIA: Status: ACTIVE | Noted: 2025-01-02

## 2025-01-02 PROBLEM — J98.11 ATELECTASIS OF BOTH LUNGS: Status: ACTIVE | Noted: 2025-01-02

## 2025-01-02 PROBLEM — E87.6 HYPOKALEMIA: Status: ACTIVE | Noted: 2025-01-02

## 2025-01-02 LAB
ACANTHOCYTES BLD QL SMEAR: PRESENT
ALBUMIN SERPL BCP-MCNC: 1.4 G/DL (ref 3.5–5.2)
ALP SERPL-CCNC: 143 U/L (ref 40–150)
ALT SERPL W/O P-5'-P-CCNC: 19 U/L (ref 10–44)
ANION GAP SERPL CALC-SCNC: 10 MMOL/L (ref 8–16)
ANISOCYTOSIS BLD QL SMEAR: SLIGHT
AST SERPL-CCNC: 39 U/L (ref 10–40)
BACTERIA UR CULT: ABNORMAL
BASOPHILS # BLD AUTO: 0.02 K/UL (ref 0–0.2)
BASOPHILS NFR BLD: 0.2 % (ref 0–1.9)
BILIRUB SERPL-MCNC: 0.5 MG/DL (ref 0.1–1)
BUN SERPL-MCNC: 20 MG/DL (ref 8–23)
BURR CELLS BLD QL SMEAR: ABNORMAL
CALCIUM SERPL-MCNC: 7.6 MG/DL (ref 8.7–10.5)
CHLORIDE SERPL-SCNC: 115 MMOL/L (ref 95–110)
CO2 SERPL-SCNC: 23 MMOL/L (ref 23–29)
CREAT SERPL-MCNC: 0.8 MG/DL (ref 0.5–1.4)
CRP SERPL-MCNC: 62.8 MG/L (ref 0–8.2)
DACRYOCYTES BLD QL SMEAR: ABNORMAL
DIFFERENTIAL METHOD BLD: ABNORMAL
EOSINOPHIL # BLD AUTO: 0 K/UL (ref 0–0.5)
EOSINOPHIL NFR BLD: 0.4 % (ref 0–8)
ERYTHROCYTE [DISTWIDTH] IN BLOOD BY AUTOMATED COUNT: 24.8 % (ref 11.5–14.5)
EST. GFR  (NO RACE VARIABLE): >60 ML/MIN/1.73 M^2
GLUCOSE SERPL-MCNC: 63 MG/DL (ref 70–110)
HCT VFR BLD AUTO: 36.9 % (ref 37–48.5)
HGB BLD-MCNC: 12.3 G/DL (ref 12–16)
HYPOCHROMIA BLD QL SMEAR: ABNORMAL
IMM GRANULOCYTES # BLD AUTO: 0.08 K/UL (ref 0–0.04)
IMM GRANULOCYTES NFR BLD AUTO: 0.7 % (ref 0–0.5)
LYMPHOCYTES # BLD AUTO: 1.9 K/UL (ref 1–4.8)
LYMPHOCYTES NFR BLD: 16.3 % (ref 18–48)
MAGNESIUM SERPL-MCNC: 1.9 MG/DL (ref 1.6–2.6)
MCH RBC QN AUTO: 23.1 PG (ref 27–31)
MCHC RBC AUTO-ENTMCNC: 33.3 G/DL (ref 32–36)
MCV RBC AUTO: 69 FL (ref 82–98)
MONOCYTES # BLD AUTO: 0.7 K/UL (ref 0.3–1)
MONOCYTES NFR BLD: 6.4 % (ref 4–15)
NEUTROPHILS # BLD AUTO: 8.7 K/UL (ref 1.8–7.7)
NEUTROPHILS NFR BLD: 76 % (ref 38–73)
NRBC BLD-RTO: 0 /100 WBC
OVALOCYTES BLD QL SMEAR: ABNORMAL
PHOSPHATE SERPL-MCNC: 2.4 MG/DL (ref 2.7–4.5)
PLATELET # BLD AUTO: 580 K/UL (ref 150–450)
PLATELET BLD QL SMEAR: ABNORMAL
PMV BLD AUTO: 9.2 FL (ref 9.2–12.9)
POCT GLUCOSE: 69 MG/DL (ref 70–110)
POCT GLUCOSE: 74 MG/DL (ref 70–110)
POCT GLUCOSE: 99 MG/DL (ref 70–110)
POIKILOCYTOSIS BLD QL SMEAR: SLIGHT
POLYCHROMASIA BLD QL SMEAR: ABNORMAL
POTASSIUM SERPL-SCNC: 3.4 MMOL/L (ref 3.5–5.1)
PROT SERPL-MCNC: 5.2 G/DL (ref 6–8.4)
RBC # BLD AUTO: 5.32 M/UL (ref 4–5.4)
SCHISTOCYTES BLD QL SMEAR: ABNORMAL
SCHISTOCYTES BLD QL SMEAR: PRESENT
SMUDGE CELLS BLD QL SMEAR: PRESENT
SODIUM SERPL-SCNC: 148 MMOL/L (ref 136–145)
SPHEROCYTES BLD QL SMEAR: ABNORMAL
STOMATOCYTES BLD QL SMEAR: ABNORMAL
TARGETS BLD QL SMEAR: ABNORMAL
WBC # BLD AUTO: 11.41 K/UL (ref 3.9–12.7)

## 2025-01-02 PROCEDURE — 63600175 PHARM REV CODE 636 W HCPCS: Performed by: FAMILY MEDICINE

## 2025-01-02 PROCEDURE — 27000207 HC ISOLATION

## 2025-01-02 PROCEDURE — 25000003 PHARM REV CODE 250: Performed by: HOSPITALIST

## 2025-01-02 PROCEDURE — 63600175 PHARM REV CODE 636 W HCPCS

## 2025-01-02 PROCEDURE — 83735 ASSAY OF MAGNESIUM: CPT | Performed by: FAMILY MEDICINE

## 2025-01-02 PROCEDURE — 21400001 HC TELEMETRY ROOM

## 2025-01-02 PROCEDURE — 25000003 PHARM REV CODE 250

## 2025-01-02 PROCEDURE — 85025 COMPLETE CBC W/AUTO DIFF WBC: CPT | Performed by: FAMILY MEDICINE

## 2025-01-02 PROCEDURE — 99233 SBSQ HOSP IP/OBS HIGH 50: CPT | Mod: ,,, | Performed by: INTERNAL MEDICINE

## 2025-01-02 PROCEDURE — 11000001 HC ACUTE MED/SURG PRIVATE ROOM

## 2025-01-02 PROCEDURE — 80053 COMPREHEN METABOLIC PANEL: CPT | Performed by: FAMILY MEDICINE

## 2025-01-02 PROCEDURE — 84100 ASSAY OF PHOSPHORUS: CPT | Performed by: FAMILY MEDICINE

## 2025-01-02 PROCEDURE — 25000003 PHARM REV CODE 250: Performed by: FAMILY MEDICINE

## 2025-01-02 PROCEDURE — 86140 C-REACTIVE PROTEIN: CPT | Performed by: STUDENT IN AN ORGANIZED HEALTH CARE EDUCATION/TRAINING PROGRAM

## 2025-01-02 PROCEDURE — 36415 COLL VENOUS BLD VENIPUNCTURE: CPT | Performed by: STUDENT IN AN ORGANIZED HEALTH CARE EDUCATION/TRAINING PROGRAM

## 2025-01-02 RX ORDER — POTASSIUM CHLORIDE 7.45 MG/ML
10 INJECTION INTRAVENOUS
Status: DISCONTINUED | OUTPATIENT
Start: 2025-01-02 | End: 2025-01-02

## 2025-01-02 RX ORDER — MICONAZOLE NITRATE 2 G/100G
POWDER TOPICAL 2 TIMES DAILY
Status: DISCONTINUED | OUTPATIENT
Start: 2025-01-02 | End: 2025-01-18 | Stop reason: HOSPADM

## 2025-01-02 RX ORDER — OXYCODONE AND ACETAMINOPHEN 7.5; 325 MG/1; MG/1
1 TABLET ORAL EVERY 4 HOURS PRN
Status: DISCONTINUED | OUTPATIENT
Start: 2025-01-02 | End: 2025-01-03

## 2025-01-02 RX ORDER — TALC
6 POWDER (GRAM) TOPICAL NIGHTLY PRN
Status: DISCONTINUED | OUTPATIENT
Start: 2025-01-02 | End: 2025-01-18 | Stop reason: HOSPADM

## 2025-01-02 RX ORDER — LOPERAMIDE HYDROCHLORIDE 2 MG/1
2 CAPSULE ORAL 4 TIMES DAILY PRN
Status: DISCONTINUED | OUTPATIENT
Start: 2025-01-02 | End: 2025-01-03

## 2025-01-02 RX ADMIN — GABAPENTIN 250 MG: 250 SOLUTION ORAL at 05:01

## 2025-01-02 RX ADMIN — HEPARIN SODIUM 5000 UNITS: 5000 INJECTION INTRAVENOUS; SUBCUTANEOUS at 05:01

## 2025-01-02 RX ADMIN — GABAPENTIN 250 MG: 250 SOLUTION ORAL at 02:01

## 2025-01-02 RX ADMIN — HEPARIN SODIUM 5000 UNITS: 5000 INJECTION INTRAVENOUS; SUBCUTANEOUS at 02:01

## 2025-01-02 RX ADMIN — ASPIRIN 81 MG CHEWABLE TABLET 81 MG: 81 TABLET CHEWABLE at 10:01

## 2025-01-02 RX ADMIN — OXYCODONE AND ACETAMINOPHEN 1 TABLET: 7.5; 325 TABLET ORAL at 06:01

## 2025-01-02 RX ADMIN — MEROPENEM 1 G: 1 INJECTION INTRAVENOUS at 12:01

## 2025-01-02 RX ADMIN — LOPERAMIDE HYDROCHLORIDE 2 MG: 2 CAPSULE ORAL at 03:01

## 2025-01-02 RX ADMIN — HEPARIN SODIUM 5000 UNITS: 5000 INJECTION INTRAVENOUS; SUBCUTANEOUS at 08:01

## 2025-01-02 RX ADMIN — ATORVASTATIN CALCIUM 80 MG: 40 TABLET, FILM COATED ORAL at 10:01

## 2025-01-02 RX ADMIN — ACETAMINOPHEN 650 MG: 650 SOLUTION ORAL at 06:01

## 2025-01-02 RX ADMIN — MICONAZOLE NITRATE 2 % TOPICAL POWDER: at 08:01

## 2025-01-02 RX ADMIN — POTASSIUM BICARBONATE 40 MEQ: 391 TABLET, EFFERVESCENT ORAL at 10:01

## 2025-01-02 RX ADMIN — THERA TABS 1 TABLET: TAB at 10:01

## 2025-01-02 RX ADMIN — SODIUM CHLORIDE 1 G: 9 INJECTION, SOLUTION INTRAVENOUS at 08:01

## 2025-01-02 RX ADMIN — GABAPENTIN 250 MG: 250 SOLUTION ORAL at 08:01

## 2025-01-02 RX ADMIN — OXYCODONE AND ACETAMINOPHEN 1 TABLET: 7.5; 325 TABLET ORAL at 03:01

## 2025-01-02 RX ADMIN — TRAZODONE HYDROCHLORIDE 50 MG: 50 TABLET ORAL at 08:01

## 2025-01-02 NOTE — SUBJECTIVE & OBJECTIVE
Interval History: NAEON, some neck pain (positional) today    Review of Systems   Constitutional:  Negative for activity change, appetite change and fever.   Respiratory:  Negative for cough, shortness of breath and wheezing.    Cardiovascular:  Negative for chest pain and leg swelling.   Gastrointestinal:  Positive for diarrhea. Negative for abdominal pain, constipation, nausea and vomiting.   Musculoskeletal:  Positive for myalgias and neck stiffness.   Skin:  Negative for rash.   Neurological:  Negative for headaches.     Objective:     Vital Signs (Most Recent):  Temp: 98 °F (36.7 °C) (01/02/25 1249)  Pulse: 96 (01/02/25 1249)  Resp: 18 (01/02/25 1545)  BP: 100/83 (01/02/25 1249)  SpO2: (!) 94 % (01/02/25 1249) Vital Signs (24h Range):  Temp:  [97.2 °F (36.2 °C)-98 °F (36.7 °C)] 98 °F (36.7 °C)  Pulse:  [77-96] 96  Resp:  [16-18] 18  SpO2:  [93 %-95 %] 94 %  BP: (100-110)/(55-83) 100/83     Weight: 63.5 kg (139 lb 15.9 oz)  Body mass index is 24.8 kg/m².    Intake/Output Summary (Last 24 hours) at 1/2/2025 1548  Last data filed at 1/2/2025 1531  Gross per 24 hour   Intake 150 ml   Output 860 ml   Net -710 ml         Physical Exam  Vitals and nursing note reviewed.   Constitutional:       General: She is not in acute distress.     Appearance: She is ill-appearing. She is not toxic-appearing or diaphoretic.      Comments: Chronically ill appearing, cachectic   HENT:      Head: Normocephalic and atraumatic.      Comments: Bilateral temporal wasting.     Nose: Nose normal.   Eyes:      General: No scleral icterus.     Extraocular Movements: Extraocular movements intact.      Conjunctiva/sclera: Conjunctivae normal.   Cardiovascular:      Rate and Rhythm: Normal rate and regular rhythm.   Pulmonary:      Effort: Pulmonary effort is normal. No respiratory distress.      Breath sounds: No wheezing or rales.   Abdominal:      General: Abdomen is flat. There is no distension.      Palpations: Abdomen is soft.       Tenderness: There is no abdominal tenderness. There is no guarding.   Musculoskeletal:      Cervical back: Normal range of motion.      Right lower leg: No edema.      Left lower leg: No edema.      Comments: Left hemiplegia, left UE and LE contracted/stiff     Skin:     General: Skin is warm and dry.      Comments: Multiple wounds photos in chart   Neurological:      Mental Status: She is alert. Mental status is at baseline.      Cranial Nerves: Dysarthria present.   Psychiatric:      Comments: Flat affect             Significant Labs: All pertinent labs within the past 24 hours have been reviewed.    Significant Imaging: I have reviewed all pertinent imaging results/findings within the past 24 hours.

## 2025-01-02 NOTE — PROGRESS NOTES
[facility] Medicine  Progress Note    Patient Name: Emily Martinez  MRN: [id number]  Patient Class: IP- Inpatient   Admission Date: 12/30/2024  Length of Stay: 2 days  Attending Physician: Valerio Jones MD  Primary Care Provider: Alireza Sosa MD        Subjective     Principal Problem:Diverticulitis        HPI:  59-year-old female, history of a stroke with left hemiplegia, bed-bound, diabetes, hypertension, recurrent infections, dysphagia sp PEG, multiple skin wounds, multiple recent admissions in the last several months in [place] [facility] [place], most recently last week for UTI, discharged with Keflex, living at home with her daughter, now brought in for diarrhea.  Daughter states the patient has had diarrhea for about a week and a half.  She is having about 4-5 episodes a day, watery, nonbloody.  She has intermittently also vomiting.  Patient has a PEG tube for nutrition but per most recent SLP note can also take p.o. nutrition but daughter says she has had minimal p.o. intake because she has said she is not hungry.  No fevers noted.  Daughter is frustrated as she feels like when patient gets admitted, she is temporarily better and then very quickly becomes ill again.  All of their care has been in [place] [facility] [place] but they decided to come to [facility] today as they were hopeful that we would be able to get her better.       Of note, urine culture from 6 days ago is growing out ESBL E coli, greater than 100,000 colonies    In the ED patient afebrile and hemodynamically stable saturating well on room air at rest. No leukocytosis. UA still concerning for UTI and patient started on meropenem. CT abdomen performed and noted acute diverticulitis with area of concern for contained perforation. Patient without abdominal tenderness. Patient admitted to the [facility] for further evaluation and management.    Overview/Hospital Course:  12/31 CT head -No acute intracranial process. Prominent  involutional changes and chronic microvascular ischemic changes in the periventricular white matter.  Small areas of probable remote lacunar infarction in the bilateral basal ganglia and small probable chronic right parietal cortical infarct. CT abdomen - racute diverticulitis of the mid sigmoid colon with adjacent suspected contained perforation.  No associated rim enhancement to suggest drainable abscess at this time.  No bowel obstruction. Apparent circumferential wall thickening of the distal rectum/anus which could be related to underdistention versus nonspecific proctitis.  No significant perirectal inflammatory change or evidence perirectal or perianal drainable abscess.  Left more than right basilar patchy nonspecific pulmonary mosaic attenuation with bronchial wall thickening pulmonary edema or small airways process / right basilar patchy clusters of small nodules suggesting infectious or inflammatory small airways process versus aspiration.  No large consolidation. s/p CRS eval - hemodynamically normal with benign abdominal exam, and reassuring labwork. No acute surgical intervention indicated. continue NPO status, with IVF hydration and IV meropenem.  trend CRP 59-->66. CRS eval -  continue to trend CRP  if downtrending and passing flatus, okay to start on clears and downtitrate IVFC.  1/1 UC GNRs.  ID eval - continue meropenem. If no Pseudomonas isolated, okay to de-escalate to ertapenem. C diff assay pending.    Advancing TF per CRS recs to home regimen. De-escalated to ertapenem for 7-10day treatment course (EED 01/09/25) for diverticulitis and ESBL Ecoli cystitis.     Interval History: NAEON, some neck pain (positional) today    Review of Systems   Constitutional:  Negative for activity change, appetite change and fever.   Respiratory:  Negative for cough, shortness of breath and wheezing.    Cardiovascular:  Negative for chest pain and leg swelling.   Gastrointestinal:  Positive for diarrhea.  Negative for abdominal pain, constipation, nausea and vomiting.   Musculoskeletal:  Positive for myalgias and neck stiffness.   Skin:  Negative for rash.   Neurological:  Negative for headaches.     Objective:     Vital Signs (Most Recent):  Temp: 98 °F (36.7 °C) (01/02/25 1249)  Pulse: 96 (01/02/25 1249)  Resp: 18 (01/02/25 1545)  BP: 100/83 (01/02/25 1249)  SpO2: (!) 94 % (01/02/25 1249) Vital Signs (24h Range):  Temp:  [97.2 °F (36.2 °C)-98 °F (36.7 °C)] 98 °F (36.7 °C)  Pulse:  [77-96] 96  Resp:  [16-18] 18  SpO2:  [93 %-95 %] 94 %  BP: (100-110)/(55-83) 100/83     Weight: 63.5 kg (139 lb 15.9 oz)  Body mass index is 24.8 kg/m².    Intake/Output Summary (Last 24 hours) at 1/2/2025 1548  Last data filed at 1/2/2025 1531  Gross per 24 hour   Intake 150 ml   Output 860 ml   Net -710 ml         Physical Exam  Vitals and nursing note reviewed.   Constitutional:       General: She is not in acute distress.     Appearance: She is ill-appearing. She is not toxic-appearing or diaphoretic.      Comments: Chronically ill appearing, cachectic   HENT:      Head: Normocephalic and atraumatic.      Comments: Bilateral temporal wasting.     Nose: Nose normal.   Eyes:      General: No scleral icterus.     Extraocular Movements: Extraocular movements intact.      Conjunctiva/sclera: Conjunctivae normal.   Cardiovascular:      Rate and Rhythm: Normal rate and regular rhythm.   Pulmonary:      Effort: Pulmonary effort is normal. No respiratory distress.      Breath sounds: No wheezing or rales.   Abdominal:      General: Abdomen is flat. There is no distension.      Palpations: Abdomen is soft.      Tenderness: There is no abdominal tenderness. There is no guarding.   Musculoskeletal:      Cervical back: Normal range of motion.      Right lower leg: No edema.      Left lower leg: No edema.      Comments: Left hemiplegia, left UE and LE contracted/stiff     Skin:     General: Skin is warm and dry.      Comments: Multiple wounds  photos in chart   Neurological:      Mental Status: She is alert. Mental status is at baseline.      Cranial Nerves: Dysarthria present.   Psychiatric:      Comments: Flat affect             Significant Labs: All pertinent labs within the past 24 hours have been reviewed.    Significant Imaging: I have reviewed all pertinent imaging results/findings within the past 24 hours.    Assessment and Plan     * Diverticulitis  - CT with diverticulitis with contained perforation  - VSS without leukocytosis  ;  non-surgical abdomen on exam  - maintain npo for now  - CRS consult in am   (stat consult if clinical decompensation or concerning abdominal symptoms/signs)  - continue Meropenem  - ID consulted ; appreciate recs  - further management pending clinical course and future study review    12/31  CT abdomen - racute diverticulitis of the mid sigmoid colon with adjacent suspected contained perforation.  No associated rim enhancement to suggest drainable abscess at this time.  No bowel obstruction. Apparent circumferential wall thickening of the distal rectum/anus which could be related to underdistention versus nonspecific proctitis.  No significant perirectal inflammatory change or evidence perirectal or perianal drainable abscess.  Left more than right basilar patchy nonspecific pulmonary mosaic attenuation with bronchial wall thickening pulmonary edema or small airways process / right basilar patchy clusters of small nodules suggesting infectious or inflammatory small airways process versus aspiration.  No large consolidation. s/p CRS eval - hemodynamically normal with benign abdominal exam, and reassuring labwork. No acute surgical intervention indicated. continue NPO status, with IVF hydration and IV meropenem.  trend CRP 59  C.diff assay pending. s/p CRS eval - hemodynamically normal with benign abdominal exam, and reassuring labwork. No acute surgical intervention indicated.    - Advance TF, stop if residual>500cc  -  Ertapenem per ID recs EED 01/09/25    Hypophosphatemia  Patient's most recent phosphorus results are listed below.   Recent Labs     12/31/24  1422 01/01/25  0811 01/02/25  0649   PHOS 2.5* 2.6* 2.4*     Plan  - Will treat hypophosphatemia with prn supplementation  - Patient's hypophosphatemia is stable    Hypokalemia  Patient's most recent potassium results are listed below.   Recent Labs     12/31/24  1422 01/01/25  0811 01/02/25  0649   K 3.5 3.6 3.4*     Plan  - Replete potassium per protocol  - Monitor potassium Daily  - Patient's hypokalemia is improving    Hypernatremia  Hypernatremia is likely due to Dehydration. monitor with hydration   Recent Labs     12/31/24  1422 01/01/25  0811 01/02/25  0649   * 143 148*      started on D5W infusion       Dysphagia  - sp PEG for nutrition and meds  ;  okay for comfort po feeds per recent SLP recs  - NPO as above at this time  - IVFs  - advance TF      UTI (urinary tract infection)  - recent outside culture with ESBL  - start meropenem  - ID consulted  - follow up cultures  - further management pending clinical course    12/31  continue IV meropenem.  trend CRP 59  C.diff assay pending.    1/1  GNRs.ID eval - continue meropenem. If no Pseudomonas isolated, okay to de-escalate to ertapenem.    - Ertapenem per ID recs EED 01/09/25    Multiple wounds of skin  - Wound care consulted  - turn q2  - waffle mattress overlay  - clean and dressed        History of stroke with residual effects  - chronic left hemiplegia, bed bound, sp PEG though able to tolerate po comfort feed  - continue home ASA and statin  - turn q2h      12/31 CT head -No acute intracranial process. Prominent involutional changes and chronic microvascular ischemic changes in the periventricular white matter.  Small areas of probable remote lacunar infarction in the bilateral basal ganglia and small probable chronic right parietal cortical infarct.     Insomnia  - home trazadone      Mixed  hyperlipidemia  - home statin        VTE Risk Mitigation (From admission, onward)           Ordered     heparin (porcine) injection 5,000 Units  Every 8 hours         12/30/24 1937     IP VTE HIGH RISK PATIENT  Once         12/30/24 1937     Place sequential compression device  Until discontinued         12/30/24 1937     Place sequential compression device  Until discontinued         12/30/24 1929                    Discharge Planning   ANA: 1/3/2025     Code Status: Full Code   Medical Readiness for Discharge Date:   Discharge Plan A: Home, Home with family, Home Health, Other (PCA services with [facility].)                        Valerio Jones MD  Department of Hospital Medicine   [facility]

## 2025-01-02 NOTE — ASSESSMENT & PLAN NOTE
Hypernatremia is likely due to Dehydration. monitor with hydration   Recent Labs     12/31/24  1422 01/01/25  0811 01/02/25  0649   * 143 148*      started on D5W infusion

## 2025-01-02 NOTE — ASSESSMENT & PLAN NOTE
58 yo woman with diverticulitis and small perforation, on meropenem.     Meropenem should cover tall of the usual GI suspects. Urine grew ESBL-E.coli -> change to ertapenem.    Recommendations  Continue ertapenem x 7-10 days with repeat imaging afterwards

## 2025-01-02 NOTE — CARE UPDATE
I have reviewed the chart of Emily Martinez who is hospitalized for the following:    Active Hospital Problems    Diagnosis    *Diverticulitis    Hypokalemia    Hypophosphatemia    Atelectasis of both lungs    Hypernatremia    History of stroke with residual effects    Multiple wounds of skin    UTI (urinary tract infection)    Dysphagia    Insomnia    Mixed hyperlipidemia        Lindsey Nelson PA-C  [facility]

## 2025-01-02 NOTE — ASSESSMENT & PLAN NOTE
Patient's most recent phosphorus results are listed below.   Recent Labs     12/31/24  1422 01/01/25  0811 01/02/25  0649   PHOS 2.5* 2.6* 2.4*     Plan  - Will treat hypophosphatemia with prn supplementation  - Patient's hypophosphatemia is stable

## 2025-01-02 NOTE — PROGRESS NOTES
"[facility]  Infectious Disease  Progress Note    Patient Name: Emily Martinez  MRN: [id number]  Admission Date: 12/30/2024  Length of Stay: 2 days  Attending Physician: Valerio Jones MD  Primary Care Provider: Alireza Sosa MD    Isolation Status: Contact    Assessment/Plan:      * Diverticulitis  60 yo woman with diverticulitis and small perforation, on meropenem.     Meropenem should cover tall of the usual GI suspects. Urine grew ESBL-E.coli -> change to ertapenem.    Recommendations  Continue ertapenem x 7-10 days with repeat imaging afterwards      UTI (urinary tract infection)  History of recent ESBL infection, discharged on Keflex prior to culture results.     Recommendations  Continue abx (ertapenem)    Thank you for your consult. I will sign off. Please contact us if you have any additional questions.    Yon Castillo MD  Infectious Disease  [facility]    Subjective:     Principal Problem:Diverticulitis    HPI: Ms. Martinez is a 59-year-old woman, new to [facility], with history of a stroke with left hemiplegia, bed-bound, diabetes, hypertension, recurrent infections, dysphagia sp PEG, multiple skin wounds, multiple recent admissions in the last several months in the [facility], most recently last week for UTI, discharged with Keflex. She reportedly never really improved and began having diarrhea. In the ED, she was afebrile and had UA with 38 WBCs. CT abdomen was performed and demonstrated acute diverticulitis with area of concern for contained perforation. The patient was admitted and ID is consulted for "Recent ESBL on urine culture. Also now with diverticulitis." In the meantime, the patient was started on meropenem.    This morning she feels cold but denies abdominal pain.     Blood culture (12/30) -> pending  Urine culture (12/30) -> pending    Urine culture (12/24) @ [facility] ->     Culture, Urine Greater than 100,000 CFU/mL Escherichia coli, ESBL Abnormal    Resulting Agency " [facility]   Susceptibility    Organism Antibiotic Method Susceptibility   Escherichia coli, ESBL Ciprofloxacin [facility] >=4 mcg/ml: Resistant   Escherichia coli, ESBL Ertapenem [facility] <=0.12 mcg/ml: Susceptible   Escherichia coli, ESBL Gentamicin [facility] <=1 mcg/ml: Susceptible   Escherichia coli, ESBL Meropenem [facility] <=0.25 mcg/ml: Susceptible   Escherichia coli, ESBL Nitrofurantoin [facility] <=16 mcg/ml: Susceptible   Escherichia coli, ESBL Tetracycline [facility] >=16 mcg/ml: Resistant   Escherichia coli, ESBL Tobramycin [facility] >=16 mcg/ml: Resistant   Escherichia coli, ESBL Trimethoprim/Sulfamethoxazole [facility] >=320 mcg/ml: Resistant   Escherichia coli, ESBL ESBL [facility] MANUAL Positive         Interval History: Feels okay. Waxes and wanes. No pain. Tolerating abx.    Review of Systems   Constitutional:  Negative for chills and fever.   All other systems reviewed and are negative.    Objective:     Vital Signs (Most Recent):  Temp: 97.2 °F (36.2 °C) (01/02/25 0742)  Pulse: 85 (01/02/25 0422)  Resp: 17 (01/02/25 0422)  BP: 100/71 (01/02/25 0422)  SpO2: (!) 94 % (01/02/25 0422) Vital Signs (24h Range):  Temp:  [97.2 °F (36.2 °C)-98 °F (36.7 °C)] 97.2 °F (36.2 °C)  Pulse:  [77-85] 85  Resp:  [17-18] 17  SpO2:  [92 %-95 %] 94 %  BP: (100-110)/(55-76) 100/71     Weight: 63.5 kg (139 lb 15.9 oz)  Body mass index is 24.8 kg/m².    Estimated Creatinine Clearance: 66.2 mL/min (based on SCr of 0.8 mg/dL).     Physical Exam  Vitals and nursing note reviewed.   Constitutional:       Appearance: Normal appearance.   HENT:      Head: Normocephalic.   Eyes:      Pupils: Pupils are equal, round, and reactive to light.   Cardiovascular:      Rate and Rhythm: Normal rate.   Abdominal:      General: There is distension.      Tenderness: There is no abdominal tenderness. There is no guarding or rebound.   Musculoskeletal:      Right lower leg: Edema present.      Left lower  leg: Edema present.   Neurological:      General: No focal deficit present.      Mental Status: She is alert.   Psychiatric:         Mood and Affect: Mood normal.         Behavior: Behavior normal.          Significant Labs: BMP:   Recent Labs   Lab 01/02/25  0649   GLU 63*   *   K 3.4*   *   CO2 23   BUN 20   CREATININE 0.8   CALCIUM 7.6*   MG 1.9     CBC:   Recent Labs   Lab 01/01/25  0811 01/02/25  0649   WBC 9.83 11.41   HGB 11.1* 12.3   HCT 33.7* 36.9*    580*     Microbiology Results (last 7 days)       Procedure Component Value Units Date/Time    Urine culture [[id number]]  (Abnormal)  (Susceptibility) Collected: 12/30/24 1815    Order Status: Completed Specimen: Urine Updated: 01/02/25 1158     Urine Culture, Routine ESCHERICHIA COLI ESBL  >100,000 cfu/ml      Narrative:      Specimen Source->Urine    Blood culture #1 **CANNOT BE ORDERED STAT** [[id number]] Collected: 12/30/24 1418    Order Status: Completed Specimen: Blood from Peripheral, Hand, Right Updated: 01/01/25 2012     Blood Culture, Routine No Growth to date      No Growth to date      No Growth to date    Blood culture #2 **CANNOT BE ORDERED STAT** [[id number]] Collected: 12/30/24 1413    Order Status: Completed Specimen: Blood from Peripheral, Antecubital, Right Updated: 01/01/25 2012     Blood Culture, Routine No Growth to date      No Growth to date      No Growth to date    Clostridium difficile EIA [[id number]]     Order Status: Canceled Specimen: Stool             Significant Imaging: I have reviewed all pertinent imaging results/findings within the past 24 hours.

## 2025-01-02 NOTE — PROGRESS NOTES
[facility] - [facility] [place] [facility]  Colorectal Surgery  Progress Note    Patient Name: Emily Martinez  MRN: [id number]  Admission Date: 12/30/2024  Hospital Length of Stay: 2 days  Attending Physician: Valerio Jones MD    Subjective:     Interval History:     Subjective:  No acute events overnight. Appears comfortable, states she is tired. Has not restarted tube feeds as yet.        Post-Op Info:  * No surgery found *          Medications:  Continuous Infusions:   dextrose 5% lactated ringers   Intravenous Continuous   Paused at 01/01/25 2300     Scheduled Meds:   aspirin  81 mg Per G Tube Daily    atorvastatin  80 mg Per G Tube Daily    gabapentin  250 mg Per G Tube Q8H    heparin (porcine)  5,000 Units Subcutaneous Q8H    insulin glargine U-100  5 Units Subcutaneous Daily    meropenem IV (PEDS and ADULTS)  1 g Intravenous Q8H    multivitamin  1 tablet Per G Tube Daily    traZODone  50 mg Per G Tube QHS     PRN Meds:   aspirin chewable tablet 81 mg    atorvastatin tablet 80 mg    gabapentin 250 mg/5 mL (5 mL) solution 250 mg    heparin (porcine) injection 5,000 Units    insulin glargine U-100 (Lantus) pen 5 Units    meropenem injection 1 g    multivitamin tablet    traZODone tablet 50 mg        Objective:     Vital Signs (Most Recent):  Temp: 97.7 °F (36.5 °C) (01/02/25 0422)  Pulse: 85 (01/02/25 0422)  Resp: 17 (01/02/25 0422)  BP: 100/71 (01/02/25 0422)  SpO2: (!) 94 % (01/02/25 0422) Vital Signs (24h Range):  Temp:  [97.7 °F (36.5 °C)-98 °F (36.7 °C)] 97.7 °F (36.5 °C)  Pulse:  [77-85] 85  Resp:  [17-18] 17  SpO2:  [92 %-95 %] 94 %  BP: (100-110)/(55-76) 100/71     Intake/Output - Last 3 Shifts         12/31 0700  01/01 0659 01/01 0700  01/02 0659 01/02 0700  01/03 0659    I.V. (mL/kg) 710.2 (11.2)      Other 20      IV Piggyback       Total Intake(mL/kg) 730.2 (11.5)      Urine (mL/kg/hr)  750 (0.5)     Stool  0     Total Output  750     Net +730.2 -750            Stool Occurrence 1 x 2 x               General: drowsy but  responsive, in no apparent distress  HEENT: Sclera anicteric, trachea midline  Lungs: Normal respiratory rate and effort on room air  Abdomen: Soft, non distended, non tender  Extremities: Warm, well perfused, no edema  Neuro: Baseline deficits not assessed  Psych: Appropriate affect    Significant Labs:  BMP (Last 3 Results):   Recent Labs   Lab 12/31/24  0815 12/31/24  1422 01/01/25  0811   GLU 71 88 110   * 148* 143   K 3.6 3.5 3.6   * 114* 110   CO2 21* 22* 23   BUN 23 23 21   CREATININE 0.8 0.8 0.8   CALCIUM 7.4* 7.8* 7.5*   MG 1.8  --  1.9     CBC:   Recent Labs   Lab 01/01/25  0811   WBC 9.83   RBC 4.80   HGB 11.1*   HCT 33.7*      MCV 70*   MCH 23.1*   MCHC 32.9       Significant Diagnostics:  Results for orders placed or performed during the hospital encounter of 12/30/24 (from the past 2160 hours)   CT Abdomen Pelvis With IV Contrast NO Oral Contrast    Narrative    EXAMINATION:  CT ABDOMEN PELVIS WITH IV CONTRAST    CLINICAL HISTORY:  Abdominal pain, acute, nonlocalized;    TECHNIQUE:  Low dose axial images, sagittal and coronal reformations were obtained from the lung bases to the pubic symphysis following the IV administration of 100 mL of Omnipaque 350 .  Oral contrast was not given.    COMPARISON:  Chest radiograph 06/27/2019, report only for outside facility CT abdomen and pelvis 10/28/2024 and renal ultrasound 06/12/2024    FINDINGS:  Beam hardening with streak artifact from overlying monitoring leads and adjacent bilateral upper extremities with respiratory motion somewhat limits evaluation.    Lingular platelike scarring versus atelectasis.  Mild dependent atelectasis.  Patchy nonspecific pulmonary mosaic attenuation with bronchial wall thickening seen in the bilateral lower lobes few scattered clusters of small nodules also seen within the lower lobes, more so on the left no consolidation or pleural effusion.  Heart is normal in size without significant pericardial  fluid.    Small hiatal hernia.  Peg tube balloon within the anterior aspect of the mid gastric body.  Stomach is mildly distended with mostly ingested fluid contents without wall thickening or adjacent inflammatory change.  Duodenum is within normal limits.    Portal vasculature is patent.  Liver is normal in size with diffuse parenchymal hypoattenuation suggesting fatty infiltration, without discrete mass seen.    Pancreas diffusely atrophic.  No pancreatic mass or adjacent inflammatory change.  Gallbladder, spleen are within normal limits.  Non masslike thickening of the bilateral adrenal glands.  No significant biliary dilatation.    Bilateral kidneys are normal in size, shape and location with symmetric normal enhancement.  No hydronephrosis or significant perinephric stranding.  Ureters are normal in course and caliber.  Urinary bladder is well distended without wall thickening or adjacent inflammatory change noting small volume non dependent intraluminal gas.  Uterus not identified and likely surgically absent or atrophic.  No adnexal mass or significant volume free pelvic fluid.  Pelvic phleboliths noted.    Mild presacral edema, nonspecific.  There is apparent circumferential wall thickening of the distal rectum/anus.  No perirectal or perianal abscess or discrete mass seen.  No significant perirectal inflammatory change.  Numerous scattered colonic diverticula short-segment abnormal circumferential wall thickening of the mid sigmoid colon with mild adjacent inflammatory change concerning for acute diverticulitis.  Small collection of gas foci with fat stranding just superior to this bowel loop of sigmoid colon concerning for contained perforation.  No rim enhancing component identified.  No intraperitoneal free air seen elsewhere.  No bowel obstruction. Appendix and terminal ileum are within normal limits.  No bowel pneumatosis or portal venous gas.    No ascites.  No lymphadenopathy by CT criteria.    Mild  scattered calcified and noncalcified plaque of the abdominal aorta extending into its iliac branches.  No aortic aneurysm or dissection.    Mild body wall subcutaneous edema suggesting mild anasarca.    Osseous structures show generalized osteopenia, chronic minimal to mild anterior wedge deformity of a few lower thoracic vertebral bodies and age-related degenerative change.  No acute destructive osseous process seen.  Small sclerotic focus at the right femoral head likely a bone island.      Impression    1. Findings concerning for acute diverticulitis of the mid sigmoid colon with adjacent suspected contained perforation.  No associated rim enhancement to suggest drainable abscess at this time.  No bowel obstruction.  Consider follow-up with imaging or endoscopy after therapy according to colorectal screening guidelines.  2. Apparent circumferential wall thickening of the distal rectum/anus which could be related to underdistention versus nonspecific proctitis.  No significant perirectal inflammatory change or evidence perirectal or perianal drainable abscess.  Mild presacral edema, nonspecific.  3. Left more than right basilar patchy nonspecific pulmonary mosaic attenuation with bronchial wall thickening which can be seen with small vessels disease including pulmonary edema or small airways process.  Additionally, there is left more than right basilar patchy clusters of small nodules suggesting infectious or inflammatory small airways process versus aspiration.  No large consolidation.  Attention on follow-up.  4. Suspected hepatic steatosis.  5. Small hiatal hernia.  Peg tube.  Other incidental  6. Other incidental/nonemergent findings in the body of the report.  This report was flagged in Epic as abnormal.      Electronically signed by: Celio De Leon MD  Date:    12/30/2024  Time:    18:56   CT Head Without Contrast    Narrative    EXAMINATION:  CT HEAD WITHOUT CONTRAST    CLINICAL HISTORY:  Mental status  change, unknown cause;    TECHNIQUE:  Low dose axial CT images obtained throughout the head without intravenous contrast. Sagittal and coronal reconstructions were performed.    COMPARISON:  06/27/2019    FINDINGS:  Patient is rotated to the right obscuring visualization.    Small probable remote areas of lacunar infarction following the basal ganglia bilaterally.    Prominent probable chronic microvascular ischemic changes in the periventricular and subcortical white matter.    No evidence of acute calvarial fracture.  Small probable chronic right parietal cortical infarction.      Impression    1. No acute intracranial process.  See above comments.  2. Prominent involutional changes and chronic microvascular ischemic changes in the periventricular white matter.  Small areas of probable remote lacunar infarction in the bilateral basal ganglia and small probable chronic right parietal cortical infarct.  MRI follow-up may better characterize if there is high clinical suspicion.  See above comments.      Electronically signed by: Sebastian Tinoco  Date:    12/30/2024  Time:    17:01         Assessment/Plan:     Active Diagnoses:    Diagnosis Date Noted POA    PRINCIPAL PROBLEM:  Diverticulitis [K57.92] 12/30/2024 Yes    Hypernatremia [E87.0] 12/31/2024 Unknown    History of stroke with residual effects [I69.30] 12/30/2024 Not Applicable    Multiple wounds of skin [T14.8XXA] 12/30/2024 Unknown    UTI (urinary tract infection) [N39.0] 12/30/2024 Yes    Dysphagia [R13.10] 12/30/2024 Unknown    Insomnia [G47.00] 04/06/2019 Yes    Mixed hyperlipidemia [E78.2] 04/01/2019 Yes      Problems Resolved During this Admission:    Diagnosis Date Noted Date Resolved POA    Tobacco dependency [F17.200] 12/31/2024 12/31/2024 Unknown     Emily Martinez is a 60 y/o F with multiple medical co-morbidities including prior CVA with significant deficits, now with radiologic evidence of sigmoid diverticulitis and possible contained  perforation.      Plan:  - continue to trend CRP and CBC  - Advance feeds to goal as tolerated   - Remainder of care per primary team    Noman Valentin MD  Colorectal Surgery  [facility]

## 2025-01-02 NOTE — ASSESSMENT & PLAN NOTE
- CT with diverticulitis with contained perforation  - VSS without leukocytosis  ;  non-surgical abdomen on exam  - maintain npo for now  - CRS consult in am   (stat consult if clinical decompensation or concerning abdominal symptoms/signs)  - continue Meropenem  - ID consulted ; appreciate recs  - further management pending clinical course and future study review    12/31  CT abdomen - racute diverticulitis of the mid sigmoid colon with adjacent suspected contained perforation.  No associated rim enhancement to suggest drainable abscess at this time.  No bowel obstruction. Apparent circumferential wall thickening of the distal rectum/anus which could be related to underdistention versus nonspecific proctitis.  No significant perirectal inflammatory change or evidence perirectal or perianal drainable abscess.  Left more than right basilar patchy nonspecific pulmonary mosaic attenuation with bronchial wall thickening pulmonary edema or small airways process / right basilar patchy clusters of small nodules suggesting infectious or inflammatory small airways process versus aspiration.  No large consolidation. s/p CRS eval - hemodynamically normal with benign abdominal exam, and reassuring labwork. No acute surgical intervention indicated. continue NPO status, with IVF hydration and IV meropenem.  trend CRP 59  C.diff assay pending. s/p CRS eval - hemodynamically normal with benign abdominal exam, and reassuring labwork. No acute surgical intervention indicated.    - Advance TF, stop if residual>500cc  - Ertapenem per ID recs EED 01/09/25

## 2025-01-02 NOTE — PLAN OF CARE
Problem: Skin Injury Risk Increased  Goal: Skin Health and Integrity  Outcome: Progressing     Problem: Infection  Goal: Absence of Infection Signs and Symptoms  Outcome: Progressing     Problem: Adult Inpatient Plan of Care  Goal: Plan of Care Review  Outcome: Progressing  Goal: Patient-Specific Goal (Individualized)  Outcome: Progressing  Goal: Absence of Hospital-Acquired Illness or Injury  Outcome: Progressing  Goal: Optimal Comfort and Wellbeing  Outcome: Progressing  Goal: Readiness for Transition of Care  Outcome: Progressing     Problem: Diabetes Comorbidity  Goal: Blood Glucose Level Within Targeted Range  Outcome: Progressing

## 2025-01-02 NOTE — ASSESSMENT & PLAN NOTE
Patient's most recent potassium results are listed below.   Recent Labs     12/31/24  1422 01/01/25  0811 01/02/25  0649   K 3.5 3.6 3.4*     Plan  - Replete potassium per protocol  - Monitor potassium Daily  - Patient's hypokalemia is improving

## 2025-01-02 NOTE — ASSESSMENT & PLAN NOTE
- sp PEG for nutrition and meds  ;  okay for comfort po feeds per recent SLP recs  - NPO as above at this time  - IVFs  - advance TF

## 2025-01-02 NOTE — ASSESSMENT & PLAN NOTE
History of recent ESBL infection, discharged on Keflex prior to culture results.     Recommendations  Continue abx (ertapenem)

## 2025-01-02 NOTE — SUBJECTIVE & OBJECTIVE
Interval History: Feels okay. Waxes and wanes. No pain. Tolerating abx.    Review of Systems   Constitutional:  Negative for chills and fever.   All other systems reviewed and are negative.    Objective:     Vital Signs (Most Recent):  Temp: 97.2 °F (36.2 °C) (01/02/25 0742)  Pulse: 85 (01/02/25 0422)  Resp: 17 (01/02/25 0422)  BP: 100/71 (01/02/25 0422)  SpO2: (!) 94 % (01/02/25 0422) Vital Signs (24h Range):  Temp:  [97.2 °F (36.2 °C)-98 °F (36.7 °C)] 97.2 °F (36.2 °C)  Pulse:  [77-85] 85  Resp:  [17-18] 17  SpO2:  [92 %-95 %] 94 %  BP: (100-110)/(55-76) 100/71     Weight: 63.5 kg (139 lb 15.9 oz)  Body mass index is 24.8 kg/m².    Estimated Creatinine Clearance: 66.2 mL/min (based on SCr of 0.8 mg/dL).     Physical Exam  Vitals and nursing note reviewed.   Constitutional:       Appearance: Normal appearance.   HENT:      Head: Normocephalic.   Eyes:      Pupils: Pupils are equal, round, and reactive to light.   Cardiovascular:      Rate and Rhythm: Normal rate.   Abdominal:      General: There is distension.      Tenderness: There is no abdominal tenderness. There is no guarding or rebound.   Musculoskeletal:      Right lower leg: Edema present.      Left lower leg: Edema present.   Neurological:      General: No focal deficit present.      Mental Status: She is alert.   Psychiatric:         Mood and Affect: Mood normal.         Behavior: Behavior normal.          Significant Labs: BMP:   Recent Labs   Lab 01/02/25  0649   GLU 63*   *   K 3.4*   *   CO2 23   BUN 20   CREATININE 0.8   CALCIUM 7.6*   MG 1.9     CBC:   Recent Labs   Lab 01/01/25  0811 01/02/25  0649   WBC 9.83 11.41   HGB 11.1* 12.3   HCT 33.7* 36.9*    580*     Microbiology Results (last 7 days)       Procedure Component Value Units Date/Time    Urine culture [[id number]]  (Abnormal)  (Susceptibility) Collected: 12/30/24 1815    Order Status: Completed Specimen: Urine Updated: 01/02/25 1158     Urine Culture, Routine ESCHERICHIA  COLI ESBL  >100,000 cfu/ml      Narrative:      Specimen Source->Urine    Blood culture #1 **CANNOT BE ORDERED STAT** [[id number]] Collected: 12/30/24 1418    Order Status: Completed Specimen: Blood from Peripheral, Hand, Right Updated: 01/01/25 2012     Blood Culture, Routine No Growth to date      No Growth to date      No Growth to date    Blood culture #2 **CANNOT BE ORDERED STAT** [[id number]] Collected: 12/30/24 1413    Order Status: Completed Specimen: Blood from Peripheral, Antecubital, Right Updated: 01/01/25 2012     Blood Culture, Routine No Growth to date      No Growth to date      No Growth to date    Clostridium difficile EIA [[id number]]     Order Status: Canceled Specimen: Stool             Significant Imaging: I have reviewed all pertinent imaging results/findings within the past 24 hours.

## 2025-01-03 LAB
ALBUMIN SERPL BCP-MCNC: 1.2 G/DL (ref 3.5–5.2)
ALP SERPL-CCNC: 153 U/L (ref 40–150)
ALT SERPL W/O P-5'-P-CCNC: 42 U/L (ref 10–44)
ANION GAP SERPL CALC-SCNC: 7 MMOL/L (ref 8–16)
ANISOCYTOSIS BLD QL SMEAR: SLIGHT
AST SERPL-CCNC: 123 U/L (ref 10–40)
BASOPHILS # BLD AUTO: 0.02 K/UL (ref 0–0.2)
BASOPHILS NFR BLD: 0.2 % (ref 0–1.9)
BILIRUB SERPL-MCNC: 0.9 MG/DL (ref 0.1–1)
BUN SERPL-MCNC: 19 MG/DL (ref 8–23)
BURR CELLS BLD QL SMEAR: ABNORMAL
CALCIUM SERPL-MCNC: 7.3 MG/DL (ref 8.7–10.5)
CHLORIDE SERPL-SCNC: 116 MMOL/L (ref 95–110)
CO2 SERPL-SCNC: 25 MMOL/L (ref 23–29)
CREAT SERPL-MCNC: 0.8 MG/DL (ref 0.5–1.4)
CRP SERPL-MCNC: 64.5 MG/L (ref 0–8.2)
DACRYOCYTES BLD QL SMEAR: ABNORMAL
DIFFERENTIAL METHOD BLD: ABNORMAL
EOSINOPHIL # BLD AUTO: 0.1 K/UL (ref 0–0.5)
EOSINOPHIL NFR BLD: 0.5 % (ref 0–8)
ERYTHROCYTE [DISTWIDTH] IN BLOOD BY AUTOMATED COUNT: 24.1 % (ref 11.5–14.5)
EST. GFR  (NO RACE VARIABLE): >60 ML/MIN/1.73 M^2
GIANT PLATELETS BLD QL SMEAR: PRESENT
GLUCOSE SERPL-MCNC: 118 MG/DL (ref 70–110)
HCT VFR BLD AUTO: 30.4 % (ref 37–48.5)
HGB BLD-MCNC: 10.2 G/DL (ref 12–16)
HYPOCHROMIA BLD QL SMEAR: ABNORMAL
IMM GRANULOCYTES # BLD AUTO: 0.1 K/UL (ref 0–0.04)
IMM GRANULOCYTES NFR BLD AUTO: 1 % (ref 0–0.5)
LYMPHOCYTES # BLD AUTO: 1.7 K/UL (ref 1–4.8)
LYMPHOCYTES NFR BLD: 16.1 % (ref 18–48)
MCH RBC QN AUTO: 23 PG (ref 27–31)
MCHC RBC AUTO-ENTMCNC: 33.6 G/DL (ref 32–36)
MCV RBC AUTO: 69 FL (ref 82–98)
MONOCYTES # BLD AUTO: 0.8 K/UL (ref 0.3–1)
MONOCYTES NFR BLD: 7.2 % (ref 4–15)
NEUTROPHILS # BLD AUTO: 7.8 K/UL (ref 1.8–7.7)
NEUTROPHILS NFR BLD: 75 % (ref 38–73)
NRBC BLD-RTO: 0 /100 WBC
OVALOCYTES BLD QL SMEAR: ABNORMAL
PLATELET # BLD AUTO: 532 K/UL (ref 150–450)
PLATELET BLD QL SMEAR: ABNORMAL
PMV BLD AUTO: 8.9 FL (ref 9.2–12.9)
POCT GLUCOSE: 125 MG/DL (ref 70–110)
POCT GLUCOSE: 134 MG/DL (ref 70–110)
POCT GLUCOSE: 149 MG/DL (ref 70–110)
POCT GLUCOSE: 164 MG/DL (ref 70–110)
POIKILOCYTOSIS BLD QL SMEAR: SLIGHT
POLYCHROMASIA BLD QL SMEAR: ABNORMAL
POTASSIUM SERPL-SCNC: 5.7 MMOL/L (ref 3.5–5.1)
PROT SERPL-MCNC: 4.4 G/DL (ref 6–8.4)
RBC # BLD AUTO: 4.43 M/UL (ref 4–5.4)
SCHISTOCYTES BLD QL SMEAR: ABNORMAL
SCHISTOCYTES BLD QL SMEAR: PRESENT
SODIUM SERPL-SCNC: 148 MMOL/L (ref 136–145)
TARGETS BLD QL SMEAR: ABNORMAL
WBC # BLD AUTO: 10.39 K/UL (ref 3.9–12.7)

## 2025-01-03 PROCEDURE — 36415 COLL VENOUS BLD VENIPUNCTURE: CPT | Mod: XB

## 2025-01-03 PROCEDURE — 25000003 PHARM REV CODE 250: Performed by: HOSPITALIST

## 2025-01-03 PROCEDURE — 63600175 PHARM REV CODE 636 W HCPCS

## 2025-01-03 PROCEDURE — 25000003 PHARM REV CODE 250: Performed by: FAMILY MEDICINE

## 2025-01-03 PROCEDURE — 25000003 PHARM REV CODE 250

## 2025-01-03 PROCEDURE — 85025 COMPLETE CBC W/AUTO DIFF WBC: CPT

## 2025-01-03 PROCEDURE — 63600175 PHARM REV CODE 636 W HCPCS: Performed by: FAMILY MEDICINE

## 2025-01-03 PROCEDURE — 11000001 HC ACUTE MED/SURG PRIVATE ROOM

## 2025-01-03 PROCEDURE — 86140 C-REACTIVE PROTEIN: CPT | Performed by: STUDENT IN AN ORGANIZED HEALTH CARE EDUCATION/TRAINING PROGRAM

## 2025-01-03 PROCEDURE — 80053 COMPREHEN METABOLIC PANEL: CPT

## 2025-01-03 PROCEDURE — 21400001 HC TELEMETRY ROOM

## 2025-01-03 PROCEDURE — 36415 COLL VENOUS BLD VENIPUNCTURE: CPT | Performed by: STUDENT IN AN ORGANIZED HEALTH CARE EDUCATION/TRAINING PROGRAM

## 2025-01-03 PROCEDURE — 27000207 HC ISOLATION

## 2025-01-03 RX ORDER — OXYCODONE AND ACETAMINOPHEN 7.5; 325 MG/1; MG/1
1 TABLET ORAL EVERY 4 HOURS PRN
Status: DISCONTINUED | OUTPATIENT
Start: 2025-01-03 | End: 2025-01-18 | Stop reason: HOSPADM

## 2025-01-03 RX ORDER — IBUPROFEN 200 MG
16 TABLET ORAL
Status: DISCONTINUED | OUTPATIENT
Start: 2025-01-03 | End: 2025-01-18 | Stop reason: HOSPADM

## 2025-01-03 RX ORDER — LOPERAMIDE HYDROCHLORIDE 2 MG/1
2 CAPSULE ORAL 4 TIMES DAILY PRN
Status: DISCONTINUED | OUTPATIENT
Start: 2025-01-03 | End: 2025-01-15

## 2025-01-03 RX ORDER — IBUPROFEN 200 MG
24 TABLET ORAL
Status: DISCONTINUED | OUTPATIENT
Start: 2025-01-03 | End: 2025-01-18 | Stop reason: HOSPADM

## 2025-01-03 RX ADMIN — HEPARIN SODIUM 5000 UNITS: 5000 INJECTION INTRAVENOUS; SUBCUTANEOUS at 09:01

## 2025-01-03 RX ADMIN — ATORVASTATIN CALCIUM 80 MG: 40 TABLET, FILM COATED ORAL at 09:01

## 2025-01-03 RX ADMIN — MICONAZOLE NITRATE 2 % TOPICAL POWDER: at 08:01

## 2025-01-03 RX ADMIN — MICONAZOLE NITRATE 2 % TOPICAL POWDER: at 09:01

## 2025-01-03 RX ADMIN — SODIUM CHLORIDE 1 G: 9 INJECTION, SOLUTION INTRAVENOUS at 08:01

## 2025-01-03 RX ADMIN — LOPERAMIDE HYDROCHLORIDE 2 MG: 2 CAPSULE ORAL at 02:01

## 2025-01-03 RX ADMIN — WHITE PETROLATUM: 1.75 OINTMENT TOPICAL at 08:01

## 2025-01-03 RX ADMIN — ASPIRIN 81 MG CHEWABLE TABLET 81 MG: 81 TABLET CHEWABLE at 09:01

## 2025-01-03 RX ADMIN — THERA TABS 1 TABLET: TAB at 09:01

## 2025-01-03 RX ADMIN — GABAPENTIN 250 MG: 250 SOLUTION ORAL at 02:01

## 2025-01-03 RX ADMIN — HEPARIN SODIUM 5000 UNITS: 5000 INJECTION INTRAVENOUS; SUBCUTANEOUS at 06:01

## 2025-01-03 RX ADMIN — GABAPENTIN 250 MG: 250 SOLUTION ORAL at 06:01

## 2025-01-03 RX ADMIN — HEPARIN SODIUM 5000 UNITS: 5000 INJECTION INTRAVENOUS; SUBCUTANEOUS at 02:01

## 2025-01-03 RX ADMIN — TRAZODONE HYDROCHLORIDE 50 MG: 50 TABLET ORAL at 08:01

## 2025-01-03 RX ADMIN — INSULIN GLARGINE 5 UNITS: 100 INJECTION, SOLUTION SUBCUTANEOUS at 09:01

## 2025-01-03 RX ADMIN — GABAPENTIN 250 MG: 250 SOLUTION ORAL at 09:01

## 2025-01-03 NOTE — NURSING
Patient in 9/10 pain to left arm and shoulder and back. Oxycodone and tylenol given. Residual 100. Tube feeding placed at 15mL. Night nurse is to gradually go up on feeding as tolerated. Goal 50mL/hr.

## 2025-01-03 NOTE — NURSING
Waffle mattress, heel boots, triad cream, and heart shaped foam padding placed to sacral area. Drying agent placed underneath breast for moisture. Miconazole powder ordered for yeast under breast.

## 2025-01-03 NOTE — NURSING
Increased feedings by 5ml. Rate is now at 30ml/hr. Patient goal is 50ml/hr. Residuals check patient had 300cc of residual. Upper limit residual goal is 500cc per MD. Patient was given free water bolus before increase. Patient is alert and oriented and did not report any discomfort.

## 2025-01-03 NOTE — ASSESSMENT & PLAN NOTE
Patient's most recent potassium results are listed below.   Recent Labs     01/01/25  0811 01/02/25  0649   K 3.6 3.4*       Plan  - Replete potassium per protocol  - Monitor potassium Daily  - Patient's hypokalemia is improving

## 2025-01-03 NOTE — PROGRESS NOTES
[facility] Surg  Colorectal Surgery  Progress Note    Patient Name: Emily Martinez  MRN: [id number]  Admission Date: 12/30/2024  Hospital Length of Stay: 3 days  Attending Physician: Valerio Jones MD    Subjective:     Interval History:     Subjective:  No acute events overnight. Appears comfortable,  tolerating tube feeds at 25, advancing to goal, having bowel function, no abdominal complaints         Post-Op Info:  * No surgery found *          Medications:  Continuous Infusions:      Scheduled Meds:   aspirin  81 mg Per G Tube Daily    atorvastatin  80 mg Per G Tube Daily    ertapenem (INVanz) IV (PEDS and ADULTS)  1 g Intravenous Q24H    gabapentin  250 mg Per G Tube Q8H    heparin (porcine)  5,000 Units Subcutaneous Q8H    insulin glargine U-100  5 Units Subcutaneous Daily    miconazole NITRATE 2 %   Topical (Top) BID    multivitamin  1 tablet Per G Tube Daily    traZODone  50 mg Per G Tube QHS     PRN Meds:   aspirin chewable tablet 81 mg    atorvastatin tablet 80 mg    ertapenem (INVANZ) 1 g in 0.9% NaCl 100 mL IVPB (MB+)    gabapentin 250 mg/5 mL (5 mL) solution 250 mg    heparin (porcine) injection 5,000 Units    insulin glargine U-100 (Lantus) pen 5 Units    miconazole NITRATE 2 % top powder    multivitamin tablet    traZODone tablet 50 mg        Objective:     Vital Signs (Most Recent):  Temp: 97.6 °F (36.4 °C) (01/03/25 0356)  Pulse: 78 (01/03/25 0356)  Resp: 18 (01/03/25 0356)  BP: (!) 113/53 (01/03/25 0356)  SpO2: (!) 94 % (01/03/25 0356) Vital Signs (24h Range):  Temp:  [96.7 °F (35.9 °C)-98 °F (36.7 °C)] 97.6 °F (36.4 °C)  Pulse:  [] 78  Resp:  [16-18] 18  SpO2:  [94 %-96 %] 94 %  BP: ()/(53-83) 113/53     Intake/Output - Last 3 Shifts         01/01 0700  01/02 0659 01/02 0700  01/03 0659 01/03 0700  01/04 0659    I.V. (mL/kg)       Other       NG/GT  756     Total Intake(mL/kg)  756 (11.9)     Urine (mL/kg/hr) 750 (0.5) 0 (0)     Drains  575     Other  0     Stool 0      Total  Output 750 575     Net -750 +181            Urine Occurrence  2 x     Stool Occurrence 2 x 2 x               General: drowsy but responsive, in no apparent distress  HEENT: Sclera anicteric, trachea midline  Lungs: Normal respiratory rate and effort on room air  Abdomen: Soft, non distended, non tender  Extremities: Warm, well perfused, no edema  Neuro: Baseline deficits not assessed  Psych: Appropriate affect    Significant Labs:  BMP (Last 3 Results):   Recent Labs   Lab 12/31/24  0815 12/31/24  1422 01/01/25  0811 01/02/25  0649   GLU 71 88 110 63*   * 148* 143 148*   K 3.6 3.5 3.6 3.4*   * 114* 110 115*   CO2 21* 22* 23 23   BUN 23 23 21 20   CREATININE 0.8 0.8 0.8 0.8   CALCIUM 7.4* 7.8* 7.5* 7.6*   MG 1.8  --  1.9 1.9     CBC:   Recent Labs   Lab 01/02/25  0649   WBC 11.41   RBC 5.32   HGB 12.3   HCT 36.9*   *   MCV 69*   MCH 23.1*   MCHC 33.3       Significant Diagnostics:  Results for orders placed or performed during the hospital encounter of 12/30/24 (from the past 2160 hours)   CT Abdomen Pelvis With IV Contrast NO Oral Contrast    Narrative    EXAMINATION:  CT ABDOMEN PELVIS WITH IV CONTRAST    CLINICAL HISTORY:  Abdominal pain, acute, nonlocalized;    TECHNIQUE:  Low dose axial images, sagittal and coronal reformations were obtained from the lung bases to the pubic symphysis following the IV administration of 100 mL of Omnipaque 350 .  Oral contrast was not given.    COMPARISON:  Chest radiograph 06/27/2019, report only for outside facility CT abdomen and pelvis 10/28/2024 and renal ultrasound 06/12/2024    FINDINGS:  Beam hardening with streak artifact from overlying monitoring leads and adjacent bilateral upper extremities with respiratory motion somewhat limits evaluation.    Lingular platelike scarring versus atelectasis.  Mild dependent atelectasis.  Patchy nonspecific pulmonary mosaic attenuation with bronchial wall thickening seen in the bilateral lower lobes few scattered  clusters of small nodules also seen within the lower lobes, more so on the left no consolidation or pleural effusion.  Heart is normal in size without significant pericardial fluid.    Small hiatal hernia.  Peg tube balloon within the anterior aspect of the mid gastric body.  Stomach is mildly distended with mostly ingested fluid contents without wall thickening or adjacent inflammatory change.  Duodenum is within normal limits.    Portal vasculature is patent.  Liver is normal in size with diffuse parenchymal hypoattenuation suggesting fatty infiltration, without discrete mass seen.    Pancreas diffusely atrophic.  No pancreatic mass or adjacent inflammatory change.  Gallbladder, spleen are within normal limits.  Non masslike thickening of the bilateral adrenal glands.  No significant biliary dilatation.    Bilateral kidneys are normal in size, shape and location with symmetric normal enhancement.  No hydronephrosis or significant perinephric stranding.  Ureters are normal in course and caliber.  Urinary bladder is well distended without wall thickening or adjacent inflammatory change noting small volume non dependent intraluminal gas.  Uterus not identified and likely surgically absent or atrophic.  No adnexal mass or significant volume free pelvic fluid.  Pelvic phleboliths noted.    Mild presacral edema, nonspecific.  There is apparent circumferential wall thickening of the distal rectum/anus.  No perirectal or perianal abscess or discrete mass seen.  No significant perirectal inflammatory change.  Numerous scattered colonic diverticula short-segment abnormal circumferential wall thickening of the mid sigmoid colon with mild adjacent inflammatory change concerning for acute diverticulitis.  Small collection of gas foci with fat stranding just superior to this bowel loop of sigmoid colon concerning for contained perforation.  No rim enhancing component identified.  No intraperitoneal free air seen elsewhere.  No  bowel obstruction. Appendix and terminal ileum are within normal limits.  No bowel pneumatosis or portal venous gas.    No ascites.  No lymphadenopathy by CT criteria.    Mild scattered calcified and noncalcified plaque of the abdominal aorta extending into its iliac branches.  No aortic aneurysm or dissection.    Mild body wall subcutaneous edema suggesting mild anasarca.    Osseous structures show generalized osteopenia, chronic minimal to mild anterior wedge deformity of a few lower thoracic vertebral bodies and age-related degenerative change.  No acute destructive osseous process seen.  Small sclerotic focus at the right femoral head likely a bone island.      Impression    1. Findings concerning for acute diverticulitis of the mid sigmoid colon with adjacent suspected contained perforation.  No associated rim enhancement to suggest drainable abscess at this time.  No bowel obstruction.  Consider follow-up with imaging or endoscopy after therapy according to colorectal screening guidelines.  2. Apparent circumferential wall thickening of the distal rectum/anus which could be related to underdistention versus nonspecific proctitis.  No significant perirectal inflammatory change or evidence perirectal or perianal drainable abscess.  Mild presacral edema, nonspecific.  3. Left more than right basilar patchy nonspecific pulmonary mosaic attenuation with bronchial wall thickening which can be seen with small vessels disease including pulmonary edema or small airways process.  Additionally, there is left more than right basilar patchy clusters of small nodules suggesting infectious or inflammatory small airways process versus aspiration.  No large consolidation.  Attention on follow-up.  4. Suspected hepatic steatosis.  5. Small hiatal hernia.  Peg tube.  Other incidental  6. Other incidental/nonemergent findings in the body of the report.  This report was flagged in Epic as abnormal.      Electronically signed  by: Celio De Leon MD  Date:    12/30/2024  Time:    18:56   CT Head Without Contrast    Narrative    EXAMINATION:  CT HEAD WITHOUT CONTRAST    CLINICAL HISTORY:  Mental status change, unknown cause;    TECHNIQUE:  Low dose axial CT images obtained throughout the head without intravenous contrast. Sagittal and coronal reconstructions were performed.    COMPARISON:  06/27/2019    FINDINGS:  Patient is rotated to the right obscuring visualization.    Small probable remote areas of lacunar infarction following the basal ganglia bilaterally.    Prominent probable chronic microvascular ischemic changes in the periventricular and subcortical white matter.    No evidence of acute calvarial fracture.  Small probable chronic right parietal cortical infarction.      Impression    1. No acute intracranial process.  See above comments.  2. Prominent involutional changes and chronic microvascular ischemic changes in the periventricular white matter.  Small areas of probable remote lacunar infarction in the bilateral basal ganglia and small probable chronic right parietal cortical infarct.  MRI follow-up may better characterize if there is high clinical suspicion.  See above comments.      Electronically signed by: Sebastian Tinoco  Date:    12/30/2024  Time:    17:01         Assessment/Plan:     Active Diagnoses:    Diagnosis Date Noted POA    PRINCIPAL PROBLEM:  Diverticulitis [K57.92] 12/30/2024 Yes    Hypokalemia [E87.6] 01/02/2025 No    Hypophosphatemia [E83.39] 01/02/2025 Yes    Atelectasis of both lungs [J98.11] 01/02/2025 Yes    Hypernatremia [E87.0] 12/31/2024 Yes    History of stroke with residual effects [I69.30] 12/30/2024 Not Applicable    Multiple wounds of skin [T14.8XXA] 12/30/2024 Yes    UTI (urinary tract infection) [N39.0] 12/30/2024 Yes    Dysphagia [R13.10] 12/30/2024 Yes    Insomnia [G47.00] 04/06/2019 Yes    Mixed hyperlipidemia [E78.2] 04/01/2019 Yes      Problems Resolved During this Admission:    Diagnosis  Date Noted Date Resolved POA    Tobacco dependency [F17.200] 12/31/2024 12/31/2024 Unknown     Emily Martinez is a 60 y/o F with multiple medical co-morbidities including prior CVA with significant deficits, now with radiologic evidence of sigmoid diverticulitis and possible contained perforation.      Plan:  - Advance feeds to goal as tolerated   - Patient should undergo a colonoscopy as an outpatient with the endoscopist of her choice in 6-12 weeks.   - CRS will sign off, please reconsult with further concerns.   - Remainder of care per primary team    Noman Valentin MD  Colorectal Surgery  [facility] Surg

## 2025-01-03 NOTE — PROGRESS NOTES
[facility]  Progress Note    Patient Name: Emily Martinez  MRN: [id number]  Patient Class: IP- Inpatient   Admission Date: 12/30/2024  Length of Stay: 3 days  Attending Physician: Valerio Jones MD  Primary Care Provider: Alireza Sosa MD        Subjective     Principal Problem:Diverticulitis        HPI:  59-year-old female, history of a stroke with left hemiplegia, bed-bound, diabetes, hypertension, recurrent infections, dysphagia sp PEG, multiple skin wounds, multiple recent admissions in the last several months in the [facility] system, most recently last week for UTI, discharged with Keflex, living at home with her daughter, now brought in for diarrhea.  Daughter states the patient has had diarrhea for about a week and a half.  She is having about 4-5 episodes a day, watery, nonbloody.  She has intermittently also vomiting.  Patient has a PEG tube for nutrition but per most recent SLP note can also take p.o. nutrition but daughter says she has had minimal p.o. intake because she has said she is not hungry.  No fevers noted.  Daughter is frustrated as she feels like when patient gets admitted, she is temporarily better and then very quickly becomes ill again.  All of their care has been in the [facility] system but they decided to come to [facility] today as they were hopeful that we would be able to get her better.       Of note, urine culture from 6 days ago is growing out ESBL E coli, greater than 100,000 colonies    In the ED patient afebrile and hemodynamically stable saturating well on room air at rest. No leukocytosis. UA still concerning for UTI and patient started on meropenem. CT abdomen performed and noted acute diverticulitis with area of concern for contained perforation. Patient without abdominal tenderness. Patient admitted to the [facility] for further evaluation and management.    Overview/Hospital Course:  12/31 CT head -No acute intracranial process. Prominent  involutional changes and chronic microvascular ischemic changes in the periventricular white matter.  Small areas of probable remote lacunar infarction in the bilateral basal ganglia and small probable chronic right parietal cortical infarct. CT abdomen - racute diverticulitis of the mid sigmoid colon with adjacent suspected contained perforation.  No associated rim enhancement to suggest drainable abscess at this time.  No bowel obstruction. Apparent circumferential wall thickening of the distal rectum/anus which could be related to underdistention versus nonspecific proctitis.  No significant perirectal inflammatory change or evidence perirectal or perianal drainable abscess.  Left more than right basilar patchy nonspecific pulmonary mosaic attenuation with bronchial wall thickening pulmonary edema or small airways process / right basilar patchy clusters of small nodules suggesting infectious or inflammatory small airways process versus aspiration.  No large consolidation. s/p CRS eval - hemodynamically normal with benign abdominal exam, and reassuring labwork. No acute surgical intervention indicated. continue NPO status, with IVF hydration and IV meropenem.  trend CRP 59-->66. CRS eval -  continue to trend CRP  if downtrending and passing flatus, okay to start on clears and downtitrate IVFC.  1/1 UC GNRs.  ID eval - continue meropenem. If no Pseudomonas isolated, okay to de-escalate to ertapenem. C diff assay pending.    Advancing TF per CRS recs to home regimen. De-escalated to ertapenem for 7-10day treatment course (EED 01/09/25) for diverticulitis and ESBL Ecoli cystitis. Not acute care at home candidate.     Interval History: SUSIE, daughter updated via phone.    Review of Systems  Objective:     Vital Signs (Most Recent):  Temp: 97.5 °F (36.4 °C) (01/03/25 1631)  Pulse: 84 (01/03/25 1631)  Resp: 20 (01/03/25 1631)  BP: 100/76 (01/03/25 1631)  SpO2: (!) 90 % (01/03/25 1631) Vital Signs (24h Range):  Temp:   [96.8 °F (36 °C)-98.5 °F (36.9 °C)] 97.5 °F (36.4 °C)  Pulse:  [78-99] 84  Resp:  [12-20] 20  SpO2:  [90 %-96 %] 90 %  BP: ()/(53-76) 100/76     Weight: 63.5 kg (139 lb 15.9 oz)  Body mass index is 24.8 kg/m².    Intake/Output Summary (Last 24 hours) at 1/3/2025 1713  Last data filed at 1/3/2025 1448  Gross per 24 hour   Intake 756 ml   Output 465 ml   Net 291 ml         Physical Exam  Vitals and nursing note reviewed.   Constitutional:       General: She is not in acute distress.     Appearance: She is ill-appearing. She is not toxic-appearing or diaphoretic.      Comments: Chronically ill appearing, cachectic   HENT:      Head: Normocephalic and atraumatic.      Comments: Bilateral temporal wasting.     Nose: Nose normal.   Eyes:      General: No scleral icterus.     Extraocular Movements: Extraocular movements intact.      Conjunctiva/sclera: Conjunctivae normal.   Cardiovascular:      Rate and Rhythm: Normal rate and regular rhythm.   Pulmonary:      Effort: Pulmonary effort is normal. No respiratory distress.      Breath sounds: No wheezing or rales.   Abdominal:      General: Abdomen is flat. There is no distension.      Palpations: Abdomen is soft.      Tenderness: There is no abdominal tenderness. There is no guarding.   Musculoskeletal:      Cervical back: Normal range of motion.      Right lower leg: No edema.      Left lower leg: No edema.      Comments: Left hemiplegia, left UE and LE contracted/stiff     Skin:     General: Skin is warm and dry.      Comments: Multiple wounds photos in chart   Neurological:      Mental Status: She is alert. Mental status is at baseline.      Cranial Nerves: Dysarthria present.   Psychiatric:      Comments: Flat affect             Significant Labs: All pertinent labs within the past 24 hours have been reviewed.    Significant Imaging: I have reviewed all pertinent imaging results/findings within the past 24 hours.    Assessment and Plan     * Diverticulitis  - CT  with diverticulitis with contained perforation  - VSS without leukocytosis  ;  non-surgical abdomen on exam  - maintain npo for now  - CRS consult in am   (stat consult if clinical decompensation or concerning abdominal symptoms/signs)  - continue Meropenem  - ID consulted ; appreciate recs  - further management pending clinical course and future study review    12/31  CT abdomen - racute diverticulitis of the mid sigmoid colon with adjacent suspected contained perforation.  No associated rim enhancement to suggest drainable abscess at this time.  No bowel obstruction. Apparent circumferential wall thickening of the distal rectum/anus which could be related to underdistention versus nonspecific proctitis.  No significant perirectal inflammatory change or evidence perirectal or perianal drainable abscess.  Left more than right basilar patchy nonspecific pulmonary mosaic attenuation with bronchial wall thickening pulmonary edema or small airways process / right basilar patchy clusters of small nodules suggesting infectious or inflammatory small airways process versus aspiration.  No large consolidation. s/p CRS eval - hemodynamically normal with benign abdominal exam, and reassuring labwork. No acute surgical intervention indicated. continue NPO status, with IVF hydration and IV meropenem.  trend CRP 59  C.diff assay pending. s/p CRS eval - hemodynamically normal with benign abdominal exam, and reassuring labwork. No acute surgical intervention indicated.    - Advance TF, stop if residual>500cc  - Ertapenem per ID recs EED 01/09/25    Hypophosphatemia  Patient's most recent phosphorus results are listed below.   Recent Labs     01/01/25  0811 01/02/25  0649   PHOS 2.6* 2.4*       Plan  - Will treat hypophosphatemia with prn supplementation  - Patient's hypophosphatemia is stable    Hypokalemia  Patient's most recent potassium results are listed below.   Recent Labs     01/01/25  0811 01/02/25  0649   K 3.6 3.4*        Plan  - Replete potassium per protocol  - Monitor potassium Daily  - Patient's hypokalemia is improving    Hypernatremia  Hypernatremia is likely due to Dehydration. monitor with hydration   Recent Labs     01/01/25  0811 01/02/25  0649    148*      started on D5W infusion       Dysphagia  - sp PEG for nutrition and meds  ;  okay for comfort po feeds per recent SLP recs  - NPO as above at this time  - IVFs  - advance TF      UTI (urinary tract infection)  - recent outside culture with ESBL  - start meropenem  - ID consulted  - follow up cultures  - further management pending clinical course    12/31  continue IV meropenem.  trend CRP 59  C.diff assay pending.    1/1 UC GNRs.ID eval - continue meropenem. If no Pseudomonas isolated, okay to de-escalate to ertapenem.    - Ertapenem per ID recs EED 01/09/25    Multiple wounds of skin  - Wound care consulted  - turn q2  - waffle mattress overlay  - clean and dressed        History of stroke with residual effects  - chronic left hemiplegia, bed bound, sp PEG though able to tolerate po comfort feed  - continue home ASA and statin  - turn q2h      12/31 CT head -No acute intracranial process. Prominent involutional changes and chronic microvascular ischemic changes in the periventricular white matter.  Small areas of probable remote lacunar infarction in the bilateral basal ganglia and small probable chronic right parietal cortical infarct.     Insomnia  - home trazadone      Mixed hyperlipidemia  - home statin        VTE Risk Mitigation (From admission, onward)           Ordered     heparin (porcine) injection 5,000 Units  Every 8 hours         12/30/24 1937     IP VTE HIGH RISK PATIENT  Once         12/30/24 1937     Place sequential compression device  Until discontinued         12/30/24 1937     Place sequential compression device  Until discontinued         12/30/24 1929                    Discharge Planning   ANA: 1/4/2025     Code Status: Full Code    Medical Readiness for Discharge Date:   Discharge Plan A: Home, Home with family, Home Health, Other (PCA services with [facility].)                        Valerio Jones MD  Department of Hospital Medicine   [facility] Surg

## 2025-01-03 NOTE — CONSULTS
[facility]    Wound Care     Patient Name:  Emily Martinez  MRN:  [id number]  Date: 1/3/2025  Diagnosis: Diverticulitis     History:  Past Medical History:   Diagnosis Date    Diabetes mellitus type I     Hyperlipidemia     Hypertension     Right sided weakness 6/27/2019     Social History     Socioeconomic History    Marital status: Single   Tobacco Use    Smoking status: Every Day     Current packs/day: 1.50     Average packs/day: 1.5 packs/day for 35.0 years (52.5 ttl pk-yrs)     Types: Cigarettes    Smokeless tobacco: Never   Substance and Sexual Activity    Alcohol use: Not Currently    Drug use: Not Currently     Social Drivers of Health     Financial Resource Strain: Low Risk  (12/31/2024)    Overall Financial Resource Strain (CARDIA)     Difficulty of Paying Living Expenses: Not hard at all   Food Insecurity: No Food Insecurity (12/31/2024)    Hunger Vital Sign     Worried About Running Out of Food in the Last Year: Never true     Ran Out of Food in the Last Year: Never true   Transportation Needs: No Transportation Needs (12/31/2024)    TRANSPORTATION NEEDS     Transportation : No   Physical Activity: Inactive (12/31/2024)    Exercise Vital Sign     Days of Exercise per Week: 0 days     Minutes of Exercise per Session: 0 min   Stress: No Stress Concern Present (12/31/2024)    [employer] - Occupational Stress Questionnaire     Feeling of Stress : Only a little   Housing Stability: Low Risk  (12/31/2024)    Housing Stability Vital Sign     Unable to Pay for Housing in the Last Year: No     Homeless in the Last Year: No     Precautions:  Allergies as of 12/30/2024 - Reviewed 12/30/2024   Allergen Reaction Noted    Sulfur  10/30/2014       WO Assessment Details / Treatment:    Patient seen for wound care: New Consult   Chart reviewed for this encounter.   Labs:   WBC (K/uL)   Date Value   01/02/2025 11.41   01/01/2025 9.83     Glucose (mg/dL)   Date Value   01/02/2025 63  (L)   01/01/2025 110     Albumin (g/dL)   Date Value   01/02/2025 1.4 (L)   01/01/2025 1.4 (L)       Anand Score: 14    Narrative:  Pt seen for WC consultation and agreed to assessment  Chart reviewed for this encounter.   See Flow Sheet for additional documentation and media.    Pt skin dry and intact to left arm. Will order Aquaphor.       RECOMMENDATIONS:  Bedside nurse assess for acute changes (purulence, increased redness/swelling, increased drainage, malodor, increased pain, pallor, necrosis) please contact physician on any acute changes.    Discussed POC with patient and primary nurse.   See EMR for orders & patient education.     Bedside nursing to continue care & monitoring.  Bedside nursing to maintain pressure injury prevention interventions, (PIP).     Recommendations made to primary team for above plan via secure chat.     Thank you for the consult. Wound Care will sign off.  Please place a new consult if needed.        01/03/25 1040   WOCN Assessment   WOCN Total Time (mins) 15   Visit Date 01/03/25   Visit Time 1040   Consult Type New   WOCN Speciality Wound   Wound other  (Dry skin)   Intervention assessed;chart review;orders        Wound 12/30/24 1352 Other (comment) Left proximal;inferior;anterior Arm #1   Date First Assessed/Time First Assessed: 12/30/24 1352   Present on Original Admission: Yes  Primary Wound Type: (c) Other (comment)  Side: Left  Orientation: proximal;inferior;anterior  Location: Arm  Wound Number: #1  Is this injury device related?: No   Wound Image     Dressing Appearance Open to air   Drainage Amount None   Drainage Characteristics/Odor No odor   Appearance Intact;Dry   Tissue loss description Not applicable   Periwound Area Intact;Dry

## 2025-01-03 NOTE — PLAN OF CARE
Problem: Skin Injury Risk Increased  Goal: Skin Health and Integrity  Outcome: Progressing     Problem: Infection  Goal: Absence of Infection Signs and Symptoms  Outcome: Progressing     Problem: Adult Inpatient Plan of Care  Goal: Plan of Care Review  Outcome: Progressing  Goal: Patient-Specific Goal (Individualized)  Outcome: Progressing  Goal: Absence of Hospital-Acquired Illness or Injury  Outcome: Progressing  Goal: Optimal Comfort and Wellbeing  Outcome: Progressing  Goal: Readiness for Transition of Care  Outcome: Progressing     Problem: Diabetes Comorbidity  Goal: Blood Glucose Level Within Targeted Range  Outcome: Progressing     Problem: Acute Kidney Injury/Impairment  Goal: Fluid and Electrolyte Balance  Outcome: Progressing  Goal: Improved Oral Intake  Outcome: Progressing  Goal: Effective Renal Function  Outcome: Progressing     Problem: Wound  Goal: Optimal Coping  Outcome: Progressing  Goal: Optimal Functional Ability  Outcome: Progressing  Goal: Absence of Infection Signs and Symptoms  Outcome: Progressing  Goal: Improved Oral Intake  Outcome: Progressing  Goal: Optimal Pain Control and Function  Outcome: Progressing  Goal: Skin Health and Integrity  Outcome: Progressing  Goal: Optimal Wound Healing  Outcome: Progressing     Problem: Fall Injury Risk  Goal: Absence of Fall and Fall-Related Injury  Outcome: Progressing     Problem: Infection  Goal: Absence of Infection Signs and Symptoms  Outcome: Progressing     Problem: Adult Inpatient Plan of Care  Goal: Plan of Care Review  Outcome: Progressing     Problem: Diabetes Comorbidity  Goal: Blood Glucose Level Within Targeted Range  Outcome: Progressing     Problem: Acute Kidney Injury/Impairment  Goal: Fluid and Electrolyte Balance  Outcome: Progressing     Problem: Wound  Goal: Optimal Coping  Outcome: Progressing

## 2025-01-03 NOTE — ASSESSMENT & PLAN NOTE
Patient's most recent phosphorus results are listed below.   Recent Labs     01/01/25  0811 01/02/25  0649   PHOS 2.6* 2.4*       Plan  - Will treat hypophosphatemia with prn supplementation  - Patient's hypophosphatemia is stable

## 2025-01-03 NOTE — ASSESSMENT & PLAN NOTE
Hypernatremia is likely due to Dehydration. monitor with hydration   Recent Labs     01/01/25  0811 01/02/25  0649    148*      started on D5W infusion

## 2025-01-03 NOTE — PLAN OF CARE
SAMI assigned to pt today.   Per MD pt currently still on IV abx for 5 more days for UTI and MD wanted SW to reach out to pt daughter to see if she would be interested with acute care at home or HH w/ infusion. SAMI contacted pt daughter- Aneta [phone] @ 11:50am to provide update. She stated she not comfortable doing the IV abx and if they can get assistance at home to help with IV abx that would be good. She also asked to speak w/ the doctor regarding pt (SAMI secure chat pt MD informing daughter update and seeking to speak with him). CM dept will continue following along w/ pt treatment team for recommendation/needs.     Discharge Plan A and Plan B have been determined by review of patient's clinical status, future medical and therapeutic needs, and coverage/benefits for post-acute care in coordination with multidisciplinary team members.    CHRISTIANA Rosa   [facility]    Case Management Dept  [phone]

## 2025-01-03 NOTE — SUBJECTIVE & OBJECTIVE
Interval History: SUSIE daughter updated via phone.    Review of Systems  Objective:     Vital Signs (Most Recent):  Temp: 97.5 °F (36.4 °C) (01/03/25 1631)  Pulse: 84 (01/03/25 1631)  Resp: 20 (01/03/25 1631)  BP: 100/76 (01/03/25 1631)  SpO2: (!) 90 % (01/03/25 1631) Vital Signs (24h Range):  Temp:  [96.8 °F (36 °C)-98.5 °F (36.9 °C)] 97.5 °F (36.4 °C)  Pulse:  [78-99] 84  Resp:  [12-20] 20  SpO2:  [90 %-96 %] 90 %  BP: ()/(53-76) 100/76     Weight: 63.5 kg (139 lb 15.9 oz)  Body mass index is 24.8 kg/m².    Intake/Output Summary (Last 24 hours) at 1/3/2025 1713  Last data filed at 1/3/2025 1448  Gross per 24 hour   Intake 756 ml   Output 465 ml   Net 291 ml         Physical Exam  Vitals and nursing note reviewed.   Constitutional:       General: She is not in acute distress.     Appearance: She is ill-appearing. She is not toxic-appearing or diaphoretic.      Comments: Chronically ill appearing, cachectic   HENT:      Head: Normocephalic and atraumatic.      Comments: Bilateral temporal wasting.     Nose: Nose normal.   Eyes:      General: No scleral icterus.     Extraocular Movements: Extraocular movements intact.      Conjunctiva/sclera: Conjunctivae normal.   Cardiovascular:      Rate and Rhythm: Normal rate and regular rhythm.   Pulmonary:      Effort: Pulmonary effort is normal. No respiratory distress.      Breath sounds: No wheezing or rales.   Abdominal:      General: Abdomen is flat. There is no distension.      Palpations: Abdomen is soft.      Tenderness: There is no abdominal tenderness. There is no guarding.   Musculoskeletal:      Cervical back: Normal range of motion.      Right lower leg: No edema.      Left lower leg: No edema.      Comments: Left hemiplegia, left UE and LE contracted/stiff     Skin:     General: Skin is warm and dry.      Comments: Multiple wounds photos in chart   Neurological:      Mental Status: She is alert. Mental status is at baseline.      Cranial Nerves:  Dysarthria present.   Psychiatric:      Comments: Flat affect             Significant Labs: All pertinent labs within the past 24 hours have been reviewed.    Significant Imaging: I have reviewed all pertinent imaging results/findings within the past 24 hours.

## 2025-01-04 PROBLEM — R79.89 LFT ELEVATION: Status: ACTIVE | Noted: 2025-01-04

## 2025-01-04 LAB
ALBUMIN SERPL BCP-MCNC: 1.2 G/DL (ref 3.5–5.2)
ALP SERPL-CCNC: 175 U/L (ref 40–150)
ALT SERPL W/O P-5'-P-CCNC: 251 U/L (ref 10–44)
ANION GAP SERPL CALC-SCNC: 10 MMOL/L (ref 8–16)
AST SERPL-CCNC: 780 U/L (ref 10–40)
BACTERIA BLD CULT: NORMAL
BACTERIA BLD CULT: NORMAL
BILIRUB SERPL-MCNC: 1.2 MG/DL (ref 0.1–1)
BUN SERPL-MCNC: 16 MG/DL (ref 8–23)
CALCIUM SERPL-MCNC: 6.5 MG/DL (ref 8.7–10.5)
CHLORIDE SERPL-SCNC: 115 MMOL/L (ref 95–110)
CO2 SERPL-SCNC: 23 MMOL/L (ref 23–29)
CREAT SERPL-MCNC: 0.7 MG/DL (ref 0.5–1.4)
EST. GFR  (NO RACE VARIABLE): >60 ML/MIN/1.73 M^2
GLUCOSE SERPL-MCNC: 148 MG/DL (ref 70–110)
POCT GLUCOSE: 157 MG/DL (ref 70–110)
POCT GLUCOSE: 177 MG/DL (ref 70–110)
POCT GLUCOSE: 181 MG/DL (ref 70–110)
POCT GLUCOSE: 186 MG/DL (ref 70–110)
POCT GLUCOSE: 189 MG/DL (ref 70–110)
POTASSIUM SERPL-SCNC: 3.2 MMOL/L (ref 3.5–5.1)
PROT SERPL-MCNC: 4.3 G/DL (ref 6–8.4)
SODIUM SERPL-SCNC: 148 MMOL/L (ref 136–145)

## 2025-01-04 PROCEDURE — 21400001 HC TELEMETRY ROOM

## 2025-01-04 PROCEDURE — 11000001 HC ACUTE MED/SURG PRIVATE ROOM

## 2025-01-04 PROCEDURE — 80053 COMPREHEN METABOLIC PANEL: CPT

## 2025-01-04 PROCEDURE — 94761 N-INVAS EAR/PLS OXIMETRY MLT: CPT

## 2025-01-04 PROCEDURE — 25000003 PHARM REV CODE 250: Performed by: FAMILY MEDICINE

## 2025-01-04 PROCEDURE — 25000003 PHARM REV CODE 250

## 2025-01-04 PROCEDURE — 27000207 HC ISOLATION

## 2025-01-04 PROCEDURE — 63600175 PHARM REV CODE 636 W HCPCS

## 2025-01-04 PROCEDURE — 63600175 PHARM REV CODE 636 W HCPCS: Performed by: FAMILY MEDICINE

## 2025-01-04 PROCEDURE — 25000003 PHARM REV CODE 250: Performed by: HOSPITALIST

## 2025-01-04 RX ADMIN — THERA TABS 1 TABLET: TAB at 09:01

## 2025-01-04 RX ADMIN — INSULIN ASPART 1 UNITS: 100 INJECTION, SOLUTION INTRAVENOUS; SUBCUTANEOUS at 09:01

## 2025-01-04 RX ADMIN — GABAPENTIN 250 MG: 250 SOLUTION ORAL at 02:01

## 2025-01-04 RX ADMIN — INSULIN GLARGINE 5 UNITS: 100 INJECTION, SOLUTION SUBCUTANEOUS at 09:01

## 2025-01-04 RX ADMIN — WHITE PETROLATUM: 1.75 OINTMENT TOPICAL at 09:01

## 2025-01-04 RX ADMIN — ATORVASTATIN CALCIUM 80 MG: 40 TABLET, FILM COATED ORAL at 09:01

## 2025-01-04 RX ADMIN — HEPARIN SODIUM 5000 UNITS: 5000 INJECTION INTRAVENOUS; SUBCUTANEOUS at 09:01

## 2025-01-04 RX ADMIN — WHITE PETROLATUM: 1.75 OINTMENT TOPICAL at 08:01

## 2025-01-04 RX ADMIN — HEPARIN SODIUM 5000 UNITS: 5000 INJECTION INTRAVENOUS; SUBCUTANEOUS at 06:01

## 2025-01-04 RX ADMIN — HEPARIN SODIUM 5000 UNITS: 5000 INJECTION INTRAVENOUS; SUBCUTANEOUS at 02:01

## 2025-01-04 RX ADMIN — MICONAZOLE NITRATE 2 % TOPICAL POWDER: at 09:01

## 2025-01-04 RX ADMIN — INSULIN ASPART 2 UNITS: 100 INJECTION, SOLUTION INTRAVENOUS; SUBCUTANEOUS at 05:01

## 2025-01-04 RX ADMIN — SODIUM CHLORIDE 1000 ML: 9 INJECTION, SOLUTION INTRAVENOUS at 09:01

## 2025-01-04 RX ADMIN — MICONAZOLE NITRATE 2 % TOPICAL POWDER: at 08:01

## 2025-01-04 RX ADMIN — TRAZODONE HYDROCHLORIDE 50 MG: 50 TABLET ORAL at 08:01

## 2025-01-04 RX ADMIN — ASPIRIN 81 MG CHEWABLE TABLET 81 MG: 81 TABLET CHEWABLE at 09:01

## 2025-01-04 RX ADMIN — GABAPENTIN 250 MG: 250 SOLUTION ORAL at 06:01

## 2025-01-04 RX ADMIN — GABAPENTIN 250 MG: 250 SOLUTION ORAL at 09:01

## 2025-01-04 RX ADMIN — INSULIN ASPART 2 UNITS: 100 INJECTION, SOLUTION INTRAVENOUS; SUBCUTANEOUS at 10:01

## 2025-01-04 RX ADMIN — SODIUM CHLORIDE 1 G: 9 INJECTION, SOLUTION INTRAVENOUS at 08:01

## 2025-01-04 RX ADMIN — INSULIN ASPART 2 UNITS: 100 INJECTION, SOLUTION INTRAVENOUS; SUBCUTANEOUS at 02:01

## 2025-01-04 NOTE — PROGRESS NOTES
[facility]  Progress Note    Patient Name: Emily Martinez  MRN: [id number]  Patient Class: IP- Inpatient   Admission Date: 12/30/2024  Length of Stay: 4 days  Attending Physician: Valerio Jones MD  Primary Care Provider: Alireza Sosa MD        Subjective     Principal Problem:Diverticulitis        HPI:  59-year-old female, history of a stroke with left hemiplegia, bed-bound, diabetes, hypertension, recurrent infections, dysphagia sp PEG, multiple skin wounds, multiple recent [place] in the last several months [place] [facility] [place], most recently last week for UTI, discharged with Keflex, living at home with her daughter, now brought in for diarrhea.  Daughter states the patient has had diarrhea for about a week and a half.  She is having about 4-5 episodes a day, watery, nonbloody.  She has intermittently also vomiting.  Patient has a PEG tube for nutrition but per most recent SLP note can also take p.o. nutrition but daughter says she has had minimal p.o. intake because she has said she is not hungry.  No fevers noted.  Daughter is frustrated as she feels like when patient gets admitted, she is temporarily better and then very quickly becomes ill again.  All of [place] [facility] [place] but they decided to come to [facility] today as they were hopeful that we would be able to get her better.       Of note, urine culture from 6 days ago is growing out ESBL E coli, greater than 100,000 colonies    In the ED patient afebrile and hemodynamically stable saturating well on room air at rest. No leukocytosis. UA still concerning for UTI and patient started on meropenem. CT abdomen performed and noted acute diverticulitis with area of concern for contained perforation. Patient without abdominal tenderness. Patient admitted to the [facility] for further evaluation and management.    Overview/Hospital Course:  12/31 CT head -No acute intracranial process. Prominent  involutional changes and chronic microvascular ischemic changes in the periventricular white matter.  Small areas of probable remote lacunar infarction in the bilateral basal ganglia and small probable chronic right parietal cortical infarct. CT abdomen - racute diverticulitis of the mid sigmoid colon with adjacent suspected contained perforation.  No associated rim enhancement to suggest drainable abscess at this time.  No bowel obstruction. Apparent circumferential wall thickening of the distal rectum/anus which could be related to underdistention versus nonspecific proctitis.  No significant perirectal inflammatory change or evidence perirectal or perianal drainable abscess.  Left more than right basilar patchy nonspecific pulmonary mosaic attenuation with bronchial wall thickening pulmonary edema or small airways process / right basilar patchy clusters of small nodules suggesting infectious or inflammatory small airways process versus aspiration.  No large consolidation. s/p CRS eval - hemodynamically normal with benign abdominal exam, and reassuring labwork. No acute surgical intervention indicated. continue NPO status, with IVF hydration and IV meropenem.  trend CRP 59-->66. CRS eval -  continue to trend CRP  if downtrending and passing flatus, okay to start on clears and downtitrate IVFC.  1/1 UC GNRs.  ID eval - continue meropenem. If no Pseudomonas isolated, okay to de-escalate to ertapenem. C diff assay pending.    Advancing TF per CRS recs to home regimen. De-escalated to ertapenem for 7-10day treatment course (EED 01/09/25) for diverticulitis and ESBL Ecoli cystitis. Not acute care at home candidate.     Interval History: CXR ordered given hypotension and cough without focal consolidation. No increased O2 requirement. Difficult lab stick by multiple teams, [place] assisted with getting labs. Hypocalcemia normal when corrected for low albumin.    Review of Systems  Objective:     Vital Signs (Most  Recent):  Temp: 98 °F (36.7 °C) (01/04/25 1611)  Pulse: 100 (01/04/25 1611)  Resp: 18 (01/04/25 1611)  BP: (!) 119/56 (01/04/25 1611)  SpO2: (!) 90 % (01/04/25 1611) Vital Signs (24h Range):  Temp:  [97.4 °F (36.3 °C)-98 °F (36.7 °C)] 98 °F (36.7 °C)  Pulse:  [] 100  Resp:  [18-20] 18  SpO2:  [90 %-95 %] 90 %  BP: ()/(54-66) 119/56     Weight: 63.5 kg (139 lb 15.9 oz)  Body mass index is 24.8 kg/m².    Intake/Output Summary (Last 24 hours) at 1/4/2025 1747  Last data filed at 1/3/2025 2112  Gross per 24 hour   Intake 295.7 ml   Output --   Net 295.7 ml         Physical Exam  Vitals and nursing note reviewed.   Constitutional:       General: She is not in acute distress.     Appearance: She is ill-appearing. She is not toxic-appearing or diaphoretic.      Comments: Chronically ill appearing, cachectic   HENT:      Head: Normocephalic and atraumatic.      Comments: Bilateral temporal wasting.     Nose: Nose normal.   Eyes:      General: No scleral icterus.     Extraocular Movements: Extraocular movements intact.      Conjunctiva/sclera: Conjunctivae normal.   Cardiovascular:      Rate and Rhythm: Normal rate and regular rhythm.   Pulmonary:      Effort: Pulmonary effort is normal. No respiratory distress.      Breath sounds: Decreased air movement present. No wheezing or rales.   Abdominal:      General: Abdomen is flat. There is no distension.      Palpations: Abdomen is soft.      Tenderness: There is no abdominal tenderness. There is no guarding.   Musculoskeletal:      Cervical back: Normal range of motion.      Right lower leg: No edema.      Left lower leg: No edema.      Comments: Left hemiplegia, left UE and LE contracted/stiff     Skin:     General: Skin is warm and dry.      Comments: Multiple wounds photos in chart   Neurological:      Mental Status: She is alert. Mental status is at baseline.      Cranial Nerves: Dysarthria present.   Psychiatric:      Comments: Flat affect              Significant Labs: All pertinent labs within the past 24 hours have been reviewed.    Significant Imaging: I have reviewed all pertinent imaging results/findings within the past 24 hours.    Assessment and Plan     * Diverticulitis  - CT with diverticulitis with contained perforation  - VSS without leukocytosis  ;  non-surgical abdomen on exam  - maintain npo for now  - CRS consult in am   (stat consult if clinical decompensation or concerning abdominal symptoms/signs)  - continue Meropenem  - ID consulted ; appreciate recs  - further management pending clinical course and future study review    12/31  CT abdomen - racute diverticulitis of the mid sigmoid colon with adjacent suspected contained perforation.  No associated rim enhancement to suggest drainable abscess at this time.  No bowel obstruction. Apparent circumferential wall thickening of the distal rectum/anus which could be related to underdistention versus nonspecific proctitis.  No significant perirectal inflammatory change or evidence perirectal or perianal drainable abscess.  Left more than right basilar patchy nonspecific pulmonary mosaic attenuation with bronchial wall thickening pulmonary edema or small airways process / right basilar patchy clusters of small nodules suggesting infectious or inflammatory small airways process versus aspiration.  No large consolidation. s/p CRS eval - hemodynamically normal with benign abdominal exam, and reassuring labwork. No acute surgical intervention indicated. continue NPO status, with IVF hydration and IV meropenem.  trend CRP 59  C.diff assay pending. s/p CRS eval - hemodynamically normal with benign abdominal exam, and reassuring labwork. No acute surgical intervention indicated.    - Advance TF, stop if residual>500cc  - Ertapenem per ID recs EED 01/09/25    LFT elevation  Without inciting event on labs 01/04. No evidence of hypervolemia on CXR, no increased O2 requirement, no abdominal palpation. Shock  liver a consideration given hypotension with MAP around 65 that morning.    - US Liver with doppler  - continue to monitor with daily labs  - s/p 1L bolus with BP improvement.       Hypophosphatemia  Patient's most recent phosphorus results are listed below.   Recent Labs     01/02/25  0649   PHOS 2.4*       Plan  - Will treat hypophosphatemia with prn supplementation  - Patient's hypophosphatemia is stable    Hypokalemia  Patient's most recent potassium results are listed below.   Recent Labs     01/02/25  0649 01/03/25 2247 01/04/25  1428   K 3.4* 5.7* 3.2*       Plan  - Replete potassium per protocol  - Monitor potassium Daily  - Patient's hypokalemia is improving    Hypernatremia  Hypernatremia is likely due to Dehydration. monitor with hydration   Recent Labs     01/02/25  0649 01/03/25 2247 01/04/25  1428   * 148* 148*      S/p D5W infusion       Dysphagia  - sp PEG for nutrition and meds  ;  okay for comfort po feeds per recent SLP recs  - NPO as above at this time  - IVFs  - advance TF      UTI (urinary tract infection)  - recent outside culture with ESBL  - start meropenem  - ID consulted  - follow up cultures  - further management pending clinical course    12/31  continue IV meropenem.  trend CRP 59  C.diff assay pending.    1/1 UC GNRs.ID eval - continue meropenem. If no Pseudomonas isolated, okay to de-escalate to ertapenem.    - Ertapenem per ID recs EED 01/09/25    Multiple wounds of skin  - Wound care consulted  - turn q2  - waffle mattress overlay  - clean and dressed        History of stroke with residual effects  - chronic left hemiplegia, bed bound, sp PEG though able to tolerate po comfort feed  - continue home ASA and statin  - turn q2h      12/31 CT head -No acute intracranial process. Prominent involutional changes and chronic microvascular ischemic changes in the periventricular white matter.  Small areas of probable remote lacunar infarction in the bilateral basal ganglia and small  probable chronic right parietal cortical infarct.     Insomnia  - home trazadone      Mixed hyperlipidemia  - home statin        VTE Risk Mitigation (From admission, onward)           Ordered     heparin (porcine) injection 5,000 Units  Every 8 hours         12/30/24 1937     IP VTE HIGH RISK PATIENT  Once         12/30/24 1937     Place sequential compression device  Until discontinued         12/30/24 1937     Place sequential compression device  Until discontinued         12/30/24 1929                    Discharge Planning   ANA: 1/4/2025     Code Status: Full Code   Medical Readiness for Discharge Date:   Discharge Plan A: Home, Home with family, Home Health, Other (PCA services with [facility].)        Critical care time spent on the evaluation and treatment of severe organ dysfunction, review of pertinent labs and imaging studies, discussions with consulting providers and discussions with patient/family: 15 minutes.                 Valerio Jones MD  Department of Hospital Medicine   [facility]

## 2025-01-04 NOTE — ASSESSMENT & PLAN NOTE
Hypernatremia is likely due to Dehydration. monitor with hydration   Recent Labs     01/02/25  0649 01/03/25  2247 01/04/25  1428   * 148* 148*      S/p D5W infusion

## 2025-01-04 NOTE — ASSESSMENT & PLAN NOTE
Without inciting event on labs 01/04. No evidence of hypervolemia on CXR, no increased O2 requirement, no abdominal palpation. Shock liver a consideration given hypotension with MAP around 65 that morning.    - US Liver with doppler  - continue to monitor with daily labs  - s/p 1L bolus with BP improvement.

## 2025-01-04 NOTE — SUBJECTIVE & OBJECTIVE
Interval History: CXR ordered given hypotension and cough without focal consolidation. No increased O2 requirement. Difficult lab stick by multiple teams, [place] assisted with getting labs. Hypocalcemia normal when corrected for low albumin.    Review of Systems  Objective:     Vital Signs (Most Recent):  Temp: 98 °F (36.7 °C) (01/04/25 1611)  Pulse: 100 (01/04/25 1611)  Resp: 18 (01/04/25 1611)  BP: (!) 119/56 (01/04/25 1611)  SpO2: (!) 90 % (01/04/25 1611) Vital Signs (24h Range):  Temp:  [97.4 °F (36.3 °C)-98 °F (36.7 °C)] 98 °F (36.7 °C)  Pulse:  [] 100  Resp:  [18-20] 18  SpO2:  [90 %-95 %] 90 %  BP: ()/(54-66) 119/56     Weight: 63.5 kg (139 lb 15.9 oz)  Body mass index is 24.8 kg/m².    Intake/Output Summary (Last 24 hours) at 1/4/2025 1747  Last data filed at 1/3/2025 2112  Gross per 24 hour   Intake 295.7 ml   Output --   Net 295.7 ml         Physical Exam  Vitals and nursing note reviewed.   Constitutional:       General: She is not in acute distress.     Appearance: She is ill-appearing. She is not toxic-appearing or diaphoretic.      Comments: Chronically ill appearing, cachectic   HENT:      Head: Normocephalic and atraumatic.      Comments: Bilateral temporal wasting.     Nose: Nose normal.   Eyes:      General: No scleral icterus.     Extraocular Movements: Extraocular movements intact.      Conjunctiva/sclera: Conjunctivae normal.   Cardiovascular:      Rate and Rhythm: Normal rate and regular rhythm.   Pulmonary:      Effort: Pulmonary effort is normal. No respiratory distress.      Breath sounds: Decreased air movement present. No wheezing or rales.   Abdominal:      General: Abdomen is flat. There is no distension.      Palpations: Abdomen is soft.      Tenderness: There is no abdominal tenderness. There is no guarding.   Musculoskeletal:      Cervical back: Normal range of motion.      Right lower leg: No edema.      Left lower leg: No edema.      Comments: Left hemiplegia, left UE  and LE contracted/stiff     Skin:     General: Skin is warm and dry.      Comments: Multiple wounds photos in chart   Neurological:      Mental Status: She is alert. Mental status is at baseline.      Cranial Nerves: Dysarthria present.   Psychiatric:      Comments: Flat affect             Significant Labs: All pertinent labs within the past 24 hours have been reviewed.    Significant Imaging: I have reviewed all pertinent imaging results/findings within the past 24 hours.

## 2025-01-04 NOTE — ASSESSMENT & PLAN NOTE
Patient's most recent phosphorus results are listed below.   Recent Labs     01/02/25  0649   PHOS 2.4*       Plan  - Will treat hypophosphatemia with prn supplementation  - Patient's hypophosphatemia is stable

## 2025-01-04 NOTE — ASSESSMENT & PLAN NOTE
Patient's most recent potassium results are listed below.   Recent Labs     01/02/25  0649 01/03/25  2247 01/04/25  1428   K 3.4* 5.7* 3.2*       Plan  - Replete potassium per protocol  - Monitor potassium Daily  - Patient's hypokalemia is improving

## 2025-01-05 LAB
ALBUMIN SERPL BCP-MCNC: 1.1 G/DL (ref 3.5–5.2)
ALP SERPL-CCNC: 212 U/L (ref 40–150)
ALT SERPL W/O P-5'-P-CCNC: 699 U/L (ref 10–44)
AMMONIA PLAS-SCNC: 46 UMOL/L (ref 10–50)
ANION GAP SERPL CALC-SCNC: 8 MMOL/L (ref 8–16)
ANISOCYTOSIS BLD QL SMEAR: SLIGHT
AST SERPL-CCNC: 2786 U/L (ref 10–40)
BASOPHILS # BLD AUTO: 0.02 K/UL (ref 0–0.2)
BASOPHILS NFR BLD: 0.2 % (ref 0–1.9)
BILIRUB SERPL-MCNC: 1.6 MG/DL (ref 0.1–1)
BUN SERPL-MCNC: 17 MG/DL (ref 8–23)
BURR CELLS BLD QL SMEAR: ABNORMAL
CALCIUM SERPL-MCNC: 7 MG/DL (ref 8.7–10.5)
CHLORIDE SERPL-SCNC: 112 MMOL/L (ref 95–110)
CO2 SERPL-SCNC: 25 MMOL/L (ref 23–29)
CREAT SERPL-MCNC: 0.8 MG/DL (ref 0.5–1.4)
CRP SERPL-MCNC: 72.3 MG/L (ref 0–8.2)
DACRYOCYTES BLD QL SMEAR: ABNORMAL
DIFFERENTIAL METHOD BLD: ABNORMAL
EOSINOPHIL # BLD AUTO: 0 K/UL (ref 0–0.5)
EOSINOPHIL NFR BLD: 0.1 % (ref 0–8)
ERYTHROCYTE [DISTWIDTH] IN BLOOD BY AUTOMATED COUNT: 23.9 % (ref 11.5–14.5)
EST. GFR  (NO RACE VARIABLE): >60 ML/MIN/1.73 M^2
GLUCOSE SERPL-MCNC: 88 MG/DL (ref 70–110)
HCT VFR BLD AUTO: 27.8 % (ref 37–48.5)
HGB BLD-MCNC: 9.4 G/DL (ref 12–16)
HYPOCHROMIA BLD QL SMEAR: ABNORMAL
IMM GRANULOCYTES # BLD AUTO: 0.12 K/UL (ref 0–0.04)
IMM GRANULOCYTES NFR BLD AUTO: 1 % (ref 0–0.5)
LYMPHOCYTES # BLD AUTO: 1 K/UL (ref 1–4.8)
LYMPHOCYTES NFR BLD: 7.6 % (ref 18–48)
MCH RBC QN AUTO: 23.2 PG (ref 27–31)
MCHC RBC AUTO-ENTMCNC: 33.8 G/DL (ref 32–36)
MCV RBC AUTO: 69 FL (ref 82–98)
MONOCYTES # BLD AUTO: 0.2 K/UL (ref 0.3–1)
MONOCYTES NFR BLD: 1.7 % (ref 4–15)
NEUTROPHILS # BLD AUTO: 11.2 K/UL (ref 1.8–7.7)
NEUTROPHILS NFR BLD: 89.4 % (ref 38–73)
NRBC BLD-RTO: 0 /100 WBC
OVALOCYTES BLD QL SMEAR: ABNORMAL
PLATELET # BLD AUTO: 428 K/UL (ref 150–450)
PMV BLD AUTO: 9.1 FL (ref 9.2–12.9)
POCT GLUCOSE: 113 MG/DL (ref 70–110)
POCT GLUCOSE: 123 MG/DL (ref 70–110)
POCT GLUCOSE: 126 MG/DL (ref 70–110)
POCT GLUCOSE: 130 MG/DL (ref 70–110)
POIKILOCYTOSIS BLD QL SMEAR: ABNORMAL
POLYCHROMASIA BLD QL SMEAR: ABNORMAL
POTASSIUM SERPL-SCNC: 3.2 MMOL/L (ref 3.5–5.1)
PROT SERPL-MCNC: 4.4 G/DL (ref 6–8.4)
RBC # BLD AUTO: 4.06 M/UL (ref 4–5.4)
SODIUM SERPL-SCNC: 145 MMOL/L (ref 136–145)
TARGETS BLD QL SMEAR: ABNORMAL
WBC # BLD AUTO: 12.55 K/UL (ref 3.9–12.7)

## 2025-01-05 PROCEDURE — 36415 COLL VENOUS BLD VENIPUNCTURE: CPT | Performed by: STUDENT IN AN ORGANIZED HEALTH CARE EDUCATION/TRAINING PROGRAM

## 2025-01-05 PROCEDURE — 86140 C-REACTIVE PROTEIN: CPT | Performed by: STUDENT IN AN ORGANIZED HEALTH CARE EDUCATION/TRAINING PROGRAM

## 2025-01-05 PROCEDURE — 80053 COMPREHEN METABOLIC PANEL: CPT

## 2025-01-05 PROCEDURE — 36415 COLL VENOUS BLD VENIPUNCTURE: CPT

## 2025-01-05 PROCEDURE — 27000207 HC ISOLATION

## 2025-01-05 PROCEDURE — 82140 ASSAY OF AMMONIA: CPT

## 2025-01-05 PROCEDURE — 85025 COMPLETE CBC W/AUTO DIFF WBC: CPT

## 2025-01-05 PROCEDURE — 25000003 PHARM REV CODE 250: Performed by: FAMILY MEDICINE

## 2025-01-05 PROCEDURE — 25000003 PHARM REV CODE 250

## 2025-01-05 PROCEDURE — 25000003 PHARM REV CODE 250: Performed by: HOSPITALIST

## 2025-01-05 PROCEDURE — 63600175 PHARM REV CODE 636 W HCPCS: Performed by: FAMILY MEDICINE

## 2025-01-05 PROCEDURE — 21400001 HC TELEMETRY ROOM

## 2025-01-05 PROCEDURE — 11000001 HC ACUTE MED/SURG PRIVATE ROOM

## 2025-01-05 PROCEDURE — 63600175 PHARM REV CODE 636 W HCPCS

## 2025-01-05 RX ADMIN — HEPARIN SODIUM 5000 UNITS: 5000 INJECTION INTRAVENOUS; SUBCUTANEOUS at 01:01

## 2025-01-05 RX ADMIN — HEPARIN SODIUM 5000 UNITS: 5000 INJECTION INTRAVENOUS; SUBCUTANEOUS at 09:01

## 2025-01-05 RX ADMIN — TRAZODONE HYDROCHLORIDE 25 MG: 50 TABLET ORAL at 08:01

## 2025-01-05 RX ADMIN — HEPARIN SODIUM 5000 UNITS: 5000 INJECTION INTRAVENOUS; SUBCUTANEOUS at 06:01

## 2025-01-05 RX ADMIN — MICONAZOLE NITRATE 2 % TOPICAL POWDER: at 08:01

## 2025-01-05 RX ADMIN — SODIUM CHLORIDE 1 G: 9 INJECTION, SOLUTION INTRAVENOUS at 08:01

## 2025-01-05 RX ADMIN — THERA TABS 1 TABLET: TAB at 09:01

## 2025-01-05 RX ADMIN — GABAPENTIN 250 MG: 250 SOLUTION ORAL at 01:01

## 2025-01-05 RX ADMIN — WHITE PETROLATUM: 1.75 OINTMENT TOPICAL at 09:01

## 2025-01-05 RX ADMIN — WHITE PETROLATUM: 1.75 OINTMENT TOPICAL at 08:01

## 2025-01-05 RX ADMIN — ASPIRIN 81 MG CHEWABLE TABLET 81 MG: 81 TABLET CHEWABLE at 09:01

## 2025-01-05 RX ADMIN — MICONAZOLE NITRATE 2 % TOPICAL POWDER: at 09:01

## 2025-01-05 RX ADMIN — GABAPENTIN 250 MG: 250 SOLUTION ORAL at 09:01

## 2025-01-05 RX ADMIN — INSULIN GLARGINE 5 UNITS: 100 INJECTION, SOLUTION SUBCUTANEOUS at 09:01

## 2025-01-05 RX ADMIN — GABAPENTIN 250 MG: 250 SOLUTION ORAL at 06:01

## 2025-01-05 NOTE — PLAN OF CARE
Problem: Skin Injury Risk Increased  Goal: Skin Health and Integrity  Outcome: Progressing     Problem: Infection  Goal: Absence of Infection Signs and Symptoms  Outcome: Progressing     Problem: Wound  Goal: Optimal Coping  Outcome: Progressing  Goal: Optimal Pain Control and Function  Outcome: Progressing

## 2025-01-05 NOTE — ASSESSMENT & PLAN NOTE
Without inciting event on labs 01/04. No evidence of hypervolemia on CXR, no increased O2 requirement, no abdominal palpation. Shock liver a consideration given hypotension with MAP around 65 that morning.    - US Liver with doppler negative for acute abormality  - continue to monitor with daily labs, avoid hepatotoxic medications  - s/p 1L bolus with BP improvement.

## 2025-01-05 NOTE — PLAN OF CARE
Problem: Wound  Goal: Optimal Coping  Outcome: Progressing  Goal: Optimal Pain Control and Function  Outcome: Progressing

## 2025-01-05 NOTE — SUBJECTIVE & OBJECTIVE
Interval History: NAEON, rising LFTs, suspect shock liver. Continue to monitor, decrease hepatically cleared meds.    Review of Systems  Objective:     Vital Signs (Most Recent):  Temp: 98 °F (36.7 °C) (01/05/25 1141)  Pulse: 93 (01/05/25 1141)  Resp: 18 (01/05/25 1141)  BP: (!) 109/53 (01/05/25 1141)  SpO2: (!) 94 % (01/05/25 1141) Vital Signs (24h Range):  Temp:  [97.8 °F (36.6 °C)-98.9 °F (37.2 °C)] 98 °F (36.7 °C)  Pulse:  [] 93  Resp:  [18] 18  SpO2:  [87 %-98 %] 94 %  BP: (104-120)/(51-72) 109/53     Weight: 56.4 kg (124 lb 5.4 oz)  Body mass index is 22.03 kg/m².    Intake/Output Summary (Last 24 hours) at 1/5/2025 1542  Last data filed at 1/5/2025 0655  Gross per 24 hour   Intake 325 ml   Output --   Net 325 ml         Physical Exam  Vitals and nursing note reviewed.   Constitutional:       General: She is not in acute distress.     Appearance: She is ill-appearing. She is not toxic-appearing or diaphoretic.      Comments: Chronically ill appearing, cachectic   HENT:      Head: Normocephalic and atraumatic.      Comments: Bilateral temporal wasting.     Nose: Nose normal.   Eyes:      General: No scleral icterus.     Extraocular Movements: Extraocular movements intact.      Conjunctiva/sclera: Conjunctivae normal.   Cardiovascular:      Rate and Rhythm: Normal rate and regular rhythm.   Pulmonary:      Effort: Pulmonary effort is normal. No respiratory distress.      Breath sounds: Decreased air movement present. No wheezing or rales.   Abdominal:      General: Abdomen is flat. There is no distension.      Palpations: Abdomen is soft.      Tenderness: There is no abdominal tenderness. There is no guarding.   Musculoskeletal:      Cervical back: Normal range of motion.      Right lower leg: No edema.      Left lower leg: No edema.      Comments: Left hemiplegia, left UE and LE contracted/stiff     Skin:     General: Skin is warm and dry.      Comments: Multiple wounds photos in chart   Neurological:       Mental Status: Mental status is at baseline. She is lethargic.      Cranial Nerves: Dysarthria present.   Psychiatric:      Comments: Flat affect             Significant Labs: All pertinent labs within the past 24 hours have been reviewed.    Significant Imaging: I have reviewed all pertinent imaging results/findings within the past 24 hours.

## 2025-01-05 NOTE — PLAN OF CARE
Problem: Skin Injury Risk Increased  Goal: Skin Health and Integrity  Outcome: Progressing     Problem: Infection  Goal: Absence of Infection Signs and Symptoms  Outcome: Progressing     Problem: Adult Inpatient Plan of Care  Goal: Plan of Care Review  Outcome: Progressing  Goal: Patient-Specific Goal (Individualized)  Outcome: Progressing  Goal: Absence of Hospital-Acquired Illness or Injury  Outcome: Progressing  Goal: Optimal Comfort and Wellbeing  Outcome: Progressing  Goal: Readiness for Transition of Care  Outcome: Progressing     Problem: Diabetes Comorbidity  Goal: Blood Glucose Level Within Targeted Range  Outcome: Progressing     Problem: Wound  Goal: Optimal Coping  Outcome: Progressing  Goal: Optimal Functional Ability  Outcome: Progressing  Goal: Absence of Infection Signs and Symptoms  Outcome: Progressing  Goal: Improved Oral Intake  Outcome: Progressing  Goal: Optimal Pain Control and Function  Outcome: Progressing  Goal: Skin Health and Integrity  Outcome: Progressing  Goal: Optimal Wound Healing  Outcome: Progressing     Problem: Acute Kidney Injury/Impairment  Goal: Fluid and Electrolyte Balance  Outcome: Progressing  Goal: Improved Oral Intake  Outcome: Progressing  Goal: Effective Renal Function  Outcome: Progressing     Problem: Fall Injury Risk  Goal: Absence of Fall and Fall-Related Injury  Outcome: Progressing

## 2025-01-05 NOTE — PROGRESS NOTES
[facility] Medicine  Progress Note    Patient Name: Emily Martinez  MRN: [id number]  Patient Class: IP- Inpatient   Admission Date: 12/30/2024  Length of Stay: 5 days  Attending Physician: Valerio Jones MD  Primary Care Provider: Alireza Sosa MD        Subjective     Principal Problem:Diverticulitis        HPI:  59-year-old female, history of a stroke with left hemiplegia, bed-bound, diabetes, hypertension, recurrent infections, dysphagia sp PEG, multiple skin wounds, multiple recent admissions in the last several months [place] [facility], most recently last week for UTI, discharged with Keflex, living at home with her daughter, now brought in for diarrhea.  Daughter states the patient has had diarrhea for about a week and a half.  She is having about 4-5 episodes a day, watery, nonbloody.  She has intermittently also vomiting.  Patient has a PEG tube for nutrition but per most recent SLP note can also take p.o. nutrition but daughter says she has had minimal p.o. intake because she has said she is not hungry.  No fevers noted.  Daughter is frustrated as she feels like when patient gets admitted, she is temporarily better and then very quickly becomes ill again.  All of [place] has [place] [facility] [place] but they decided to come to [facility] today as they were hopeful that we would be able to get her better.       Of note, urine culture from 6 days ago is growing out ESBL E coli, greater than 100,000 colonies    In the ED patient afebrile and hemodynamically stable saturating well on room air at rest. No leukocytosis. UA still concerning for UTI and patient started on meropenem. CT abdomen performed and noted acute diverticulitis with area of concern for contained perforation. Patient without abdominal tenderness. Patient admitted to the [facility] for further evaluation and management.    Overview/Hospital Course:  12/31 CT head -No acute intracranial process. Prominent  involutional changes and chronic microvascular ischemic changes in the periventricular white matter.  Small areas of probable remote lacunar infarction in the bilateral basal ganglia and small probable chronic right parietal cortical infarct. CT abdomen - racute diverticulitis of the mid sigmoid colon with adjacent suspected contained perforation.  No associated rim enhancement to suggest drainable abscess at this time.  No bowel obstruction. Apparent circumferential wall thickening of the distal rectum/anus which could be related to underdistention versus nonspecific proctitis.  No significant perirectal inflammatory change or evidence perirectal or perianal drainable abscess.  Left more than right basilar patchy nonspecific pulmonary mosaic attenuation with bronchial wall thickening pulmonary edema or small airways process / right basilar patchy clusters of small nodules suggesting infectious or inflammatory small airways process versus aspiration.  No large consolidation. s/p CRS eval - hemodynamically normal with benign abdominal exam, and reassuring labwork. No acute surgical intervention indicated. continue NPO status, with IVF hydration and IV meropenem.  trend CRP 59-->66. CRS eval -  continue to trend CRP  if downtrending and passing flatus, okay to start on clears and downtitrate IVFC.  1/1 UC GNRs.  ID eval - continue meropenem. If no Pseudomonas isolated, okay to de-escalate to ertapenem. C diff assay pending.    Advancing TF per CRS recs to home regimen. De-escalated to ertapenem for 7-10day treatment course (EED 01/09/25) for diverticulitis and ESBL Ecoli cystitis. Not acute care at home candidate. Transaminitis with negative Liver US, suspect shock liver after hypotension on 01/04.    Interval History: NAEON, rising LFTs, suspect shock liver. Continue to monitor, decrease hepatically cleared meds.    Review of Systems  Objective:     Vital Signs (Most Recent):  Temp: 98 °F (36.7 °C) (01/05/25  1141)  Pulse: 93 (01/05/25 1141)  Resp: 18 (01/05/25 1141)  BP: (!) 109/53 (01/05/25 1141)  SpO2: (!) 94 % (01/05/25 1141) Vital Signs (24h Range):  Temp:  [97.8 °F (36.6 °C)-98.9 °F (37.2 °C)] 98 °F (36.7 °C)  Pulse:  [] 93  Resp:  [18] 18  SpO2:  [87 %-98 %] 94 %  BP: (104-120)/(51-72) 109/53     Weight: 56.4 kg (124 lb 5.4 oz)  Body mass index is 22.03 kg/m².    Intake/Output Summary (Last 24 hours) at 1/5/2025 1542  Last data filed at 1/5/2025 0655  Gross per 24 hour   Intake 325 ml   Output --   Net 325 ml         Physical Exam  Vitals and nursing note reviewed.   Constitutional:       General: She is not in acute distress.     Appearance: She is ill-appearing. She is not toxic-appearing or diaphoretic.      Comments: Chronically ill appearing, cachectic   HENT:      Head: Normocephalic and atraumatic.      Comments: Bilateral temporal wasting.     Nose: Nose normal.   Eyes:      General: No scleral icterus.     Extraocular Movements: Extraocular movements intact.      Conjunctiva/sclera: Conjunctivae normal.   Cardiovascular:      Rate and Rhythm: Normal rate and regular rhythm.   Pulmonary:      Effort: Pulmonary effort is normal. No respiratory distress.      Breath sounds: Decreased air movement present. No wheezing or rales.   Abdominal:      General: Abdomen is flat. There is no distension.      Palpations: Abdomen is soft.      Tenderness: There is no abdominal tenderness. There is no guarding.   Musculoskeletal:      Cervical back: Normal range of motion.      Right lower leg: No edema.      Left lower leg: No edema.      Comments: Left hemiplegia, left UE and LE contracted/stiff     Skin:     General: Skin is warm and dry.      Comments: Multiple wounds photos in chart   Neurological:      Mental Status: Mental status is at baseline. She is lethargic.      Cranial Nerves: Dysarthria present.   Psychiatric:      Comments: Flat affect             Significant Labs: All pertinent labs within the past  24 hours have been reviewed.    Significant Imaging: I have reviewed all pertinent imaging results/findings within the past 24 hours.    Assessment and Plan     * Diverticulitis  - CT with diverticulitis with contained perforation  - VSS without leukocytosis  ;  non-surgical abdomen on exam  - maintain npo for now  - CRS consult in am   (stat consult if clinical decompensation or concerning abdominal symptoms/signs)  - continue Meropenem  - ID consulted ; appreciate recs  - further management pending clinical course and future study review    12/31  CT abdomen - racute diverticulitis of the mid sigmoid colon with adjacent suspected contained perforation.  No associated rim enhancement to suggest drainable abscess at this time.  No bowel obstruction. Apparent circumferential wall thickening of the distal rectum/anus which could be related to underdistention versus nonspecific proctitis.  No significant perirectal inflammatory change or evidence perirectal or perianal drainable abscess.  Left more than right basilar patchy nonspecific pulmonary mosaic attenuation with bronchial wall thickening pulmonary edema or small airways process / right basilar patchy clusters of small nodules suggesting infectious or inflammatory small airways process versus aspiration.  No large consolidation. s/p CRS eval - hemodynamically normal with benign abdominal exam, and reassuring labwork. No acute surgical intervention indicated. continue NPO status, with IVF hydration and IV meropenem.  trend CRP 59  C.diff assay pending. s/p CRS eval - hemodynamically normal with benign abdominal exam, and reassuring labwork. No acute surgical intervention indicated.    - Advance TF, stop if residual>500cc  - Ertapenem per ID recs EED 01/09/25    LFT elevation  Without inciting event on labs 01/04. No evidence of hypervolemia on CXR, no increased O2 requirement, no abdominal palpation. Shock liver a consideration given hypotension with MAP around 65  "that morning.    - US Liver with doppler negative for acute abormality  - continue to monitor with daily labs, avoid hepatotoxic medications  - s/p 1L bolus with BP improvement.       Hypophosphatemia  Patient's most recent phosphorus results are listed below.   No results for input(s): "PHOS" in the last 72 hours.    Plan  - Will treat hypophosphatemia with prn supplementation  - Patient's hypophosphatemia is stable    Hypokalemia  Patient's most recent potassium results are listed below.   Recent Labs     01/03/25 2247 01/04/25  1428 01/05/25  0650   K 5.7* 3.2* 3.2*       Plan  - Replete potassium per protocol  - Monitor potassium Daily  - Patient's hypokalemia is improving    Hypernatremia  Hypernatremia is likely due to Dehydration. monitor with hydration   Recent Labs     01/03/25 2247 01/04/25  1428 01/05/25  0650   * 148* 145      S/p D5W infusion       Dysphagia  - sp PEG for nutrition and meds  ;  okay for comfort po feeds per recent SLP recs  - NPO as above at this time  - IVFs  - advance TF      UTI (urinary tract infection)  - recent outside culture with ESBL  - start meropenem  - ID consulted  - follow up cultures  - further management pending clinical course    12/31  continue IV meropenem.  trend CRP 59  C.diff assay pending.    1/1 UC GNRs.ID eval - continue meropenem. If no Pseudomonas isolated, okay to de-escalate to ertapenem.    - Ertapenem per ID recs EED 01/09/25    Multiple wounds of skin  - Wound care consulted  - turn q2  - waffle mattress overlay  - clean and dressed        History of stroke with residual effects  - chronic left hemiplegia, bed bound, sp PEG though able to tolerate po comfort feed  - continue home ASA and statin  - turn q2h      12/31 CT head -No acute intracranial process. Prominent involutional changes and chronic microvascular ischemic changes in the periventricular white matter.  Small areas of probable remote lacunar infarction in the bilateral basal ganglia " and small probable chronic right parietal cortical infarct.     Insomnia  - home trazadone      Mixed hyperlipidemia  - home statin        VTE Risk Mitigation (From admission, onward)           Ordered     heparin (porcine) injection 5,000 Units  Every 8 hours         12/30/24 1937     IP VTE HIGH RISK PATIENT  Once         12/30/24 1937     Place sequential compression device  Until discontinued         12/30/24 1937     Place sequential compression device  Until discontinued         12/30/24 1929                    Discharge Planning   ANA: 1/9/2025     Code Status: Full Code   Medical Readiness for Discharge Date:   Discharge Plan A: Home, Home with family, Home Health, Other (PCA services with [facility].)                        Valerio Jones MD  Department of Hospital Medicine   [facility]

## 2025-01-05 NOTE — ASSESSMENT & PLAN NOTE
"Patient's most recent phosphorus results are listed below.   No results for input(s): "PHOS" in the last 72 hours.    Plan  - Will treat hypophosphatemia with prn supplementation  - Patient's hypophosphatemia is stable  "

## 2025-01-05 NOTE — ASSESSMENT & PLAN NOTE
Patient's most recent potassium results are listed below.   Recent Labs     01/03/25  2247 01/04/25  1428 01/05/25  0650   K 5.7* 3.2* 3.2*       Plan  - Replete potassium per protocol  - Monitor potassium Daily  - Patient's hypokalemia is improving

## 2025-01-05 NOTE — ASSESSMENT & PLAN NOTE
Hypernatremia is likely due to Dehydration. monitor with hydration   Recent Labs     01/03/25  2247 01/04/25  1428 01/05/25  0650   * 148* 145      S/p D5W infusion

## 2025-01-06 LAB
ACANTHOCYTES BLD QL SMEAR: PRESENT
ALBUMIN SERPL BCP-MCNC: 1.1 G/DL (ref 3.5–5.2)
ALP SERPL-CCNC: 299 U/L (ref 40–150)
ALT SERPL W/O P-5'-P-CCNC: 870 U/L (ref 10–44)
ANION GAP SERPL CALC-SCNC: 13 MMOL/L (ref 8–16)
ANISOCYTOSIS BLD QL SMEAR: SLIGHT
AST SERPL-CCNC: 2726 U/L (ref 10–40)
BASOPHILS # BLD AUTO: 0.02 K/UL (ref 0–0.2)
BASOPHILS NFR BLD: 0.2 % (ref 0–1.9)
BILIRUB SERPL-MCNC: 1.9 MG/DL (ref 0.1–1)
BUN SERPL-MCNC: 17 MG/DL (ref 8–23)
BURR CELLS BLD QL SMEAR: ABNORMAL
CALCIUM SERPL-MCNC: 7.2 MG/DL (ref 8.7–10.5)
CHLORIDE SERPL-SCNC: 111 MMOL/L (ref 95–110)
CO2 SERPL-SCNC: 27 MMOL/L (ref 23–29)
CREAT SERPL-MCNC: 0.9 MG/DL (ref 0.5–1.4)
CRP SERPL-MCNC: 153.7 MG/L (ref 0–8.2)
DACRYOCYTES BLD QL SMEAR: ABNORMAL
DIFFERENTIAL METHOD BLD: ABNORMAL
EOSINOPHIL # BLD AUTO: 0 K/UL (ref 0–0.5)
EOSINOPHIL NFR BLD: 0.2 % (ref 0–8)
ERYTHROCYTE [DISTWIDTH] IN BLOOD BY AUTOMATED COUNT: 24.1 % (ref 11.5–14.5)
EST. GFR  (NO RACE VARIABLE): >60 ML/MIN/1.73 M^2
GLUCOSE SERPL-MCNC: 153 MG/DL (ref 70–110)
HCT VFR BLD AUTO: 28.7 % (ref 37–48.5)
HGB BLD-MCNC: 10.1 G/DL (ref 12–16)
HYPOCHROMIA BLD QL SMEAR: ABNORMAL
IMM GRANULOCYTES # BLD AUTO: 0.05 K/UL (ref 0–0.04)
IMM GRANULOCYTES NFR BLD AUTO: 0.5 % (ref 0–0.5)
LYMPHOCYTES # BLD AUTO: 0.7 K/UL (ref 1–4.8)
LYMPHOCYTES NFR BLD: 6.8 % (ref 18–48)
MCH RBC QN AUTO: 23.7 PG (ref 27–31)
MCHC RBC AUTO-ENTMCNC: 35.2 G/DL (ref 32–36)
MCV RBC AUTO: 67 FL (ref 82–98)
MONOCYTES # BLD AUTO: 0.2 K/UL (ref 0.3–1)
MONOCYTES NFR BLD: 2 % (ref 4–15)
NEUTROPHILS # BLD AUTO: 9.5 K/UL (ref 1.8–7.7)
NEUTROPHILS NFR BLD: 90.3 % (ref 38–73)
NRBC BLD-RTO: 0 /100 WBC
OVALOCYTES BLD QL SMEAR: ABNORMAL
PLATELET # BLD AUTO: 373 K/UL (ref 150–450)
PMV BLD AUTO: 8.9 FL (ref 9.2–12.9)
POCT GLUCOSE: 145 MG/DL (ref 70–110)
POCT GLUCOSE: 175 MG/DL (ref 70–110)
POCT GLUCOSE: 186 MG/DL (ref 70–110)
POCT GLUCOSE: 188 MG/DL (ref 70–110)
POCT GLUCOSE: 200 MG/DL (ref 70–110)
POIKILOCYTOSIS BLD QL SMEAR: ABNORMAL
POLYCHROMASIA BLD QL SMEAR: ABNORMAL
POTASSIUM SERPL-SCNC: 3.3 MMOL/L (ref 3.5–5.1)
PROT SERPL-MCNC: 4.8 G/DL (ref 6–8.4)
RBC # BLD AUTO: 4.26 M/UL (ref 4–5.4)
SCHISTOCYTES BLD QL SMEAR: ABNORMAL
SODIUM SERPL-SCNC: 151 MMOL/L (ref 136–145)
SPHEROCYTES BLD QL SMEAR: ABNORMAL
TARGETS BLD QL SMEAR: ABNORMAL
WBC # BLD AUTO: 10.56 K/UL (ref 3.9–12.7)

## 2025-01-06 PROCEDURE — 86140 C-REACTIVE PROTEIN: CPT | Performed by: STUDENT IN AN ORGANIZED HEALTH CARE EDUCATION/TRAINING PROGRAM

## 2025-01-06 PROCEDURE — 25000003 PHARM REV CODE 250

## 2025-01-06 PROCEDURE — 25000003 PHARM REV CODE 250: Performed by: HOSPITALIST

## 2025-01-06 PROCEDURE — 80053 COMPREHEN METABOLIC PANEL: CPT

## 2025-01-06 PROCEDURE — 27000207 HC ISOLATION

## 2025-01-06 PROCEDURE — 85025 COMPLETE CBC W/AUTO DIFF WBC: CPT

## 2025-01-06 PROCEDURE — 25000003 PHARM REV CODE 250: Performed by: FAMILY MEDICINE

## 2025-01-06 PROCEDURE — 36415 COLL VENOUS BLD VENIPUNCTURE: CPT | Performed by: STUDENT IN AN ORGANIZED HEALTH CARE EDUCATION/TRAINING PROGRAM

## 2025-01-06 PROCEDURE — 21400001 HC TELEMETRY ROOM

## 2025-01-06 PROCEDURE — 63600175 PHARM REV CODE 636 W HCPCS: Performed by: FAMILY MEDICINE

## 2025-01-06 PROCEDURE — 11000001 HC ACUTE MED/SURG PRIVATE ROOM

## 2025-01-06 PROCEDURE — 63600175 PHARM REV CODE 636 W HCPCS

## 2025-01-06 RX ADMIN — MICONAZOLE NITRATE 2 % TOPICAL POWDER: at 08:01

## 2025-01-06 RX ADMIN — INSULIN ASPART 1 UNITS: 100 INJECTION, SOLUTION INTRAVENOUS; SUBCUTANEOUS at 09:01

## 2025-01-06 RX ADMIN — WHITE PETROLATUM: 1.75 OINTMENT TOPICAL at 08:01

## 2025-01-06 RX ADMIN — TRAZODONE HYDROCHLORIDE 25 MG: 50 TABLET ORAL at 09:01

## 2025-01-06 RX ADMIN — HEPARIN SODIUM 5000 UNITS: 5000 INJECTION INTRAVENOUS; SUBCUTANEOUS at 09:01

## 2025-01-06 RX ADMIN — THERA TABS 1 TABLET: TAB at 08:01

## 2025-01-06 RX ADMIN — HEPARIN SODIUM 5000 UNITS: 5000 INJECTION INTRAVENOUS; SUBCUTANEOUS at 06:01

## 2025-01-06 RX ADMIN — GABAPENTIN 250 MG: 250 SOLUTION ORAL at 02:01

## 2025-01-06 RX ADMIN — WHITE PETROLATUM: 1.75 OINTMENT TOPICAL at 09:01

## 2025-01-06 RX ADMIN — ASPIRIN 81 MG CHEWABLE TABLET 81 MG: 81 TABLET CHEWABLE at 08:01

## 2025-01-06 RX ADMIN — MICONAZOLE NITRATE 2 % TOPICAL POWDER: at 09:01

## 2025-01-06 RX ADMIN — GABAPENTIN 250 MG: 250 SOLUTION ORAL at 09:01

## 2025-01-06 RX ADMIN — HEPARIN SODIUM 5000 UNITS: 5000 INJECTION INTRAVENOUS; SUBCUTANEOUS at 02:01

## 2025-01-06 RX ADMIN — INSULIN GLARGINE 5 UNITS: 100 INJECTION, SOLUTION SUBCUTANEOUS at 08:01

## 2025-01-06 RX ADMIN — SODIUM CHLORIDE 1 G: 9 INJECTION, SOLUTION INTRAVENOUS at 09:01

## 2025-01-06 RX ADMIN — INSULIN ASPART 2 UNITS: 100 INJECTION, SOLUTION INTRAVENOUS; SUBCUTANEOUS at 03:01

## 2025-01-06 RX ADMIN — GABAPENTIN 250 MG: 250 SOLUTION ORAL at 06:01

## 2025-01-07 PROBLEM — J96.01 ACUTE HYPOXEMIC RESPIRATORY FAILURE: Status: ACTIVE | Noted: 2025-01-07

## 2025-01-07 LAB
ALBUMIN SERPL BCP-MCNC: 1 G/DL (ref 3.5–5.2)
ALP SERPL-CCNC: 338 U/L (ref 40–150)
ALT SERPL W/O P-5'-P-CCNC: 705 U/L (ref 10–44)
ANION GAP SERPL CALC-SCNC: 12 MMOL/L (ref 8–16)
ANISOCYTOSIS BLD QL SMEAR: SLIGHT
AST SERPL-CCNC: 2031 U/L (ref 10–40)
BASOPHILS # BLD AUTO: 0.04 K/UL (ref 0–0.2)
BASOPHILS NFR BLD: 0.3 % (ref 0–1.9)
BILIRUB SERPL-MCNC: 1.6 MG/DL (ref 0.1–1)
BUN SERPL-MCNC: 19 MG/DL (ref 8–23)
BURR CELLS BLD QL SMEAR: ABNORMAL
CALCIUM SERPL-MCNC: 7.1 MG/DL (ref 8.7–10.5)
CHLORIDE SERPL-SCNC: 111 MMOL/L (ref 95–110)
CO2 SERPL-SCNC: 24 MMOL/L (ref 23–29)
CREAT SERPL-MCNC: 0.9 MG/DL (ref 0.5–1.4)
CRP SERPL-MCNC: 223.1 MG/L (ref 0–8.2)
DIFFERENTIAL METHOD BLD: ABNORMAL
DOHLE BOD BLD QL SMEAR: PRESENT
EOSINOPHIL # BLD AUTO: 0.1 K/UL (ref 0–0.5)
EOSINOPHIL NFR BLD: 0.4 % (ref 0–8)
ERYTHROCYTE [DISTWIDTH] IN BLOOD BY AUTOMATED COUNT: 24.5 % (ref 11.5–14.5)
EST. GFR  (NO RACE VARIABLE): >60 ML/MIN/1.73 M^2
GLUCOSE SERPL-MCNC: 120 MG/DL (ref 70–110)
GLUCOSE SERPL-MCNC: 168 MG/DL (ref 70–110)
HCT VFR BLD AUTO: 28.2 % (ref 37–48.5)
HGB BLD-MCNC: 9.4 G/DL (ref 12–16)
IMM GRANULOCYTES # BLD AUTO: 0.04 K/UL (ref 0–0.04)
IMM GRANULOCYTES NFR BLD AUTO: 0.3 % (ref 0–0.5)
LYMPHOCYTES # BLD AUTO: 1 K/UL (ref 1–4.8)
LYMPHOCYTES NFR BLD: 8.5 % (ref 18–48)
MCH RBC QN AUTO: 23.6 PG (ref 27–31)
MCHC RBC AUTO-ENTMCNC: 33.3 G/DL (ref 32–36)
MCV RBC AUTO: 71 FL (ref 82–98)
MONOCYTES # BLD AUTO: 0.2 K/UL (ref 0.3–1)
MONOCYTES NFR BLD: 1.8 % (ref 4–15)
NEUTROPHILS # BLD AUTO: 10.2 K/UL (ref 1.8–7.7)
NEUTROPHILS NFR BLD: 88.7 % (ref 38–73)
NRBC BLD-RTO: 0 /100 WBC
OVALOCYTES BLD QL SMEAR: ABNORMAL
PLATELET # BLD AUTO: 307 K/UL (ref 150–450)
PMV BLD AUTO: 9.2 FL (ref 9.2–12.9)
POCT GLUCOSE: 188 MG/DL (ref 70–110)
POCT GLUCOSE: 208 MG/DL (ref 70–110)
POCT GLUCOSE: 226 MG/DL (ref 70–110)
POIKILOCYTOSIS BLD QL SMEAR: SLIGHT
POLYCHROMASIA BLD QL SMEAR: ABNORMAL
POTASSIUM SERPL-SCNC: 3.2 MMOL/L (ref 3.5–5.1)
PROT SERPL-MCNC: 4.5 G/DL (ref 6–8.4)
RBC # BLD AUTO: 3.99 M/UL (ref 4–5.4)
SODIUM SERPL-SCNC: 147 MMOL/L (ref 136–145)
SPHEROCYTES BLD QL SMEAR: ABNORMAL
TARGETS BLD QL SMEAR: ABNORMAL
TOXIC GRANULES BLD QL SMEAR: PRESENT
WBC # BLD AUTO: 11.55 K/UL (ref 3.9–12.7)

## 2025-01-07 PROCEDURE — 36415 COLL VENOUS BLD VENIPUNCTURE: CPT | Performed by: STUDENT IN AN ORGANIZED HEALTH CARE EDUCATION/TRAINING PROGRAM

## 2025-01-07 PROCEDURE — 27000207 HC ISOLATION

## 2025-01-07 PROCEDURE — 63600175 PHARM REV CODE 636 W HCPCS

## 2025-01-07 PROCEDURE — 25000003 PHARM REV CODE 250: Performed by: FAMILY MEDICINE

## 2025-01-07 PROCEDURE — 63600175 PHARM REV CODE 636 W HCPCS: Performed by: FAMILY MEDICINE

## 2025-01-07 PROCEDURE — 85025 COMPLETE CBC W/AUTO DIFF WBC: CPT

## 2025-01-07 PROCEDURE — 25000003 PHARM REV CODE 250: Performed by: HOSPITALIST

## 2025-01-07 PROCEDURE — 21400001 HC TELEMETRY ROOM

## 2025-01-07 PROCEDURE — 94761 N-INVAS EAR/PLS OXIMETRY MLT: CPT

## 2025-01-07 PROCEDURE — 11000001 HC ACUTE MED/SURG PRIVATE ROOM

## 2025-01-07 PROCEDURE — 86140 C-REACTIVE PROTEIN: CPT | Performed by: STUDENT IN AN ORGANIZED HEALTH CARE EDUCATION/TRAINING PROGRAM

## 2025-01-07 PROCEDURE — 80053 COMPREHEN METABOLIC PANEL: CPT

## 2025-01-07 PROCEDURE — 25000003 PHARM REV CODE 250

## 2025-01-07 RX ORDER — SODIUM CHLORIDE 9 MG/ML
INJECTION, SOLUTION INTRAVENOUS CONTINUOUS
Status: DISCONTINUED | OUTPATIENT
Start: 2025-01-07 | End: 2025-01-07

## 2025-01-07 RX ORDER — FUROSEMIDE 10 MG/ML
40 INJECTION INTRAMUSCULAR; INTRAVENOUS DAILY
Status: COMPLETED | OUTPATIENT
Start: 2025-01-07 | End: 2025-01-08

## 2025-01-07 RX ADMIN — WHITE PETROLATUM: 1.75 OINTMENT TOPICAL at 09:01

## 2025-01-07 RX ADMIN — GABAPENTIN 250 MG: 250 SOLUTION ORAL at 05:01

## 2025-01-07 RX ADMIN — HEPARIN SODIUM 5000 UNITS: 5000 INJECTION INTRAVENOUS; SUBCUTANEOUS at 05:01

## 2025-01-07 RX ADMIN — WHITE PETROLATUM: 1.75 OINTMENT TOPICAL at 10:01

## 2025-01-07 RX ADMIN — MICONAZOLE NITRATE 2 % TOPICAL POWDER: at 10:01

## 2025-01-07 RX ADMIN — SODIUM CHLORIDE: 9 INJECTION, SOLUTION INTRAVENOUS at 10:01

## 2025-01-07 RX ADMIN — INSULIN ASPART 1 UNITS: 100 INJECTION, SOLUTION INTRAVENOUS; SUBCUTANEOUS at 10:01

## 2025-01-07 RX ADMIN — INSULIN GLARGINE 5 UNITS: 100 INJECTION, SOLUTION SUBCUTANEOUS at 10:01

## 2025-01-07 RX ADMIN — HEPARIN SODIUM 5000 UNITS: 5000 INJECTION INTRAVENOUS; SUBCUTANEOUS at 09:01

## 2025-01-07 RX ADMIN — POTASSIUM BICARBONATE 40 MEQ: 391 TABLET, EFFERVESCENT ORAL at 10:01

## 2025-01-07 RX ADMIN — GABAPENTIN 250 MG: 250 SOLUTION ORAL at 09:01

## 2025-01-07 RX ADMIN — SODIUM CHLORIDE 1 G: 9 INJECTION, SOLUTION INTRAVENOUS at 09:01

## 2025-01-07 RX ADMIN — MICONAZOLE NITRATE 2 % TOPICAL POWDER: at 09:01

## 2025-01-07 RX ADMIN — ASPIRIN 81 MG CHEWABLE TABLET 81 MG: 81 TABLET CHEWABLE at 10:01

## 2025-01-07 RX ADMIN — THERA TABS 1 TABLET: TAB at 10:01

## 2025-01-07 RX ADMIN — FUROSEMIDE 40 MG: 10 INJECTION, SOLUTION INTRAVENOUS at 05:01

## 2025-01-07 RX ADMIN — TRAZODONE HYDROCHLORIDE 25 MG: 50 TABLET ORAL at 09:01

## 2025-01-07 NOTE — ASSESSMENT & PLAN NOTE
- CT with diverticulitis with contained perforation  - VSS without leukocytosis  ;  non-surgical abdomen on exam  - maintain npo for now  - CRS consult in am   (stat consult if clinical decompensation or concerning abdominal symptoms/signs)  - continue Meropenem  - ID consulted ; appreciate recs  - further management pending clinical course and future study review    12/31  CT abdomen - racute diverticulitis of the mid sigmoid colon with adjacent suspected contained perforation.  No associated rim enhancement to suggest drainable abscess at this time.  No bowel obstruction. Apparent circumferential wall thickening of the distal rectum/anus which could be related to underdistention versus nonspecific proctitis.  No significant perirectal inflammatory change or evidence perirectal or perianal drainable abscess.  Left more than right basilar patchy nonspecific pulmonary mosaic attenuation with bronchial wall thickening pulmonary edema or small airways process / right basilar patchy clusters of small nodules suggesting infectious or inflammatory small airways process versus aspiration.  No large consolidation. s/p CRS eval - hemodynamically normal with benign abdominal exam, and reassuring labwork. No acute surgical intervention indicated. continue NPO status, with IVF hydration and IV meropenem.  trend CRP 59  C.diff assay pending. s/p CRS eval - hemodynamically normal with benign abdominal exam, and reassuring labwork. No acute surgical intervention indicated.    - Advance TF, stop if residual>300cc, wait 2h and attempt TF again once residual resolves  - Ertapenem per ID recs EED 01/09/25

## 2025-01-07 NOTE — ASSESSMENT & PLAN NOTE
Patient with Hypoxic Respiratory failure which is Acute.  she is not on home oxygen. Supplemental oxygen was provided and noted-      .   Signs/symptoms of respiratory failure include- increased work of breathing and lethargy. Contributing diagnoses includes - Pleural effusion Labs and images were reviewed. Patient Has not had a recent ABG. Will treat underlying causes and adjust management of respiratory failure as follows-     - XR on 01/08 with evidence of hypervolemia--Lasix 40mg IV qd for 2d

## 2025-01-07 NOTE — NURSING
Residuals measure at 350ml. Will hold on feedings. Messaged primary team to get and clarify feeding holding times.

## 2025-01-07 NOTE — PLAN OF CARE
Tolerated tube feeding overnight at 50cc, care on going.    Problem: Skin Injury Risk Increased  Goal: Skin Health and Integrity  1/7/2025 0457 by Vidhi Brown RN  Outcome: Progressing  1/7/2025 0439 by Vidhi Brown RN  Outcome: Progressing     Problem: Infection  Goal: Absence of Infection Signs and Symptoms  1/7/2025 0457 by Vidhi Brown RN  Outcome: Progressing  1/7/2025 0439 by Vidhi Brown RN  Outcome: Progressing     Problem: Adult Inpatient Plan of Care  Goal: Plan of Care Review  1/7/2025 0457 by Vidhi Brown RN  Outcome: Progressing  1/7/2025 0439 by Vidhi Brown RN  Outcome: Progressing  Goal: Patient-Specific Goal (Individualized)  1/7/2025 0457 by Vidhi Brown RN  Outcome: Progressing  1/7/2025 0439 by Vidhi Brown RN  Outcome: Progressing  Goal: Absence of Hospital-Acquired Illness or Injury  1/7/2025 0457 by Vidhi Brown RN  Outcome: Progressing  1/7/2025 0439 by Vidhi Brown RN  Outcome: Progressing  Goal: Optimal Comfort and Wellbeing  1/7/2025 0457 by Vidhi Brown RN  Outcome: Progressing  1/7/2025 0439 by Vidhi Brown RN  Outcome: Progressing  Goal: Readiness for Transition of Care  1/7/2025 0457 by Vidhi Brown RN  Outcome: Progressing  1/7/2025 0439 by Vidhi Brown RN  Outcome: Progressing     Problem: Diabetes Comorbidity  Goal: Blood Glucose Level Within Targeted Range  1/7/2025 0457 by Vidhi Brown RN  Outcome: Progressing  1/7/2025 0439 by Vidhi Brown RN  Outcome: Progressing     Problem: Acute Kidney Injury/Impairment  Goal: Fluid and Electrolyte Balance  1/7/2025 0457 by Vidhi Brown RN  Outcome: Progressing  1/7/2025 0439 by Vidhi Brown RN  Outcome: Progressing  Goal: Improved Oral Intake  1/7/2025 0457 by Vidhi Brown RN  Outcome: Progressing  1/7/2025 0439 by Brown, Vidhi, RN  Outcome: Progressing  Goal: Effective Renal Function  1/7/2025 0457 by  Vidhi Brown RN  Outcome: Progressing  1/7/2025 0439 by Vidhi Brown RN  Outcome: Progressing     Problem: Wound  Goal: Optimal Coping  1/7/2025 0457 by Vidhi Brown RN  Outcome: Progressing  1/7/2025 0439 by Vidhi Brown RN  Outcome: Progressing  Goal: Optimal Functional Ability  1/7/2025 0457 by Vidhi Brown RN  Outcome: Progressing  1/7/2025 0439 by Vidhi Brown RN  Outcome: Progressing  Goal: Absence of Infection Signs and Symptoms  1/7/2025 0457 by Vidhi Brown RN  Outcome: Progressing  1/7/2025 0439 by Vidhi Brown RN  Outcome: Progressing  Goal: Improved Oral Intake  1/7/2025 0457 by Vidhi Brown RN  Outcome: Progressing  1/7/2025 0439 by Vidhi Brown RN  Outcome: Progressing  Goal: Optimal Pain Control and Function  1/7/2025 0457 by Vidhi Brown RN  Outcome: Progressing  1/7/2025 0439 by Vidhi Brown RN  Outcome: Progressing  Goal: Skin Health and Integrity  1/7/2025 0457 by Vidhi Brown RN  Outcome: Progressing  1/7/2025 0439 by Vidhi Brown RN  Outcome: Progressing  Goal: Optimal Wound Healing  1/7/2025 0457 by Vidhi Brown RN  Outcome: Progressing  1/7/2025 0439 by Vidhi Brown RN  Outcome: Progressing     Problem: Fall Injury Risk  Goal: Absence of Fall and Fall-Related Injury  1/7/2025 0457 by Vidhi Brown RN  Outcome: Progressing  1/7/2025 0439 by Vidhi Brown RN  Outcome: Progressing

## 2025-01-07 NOTE — ASSESSMENT & PLAN NOTE
I have personally reviewed Chest X-ray. Patient with atelectasis on interpretation. Likely Non-Obstructive atelectasis secondary to pleural effusion. Signs and symptoms include Shortness of breath and Hypoxemia Will begin treatment with upright positioning.

## 2025-01-07 NOTE — ASSESSMENT & PLAN NOTE
Patient's most recent potassium results are listed below.   Recent Labs     01/05/25  0650 01/06/25  1445 01/07/25  0231   K 3.2* 3.3* 3.2*       Plan  - Replete potassium per protocol  - Monitor potassium Daily  - Patient's hypokalemia is improving

## 2025-01-07 NOTE — SUBJECTIVE & OBJECTIVE
Interval History: NAEON, some nausea/vomiting     Review of Systems  Objective:     Vital Signs (Most Recent):  Temp: 98 °F (36.7 °C) (01/07/25 1200)  Pulse: 95 (01/07/25 1200)  Resp: 18 (01/07/25 0815)  BP: 109/70 (01/07/25 1200)  SpO2: (!) 89 % (01/07/25 1200) Vital Signs (24h Range):  Temp:  [97.2 °F (36.2 °C)-98 °F (36.7 °C)] 98 °F (36.7 °C)  Pulse:  [78-95] 95  Resp:  [18] 18  SpO2:  [89 %-96 %] 89 %  BP: ()/(55-74) 109/70     Weight: 56.4 kg (124 lb 5.4 oz)  Body mass index is 22.03 kg/m².    Intake/Output Summary (Last 24 hours) at 1/7/2025 1139  Last data filed at 1/7/2025 0318  Gross per 24 hour   Intake 450 ml   Output --   Net 450 ml         Physical Exam  Vitals and nursing note reviewed.   Constitutional:       General: She is not in acute distress.     Appearance: She is ill-appearing. She is not toxic-appearing or diaphoretic.      Comments: Chronically ill appearing, cachectic   HENT:      Head: Normocephalic and atraumatic.      Comments: Bilateral temporal wasting.     Nose: Nose normal.   Eyes:      General: No scleral icterus.     Extraocular Movements: Extraocular movements intact.      Conjunctiva/sclera: Conjunctivae normal.   Cardiovascular:      Rate and Rhythm: Normal rate and regular rhythm.   Pulmonary:      Effort: Pulmonary effort is normal. No respiratory distress.      Breath sounds: Decreased air movement present. No wheezing or rales.   Abdominal:      General: Abdomen is flat. There is no distension.      Palpations: Abdomen is soft.      Tenderness: There is no abdominal tenderness. There is no guarding.   Musculoskeletal:      Cervical back: Normal range of motion.      Right lower leg: No edema.      Left lower leg: No edema.      Comments: Left hemiplegia, left UE and LE contracted/stiff. LUE edematous.     Skin:     General: Skin is warm and dry.      Comments: Multiple wounds photos in chart   Neurological:      Mental Status: Mental status is at baseline. She is  lethargic.      Cranial Nerves: Dysarthria present.             Significant Labs: All pertinent labs within the past 24 hours have been reviewed.    Significant Imaging: I have reviewed all pertinent imaging results/findings within the past 24 hours.

## 2025-01-07 NOTE — PROGRESS NOTES
[facility] Hospital Medicine  Progress Note    Patient Name: Emily Martinez  MRN: [id number]  Patient Class: IP- Inpatient   Admission Date: 12/30/2024  Length of Stay: 7 days  Attending Physician: Valerio Jones MD  Primary Care Provider: Alireza Sosa MD        Subjective     Principal Problem:Diverticulitis        HPI:  59-year-old female, history of a stroke with left hemiplegia, bed-bound, diabetes, hypertension, recurrent infections, dysphagia sp PEG, multiple skin wounds, multiple recent admissions in the last several months in the [facility], most recently last week for UTI, discharged with Keflex, living at home with her daughter, now brought in for diarrhea.  Daughter states the patient has had diarrhea for about a week and a half.  She is having about 4-5 episodes a day, watery, nonbloody.  She has intermittently also vomiting.  Patient has a PEG tube for nutrition but per most recent SLP note can also take p.o. nutrition but daughter says she has had minimal p.o. intake because she has said she is not hungry.  No fevers noted.  Daughter is frustrated as she feels like when patient gets admitted, she is temporarily better and then very quickly becomes ill again.  All of their care has been in [place] [facility] but they decided to come to [facility] today as they were hopeful that we would be able to get her better.       Of note, urine culture from 6 days ago is growing out ESBL E coli, greater than 100,000 colonies    In the ED patient afebrile and hemodynamically stable saturating well on room air at rest. No leukocytosis. UA still concerning for UTI and patient started on meropenem. CT abdomen performed and noted acute diverticulitis with area of concern for contained perforation. Patient without abdominal tenderness. Patient admitted to the [facility] for further evaluation and management.    Overview/Hospital Course:  12/31 CT head -No acute intracranial process. Prominent  involutional changes and chronic microvascular ischemic changes in the periventricular white matter.  Small areas of probable remote lacunar infarction in the bilateral basal ganglia and small probable chronic right parietal cortical infarct. CT abdomen - racute diverticulitis of the mid sigmoid colon with adjacent suspected contained perforation.  No associated rim enhancement to suggest drainable abscess at this time.  No bowel obstruction. Apparent circumferential wall thickening of the distal rectum/anus which could be related to underdistention versus nonspecific proctitis.  No significant perirectal inflammatory change or evidence perirectal or perianal drainable abscess.  Left more than right basilar patchy nonspecific pulmonary mosaic attenuation with bronchial wall thickening pulmonary edema or small airways process / right basilar patchy clusters of small nodules suggesting infectious or inflammatory small airways process versus aspiration.  No large consolidation. s/p CRS eval - hemodynamically normal with benign abdominal exam, and reassuring labwork. No acute surgical intervention indicated. continue NPO status, with IVF hydration and IV meropenem.  trend CRP 59-->66. CRS eval -  continue to trend CRP  if downtrending and passing flatus, okay to start on clears and downtitrate IVFC.  1/1 UC GNRs.  ID eval - continue meropenem. If no Pseudomonas isolated, okay to de-escalate to ertapenem. C diff assay pending.    Advancing TF per CRS recs to home regimen. De-escalated to ertapenem for 7-10day treatment course (EED 01/09/25) for diverticulitis and ESBL Ecoli cystitis. Not acute care at home candidate. Transaminitis with negative Liver US, suspect shock liver after hypotension on 01/04 given improvement in LFTs and US Liver negative for acute abnormality. Continued lethargy evaluated with CT head.     Interval History: NAEON, some nausea/vomiting     Review of Systems  Objective:     Vital Signs (Most  Recent):  Temp: 98 °F (36.7 °C) (01/07/25 1200)  Pulse: 95 (01/07/25 1200)  Resp: 18 (01/07/25 0815)  BP: 109/70 (01/07/25 1200)  SpO2: (!) 89 % (01/07/25 1200) Vital Signs (24h Range):  Temp:  [97.2 °F (36.2 °C)-98 °F (36.7 °C)] 98 °F (36.7 °C)  Pulse:  [78-95] 95  Resp:  [18] 18  SpO2:  [89 %-96 %] 89 %  BP: ()/(55-74) 109/70     Weight: 56.4 kg (124 lb 5.4 oz)  Body mass index is 22.03 kg/m².    Intake/Output Summary (Last 24 hours) at 1/7/2025 7328  Last data filed at 1/7/2025 0318  Gross per 24 hour   Intake 450 ml   Output --   Net 450 ml         Physical Exam  Vitals and nursing note reviewed.   Constitutional:       General: She is not in acute distress.     Appearance: She is ill-appearing. She is not toxic-appearing or diaphoretic.      Comments: Chronically ill appearing, cachectic   HENT:      Head: Normocephalic and atraumatic.      Comments: Bilateral temporal wasting.     Nose: Nose normal.   Eyes:      General: No scleral icterus.     Extraocular Movements: Extraocular movements intact.      Conjunctiva/sclera: Conjunctivae normal.   Cardiovascular:      Rate and Rhythm: Normal rate and regular rhythm.   Pulmonary:      Effort: Pulmonary effort is normal. No respiratory distress.      Breath sounds: Decreased air movement present. No wheezing or rales.   Abdominal:      General: Abdomen is flat. There is no distension.      Palpations: Abdomen is soft.      Tenderness: There is no abdominal tenderness. There is no guarding.   Musculoskeletal:      Cervical back: Normal range of motion.      Right lower leg: No edema.      Left lower leg: No edema.      Comments: Left hemiplegia, left UE and LE contracted/stiff. LUE edematous.     Skin:     General: Skin is warm and dry.      Comments: Multiple wounds photos in chart   Neurological:      Mental Status: Mental status is at baseline. She is lethargic.      Cranial Nerves: Dysarthria present.             Significant Labs: All pertinent labs within  the past 24 hours have been reviewed.    Significant Imaging: I have reviewed all pertinent imaging results/findings within the past 24 hours.    Assessment and Plan     * Diverticulitis  - CT with diverticulitis with contained perforation  - VSS without leukocytosis  ;  non-surgical abdomen on exam  - maintain npo for now  - CRS consult in am   (stat consult if clinical decompensation or concerning abdominal symptoms/signs)  - continue Meropenem  - ID consulted ; appreciate recs  - further management pending clinical course and future study review    12/31  CT abdomen - racute diverticulitis of the mid sigmoid colon with adjacent suspected contained perforation.  No associated rim enhancement to suggest drainable abscess at this time.  No bowel obstruction. Apparent circumferential wall thickening of the distal rectum/anus which could be related to underdistention versus nonspecific proctitis.  No significant perirectal inflammatory change or evidence perirectal or perianal drainable abscess.  Left more than right basilar patchy nonspecific pulmonary mosaic attenuation with bronchial wall thickening pulmonary edema or small airways process / right basilar patchy clusters of small nodules suggesting infectious or inflammatory small airways process versus aspiration.  No large consolidation. s/p CRS eval - hemodynamically normal with benign abdominal exam, and reassuring labwork. No acute surgical intervention indicated. continue NPO status, with IVF hydration and IV meropenem.  trend CRP 59  C.diff assay pending. s/p CRS eval - hemodynamically normal with benign abdominal exam, and reassuring labwork. No acute surgical intervention indicated.    - Advance TF, stop if residual>300cc, wait 2h and attempt TF again once residual resolves  - Ertapenem per ID recs EED 01/09/25    Acute hypoxemic respiratory failure  Patient with Hypoxic Respiratory failure which is Acute.  she is not on home oxygen. Supplemental oxygen  "was provided and noted-      .   Signs/symptoms of respiratory failure include- increased work of breathing and lethargy. Contributing diagnoses includes - Pleural effusion Labs and images were reviewed. Patient Has not had a recent ABG. Will treat underlying causes and adjust management of respiratory failure as follows-     - XR on 01/08 with evidence of hypervolemia--Lasix 40mg IV qd for 2d    LFT elevation  Without inciting event on labs 01/04. No evidence of hypervolemia on CXR, no increased O2 requirement, no abdominal palpation. Shock liver a consideration given hypotension with MAP around 65 that morning.    - US Liver with doppler negative for acute abormality  - continue to monitor with daily labs, avoid hepatotoxic medications  - s/p 1L bolus with BP improvement.       Atelectasis of both lungs  I have personally reviewed Chest X-ray. Patient with atelectasis on interpretation. Likely Non-Obstructive atelectasis secondary to pleural effusion. Signs and symptoms include Shortness of breath and Hypoxemia Will begin treatment with upright positioning.    Hypophosphatemia  Patient's most recent phosphorus results are listed below.   No results for input(s): "PHOS" in the last 72 hours.    Plan  - Will treat hypophosphatemia with prn supplementation  - Patient's hypophosphatemia is stable    Hypokalemia  Patient's most recent potassium results are listed below.   Recent Labs     01/05/25  0650 01/06/25  1445 01/07/25  0231   K 3.2* 3.3* 3.2*       Plan  - Replete potassium per protocol  - Monitor potassium Daily  - Patient's hypokalemia is improving    Hypernatremia  Hypernatremia is likely due to Dehydration. monitor with hydration   Recent Labs     01/05/25  0650 01/06/25  1445 01/07/25  0231    151* 147*      S/p D5W infusion       Dysphagia  - sp PEG for nutrition and meds  ;  okay for comfort po feeds per recent SLP recs  - NPO as above at this time  - IVFs  - advance TF as possible      UTI " (urinary tract infection)  - recent outside culture with ESBL  - start meropenem  - ID consulted  - follow up cultures  - further management pending clinical course    12/31  continue IV meropenem.  trend CRP 59  C.diff assay pending.    1/1 UC GNRs.ID eval - continue meropenem. If no Pseudomonas isolated, okay to de-escalate to ertapenem.    - Ertapenem per ID recs EED 01/09/25    Multiple wounds of skin  - Wound care consulted  - turn q2  - waffle mattress overlay  - clean and dressed        History of stroke with residual effects  - chronic left hemiplegia, bed bound, sp PEG though able to tolerate po comfort feed  - continue home ASA and statin  - turn q2h      12/31 CT head -No acute intracranial process. Prominent involutional changes and chronic microvascular ischemic changes in the periventricular white matter.  Small areas of probable remote lacunar infarction in the bilateral basal ganglia and small probable chronic right parietal cortical infarct.     - Given ongoing lethargy repeat CT head to evaluate for new intracranial process    Insomnia  - home trazadone      Mixed hyperlipidemia  - home statin        VTE Risk Mitigation (From admission, onward)           Ordered     heparin (porcine) injection 5,000 Units  Every 8 hours         12/30/24 1937     IP VTE HIGH RISK PATIENT  Once         12/30/24 1937     Place sequential compression device  Until discontinued         12/30/24 1937     Place sequential compression device  Until discontinued         12/30/24 1929                    Discharge Planning   ANA: 1/9/2025     Code Status: Full Code   Medical Readiness for Discharge Date:   Discharge Plan A: Home, Home with family, Home Health, Other (PCA services with [facility].)                        Valerio Jones MD  Department of Hospital Medicine   [facility] S[facility]

## 2025-01-07 NOTE — ASSESSMENT & PLAN NOTE
- sp PEG for nutrition and meds  ;  okay for comfort po feeds per recent SLP recs  - NPO as above at this time  - IVFs  - advance TF as possible

## 2025-01-07 NOTE — ASSESSMENT & PLAN NOTE
Hypernatremia is likely due to Dehydration. monitor with hydration   Recent Labs     01/05/25  0650 01/06/25  1445 01/07/25  0231    151* 147*      S/p D5W infusion

## 2025-01-07 NOTE — PLAN OF CARE
Problem: Skin Injury Risk Increased  Goal: Skin Health and Integrity  Outcome: Not Progressing     Problem: Infection  Goal: Absence of Infection Signs and Symptoms  Outcome: Not Progressing     Problem: Adult Inpatient Plan of Care  Goal: Plan of Care Review  Outcome: Not Progressing  Goal: Patient-Specific Goal (Individualized)  Outcome: Not Progressing  Goal: Absence of Hospital-Acquired Illness or Injury  Outcome: Not Progressing  Goal: Optimal Comfort and Wellbeing  Outcome: Not Progressing  Goal: Readiness for Transition of Care  Outcome: Not Progressing     Problem: Diabetes Comorbidity  Goal: Blood Glucose Level Within Targeted Range  Outcome: Not Progressing     Problem: Acute Kidney Injury/Impairment  Goal: Fluid and Electrolyte Balance  Outcome: Not Progressing  Goal: Improved Oral Intake  Outcome: Not Progressing  Goal: Effective Renal Function  Outcome: Not Progressing     Problem: Wound  Goal: Optimal Coping  Outcome: Not Progressing  Goal: Optimal Functional Ability  Outcome: Not Progressing  Goal: Absence of Infection Signs and Symptoms  Outcome: Not Progressing  Goal: Improved Oral Intake  Outcome: Not Progressing  Goal: Optimal Pain Control and Function  Outcome: Not Progressing  Goal: Skin Health and Integrity  Outcome: Not Progressing  Goal: Optimal Wound Healing  Outcome: Not Progressing     Problem: Fall Injury Risk  Goal: Absence of Fall and Fall-Related Injury  Outcome: Not Progressing

## 2025-01-07 NOTE — ASSESSMENT & PLAN NOTE
- chronic left hemiplegia, bed bound, sp PEG though able to tolerate po comfort feed  - continue home ASA and statin  - turn q2h      12/31 CT head -No acute intracranial process. Prominent involutional changes and chronic microvascular ischemic changes in the periventricular white matter.  Small areas of probable remote lacunar infarction in the bilateral basal ganglia and small probable chronic right parietal cortical infarct.     - Given ongoing lethargy repeat CT head to evaluate for new intracranial process

## 2025-01-08 LAB
ACANTHOCYTES BLD QL SMEAR: PRESENT
ALBUMIN SERPL BCP-MCNC: 0.9 G/DL (ref 3.5–5.2)
ALP SERPL-CCNC: 538 U/L (ref 40–150)
ALT SERPL W/O P-5'-P-CCNC: 479 U/L (ref 10–44)
AMMONIA PLAS-SCNC: 89 UMOL/L (ref 10–50)
ANION GAP SERPL CALC-SCNC: 13 MMOL/L (ref 8–16)
ANISOCYTOSIS BLD QL SMEAR: SLIGHT
ANISOCYTOSIS BLD QL SMEAR: SLIGHT
AST SERPL-CCNC: 1228 U/L (ref 10–40)
BACTERIA #/AREA URNS AUTO: ABNORMAL /HPF
BASOPHILS # BLD AUTO: 0.02 K/UL (ref 0–0.2)
BASOPHILS # BLD AUTO: 0.06 K/UL (ref 0–0.2)
BASOPHILS NFR BLD: 0.2 % (ref 0–1.9)
BASOPHILS NFR BLD: 0.4 % (ref 0–1.9)
BILIRUB SERPL-MCNC: 2 MG/DL (ref 0.1–1)
BILIRUB UR QL STRIP: NEGATIVE
BNP SERPL-MCNC: 85 PG/ML (ref 0–99)
BUN SERPL-MCNC: 21 MG/DL (ref 8–23)
BURR CELLS BLD QL SMEAR: ABNORMAL
BURR CELLS BLD QL SMEAR: ABNORMAL
CALCIUM SERPL-MCNC: 7.3 MG/DL (ref 8.7–10.5)
CHLORIDE SERPL-SCNC: 114 MMOL/L (ref 95–110)
CLARITY UR REFRACT.AUTO: ABNORMAL
CO2 SERPL-SCNC: 23 MMOL/L (ref 23–29)
COLOR UR AUTO: YELLOW
CREAT SERPL-MCNC: 1 MG/DL (ref 0.5–1.4)
CRP SERPL-MCNC: 257.8 MG/L (ref 0–8.2)
DACRYOCYTES BLD QL SMEAR: ABNORMAL
DIFFERENTIAL METHOD BLD: ABNORMAL
DIFFERENTIAL METHOD BLD: ABNORMAL
EOSINOPHIL # BLD AUTO: 0 K/UL (ref 0–0.5)
EOSINOPHIL # BLD AUTO: 0 K/UL (ref 0–0.5)
EOSINOPHIL NFR BLD: 0.2 % (ref 0–8)
EOSINOPHIL NFR BLD: 0.3 % (ref 0–8)
ERYTHROCYTE [DISTWIDTH] IN BLOOD BY AUTOMATED COUNT: 24.1 % (ref 11.5–14.5)
ERYTHROCYTE [DISTWIDTH] IN BLOOD BY AUTOMATED COUNT: 24.6 % (ref 11.5–14.5)
EST. GFR  (NO RACE VARIABLE): >60 ML/MIN/1.73 M^2
GLUCOSE SERPL-MCNC: 106 MG/DL (ref 70–110)
GLUCOSE UR QL STRIP: NEGATIVE
HCT VFR BLD AUTO: 26.3 % (ref 37–48.5)
HCT VFR BLD AUTO: 28.6 % (ref 37–48.5)
HGB BLD-MCNC: 10.1 G/DL (ref 12–16)
HGB BLD-MCNC: 9.3 G/DL (ref 12–16)
HGB UR QL STRIP: ABNORMAL
HYPOCHROMIA BLD QL SMEAR: ABNORMAL
IMM GRANULOCYTES # BLD AUTO: 0.1 K/UL (ref 0–0.04)
IMM GRANULOCYTES # BLD AUTO: 0.2 K/UL (ref 0–0.04)
IMM GRANULOCYTES NFR BLD AUTO: 0.8 % (ref 0–0.5)
IMM GRANULOCYTES NFR BLD AUTO: 1.3 % (ref 0–0.5)
KETONES UR QL STRIP: NEGATIVE
LACTATE SERPL-SCNC: 1.2 MMOL/L (ref 0.5–2.2)
LEUKOCYTE ESTERASE UR QL STRIP: ABNORMAL
LYMPHOCYTES # BLD AUTO: 2 K/UL (ref 1–4.8)
LYMPHOCYTES # BLD AUTO: 2.2 K/UL (ref 1–4.8)
LYMPHOCYTES NFR BLD: 14.8 % (ref 18–48)
LYMPHOCYTES NFR BLD: 15.1 % (ref 18–48)
MAGNESIUM SERPL-MCNC: 2.1 MG/DL (ref 1.6–2.6)
MCH RBC QN AUTO: 23.7 PG (ref 27–31)
MCH RBC QN AUTO: 23.7 PG (ref 27–31)
MCHC RBC AUTO-ENTMCNC: 35.3 G/DL (ref 32–36)
MCHC RBC AUTO-ENTMCNC: 35.4 G/DL (ref 32–36)
MCV RBC AUTO: 67 FL (ref 82–98)
MCV RBC AUTO: 67 FL (ref 82–98)
MICROSCOPIC COMMENT: ABNORMAL
MONOCYTES # BLD AUTO: 0.5 K/UL (ref 0.3–1)
MONOCYTES # BLD AUTO: 0.6 K/UL (ref 0.3–1)
MONOCYTES NFR BLD: 3 % (ref 4–15)
MONOCYTES NFR BLD: 4.5 % (ref 4–15)
NEUTROPHILS # BLD AUTO: 10.3 K/UL (ref 1.8–7.7)
NEUTROPHILS # BLD AUTO: 12.1 K/UL (ref 1.8–7.7)
NEUTROPHILS NFR BLD: 79.2 % (ref 38–73)
NEUTROPHILS NFR BLD: 80.2 % (ref 38–73)
NITRITE UR QL STRIP: NEGATIVE
NRBC BLD-RTO: 1 /100 WBC
NRBC BLD-RTO: 1 /100 WBC
OVALOCYTES BLD QL SMEAR: ABNORMAL
PH UR STRIP: 6 [PH] (ref 5–8)
PLATELET # BLD AUTO: 211 K/UL (ref 150–450)
PLATELET # BLD AUTO: 240 K/UL (ref 150–450)
PLATELET BLD QL SMEAR: ABNORMAL
PMV BLD AUTO: ABNORMAL FL (ref 9.2–12.9)
PMV BLD AUTO: ABNORMAL FL (ref 9.2–12.9)
POCT GLUCOSE: 101 MG/DL (ref 70–110)
POCT GLUCOSE: 114 MG/DL (ref 70–110)
POCT GLUCOSE: 162 MG/DL (ref 70–110)
POCT GLUCOSE: 168 MG/DL (ref 70–110)
POIKILOCYTOSIS BLD QL SMEAR: SLIGHT
POIKILOCYTOSIS BLD QL SMEAR: SLIGHT
POTASSIUM SERPL-SCNC: 4.4 MMOL/L (ref 3.5–5.1)
PROT SERPL-MCNC: 5 G/DL (ref 6–8.4)
PROT UR QL STRIP: ABNORMAL
RBC # BLD AUTO: 3.92 M/UL (ref 4–5.4)
RBC # BLD AUTO: 4.26 M/UL (ref 4–5.4)
RBC #/AREA URNS AUTO: 7 /HPF (ref 0–4)
SCHISTOCYTES BLD QL SMEAR: ABNORMAL
SCHISTOCYTES BLD QL SMEAR: PRESENT
SODIUM SERPL-SCNC: 150 MMOL/L (ref 136–145)
SP GR UR STRIP: 1.01 (ref 1–1.03)
SPHEROCYTES BLD QL SMEAR: ABNORMAL
SQUAMOUS #/AREA URNS AUTO: 0 /HPF
TARGETS BLD QL SMEAR: ABNORMAL
TARGETS BLD QL SMEAR: ABNORMAL
URN SPEC COLLECT METH UR: ABNORMAL
WBC # BLD AUTO: 13.01 K/UL (ref 3.9–12.7)
WBC # BLD AUTO: 15.05 K/UL (ref 3.9–12.7)
WBC #/AREA URNS AUTO: >100 /HPF (ref 0–5)

## 2025-01-08 PROCEDURE — 94761 N-INVAS EAR/PLS OXIMETRY MLT: CPT

## 2025-01-08 PROCEDURE — 87086 URINE CULTURE/COLONY COUNT: CPT | Performed by: HOSPITALIST

## 2025-01-08 PROCEDURE — 95711 VEEG 2-12 HR UNMONITORED: CPT

## 2025-01-08 PROCEDURE — 21400001 HC TELEMETRY ROOM

## 2025-01-08 PROCEDURE — 25000003 PHARM REV CODE 250

## 2025-01-08 PROCEDURE — 36415 COLL VENOUS BLD VENIPUNCTURE: CPT

## 2025-01-08 PROCEDURE — 11000001 HC ACUTE MED/SURG PRIVATE ROOM

## 2025-01-08 PROCEDURE — P9047 ALBUMIN (HUMAN), 25%, 50ML: HCPCS | Performed by: HOSPITALIST

## 2025-01-08 PROCEDURE — 27000207 HC ISOLATION

## 2025-01-08 PROCEDURE — 82140 ASSAY OF AMMONIA: CPT

## 2025-01-08 PROCEDURE — 25000003 PHARM REV CODE 250: Performed by: HOSPITALIST

## 2025-01-08 PROCEDURE — 25000003 PHARM REV CODE 250: Performed by: FAMILY MEDICINE

## 2025-01-08 PROCEDURE — 85025 COMPLETE CBC W/AUTO DIFF WBC: CPT

## 2025-01-08 PROCEDURE — 81001 URINALYSIS AUTO W/SCOPE: CPT | Performed by: HOSPITALIST

## 2025-01-08 PROCEDURE — 63600175 PHARM REV CODE 636 W HCPCS: Performed by: HOSPITALIST

## 2025-01-08 PROCEDURE — 83735 ASSAY OF MAGNESIUM: CPT

## 2025-01-08 PROCEDURE — 83880 ASSAY OF NATRIURETIC PEPTIDE: CPT | Performed by: HOSPITALIST

## 2025-01-08 PROCEDURE — 95700 EEG CONT REC W/VID EEG TECH: CPT

## 2025-01-08 PROCEDURE — 86140 C-REACTIVE PROTEIN: CPT | Performed by: STUDENT IN AN ORGANIZED HEALTH CARE EDUCATION/TRAINING PROGRAM

## 2025-01-08 PROCEDURE — 63600175 PHARM REV CODE 636 W HCPCS

## 2025-01-08 PROCEDURE — 95718 EEG PHYS/QHP 2-12 HR W/VEEG: CPT | Mod: ,,, | Performed by: STUDENT IN AN ORGANIZED HEALTH CARE EDUCATION/TRAINING PROGRAM

## 2025-01-08 PROCEDURE — 99233 SBSQ HOSP IP/OBS HIGH 50: CPT | Mod: ,,, | Performed by: COLON & RECTAL SURGERY

## 2025-01-08 PROCEDURE — 63600175 PHARM REV CODE 636 W HCPCS: Performed by: FAMILY MEDICINE

## 2025-01-08 PROCEDURE — 87040 BLOOD CULTURE FOR BACTERIA: CPT | Mod: 59

## 2025-01-08 PROCEDURE — 51798 US URINE CAPACITY MEASURE: CPT

## 2025-01-08 PROCEDURE — 80053 COMPREHEN METABOLIC PANEL: CPT

## 2025-01-08 PROCEDURE — 83605 ASSAY OF LACTIC ACID: CPT | Performed by: HOSPITALIST

## 2025-01-08 RX ORDER — ONDANSETRON HYDROCHLORIDE 2 MG/ML
4 INJECTION, SOLUTION INTRAVENOUS EVERY 8 HOURS
Status: DISCONTINUED | OUTPATIENT
Start: 2025-01-08 | End: 2025-01-11

## 2025-01-08 RX ORDER — ALBUMIN HUMAN 250 G/1000ML
50 SOLUTION INTRAVENOUS ONCE
Status: COMPLETED | OUTPATIENT
Start: 2025-01-08 | End: 2025-01-08

## 2025-01-08 RX ADMIN — ONDANSETRON 4 MG: 2 INJECTION INTRAMUSCULAR; INTRAVENOUS at 09:01

## 2025-01-08 RX ADMIN — MICONAZOLE NITRATE 2 % TOPICAL POWDER: at 09:01

## 2025-01-08 RX ADMIN — SODIUM CHLORIDE, POTASSIUM CHLORIDE, SODIUM LACTATE AND CALCIUM CHLORIDE 1000 ML: 600; 310; 30; 20 INJECTION, SOLUTION INTRAVENOUS at 06:01

## 2025-01-08 RX ADMIN — HEPARIN SODIUM 5000 UNITS: 5000 INJECTION INTRAVENOUS; SUBCUTANEOUS at 04:01

## 2025-01-08 RX ADMIN — WHITE PETROLATUM: 1.75 OINTMENT TOPICAL at 08:01

## 2025-01-08 RX ADMIN — WHITE PETROLATUM: 1.75 OINTMENT TOPICAL at 09:01

## 2025-01-08 RX ADMIN — ONDANSETRON 4 MG: 2 INJECTION INTRAMUSCULAR; INTRAVENOUS at 04:01

## 2025-01-08 RX ADMIN — MICONAZOLE NITRATE 2 % TOPICAL POWDER: at 08:01

## 2025-01-08 RX ADMIN — GABAPENTIN 250 MG: 250 SOLUTION ORAL at 04:01

## 2025-01-08 RX ADMIN — HEPARIN SODIUM 5000 UNITS: 5000 INJECTION INTRAVENOUS; SUBCUTANEOUS at 05:01

## 2025-01-08 RX ADMIN — IOHEXOL 100 ML: 350 INJECTION, SOLUTION INTRAVENOUS at 11:01

## 2025-01-08 RX ADMIN — GABAPENTIN 250 MG: 250 SOLUTION ORAL at 05:01

## 2025-01-08 RX ADMIN — ALBUMIN (HUMAN) 50 G: 12.5 SOLUTION INTRAVENOUS at 09:01

## 2025-01-08 RX ADMIN — INSULIN GLARGINE 5 UNITS: 100 INJECTION, SOLUTION SUBCUTANEOUS at 09:01

## 2025-01-08 RX ADMIN — SODIUM CHLORIDE 1 G: 9 INJECTION, SOLUTION INTRAVENOUS at 08:01

## 2025-01-08 RX ADMIN — ASPIRIN 81 MG CHEWABLE TABLET 81 MG: 81 TABLET CHEWABLE at 09:01

## 2025-01-08 RX ADMIN — FUROSEMIDE 40 MG: 10 INJECTION, SOLUTION INTRAVENOUS at 09:01

## 2025-01-08 RX ADMIN — THERA TABS 1 TABLET: TAB at 09:01

## 2025-01-08 NOTE — PLAN OF CARE
Recommendations     1. Continuous TF recommendation: Glucerna 1.5 @ 50 mL/hr to provide 1200 mL total volume, 1800 kcal, 150 g CHO, 99 g protein, 19 g fiber, 910 mL free water, 120% DRI     -150 mL flush q4 hrs to provide additional 900 mL free water (Total 1810mL)   - nursing, please continue documenting TF rate via flowsheets   2. RD to monitor intake, labs, weight     Goals:   1. % nutritional needs met with diet   2. Maintain weight during admission  Nutrition Goal Status: progressing towards goal  Communication of RD Recs:  (POC)

## 2025-01-08 NOTE — NURSING
Attempted to place iv times 3. Patient has +3 edema to bilateral arms. Informed charge nurse. That I will re attempt to place later. Pt currently have iv access. Plan of care continued.

## 2025-01-08 NOTE — PROGRESS NOTES
[facility] Med Surg  Adult Nutrition  Progress Note    SUMMARY       Recommendations    1. Continuous TF recommendation: Glucerna 1.5 @ 50 mL/hr to provide 1200 mL total volume, 1800 kcal, 150 g CHO, 99 g protein, 19 g fiber, 910 mL free water, 120% DRI     -150 mL flush q4 hrs to provide additional 900 mL free water (Total 1810mL)   - nursing, please continue documenting TF rate via flowsheets   2. RD to monitor intake, labs, weight    Goals:   1. % nutritional needs met with diet   2. Maintain weight during admission  Nutrition Goal Status: progressing towards goal  Communication of RD Recs:  (POC)    Assessment and Plan    Nutrition Problem  Inadequate enteral infusion    Related to (etiology):   TF running @ 30 ml/hr (goal rate @ 50 ml/hr)    Signs and Symptoms (as evidenced by):   Pt energy needs > intake     Interventions/Recommendations (treatment strategy):  Collaboration with other providers    Nutrition Diagnosis Status:   New      Nutrition Problem  Difficulty swallowing      Related to (etiology):   Dysphagia      Signs and Symptoms (as evidenced by):   PEG tube      Interventions/Recommendations (treatment strategy):  Collaboration of nutrition care with other providers   EN     Nutrition Diagnosis Status:   Continues    Reason for Assessment    Reason For Assessment: RD follow-up  Diagnosis: gastrointestinal disease (Diverticulitis)  General Information Comments: Pt admitted with diverticulitis. Pt was asleep and would not wake up for conversation. NFPE inappropriate at this time. Noted 32.2% weight loss x 1 year. TF currently running at 30 ml/hr. BM: 1/8.  Nutrition Discharge Planning: Pending clinical course    Nutrition/Diet History    Spiritual, Cultural Beliefs, Catholic Practices, Values that Affect Care: no  Food Allergies: NKFA  Factors Affecting Nutritional Intake: NPO    Nutrition Related Social Determinants of Health: SDOH: Adequate food in home environment    Food Insecurity: No Food  "Insecurity (12/31/2024)    Hunger Vital Sign     Worried About Running Out of Food in the Last Year: Never true     Ran Out of Food in the Last Year: Never true     Anthropometrics    Temp: 98 °F (36.7 °C)  Height Method: Estimated  Height: 5' 3" (160 cm)  Height (inches): 63 in  Weight Method: Bed Scale  Weight: 56.4 kg (124 lb 5.4 oz)  Weight (lb): 124.34 lb  Ideal Body Weight (IBW), Female: 115 lb  % Ideal Body Weight, Female (lb): 121.73 %  BMI (Calculated): 22  BMI Grade: 18.5-24.9 - normal     Lab/Procedures/Meds    Pertinent Labs Reviewed: reviewed  Pertinent Labs Comments: aspirin, ertapenem, gabapentin, heparin, lantus, miconazole, MVI, ondansetron  Pertinent Medications Reviewed: reviewed  Pertinent Medications Comments: WBC 15.05 high, RBC 3.92 low, H/H 9.3/26.3 low, Na 150 high, Cl 114 high, GFR 60 low, Ca 7.3 low, P 2.4 low,  high, albumin 0.9 low, AST 1228 high,  high, .8 high    Estimated/Assessed Needs    Weight Used For Calorie Calculations: 63.5 kg (139 lb 15.9 oz)  Energy Calorie Requirements (kcal): 5547-3082 kcal/day (30-35 kcal/kg)  Energy Need Method: Kcal/kg  Protein Requirements: 76-95 g/day (1.2-1.5 g/kg)  Weight Used For Protein Calculations: 63.5 kg (139 lb 15.9 oz)     Estimated Fluid Requirement Method: RDA Method  RDA Method (mL): 1905  CHO Requirement: 238-278 g/day    Nutrition Prescription Ordered    Current Diet Order: NPO    Evaluation of Received Nutrient/Fluid Intake    Enteral Calories (kcal): 1080  Enteral Protein (gm): 59  Enteral (Free Water) Fluid (mL): 546  % Intake of Estimated Energy Needs: 25 - 50 %  % Meal Intake: NPO    Nutrition Risk    Level of Risk/Frequency of Follow-up: high     Monitor and Evaluation    Food and Nutrient Intake: enteral nutrition intake  Food and Nutrient Adminstration: enteral and parenteral nutrition administration  Knowledge/Beliefs/Attitudes: food and nutrition knowledge/skill  Physical Activity and Function: " nutrition-related ADLs and IADLs  Anthropometric Measurements: weight, weight change, body mass index  Biochemical Data, Medical Tests and Procedures: electrolyte and renal panel, gastrointestinal profile, glucose/endocrine profile, inflammatory profile, lipid profile  Nutrition-Focused Physical Findings: overall appearance     Nutrition Follow-Up    RD Follow-up?: Yes

## 2025-01-08 NOTE — CONSULTS
CRS Follow-up Note    58 y/o F, bed-bound, with multiple co-morbidities including HTN, DM and prior CVA with significant functional deficits including dysphagia with PEG tube for feeds, left hemiplagia. Dependent on support for ADLs - has help from daughter/granddaughter.    Admission to hospital on 12/30 with failure to thrive, UTI, diarrhea in the setting of sigmoid diverticulitis. She is limited historian at baseline.  Abdominal exam benign when first seen, with unremarkable labwork aside from hypernatremia (147).  Hospital course complicated shock liver with transaminitis, lethargy/encephalopathy, hypotension.    Patient with rising WBC/CRP, and reported blood in stool. CRS consulted to re-evaluate.    Patient unable to provide any history - she is obtunded and non-verbal currently.    Vitals:    01/08/25 1629   BP: (!) 73/50   Pulse: 75   Resp:    Temp: 96.9 °F (36.1 °C)   General: frail-appearing female, lethargic/obtunded  Resp: non-labored breathing   Abdomen: soft, non-distended. Unable to assess tenderness.  PEG tube in place, tube feeds actively running. Faint pfannenstiel scar noted  Rectal: low rectal tone, actively passing soft stool with streaks of maroon-colored blood. Some additonal stool emptied from rectal vault - no large-volume hematochezia noted.  Ext: contracted LUE/LLE    Labs:    CRP: 72 -> 153 -> 223 -> 257    WBC: 11.5 -> 15.0 -> 13.0  Hgb: 9.4 -> 9.3 -> 10.1    Na: 150  BUN/Cr: 21/1.0    Recommendation:    Obtain CTA if concern for active bleeding - although Hgb stable.    CT will also evaluate if any progression of diverticulitis - possible abscess formation- to explain rising CRP and hypotension.    -Continue IV abx  -Hold tube feeds until CT reviewed.  - Trend CRP/WBC  -Would consider initiating goals of care discussion with family given overall clinical picture

## 2025-01-08 NOTE — PROCEDURES
Preliminary EEG Read  EEG reviewed 09:02-09:32    Background:   The background is asymmetric and continuous.   Background over the left hemisphere consists of intermixed delta/theta activity reaching up to 6-7 hz.    Background over the right hemisphere consists of mostly delta activity with some overriding theta frequencies with notably lower amplitude of activity.      Impression:   Abnormal study consistent with a right hemispheric structural lesion (patient's known prior right hemispheric CVA) and a moderate diffuse encephalopathy.  No epileptiform discharges are seen.    Please hit the EEG button with any clinical activity concerning for seizures and describe what you see.    Detailed report to follow.     Trisha Lira MD  [facility]   Department of Neurology

## 2025-01-08 NOTE — PLAN OF CARE
Problem: Enteral Nutrition  Goal: Feeding Tolerance  Outcome: Progressing     Problem: Diabetes Comorbidity  Goal: Blood Glucose Level Within Targeted Range  1/8/2025 0541 by Yeny Escoto RN  Outcome: Progressing  1/8/2025 0540 by Yeny Escoto RN  Outcome: Progressing     Problem: Adult Inpatient Plan of Care  Goal: Optimal Comfort and Wellbeing  1/8/2025 0541 by Yeny Escoto RN  Outcome: Progressing  1/8/2025 0540 by Yeny Escoto RN  Outcome: Progressing       Pt required suction times 2 throughout the night d/t grugling suction effective. Resumed tube feeding d/t decrease in residual less than 300. Patient remains lethargic moans and yells out to tactile stimuli. No distress noted.

## 2025-01-09 PROBLEM — E86.0 DEHYDRATION: Status: ACTIVE | Noted: 2025-01-09

## 2025-01-09 LAB
ABO + RH BLD: NORMAL
ALBUMIN SERPL BCP-MCNC: 1.8 G/DL (ref 3.5–5.2)
ALBUMIN SERPL BCP-MCNC: 2 G/DL (ref 3.5–5.2)
ALP SERPL-CCNC: 365 U/L (ref 40–150)
ALP SERPL-CCNC: 438 U/L (ref 40–150)
ALT SERPL W/O P-5'-P-CCNC: 229 U/L (ref 10–44)
ALT SERPL W/O P-5'-P-CCNC: 252 U/L (ref 10–44)
ANION GAP SERPL CALC-SCNC: 12 MMOL/L (ref 8–16)
ANION GAP SERPL CALC-SCNC: 13 MMOL/L (ref 8–16)
ANISOCYTOSIS BLD QL SMEAR: SLIGHT
AST SERPL-CCNC: 418 U/L (ref 10–40)
AST SERPL-CCNC: 532 U/L (ref 10–40)
BASOPHILS # BLD AUTO: 0.02 K/UL (ref 0–0.2)
BASOPHILS # BLD AUTO: 0.02 K/UL (ref 0–0.2)
BASOPHILS NFR BLD: 0.2 % (ref 0–1.9)
BASOPHILS NFR BLD: 0.2 % (ref 0–1.9)
BILIRUB SERPL-MCNC: 2.1 MG/DL (ref 0.1–1)
BILIRUB SERPL-MCNC: 2.2 MG/DL (ref 0.1–1)
BLD GP AB SCN CELLS X3 SERPL QL: NORMAL
BLD PROD TYP BPU: NORMAL
BLD PROD TYP BPU: NORMAL
BLOOD UNIT EXPIRATION DATE: NORMAL
BLOOD UNIT EXPIRATION DATE: NORMAL
BLOOD UNIT TYPE CODE: 5100
BLOOD UNIT TYPE CODE: 5100
BLOOD UNIT TYPE: NORMAL
BLOOD UNIT TYPE: NORMAL
BUN SERPL-MCNC: 21 MG/DL (ref 8–23)
BUN SERPL-MCNC: 21 MG/DL (ref 8–23)
BURR CELLS BLD QL SMEAR: ABNORMAL
C DIFF GDH STL QL: POSITIVE
C DIFF TOX A+B STL QL IA: NEGATIVE
C DIFF TOX GENS STL QL NAA+PROBE: POSITIVE
CALCIUM SERPL-MCNC: 7.4 MG/DL (ref 8.7–10.5)
CALCIUM SERPL-MCNC: 7.7 MG/DL (ref 8.7–10.5)
CHLORIDE SERPL-SCNC: 111 MMOL/L (ref 95–110)
CHLORIDE SERPL-SCNC: 115 MMOL/L (ref 95–110)
CO2 SERPL-SCNC: 23 MMOL/L (ref 23–29)
CO2 SERPL-SCNC: 26 MMOL/L (ref 23–29)
CODING SYSTEM: NORMAL
CODING SYSTEM: NORMAL
CREAT SERPL-MCNC: 0.9 MG/DL (ref 0.5–1.4)
CREAT SERPL-MCNC: 1 MG/DL (ref 0.5–1.4)
CROSSMATCH INTERPRETATION: NORMAL
CROSSMATCH INTERPRETATION: NORMAL
CRP SERPL-MCNC: 268 MG/L (ref 0–8.2)
DIFFERENTIAL METHOD BLD: ABNORMAL
DIFFERENTIAL METHOD BLD: ABNORMAL
DISPENSE STATUS: NORMAL
DISPENSE STATUS: NORMAL
EOSINOPHIL # BLD AUTO: 0 K/UL (ref 0–0.5)
EOSINOPHIL # BLD AUTO: 0 K/UL (ref 0–0.5)
EOSINOPHIL NFR BLD: 0.2 % (ref 0–8)
EOSINOPHIL NFR BLD: 0.3 % (ref 0–8)
ERYTHROCYTE [DISTWIDTH] IN BLOOD BY AUTOMATED COUNT: 24.4 % (ref 11.5–14.5)
ERYTHROCYTE [DISTWIDTH] IN BLOOD BY AUTOMATED COUNT: 26.9 % (ref 11.5–14.5)
EST. GFR  (NO RACE VARIABLE): >60 ML/MIN/1.73 M^2
EST. GFR  (NO RACE VARIABLE): >60 ML/MIN/1.73 M^2
GLUCOSE SERPL-MCNC: 107 MG/DL (ref 70–110)
GLUCOSE SERPL-MCNC: 54 MG/DL (ref 70–110)
HCT VFR BLD AUTO: 20.1 % (ref 37–48.5)
HCT VFR BLD AUTO: 20.7 % (ref 37–48.5)
HCT VFR BLD AUTO: 29.2 % (ref 37–48.5)
HGB BLD-MCNC: 6.7 G/DL (ref 12–16)
HGB BLD-MCNC: 6.9 G/DL (ref 12–16)
HGB BLD-MCNC: 9.9 G/DL (ref 12–16)
HYPOCHROMIA BLD QL SMEAR: ABNORMAL
IMM GRANULOCYTES # BLD AUTO: 0.03 K/UL (ref 0–0.04)
IMM GRANULOCYTES # BLD AUTO: 0.17 K/UL (ref 0–0.04)
IMM GRANULOCYTES NFR BLD AUTO: 0.3 % (ref 0–0.5)
IMM GRANULOCYTES NFR BLD AUTO: 1.8 % (ref 0–0.5)
LYMPHOCYTES # BLD AUTO: 1.4 K/UL (ref 1–4.8)
LYMPHOCYTES # BLD AUTO: 1.9 K/UL (ref 1–4.8)
LYMPHOCYTES NFR BLD: 15.1 % (ref 18–48)
LYMPHOCYTES NFR BLD: 20.5 % (ref 18–48)
MAGNESIUM SERPL-MCNC: 1.9 MG/DL (ref 1.6–2.6)
MAGNESIUM SERPL-MCNC: 1.9 MG/DL (ref 1.6–2.6)
MCH RBC QN AUTO: 23.3 PG (ref 27–31)
MCH RBC QN AUTO: 25 PG (ref 27–31)
MCHC RBC AUTO-ENTMCNC: 33.3 G/DL (ref 32–36)
MCHC RBC AUTO-ENTMCNC: 33.9 G/DL (ref 32–36)
MCV RBC AUTO: 70 FL (ref 82–98)
MCV RBC AUTO: 74 FL (ref 82–98)
MONOCYTES # BLD AUTO: 0.4 K/UL (ref 0.3–1)
MONOCYTES # BLD AUTO: 0.5 K/UL (ref 0.3–1)
MONOCYTES NFR BLD: 4.2 % (ref 4–15)
MONOCYTES NFR BLD: 4.8 % (ref 4–15)
NEUTROPHILS # BLD AUTO: 6.9 K/UL (ref 1.8–7.7)
NEUTROPHILS # BLD AUTO: 7.2 K/UL (ref 1.8–7.7)
NEUTROPHILS NFR BLD: 73.9 % (ref 38–73)
NEUTROPHILS NFR BLD: 78.5 % (ref 38–73)
NRBC BLD-RTO: 1 /100 WBC
NRBC BLD-RTO: 2 /100 WBC
OVALOCYTES BLD QL SMEAR: ABNORMAL
PLATELET # BLD AUTO: 134 K/UL (ref 150–450)
PLATELET # BLD AUTO: 139 K/UL (ref 150–450)
PLATELET BLD QL SMEAR: ABNORMAL
PMV BLD AUTO: ABNORMAL FL (ref 9.2–12.9)
PMV BLD AUTO: ABNORMAL FL (ref 9.2–12.9)
POCT GLUCOSE: 106 MG/DL (ref 70–110)
POCT GLUCOSE: 119 MG/DL (ref 70–110)
POCT GLUCOSE: 119 MG/DL (ref 70–110)
POCT GLUCOSE: 59 MG/DL (ref 70–110)
POIKILOCYTOSIS BLD QL SMEAR: SLIGHT
POLYCHROMASIA BLD QL SMEAR: ABNORMAL
POTASSIUM SERPL-SCNC: 3 MMOL/L (ref 3.5–5.1)
POTASSIUM SERPL-SCNC: 3.1 MMOL/L (ref 3.5–5.1)
PROT SERPL-MCNC: 4.8 G/DL (ref 6–8.4)
PROT SERPL-MCNC: 5.1 G/DL (ref 6–8.4)
RBC # BLD AUTO: 2.87 M/UL (ref 4–5.4)
RBC # BLD AUTO: 3.96 M/UL (ref 4–5.4)
SODIUM SERPL-SCNC: 150 MMOL/L (ref 136–145)
SODIUM SERPL-SCNC: 150 MMOL/L (ref 136–145)
SPECIMEN OUTDATE: NORMAL
TARGETS BLD QL SMEAR: ABNORMAL
TRANS ERYTHROCYTES VOL PATIENT: NORMAL ML
TRANS ERYTHROCYTES VOL PATIENT: NORMAL ML
WBC # BLD AUTO: 9.2 K/UL (ref 3.9–12.7)
WBC # BLD AUTO: 9.28 K/UL (ref 3.9–12.7)

## 2025-01-09 PROCEDURE — 87493 C DIFF AMPLIFIED PROBE: CPT | Performed by: HOSPITALIST

## 2025-01-09 PROCEDURE — 51798 US URINE CAPACITY MEASURE: CPT

## 2025-01-09 PROCEDURE — 25000003 PHARM REV CODE 250

## 2025-01-09 PROCEDURE — 86140 C-REACTIVE PROTEIN: CPT | Performed by: STUDENT IN AN ORGANIZED HEALTH CARE EDUCATION/TRAINING PROGRAM

## 2025-01-09 PROCEDURE — P9021 RED BLOOD CELLS UNIT: HCPCS | Performed by: INTERNAL MEDICINE

## 2025-01-09 PROCEDURE — 25500020 PHARM REV CODE 255

## 2025-01-09 PROCEDURE — 11000001 HC ACUTE MED/SURG PRIVATE ROOM

## 2025-01-09 PROCEDURE — 83735 ASSAY OF MAGNESIUM: CPT

## 2025-01-09 PROCEDURE — 25000003 PHARM REV CODE 250: Performed by: INTERNAL MEDICINE

## 2025-01-09 PROCEDURE — 80053 COMPREHEN METABOLIC PANEL: CPT

## 2025-01-09 PROCEDURE — 25000003 PHARM REV CODE 250: Performed by: HOSPITALIST

## 2025-01-09 PROCEDURE — 85025 COMPLETE CBC W/AUTO DIFF WBC: CPT

## 2025-01-09 PROCEDURE — 85018 HEMOGLOBIN: CPT | Performed by: INTERNAL MEDICINE

## 2025-01-09 PROCEDURE — 83735 ASSAY OF MAGNESIUM: CPT | Mod: 91 | Performed by: INTERNAL MEDICINE

## 2025-01-09 PROCEDURE — 80053 COMPREHEN METABOLIC PANEL: CPT | Mod: 91 | Performed by: INTERNAL MEDICINE

## 2025-01-09 PROCEDURE — 36415 COLL VENOUS BLD VENIPUNCTURE: CPT | Performed by: STUDENT IN AN ORGANIZED HEALTH CARE EDUCATION/TRAINING PROGRAM

## 2025-01-09 PROCEDURE — 86920 COMPATIBILITY TEST SPIN: CPT | Performed by: INTERNAL MEDICINE

## 2025-01-09 PROCEDURE — 36430 TRANSFUSION BLD/BLD COMPNT: CPT

## 2025-01-09 PROCEDURE — 30233N1 TRANSFUSION OF NONAUTOLOGOUS RED BLOOD CELLS INTO PERIPHERAL VEIN, PERCUTANEOUS APPROACH: ICD-10-PCS

## 2025-01-09 PROCEDURE — 87324 CLOSTRIDIUM AG IA: CPT | Performed by: HOSPITALIST

## 2025-01-09 PROCEDURE — 85025 COMPLETE CBC W/AUTO DIFF WBC: CPT | Mod: 91 | Performed by: INTERNAL MEDICINE

## 2025-01-09 PROCEDURE — 63600175 PHARM REV CODE 636 W HCPCS

## 2025-01-09 PROCEDURE — 36415 COLL VENOUS BLD VENIPUNCTURE: CPT | Performed by: INTERNAL MEDICINE

## 2025-01-09 PROCEDURE — 85014 HEMATOCRIT: CPT | Performed by: INTERNAL MEDICINE

## 2025-01-09 PROCEDURE — 27000207 HC ISOLATION

## 2025-01-09 PROCEDURE — 21400001 HC TELEMETRY ROOM

## 2025-01-09 PROCEDURE — 63600175 PHARM REV CODE 636 W HCPCS: Performed by: INTERNAL MEDICINE

## 2025-01-09 PROCEDURE — 86901 BLOOD TYPING SEROLOGIC RH(D): CPT | Performed by: INTERNAL MEDICINE

## 2025-01-09 RX ORDER — METOLAZONE 2.5 MG/1
5 TABLET ORAL DAILY
Status: DISCONTINUED | OUTPATIENT
Start: 2025-01-09 | End: 2025-01-10

## 2025-01-09 RX ORDER — DEXTROSE MONOHYDRATE 50 MG/ML
INJECTION, SOLUTION INTRAVENOUS CONTINUOUS
Status: DISCONTINUED | OUTPATIENT
Start: 2025-01-09 | End: 2025-01-11

## 2025-01-09 RX ORDER — METRONIDAZOLE 500 MG/100ML
500 INJECTION, SOLUTION INTRAVENOUS
Status: DISCONTINUED | OUTPATIENT
Start: 2025-01-09 | End: 2025-01-11

## 2025-01-09 RX ORDER — HYDROCODONE BITARTRATE AND ACETAMINOPHEN 500; 5 MG/1; MG/1
TABLET ORAL
Status: DISCONTINUED | OUTPATIENT
Start: 2025-01-09 | End: 2025-01-18 | Stop reason: HOSPADM

## 2025-01-09 RX ADMIN — METOLAZONE 5 MG: 2.5 TABLET ORAL at 05:01

## 2025-01-09 RX ADMIN — METRONIDAZOLE 500 MG: 500 INJECTION, SOLUTION INTRAVENOUS at 11:01

## 2025-01-09 RX ADMIN — WHITE PETROLATUM: 1.75 OINTMENT TOPICAL at 08:01

## 2025-01-09 RX ADMIN — ONDANSETRON 4 MG: 2 INJECTION INTRAMUSCULAR; INTRAVENOUS at 09:01

## 2025-01-09 RX ADMIN — MICONAZOLE NITRATE 2 % TOPICAL POWDER: at 08:01

## 2025-01-09 RX ADMIN — VANCOMYCIN HYDROCHLORIDE 500 MG: KIT at 11:01

## 2025-01-09 RX ADMIN — ONDANSETRON 4 MG: 2 INJECTION INTRAMUSCULAR; INTRAVENOUS at 01:01

## 2025-01-09 RX ADMIN — Medication 16 G: at 08:01

## 2025-01-09 RX ADMIN — THERA TABS 1 TABLET: TAB at 08:01

## 2025-01-09 RX ADMIN — POTASSIUM BICARBONATE 25 MEQ: 978 TABLET, EFFERVESCENT ORAL at 09:01

## 2025-01-09 RX ADMIN — SODIUM CHLORIDE 1 G: 9 INJECTION, SOLUTION INTRAVENOUS at 08:01

## 2025-01-09 RX ADMIN — OXYCODONE AND ACETAMINOPHEN 1 TABLET: 7.5; 325 TABLET ORAL at 01:01

## 2025-01-09 RX ADMIN — ASPIRIN 81 MG CHEWABLE TABLET 81 MG: 81 TABLET CHEWABLE at 08:01

## 2025-01-09 RX ADMIN — LOPERAMIDE HYDROCHLORIDE 2 MG: 2 CAPSULE ORAL at 09:01

## 2025-01-09 RX ADMIN — ONDANSETRON 4 MG: 2 INJECTION INTRAMUSCULAR; INTRAVENOUS at 05:01

## 2025-01-09 RX ADMIN — POTASSIUM BICARBONATE 25 MEQ: 978 TABLET, EFFERVESCENT ORAL at 01:01

## 2025-01-09 RX ADMIN — METRONIDAZOLE 500 MG: 500 INJECTION, SOLUTION INTRAVENOUS at 05:01

## 2025-01-09 RX ADMIN — VANCOMYCIN HYDROCHLORIDE 500 MG: KIT at 05:01

## 2025-01-09 RX ADMIN — OXYCODONE AND ACETAMINOPHEN 1 TABLET: 7.5; 325 TABLET ORAL at 05:01

## 2025-01-09 RX ADMIN — DEXTROSE MONOHYDRATE: 5 INJECTION, SOLUTION INTRAVENOUS at 12:01

## 2025-01-09 NOTE — CARE UPDATE
"RAPID RESPONSE NURSE PROACTIVE ROUNDING NOTE       Time of Visit:     Admit Date: 2024  LOS: 8  Code Status: Full Code   Date of Visit: 2025  : 1965  Age: 59 y.o.  Sex: female  Race: Black or   Bed: 6060 A:   MRN: [id number]  Was the patient discharged from an ICU this admission? No   Was the patient discharged from a PACU within last 24 hours? No   Did the patient receive conscious sedation/general anesthesia in last 24 hours? No  Was the patient in the ED within the past 24 hours? No  Was the patient on NIPPV within the past 24 hours? No   Attending Physician: Valerio Jones MD  Primary Service: [facility] MED    Time spent at the bedside: 15 -30 min    SITUATION    Notified by previous RRN during handoff.  Reason for alert: Hypotension  Called to evaluate the patient for Circulatory.    BACKGROUND     Why is the patient in the hospital?: Diverticulitis    Patient has a past medical history of Diabetes mellitus type I, Hyperlipidemia, Hypertension, and Right sided weakness.    Last Vitals:  Temp: 97.6 °F (36.4 °C) (2010)  Pulse: 84 (2152)  Resp: 16 (2010)  BP: 90/61 (2222)  SpO2: 99 % (2152)    24 Hours Vitals Range:  Temp:  [96.9 °F (36.1 °C)-98.6 °F (37 °C)]   Pulse:  [75-88]   Resp:  [16-18]   BP: ()/(50-69)   SpO2:  [84 %-99 %]     Labs:  Recent Labs     25  0231 25  0815 25  1602   WBC 11.55 15.05* 13.01*   HGB 9.4* 9.3* 10.1*   HCT 28.2* 26.3* 28.6*    240 211       Recent Labs     25  1445 25  0231 25  0815   * 147* 150*   K 3.3* 3.2* 4.4   * 111* 114*   CO2 27 24 23   BUN 17 19 21   CREATININE 0.9 0.9 1.0   * 120* 106   MG  --   --  2.1        No results for input(s): "PH", "PCO2", "PO2", "HCO3", "POCSATURATED", "BE" in the last 72 hours.     ASSESSMENT     Assessed patient s/p 1L NS bolus for hypotension on prev shift. BP found to be soft with MAP 64-70. Other VSS. "      Physical Exam  Constitutional:       General: She is not in acute distress.     Appearance: She is ill-appearing.   Cardiovascular:      Rate and Rhythm: Normal rate and regular rhythm.   Pulmonary:      Effort: Pulmonary effort is normal. No respiratory distress.      Breath sounds: Rales present.   Abdominal:      General: There is no distension.   Musculoskeletal:         General: Swelling present.      Right lower leg: No edema.      Left lower leg: No edema.      Comments: +3 edema to UE bilaterally     Skin:     General: Skin is warm and dry.      Capillary Refill: Capillary refill takes 2 to 3 seconds.   Neurological:      Mental Status: Mental status is at baseline.         INTERVENTIONS    MD Jacobs notified, BNP, LA, and Albumin ordered. BNP and LA WNL.    RECOMMENDATIONS    We recommend: Continuous telemetry monitoring, Maintain IV access, and Strict I/O. BP remains soft, notify RRT/primary team with MAPs <65.     PROVIDER ESCALATION    Yes/No  Yes    Orders received and case discussed with Dr. Jacobs .    Disposition: Remain in room 608.    FOLLOW-UP    Bedside Tana MARTINEZ  updated on plan of care. Instructed to call the Rapid Response Nurse, Alexandro Cody RN at [phone] for additional questions or concerns.

## 2025-01-09 NOTE — SUBJECTIVE & OBJECTIVE
Interval History: Continued lethargy today (CT head and EEG negative thus far), diuresed given bilateral UE edema and CXR with central vascular congestion. Labs collection complicated by edema and challenging vascular access.     Reported BM with clots (new finding today) prompted stat CTA AP and CBC (stable Hgb). CRS re-consulted. Hypotensive to MAP 57, given 1L IVF. Blood cultures ordered. Already on Ertapenem for ESBL UTI. [place] notified.     Review of Systems   Unable to perform ROS: Mental status change     Objective:     Vital Signs (Most Recent):  Temp: 96.9 °F (36.1 °C) (01/08/25 1629)  Pulse: 75 (01/08/25 1629)  Resp: 16 (01/08/25 1117)  BP: (!) 73/50 (01/08/25 1629)  SpO2: (!) 93 % (01/08/25 1629) Vital Signs (24h Range):  Temp:  [96.9 °F (36.1 °C)-98.6 °F (37 °C)] 96.9 °F (36.1 °C)  Pulse:  [75-89] 75  Resp:  [16-18] 16  SpO2:  [84 %-94 %] 93 %  BP: ()/(50-69) 73/50     Weight: 56.4 kg (124 lb 5.4 oz)  Body mass index is 22.03 kg/m².    Intake/Output Summary (Last 24 hours) at 1/8/2025 1819  Last data filed at 1/8/2025 0400  Gross per 24 hour   Intake 450 ml   Output --   Net 450 ml         Physical Exam  Vitals and nursing note reviewed.   Constitutional:       General: She is not in acute distress.     Appearance: She is ill-appearing. She is not toxic-appearing or diaphoretic.      Comments: Chronically ill appearing, cachectic   HENT:      Head: Normocephalic and atraumatic.      Comments: Bilateral temporal wasting.     Nose: Nose normal.   Eyes:      General: No scleral icterus.     Extraocular Movements: Extraocular movements intact.      Conjunctiva/sclera: Conjunctivae normal.   Cardiovascular:      Rate and Rhythm: Normal rate and regular rhythm.   Pulmonary:      Effort: Pulmonary effort is normal. No respiratory distress.      Breath sounds: Decreased air movement present. No wheezing or rales.   Abdominal:      General: Abdomen is flat. There is no distension.      Palpations: Abdomen  is soft.      Tenderness: There is no abdominal tenderness. There is no guarding.   Musculoskeletal:      Cervical back: Normal range of motion.      Right lower leg: No edema.      Left lower leg: No edema.      Comments: Left hemiplegia, left UE and LE contracted/stiff. LUE edematous.     Skin:     General: Skin is warm and dry.      Comments: Multiple wounds photos in chart   Neurological:      Mental Status: Mental status is at baseline. She is lethargic.      Cranial Nerves: Dysarthria present.             Significant Labs: All pertinent labs within the past 24 hours have been reviewed.    Significant Imaging: I have reviewed all pertinent imaging results/findings within the past 24 hours.

## 2025-01-09 NOTE — PROGRESS NOTES
Progress Note:     60 y/o F, bed-bound, with multiple co-morbidities including HTN, DM and prior CVA with significant functional deficits including dysphagia with PEG tube for feeds, left hemiplagia. Dependent on support for ADLs - has help from daughter/granddaughter.    Admission to hospital on 12/30 with failure to thrive, UTI, diarrhea in the setting of sigmoid diverticulitis. She is limited historian at baseline.  Abdominal exam benign when first seen, with unremarkable labwork aside from hypernatremia (147).  Hospital course complicated shock liver with transaminitis, lethargy/encephalopathy, hypotension.    Patient with rising WBC/CRP, and reported blood in stool. CRS consulted to re-evaluate.    Patient unable to provide any history - she is obtunded and non-verbal currently.      Overnight Events: NAEO, hemodynamics improved with IVF, CTA without bleed, concern for possible pseudomembranous colitis, Cdiff in process, pt remains obtunded, belly soft     Vitals:    01/09/25 0338   BP:    Pulse: 72   Resp:    Temp:      Intake/Output - Last 3 Shifts         01/07 0700 01/08 0659 01/08 0700 01/09 0659 01/09 0700  01/10 0659    NG/      Total Intake(mL/kg) 450 (8)      Urine (mL/kg/hr)  1150 (0.8)     Stool  0     Total Output  1150     Net +450 -1150            Stool Occurrence 2 x 4 x             General: frail-appearing female, lethargic/obtunded  Resp: non-labored breathing   Abdomen: soft, non-distended. Unable to assess tenderness.  PEG tube in place, tube feeds actively running. Faint pfannenstiel scar noted  Rectal: low rectal tone, actively passing soft stool with streaks of maroon-colored blood. Some additonal stool emptied from rectal vault - no large-volume hematochezia noted.  Ext: contracted LUE/LLE    Labs:  Lab Results   Component Value Date    WBC 13.01 (H) 01/08/2025    HGB 10.1 (L) 01/08/2025    HCT 28.6 (L) 01/08/2025    MCV 67 (L) 01/08/2025     01/08/2025       CMP  Sodium    Date Value Ref Range Status   01/09/2025 150 (H) 136 - 145 mmol/L Final     Potassium   Date Value Ref Range Status   01/09/2025 3.0 (L) 3.5 - 5.1 mmol/L Final     Chloride   Date Value Ref Range Status   01/09/2025 115 (H) 95 - 110 mmol/L Final     CO2   Date Value Ref Range Status   01/09/2025 23 23 - 29 mmol/L Final     Glucose   Date Value Ref Range Status   01/09/2025 54 (L) 70 - 110 mg/dL Final     BUN   Date Value Ref Range Status   01/09/2025 21 8 - 23 mg/dL Final     Creatinine   Date Value Ref Range Status   01/09/2025 1.0 0.5 - 1.4 mg/dL Final     Calcium   Date Value Ref Range Status   01/09/2025 7.4 (L) 8.7 - 10.5 mg/dL Final     Total Protein   Date Value Ref Range Status   01/09/2025 4.8 (L) 6.0 - 8.4 g/dL Final     Albumin   Date Value Ref Range Status   01/09/2025 2.0 (L) 3.5 - 5.2 g/dL Final     Total Bilirubin   Date Value Ref Range Status   01/09/2025 2.2 (H) 0.1 - 1.0 mg/dL Final     Comment:     For infants and newborns, interpretation of results should be based  on gestational age, weight and in agreement with clinical  observations.    Premature Infant recommended reference ranges:  Up to 24 hours.............<8.0 mg/dL  Up to 48 hours............<12.0 mg/dL  3-5 days..................<15.0 mg/dL  6-29 days.................<15.0 mg/dL       Alkaline Phosphatase   Date Value Ref Range Status   01/09/2025 365 (H) 40 - 150 U/L Final     AST   Date Value Ref Range Status   01/09/2025 532 (H) 10 - 40 U/L Final     ALT   Date Value Ref Range Status   01/09/2025 252 (H) 10 - 44 U/L Final     Anion Gap   Date Value Ref Range Status   01/09/2025 12 8 - 16 mmol/L Final     eGFR   Date Value Ref Range Status   01/09/2025 >60.0 >60 mL/min/1.73 m^2 Final     Lab Results   Component Value Date    .0 (H) 01/09/2025         Recommendation:    -Continue IV abx  -FU cdiff result, if negative, would consult GI for consideration of endoscopy to evaluate for source of blood loss  -Trend CRP/WBC  -Would  consider initiating goals of care discussion with family given overall clinical picture   -Discussed with attending, Dr. Saba

## 2025-01-09 NOTE — PROGRESS NOTES
[facility] Medicine  Progress note    Team: [facility] MED R Tiana Lazar MD  Admit Date: 12/30/2024  Code status: Full Code    Principal Problem:  Diverticulitis    Interval hx:  Patient passing bloody stool and clots    PEx  Temp:  [94.5 °F (34.7 °C)-98 °F (36.7 °C)]   Pulse:  [65-86]   Resp:  [16-18]   BP: ()/(50-75)   SpO2:  [93 %-99 %]     Intake/Output Summary (Last 24 hours) at 1/9/2025 1425  Last data filed at 1/9/2025 1310  Gross per 24 hour   Intake --   Output 1850 ml   Net -1850 ml       General Appearance: no acute distress, WD WN  Heart: regular rate and rhythm, no heave  Respiratory: Normal respiratory effort, symmetric excursion, bilateral vesicular breath sounds   Abdomen: Soft, non-tender; bowel sounds active  Skin: intact, no rash, no ulcers  Neurologic:  No focal numbness or weakness  Mental status: Alert, oriented x 4, affect appropriate    Recent Labs   Lab 01/08/25  0815 01/08/25  1602 01/09/25  0646 01/09/25  1157   WBC 15.05* 13.01* 9.28  --    HGB 9.3* 10.1* 6.7* 6.9*   HCT 26.3* 28.6* 20.1* 20.7*    211 139*  --      Recent Labs   Lab 01/07/25 0231 01/08/25  0815 01/09/25  0646   * 150* 150*   K 3.2* 4.4 3.0*   * 114* 115*   CO2 24 23 23   BUN 19 21 21   CREATININE 0.9 1.0 1.0   * 106 54*   CALCIUM 7.1* 7.3* 7.4*   MG  --  2.1  --      Recent Labs   Lab 01/07/25 0231 01/08/25  0815 01/09/25  0646   ALKPHOS 338* 538* 365*   * 479* 252*   AST 2,031* 1,228* 532*   ALBUMIN 1.0* 0.9* 2.0*   PROT 4.5* 5.0* 4.8*   BILITOT 1.6* 2.0* 2.2*        Recent Labs   Lab 01/07/25  2134 01/08/25  0744 01/08/25  1614 01/08/25 2012 01/09/25  0839 01/09/25  1121   POCTGLUCOSE 168* 162* 101 114* 59* 106       Scheduled Meds:   aspirin  81 mg Per G Tube Daily    ertapenem (INVanz) IV (PEDS and ADULTS)  1 g Intravenous Q24H    gabapentin  250 mg Per G Tube Q8H    insulin glargine U-100  5 Units Subcutaneous Daily    metroNIDAZOLE IV (PEDS and ADULTS)  500 mg Intravenous Q8H     miconazole NITRATE 2 %   Topical (Top) BID    multivitamin  1 tablet Per G Tube Daily    ondansetron  4 mg Intravenous Q8H    potassium bicarbonate  25 mEq Oral TID    traZODone  25 mg Per G Tube QHS    vancomycin  500 mg Oral Q6H    white petrolatum   Topical (Top) BID     Continuous Infusions:   D5W   Intravenous Continuous 100 mL/hr at 01/09/25 1242 New Bag at 01/09/25 1242     As Needed:    Current Facility-Administered Medications:     0.9%  NaCl infusion (for blood administration), , Intravenous, Q24H PRN    albuterol, 2 puff, Inhalation, Q6H PRN **AND** MDI Q6H PRN, , , Q6H PRN    dextrose 50%, 12.5 g, Intravenous, PRN    dextrose 50%, 25 g, Intravenous, PRN    glucagon (human recombinant), 1 mg, Intramuscular, PRN    glucose, 16 g, Per G Tube, PRN    glucose, 24 g, Per G Tube, PRN    insulin aspart U-100, 0-10 Units, Subcutaneous, QID (AC + HS) PRN    loperamide, 2 mg, Per G Tube, QID PRN    melatonin, 6 mg, Per G Tube, Nightly PRN    naloxone, 0.02 mg, Intravenous, PRN    oxyCODONE-acetaminophen, 1 tablet, Per G Tube, Q4H PRN    sodium chloride 0.9%, 10 mL, Intravenous, PRN    sodium chloride 0.9%, 10 mL, Intravenous, Q12H PRN    Assessment and Plan  / Problems managed today    * Diverticulitis  - CT with diverticulitis with contained perforation  - VSS without leukocytosis  ;  non-surgical abdomen on exam  - maintain npo for now  - CRS consult in am   (stat consult if clinical decompensation or concerning abdominal symptoms/signs)  - continue Meropenem  - ID consulted ; appreciate recs  - further management pending clinical course and future study review    12/31  CT abdomen - racute diverticulitis of the mid sigmoid colon with adjacent suspected contained perforation.  No associated rim enhancement to suggest drainable abscess at this time.  No bowel obstruction. Apparent circumferential wall thickening of the distal rectum/anus which could be related to underdistention versus nonspecific proctitis.   No significant perirectal inflammatory change or evidence perirectal or perianal drainable abscess.  Left more than right basilar patchy nonspecific pulmonary mosaic attenuation with bronchial wall thickening pulmonary edema or small airways process / right basilar patchy clusters of small nodules suggesting infectious or inflammatory small airways process versus aspiration.  No large consolidation. s/p CRS eval - hemodynamically normal with benign abdominal exam, and reassuring labwork. No acute surgical intervention indicated. continue NPO status, with IVF hydration and IV meropenem.  trend CRP 59  C.diff assay pending. s/p CRS eval - hemodynamically normal with benign abdominal exam, and reassuring labwork. No acute surgical intervention indicated.    - Ertapenem per ID recs EED 01/09/25  - New BM with clots prompting stat CBC (stable Hgb) and stat CTA to evaluate for bleed vs. intra-abdominal infective source  - Hypotension resuscitation with 1L LR bolus. Attempt at judicious resuscitation given edematous CXR on 01/07. While pt is intravascularly dry appears hypervolemic (BL UE edema, rales) on exam.   - f/u blood cultures (difficult stick per phlebotomy)    Acute hypoxemic respiratory failure  Patient with Hypoxic Respiratory failure which is Acute.  she is not on home oxygen. Supplemental oxygen was provided and noted-      .   Signs/symptoms of respiratory failure include- increased work of breathing and lethargy. Contributing diagnoses includes - Pleural effusion Labs and images were reviewed. Patient Has not had a recent ABG. Will treat underlying causes and adjust management of respiratory failure as follows-     - XR on 01/08 with evidence of hypervolemia--Lasix 40mg IV qd on 01/07-01/08  - metolazone 5 mg daily as wit hyponatremia    LFT elevation  Without inciting event on labs 01/04. No evidence of hypervolemia on CXR, no increased O2 requirement, no abdominal palpation. Shock liver a consideration  "given hypotension with MAP around 65 that morning.    - US Liver with doppler negative for acute abormality  - continue to monitor with daily labs, avoid hepatotoxic medications  - improving      Atelectasis of both lungs  I have personally reviewed Chest X-ray. Patient with atelectasis on interpretation. Likely Non-Obstructive atelectasis secondary to pleural effusion. Signs and symptoms include Shortness of breath and Hypoxemia Will begin treatment with upright positioning.    Hypophosphatemia  Patient's most recent phosphorus results are listed below.   No results for input(s): "PHOS" in the last 72 hours.    Plan  - Will treat hypophosphatemia with prn supplementation  - Patient's hypophosphatemia is stable    Hypokalemia  Patient's most recent potassium results are listed below.   Recent Labs     01/06/25  1445 01/07/25  0231 01/08/25  0815   K 3.3* 3.2* 4.4       Plan  - Replete potassium per protocol  - Monitor potassium Daily  - Patient's hypokalemia is improving    Hypernatremia  Hypernatremia is likely due to Dehydration. monitor with hydration   Recent Labs     01/06/25  1445 01/07/25  0231 01/08/25  0815   * 147* 150*      S/p D5W infusion       Dysphagia  - sp PEG for nutrition and meds  ;  okay for comfort po feeds per recent SLP recs  - NPO as above at this time  - IVFs  - advance TF as possible, but hold TF pending CTA results      UTI (urinary tract infection)  - recent outside culture with ESBL  - start meropenem  - ID consulted  - follow up cultures  - further management pending clinical course    12/31  continue IV meropenem.  trend CRP 59  C.diff assay pending.    1/1 UC GNRs.ID eval - continue meropenem. If no Pseudomonas isolated, okay to de-escalate to ertapenem.    - Ertapenem per ID recs EED 01/09/25    Multiple wounds of skin  - Wound care consulted  - turn q2  - waffle mattress overlay  - clean and dressed        History of stroke with residual effects  - chronic left hemiplegia, " bed bound, sp PEG though able to tolerate po comfort feed  - continue home ASA and statin  - turn q2h      12/31 CT head -No acute intracranial process. Prominent involutional changes and chronic microvascular ischemic changes in the periventricular white matter.  Small areas of probable remote lacunar infarction in the bilateral basal ganglia and small probable chronic right parietal cortical infarct.     - Given ongoing lethargy repeat CT head negative for acute process  - EEG negative for epileptic activity    Insomnia  - home trazadone      Mixed hyperlipidemia  - home statin      Diet:  fluid restriction, Glucerna 50 mL/h  GI PPx: not needed  DVT PPx:  SCD/MIGUEL ANGEL  Airways: 2L oxygen  Wounds: left proximal arm     Goals of Care:  Return to prior functional status     Discharge Planning   ANA: 1/11/2025   Is the patient medically ready for discharge?:     Reason for patient still in hospital (select all that apply): Patient trending condition and Treatment  Discharge Plan A: Home, Home with family, Home Health, Other (PCA services with [facility].)        Tiana Lazar MD

## 2025-01-09 NOTE — ASSESSMENT & PLAN NOTE
"- CT abdomen 12/31 with diverticulitis with contained perforation. No associated rim enhancement to suggest drainable abscess at this time.  - VSS without leukocytosis ;  non-surgical abdomen on exam  - CRS consult appreciated.   - Bloody clots starting 1/7 - CTA abdomen showed "Rectosigmoid proctocolitis, of numerous potential etiologies. Questionable cecitis as well. Correlate clinically, including for the possibility of early/mild pseudomembranous colitis."  - c diff antigen positive, c diff toxin, c diff toxin PCR positive  - started on metronidazole IV and vancomycin - ID consulted    - decreased vancomycin to 125 mg QID and stopped metronidazole.   - still with diarrhea. Currently has rectal tube.  - discussed with GI about on-going diarrhea requiring rectal tube   Can not use anti-diarrheals unless confirmed c. Diff negative   F/u stool cultures   Continue supportive care   If rectal tube is still required, may need to consult GI for further diarrhea management and may need LTAC if can not be removed  "

## 2025-01-09 NOTE — ASSESSMENT & PLAN NOTE
- chronic left hemiplegia, bed bound, sp PEG though able to tolerate po comfort feed  - continue home ASA and statin  - turn q2h      12/31 CT head -No acute intracranial process. Prominent involutional changes and chronic microvascular ischemic changes in the periventricular white matter.  Small areas of probable remote lacunar infarction in the bilateral basal ganglia and small probable chronic right parietal cortical infarct.     - Given ongoing lethargy repeat CT head negative for acute process  - EEG negative for epileptic activity

## 2025-01-09 NOTE — PROCEDURES
VIDEO ELECTROENCEPHALOGRAM  REPORT    DATE OF SERVICE:1/9/25  EEG NUMBER: FH -1  REQUESTED BY:  Dr Lazar  LOCATION OF SERVICE:  [facility] [place]    METHODOLOGY   Electroencephalographic (EEG) recording is with electrodes placed according to the International 10-20 placement system.  Thirty two (32) channels of digital signal (sampling rate of 512/sec) including T1 and T2 was simultaneously recorded from the scalp and may include  EKG, EMG, and/or eye monitors.  Recording band pass was 0.1 to 512 hz.  Digital video recording of the patient is simultaneously recorded with the EEG.  The patient is instructed report clinical symptoms which may occur during the recording session.  EEG and video recording is stored and archived in digital format.  Activation procedures which include photic stimulation, hyperventilation and instructing patients to perform simple task are done in selected patients.   The EEG is displayed on a monitor screen and can be reviewed using different montages.  Computer assisted analysis is employed to detect spike and electrographic seizure activity.   The entire record is submitted for computer analysis.  The entire recording is visually reviewed and the times identified by computer analysis as being spikes or seizures are reviewed again.  Compresses spectral analysis (CSA) is also performed on the activity recorded from each individual channel.  This is displayed as a power display of frequencies from 0 to 30 Hz over time.   The CSA is reviewed looking for asymmetries in power between homologous areas of the scalp and then compared with the original EEG recording.     [employer] (FIG) software was also utilized in the review of this study.  This software suite analyzes the EEG recording in multiple domains.  Coherence and rhythmicity is computed to identify EEG sections which may contain organized seizures.  Each channel undergoes analysis to detect presence of spike and sharp waves which have special and  morphological characteristic of epileptic activity.  The routine EEG recording is converted from spacial into frequency domain.  This is then displayed comparing homologous areas to identify areas of significant asymmetry.  Algorithm to identify non-cortically generated artifact is used to separate eye movement, EMG and other artifact from the EEG.      ELECTROENCEPHALOGRAM:    RECORDING TIMES:  Start on 1/8/25 at 09:04 for 11 hours 39 minutes    Indication: 59-year-old female, history of a stroke with left hemiplegia, bed-bound, diabetes, hypertension, recurrent infections, dysphagia sp PEG, multiple skin wounds, multiple recent admissions in the last several months in the [facility] system, most recently last week for UTI, discharged with Keflex, living at home with her daughter, now brought in for diarrhea.     State of Consciousness:   Awake and asleep    Background:   The background is asymmetric and continuous.   Background over the left hemisphere consists of intermixed delta/theta activity reaching up to 6-7 hz.    Background over the right hemisphere consists of mostly delta activity with some overriding theta frequencies with notably lower amplitude of activity.      Sleep:   Transition to sleep is characterized by asymmetric sleep spindles and k complexes.    Epileptiform Abnormalities  None    Seizures/Events:   None    EKG:   Regular rate and rhythm on single lead EKG    Activating procedures:   Hyperventilation and photic stimulation are not performed     Impression:   Abnormal study consistent with a right hemispheric structural lesion (patient's known prior right hemispheric CVA) and a moderate diffuse encephalopathy. No epileptiform discharges are seen.     Trisha Lira MD  [facility]   Department of Neurology/Epilepsy

## 2025-01-09 NOTE — PLAN OF CARE
Problem: Skin Injury Risk Increased  Goal: Skin Health and Integrity  Outcome: Not Progressing     Problem: Infection  Goal: Absence of Infection Signs and Symptoms  Outcome: Not Progressing     Problem: Adult Inpatient Plan of Care  Goal: Plan of Care Review  Outcome: Not Progressing  Goal: Patient-Specific Goal (Individualized)  Outcome: Not Progressing  Goal: Absence of Hospital-Acquired Illness or Injury  Outcome: Not Progressing  Goal: Optimal Comfort and Wellbeing  Outcome: Not Progressing  Goal: Readiness for Transition of Care  Outcome: Not Progressing     Problem: Diabetes Comorbidity  Goal: Blood Glucose Level Within Targeted Range  Outcome: Not Progressing     Problem: Acute Kidney Injury/Impairment  Goal: Fluid and Electrolyte Balance  Outcome: Not Progressing  Goal: Improved Oral Intake  Outcome: Not Progressing  Goal: Effective Renal Function  Outcome: Not Progressing     Problem: Wound  Goal: Optimal Coping  Outcome: Not Progressing  Goal: Optimal Functional Ability  Outcome: Not Progressing  Goal: Absence of Infection Signs and Symptoms  Outcome: Not Progressing  Goal: Improved Oral Intake  Outcome: Not Progressing  Goal: Optimal Pain Control and Function  Outcome: Not Progressing  Goal: Skin Health and Integrity  Outcome: Not Progressing  Goal: Optimal Wound Healing  Outcome: Not Progressing     Problem: Fall Injury Risk  Goal: Absence of Fall and Fall-Related Injury  Outcome: Not Progressing     Problem: Enteral Nutrition  Goal: Absence of Aspiration Signs and Symptoms  Outcome: Not Progressing  Goal: Safe, Effective Therapy Delivery  Outcome: Not Progressing  Goal: Feeding Tolerance  Outcome: Not Progressing

## 2025-01-09 NOTE — ASSESSMENT & PLAN NOTE
Patient's most recent potassium results are listed below.   Recent Labs     01/06/25  1445 01/07/25  0231 01/08/25  0815   K 3.3* 3.2* 4.4       Plan  - Replete potassium per protocol  - Monitor potassium Daily  - Patient's hypokalemia is improving

## 2025-01-09 NOTE — PROGRESS NOTES
[facility] includes the [facility] Medicine  Progress Note    Patient Name: Emily Martinez  MRN: [id number]  Patient Class: IP- Inpatient   Admission Date: 12/30/2024  Length of Stay: 8 days  Attending Physician: Valerio Jones MD  Primary Care Provider: Alireza Sosa MD        Subjective     Principal Problem:Diverticulitis        HPI:  59-year-old female, history of a stroke with left hemiplegia, bed-bound, diabetes, hypertension, recurrent infections, dysphagia sp PEG, multiple skin wounds, multiple recent admissions in the last several months in the [facility] [place], most recently last week for UTI, discharged with Keflex, living at home with her daughter, now brought in for diarrhea.  Daughter states the patient has had diarrhea for about a week and a half.  She is having about 4-5 episodes a day, watery, nonbloody.  She has intermittently also vomiting.  Patient has a PEG tube for nutrition but per most recent SLP note can also take p.o. nutrition but daughter says she has had minimal p.o. intake because she has said she is not hungry.  No fevers noted.  Daughter is frustrated as she feels like when patient gets admitted, she is temporarily better and then very quickly becomes ill again.  All of their care has been in the [facility] [place] but they decided to come to [facility] today as they were hopeful that we would be able to get her better.       Of note, urine culture from 6 days ago is growing out ESBL E coli, greater than 100,000 colonies    In the ED patient afebrile and hemodynamically stable saturating well on room air at rest. No leukocytosis. UA still concerning for UTI and patient started on meropenem. CT abdomen performed and noted acute diverticulitis with area of concern for contained perforation. Patient without abdominal tenderness. Patient admitted to the [facility] for further evaluation and management.    Overview/Hospital Course:  12/31 CT head -No acute intracranial process. Prominent  involutional changes and chronic microvascular ischemic changes in the periventricular white matter.  Small areas of probable remote lacunar infarction in the bilateral basal ganglia and small probable chronic right parietal cortical infarct. CT abdomen - racute diverticulitis of the mid sigmoid colon with adjacent suspected contained perforation.  No associated rim enhancement to suggest drainable abscess at this time.  No bowel obstruction. Apparent circumferential wall thickening of the distal rectum/anus which could be related to underdistention versus nonspecific proctitis.  No significant perirectal inflammatory change or evidence perirectal or perianal drainable abscess.  Left more than right basilar patchy nonspecific pulmonary mosaic attenuation with bronchial wall thickening pulmonary edema or small airways process / right basilar patchy clusters of small nodules suggesting infectious or inflammatory small airways process versus aspiration.  No large consolidation. s/p CRS eval - hemodynamically normal with benign abdominal exam, and reassuring labwork. No acute surgical intervention indicated. continue NPO status, with IVF hydration and IV meropenem.  trend CRP 59-->66. CRS eval -  continue to trend CRP  if downtrending and passing flatus, okay to start on clears and downtitrate IVFC.  1/1 UC GNRs.  ID eval - continue meropenem. If no Pseudomonas isolated, okay to de-escalate to ertapenem. C diff assay pending.    Advancing TF per CRS recs to home regimen. De-escalated to ertapenem for 7-10day treatment course (EED 01/09/25) for diverticulitis and ESBL Ecoli cystitis. Not acute care at home candidate. Transaminitis with negative Liver US, suspect shock liver after hypotension on 01/04 given improvement in LFTs and US Liver negative for acute abnormality. Continued lethargy evaluated with CT head and EEG which were negative for acute findings. Reported BM with clots and worsening hypotension prompted Hgb  check (stable), CTA AP to evaluate for lower GIB and/or intra-abdominal infective source, IVF bolus and CRS re-consult, blood cultures re-ordered, but pt already on Ertapenem.     Interval History: Continued lethargy today (CT head and EEG negative thus far), diuresed given bilateral UE edema and CXR with central vascular congestion. Labs collection complicated by edema and challenging vascular access.     Reported BM with clots (new finding today) prompted stat CTA AP and CBC (stable Hgb). CRS re-consulted. Hypotensive to MAP 57, given 1L IVF. Blood cultures ordered. Already on Ertapenem for ESBL UTI. [place] notified.     Review of Systems   Unable to perform ROS: Mental status change     Objective:     Vital Signs (Most Recent):  Temp: 96.9 °F (36.1 °C) (01/08/25 1629)  Pulse: 75 (01/08/25 1629)  Resp: 16 (01/08/25 1117)  BP: (!) 73/50 (01/08/25 1629)  SpO2: (!) 93 % (01/08/25 1629) Vital Signs (24h Range):  Temp:  [96.9 °F (36.1 °C)-98.6 °F (37 °C)] 96.9 °F (36.1 °C)  Pulse:  [75-89] 75  Resp:  [16-18] 16  SpO2:  [84 %-94 %] 93 %  BP: ()/(50-69) 73/50     Weight: 56.4 kg (124 lb 5.4 oz)  Body mass index is 22.03 kg/m².    Intake/Output Summary (Last 24 hours) at 1/8/2025 1819  Last data filed at 1/8/2025 0400  Gross per 24 hour   Intake 450 ml   Output --   Net 450 ml         Physical Exam  Vitals and nursing note reviewed.   Constitutional:       General: She is not in acute distress.     Appearance: She is ill-appearing. She is not toxic-appearing or diaphoretic.      Comments: Chronically ill appearing, cachectic   HENT:      Head: Normocephalic and atraumatic.      Comments: Bilateral temporal wasting.     Nose: Nose normal.   Eyes:      General: No scleral icterus.     Extraocular Movements: Extraocular movements intact.      Conjunctiva/sclera: Conjunctivae normal.   Cardiovascular:      Rate and Rhythm: Normal rate and regular rhythm.   Pulmonary:      Effort: Pulmonary effort is normal. No  respiratory distress.      Breath sounds: Decreased air movement present. No wheezing or rales.   Abdominal:      General: Abdomen is flat. There is no distension.      Palpations: Abdomen is soft.      Tenderness: There is no abdominal tenderness. There is no guarding.   Musculoskeletal:      Cervical back: Normal range of motion.      Right lower leg: No edema.      Left lower leg: No edema.      Comments: Left hemiplegia, left UE and LE contracted/stiff. LUE edematous.     Skin:     General: Skin is warm and dry.      Comments: Multiple wounds photos in chart   Neurological:      Mental Status: Mental status is at baseline. She is lethargic.      Cranial Nerves: Dysarthria present.             Significant Labs: All pertinent labs within the past 24 hours have been reviewed.    Significant Imaging: I have reviewed all pertinent imaging results/findings within the past 24 hours.    Assessment and Plan     * Diverticulitis  - CT with diverticulitis with contained perforation  - VSS without leukocytosis  ;  non-surgical abdomen on exam  - maintain npo for now  - CRS consult in am   (stat consult if clinical decompensation or concerning abdominal symptoms/signs)  - continue Meropenem  - ID consulted ; appreciate recs  - further management pending clinical course and future study review    12/31  CT abdomen - racute diverticulitis of the mid sigmoid colon with adjacent suspected contained perforation.  No associated rim enhancement to suggest drainable abscess at this time.  No bowel obstruction. Apparent circumferential wall thickening of the distal rectum/anus which could be related to underdistention versus nonspecific proctitis.  No significant perirectal inflammatory change or evidence perirectal or perianal drainable abscess.  Left more than right basilar patchy nonspecific pulmonary mosaic attenuation with bronchial wall thickening pulmonary edema or small airways process / right basilar patchy clusters of small  nodules suggesting infectious or inflammatory small airways process versus aspiration.  No large consolidation. s/p CRS eval - hemodynamically normal with benign abdominal exam, and reassuring labwork. No acute surgical intervention indicated. continue NPO status, with IVF hydration and IV meropenem.  trend CRP 59  C.diff assay pending. s/p CRS eval - hemodynamically normal with benign abdominal exam, and reassuring labwork. No acute surgical intervention indicated.    - Ertapenem per ID recs EED 01/09/25  - New BM with clots prompting stat CBC (stable Hgb) and stat CTA to evaluate for bleed vs. intra-abdominal infective source  - Hypotension resuscitation with 1L LR bolus. Attempt at judicious resuscitation given edematous CXR on 01/07. While pt is intravascularly dry appears hypervolemic (BL UE edema, rales) on exam.   - f/u blood cultures (difficult stick per phlebotomy)    Acute hypoxemic respiratory failure  Patient with Hypoxic Respiratory failure which is Acute.  she is not on home oxygen. Supplemental oxygen was provided and noted-      .   Signs/symptoms of respiratory failure include- increased work of breathing and lethargy. Contributing diagnoses includes - Pleural effusion Labs and images were reviewed. Patient Has not had a recent ABG. Will treat underlying causes and adjust management of respiratory failure as follows-     - XR on 01/08 with evidence of hypervolemia--Lasix 40mg IV qd on 01/07-01/08    LFT elevation  Without inciting event on labs 01/04. No evidence of hypervolemia on CXR, no increased O2 requirement, no abdominal palpation. Shock liver a consideration given hypotension with MAP around 65 that morning.    - US Liver with doppler negative for acute abormality  - continue to monitor with daily labs, avoid hepatotoxic medications  - improving      Atelectasis of both lungs  I have personally reviewed Chest X-ray. Patient with atelectasis on interpretation. Likely Non-Obstructive  "atelectasis secondary to pleural effusion. Signs and symptoms include Shortness of breath and Hypoxemia Will begin treatment with upright positioning.    Hypophosphatemia  Patient's most recent phosphorus results are listed below.   No results for input(s): "PHOS" in the last 72 hours.    Plan  - Will treat hypophosphatemia with prn supplementation  - Patient's hypophosphatemia is stable    Hypokalemia  Patient's most recent potassium results are listed below.   Recent Labs     01/06/25  1445 01/07/25  0231 01/08/25  0815   K 3.3* 3.2* 4.4       Plan  - Replete potassium per protocol  - Monitor potassium Daily  - Patient's hypokalemia is improving    Hypernatremia  Hypernatremia is likely due to Dehydration. monitor with hydration   Recent Labs     01/06/25  1445 01/07/25  0231 01/08/25  0815   * 147* 150*      S/p D5W infusion       Dysphagia  - sp PEG for nutrition and meds  ;  okay for comfort po feeds per recent SLP recs  - NPO as above at this time  - IVFs  - advance TF as possible, but hold TF pending CTA results      UTI (urinary tract infection)  - recent outside culture with ESBL  - start meropenem  - ID consulted  - follow up cultures  - further management pending clinical course    12/31  continue IV meropenem.  trend CRP 59  C.diff assay pending.    1/1 UC GNRs.ID eval - continue meropenem. If no Pseudomonas isolated, okay to de-escalate to ertapenem.    - Ertapenem per ID recs EED 01/09/25    Multiple wounds of skin  - Wound care consulted  - turn q2  - waffle mattress overlay  - clean and dressed        History of stroke with residual effects  - chronic left hemiplegia, bed bound, sp PEG though able to tolerate po comfort feed  - continue home ASA and statin  - turn q2h      12/31 CT head -No acute intracranial process. Prominent involutional changes and chronic microvascular ischemic changes in the periventricular white matter.  Small areas of probable remote lacunar infarction in the bilateral " basal ganglia and small probable chronic right parietal cortical infarct.     - Given ongoing lethargy repeat CT head negative for acute process  - EEG negative for epileptic activity    Insomnia  - home trazadone      Mixed hyperlipidemia  - home statin        VTE Risk Mitigation (From admission, onward)           Ordered     heparin (porcine) injection 5,000 Units  Every 8 hours         12/30/24 1937     IP VTE HIGH RISK PATIENT  Once         12/30/24 1937     Place sequential compression device  Until discontinued         12/30/24 1937     Place sequential compression device  Until discontinued         12/30/24 1929                    Discharge Planning   ANA: 1/11/2025     Code Status: Full Code   Medical Readiness for Discharge Date:   Discharge Plan A: Home, Home with family, Home Health, Other (PCA services with [facility].)      Critical care time spent on the evaluation and treatment of severe organ dysfunction, review of pertinent labs and imaging studies, discussions with consulting providers and discussions with patient/family: 35 minutes.                   Valerio Joens MD  Department of Hospital Medicine   [facility]

## 2025-01-09 NOTE — PLAN OF CARE
[facility] - Med Surg  Discharge Reassessment    Primary Care Provider: Alireza Sosa MD    Expected Discharge Date: 1/11/2025    SW in communication with pt's daughter, Aneta [phone], regarding d/c plan.  Pt not medically ready for d/c at this time.  Pt has [facility] and [facility] PCA services.  Pt also has tube feeds.  SW will continue to follow.      Discharge Plan A and Plan B have been determined by review of patient's clinical status, future medical and therapeutic needs, and coverage/benefits for post-acute care in coordination with multidisciplinary team members.    Reassessment (most recent)       Discharge Reassessment - 01/09/25 1530          Discharge Reassessment    Assessment Type Discharge Planning Reassessment     Did the patient's condition or plan change since previous assessment? No     Discharge Plan discussed with: Adult children     Communicated ANA with patient/caregiver Yes     Discharge Plan A Home;Home with family;Home Health;Other   [facility]PCA Services    Discharge Plan B Home;Home with family;Other   PCA services    DME Needed Upon Discharge  none     Transition of Care Barriers None     Why the patient remains in the hospital Requires continued medical care        Post-Acute Status    Post-Acute Authorization Home Health;Other     Home Health Status Pending medical clearance/testing     Coverage [facility]     Other Status See Comments   PCA services.    Discharge Delays None known at this time                   Albina Santos LMSW  Part-Time-  [facility]  Ext. [phone]

## 2025-01-09 NOTE — ASSESSMENT & PLAN NOTE
Without inciting event on labs 01/04. No evidence of hypervolemia on CXR, no increased O2 requirement, no abdominal palpation. Shock liver a consideration given hypotension with MAP around 65 that morning.    - US Liver with doppler negative for acute abormality  - continue to monitor with daily labs, avoid hepatotoxic medications  - improving

## 2025-01-09 NOTE — ASSESSMENT & PLAN NOTE
- sp PEG for nutrition and meds  ;  okay for comfort po feeds per recent SLP recs  - NPO as above at this time  - IVFs  - advance TF as possible, but hold TF pending CTA results

## 2025-01-09 NOTE — CARE UPDATE
CTA abdomen resulted w/o signs of active bleeding, showed early Rectosigmoid proctocolitis, of numerous potential etiologies.  Questionable cecitis as well.  Correlate clinically, including for the possibility of early/mild pseudomembranous colitis. Will hold tube feed for now and check C.diff given bloody diarrhea.    Vicente Matt DO  Staff Physician, Hospital Medicine.

## 2025-01-09 NOTE — NURSING
Patient had 2 bloody stools with blood clots noted. Patient in pain when lifting arms and turning her. Blood pressure still hypotensive. Blood still transfusing.

## 2025-01-09 NOTE — ASSESSMENT & PLAN NOTE
Patient with Hypoxic Respiratory failure which is Acute.  she is not on home oxygen. Supplemental oxygen was provided and noted-      .   Signs/symptoms of respiratory failure include- increased work of breathing and lethargy. Contributing diagnoses includes - Pleural effusion Labs and images were reviewed. Patient Has not had a recent ABG. Will treat underlying causes and adjust management of respiratory failure as follows-     - XR on 01/08 with evidence of hypervolemia--Lasix 40mg IV qd on 01/07-01/08

## 2025-01-09 NOTE — ASSESSMENT & PLAN NOTE
- CT with diverticulitis with contained perforation  - VSS without leukocytosis  ;  non-surgical abdomen on exam  - maintain npo for now  - CRS consult in am   (stat consult if clinical decompensation or concerning abdominal symptoms/signs)  - continue Meropenem  - ID consulted ; appreciate recs  - further management pending clinical course and future study review    12/31  CT abdomen - racute diverticulitis of the mid sigmoid colon with adjacent suspected contained perforation.  No associated rim enhancement to suggest drainable abscess at this time.  No bowel obstruction. Apparent circumferential wall thickening of the distal rectum/anus which could be related to underdistention versus nonspecific proctitis.  No significant perirectal inflammatory change or evidence perirectal or perianal drainable abscess.  Left more than right basilar patchy nonspecific pulmonary mosaic attenuation with bronchial wall thickening pulmonary edema or small airways process / right basilar patchy clusters of small nodules suggesting infectious or inflammatory small airways process versus aspiration.  No large consolidation. s/p CRS eval - hemodynamically normal with benign abdominal exam, and reassuring labwork. No acute surgical intervention indicated. continue NPO status, with IVF hydration and IV meropenem.  trend CRP 59  C.diff assay pending. s/p CRS eval - hemodynamically normal with benign abdominal exam, and reassuring labwork. No acute surgical intervention indicated.    - Ertapenem per ID recs EED 01/09/25  - New BM with clots prompting stat CBC (stable Hgb) and stat CTA to evaluate for bleed vs. intra-abdominal infective source  - Hypotension resuscitation with 1L LR bolus. Attempt at judicious resuscitation given edematous CXR on 01/07. While pt is intravascularly dry appears hypervolemic (BL UE edema, rales) on exam.   - f/u blood cultures (difficult stick per phlebotomy)

## 2025-01-09 NOTE — ASSESSMENT & PLAN NOTE
Patient with Hypoxic Respiratory failure which is Acute.  she is not on home oxygen. Supplemental oxygen was provided and noted-      Signs/symptoms of respiratory failure include- increased work of breathing and lethargy. Contributing diagnoses includes - Pleural effusion Labs and images were reviewed. Patient Has not had a recent ABG. Will treat underlying causes and adjust management of respiratory failure as follows-     - XR on 01/08 with evidence of hypervolemia--Lasix 40mg IV qd on 01  - resolving as on RA at times  - aggressive pulmonary toileting

## 2025-01-09 NOTE — ASSESSMENT & PLAN NOTE
Hypernatremia is likely due to Dehydration. monitor with hydration   Recent Labs     01/06/25  1445 01/07/25  0231 01/08/25  0815   * 147* 150*      S/p D5W infusion

## 2025-01-10 PROBLEM — A04.72 C. DIFFICILE COLITIS: Status: ACTIVE | Noted: 2024-12-30

## 2025-01-10 PROBLEM — D62 ACUTE BLOOD LOSS ANEMIA: Status: ACTIVE | Noted: 2025-01-10

## 2025-01-10 PROBLEM — N39.0 UTI (URINARY TRACT INFECTION): Status: RESOLVED | Noted: 2024-12-30 | Resolved: 2025-01-10

## 2025-01-10 PROBLEM — I95.89 CHRONIC HYPOTENSION: Status: ACTIVE | Noted: 2025-01-10

## 2025-01-10 LAB
ALBUMIN SERPL BCP-MCNC: 1.9 G/DL (ref 3.5–5.2)
ALP SERPL-CCNC: 518 U/L (ref 40–150)
ALT SERPL W/O P-5'-P-CCNC: 233 U/L (ref 10–44)
ANION GAP SERPL CALC-SCNC: 16 MMOL/L (ref 8–16)
ANISOCYTOSIS BLD QL SMEAR: SLIGHT
AST SERPL-CCNC: 397 U/L (ref 10–40)
BACTERIA UR CULT: NORMAL
BASOPHILS # BLD AUTO: 0.07 K/UL (ref 0–0.2)
BASOPHILS NFR BLD: 0.7 % (ref 0–1.9)
BILIRUB SERPL-MCNC: 1.9 MG/DL (ref 0.1–1)
BUN SERPL-MCNC: 20 MG/DL (ref 8–23)
BURR CELLS BLD QL SMEAR: ABNORMAL
CALCIUM SERPL-MCNC: 8.2 MG/DL (ref 8.7–10.5)
CHLORIDE SERPL-SCNC: 112 MMOL/L (ref 95–110)
CO2 SERPL-SCNC: 21 MMOL/L (ref 23–29)
CREAT SERPL-MCNC: 1.2 MG/DL (ref 0.5–1.4)
CRP SERPL-MCNC: 313.2 MG/L (ref 0–8.2)
DIFFERENTIAL METHOD BLD: ABNORMAL
EOSINOPHIL # BLD AUTO: 0 K/UL (ref 0–0.5)
EOSINOPHIL NFR BLD: 0.4 % (ref 0–8)
ERYTHROCYTE [DISTWIDTH] IN BLOOD BY AUTOMATED COUNT: 26.1 % (ref 11.5–14.5)
EST. GFR  (NO RACE VARIABLE): 51.5 ML/MIN/1.73 M^2
GLUCOSE SERPL-MCNC: 189 MG/DL (ref 70–110)
HCT VFR BLD AUTO: 39.9 % (ref 37–48.5)
HGB BLD-MCNC: 13.5 G/DL (ref 12–16)
HYPOCHROMIA BLD QL SMEAR: ABNORMAL
IMM GRANULOCYTES # BLD AUTO: 0.28 K/UL (ref 0–0.04)
IMM GRANULOCYTES NFR BLD AUTO: 2.6 % (ref 0–0.5)
LYMPHOCYTES # BLD AUTO: 1.4 K/UL (ref 1–4.8)
LYMPHOCYTES NFR BLD: 13.2 % (ref 18–48)
MCH RBC QN AUTO: 26.2 PG (ref 27–31)
MCHC RBC AUTO-ENTMCNC: 33.8 G/DL (ref 32–36)
MCV RBC AUTO: 77 FL (ref 82–98)
MONOCYTES # BLD AUTO: 0.5 K/UL (ref 0.3–1)
MONOCYTES NFR BLD: 4.2 % (ref 4–15)
NEUTROPHILS # BLD AUTO: 8.4 K/UL (ref 1.8–7.7)
NEUTROPHILS NFR BLD: 78.9 % (ref 38–73)
NRBC BLD-RTO: 3 /100 WBC
OVALOCYTES BLD QL SMEAR: ABNORMAL
PHOSPHATE SERPL-MCNC: 2.5 MG/DL (ref 2.7–4.5)
PLATELET # BLD AUTO: 102 K/UL (ref 150–450)
PMV BLD AUTO: ABNORMAL FL (ref 9.2–12.9)
POCT GLUCOSE: 240 MG/DL (ref 70–110)
POCT GLUCOSE: 271 MG/DL (ref 70–110)
POCT GLUCOSE: 283 MG/DL (ref 70–110)
POCT GLUCOSE: 305 MG/DL (ref 70–110)
POIKILOCYTOSIS BLD QL SMEAR: SLIGHT
POLYCHROMASIA BLD QL SMEAR: ABNORMAL
POTASSIUM SERPL-SCNC: 3.9 MMOL/L (ref 3.5–5.1)
PROT SERPL-MCNC: 6.1 G/DL (ref 6–8.4)
RBC # BLD AUTO: 5.16 M/UL (ref 4–5.4)
SODIUM SERPL-SCNC: 149 MMOL/L (ref 136–145)
SPHEROCYTES BLD QL SMEAR: ABNORMAL
TARGETS BLD QL SMEAR: ABNORMAL
WBC # BLD AUTO: 10.64 K/UL (ref 3.9–12.7)

## 2025-01-10 PROCEDURE — 25000003 PHARM REV CODE 250

## 2025-01-10 PROCEDURE — 99232 SBSQ HOSP IP/OBS MODERATE 35: CPT | Mod: ,,, | Performed by: STUDENT IN AN ORGANIZED HEALTH CARE EDUCATION/TRAINING PROGRAM

## 2025-01-10 PROCEDURE — 85025 COMPLETE CBC W/AUTO DIFF WBC: CPT

## 2025-01-10 PROCEDURE — 25000003 PHARM REV CODE 250: Performed by: INTERNAL MEDICINE

## 2025-01-10 PROCEDURE — 36415 COLL VENOUS BLD VENIPUNCTURE: CPT | Performed by: STUDENT IN AN ORGANIZED HEALTH CARE EDUCATION/TRAINING PROGRAM

## 2025-01-10 PROCEDURE — 21400001 HC TELEMETRY ROOM

## 2025-01-10 PROCEDURE — 25000003 PHARM REV CODE 250: Performed by: FAMILY MEDICINE

## 2025-01-10 PROCEDURE — 11000001 HC ACUTE MED/SURG PRIVATE ROOM

## 2025-01-10 PROCEDURE — 63600175 PHARM REV CODE 636 W HCPCS: Performed by: INTERNAL MEDICINE

## 2025-01-10 PROCEDURE — 63600175 PHARM REV CODE 636 W HCPCS

## 2025-01-10 PROCEDURE — 99223 1ST HOSP IP/OBS HIGH 75: CPT | Mod: ,,, | Performed by: STUDENT IN AN ORGANIZED HEALTH CARE EDUCATION/TRAINING PROGRAM

## 2025-01-10 PROCEDURE — 80053 COMPREHEN METABOLIC PANEL: CPT

## 2025-01-10 PROCEDURE — 84100 ASSAY OF PHOSPHORUS: CPT

## 2025-01-10 PROCEDURE — 86140 C-REACTIVE PROTEIN: CPT | Performed by: STUDENT IN AN ORGANIZED HEALTH CARE EDUCATION/TRAINING PROGRAM

## 2025-01-10 PROCEDURE — 27000207 HC ISOLATION

## 2025-01-10 PROCEDURE — 25000003 PHARM REV CODE 250: Performed by: HOSPITALIST

## 2025-01-10 RX ORDER — MUPIROCIN 20 MG/G
OINTMENT TOPICAL 2 TIMES DAILY
Status: COMPLETED | OUTPATIENT
Start: 2025-01-10 | End: 2025-01-14

## 2025-01-10 RX ADMIN — METRONIDAZOLE 500 MG: 500 INJECTION, SOLUTION INTRAVENOUS at 03:01

## 2025-01-10 RX ADMIN — VANCOMYCIN HYDROCHLORIDE 500 MG: KIT at 01:01

## 2025-01-10 RX ADMIN — LOPERAMIDE HYDROCHLORIDE 2 MG: 2 CAPSULE ORAL at 05:01

## 2025-01-10 RX ADMIN — INSULIN ASPART 1 UNITS: 100 INJECTION, SOLUTION INTRAVENOUS; SUBCUTANEOUS at 09:01

## 2025-01-10 RX ADMIN — GABAPENTIN 250 MG: 250 SOLUTION ORAL at 01:01

## 2025-01-10 RX ADMIN — ONDANSETRON 4 MG: 2 INJECTION INTRAMUSCULAR; INTRAVENOUS at 01:01

## 2025-01-10 RX ADMIN — WHITE PETROLATUM: 1.75 OINTMENT TOPICAL at 08:01

## 2025-01-10 RX ADMIN — INSULIN ASPART 6 UNITS: 100 INJECTION, SOLUTION INTRAVENOUS; SUBCUTANEOUS at 12:01

## 2025-01-10 RX ADMIN — INSULIN ASPART 8 UNITS: 100 INJECTION, SOLUTION INTRAVENOUS; SUBCUTANEOUS at 09:01

## 2025-01-10 RX ADMIN — THERA TABS 1 TABLET: TAB at 09:01

## 2025-01-10 RX ADMIN — ONDANSETRON 4 MG: 2 INJECTION INTRAMUSCULAR; INTRAVENOUS at 05:01

## 2025-01-10 RX ADMIN — WHITE PETROLATUM: 1.75 OINTMENT TOPICAL at 09:01

## 2025-01-10 RX ADMIN — VANCOMYCIN HYDROCHLORIDE 500 MG: KIT at 05:01

## 2025-01-10 RX ADMIN — MUPIROCIN: 20 OINTMENT TOPICAL at 08:01

## 2025-01-10 RX ADMIN — MICONAZOLE NITRATE 2 % TOPICAL POWDER: at 08:01

## 2025-01-10 RX ADMIN — POTASSIUM BICARBONATE 25 MEQ: 978 TABLET, EFFERVESCENT ORAL at 01:01

## 2025-01-10 RX ADMIN — MUPIROCIN: 20 OINTMENT TOPICAL at 12:01

## 2025-01-10 RX ADMIN — INSULIN GLARGINE 5 UNITS: 100 INJECTION, SOLUTION SUBCUTANEOUS at 09:01

## 2025-01-10 RX ADMIN — ASPIRIN 81 MG CHEWABLE TABLET 81 MG: 81 TABLET CHEWABLE at 09:01

## 2025-01-10 RX ADMIN — GABAPENTIN 250 MG: 250 SOLUTION ORAL at 09:01

## 2025-01-10 RX ADMIN — VANCOMYCIN HYDROCHLORIDE 500 MG: KIT at 11:01

## 2025-01-10 RX ADMIN — ONDANSETRON 4 MG: 2 INJECTION INTRAMUSCULAR; INTRAVENOUS at 09:01

## 2025-01-10 RX ADMIN — DEXTROSE MONOHYDRATE: 5 INJECTION, SOLUTION INTRAVENOUS at 10:01

## 2025-01-10 RX ADMIN — POTASSIUM BICARBONATE 25 MEQ: 978 TABLET, EFFERVESCENT ORAL at 09:01

## 2025-01-10 RX ADMIN — METRONIDAZOLE 500 MG: 500 INJECTION, SOLUTION INTRAVENOUS at 11:01

## 2025-01-10 RX ADMIN — MICONAZOLE NITRATE 2 % TOPICAL POWDER: at 09:01

## 2025-01-10 RX ADMIN — METRONIDAZOLE 500 MG: 500 INJECTION, SOLUTION INTRAVENOUS at 06:01

## 2025-01-10 NOTE — CARE UPDATE
"RAPID RESPONSE NURSE PROACTIVE ROUNDING NOTE       Time of Visit:     Admit Date: 2024  LOS: 9  Code Status: Full Code   Date of Visit: 2025  : 1965  Age: 59 y.o.  Sex: female  Race: Black or   Bed: [address] [place] [address] A:   MRN: [id number]  Was the patient discharged from an ICU this admission? No   Was the patient discharged from a PACU within last 24 hours? No   Did the patient receive conscious sedation/general anesthesia in last 24 hours? No  Was the patient in the ED within the past 24 hours? No  Was the patient on NIPPV within the past 24 hours? No   Attending Physician: Tiana Lazar MD  Primary Service: [facility] MED    Time spent at the bedside: 15 -30 min    SITUATION    Notified by charge RN via phone call.  Reason for alert: hypotension  Called to evaluate the patient for Circulatory.    BACKGROUND     Why is the patient in the hospital?: Diverticulitis    Patient has a past medical history of Diabetes mellitus type I, Hyperlipidemia, Hypertension, and Right sided weakness.    Last Vitals:  Temp: 98.2 °F (36.8 °C) (2144)  Pulse: 89 (2144)  Resp: 16 (2144)  BP: 75/53 (2144)  SpO2: 94 % (2144)    24 Hours Vitals Range:  Temp:  [94.3 °F (34.6 °C)-98.2 °F (36.8 °C)]   Pulse:  []   Resp:  [16-18]   BP: ()/(53-75)   SpO2:  [89 %-99 %]     Labs:  Recent Labs     25  0815 25  1602 25  0646 25  1157   WBC 15.05* 13.01* 9.28  --    HGB 9.3* 10.1* 6.7* 6.9*   HCT 26.3* 28.6* 20.1* 20.7*    211 139*  --        Recent Labs     25  0231 25  0815 25  0646   * 150* 150*   K 3.2* 4.4 3.0*   * 114* 115*   CO2 24 23 23   BUN 19 21 21   CREATININE 0.9 1.0 1.0   * 106 54*   MG  --  2.1 1.9        No results for input(s): "PH", "PCO2", "PO2", "HCO3", "POCSATURATED", "BE" in the last 72 hours.     ASSESSMENT      Physical Exam  Constitutional:       Appearance: She is ill-appearing and " toxic-appearing.      Interventions: Nasal cannula in place.   Cardiovascular:      Rate and Rhythm: Normal rate.   Pulmonary:      Effort: Pulmonary effort is normal.   Abdominal:      Palpations: Abdomen is soft.   Genitourinary:     Comments: Hernandez with yellow urine draining  Musculoskeletal:         General: Swelling present.      Right lower leg: Edema present.      Left lower leg: Edema present.   Skin:     General: Skin is warm and dry.   Neurological:      Mental Status: She is lethargic and disoriented.       Called to bedside for hypotension (~80s/50s) and ongoing bloody BM's. Upon arrival patient lying in bed, arouses to repeated/noxious stimulation but does not remain awake for exam. Per MD notes and discussing with Dr. Germain this is patients new mental status baseline during this admission and has been evaluated with no acute findings.     Patient in between receiving 2U PRBC, BP 87/54 (64). Per Dr. Germain patient stable to remain in 608 at this time. Plan to transfuse second unit and evaluate BP response before additional fluids.     INTERVENTIONS    The patient was seen for Cardiac problem. Staff concerns included hypotension. The following interventions were performed: CBC, CMP, Magnesium, continuous cardiac monitoring continued, and No additional interventions needed at this time..    RECOMMENDATIONS    We recommend: Continuous telemetry monitoring, Maintain IV access, Strict I/O, Aspiration precautions, Obtain repeat labs and monitor trends, Notify Rapid Response of decline in patient status, and transfuse 1U PRBC    PROVIDER ESCALATION    Yes/No  Yes    Orders received and case discussed with Dr. Germain .    Disposition: Remain in room 608.    FOLLOW-UP    Bedside Tana MARTINEZ  updated on plan of care. Instructed to call the Rapid Response Nurse, Kartik Flynn RN at [phone] for additional questions or concerns.

## 2025-01-10 NOTE — PROGRESS NOTES
Progress Note:     60 y/o F, bed-bound, with multiple co-morbidities including HTN, DM and prior CVA with significant functional deficits including dysphagia with PEG tube for feeds, left hemiplagia. Dependent on support for ADLs - has help from daughter/granddaughter.    Admission to hospital on 12/30 with failure to thrive, UTI, diarrhea in the setting of sigmoid diverticulitis. She is limited historian at baseline.  Abdominal exam benign when first seen, with unremarkable labwork aside from hypernatremia (147).  Hospital course complicated shock liver with transaminitis, lethargy/encephalopathy, hypotension.    Patient with rising WBC/CRP, and reported blood in stool. CRS consulted to re-evaluate.    Patient unable to provide any history - she is obtunded and non-verbal currently.      Overnight Events: NAEO, pt stbale this am, hgb 13 after 2U, multiple Bms, pt nonverbal on rounds     Vitals:    01/10/25 0738   BP: 93/62   Pulse: 84   Resp: 18   Temp: 98.6 °F (37 °C)     Intake/Output - Last 3 Shifts         01/08 0700  01/09 0659 01/09 0700  01/10 0659 01/10 0700  01/11 0659    Blood  732.5     NG/GT  1500     Total Intake(mL/kg)  2232.5 (39.6)     Urine (mL/kg/hr) 1150 (0.8) 1900 (1.4)     Stool 0 0     Total Output 1150 1900     Net -1150 +332.5            Stool Occurrence 4 x 12 x             General: frail-appearing female, lethargic/obtunded  Resp: non-labored breathing   Abdomen: soft, non-distended. Unable to assess tenderness.  PEG tube in place, tube feeds actively running. Faint pfannenstiel scar noted  Ext: contracted LUE/LLE    Labs:  Lab Results   Component Value Date    WBC 10.64 01/10/2025    HGB 13.5 01/10/2025    HCT 39.9 01/10/2025    MCV 77 (L) 01/10/2025     (L) 01/10/2025       CMP  Sodium   Date Value Ref Range Status   01/10/2025 149 (H) 136 - 145 mmol/L Final     Potassium   Date Value Ref Range Status   01/10/2025 3.9 3.5 - 5.1 mmol/L Final     Chloride   Date Value Ref Range  Status   01/10/2025 112 (H) 95 - 110 mmol/L Final     CO2   Date Value Ref Range Status   01/10/2025 21 (L) 23 - 29 mmol/L Final     Glucose   Date Value Ref Range Status   01/10/2025 189 (H) 70 - 110 mg/dL Final     BUN   Date Value Ref Range Status   01/10/2025 20 8 - 23 mg/dL Final     Creatinine   Date Value Ref Range Status   01/10/2025 1.2 0.5 - 1.4 mg/dL Final     Calcium   Date Value Ref Range Status   01/10/2025 8.2 (L) 8.7 - 10.5 mg/dL Final     Total Protein   Date Value Ref Range Status   01/10/2025 6.1 6.0 - 8.4 g/dL Final     Albumin   Date Value Ref Range Status   01/10/2025 1.9 (L) 3.5 - 5.2 g/dL Final     Total Bilirubin   Date Value Ref Range Status   01/10/2025 1.9 (H) 0.1 - 1.0 mg/dL Final     Comment:     For infants and newborns, interpretation of results should be based  on gestational age, weight and in agreement with clinical  observations.    Premature Infant recommended reference ranges:  Up to 24 hours.............<8.0 mg/dL  Up to 48 hours............<12.0 mg/dL  3-5 days..................<15.0 mg/dL  6-29 days.................<15.0 mg/dL       Alkaline Phosphatase   Date Value Ref Range Status   01/10/2025 518 (H) 40 - 150 U/L Final     AST   Date Value Ref Range Status   01/10/2025 397 (H) 10 - 40 U/L Final     Comment:     *Result may be interfered by visible hemolysis     ALT   Date Value Ref Range Status   01/10/2025 233 (H) 10 - 44 U/L Final     Anion Gap   Date Value Ref Range Status   01/10/2025 16 8 - 16 mmol/L Final     eGFR   Date Value Ref Range Status   01/10/2025 51.5 (A) >60 mL/min/1.73 m^2 Final     Lab Results   Component Value Date    .2 (H) 01/10/2025         Recommendation:    -Wound treat cdiff empirically, appreciate ID consult   -would consult GI for consideration of colonoscopy to evaluate for source of blood loss  -Trend CRP/WBC  -Would consider initiating goals of care discussion with family given overall clinical picture

## 2025-01-10 NOTE — CONSULTS
[facility]  Infectious Disease  Consult Note    Patient Name: Emily Martinez  MRN: [id number]  Admission Date: 12/30/2024  Hospital Length of Stay: 10 days  Attending Physician: Tiana Lazar MD  Primary Care Provider: Alireza Sosa MD     Isolation Status: Special Contact    Patient information was obtained from ER records.      Inpatient consult to Infectious Diseases  Consult performed by: Samad, Mawadah, MD  Consult ordered by: Tiana Lazar MD        Assessment/Plan:     ID  * C. difficile colitis  58 y/o F woman with pseudomembranous colitis on CTA Abdomen. No evidence of perforation/ileus/obstruction on imaging. C. Diff toxin A/B negative, C. Diff antigen positive and C. Diff PCR positive. Given positivity of C. Diff antigen and PCR, patient with presumed C. Diff.  Stool now more well-formed and non-bloody.  Will treat as non-fulminant C. Diff. Completed course of Meropenem and Ertapenem for ESBL UTI    Recommendations:   - Stop IV Metronidazole  - Decrease PO Vancomycin 500 mg to PO Vancomycin 125 mg q 6 hrs for total treatment course of 10 days.  - F/u Bcx           Case discussed with primary team.               Thank you for your consult. I will sign off. Please contact us if you have any additional questions.    Mawadah Samad, MD  Infectious Disease  [facility]    Subjective:     Principal Problem: C. difficile colitis    HPI: Emily Martinez, 58 y/o F PMHx of CVA with residual left-sided hemiplegia, bed-bound, diabetes, HTN, dysphagia s/p PEG, and multiple skin wounds. [place] [facility] [place] UTI on ceftriaxone; d/c on keflex; however Cx grew ESBL > 100,000 CFU. Re-presented 12/31 with reports from daughter of 4-5 episodes/day of watery/non-bloody stools with intermittent vomiting. CT Abdomen noted acute sigmoid diverticulitis with areas of contained perforation. CRS consulted, recommending no intervention; ID consulted with recs to continue IV meropenem, f/u urine  cultures, de-escalate to ertapenem if no pseudomonas (EED ). On , patient began to have bowel movements with clots.  CTA Abdo w/o signs of active bleeding, early rectosigmoid proctocolitis, possible pseudomembranous colitis. CRS and ID re-consulted. ID consulted for treatment of C.diff.    Past Medical History:   Diagnosis Date    Diabetes mellitus type I     Hyperlipidemia     Hypertension     Right sided weakness 2019       Past Surgical History:   Procedure Laterality Date     SECTION         Review of patient's allergies indicates:   Allergen Reactions    Sulfur        Medications:  Medications Prior to Admission   Medication Sig    acetaminophen (TYLENOL) 650 MG TbSR Take 1 tablet (650 mg total) by mouth every 8 (eight) hours. For back pain    albuterol (ACCUNEB) 1.25 mg/3 mL Nebu USE ONE VIAL in Nebulizer EVERY 6 HOURS AS NEEDED    albuterol (PROAIR HFA) 90 mcg/actuation inhaler inhale ONE TO TWO puffs EVERY 6 HOURS into lungs AS NEEDED FOR WHEEZING AND SHORTNESS OF BREATH    amitriptyline (ELAVIL) 10 MG tablet TAKE 1 TABLET(10 MG) BY MOUTH EVERY NIGHT AS NEEDED FOR INSOMNIA    amitriptyline (ELAVIL) 10 MG tablet TAKE 1 TABLET(10 MG) BY MOUTH EVERY NIGHT AS NEEDED FOR INSOMNIA    amitriptyline (ELAVIL) 25 MG tablet TAKE 1 TABLET(25 MG) BY MOUTH EVERY NIGHT AS NEEDED FOR INSOMNIA    amLODIPine (NORVASC) 10 MG tablet TAKE 1 TABLET(10 MG) BY MOUTH EVERY DAY    aspirin (ECOTRIN) 81 MG EC tablet Take 1 tablet (81 mg total) by mouth once daily.    atorvastatin (LIPITOR) 40 MG tablet TAKE 1 TABLET(40 MG) BY MOUTH EVERY DAY    atorvastatin (LIPITOR) 40 MG tablet TAKE 1 TABLET(40 MG) BY MOUTH EVERY DAY    blood sugar diagnostic (TRUE METRIX GLUCOSE TEST STRIP) Strp TO CHECK BLOOD GLUCOSE THREE TIMES DAILY    blood-glucose meter (FREESTYLE SYSTEM KIT) kit Use daily- TID    blood-glucose meter kit Use as instructed    capsicum 0.075% (ZOSTRIX) 0.075 % topical cream Apply topically 3 (three) times  daily.    capsicum 0.075% (ZOSTRIX) 0.075 % topical cream Apply topically 3 (three) times daily.    cephALEXin (KEFLEX) 500 MG capsule Take 1 capsule (500 mg total) by mouth every 8 (eight) hours.    cephALEXin (KEFLEX) 500 MG capsule Take 1 capsule (500 mg total) by mouth every 6 (six) hours.    clopidogreL (PLAVIX) 75 mg tablet Take 1 tablet (75 mg total) by mouth once daily.    diaper,brief,adult,disposable Misc 1 each by Misc.(Non-Drug; Combo Route) route 3 (three) times daily.    diazePAM (VALIUM) 5 MG tablet Take 1 tablet (5 mg total) by mouth 2 (two) times daily.    diclofenac (VOLTAREN) 75 MG EC tablet TAKE 1 TABLET(75 MG) BY MOUTH TWICE DAILY FOR 14 DAYS    diphenhydrAMINE (BENADRYL) 50 MG capsule Take 1 capsule (50 mg total) by mouth nightly as needed for Itching.     mg capsule TAKE ONE CAPSULE BY MOUTH TWICE DAILY    flash glucose sensor (FREESTYLE LEATHA 14 DAY SENSOR) Kit 1 each by Misc.(Non-Drug; Combo Route) route once daily.    fluticasone propionate (FLONASE) 50 mcg/actuation nasal spray 1 spray (50 mcg total) by Each Nostril route once daily.    food supplemt, lactose-reduced (ENSURE MAX PROTEIN) Liqd Take 118 mLs by mouth 3 (three) times daily.    gabapentin (NEURONTIN) 100 MG capsule Take 1 capsule (100 mg total) by mouth every evening.    gabapentin (NEURONTIN) 300 MG capsule Take 1 capsule (300 mg total) by mouth 2 (two) times daily.    glipiZIDE 5 MG TR24 Take 1 tablet (5 mg total) by mouth daily with breakfast.    hydroCHLOROthiazide (HYDRODIURIL) 25 MG tablet Take 1 tablet (25 mg total) by mouth once daily.    hydrocortisone 2.5 % cream Apply topically 2 (two) times daily.    hydrOXYzine HCl (ATARAX) 25 MG tablet Take 1-2 tablets (25-50 mg total) by mouth 2 (two) times daily as needed for Itching.    hydrOXYzine HCL (ATARAX) 25 MG tablet Take 1-2 tablets (25-50 mg total) by mouth 2 (two) times daily as needed for Anxiety.    hydrOXYzine pamoate (VISTARIL) 25 MG Cap Take 1 capsule (25  mg total) by mouth every evening.    ibuprofen (ADVIL,MOTRIN) 800 MG tablet TAKE 1 TABLET(800 MG) BY MOUTH THREE TIMES DAILY WITH FOOD    ibuprofen (ADVIL,MOTRIN) 800 MG tablet Take 1 tablet (800 mg total) by mouth 3 (three) times daily. TAKE 1 TABLET BY MOUTH DAILY AS NEEDED    insulin aspart U-100 (NOVOLOG) 100 unit/mL (3 mL) InPn pen Inject 5-10 Units into the skin 3 (three) times daily with meals.    insulin detemir U-100, Levemir, (LEVEMIR FLEXPEN) 100 unit/mL (3 mL) InPn pen Inject 30 Units into the skin every evening.    lancets (ONETOUCH ULTRASOFT LANCETS) Misc Use 1 TID as directed for diabetes checking    lancets Misc To check BG 3 times daily, to use with insurance preferred meter    latanoprost 0.005 % ophthalmic solution Place 1 drop into both eyes every evening.    metFORMIN (GLUCOPHAGE) 1000 MG tablet TAKE 1 TABLET(1000 MG) BY MOUTH TWICE DAILY    metFORMIN (GLUCOPHAGE) 1000 MG tablet Take 1 tablet (1,000 mg total) by mouth 2 (two) times a day.    miconazole nitrate 200 mg/5 gram (4 %) Crea Place 1 application vaginally once daily.    mupirocin (BACTROBAN) 2 % ointment Apply topically 2 (two) times daily.    mupirocin (BACTROBAN) 2 % ointment Apply topically 2 (two) times daily. AAA    mupirocin (BACTROBAN) 2 % ointment Apply topically 3 (three) times daily.    nicotine (NICODERM CQ) 21 mg/24 hr Place 1 patch onto the skin once daily.    nicotine (NICODERM CQ) 21 mg/24 hr Place 1 patch onto the skin once daily.    olmesartan (BENICAR) 40 MG tablet Take 1 tablet (40 mg total) by mouth once daily.    olopatadine (PATANOL) 0.1 % ophthalmic solution Place 1 drop into both eyes 2 (two) times daily.    omeprazole (PRILOSEC) 40 MG capsule Take 1 capsule (40 mg total) by mouth once daily.    omeprazole (PRILOSEC) 40 MG capsule TAKE 1 CAPSULE(40 MG) BY MOUTH EVERY DAY    pantoprazole (PROTONIX) 40 MG tablet Take 1 tablet (40 mg total) by mouth once daily.    pen needle, diabetic (BD ULTRA-FINE ORIG PEN NEEDLE)  "29 gauge x 1/2" Ndle USE TO TEST THREE TIMES DAILY MUST LAST 33 DAYS    plecanatide (TRULANCE) 3 mg Tab Take 3 mg by mouth once daily.    ramelteon (ROZEREM) 8 mg tablet Take 1 tablet (8 mg total) by mouth every evening.    semaglutide (OZEMPIC) 1 mg/dose (2 mg/1.5 mL) PnIj Inject 1 mg into the skin every 7 days.    silver sulfADIAZINE 1% (SILVADENE) 1 % cream Apply topically 2 (two) times daily.    ticagrelor (BRILINTA) 90 mg tablet TAKE 1 TABLET(90 MG) BY MOUTH TWICE DAILY    traMADoL (ULTRAM) 50 mg tablet Take 1 tablet (50 mg total) by mouth every 24 hours as needed for Pain.    triamcinolone acetonide 0.1% (KENALOG) 0.1 % ointment APPLY TOPICALLY TO THE AFFECTED AREA TWICE DAILY    walker (ULTRA-LIGHT ROLLATOR) Misc 1 each by Misc.(Non-Drug; Combo Route) route once daily at 6am.    wheelchair Kelsey 1 each by Misc.(Non-Drug; Combo Route) route 2 (two) times daily. Power Wheel Chair     Antibiotics (From admission, onward)      Start     Stop Route Frequency Ordered    01/10/25 1215  mupirocin 2 % ointment  (DECOLONIZATION PROTOCOL ORDERS)         01/15/25 0859 Nasl 2 times daily 01/10/25 1113    01/09/25 1800  vancomycin 125 mg/5 mL oral solution 500 mg  (C. difficile Infection (CDI) Treatment Order Panel)         -- Oral Every 6 hours 01/09/25 1416    01/09/25 1530  metronidazole IVPB 500 mg  (C. difficile Infection (CDI) Treatment Order Panel)         -- IV Every 8 hours (non-standard times) 01/09/25 1416          Antifungals (From admission, onward)      Start     Stop Route Frequency Ordered    01/02/25 2100  miconazole NITRATE 2 % top powder         -- Top 2 times daily 01/02/25 1856          Antivirals (From admission, onward)      None             Immunization History   Administered Date(s) Administered    Influenza - Quadrivalent - PF *Preferred* (6 months and older) 10/21/2021       Family History       Problem Relation (Age of Onset)    Cancer Sister    Diabetes Mother, Sister, Brother, Maternal Aunt    " Heart disease Maternal Aunt    Hyperlipidemia Mother, Sister, Brother    Hypertension Mother, Sister, Brother    Stroke Mother          Social History     Socioeconomic History    Marital status: Single   Tobacco Use    Smoking status: Every Day     Current packs/day: 1.50     Average packs/day: 1.5 packs/day for 35.0 years (52.5 ttl pk-yrs)     Types: Cigarettes    Smokeless tobacco: Never   Substance and Sexual Activity    Alcohol use: Not Currently    Drug use: Not Currently     Social Drivers of Health     Financial Resource Strain: Low Risk  (12/31/2024)    Overall Financial Resource Strain (CARDIA)     Difficulty of Paying Living Expenses: Not hard at all   Food Insecurity: No Food Insecurity (12/31/2024)    Hunger Vital Sign     Worried About Running Out of Food in the Last Year: Never true     Ran Out of Food in the Last Year: Never true   Transportation Needs: No Transportation Needs (12/31/2024)    TRANSPORTATION NEEDS     Transportation : No   Physical Activity: Inactive (12/31/2024)    Exercise Vital Sign     Days of Exercise per Week: 0 days     Minutes of Exercise per Session: 0 min   Stress: No Stress Concern Present (12/31/2024)    [employer] - Occupational Stress Questionnaire     Feeling of Stress : Only a little   Housing Stability: Low Risk  (12/31/2024)    Housing Stability Vital Sign     Unable to Pay for Housing in the Last Year: No     Homeless in the Last Year: No     Review of Systems   Unable to perform ROS: Patient nonverbal     Objective:     Vital Signs (Most Recent):  Temp: 98.4 °F (36.9 °C) (01/10/25 1130)  Pulse: 87 (01/10/25 1130)  Resp: 18 (01/10/25 1130)  BP: 95/65 (01/10/25 1130)  SpO2: 95 % (01/10/25 1130) Vital Signs (24h Range):  Temp:  [94.3 °F (34.6 °C)-99.1 °F (37.3 °C)] 98.4 °F (36.9 °C)  Pulse:  [] 87  Resp:  [16-18] 18  SpO2:  [89 %-98 %] 95 %  BP: ()/(52-71) 95/65     Weight: 56.4 kg (124 lb 5.4 oz)  Body mass index is 22.03  kg/m².    Estimated Creatinine Clearance: 40.7 mL/min (based on SCr of 1.2 mg/dL).     Physical Exam  Vitals and nursing note reviewed.   Constitutional:       Appearance: She is ill-appearing.      Comments: Chronically ill appearing, cachectic   HENT:      Head: Normocephalic and atraumatic.      Comments: Bilateral temporal wasting.     Nose: Nose normal.   Eyes:      Extraocular Movements: Extraocular movements intact.   Cardiovascular:      Rate and Rhythm: Normal rate and regular rhythm.   Abdominal:      General: Abdomen is flat. There is no distension.      Palpations: Abdomen is soft.      Tenderness: There is no abdominal tenderness. There is no guarding or rebound.   Musculoskeletal:      Cervical back: Normal range of motion.      Comments: Left hemiplegia   Skin:     General: Skin is warm.      Comments: Multiple wounds photos in chart   Neurological:      Mental Status: Mental status is at baseline.          Significant Labs: All pertinent labs within the past 24 hours have been reviewed.    Significant Imaging: I have reviewed all pertinent imaging results/findings within the past 24 hours.

## 2025-01-10 NOTE — ASSESSMENT & PLAN NOTE
Anemia is likely due to acute blood loss which was from C diff colitis . Most recent hemoglobin and hematocrit are listed below.  Recent Labs     01/13/25  0538 01/14/25  0523 01/15/25  0241   HGB 11.2* 10.1* 9.8*   HCT 32.7* 29.9* 29.0*     Plan  - Monitor serial CBC: Daily  - Transfuse PRBC if patient becomes hemodynamically unstable, symptomatic or H/H drops below 7/21.  - Patient has received 3 units of PRBCs on 1/10/2025  - Patient's anemia is currently improving  - transfused above threshhold  - UGI - had blood streaked vomitus 1/12/2025. Started on protonix po BID. No further reports of blood while on heparin drip for 2 days. Switched to famotidine due to recurring c diff risks with PPI  - Hgb slowly drifting down, not from acute loss, likely due to inflammation from acute infections disease and critical illness

## 2025-01-10 NOTE — PROGRESS NOTES
[facility] Medicine  Progress note    Team: [facility] R Tiana Lazar MD  Admit Date: 12/30/2024  Code status: Full Code    Principal Problem:  Diverticulitis    Interval hx:  Patient continues to pass bloody stool, but stool now with mass    PEx  Temp:  [94.3 °F (34.6 °C)-99.1 °F (37.3 °C)]   Pulse:  []   Resp:  [16-18]   BP: ()/(52-71)   SpO2:  [89 %-98 %]     Intake/Output Summary (Last 24 hours) at 1/10/2025 1053  Last data filed at 1/10/2025 0654  Gross per 24 hour   Intake 2232.5 ml   Output 1900 ml   Net 332.5 ml     General Appearance: no acute distress, WD WN  Heart: regular rate and rhythm, no heave  Respiratory: Normal respiratory effort, symmetric excursion, bilateral vesicular breath sounds   Abdomen: Soft, non-tender; bowel sounds active  Skin: intact, no rash, no ulcers  Neurologic:  No focal numbness or weakness  Mental status: Alert, oriented x 4, affect appropriate    Recent Labs   Lab 01/09/25  0646 01/09/25  1157 01/09/25  2119 01/10/25  0333   WBC 9.28  --  9.20 10.64   HGB 6.7* 6.9* 9.9* 13.5   HCT 20.1* 20.7* 29.2* 39.9   *  --  134* 102*     Recent Labs   Lab 01/08/25  0815 01/09/25  0646 01/09/25  2119 01/10/25  0333   * 150* 150* 149*   K 4.4 3.0* 3.1* 3.9   * 115* 111* 112*   CO2 23 23 26 21*   BUN 21 21 21 20   CREATININE 1.0 1.0 0.9 1.2    54* 107 189*   CALCIUM 7.3* 7.4* 7.7* 8.2*   MG 2.1 1.9 1.9  --    PHOS  --   --   --  2.5*     Recent Labs   Lab 01/09/25  0646 01/09/25  2119 01/10/25  0333   ALKPHOS 365* 438* 518*   * 229* 233*   * 418* 397*   ALBUMIN 2.0* 1.8* 1.9*   PROT 4.8* 5.1* 6.1   BILITOT 2.2* 2.1* 1.9*        Recent Labs   Lab 01/08/25 2012 01/09/25  0839 01/09/25  1121 01/09/25  1945 01/09/25  2127 01/10/25  0740   POCTGLUCOSE 114* 59* 106 119* 119* 305*       Scheduled Meds:   aspirin  81 mg Per G Tube Daily    gabapentin  250 mg Per G Tube Q8H    insulin glargine U-100  5 Units Subcutaneous Daily    metroNIDAZOLE IV  "(PEDS and ADULTS)  500 mg Intravenous Q8H    miconazole NITRATE 2 %   Topical (Top) BID    multivitamin  1 tablet Per G Tube Daily    ondansetron  4 mg Intravenous Q8H    potassium bicarbonate  25 mEq Oral TID    traZODone  25 mg Per G Tube QHS    vancomycin  500 mg Oral Q6H    white petrolatum   Topical (Top) BID     Continuous Infusions:   D5W   Intravenous Continuous 100 mL/hr at 01/10/25 1004 New Bag at 01/10/25 1004     As Needed:    Current Facility-Administered Medications:     0.9%  NaCl infusion (for blood administration), , Intravenous, Q24H PRN    albuterol, 2 puff, Inhalation, Q6H PRN **AND** MDI Q6H PRN, , , Q6H PRN    dextrose 50%, 12.5 g, Intravenous, PRN    dextrose 50%, 25 g, Intravenous, PRN    glucagon (human recombinant), 1 mg, Intramuscular, PRN    glucose, 16 g, Per G Tube, PRN    glucose, 24 g, Per G Tube, PRN    insulin aspart U-100, 0-10 Units, Subcutaneous, QID (AC + HS) PRN    loperamide, 2 mg, Per G Tube, QID PRN    melatonin, 6 mg, Per G Tube, Nightly PRN    naloxone, 0.02 mg, Intravenous, PRN    oxyCODONE-acetaminophen, 1 tablet, Per G Tube, Q4H PRN    sodium chloride 0.9%, 10 mL, Intravenous, PRN    sodium chloride 0.9%, 10 mL, Intravenous, Q12H PRN    Assessment and Plan  / Problems managed today    * C. difficile colitis  - CT abdomen 12/31 with diverticulitis with contained perforation. No associated rim enhancement to suggest drainable abscess at this time.  - VSS without leukocytosis  ;  non-surgical abdomen on exam  - CRS consult appreciated.   - Bloody clots starting 1/7 - CTA abdomen showed "Rectosigmoid proctocolitis, of numerous potential etiologies. Questionable cecitis as well. Correlate clinically, including for the possibility of early/mild pseudomembranous colitis."  - c diff antigen positive, c diff toxin, c diff toxin PCR positive  - started on metronidazole IV and vancomycin - ID consulted    Acute blood loss anemia  Anemia is likely due to acute blood loss which was " from C diff colitis . Most recent hemoglobin and hematocrit are listed below.  Recent Labs     01/09/25  1157 01/09/25  2119 01/10/25  0333   HGB 6.9* 9.9* 13.5   HCT 20.7* 29.2* 39.9     Plan  - Monitor serial CBC: Daily  - Transfuse PRBC if patient becomes hemodynamically unstable, symptomatic or H/H drops below 7/21.  - Patient has received 3 units of PRBCs on 1/10/2025  - Patient's anemia is currently improving  - transfused above threshhold    Chronic hypotension  Chronic due to malnutrition  Patient hypervolemic  Will defer volume resuscitation unless MAP <60    Acute hypoxemic respiratory failure  Patient with Hypoxic Respiratory failure which is Acute.  she is not on home oxygen. Supplemental oxygen was provided and noted-      Signs/symptoms of respiratory failure include- increased work of breathing and lethargy. Contributing diagnoses includes - Pleural effusion Labs and images were reviewed. Patient Has not had a recent ABG. Will treat underlying causes and adjust management of respiratory failure as follows-     - XR on 01/08 with evidence of hypervolemia--Lasix 40mg IV qd on 01/07-01/08  - will consider resuming diuresis once diarrhea is less profuse    LFT elevation  Without inciting event on labs 01/04. No evidence of hypervolemia on CXR, no increased O2 requirement, no abdominal palpation. Shock liver a consideration given hypotension with MAP around 65 that morning.    - US Liver with doppler negative for acute abormality  - continue to monitor with daily labs, avoid hepatotoxic medications  - improving      Atelectasis of both lungs  I have personally reviewed Chest X-ray. Patient with atelectasis on interpretation. Likely Non-Obstructive atelectasis secondary to pleural effusion. Signs and symptoms include Shortness of breath and Hypoxemia Will begin treatment with upright positioning.    Hypophosphatemia  Patient's most recent phosphorus results are listed below.   No results for input(s):  ""PHOS" in the last 72 hours.    Plan  - Will treat hypophosphatemia with prn supplementation  - Patient's hypophosphatemia is stable    Hypokalemia  Patient's most recent potassium results are listed below.   Recent Labs     01/09/25  0646 01/09/25  2119 01/10/25  0333   K 3.0* 3.1* 3.9     Plan  - Replete potassium per protocol  - Monitor potassium Daily  - Patient's hypokalemia is improving    Hypernatremia  Hypernatremia is likely due to Dehydration. monitor with hydration   Recent Labs     01/09/25  0646 01/09/25  2119 01/10/25  0333   * 150* 149*    S/p D5W infusion       Dysphagia  - sp PEG for nutrition and meds  ;  okay for comfort po feeds per recent SLP recs  - NPO as above at this time  - IVFs  - advance TF as possible, but hold TF pending CTA results      UTI (urinary tract infection)  - recent outside culture with ESBL  - start meropenem  - ID consulted  - follow up cultures  - further management pending clinical course    12/31  continue IV meropenem.  trend CRP 59  C.diff assay pending.    1/1 UC GNRs.ID eval - continue meropenem. If no Pseudomonas isolated, okay to de-escalate to ertapenem.    - Ertapenem per ID recs EED 01/09/25    Multiple wounds of skin  - Wound care consulted  - turn q2  - waffle mattress overlay  - clean and dressed        History of stroke with residual effects  - chronic left hemiplegia, bed bound, sp PEG though able to tolerate po comfort feed  - continue home ASA and statin  - turn q2h      12/31 CT head -No acute intracranial process. Prominent involutional changes and chronic microvascular ischemic changes in the periventricular white matter.  Small areas of probable remote lacunar infarction in the bilateral basal ganglia and small probable chronic right parietal cortical infarct.     - Given ongoing lethargy repeat CT head negative for acute process  - EEG negative for epileptic activity    Insomnia  - home trazadone      Mixed hyperlipidemia  - home " statin      Diet:  fluid restriction, Glucerna 50 mL/h  GI PPx: not needed  DVT PPx:  SCD/MIGUEL ANGEL  Airways: 2L oxygen  Wounds: left proximal arm     Goals of Care:  Return to prior functional status     Discharge Planning   ANA: 1/14/2025   Is the patient medically ready for discharge?:     Reason for patient still in hospital (select all that apply): Patient trending condition and Treatment  Discharge Plan A: Home, Home with family, Home Health, Other ([facility])   Discharge Delays: None known at this time    Tiana Lazar MD

## 2025-01-10 NOTE — PLAN OF CARE
Problem: Skin Injury Risk Increased  Goal: Skin Health and Integrity  Outcome: Not Progressing     Problem: Infection  Goal: Absence of Infection Signs and Symptoms  Outcome: Not Progressing     Problem: Adult Inpatient Plan of Care  Goal: Plan of Care Review  Outcome: Not Progressing  Goal: Patient-Specific Goal (Individualized)  Outcome: Not Progressing  Goal: Absence of Hospital-Acquired Illness or Injury  Outcome: Not Progressing  Goal: Optimal Comfort and Wellbeing  Outcome: Not Progressing  Goal: Readiness for Transition of Care  Outcome: Not Progressing     Problem: Diabetes Comorbidity  Goal: Blood Glucose Level Within Targeted Range  Outcome: Not Progressing     Problem: Acute Kidney Injury/Impairment  Goal: Fluid and Electrolyte Balance  Outcome: Not Progressing  Goal: Improved Oral Intake  Outcome: Not Progressing  Goal: Effective Renal Function  Outcome: Not Progressing     Problem: Wound  Goal: Optimal Coping  Outcome: Not Progressing  Goal: Optimal Functional Ability  Outcome: Not Progressing  Goal: Absence of Infection Signs and Symptoms  Outcome: Not Progressing  Goal: Improved Oral Intake  Outcome: Not Progressing  Goal: Optimal Pain Control and Function  Outcome: Not Progressing  Goal: Skin Health and Integrity  Outcome: Not Progressing  Goal: Optimal Wound Healing  Outcome: Not Progressing     Problem: Fall Injury Risk  Goal: Absence of Fall and Fall-Related Injury  Outcome: Not Progressing     Problem: Enteral Nutrition  Goal: Absence of Aspiration Signs and Symptoms  Outcome: Not Progressing  Goal: Safe, Effective Therapy Delivery  Outcome: Not Progressing  Goal: Feeding Tolerance  Outcome: Not Progressing     Problem: Fluid Volume Deficit  Goal: Fluid Balance  Outcome: Not Progressing     Problem: Electrolyte Imbalance  Goal: Electrolyte Balance  Outcome: Not Progressing     Problem: Nausea and Vomiting  Goal: Nausea and Vomiting Relief  Outcome: Not Progressing     Problem: Mobility  Impairment  Goal: Optimal Mobility  Outcome: Not Progressing     Problem: Diarrhea  Goal: Effective Diarrhea Management  Outcome: Not Progressing     Problem: Urinary Retention  Goal: Effective Urinary Elimination  Outcome: Not Progressing

## 2025-01-10 NOTE — CARE UPDATE
Unit CRIS Care Support Interaction      I have reviewed the chart of Emily Martinez who is hospitalized for C. difficile colitis. The patient is currently located in the following unit: [facility]SU        I have assisted the primary physician in management of the following:      MRSA Decolonization - Mupirocin ordered and CHG ordered    Lindsey Nelson PA-C  Unit Based CRIS

## 2025-01-10 NOTE — ASSESSMENT & PLAN NOTE
Patient's most recent potassium results are listed below.   Recent Labs     01/12/25  1053 01/13/25  0818 01/14/25  0523   K 5.4* 4.2 4.3     Plan  - Replete potassium per protocol  - Monitor potassium Daily  - Patient's hypokalemia is improving

## 2025-01-10 NOTE — ASSESSMENT & PLAN NOTE
58 y/o F woman with pseudomembranous colitis on CTA Abdomen. C. Diff toxin A/B negative, C. Diff antigen positive and C. Diff PCR positive. Given positivity of C. Diff antigen and PCR, patient with presumed C. Diff.  Stool now more well-formed and non-bloody.  Will treat as non-fulminant C. Diff. Completed course of Meropenem and Ertapenem for ESBL UTI    Recommendations:   - Stop IV Metronidazole  - Decrease PO Vancomycin 500 mg to PO Vancomycin 125 mg q 6 hrs for total treatment course of 10 days.  - F/u Bcx           Case discussed with primary team.

## 2025-01-10 NOTE — SUBJECTIVE & OBJECTIVE
Past Medical History:   Diagnosis Date    Diabetes mellitus type I     Hyperlipidemia     Hypertension     Right sided weakness 2019       Past Surgical History:   Procedure Laterality Date     SECTION         Review of patient's allergies indicates:   Allergen Reactions    Sulfur        Medications:  Medications Prior to Admission   Medication Sig    acetaminophen (TYLENOL) 650 MG TbSR Take 1 tablet (650 mg total) by mouth every 8 (eight) hours. For back pain    albuterol (ACCUNEB) 1.25 mg/3 mL Nebu USE ONE VIAL in Nebulizer EVERY 6 HOURS AS NEEDED    albuterol (PROAIR HFA) 90 mcg/actuation inhaler inhale ONE TO TWO puffs EVERY 6 HOURS into lungs AS NEEDED FOR WHEEZING AND SHORTNESS OF BREATH    amitriptyline (ELAVIL) 10 MG tablet TAKE 1 TABLET(10 MG) BY MOUTH EVERY NIGHT AS NEEDED FOR INSOMNIA    amitriptyline (ELAVIL) 10 MG tablet TAKE 1 TABLET(10 MG) BY MOUTH EVERY NIGHT AS NEEDED FOR INSOMNIA    amitriptyline (ELAVIL) 25 MG tablet TAKE 1 TABLET(25 MG) BY MOUTH EVERY NIGHT AS NEEDED FOR INSOMNIA    amLODIPine (NORVASC) 10 MG tablet TAKE 1 TABLET(10 MG) BY MOUTH EVERY DAY    aspirin (ECOTRIN) 81 MG EC tablet Take 1 tablet (81 mg total) by mouth once daily.    atorvastatin (LIPITOR) 40 MG tablet TAKE 1 TABLET(40 MG) BY MOUTH EVERY DAY    atorvastatin (LIPITOR) 40 MG tablet TAKE 1 TABLET(40 MG) BY MOUTH EVERY DAY    blood sugar diagnostic (TRUE METRIX GLUCOSE TEST STRIP) Strp TO CHECK BLOOD GLUCOSE THREE TIMES DAILY    blood-glucose meter (FREESTYLE SYSTEM KIT) kit Use daily- TID    blood-glucose meter kit Use as instructed    capsicum 0.075% (ZOSTRIX) 0.075 % topical cream Apply topically 3 (three) times daily.    capsicum 0.075% (ZOSTRIX) 0.075 % topical cream Apply topically 3 (three) times daily.    cephALEXin (KEFLEX) 500 MG capsule Take 1 capsule (500 mg total) by mouth every 8 (eight) hours.    cephALEXin (KEFLEX) 500 MG capsule Take 1 capsule (500 mg total) by mouth every 6 (six) hours.     clopidogreL (PLAVIX) 75 mg tablet Take 1 tablet (75 mg total) by mouth once daily.    diaper,brief,adult,disposable Misc 1 each by Misc.(Non-Drug; Combo Route) route 3 (three) times daily.    diazePAM (VALIUM) 5 MG tablet Take 1 tablet (5 mg total) by mouth 2 (two) times daily.    diclofenac (VOLTAREN) 75 MG EC tablet TAKE 1 TABLET(75 MG) BY MOUTH TWICE DAILY FOR 14 DAYS    diphenhydrAMINE (BENADRYL) 50 MG capsule Take 1 capsule (50 mg total) by mouth nightly as needed for Itching.     mg capsule TAKE ONE CAPSULE BY MOUTH TWICE DAILY    flash glucose sensor (FREESTYLE LEATHA 14 DAY SENSOR) Kit 1 each by Misc.(Non-Drug; Combo Route) route once daily.    fluticasone propionate (FLONASE) 50 mcg/actuation nasal spray 1 spray (50 mcg total) by Each Nostril route once daily.    food supplemt, lactose-reduced (ENSURE MAX PROTEIN) Liqd Take 118 mLs by mouth 3 (three) times daily.    gabapentin (NEURONTIN) 100 MG capsule Take 1 capsule (100 mg total) by mouth every evening.    gabapentin (NEURONTIN) 300 MG capsule Take 1 capsule (300 mg total) by mouth 2 (two) times daily.    glipiZIDE 5 MG TR24 Take 1 tablet (5 mg total) by mouth daily with breakfast.    hydroCHLOROthiazide (HYDRODIURIL) 25 MG tablet Take 1 tablet (25 mg total) by mouth once daily.    hydrocortisone 2.5 % cream Apply topically 2 (two) times daily.    hydrOXYzine HCl (ATARAX) 25 MG tablet Take 1-2 tablets (25-50 mg total) by mouth 2 (two) times daily as needed for Itching.    hydrOXYzine HCL (ATARAX) 25 MG tablet Take 1-2 tablets (25-50 mg total) by mouth 2 (two) times daily as needed for Anxiety.    hydrOXYzine pamoate (VISTARIL) 25 MG Cap Take 1 capsule (25 mg total) by mouth every evening.    ibuprofen (ADVIL,MOTRIN) 800 MG tablet TAKE 1 TABLET(800 MG) BY MOUTH THREE TIMES DAILY WITH FOOD    ibuprofen (ADVIL,MOTRIN) 800 MG tablet Take 1 tablet (800 mg total) by mouth 3 (three) times daily. TAKE 1 TABLET BY MOUTH DAILY AS NEEDED    insulin aspart  "U-100 (NOVOLOG) 100 unit/mL (3 mL) InPn pen Inject 5-10 Units into the skin 3 (three) times daily with meals.    insulin detemir U-100, Levemir, (LEVEMIR FLEXPEN) 100 unit/mL (3 mL) InPn pen Inject 30 Units into the skin every evening.    lancets (ONETOUCH ULTRASOFT LANCETS) Misc Use 1 TID as directed for diabetes checking    lancets Misc To check BG 3 times daily, to use with insurance preferred meter    latanoprost 0.005 % ophthalmic solution Place 1 drop into both eyes every evening.    metFORMIN (GLUCOPHAGE) 1000 MG tablet TAKE 1 TABLET(1000 MG) BY MOUTH TWICE DAILY    metFORMIN (GLUCOPHAGE) 1000 MG tablet Take 1 tablet (1,000 mg total) by mouth 2 (two) times a day.    miconazole nitrate 200 mg/5 gram (4 %) Crea Place 1 application vaginally once daily.    mupirocin (BACTROBAN) 2 % ointment Apply topically 2 (two) times daily.    mupirocin (BACTROBAN) 2 % ointment Apply topically 2 (two) times daily. AAA    mupirocin (BACTROBAN) 2 % ointment Apply topically 3 (three) times daily.    nicotine (NICODERM CQ) 21 mg/24 hr Place 1 patch onto the skin once daily.    nicotine (NICODERM CQ) 21 mg/24 hr Place 1 patch onto the skin once daily.    olmesartan (BENICAR) 40 MG tablet Take 1 tablet (40 mg total) by mouth once daily.    olopatadine (PATANOL) 0.1 % ophthalmic solution Place 1 drop into both eyes 2 (two) times daily.    omeprazole (PRILOSEC) 40 MG capsule Take 1 capsule (40 mg total) by mouth once daily.    omeprazole (PRILOSEC) 40 MG capsule TAKE 1 CAPSULE(40 MG) BY MOUTH EVERY DAY    pantoprazole (PROTONIX) 40 MG tablet Take 1 tablet (40 mg total) by mouth once daily.    pen needle, diabetic (BD ULTRA-FINE ORIG PEN NEEDLE) 29 gauge x 1/2" Ndle USE TO TEST THREE TIMES DAILY MUST LAST 33 DAYS    plecanatide (TRULANCE) 3 mg Tab Take 3 mg by mouth once daily.    ramelteon (ROZEREM) 8 mg tablet Take 1 tablet (8 mg total) by mouth every evening.    semaglutide (OZEMPIC) 1 mg/dose (2 mg/1.5 mL) PnJey Inject 1 mg into " the skin every 7 days.    silver sulfADIAZINE 1% (SILVADENE) 1 % cream Apply topically 2 (two) times daily.    ticagrelor (BRILINTA) 90 mg tablet TAKE 1 TABLET(90 MG) BY MOUTH TWICE DAILY    traMADoL (ULTRAM) 50 mg tablet Take 1 tablet (50 mg total) by mouth every 24 hours as needed for Pain.    triamcinolone acetonide 0.1% (KENALOG) 0.1 % ointment APPLY TOPICALLY TO THE AFFECTED AREA TWICE DAILY    walker (ULTRA-LIGHT ROLLATOR) Misc 1 each by Misc.(Non-Drug; Combo Route) route once daily at 6am.    wheelchair Kelsey 1 each by Misc.(Non-Drug; Combo Route) route 2 (two) times daily. Power Wheel Chair     Antibiotics (From admission, onward)      Start     Stop Route Frequency Ordered    01/10/25 1215  mupirocin 2 % ointment  (DECOLONIZATION PROTOCOL ORDERS)         01/15/25 0859 Nasl 2 times daily 01/10/25 1113    01/09/25 1800  vancomycin 125 mg/5 mL oral solution 500 mg  (C. difficile Infection (CDI) Treatment Order Panel)         -- Oral Every 6 hours 01/09/25 1416    01/09/25 1530  metronidazole IVPB 500 mg  (C. difficile Infection (CDI) Treatment Order Panel)         -- IV Every 8 hours (non-standard times) 01/09/25 1416          Antifungals (From admission, onward)      Start     Stop Route Frequency Ordered    01/02/25 2100  miconazole NITRATE 2 % top powder         -- Top 2 times daily 01/02/25 1856          Antivirals (From admission, onward)      None             Immunization History   Administered Date(s) Administered    Influenza - Quadrivalent - PF *Preferred* (6 months and older) 10/21/2021       Family History       Problem Relation (Age of Onset)    Cancer Sister    Diabetes Mother, Sister, Brother, Maternal Aunt    Heart disease Maternal Aunt    Hyperlipidemia Mother, Sister, Brother    Hypertension Mother, Sister, Brother    Stroke Mother          Social History     Socioeconomic History    Marital status: Single   Tobacco Use    Smoking status: Every Day     Current packs/day: 1.50     Average  packs/day: 1.5 packs/day for 35.0 years (52.5 ttl pk-yrs)     Types: Cigarettes    Smokeless tobacco: Never   Substance and Sexual Activity    Alcohol use: Not Currently    Drug use: Not Currently     Social Drivers of Health     Financial Resource Strain: Low Risk  (12/31/2024)    Overall Financial Resource Strain (CARDIA)     Difficulty of Paying Living Expenses: Not hard at all   Food Insecurity: No Food Insecurity (12/31/2024)    Hunger Vital Sign     Worried About Running Out of Food in the Last Year: Never true     Ran Out of Food in the Last Year: Never true   Transportation Needs: No Transportation Needs (12/31/2024)    TRANSPORTATION NEEDS     Transportation : No   Physical Activity: Inactive (12/31/2024)    Exercise Vital Sign     Days of Exercise per Week: 0 days     Minutes of Exercise per Session: 0 min   Stress: No Stress Concern Present (12/31/2024)    [employer] - Occupational Stress Questionnaire     Feeling of Stress : Only a little   Housing Stability: Low Risk  (12/31/2024)    Housing Stability Vital Sign     Unable to Pay for Housing in the Last Year: No     Homeless in the Last Year: No     Review of Systems   Unable to perform ROS: Patient nonverbal     Objective:     Vital Signs (Most Recent):  Temp: 98.4 °F (36.9 °C) (01/10/25 1130)  Pulse: 87 (01/10/25 1130)  Resp: 18 (01/10/25 1130)  BP: 95/65 (01/10/25 1130)  SpO2: 95 % (01/10/25 1130) Vital Signs (24h Range):  Temp:  [94.3 °F (34.6 °C)-99.1 °F (37.3 °C)] 98.4 °F (36.9 °C)  Pulse:  [] 87  Resp:  [16-18] 18  SpO2:  [89 %-98 %] 95 %  BP: ()/(52-71) 95/65     Weight: 56.4 kg (124 lb 5.4 oz)  Body mass index is 22.03 kg/m².    Estimated Creatinine Clearance: 40.7 mL/min (based on SCr of 1.2 mg/dL).     Physical Exam  Vitals and nursing note reviewed.   Constitutional:       Appearance: She is ill-appearing.      Comments: Chronically ill appearing, cachectic   HENT:      Head: Normocephalic and  atraumatic.      Comments: Bilateral temporal wasting.     Nose: Nose normal.   Eyes:      Extraocular Movements: Extraocular movements intact.   Cardiovascular:      Rate and Rhythm: Normal rate and regular rhythm.   Abdominal:      General: Abdomen is flat. There is no distension.      Palpations: Abdomen is soft.      Tenderness: There is no abdominal tenderness. There is no guarding or rebound.   Musculoskeletal:      Cervical back: Normal range of motion.      Comments: Left hemiplegia   Skin:     General: Skin is warm.      Comments: Multiple wounds photos in chart   Neurological:      Mental Status: Mental status is at baseline.          Significant Labs: All pertinent labs within the past 24 hours have been reviewed.    Significant Imaging: I have reviewed all pertinent imaging results/findings within the past 24 hours.

## 2025-01-10 NOTE — CONSULTS
Received consult for PIV placement.  Pt currently has a working PIV and after consulting the primary nurse, pt without s/s of infiltration at this time.  Please re-consult [facility] should pt need new access.

## 2025-01-10 NOTE — ASSESSMENT & PLAN NOTE
Hypernatremia is likely due to Dehydration. monitor with hydration   Recent Labs     01/13/25  0818 01/14/25  0523 01/15/25  0241   * 135* 137     Improved with IV D5.   Resolved and now on free water flushes

## 2025-01-10 NOTE — PLAN OF CARE
Problem: Skin Injury Risk Increased  Goal: Skin Health and Integrity  Outcome: Progressing     Problem: Infection  Goal: Absence of Infection Signs and Symptoms  Outcome: Progressing     Problem: Adult Inpatient Plan of Care  Goal: Plan of Care Review  Outcome: Progressing  Goal: Patient-Specific Goal (Individualized)  Outcome: Progressing  Goal: Absence of Hospital-Acquired Illness or Injury  Outcome: Progressing  Goal: Optimal Comfort and Wellbeing  Outcome: Progressing  Goal: Readiness for Transition of Care  Outcome: Progressing     Problem: Diabetes Comorbidity  Goal: Blood Glucose Level Within Targeted Range  Outcome: Progressing     Problem: Acute Kidney Injury/Impairment  Goal: Fluid and Electrolyte Balance  Outcome: Progressing  Goal: Improved Oral Intake  Outcome: Progressing  Goal: Effective Renal Function  Outcome: Progressing     Problem: Wound  Goal: Optimal Coping  Outcome: Progressing  Goal: Optimal Functional Ability  Outcome: Progressing  Goal: Absence of Infection Signs and Symptoms  Outcome: Progressing  Goal: Improved Oral Intake  Outcome: Progressing  Goal: Optimal Pain Control and Function  Outcome: Progressing  Goal: Skin Health and Integrity  Outcome: Progressing  Goal: Optimal Wound Healing  Outcome: Progressing     Problem: Fall Injury Risk  Goal: Absence of Fall and Fall-Related Injury  Outcome: Progressing     Problem: Enteral Nutrition  Goal: Absence of Aspiration Signs and Symptoms  Outcome: Progressing  Goal: Safe, Effective Therapy Delivery  Outcome: Progressing  Goal: Feeding Tolerance  Outcome: Progressing     Problem: Fluid Volume Deficit  Goal: Fluid Balance  Outcome: Progressing     Problem: Electrolyte Imbalance  Goal: Electrolyte Balance  Outcome: Progressing     Problem: Nausea and Vomiting  Goal: Nausea and Vomiting Relief  Outcome: Progressing     Problem: Mobility Impairment  Goal: Optimal Mobility  Outcome: Progressing     Problem: Diarrhea  Goal: Effective Diarrhea  Management  Outcome: Progressing     Problem: Urinary Retention  Goal: Effective Urinary Elimination  Outcome: Progressing

## 2025-01-11 PROBLEM — E43 SEVERE MALNUTRITION: Status: ACTIVE | Noted: 2025-01-11

## 2025-01-11 LAB
ALBUMIN SERPL BCP-MCNC: 1.5 G/DL (ref 3.5–5.2)
ALP SERPL-CCNC: 369 U/L (ref 40–150)
ALT SERPL W/O P-5'-P-CCNC: 140 U/L (ref 10–44)
ANION GAP SERPL CALC-SCNC: 11 MMOL/L (ref 8–16)
ANISOCYTOSIS BLD QL SMEAR: SLIGHT
AST SERPL-CCNC: 125 U/L (ref 10–40)
BASOPHILS # BLD AUTO: 0.01 K/UL (ref 0–0.2)
BASOPHILS NFR BLD: 0.1 % (ref 0–1.9)
BILIRUB SERPL-MCNC: 0.9 MG/DL (ref 0.1–1)
BUN SERPL-MCNC: 16 MG/DL (ref 8–23)
CALCIUM SERPL-MCNC: 7.9 MG/DL (ref 8.7–10.5)
CHLORIDE SERPL-SCNC: 107 MMOL/L (ref 95–110)
CO2 SERPL-SCNC: 22 MMOL/L (ref 23–29)
CREAT SERPL-MCNC: 0.9 MG/DL (ref 0.5–1.4)
DIFFERENTIAL METHOD BLD: ABNORMAL
EOSINOPHIL # BLD AUTO: 0 K/UL (ref 0–0.5)
EOSINOPHIL NFR BLD: 0.3 % (ref 0–8)
ERYTHROCYTE [DISTWIDTH] IN BLOOD BY AUTOMATED COUNT: 25.3 % (ref 11.5–14.5)
EST. GFR  (NO RACE VARIABLE): >60 ML/MIN/1.73 M^2
GLUCOSE SERPL-MCNC: 208 MG/DL (ref 70–110)
HCT VFR BLD AUTO: 34.2 % (ref 37–48.5)
HGB BLD-MCNC: 12 G/DL (ref 12–16)
HYPOCHROMIA BLD QL SMEAR: ABNORMAL
IMM GRANULOCYTES # BLD AUTO: 0.32 K/UL (ref 0–0.04)
IMM GRANULOCYTES NFR BLD AUTO: 3 % (ref 0–0.5)
LYMPHOCYTES # BLD AUTO: 2.4 K/UL (ref 1–4.8)
LYMPHOCYTES NFR BLD: 22.1 % (ref 18–48)
MAGNESIUM SERPL-MCNC: 1.9 MG/DL (ref 1.6–2.6)
MCH RBC QN AUTO: 26.6 PG (ref 27–31)
MCHC RBC AUTO-ENTMCNC: 35.1 G/DL (ref 32–36)
MCV RBC AUTO: 76 FL (ref 82–98)
MONOCYTES # BLD AUTO: 0.5 K/UL (ref 0.3–1)
MONOCYTES NFR BLD: 4.3 % (ref 4–15)
NEUTROPHILS # BLD AUTO: 7.6 K/UL (ref 1.8–7.7)
NEUTROPHILS NFR BLD: 70.2 % (ref 38–73)
NRBC BLD-RTO: 1 /100 WBC
OVALOCYTES BLD QL SMEAR: ABNORMAL
PHOSPHATE SERPL-MCNC: 1 MG/DL (ref 2.7–4.5)
PLATELET # BLD AUTO: 80 K/UL (ref 150–450)
PLATELET BLD QL SMEAR: ABNORMAL
PMV BLD AUTO: ABNORMAL FL (ref 9.2–12.9)
POCT GLUCOSE: 179 MG/DL (ref 70–110)
POCT GLUCOSE: 190 MG/DL (ref 70–110)
POCT GLUCOSE: 199 MG/DL (ref 70–110)
POCT GLUCOSE: 206 MG/DL (ref 70–110)
POIKILOCYTOSIS BLD QL SMEAR: SLIGHT
POLYCHROMASIA BLD QL SMEAR: ABNORMAL
POTASSIUM SERPL-SCNC: 3.4 MMOL/L (ref 3.5–5.1)
PROT SERPL-MCNC: 5.4 G/DL (ref 6–8.4)
RBC # BLD AUTO: 4.51 M/UL (ref 4–5.4)
SODIUM SERPL-SCNC: 140 MMOL/L (ref 136–145)
TARGETS BLD QL SMEAR: ABNORMAL
WBC # BLD AUTO: 10.8 K/UL (ref 3.9–12.7)
WBC TOXIC VACUOLES BLD QL SMEAR: PRESENT

## 2025-01-11 PROCEDURE — 27000207 HC ISOLATION

## 2025-01-11 PROCEDURE — 36415 COLL VENOUS BLD VENIPUNCTURE: CPT | Performed by: INTERNAL MEDICINE

## 2025-01-11 PROCEDURE — 25000003 PHARM REV CODE 250: Performed by: HOSPITALIST

## 2025-01-11 PROCEDURE — 11000001 HC ACUTE MED/SURG PRIVATE ROOM

## 2025-01-11 PROCEDURE — 25000003 PHARM REV CODE 250

## 2025-01-11 PROCEDURE — 99231 SBSQ HOSP IP/OBS SF/LOW 25: CPT | Mod: ,,, | Performed by: SURGERY

## 2025-01-11 PROCEDURE — 63600175 PHARM REV CODE 636 W HCPCS

## 2025-01-11 PROCEDURE — 21400001 HC TELEMETRY ROOM

## 2025-01-11 PROCEDURE — 80053 COMPREHEN METABOLIC PANEL: CPT | Performed by: INTERNAL MEDICINE

## 2025-01-11 PROCEDURE — 85025 COMPLETE CBC W/AUTO DIFF WBC: CPT | Performed by: INTERNAL MEDICINE

## 2025-01-11 PROCEDURE — 94640 AIRWAY INHALATION TREATMENT: CPT

## 2025-01-11 PROCEDURE — 94761 N-INVAS EAR/PLS OXIMETRY MLT: CPT

## 2025-01-11 PROCEDURE — 83735 ASSAY OF MAGNESIUM: CPT | Performed by: INTERNAL MEDICINE

## 2025-01-11 PROCEDURE — 25000003 PHARM REV CODE 250: Performed by: INTERNAL MEDICINE

## 2025-01-11 PROCEDURE — 25000242 PHARM REV CODE 250 ALT 637 W/ HCPCS: Performed by: INTERNAL MEDICINE

## 2025-01-11 PROCEDURE — 63600175 PHARM REV CODE 636 W HCPCS: Performed by: INTERNAL MEDICINE

## 2025-01-11 PROCEDURE — 27000221 HC OXYGEN, UP TO 24 HOURS

## 2025-01-11 PROCEDURE — 99900035 HC TECH TIME PER 15 MIN (STAT)

## 2025-01-11 PROCEDURE — 84100 ASSAY OF PHOSPHORUS: CPT | Performed by: INTERNAL MEDICINE

## 2025-01-11 RX ORDER — ALBUTEROL SULFATE 2.5 MG/.5ML
2.5 SOLUTION RESPIRATORY (INHALATION)
Status: DISCONTINUED | OUTPATIENT
Start: 2025-01-11 | End: 2025-01-18 | Stop reason: HOSPADM

## 2025-01-11 RX ORDER — METOLAZONE 2.5 MG/1
5 TABLET ORAL 2 TIMES DAILY
Status: DISCONTINUED | OUTPATIENT
Start: 2025-01-11 | End: 2025-01-11

## 2025-01-11 RX ADMIN — ALBUTEROL SULFATE 2.5 MG: 2.5 SOLUTION RESPIRATORY (INHALATION) at 07:01

## 2025-01-11 RX ADMIN — DEXTROSE MONOHYDRATE 30 MMOL: 25000 INJECTION, SOLUTION INTRAVENOUS at 01:01

## 2025-01-11 RX ADMIN — VANCOMYCIN HYDROCHLORIDE 125 MG: KIT at 05:01

## 2025-01-11 RX ADMIN — METRONIDAZOLE 500 MG: 500 INJECTION, SOLUTION INTRAVENOUS at 07:01

## 2025-01-11 RX ADMIN — WHITE PETROLATUM: 1.75 OINTMENT TOPICAL at 09:01

## 2025-01-11 RX ADMIN — INSULIN ASPART 2 UNITS: 100 INJECTION, SOLUTION INTRAVENOUS; SUBCUTANEOUS at 09:01

## 2025-01-11 RX ADMIN — ONDANSETRON 4 MG: 2 INJECTION INTRAMUSCULAR; INTRAVENOUS at 06:01

## 2025-01-11 RX ADMIN — VANCOMYCIN HYDROCHLORIDE 125 MG: KIT at 12:01

## 2025-01-11 RX ADMIN — MICONAZOLE NITRATE 2 % TOPICAL POWDER: at 09:01

## 2025-01-11 RX ADMIN — WHITE PETROLATUM: 1.75 OINTMENT TOPICAL at 08:01

## 2025-01-11 RX ADMIN — MUPIROCIN: 20 OINTMENT TOPICAL at 08:01

## 2025-01-11 RX ADMIN — VANCOMYCIN HYDROCHLORIDE 500 MG: KIT at 06:01

## 2025-01-11 RX ADMIN — MUPIROCIN: 20 OINTMENT TOPICAL at 09:01

## 2025-01-11 RX ADMIN — THERA TABS 1 TABLET: TAB at 09:01

## 2025-01-11 RX ADMIN — METOLAZONE 5 MG: 2.5 TABLET ORAL at 09:01

## 2025-01-11 RX ADMIN — INSULIN ASPART 4 UNITS: 100 INJECTION, SOLUTION INTRAVENOUS; SUBCUTANEOUS at 01:01

## 2025-01-11 RX ADMIN — ASPIRIN 81 MG CHEWABLE TABLET 81 MG: 81 TABLET CHEWABLE at 09:01

## 2025-01-11 RX ADMIN — INSULIN GLARGINE 5 UNITS: 100 INJECTION, SOLUTION SUBCUTANEOUS at 09:01

## 2025-01-11 RX ADMIN — VANCOMYCIN HYDROCHLORIDE 125 MG: KIT at 11:01

## 2025-01-11 RX ADMIN — ALBUTEROL SULFATE 2.5 MG: 2.5 SOLUTION RESPIRATORY (INHALATION) at 03:01

## 2025-01-11 RX ADMIN — LOPERAMIDE HYDROCHLORIDE 2 MG: 2 CAPSULE ORAL at 08:01

## 2025-01-11 RX ADMIN — MICONAZOLE NITRATE 2 % TOPICAL POWDER: at 08:01

## 2025-01-11 NOTE — ASSESSMENT & PLAN NOTE
Refeeding syndrome  Nutrition consulted. Most recent weight and BMI monitored-     Measurements:  Wt Readings from Last 1 Encounters:   01/05/25 56.4 kg (124 lb 5.4 oz)   Body mass index is 22.03 kg/m².    Patient has been screened and assessed by RD.    Malnutrition Type:  Context:    Level:      Malnutrition Characteristic Summary:       Interventions/Recommendations (treatment strategy):  1. Continuous TF recommendation: Glucerna 1.5 @ 50 mL/hr to provide 1200 mL total volume, 1800 kcal, 150 g CHO, 99 g protein, 19 g fiber, 910 mL free water, 120% DRI   -150 mL flush q4 hrs to provide additional 900 mL free water (Total 1810mL) - nursing, please continue documenting TF rate via flowsheets 2. RD to monitor intake, labs, weight    Refeeding syndrome as defined by severe phosphatemia despite initial replacement attempts.    Phosphorus, magnesium and potassium replaced

## 2025-01-11 NOTE — PROGRESS NOTES
Hospital Medicine  Progress note    Team: [facility] MED R Tiana Lazar MD  Admit Date: 12/30/2024  Code status: Full Code    Principal Problem:  C. difficile colitis    Interval hx:  stools are no longer bloody. Blood tinged now. Still multiple BMs.     PEx  Temp:  [97.4 °F (36.3 °C)-99.1 °F (37.3 °C)]   Pulse:  [72-88]   Resp:  [18-20]   BP: ()/(63-79)   SpO2:  [93 %-96 %]     Intake/Output Summary (Last 24 hours) at 1/11/2025 1147  Last data filed at 1/11/2025 0731  Gross per 24 hour   Intake 4210.04 ml   Output 900 ml   Net 3310.04 ml     General Appearance: no acute distress, WD WN  Heart: regular rate and rhythm, no heave  Respiratory: Normal respiratory effort, symmetric excursion, bilateral vesicular breath sounds   Abdomen: Soft, non-tender; bowel sounds active  Skin: intact, no rash, no ulcers  Neurologic:  No focal numbness or weakness  Mental status: Alert, oriented x 4, affect appropriate    Recent Labs   Lab 01/09/25  2119 01/10/25  0333 01/11/25  1025   WBC 9.20 10.64 10.80   HGB 9.9* 13.5 12.0   HCT 29.2* 39.9 34.2*   * 102* 80*     Recent Labs   Lab 01/09/25  0646 01/09/25  2119 01/10/25  0333 01/11/25  1025   * 150* 149* 140   K 3.0* 3.1* 3.9 3.4*   * 111* 112* 107   CO2 23 26 21* 22*   BUN 21 21 20 16   CREATININE 1.0 0.9 1.2 0.9   GLU 54* 107 189* 208*   CALCIUM 7.4* 7.7* 8.2* 7.9*   MG 1.9 1.9  --  1.9   PHOS  --   --  2.5* 1.0*     Recent Labs   Lab 01/09/25  2119 01/10/25  0333 01/11/25  1025   ALKPHOS 438* 518* 369*   * 233* 140*   * 397* 125*   ALBUMIN 1.8* 1.9* 1.5*   PROT 5.1* 6.1 5.4*   BILITOT 2.1* 1.9* 0.9        Recent Labs   Lab 01/09/25  2127 01/10/25  0740 01/10/25  1130 01/10/25  1606 01/10/25  2126 01/11/25  0807   POCTGLUCOSE 119* 305* 271* 240* 283* 199*       Scheduled Meds:   albuterol sulfate  2.5 mg Nebulization Q4H WAKE    aspirin  81 mg Per G Tube Daily    insulin glargine U-100  5 Units Subcutaneous Daily    miconazole NITRATE 2 %    "Topical (Top) BID    multivitamin  1 tablet Per G Tube Daily    mupirocin   Nasal BID    potassium phosphate IVPB  30 mmol Intravenous Once    vancomycin  125 mg Oral Q6H    white petrolatum   Topical (Top) BID     Continuous Infusions:      As Needed:    Current Facility-Administered Medications:     0.9%  NaCl infusion (for blood administration), , Intravenous, Q24H PRN    albuterol, 2 puff, Inhalation, Q6H PRN **AND** MDI Q6H PRN, , , Q6H PRN    dextrose 50%, 12.5 g, Intravenous, PRN    dextrose 50%, 25 g, Intravenous, PRN    glucagon (human recombinant), 1 mg, Intramuscular, PRN    glucose, 16 g, Per G Tube, PRN    glucose, 24 g, Per G Tube, PRN    insulin aspart U-100, 0-10 Units, Subcutaneous, QID (AC + HS) PRN    loperamide, 2 mg, Per G Tube, QID PRN    melatonin, 6 mg, Per G Tube, Nightly PRN    naloxone, 0.02 mg, Intravenous, PRN    oxyCODONE-acetaminophen, 1 tablet, Per G Tube, Q4H PRN    sodium chloride 0.9%, 10 mL, Intravenous, PRN    sodium chloride 0.9%, 10 mL, Intravenous, Q12H PRN    Assessment and Plan  / Problems managed today    * C. difficile colitis  - CT abdomen 12/31 with diverticulitis with contained perforation. No associated rim enhancement to suggest drainable abscess at this time.  - VSS without leukocytosis  ;  non-surgical abdomen on exam  - CRS consult appreciated.   - Bloody clots starting 1/7 - CTA abdomen showed "Rectosigmoid proctocolitis, of numerous potential etiologies. Questionable cecitis as well. Correlate clinically, including for the possibility of early/mild pseudomembranous colitis."  - c diff antigen positive, c diff toxin, c diff toxin PCR positive  - started on metronidazole IV and vancomycin - ID consulted    Severe malnutrition  Nutrition consulted. Most recent weight and BMI monitored-     Measurements:  Wt Readings from Last 1 Encounters:   01/05/25 56.4 kg (124 lb 5.4 oz)   Body mass index is 22.03 kg/m².    Patient has been screened and assessed by " RD.    Malnutrition Type:  Context:    Level:      Malnutrition Characteristic Summary:       Interventions/Recommendations (treatment strategy):  1. Continuous TF recommendation: Glucerna 1.5 @ 50 mL/hr to provide 1200 mL total volume, 1800 kcal, 150 g CHO, 99 g protein, 19 g fiber, 910 mL free water, 120% DRI   -150 mL flush q4 hrs to provide additional 900 mL free water (Total 1810mL) - nursing, please continue documenting TF rate via flowsheets 2. RD to monitor intake, labs, weight   Resume tube feeds as able  Refeeding syndrome replace phosphorus, potassium and magnesium as needed   IV Kphos as neutraphos may exacerbate diarrhea    Acute blood loss anemia  Anemia is likely due to acute blood loss which was from C diff colitis . Most recent hemoglobin and hematocrit are listed below.  Recent Labs     01/09/25  1157 01/09/25  2119 01/10/25  0333   HGB 6.9* 9.9* 13.5   HCT 20.7* 29.2* 39.9     Plan  - Monitor serial CBC: Daily  - Transfuse PRBC if patient becomes hemodynamically unstable, symptomatic or H/H drops below 7/21.  - Patient has received 3 units of PRBCs on 1/10/2025  - Patient's anemia is currently improving  - transfused above threshhold    Chronic hypotension  Chronic due to malnutrition  Patient hypervolemic  Will defer volume resuscitation unless MAP <60    Dehydration  Due to diarrhea and holding of feedings  Resolved with IV D5    Acute hypoxemic respiratory failure  Patient with Hypoxic Respiratory failure which is Acute.  she is not on home oxygen. Supplemental oxygen was provided and noted-      Signs/symptoms of respiratory failure include- increased work of breathing and lethargy. Contributing diagnoses includes - Pleural effusion Labs and images were reviewed. Patient Has not had a recent ABG. Will treat underlying causes and adjust management of respiratory failure as follows-     - XR on 01/08 with evidence of hypervolemia--Lasix 40mg IV qd on 01/07-01/08  - will consider resuming  diuresis once diarrhea is less profuse    LFT elevation  Without inciting event on labs 01/04. No evidence of hypervolemia on CXR, no increased O2 requirement, no abdominal palpation. Shock liver a consideration given hypotension with MAP around 65 that morning.    - US Liver with doppler negative for acute abormality  - continue to monitor with daily labs, avoid hepatotoxic medications  - improving      Atelectasis of both lungs  I have personally reviewed Chest X-ray. Patient with atelectasis on interpretation. Likely Non-Obstructive atelectasis secondary to pleural effusion. Signs and symptoms include Shortness of breath and Hypoxemia Will begin treatment with upright positioning.    Hypophosphatemia  Patient's most recent phosphorus results are listed below.   Recent Labs     01/10/25  0333 01/11/25  1025   PHOS 2.5* 1.0*     Plan  - Will treat hypophosphatemia with prn supplementation  - refeeding. Replace as needed    Hypokalemia  Patient's most recent potassium results are listed below.   Recent Labs     01/09/25  0646 01/09/25  2119 01/10/25  0333   K 3.0* 3.1* 3.9     Plan  - Replete potassium per protocol  - Monitor potassium Daily  - Patient's hypokalemia is improving    Hypernatremia  Hypernatremia is likely due to Dehydration. monitor with hydration   Recent Labs     01/09/25  2119 01/10/25  0333 01/11/25  1025   * 149* 140     Improved with IV D5.     Dysphagia  - sp PEG for nutrition and meds  ;  okay for comfort po feeds per recent SLP recs  - NPO as above at this time  - IVFs  - advance TF as possible, but hold TF pending CTA results      Multiple wounds of skin  - Wound care consulted  - turn q2  - waffle mattress overlay  - clean and dressed        History of stroke with residual effects  - chronic left hemiplegia, bed bound, sp PEG though able to tolerate po comfort feed  - continue home ASA and statin  - turn q2h      12/31 CT head -No acute intracranial process. Prominent involutional  changes and chronic microvascular ischemic changes in the periventricular white matter.  Small areas of probable remote lacunar infarction in the bilateral basal ganglia and small probable chronic right parietal cortical infarct.     - Given ongoing lethargy repeat CT head negative for acute process  - EEG negative for epileptic activity    Insomnia  - home trazadone      Mixed hyperlipidemia  - home statin      Diet:  fluid restriction, Glucerna 50 mL/h  GI PPx: not needed  DVT PPx:  SCD/MIGUEL ANGEL  Airways: 2L oxygen  Wounds: left proximal arm     Goals of Care:  Return to prior functional status     Discharge Planning   ANA: 1/14/2025   Is the patient medically ready for discharge?:     Reason for patient still in hospital (select all that apply): Patient trending condition and Treatment  Discharge Plan A: Home, Home with family, Home Health, Other ([facility])   Discharge Delays: None known at this time    Tiana Lazar MD

## 2025-01-11 NOTE — NURSING
Patient is gurgling I think she is getting overloaded with fluids and her feeding. Her feeding has been off all night due to residual being 500 ml her urine output was 900 with 5 bloody watery bms. Her skin is getting breakdown. - MD Lazar notified    Order to resume feedings  Patient is also coughing up thick brown substance. MD aware

## 2025-01-11 NOTE — ASSESSMENT & PLAN NOTE
Patient's most recent phosphorus results are listed below.   Recent Labs     01/13/25  0818 01/14/25  0523 01/15/25  0241   PHOS 2.7 2.7 3.0     Plan  - Will treat hypophosphatemia with prn supplementation  - refeeding. Replace as needed

## 2025-01-11 NOTE — PROGRESS NOTES
Progress Note:     58 y/o F, bed-bound, with multiple co-morbidities including HTN, DM and prior CVA with significant functional deficits including dysphagia with PEG tube for feeds, left hemiplagia. Dependent on support for ADLs - has help from daughter/granddaughter.    Admission to hospital on 12/30 with failure to thrive, UTI, diarrhea in the setting of sigmoid diverticulitis. She is limited historian at baseline.  Abdominal exam benign when first seen, with unremarkable labwork aside from hypernatremia (147).  Hospital course complicated shock liver with transaminitis, lethargy/encephalopathy, hypotension.    Patient with rising WBC/CRP, and reported blood in stool. CRS consulted to re-evaluate.    Patient unable to provide any history - she is obtunded and non-verbal currently.      Overnight Events: NAEO, pt stable this am, hgb equilibrating, loose blood tinged Bms per nursing, nonverbal     Vitals:    01/11/25 1052   BP:    Pulse: 79   Resp:    Temp:      Intake/Output - Last 3 Shifts         01/09 0700  01/10 0659 01/10 0700  01/11 0659 01/11 0700 01/12 0659    I.V. (mL/kg)  3611.7 (64)     Blood 732.5      NG/GT 1500 100     IV Piggyback  498.4     Total Intake(mL/kg) 2232.5 (39.6) 4210 (74.6)     Urine (mL/kg/hr) 1900 (1.4)  900 (3.7)    Stool 0  0    Total Output 1900  900    Net +332.5 +4210 -900           Stool Occurrence 12 x 2 x 5 x            General: frail-appearing female, lethargic/obtunded  Resp: non-labored breathing   Abdomen: soft, non-distended. Unable to assess tenderness.  PEG tube in place, tube feeds actively running. Faint pfannenstiel scar noted  Ext: contracted LUE/LLE    Labs:  Lab Results   Component Value Date    WBC 10.80 01/11/2025    HGB 12.0 01/11/2025    HCT 34.2 (L) 01/11/2025    MCV 76 (L) 01/11/2025    PLT 80 (L) 01/11/2025       CMP  Sodium   Date Value Ref Range Status   01/11/2025 140 136 - 145 mmol/L Final     Potassium   Date Value Ref Range Status   01/11/2025 3.4  (L) 3.5 - 5.1 mmol/L Final     Chloride   Date Value Ref Range Status   01/11/2025 107 95 - 110 mmol/L Final     CO2   Date Value Ref Range Status   01/11/2025 22 (L) 23 - 29 mmol/L Final     Glucose   Date Value Ref Range Status   01/11/2025 208 (H) 70 - 110 mg/dL Final     BUN   Date Value Ref Range Status   01/11/2025 16 8 - 23 mg/dL Final     Creatinine   Date Value Ref Range Status   01/11/2025 0.9 0.5 - 1.4 mg/dL Final     Calcium   Date Value Ref Range Status   01/11/2025 7.9 (L) 8.7 - 10.5 mg/dL Final     Total Protein   Date Value Ref Range Status   01/11/2025 5.4 (L) 6.0 - 8.4 g/dL Final     Albumin   Date Value Ref Range Status   01/11/2025 1.5 (L) 3.5 - 5.2 g/dL Final     Total Bilirubin   Date Value Ref Range Status   01/11/2025 0.9 0.1 - 1.0 mg/dL Final     Comment:     For infants and newborns, interpretation of results should be based  on gestational age, weight and in agreement with clinical  observations.    Premature Infant recommended reference ranges:  Up to 24 hours.............<8.0 mg/dL  Up to 48 hours............<12.0 mg/dL  3-5 days..................<15.0 mg/dL  6-29 days.................<15.0 mg/dL       Alkaline Phosphatase   Date Value Ref Range Status   01/11/2025 369 (H) 40 - 150 U/L Final     AST   Date Value Ref Range Status   01/11/2025 125 (H) 10 - 40 U/L Final     ALT   Date Value Ref Range Status   01/11/2025 140 (H) 10 - 44 U/L Final     Anion Gap   Date Value Ref Range Status   01/11/2025 11 8 - 16 mmol/L Final     eGFR   Date Value Ref Range Status   01/11/2025 >60.0 >60 mL/min/1.73 m^2 Final     Lab Results   Component Value Date    .2 (H) 01/10/2025         Recommendation:    -appreciate ID recommendations  -would consult GI for consideration of colonoscopy to evaluate for source of blood loss  -Trend CRP/WBC  -Would consider initiating goals of care discussion with family given overall clinical picture

## 2025-01-11 NOTE — CARE UPDATE
"RAPID RESPONSE NURSE CHART REVIEW        Chart Reviewed: 01/11/2025, 12:55 AM    MRN: [id number]  Bed: 608/608 A    Dx: C. difficile colitis    Emily Martinez has a past medical history of Diabetes mellitus type I, Hyperlipidemia, Hypertension, and Right sided weakness.    Last VS: BP 96/67   Pulse 78   Temp 98 °F (36.7 °C)   Resp 18   Ht 5' 3" (1.6 m)   Wt 56.4 kg (124 lb 5.4 oz)   LMP  (LMP Unknown)   SpO2 (!) 94%   BMI 22.03 kg/m²     24H Vital Sign Range:  Temp:  [97.8 °F (36.6 °C)-99.1 °F (37.3 °C)]   Pulse:  [76-93]   Resp:  [16-20]   BP: ()/(60-79)   SpO2:  [94 %-96 %]     Level of Consciousness (AVPU): alert    Recent Labs     01/09/25  0646 01/09/25  1157 01/09/25  2119 01/10/25  0333   WBC 9.28  --  9.20 10.64   HGB 6.7* 6.9* 9.9* 13.5   HCT 20.1* 20.7* 29.2* 39.9   *  --  134* 102*       Recent Labs     01/08/25  0815 01/09/25  0646 01/09/25  2119 01/10/25  0333   * 150* 150* 149*   K 4.4 3.0* 3.1* 3.9   * 115* 111* 112*   CO2 23 23 26 21*   BUN 21 21 21 20   CREATININE 1.0 1.0 0.9 1.2    54* 107 189*   PHOS  --   --   --  2.5*   MG 2.1 1.9 1.9  --           OXYGEN:  Flow (L/min) (Oxygen Therapy): 2.5          MEWS score: 1    Bedside RNSonal contacted for altered mental status reports patient awake and alert but confused. No additional concerns verbalized at this time. Instructed to call [phone] for further concerns or assistance.    SID Gottlieb RN       "

## 2025-01-12 LAB
ALBUMIN SERPL BCP-MCNC: 1.6 G/DL (ref 3.5–5.2)
ALP SERPL-CCNC: 399 U/L (ref 40–150)
ALT SERPL W/O P-5'-P-CCNC: 103 U/L (ref 10–44)
ANION GAP SERPL CALC-SCNC: 13 MMOL/L (ref 8–16)
AST SERPL-CCNC: 90 U/L (ref 10–40)
BASOPHILS # BLD AUTO: 0.1 K/UL (ref 0–0.2)
BASOPHILS NFR BLD: 1 % (ref 0–1.9)
BILIRUB SERPL-MCNC: 0.8 MG/DL (ref 0.1–1)
BUN SERPL-MCNC: 17 MG/DL (ref 8–23)
CALCIUM SERPL-MCNC: 7.8 MG/DL (ref 8.7–10.5)
CHLORIDE SERPL-SCNC: 108 MMOL/L (ref 95–110)
CO2 SERPL-SCNC: 16 MMOL/L (ref 23–29)
CREAT SERPL-MCNC: 1.1 MG/DL (ref 0.5–1.4)
DIFFERENTIAL METHOD BLD: ABNORMAL
EOSINOPHIL # BLD AUTO: 0 K/UL (ref 0–0.5)
EOSINOPHIL NFR BLD: 0.2 % (ref 0–8)
ERYTHROCYTE [DISTWIDTH] IN BLOOD BY AUTOMATED COUNT: 25.7 % (ref 11.5–14.5)
EST. GFR  (NO RACE VARIABLE): 57.2 ML/MIN/1.73 M^2
GLUCOSE SERPL-MCNC: 190 MG/DL (ref 70–110)
HCT VFR BLD AUTO: 35.2 % (ref 37–48.5)
HGB BLD-MCNC: 12.1 G/DL (ref 12–16)
IMM GRANULOCYTES # BLD AUTO: 0.49 K/UL (ref 0–0.04)
IMM GRANULOCYTES NFR BLD AUTO: 4.8 % (ref 0–0.5)
LYMPHOCYTES # BLD AUTO: 1.8 K/UL (ref 1–4.8)
LYMPHOCYTES NFR BLD: 17.7 % (ref 18–48)
MAGNESIUM SERPL-MCNC: 2.1 MG/DL (ref 1.6–2.6)
MCH RBC QN AUTO: 26.5 PG (ref 27–31)
MCHC RBC AUTO-ENTMCNC: 34.4 G/DL (ref 32–36)
MCV RBC AUTO: 77 FL (ref 82–98)
MONOCYTES # BLD AUTO: 0.5 K/UL (ref 0.3–1)
MONOCYTES NFR BLD: 5 % (ref 4–15)
NEUTROPHILS # BLD AUTO: 7.3 K/UL (ref 1.8–7.7)
NEUTROPHILS NFR BLD: 71.3 % (ref 38–73)
NRBC BLD-RTO: 2 /100 WBC
PHOSPHATE SERPL-MCNC: 3.6 MG/DL (ref 2.7–4.5)
PLATELET # BLD AUTO: 84 K/UL (ref 150–450)
PLATELET BLD QL SMEAR: ABNORMAL
PMV BLD AUTO: ABNORMAL FL (ref 9.2–12.9)
POCT GLUCOSE: 191 MG/DL (ref 70–110)
POCT GLUCOSE: 197 MG/DL (ref 70–110)
POCT GLUCOSE: 200 MG/DL (ref 70–110)
POIKILOCYTOSIS BLD QL SMEAR: SLIGHT
POTASSIUM SERPL-SCNC: 5.4 MMOL/L (ref 3.5–5.1)
PROT SERPL-MCNC: 6.2 G/DL (ref 6–8.4)
RBC # BLD AUTO: 4.57 M/UL (ref 4–5.4)
SODIUM SERPL-SCNC: 137 MMOL/L (ref 136–145)
TARGETS BLD QL SMEAR: ABNORMAL
WBC # BLD AUTO: 10.19 K/UL (ref 3.9–12.7)

## 2025-01-12 PROCEDURE — 87040 BLOOD CULTURE FOR BACTERIA: CPT | Performed by: INTERNAL MEDICINE

## 2025-01-12 PROCEDURE — 83735 ASSAY OF MAGNESIUM: CPT | Performed by: INTERNAL MEDICINE

## 2025-01-12 PROCEDURE — 94640 AIRWAY INHALATION TREATMENT: CPT

## 2025-01-12 PROCEDURE — 85025 COMPLETE CBC W/AUTO DIFF WBC: CPT

## 2025-01-12 PROCEDURE — 80053 COMPREHEN METABOLIC PANEL: CPT

## 2025-01-12 PROCEDURE — 99900035 HC TECH TIME PER 15 MIN (STAT)

## 2025-01-12 PROCEDURE — 27000207 HC ISOLATION

## 2025-01-12 PROCEDURE — 25000003 PHARM REV CODE 250: Performed by: INTERNAL MEDICINE

## 2025-01-12 PROCEDURE — 21400001 HC TELEMETRY ROOM

## 2025-01-12 PROCEDURE — 84100 ASSAY OF PHOSPHORUS: CPT

## 2025-01-12 PROCEDURE — 51798 US URINE CAPACITY MEASURE: CPT

## 2025-01-12 PROCEDURE — 25000003 PHARM REV CODE 250

## 2025-01-12 PROCEDURE — 36415 COLL VENOUS BLD VENIPUNCTURE: CPT

## 2025-01-12 PROCEDURE — 25000242 PHARM REV CODE 250 ALT 637 W/ HCPCS: Performed by: INTERNAL MEDICINE

## 2025-01-12 PROCEDURE — 11000001 HC ACUTE MED/SURG PRIVATE ROOM

## 2025-01-12 PROCEDURE — 94667 MNPJ CHEST WALL 1ST: CPT

## 2025-01-12 PROCEDURE — 36415 COLL VENOUS BLD VENIPUNCTURE: CPT | Performed by: INTERNAL MEDICINE

## 2025-01-12 PROCEDURE — 25000003 PHARM REV CODE 250: Performed by: HOSPITALIST

## 2025-01-12 RX ORDER — ACETAMINOPHEN 325 MG/1
650 TABLET ORAL EVERY 4 HOURS PRN
Status: DISCONTINUED | OUTPATIENT
Start: 2025-01-12 | End: 2025-01-18 | Stop reason: HOSPADM

## 2025-01-12 RX ADMIN — INSULIN ASPART 2 UNITS: 100 INJECTION, SOLUTION INTRAVENOUS; SUBCUTANEOUS at 08:01

## 2025-01-12 RX ADMIN — VANCOMYCIN HYDROCHLORIDE 125 MG: KIT at 05:01

## 2025-01-12 RX ADMIN — INSULIN GLARGINE 5 UNITS: 100 INJECTION, SOLUTION SUBCUTANEOUS at 08:01

## 2025-01-12 RX ADMIN — MUPIROCIN: 20 OINTMENT TOPICAL at 08:01

## 2025-01-12 RX ADMIN — VANCOMYCIN HYDROCHLORIDE 125 MG: KIT at 12:01

## 2025-01-12 RX ADMIN — ASPIRIN 81 MG CHEWABLE TABLET 81 MG: 81 TABLET CHEWABLE at 08:01

## 2025-01-12 RX ADMIN — VANCOMYCIN HYDROCHLORIDE 125 MG: KIT at 11:01

## 2025-01-12 RX ADMIN — INSULIN ASPART 2 UNITS: 100 INJECTION, SOLUTION INTRAVENOUS; SUBCUTANEOUS at 05:01

## 2025-01-12 RX ADMIN — ACETAMINOPHEN 650 MG: 325 TABLET ORAL at 05:01

## 2025-01-12 RX ADMIN — ALBUTEROL SULFATE 2.5 MG: 2.5 SOLUTION RESPIRATORY (INHALATION) at 08:01

## 2025-01-12 RX ADMIN — WHITE PETROLATUM: 1.75 OINTMENT TOPICAL at 08:01

## 2025-01-12 RX ADMIN — THERA TABS 1 TABLET: TAB at 08:01

## 2025-01-12 RX ADMIN — INSULIN ASPART 2 UNITS: 100 INJECTION, SOLUTION INTRAVENOUS; SUBCUTANEOUS at 12:01

## 2025-01-12 RX ADMIN — MICONAZOLE NITRATE 2 % TOPICAL POWDER: at 08:01

## 2025-01-12 RX ADMIN — ALBUTEROL SULFATE 2.5 MG: 2.5 SOLUTION RESPIRATORY (INHALATION) at 02:01

## 2025-01-12 RX ADMIN — LOPERAMIDE HYDROCHLORIDE 2 MG: 2 CAPSULE ORAL at 08:01

## 2025-01-12 NOTE — PROGRESS NOTES
Hospital Medicine  Progress note    Team: [facility]OSP MED R Tiana Lazar MD  Admit Date: 12/30/2024  Code status: Full Code    Principal Problem:  C. difficile colitis    Interval hx:  stools are no longer bloody. One clot in stool recently. Still multiple BMs.     PEx  Temp:  [98 °F (36.7 °C)-98.9 °F (37.2 °C)]   Pulse:  [80-98]   Resp:  [18-21]   BP: ()/(62-79)   SpO2:  [89 %-97 %]     Intake/Output Summary (Last 24 hours) at 1/12/2025 1422  Last data filed at 1/12/2025 0606  Gross per 24 hour   Intake 1773.94 ml   Output 750 ml   Net 1023.94 ml     General Appearance: no acute distress, WD WN  Heart: regular rate and rhythm, no heave  Respiratory: Normal respiratory effort, symmetric excursion, bilateral vesicular breath sounds   Abdomen: Soft, non-tender; bowel sounds active  Skin: intact, no rash, no ulcers  Neurologic:  No focal numbness or weakness  Mental status: Alert, oriented x 4, affect appropriate    Recent Labs   Lab 01/10/25  0333 01/11/25  1025 01/12/25  1053   WBC 10.64 10.80 10.19   HGB 13.5 12.0 12.1   HCT 39.9 34.2* 35.2*   * 80* 84*     Recent Labs   Lab 01/09/25  2119 01/10/25  0333 01/11/25  1025 01/12/25  1053   * 149* 140 137   K 3.1* 3.9 3.4* 5.4*   * 112* 107 108   CO2 26 21* 22* 16*   BUN 21 20 16 17   CREATININE 0.9 1.2 0.9 1.1    189* 208* 190*   CALCIUM 7.7* 8.2* 7.9* 7.8*   MG 1.9  --  1.9 2.1   PHOS  --  2.5* 1.0* 3.6     Recent Labs   Lab 01/10/25  0333 01/11/25  1025 01/12/25  1053   ALKPHOS 518* 369* 399*   * 140* 103*   * 125* 90*   ALBUMIN 1.9* 1.5* 1.6*   PROT 6.1 5.4* 6.2   BILITOT 1.9* 0.9 0.8        Recent Labs   Lab 01/11/25  0807 01/11/25  1244 01/11/25  1516 01/11/25  2135 01/12/25  0800 01/12/25  1136   POCTGLUCOSE 199* 206* 179* 190* 200* 197*       Scheduled Meds:   albuterol sulfate  2.5 mg Nebulization Q4H WAKE    aspirin  81 mg Per G Tube Daily    insulin glargine U-100  5 Units Subcutaneous Daily    miconazole NITRATE 2 %  "  Topical (Top) BID    multivitamin  1 tablet Per G Tube Daily    mupirocin   Nasal BID    vancomycin  125 mg Per G Tube Q6H    white petrolatum   Topical (Top) BID     Continuous Infusions:      As Needed:    Current Facility-Administered Medications:     0.9%  NaCl infusion (for blood administration), , Intravenous, Q24H PRN    albuterol, 2 puff, Inhalation, Q6H PRN **AND** MDI Q6H PRN, , , Q6H PRN    dextrose 50%, 12.5 g, Intravenous, PRN    dextrose 50%, 25 g, Intravenous, PRN    glucagon (human recombinant), 1 mg, Intramuscular, PRN    glucose, 16 g, Per G Tube, PRN    glucose, 24 g, Per G Tube, PRN    insulin aspart U-100, 0-10 Units, Subcutaneous, QID (AC + HS) PRN    loperamide, 2 mg, Per G Tube, QID PRN    melatonin, 6 mg, Per G Tube, Nightly PRN    naloxone, 0.02 mg, Intravenous, PRN    oxyCODONE-acetaminophen, 1 tablet, Per G Tube, Q4H PRN    sodium chloride 0.9%, 10 mL, Intravenous, PRN    sodium chloride 0.9%, 10 mL, Intravenous, Q12H PRN    Assessment and Plan  / Problems managed today    * C. difficile colitis  - CT abdomen 12/31 with diverticulitis with contained perforation. No associated rim enhancement to suggest drainable abscess at this time.  - VSS without leukocytosis  ;  non-surgical abdomen on exam  - CRS consult appreciated.   - Bloody clots starting 1/7 - CTA abdomen showed "Rectosigmoid proctocolitis, of numerous potential etiologies. Questionable cecitis as well. Correlate clinically, including for the possibility of early/mild pseudomembranous colitis."  - c diff antigen positive, c diff toxin, c diff toxin PCR positive  - decreased vancomycin to 125 mg QID and stopped metronidazole.   - started on metronidazole IV and vancomycin - ID consulted    Severe malnutrition  Nutrition consulted. Most recent weight and BMI monitored-     Measurements:  Wt Readings from Last 1 Encounters:   01/05/25 56.4 kg (124 lb 5.4 oz)   Body mass index is 22.03 kg/m².    Patient has been screened and " assessed by RD.    Malnutrition Type:  Context:    Level:      Malnutrition Characteristic Summary:       Interventions/Recommendations (treatment strategy):  1. Continuous TF recommendation: Glucerna 1.5 @ 50 mL/hr to provide 1200 mL total volume, 1800 kcal, 150 g CHO, 99 g protein, 19 g fiber, 910 mL free water, 120% DRI   -150 mL flush q4 hrs to provide additional 900 mL free water (Total 1810mL) - nursing, please continue documenting TF rate via flowsheets 2. RD to monitor intake, labs, weight   Resume tube feeds as able  Refeeding syndrome replace phosphorus, potassium and magnesium as needed   IV Kphos as neutraphos may exacerbate diarrhea    Acute blood loss anemia  Anemia is likely due to acute blood loss which was from C diff colitis . Most recent hemoglobin and hematocrit are listed below.  Recent Labs     01/09/25  1157 01/09/25  2119 01/10/25  0333   HGB 6.9* 9.9* 13.5   HCT 20.7* 29.2* 39.9     Plan  - Monitor serial CBC: Daily  - Transfuse PRBC if patient becomes hemodynamically unstable, symptomatic or H/H drops below 7/21.  - Patient has received 3 units of PRBCs on 1/10/2025  - Patient's anemia is currently improving  - transfused above threshhold    Chronic hypotension  Chronic due to malnutrition  Patient hypervolemic  Will defer volume resuscitation unless MAP <60    Dehydration  Due to diarrhea and holding of feedings  Resolved with IV D5    Acute hypoxemic respiratory failure  Patient with Hypoxic Respiratory failure which is Acute.  she is not on home oxygen. Supplemental oxygen was provided and noted-      Signs/symptoms of respiratory failure include- increased work of breathing and lethargy. Contributing diagnoses includes - Pleural effusion Labs and images were reviewed. Patient Has not had a recent ABG. Will treat underlying causes and adjust management of respiratory failure as follows-     - XR on 01/08 with evidence of hypervolemia--Lasix 40mg IV qd on 01/07-01/08  - will consider  resuming diuresis once diarrhea is less profuse  - resolving    LFT elevation  Without inciting event on labs 01/04. No evidence of hypervolemia on CXR, no increased O2 requirement, no abdominal palpation. Shock liver a consideration given hypotension with MAP around 65 that morning.    - US Liver with doppler negative for acute abormality  - continue to monitor with daily labs, avoid hepatotoxic medications  - improving      Atelectasis of both lungs  I have personally reviewed Chest X-ray. Patient with atelectasis on interpretation. Likely Non-Obstructive atelectasis secondary to pleural effusion. Signs and symptoms include Shortness of breath and Hypoxemia Will begin treatment with upright positioning.    Hypophosphatemia  Patient's most recent phosphorus results are listed below.   Recent Labs     01/10/25  0333 01/11/25  1025   PHOS 2.5* 1.0*     Plan  - Will treat hypophosphatemia with prn supplementation  - refeeding. Replace as needed    Hypokalemia  Patient's most recent potassium results are listed below.   Recent Labs     01/09/25  0646 01/09/25  2119 01/10/25  0333   K 3.0* 3.1* 3.9     Plan  - Replete potassium per protocol  - Monitor potassium Daily  - Patient's hypokalemia is improving    Hypernatremia  Hypernatremia is likely due to Dehydration. monitor with hydration   Recent Labs     01/09/25  2119 01/10/25  0333 01/11/25  1025   * 149* 140     Improved with IV D5.     Dysphagia  - sp PEG for nutrition and meds  ;  okay for comfort po feeds per recent SLP recs  - NPO as above at this time  - IVFs  - advance TF as possible, but hold TF pending CTA results      Multiple wounds of skin  - Wound care consulted  - turn q2  - waffle mattress overlay  - clean and dressed    History of stroke with residual effects  - chronic left hemiplegia, bed bound, sp PEG though able to tolerate po comfort feed  - continue home ASA and statin  - turn q2h      12/31 CT head -No acute intracranial process.  Prominent involutional changes and chronic microvascular ischemic changes in the periventricular white matter.  Small areas of probable remote lacunar infarction in the bilateral basal ganglia and small probable chronic right parietal cortical infarct.     - Given ongoing lethargy repeat CT head negative for acute process  - EEG negative for epileptic activity    Insomnia  - home trazadone      Mixed hyperlipidemia  - home statin      Diet:  fluid restriction, Glucerna 50 mL/h  GI PPx: not needed  DVT PPx:  SCD/MIGUEL ANGEL  Airways: 2L oxygen  Wounds: left proximal arm     Goals of Care:  Return to prior functional status     Discharge Planning   ANA: 1/14/2025   Is the patient medically ready for discharge?:     Reason for patient still in hospital (select all that apply): Patient trending condition and Treatment  Discharge Plan A: Home, Home with family, Home Health, Other ([facility])   Discharge Delays: None known at this time    Tiana Lazar MD

## 2025-01-12 NOTE — PROGRESS NOTES
Pts continues with diarrhea, she did have 2 episodes of bloody stools and 1 episode of stool with very little blood then she passed 3 small clots. Her skin on the buttocks, valentino anal perineum and inner thighs are excoriated with ulcerations. Reported to  Who ordered flexi seal, wound care consult and zinc oxide to the area after each diarrhea stool. Isolation continues, PEG tube feeding continues, horton is draining to gravity, pt has been turned and repositioned q 2 hours

## 2025-01-13 PROBLEM — R50.9 FEVER: Status: ACTIVE | Noted: 2025-01-13

## 2025-01-13 LAB
ALBUMIN SERPL BCP-MCNC: 1.5 G/DL (ref 3.5–5.2)
ALP SERPL-CCNC: 389 U/L (ref 40–150)
ALT SERPL W/O P-5'-P-CCNC: 82 U/L (ref 10–44)
ANION GAP SERPL CALC-SCNC: 7 MMOL/L (ref 8–16)
APTT PPP: 30.1 SEC (ref 21–32)
AST SERPL-CCNC: 53 U/L (ref 10–40)
BACTERIA #/AREA URNS AUTO: ABNORMAL /HPF
BACTERIA BLD CULT: NORMAL
BACTERIA BLD CULT: NORMAL
BASOPHILS # BLD AUTO: 0.04 K/UL (ref 0–0.2)
BASOPHILS NFR BLD: 0.3 % (ref 0–1.9)
BILIRUB SERPL-MCNC: 0.8 MG/DL (ref 0.1–1)
BILIRUB UR QL STRIP: ABNORMAL
BUN SERPL-MCNC: 18 MG/DL (ref 8–23)
CALCIUM SERPL-MCNC: 7.8 MG/DL (ref 8.7–10.5)
CHLORIDE SERPL-SCNC: 103 MMOL/L (ref 95–110)
CLARITY UR REFRACT.AUTO: ABNORMAL
CO2 SERPL-SCNC: 22 MMOL/L (ref 23–29)
COLOR UR AUTO: YELLOW
CREAT SERPL-MCNC: 0.9 MG/DL (ref 0.5–1.4)
DIFFERENTIAL METHOD BLD: ABNORMAL
EOSINOPHIL # BLD AUTO: 0 K/UL (ref 0–0.5)
EOSINOPHIL NFR BLD: 0.1 % (ref 0–8)
ERYTHROCYTE [DISTWIDTH] IN BLOOD BY AUTOMATED COUNT: 25.6 % (ref 11.5–14.5)
EST. GFR  (NO RACE VARIABLE): >60 ML/MIN/1.73 M^2
GLUCOSE SERPL-MCNC: 253 MG/DL (ref 70–110)
GLUCOSE UR QL STRIP: NEGATIVE
HCT VFR BLD AUTO: 32.7 % (ref 37–48.5)
HGB BLD-MCNC: 11.2 G/DL (ref 12–16)
HGB UR QL STRIP: ABNORMAL
HYALINE CASTS UR QL AUTO: 1 /LPF
IMM GRANULOCYTES # BLD AUTO: 0.26 K/UL (ref 0–0.04)
IMM GRANULOCYTES NFR BLD AUTO: 2.1 % (ref 0–0.5)
INR PPP: 1.3 (ref 0.8–1.2)
KETONES UR QL STRIP: NEGATIVE
LEUKOCYTE ESTERASE UR QL STRIP: ABNORMAL
LYMPHOCYTES # BLD AUTO: 1.6 K/UL (ref 1–4.8)
LYMPHOCYTES NFR BLD: 12.8 % (ref 18–48)
MAGNESIUM SERPL-MCNC: 2.1 MG/DL (ref 1.6–2.6)
MCH RBC QN AUTO: 26.5 PG (ref 27–31)
MCHC RBC AUTO-ENTMCNC: 34.3 G/DL (ref 32–36)
MCV RBC AUTO: 77 FL (ref 82–98)
MICROSCOPIC COMMENT: ABNORMAL
MONOCYTES # BLD AUTO: 0.8 K/UL (ref 0.3–1)
MONOCYTES NFR BLD: 6.3 % (ref 4–15)
NEUTROPHILS # BLD AUTO: 9.9 K/UL (ref 1.8–7.7)
NEUTROPHILS NFR BLD: 78.4 % (ref 38–73)
NITRITE UR QL STRIP: NEGATIVE
NRBC BLD-RTO: 1 /100 WBC
OHS QRS DURATION: 78 MS
OHS QTC CALCULATION: 464 MS
PH UR STRIP: 7 [PH] (ref 5–8)
PHOSPHATE SERPL-MCNC: 2.7 MG/DL (ref 2.7–4.5)
PLATELET # BLD AUTO: 104 K/UL (ref 150–450)
PLATELET BLD QL SMEAR: ABNORMAL
PMV BLD AUTO: ABNORMAL FL (ref 9.2–12.9)
POCT GLUCOSE: 186 MG/DL (ref 70–110)
POCT GLUCOSE: 229 MG/DL (ref 70–110)
POCT GLUCOSE: 290 MG/DL (ref 70–110)
POIKILOCYTOSIS BLD QL SMEAR: SLIGHT
POTASSIUM SERPL-SCNC: 4.2 MMOL/L (ref 3.5–5.1)
PROT SERPL-MCNC: 5.9 G/DL (ref 6–8.4)
PROT UR QL STRIP: ABNORMAL
PROTHROMBIN TIME: 13.5 SEC (ref 9–12.5)
RBC # BLD AUTO: 4.23 M/UL (ref 4–5.4)
RBC #/AREA URNS AUTO: 22 /HPF (ref 0–4)
SODIUM SERPL-SCNC: 132 MMOL/L (ref 136–145)
SP GR UR STRIP: 1.02 (ref 1–1.03)
SQUAMOUS #/AREA URNS AUTO: 5 /HPF
TARGETS BLD QL SMEAR: ABNORMAL
URN SPEC COLLECT METH UR: ABNORMAL
WBC # BLD AUTO: 12.61 K/UL (ref 3.9–12.7)
WBC #/AREA URNS AUTO: 14 /HPF (ref 0–5)

## 2025-01-13 PROCEDURE — 36415 COLL VENOUS BLD VENIPUNCTURE: CPT

## 2025-01-13 PROCEDURE — 94761 N-INVAS EAR/PLS OXIMETRY MLT: CPT

## 2025-01-13 PROCEDURE — 36415 COLL VENOUS BLD VENIPUNCTURE: CPT | Mod: XB | Performed by: INTERNAL MEDICINE

## 2025-01-13 PROCEDURE — 27000221 HC OXYGEN, UP TO 24 HOURS

## 2025-01-13 PROCEDURE — 27000646 HC AEROBIKA DEVICE

## 2025-01-13 PROCEDURE — 87086 URINE CULTURE/COLONY COUNT: CPT | Performed by: INTERNAL MEDICINE

## 2025-01-13 PROCEDURE — 25000003 PHARM REV CODE 250

## 2025-01-13 PROCEDURE — 21400001 HC TELEMETRY ROOM

## 2025-01-13 PROCEDURE — 80053 COMPREHEN METABOLIC PANEL: CPT | Performed by: INTERNAL MEDICINE

## 2025-01-13 PROCEDURE — 87186 SC STD MICRODIL/AGAR DIL: CPT | Performed by: INTERNAL MEDICINE

## 2025-01-13 PROCEDURE — 51798 US URINE CAPACITY MEASURE: CPT

## 2025-01-13 PROCEDURE — 94640 AIRWAY INHALATION TREATMENT: CPT

## 2025-01-13 PROCEDURE — 81001 URINALYSIS AUTO W/SCOPE: CPT | Performed by: INTERNAL MEDICINE

## 2025-01-13 PROCEDURE — 25000003 PHARM REV CODE 250: Performed by: HOSPITALIST

## 2025-01-13 PROCEDURE — 99900035 HC TECH TIME PER 15 MIN (STAT)

## 2025-01-13 PROCEDURE — 27000207 HC ISOLATION

## 2025-01-13 PROCEDURE — 93005 ELECTROCARDIOGRAM TRACING: CPT

## 2025-01-13 PROCEDURE — 83735 ASSAY OF MAGNESIUM: CPT | Performed by: INTERNAL MEDICINE

## 2025-01-13 PROCEDURE — 94668 MNPJ CHEST WALL SBSQ: CPT

## 2025-01-13 PROCEDURE — 93010 ELECTROCARDIOGRAM REPORT: CPT | Mod: ,,, | Performed by: INTERNAL MEDICINE

## 2025-01-13 PROCEDURE — 63600175 PHARM REV CODE 636 W HCPCS: Performed by: INTERNAL MEDICINE

## 2025-01-13 PROCEDURE — 85730 THROMBOPLASTIN TIME PARTIAL: CPT | Mod: 91 | Performed by: INTERNAL MEDICINE

## 2025-01-13 PROCEDURE — 25000242 PHARM REV CODE 250 ALT 637 W/ HCPCS: Performed by: INTERNAL MEDICINE

## 2025-01-13 PROCEDURE — 25000003 PHARM REV CODE 250: Performed by: INTERNAL MEDICINE

## 2025-01-13 PROCEDURE — 85025 COMPLETE CBC W/AUTO DIFF WBC: CPT

## 2025-01-13 PROCEDURE — 11000001 HC ACUTE MED/SURG PRIVATE ROOM

## 2025-01-13 PROCEDURE — 85610 PROTHROMBIN TIME: CPT | Performed by: INTERNAL MEDICINE

## 2025-01-13 PROCEDURE — 87088 URINE BACTERIA CULTURE: CPT | Performed by: INTERNAL MEDICINE

## 2025-01-13 PROCEDURE — 84100 ASSAY OF PHOSPHORUS: CPT | Performed by: INTERNAL MEDICINE

## 2025-01-13 PROCEDURE — 94664 DEMO&/EVAL PT USE INHALER: CPT

## 2025-01-13 RX ORDER — ONDANSETRON HYDROCHLORIDE 2 MG/ML
4 INJECTION, SOLUTION INTRAVENOUS EVERY 8 HOURS PRN
Status: DISCONTINUED | OUTPATIENT
Start: 2025-01-13 | End: 2025-01-13

## 2025-01-13 RX ORDER — HEPARIN SODIUM,PORCINE/D5W 25000/250
0-40 INTRAVENOUS SOLUTION INTRAVENOUS CONTINUOUS
Status: DISCONTINUED | OUTPATIENT
Start: 2025-01-13 | End: 2025-01-15

## 2025-01-13 RX ORDER — PROCHLORPERAZINE EDISYLATE 5 MG/ML
5 INJECTION INTRAMUSCULAR; INTRAVENOUS EVERY 4 HOURS PRN
Status: DISCONTINUED | OUTPATIENT
Start: 2025-01-13 | End: 2025-01-18 | Stop reason: HOSPADM

## 2025-01-13 RX ORDER — PANTOPRAZOLE SODIUM 40 MG/1
40 FOR SUSPENSION ORAL 2 TIMES DAILY
Status: DISCONTINUED | OUTPATIENT
Start: 2025-01-13 | End: 2025-01-15

## 2025-01-13 RX ORDER — ONDANSETRON 4 MG/1
4 TABLET, ORALLY DISINTEGRATING ORAL EVERY 8 HOURS PRN
Status: DISCONTINUED | OUTPATIENT
Start: 2025-01-13 | End: 2025-01-15

## 2025-01-13 RX ORDER — INSULIN GLARGINE 100 [IU]/ML
5 INJECTION, SOLUTION SUBCUTANEOUS 2 TIMES DAILY
Status: DISCONTINUED | OUTPATIENT
Start: 2025-01-13 | End: 2025-01-15

## 2025-01-13 RX ADMIN — MICONAZOLE NITRATE 2 % TOPICAL POWDER: at 11:01

## 2025-01-13 RX ADMIN — INSULIN GLARGINE 5 UNITS: 100 INJECTION, SOLUTION SUBCUTANEOUS at 08:01

## 2025-01-13 RX ADMIN — ASPIRIN 81 MG CHEWABLE TABLET 81 MG: 81 TABLET CHEWABLE at 11:01

## 2025-01-13 RX ADMIN — VANCOMYCIN HYDROCHLORIDE 125 MG: KIT at 12:01

## 2025-01-13 RX ADMIN — VANCOMYCIN HYDROCHLORIDE 125 MG: KIT at 05:01

## 2025-01-13 RX ADMIN — MUPIROCIN: 20 OINTMENT TOPICAL at 08:01

## 2025-01-13 RX ADMIN — DIBASIC SODIUM PHOSPHATE, MONOBASIC POTASSIUM PHOSPHATE AND MONOBASIC SODIUM PHOSPHATE 2 TABLET: 852; 155; 130 TABLET ORAL at 04:01

## 2025-01-13 RX ADMIN — INSULIN ASPART 4 UNITS: 100 INJECTION, SOLUTION INTRAVENOUS; SUBCUTANEOUS at 04:01

## 2025-01-13 RX ADMIN — MUPIROCIN: 20 OINTMENT TOPICAL at 11:01

## 2025-01-13 RX ADMIN — WHITE PETROLATUM: 1.75 OINTMENT TOPICAL at 08:01

## 2025-01-13 RX ADMIN — INSULIN GLARGINE 5 UNITS: 100 INJECTION, SOLUTION SUBCUTANEOUS at 11:01

## 2025-01-13 RX ADMIN — ALBUTEROL SULFATE 2.5 MG: 2.5 SOLUTION RESPIRATORY (INHALATION) at 03:01

## 2025-01-13 RX ADMIN — HEPARIN SODIUM 18 UNITS/KG/HR: 10000 INJECTION, SOLUTION INTRAVENOUS at 07:01

## 2025-01-13 RX ADMIN — PANTOPRAZOLE SODIUM 40 MG: 40 GRANULE, DELAYED RELEASE ORAL at 08:01

## 2025-01-13 RX ADMIN — OXYCODONE AND ACETAMINOPHEN 1 TABLET: 7.5; 325 TABLET ORAL at 06:01

## 2025-01-13 RX ADMIN — WHITE PETROLATUM: 1.75 OINTMENT TOPICAL at 11:01

## 2025-01-13 RX ADMIN — ALBUTEROL SULFATE 2.5 MG: 2.5 SOLUTION RESPIRATORY (INHALATION) at 09:01

## 2025-01-13 RX ADMIN — PANTOPRAZOLE SODIUM 40 MG: 40 GRANULE, DELAYED RELEASE ORAL at 01:01

## 2025-01-13 RX ADMIN — ALBUTEROL SULFATE 2.5 MG: 2.5 SOLUTION RESPIRATORY (INHALATION) at 08:01

## 2025-01-13 RX ADMIN — ALBUTEROL SULFATE 2.5 MG: 2.5 SOLUTION RESPIRATORY (INHALATION) at 12:01

## 2025-01-13 RX ADMIN — INSULIN ASPART 6 UNITS: 100 INJECTION, SOLUTION INTRAVENOUS; SUBCUTANEOUS at 11:01

## 2025-01-13 RX ADMIN — THERA TABS 1 TABLET: TAB at 11:01

## 2025-01-13 RX ADMIN — MICONAZOLE NITRATE 2 % TOPICAL POWDER: at 08:01

## 2025-01-13 NOTE — PROGRESS NOTES
[facility] Med [facility]  Adult Nutrition  Progress Note    SUMMARY       Recommendations    1. Continuous TF recommendation: Glucerna 1.5 @ 50 mL/hr to provide 1200 mL total volume, 1800 kcal, 150 g CHO, 99 g protein, 19 g fiber, 910 mL free water, 120% DRI     -150 mL flush q4 hrs to provide additional 900 mL free water (Total 1810mL)    - nursing, please continue documenting TF rate via flowsheets   2. Recommend kiran BID (add as modular for TF)  3. RD to monitor intake, labs, weight    Goals:   1. % nutritional needs met with diet   2. Maintain weight during admission  Nutrition Goal Status: progressing towards goal  Communication of RD Recs:  (POC)    Assessment and Plan    Nutrition Problem  Increased nutrient needs (protein, vit/min)    Related to (etiology):   Metabolic demand of healing    Signs and Symptoms (as evidenced by):   stage 2 pressure injury (left arm and left upper quadrant)     Interventions/Recommendations (treatment strategy):  Collaboration with other providers    Nutrition Diagnosis Status:   New          Nutrition Problem  Difficulty swallowing      Related to (etiology):   Dysphagia      Signs and Symptoms (as evidenced by):   PEG tube      Interventions/Recommendations (treatment strategy):  Collaboration of nutrition care with other providers   EN     Nutrition Diagnosis Status:   Continues    Nutrition Problem  Inadequate enteral infusion     Related to (etiology):   TF running @ 30 ml/hr (goal rate @ 50 ml/hr)     Signs and Symptoms (as evidenced by):   Pt energy needs > intake      Interventions/Recommendations (treatment strategy):  Collaboration with other providers     Nutrition Diagnosis Status:   Resolved   Reason for Assessment    Reason For Assessment: RD follow-up  Diagnosis: other (see comments) (C. difficile colitis)  General Information Comments: Pt admitted with C. difficile colitis. Pt has several wounds - stage 2 pressure injury (left arm and left upper quadrant). Pt  "vomited this morning  BM: 1/12 - pt having diarrhea with blood. 3+ edema. TF currently running at goal rate.  Nutrition Discharge Planning: Pending clinical course    Nutrition/Diet History    Spiritual, Cultural Beliefs, Adventism Practices, Values that Affect Care: no  Food Allergies: NKFA  Factors Affecting Nutritional Intake: NPO    Nutrition Related Social Determinants of Health: SDOH: Adequate food in home environment          Food Insecurity: No Food Insecurity (12/31/2024)     Hunger Vital Sign      Worried About Running Out of Food in the Last Year: Never true      Ran Out of Food in the Last Year: Never true     Anthropometrics    Temp: 97.4 °F (36.3 °C)  Height Method: Estimated  Height: 5' 3" (160 cm)  Height (inches): 63 in  Weight Method: Bed Scale  Weight: 56.4 kg (124 lb 5.4 oz)  Weight (lb): 124.34 lb  Ideal Body Weight (IBW), Female: 115 lb  % Ideal Body Weight, Female (lb): 121.73 %  BMI (Calculated): 22  BMI Grade: 18.5-24.9 - normal     Lab/Procedures/Meds    Pertinent Labs Reviewed: reviewed  Pertinent Labs Comments: H/H 11.2/32.7 low, K 5.4 high, GFR 57.2 low, glucose 190 high, Ca 7.8 low,  high, albumin 1.6 low, AST 90 high,  high, .2 high  Pertinent Medications Reviewed: reviewed  Pertinent Medications Comments: aspirin, lantus MVI, vancomycin    Estimated/Assessed Needs    Weight Used For Calorie Calculations: 63.5 kg (139 lb 15.9 oz)  Energy Calorie Requirements (kcal): 1538-6420 kcal/day (30-35 kcal/kg)  Energy Need Method: Kcal/kg  Protein Requirements: 76-95 g/day (1.2-1.5 g/kg)  Weight Used For Protein Calculations: 63.5 kg (139 lb 15.9 oz)     Estimated Fluid Requirement Method: RDA Method  RDA Method (mL): 1905  CHO Requirement: 238-278 g/day    Nutrition Prescription Ordered    Current Diet Order: NPO    Evaluation of Received Nutrient/Fluid Intake    Enteral Calories (kcal): 1800  Enteral Protein (gm): 99  Enteral (Free Water) Fluid (mL): 910  Free Water Flush " Fluid (mL):  (per MD/provider)  % Intake of Estimated Energy Needs: 75 - 100 %  % Meal Intake: NPO    Nutrition Risk    Level of Risk/Frequency of Follow-up: high     Monitor and Evaluation    Food and Nutrient Intake: enteral nutrition intake  Food and Nutrient Adminstration: enteral and parenteral nutrition administration  Knowledge/Beliefs/Attitudes: food and nutrition knowledge/skill  Physical Activity and Function: nutrition-related ADLs and IADLs  Anthropometric Measurements: weight, weight change, body mass index  Biochemical Data, Medical Tests and Procedures: electrolyte and renal panel, gastrointestinal profile, glucose/endocrine profile, inflammatory profile, lipid profile  Nutrition-Focused Physical Findings: overall appearance     Nutrition Follow-Up    RD Follow-up?: Yes

## 2025-01-13 NOTE — PROGRESS NOTES
Progress Note:     60 y/o F, bed-bound, with multiple co-morbidities including HTN, DM and prior CVA with significant functional deficits including dysphagia with PEG tube for feeds, left hemiplagia. Dependent on support for ADLs - has help from daughter/granddaughter.    Admission to hospital on 12/30 with failure to thrive, UTI, diarrhea in the setting of sigmoid diverticulitis. She is limited historian at baseline.  Abdominal exam benign when first seen, with unremarkable labwork aside from hypernatremia (147).  Hospital course complicated shock liver with transaminitis, lethargy/encephalopathy, hypotension.    Patient with rising WBC/CRP, and reported blood in stool. CRS consulted to re-evaluate.    Patient unable to provide any history - she is obtunded and non-verbal currently.      Overnight Events: Pt with fecal management system in place without blood, vomited X1 - nursing noted blood streaks in vomitus     Vitals:    01/13/25 0600   BP:    Pulse:    Resp: 18   Temp:      Intake/Output - Last 3 Shifts         01/11 0700  01/12 0659 01/12 0700  01/13 0659 01/13 0700  01/14 0659    I.V. (mL/kg)       NG/GT 1300 2250     IV Piggyback 473.9      Total Intake(mL/kg) 1773.9 (31.5) 2250 (39.9)     Urine (mL/kg/hr) 1400 (1) 750 (0.6)     Drains 250 0     Stool 0 800     Total Output 1650 1550     Net +123.9 +700            Stool Occurrence 13 x 3 x             General: frail-appearing female, lethargic/obtunded  Resp: non-labored breathing   Abdomen: soft, non-distended. Unable to assess tenderness.  PEG tube in place, tube feeds actively running. Faint pfannenstiel scar noted  Ext: contracted LUE/LLE    Labs:  Lab Results   Component Value Date    WBC 12.61 01/13/2025    HGB 11.2 (L) 01/13/2025    HCT 32.7 (L) 01/13/2025    MCV 77 (L) 01/13/2025     (L) 01/13/2025       CMP  Sodium   Date Value Ref Range Status   01/12/2025 137 136 - 145 mmol/L Final     Potassium   Date Value Ref Range Status   01/12/2025  5.4 (H) 3.5 - 5.1 mmol/L Final     Comment:     *Result may be interfered by visible hemolysis     Chloride   Date Value Ref Range Status   01/12/2025 108 95 - 110 mmol/L Final     CO2   Date Value Ref Range Status   01/12/2025 16 (L) 23 - 29 mmol/L Final     Glucose   Date Value Ref Range Status   01/12/2025 190 (H) 70 - 110 mg/dL Final     BUN   Date Value Ref Range Status   01/12/2025 17 8 - 23 mg/dL Final     Creatinine   Date Value Ref Range Status   01/12/2025 1.1 0.5 - 1.4 mg/dL Final     Calcium   Date Value Ref Range Status   01/12/2025 7.8 (L) 8.7 - 10.5 mg/dL Final     Total Protein   Date Value Ref Range Status   01/12/2025 6.2 6.0 - 8.4 g/dL Final     Comment:     *Result may be interfered by visible hemolysis     Albumin   Date Value Ref Range Status   01/12/2025 1.6 (L) 3.5 - 5.2 g/dL Final     Total Bilirubin   Date Value Ref Range Status   01/12/2025 0.8 0.1 - 1.0 mg/dL Final     Comment:     For infants and newborns, interpretation of results should be based  on gestational age, weight and in agreement with clinical  observations.    Premature Infant recommended reference ranges:  Up to 24 hours.............<8.0 mg/dL  Up to 48 hours............<12.0 mg/dL  3-5 days..................<15.0 mg/dL  6-29 days.................<15.0 mg/dL       Alkaline Phosphatase   Date Value Ref Range Status   01/12/2025 399 (H) 40 - 150 U/L Final     AST   Date Value Ref Range Status   01/12/2025 90 (H) 10 - 40 U/L Final     Comment:     *Result may be interfered by visible hemolysis     ALT   Date Value Ref Range Status   01/12/2025 103 (H) 10 - 44 U/L Final     Anion Gap   Date Value Ref Range Status   01/12/2025 13 8 - 16 mmol/L Final     eGFR   Date Value Ref Range Status   01/12/2025 57.2 (A) >60 mL/min/1.73 m^2 Final     Lab Results   Component Value Date    .2 (H) 01/10/2025         Recommendation:    -Rectal bleeding has abated  -Patient is a very poor surgical candidate in general  -Would consider  initiating a proactive goals of care discussion with family given overall clinical picture  -CRS will sign off, please reconsult with further questions

## 2025-01-13 NOTE — PROGRESS NOTES
Hospital Medicine  Progress note    Team: [facility] Tiana Lazar MD  Admit Date: 12/30/2024  Code status: Full Code    Principal Problem:  C. difficile colitis    Interval hx:  stools are no longer bloody. One clot in stool recently. Still multiple BMs.     PEx  Temp:  [97.6 °F (36.4 °C)-101.2 °F (38.4 °C)]   Pulse:  [68-99]   Resp:  [18]   BP: (108-141)/(61-77)   SpO2:  [88 %-97 %]     Intake/Output Summary (Last 24 hours) at 1/13/2025 1132  Last data filed at 1/13/2025 0617  Gross per 24 hour   Intake 2250 ml   Output 1550 ml   Net 700 ml     General Appearance: no acute distress, WD WN  Heart: regular rate and rhythm, no heave  Respiratory: Normal respiratory effort, symmetric excursion, bilateral vesicular breath sounds   Abdomen: Soft, non-tender; bowel sounds active  Skin: intact, no rash, no ulcers  Neurologic:  No focal numbness or weakness  Mental status: Alert, oriented x 4, affect appropriate    Recent Labs   Lab 01/11/25  1025 01/12/25  1053 01/13/25  0538   WBC 10.80 10.19 12.61   HGB 12.0 12.1 11.2*   HCT 34.2* 35.2* 32.7*   PLT 80* 84* 104*     Recent Labs   Lab 01/09/25  2119 01/10/25  0333 01/11/25  1025 01/12/25  1053 01/13/25  0818   * 149* 140 137 132*   K 3.1* 3.9 3.4* 5.4* 4.2   * 112* 107 108 103   CO2 26 21* 22* 16* 22*   BUN 21 20 16 17 18   CREATININE 0.9 1.2 0.9 1.1 0.9    189* 208* 190* 253*   CALCIUM 7.7* 8.2* 7.9* 7.8* 7.8*   MG 1.9  --  1.9 2.1  --    PHOS  --  2.5* 1.0* 3.6  --      Recent Labs   Lab 01/11/25  1025 01/12/25  1053 01/13/25  0818   ALKPHOS 369* 399* 389*   * 103* 82*   * 90* 53*   ALBUMIN 1.5* 1.6* 1.5*   PROT 5.4* 6.2 5.9*   BILITOT 0.9 0.8 0.8        Recent Labs   Lab 01/11/25  1516 01/11/25  2135 01/12/25  0800 01/12/25  1136 01/12/25  1614 01/13/25  1104   POCTGLUCOSE 179* 190* 200* 197* 191* 290*       Scheduled Meds:   albuterol sulfate  2.5 mg Nebulization Q4H WAKE    aspirin  81 mg Per G Tube Daily    insulin glargine U-100  " 5 Units Subcutaneous Daily    miconazole NITRATE 2 %   Topical (Top) BID    multivitamin  1 tablet Per G Tube Daily    mupirocin   Nasal BID    vancomycin  125 mg Per G Tube Q6H    white petrolatum   Topical (Top) BID     Continuous Infusions:      As Needed:    Current Facility-Administered Medications:     0.9%  NaCl infusion (for blood administration), , Intravenous, Q24H PRN    acetaminophen, 650 mg, Per G Tube, Q4H PRN    albuterol, 2 puff, Inhalation, Q6H PRN **AND** MDI Q6H PRN, , , Q6H PRN    dextrose 50%, 12.5 g, Intravenous, PRN    dextrose 50%, 25 g, Intravenous, PRN    glucagon (human recombinant), 1 mg, Intramuscular, PRN    glucose, 16 g, Per G Tube, PRN    glucose, 24 g, Per G Tube, PRN    insulin aspart U-100, 0-10 Units, Subcutaneous, QID (AC + HS) PRN    loperamide, 2 mg, Per G Tube, QID PRN    melatonin, 6 mg, Per G Tube, Nightly PRN    naloxone, 0.02 mg, Intravenous, PRN    ondansetron, 4 mg, Oral, Q8H PRN    ondansetron, 4 mg, Intravenous, Q8H PRN    oxyCODONE-acetaminophen, 1 tablet, Per G Tube, Q4H PRN    sodium chloride 0.9%, 10 mL, Intravenous, PRN    sodium chloride 0.9%, 10 mL, Intravenous, Q12H PRN    Assessment and Plan  / Problems managed today    * C. difficile colitis  - CT abdomen 12/31 with diverticulitis with contained perforation. No associated rim enhancement to suggest drainable abscess at this time.  - VSS without leukocytosis  ;  non-surgical abdomen on exam  - CRS consult appreciated.   - Bloody clots starting 1/7 - CTA abdomen showed "Rectosigmoid proctocolitis, of numerous potential etiologies. Questionable cecitis as well. Correlate clinically, including for the possibility of early/mild pseudomembranous colitis."  - c diff antigen positive, c diff toxin, c diff toxin PCR positive  - decreased vancomycin to 125 mg QID and stopped metronidazole.   - started on metronidazole IV and vancomycin - ID consulted    Fever  Patient had temp 101.2. She was on Pricilla hugger at time. " UA done after horton catheter removed was slightly positive. Blood culture pending. CXR pending. Spoke to ID. Prefer to wait for culture results and monitor vitals off mic hugger to consider antibiotic treatment as it less likely she has new infectious process.     Severe malnutrition  Nutrition consulted. Most recent weight and BMI monitored-     Measurements:  Wt Readings from Last 1 Encounters:   01/05/25 56.4 kg (124 lb 5.4 oz)   Body mass index is 22.03 kg/m².    Patient has been screened and assessed by RD.    Malnutrition Type:  Context:    Level:      Malnutrition Characteristic Summary:       Interventions/Recommendations (treatment strategy):  1. Continuous TF recommendation: Glucerna 1.5 @ 50 mL/hr to provide 1200 mL total volume, 1800 kcal, 150 g CHO, 99 g protein, 19 g fiber, 910 mL free water, 120% DRI   -150 mL flush q4 hrs to provide additional 900 mL free water (Total 1810mL) - nursing, please continue documenting TF rate via flowsheets 2. RD to monitor intake, labs, weight   Resume tube feeds as able  Refeeding syndrome replace phosphorus, potassium and magnesium as needed   IV Kphos as neutraphos may exacerbate diarrhea    Acute blood loss anemia  Anemia is likely due to acute blood loss which was from C diff colitis . Most recent hemoglobin and hematocrit are listed below.  Recent Labs     01/09/25  1157 01/09/25  2119 01/10/25  0333   HGB 6.9* 9.9* 13.5   HCT 20.7* 29.2* 39.9     Plan  - Monitor serial CBC: Daily  - Transfuse PRBC if patient becomes hemodynamically unstable, symptomatic or H/H drops below 7/21.  - Patient has received 3 units of PRBCs on 1/10/2025  - Patient's anemia is currently improving  - transfused above threshhold    Chronic hypotension  Chronic due to malnutrition  Patient hypervolemic  Will defer volume resuscitation unless MAP <60    Dehydration  Due to diarrhea and holding of feedings  Resolved with IV D5    Acute hypoxemic respiratory failure  Patient with Hypoxic  Respiratory failure which is Acute.  she is not on home oxygen. Supplemental oxygen was provided and noted-      Signs/symptoms of respiratory failure include- increased work of breathing and lethargy. Contributing diagnoses includes - Pleural effusion Labs and images were reviewed. Patient Has not had a recent ABG. Will treat underlying causes and adjust management of respiratory failure as follows-     - XR on 01/08 with evidence of hypervolemia--Lasix 40mg IV qd on 01/07-01/08  - will consider resuming diuresis once diarrhea is less profuse  - resolving    LFT elevation  Without inciting event on labs 01/04. No evidence of hypervolemia on CXR, no increased O2 requirement, no abdominal palpation. Shock liver a consideration given hypotension with MAP around 65 that morning.    - US Liver with doppler negative for acute abormality  - continue to monitor with daily labs, avoid hepatotoxic medications  - improving      Atelectasis of both lungs  I have personally reviewed Chest X-ray. Patient with atelectasis on interpretation. Likely Non-Obstructive atelectasis secondary to pleural effusion. Signs and symptoms include Shortness of breath and Hypoxemia Will begin treatment with upright positioning.    Hypophosphatemia  Patient's most recent phosphorus results are listed below.   Recent Labs     01/10/25  0333 01/11/25  1025   PHOS 2.5* 1.0*     Plan  - Will treat hypophosphatemia with prn supplementation  - refeeding. Replace as needed    Hypokalemia  Patient's most recent potassium results are listed below.   Recent Labs     01/09/25  0646 01/09/25  2119 01/10/25  0333   K 3.0* 3.1* 3.9     Plan  - Replete potassium per protocol  - Monitor potassium Daily  - Patient's hypokalemia is improving    Hypernatremia  Hypernatremia is likely due to Dehydration. monitor with hydration   Recent Labs     01/09/25  2119 01/10/25  0333 01/11/25  1025   * 149* 140     Improved with IV D5.     Dysphagia  - sp PEG for  nutrition and meds  ;  okay for comfort po feeds per recent SLP recs  - NPO as above at this time  - IVFs  - advance TF as possible, but hold TF pending CTA results      Multiple wounds of skin  - Wound care consulted  - turn q2  - waffle mattress overlay  - clean and dressed    History of stroke with residual effects  - chronic left hemiplegia, bed bound, sp PEG though able to tolerate po comfort feed  - continue home ASA and statin  - turn q2h      12/31 CT head -No acute intracranial process. Prominent involutional changes and chronic microvascular ischemic changes in the periventricular white matter.  Small areas of probable remote lacunar infarction in the bilateral basal ganglia and small probable chronic right parietal cortical infarct.     - Given ongoing lethargy repeat CT head negative for acute process  - EEG negative for epileptic activity    Insomnia  - home trazadone      Mixed hyperlipidemia  - home statin      Diet:   Glucerna 50 mL/h  GI PPx: not needed  DVT PPx:  SCD/MIGUEL ANGEL  Airways: 2L oxygen  Wounds: left proximal arm     Goals of Care:  Return to prior functional status     Discharge Planning   ANA: 1/15/2025   Is the patient medically ready for discharge?:     Reason for patient still in hospital (select all that apply): Patient trending condition and Treatment  Discharge Plan A: Home, Home with family, Home Health   Discharge Delays: None known at this time    Tiana Lazar MD

## 2025-01-13 NOTE — PROGRESS NOTES
Pt with temp 101.2 reported to med team, ordered to remove horton collect UA and administer tylenol. Done as instructed.pt tolerated well.

## 2025-01-13 NOTE — PLAN OF CARE
Recommendations    1. Continuous TF recommendation: Glucerna 1.5 @ 50 mL/hr to provide 1200 mL total volume, 1800 kcal, 150 g CHO, 99 g protein, 19 g fiber, 910 mL free water, 120% DRI     -150 mL flush q4 hrs to provide additional 900 mL free water (Total 1810mL)    - nursing, please continue documenting TF rate via flowsheets   2. Recommend kiran BID (add as modular for TF)  3. RD to monitor intake, labs, weight    Goals:   1. % nutritional needs met with diet   2. Maintain weight during admission  Nutrition Goal Status: progressing towards goal  Communication of RD Recs:  (POC)

## 2025-01-13 NOTE — ASSESSMENT & PLAN NOTE
Patient had temp 101.2. She was on Pricilla hugger at time. UA done after horton catheter removed was slightly positive. Blood culture pending. CXR pending. Spoke to ID. Prefer to wait for culture results and monitor vitals off pricilla hugger to consider antibiotic treatment as it less likely she has new infectious process. Patient afebrile off pricilla hugger and WBC normal. Urine culture growing GNR but Horton removed. As patient clinically improved overall, will defer adding anitbiotic at this time.

## 2025-01-13 NOTE — PLAN OF CARE
[facility] - Med Surg  Discharge Reassessment    Primary Care Provider: Alireza Sosa MD    Expected Discharge Date: 1/15/2025    Pt current with [facility], [facility], and [facility].    SW spoke to Jun w/ [facility] [phone] and provided update.  Clinicals sent via Epic.  SW spoke to Olya honeycutt/ [facility] [phone]; provided update and sent clinicals.  Pt is currrent with [facility] for tube feeds.  SW will continue to follow and provide assistance.      [facility] telephoned pt's daughter, Aneta [phone], provided update on [facility] and [facility].  SW will continue to follow.      2:22 PM  SW received a call from Laura honeycutt/ [facility] [phone] regarding a report that was filed back in August.  Laura advised SW she was working to close the case but needed follow up information on when patient would discharge.  [facility] advised will call on day of d/c.      Discharge Plan A and Plan B have been determined by review of patient's clinical status, future medical and therapeutic needs, and coverage/benefits for post-acute care in coordination with multidisciplinary team members.    Reassessment (most recent)       Discharge Reassessment - 01/13/25 1119          Discharge Reassessment    Assessment Type Discharge Planning Reassessment     Did the patient's condition or plan change since previous assessment? No     Discharge Plan A Home;Home with family;Home Health     Discharge Plan B Home;Home with family     DME Needed Upon Discharge  none     Transition of Care Barriers None     Why the patient remains in the hospital Requires continued medical care        Post-Acute Status    Post-Acute Authorization Home Health;Other;IV Infusion     Home Health Status Pending State Direction/Certification     Coverage [place] [facility]     IV Infusion Status Referral(s) sent     Other Status See Comments   PCA services with [facility]    Discharge Delays None known  at this time                     Albina Santos LMSW  Part-Time-  [facility]  Ext. [phone]

## 2025-01-14 PROBLEM — I82.621 ACUTE DEEP VEIN THROMBOSIS (DVT) OF RIGHT UPPER EXTREMITY: Status: ACTIVE | Noted: 2025-01-14

## 2025-01-14 LAB
ALBUMIN SERPL BCP-MCNC: 1.4 G/DL (ref 3.5–5.2)
ALP SERPL-CCNC: 354 U/L (ref 40–150)
ALT SERPL W/O P-5'-P-CCNC: 64 U/L (ref 10–44)
ANION GAP SERPL CALC-SCNC: 9 MMOL/L (ref 8–16)
ANISOCYTOSIS BLD QL SMEAR: SLIGHT
APTT PPP: 29.7 SEC (ref 21–32)
APTT PPP: 29.9 SEC (ref 21–32)
APTT PPP: 50.8 SEC (ref 21–32)
APTT PPP: 55.2 SEC (ref 21–32)
APTT PPP: 71.6 SEC (ref 21–32)
AST SERPL-CCNC: 49 U/L (ref 10–40)
BASOPHILS # BLD AUTO: 0.03 K/UL (ref 0–0.2)
BASOPHILS NFR BLD: 0.3 % (ref 0–1.9)
BILIRUB SERPL-MCNC: 0.6 MG/DL (ref 0.1–1)
BUN SERPL-MCNC: 18 MG/DL (ref 8–23)
BURR CELLS BLD QL SMEAR: ABNORMAL
CALCIUM SERPL-MCNC: 7.7 MG/DL (ref 8.7–10.5)
CHLORIDE SERPL-SCNC: 105 MMOL/L (ref 95–110)
CO2 SERPL-SCNC: 21 MMOL/L (ref 23–29)
CREAT SERPL-MCNC: 0.7 MG/DL (ref 0.5–1.4)
DIFFERENTIAL METHOD BLD: ABNORMAL
EOSINOPHIL # BLD AUTO: 0 K/UL (ref 0–0.5)
EOSINOPHIL NFR BLD: 0.1 % (ref 0–8)
ERYTHROCYTE [DISTWIDTH] IN BLOOD BY AUTOMATED COUNT: 25.2 % (ref 11.5–14.5)
EST. GFR  (NO RACE VARIABLE): >60 ML/MIN/1.73 M^2
GLUCOSE SERPL-MCNC: 122 MG/DL (ref 70–110)
HCT VFR BLD AUTO: 29.9 % (ref 37–48.5)
HGB BLD-MCNC: 10.1 G/DL (ref 12–16)
HYPOCHROMIA BLD QL SMEAR: ABNORMAL
IMM GRANULOCYTES # BLD AUTO: 0.27 K/UL (ref 0–0.04)
IMM GRANULOCYTES NFR BLD AUTO: 2.5 % (ref 0–0.5)
LYMPHOCYTES # BLD AUTO: 1.7 K/UL (ref 1–4.8)
LYMPHOCYTES NFR BLD: 15.6 % (ref 18–48)
MAGNESIUM SERPL-MCNC: 2 MG/DL (ref 1.6–2.6)
MCH RBC QN AUTO: 26.5 PG (ref 27–31)
MCHC RBC AUTO-ENTMCNC: 33.8 G/DL (ref 32–36)
MCV RBC AUTO: 79 FL (ref 82–98)
MONOCYTES # BLD AUTO: 0.9 K/UL (ref 0.3–1)
MONOCYTES NFR BLD: 8 % (ref 4–15)
NEUTROPHILS # BLD AUTO: 7.9 K/UL (ref 1.8–7.7)
NEUTROPHILS NFR BLD: 73.5 % (ref 38–73)
NRBC BLD-RTO: 0 /100 WBC
OVALOCYTES BLD QL SMEAR: ABNORMAL
PHOSPHATE SERPL-MCNC: 2.7 MG/DL (ref 2.7–4.5)
PLATELET # BLD AUTO: 141 K/UL (ref 150–450)
PLATELET BLD QL SMEAR: ABNORMAL
PMV BLD AUTO: ABNORMAL FL (ref 9.2–12.9)
POCT GLUCOSE: 142 MG/DL (ref 70–110)
POCT GLUCOSE: 148 MG/DL (ref 70–110)
POCT GLUCOSE: 153 MG/DL (ref 70–110)
POCT GLUCOSE: 157 MG/DL (ref 70–110)
POIKILOCYTOSIS BLD QL SMEAR: SLIGHT
POLYCHROMASIA BLD QL SMEAR: ABNORMAL
POTASSIUM SERPL-SCNC: 4.3 MMOL/L (ref 3.5–5.1)
PROT SERPL-MCNC: 5.7 G/DL (ref 6–8.4)
RBC # BLD AUTO: 3.81 M/UL (ref 4–5.4)
SODIUM SERPL-SCNC: 135 MMOL/L (ref 136–145)
TARGETS BLD QL SMEAR: ABNORMAL
WBC # BLD AUTO: 10.75 K/UL (ref 3.9–12.7)

## 2025-01-14 PROCEDURE — 94664 DEMO&/EVAL PT USE INHALER: CPT

## 2025-01-14 PROCEDURE — 25000003 PHARM REV CODE 250: Performed by: INTERNAL MEDICINE

## 2025-01-14 PROCEDURE — 27000207 HC ISOLATION

## 2025-01-14 PROCEDURE — 21400001 HC TELEMETRY ROOM

## 2025-01-14 PROCEDURE — 36415 COLL VENOUS BLD VENIPUNCTURE: CPT | Performed by: INTERNAL MEDICINE

## 2025-01-14 PROCEDURE — 25000003 PHARM REV CODE 250: Performed by: HOSPITALIST

## 2025-01-14 PROCEDURE — 11000001 HC ACUTE MED/SURG PRIVATE ROOM

## 2025-01-14 PROCEDURE — 99900035 HC TECH TIME PER 15 MIN (STAT)

## 2025-01-14 PROCEDURE — 36410 VNPNXR 3YR/> PHY/QHP DX/THER: CPT

## 2025-01-14 PROCEDURE — C1751 CATH, INF, PER/CENT/MIDLINE: HCPCS

## 2025-01-14 PROCEDURE — 63600175 PHARM REV CODE 636 W HCPCS: Performed by: INTERNAL MEDICINE

## 2025-01-14 PROCEDURE — 80053 COMPREHEN METABOLIC PANEL: CPT

## 2025-01-14 PROCEDURE — 76937 US GUIDE VASCULAR ACCESS: CPT

## 2025-01-14 PROCEDURE — 83735 ASSAY OF MAGNESIUM: CPT | Performed by: INTERNAL MEDICINE

## 2025-01-14 PROCEDURE — 94761 N-INVAS EAR/PLS OXIMETRY MLT: CPT

## 2025-01-14 PROCEDURE — 85730 THROMBOPLASTIN TIME PARTIAL: CPT | Mod: 91 | Performed by: INTERNAL MEDICINE

## 2025-01-14 PROCEDURE — 84100 ASSAY OF PHOSPHORUS: CPT | Performed by: INTERNAL MEDICINE

## 2025-01-14 PROCEDURE — 94640 AIRWAY INHALATION TREATMENT: CPT

## 2025-01-14 PROCEDURE — 25000242 PHARM REV CODE 250 ALT 637 W/ HCPCS: Performed by: INTERNAL MEDICINE

## 2025-01-14 PROCEDURE — 85025 COMPLETE CBC W/AUTO DIFF WBC: CPT

## 2025-01-14 RX ORDER — SODIUM CHLORIDE 0.9 % (FLUSH) 0.9 %
10 SYRINGE (ML) INJECTION EVERY 12 HOURS PRN
Status: DISCONTINUED | OUTPATIENT
Start: 2025-01-14 | End: 2025-01-18 | Stop reason: HOSPADM

## 2025-01-14 RX ORDER — INSULIN ASPART 100 [IU]/ML
3 INJECTION, SOLUTION INTRAVENOUS; SUBCUTANEOUS EVERY 6 HOURS
Status: DISCONTINUED | OUTPATIENT
Start: 2025-01-14 | End: 2025-01-18 | Stop reason: HOSPADM

## 2025-01-14 RX ADMIN — MICONAZOLE NITRATE 2 % TOPICAL POWDER: at 09:01

## 2025-01-14 RX ADMIN — VANCOMYCIN HYDROCHLORIDE 125 MG: KIT at 06:01

## 2025-01-14 RX ADMIN — ALBUTEROL SULFATE 2.5 MG: 2.5 SOLUTION RESPIRATORY (INHALATION) at 09:01

## 2025-01-14 RX ADMIN — WHITE PETROLATUM: 1.75 OINTMENT TOPICAL at 08:01

## 2025-01-14 RX ADMIN — VANCOMYCIN HYDROCHLORIDE 125 MG: KIT at 01:01

## 2025-01-14 RX ADMIN — WHITE PETROLATUM: 1.75 OINTMENT TOPICAL at 09:01

## 2025-01-14 RX ADMIN — MICONAZOLE NITRATE 2 % TOPICAL POWDER: at 08:01

## 2025-01-14 RX ADMIN — INSULIN ASPART 3 UNITS: 100 INJECTION, SOLUTION INTRAVENOUS; SUBCUTANEOUS at 11:01

## 2025-01-14 RX ADMIN — VANCOMYCIN HYDROCHLORIDE 125 MG: KIT at 11:01

## 2025-01-14 RX ADMIN — ALBUTEROL SULFATE 2.5 MG: 2.5 SOLUTION RESPIRATORY (INHALATION) at 12:01

## 2025-01-14 RX ADMIN — INSULIN GLARGINE 5 UNITS: 100 INJECTION, SOLUTION SUBCUTANEOUS at 09:01

## 2025-01-14 RX ADMIN — ALBUTEROL SULFATE 2.5 MG: 2.5 SOLUTION RESPIRATORY (INHALATION) at 03:01

## 2025-01-14 RX ADMIN — PANTOPRAZOLE SODIUM 40 MG: 40 GRANULE, DELAYED RELEASE ORAL at 08:01

## 2025-01-14 RX ADMIN — DIBASIC SODIUM PHOSPHATE, MONOBASIC POTASSIUM PHOSPHATE AND MONOBASIC SODIUM PHOSPHATE 2 TABLET: 852; 155; 130 TABLET ORAL at 09:01

## 2025-01-14 RX ADMIN — HEPARIN SODIUM 19 UNITS/KG/HR: 10000 INJECTION, SOLUTION INTRAVENOUS at 08:01

## 2025-01-14 RX ADMIN — ALBUTEROL SULFATE 2.5 MG: 2.5 SOLUTION RESPIRATORY (INHALATION) at 08:01

## 2025-01-14 RX ADMIN — THERA TABS 1 TABLET: TAB at 09:01

## 2025-01-14 RX ADMIN — MUPIROCIN: 20 OINTMENT TOPICAL at 08:01

## 2025-01-14 RX ADMIN — INSULIN ASPART 3 UNITS: 100 INJECTION, SOLUTION INTRAVENOUS; SUBCUTANEOUS at 01:01

## 2025-01-14 RX ADMIN — MUPIROCIN: 20 OINTMENT TOPICAL at 09:01

## 2025-01-14 RX ADMIN — VANCOMYCIN HYDROCHLORIDE 125 MG: KIT at 12:01

## 2025-01-14 RX ADMIN — INSULIN ASPART 3 UNITS: 100 INJECTION, SOLUTION INTRAVENOUS; SUBCUTANEOUS at 06:01

## 2025-01-14 RX ADMIN — DIBASIC SODIUM PHOSPHATE, MONOBASIC POTASSIUM PHOSPHATE AND MONOBASIC SODIUM PHOSPHATE 2 TABLET: 852; 155; 130 TABLET ORAL at 06:01

## 2025-01-14 RX ADMIN — ASPIRIN 81 MG CHEWABLE TABLET 81 MG: 81 TABLET CHEWABLE at 09:01

## 2025-01-14 RX ADMIN — DIBASIC SODIUM PHOSPHATE, MONOBASIC POTASSIUM PHOSPHATE AND MONOBASIC SODIUM PHOSPHATE 2 TABLET: 852; 155; 130 TABLET ORAL at 01:01

## 2025-01-14 RX ADMIN — PANTOPRAZOLE SODIUM 40 MG: 40 GRANULE, DELAYED RELEASE ORAL at 09:01

## 2025-01-14 NOTE — CONSULTS
WILDAS consulted for Midline insertion in real time using u/s guidance.     Indication: LONG TERM PVA  Gauge: 20  Location: LEFT CEPHALIC  Length in cm: 10  Max dwell date: 2/12/2025  Lot #: [id number]    Image saved and uploaded to EMR

## 2025-01-14 NOTE — PT/OT/SLP PROGRESS
Occupational Therapy      Patient Name:  Emily Martinez   MRN:  [id number]    Patient not seen today secondary to hold - pt receiving heparin for RUE DVT.  Will further investigate pt's functional baseline to see if appropriate for skilled acute services.  Will follow-up 1/15/2025.    1/14/2025

## 2025-01-14 NOTE — PLAN OF CARE
Problem: Skin Injury Risk Increased  Goal: Skin Health and Integrity  Outcome: Progressing     Problem: Infection  Goal: Absence of Infection Signs and Symptoms  Outcome: Progressing     Problem: Adult Inpatient Plan of Care  Goal: Plan of Care Review  Outcome: Progressing  Goal: Patient-Specific Goal (Individualized)  Outcome: Progressing  Goal: Absence of Hospital-Acquired Illness or Injury  Outcome: Progressing  Goal: Optimal Comfort and Wellbeing  Outcome: Progressing  Goal: Readiness for Transition of Care  Outcome: Progressing     Problem: Fall Injury Risk  Goal: Absence of Fall and Fall-Related Injury  Outcome: Progressing

## 2025-01-14 NOTE — ASSESSMENT & PLAN NOTE
- sp PEG for nutrition and meds  ;  okay for comfort po feeds per recent SLP recs  - NPO as above at this time

## 2025-01-14 NOTE — PROGRESS NOTES
Hospital Medicine  Progress note    Team: [facility] [place] HOSP MED R Tiana Lazar MD  Admit Date: 12/30/2024  Code status: Full Code    Principal Problem:  C. difficile colitis    Interval hx: On heparin drip. No report of blood stool or vomitus    PEx  Temp:  [97 °F (36.1 °C)-98.5 °F (36.9 °C)]   Pulse:  [81-96]   Resp:  [18-22]   BP: ()/(58-81)   SpO2:  [90 %-100 %]   No intake or output data in the 24 hours ending 01/14/25 1202    General Appearance: no acute distress, WD WN  Heart: regular rate and rhythm, no heave  Respiratory: Normal respiratory effort, symmetric excursion, bilateral vesicular breath sounds   Abdomen: Soft, non-tender; bowel sounds active  Skin: intact, no rash, no ulcers  Neurologic:  No focal numbness or weakness  Mental status: Alert, oriented x 4, affect appropriate    Recent Labs   Lab 01/12/25  1053 01/13/25  0538 01/14/25  0523   WBC 10.19 12.61 10.75   HGB 12.1 11.2* 10.1*   HCT 35.2* 32.7* 29.9*   PLT 84* 104* 141*     Recent Labs   Lab 01/12/25  1053 01/13/25  0818 01/14/25  0523    132* 135*   K 5.4* 4.2 4.3    103 105   CO2 16* 22* 21*   BUN 17 18 18   CREATININE 1.1 0.9 0.7   * 253* 122*   CALCIUM 7.8* 7.8* 7.7*   MG 2.1 2.1 2.0   PHOS 3.6 2.7 2.7     Recent Labs   Lab 01/12/25  1053 01/13/25  0818 01/13/25  1831 01/14/25  0523   ALKPHOS 399* 389*  --  354*   * 82*  --  64*   AST 90* 53*  --  49*   ALBUMIN 1.6* 1.5*  --  1.4*   PROT 6.2 5.9*  --  5.7*   BILITOT 0.8 0.8  --  0.6   INR  --   --  1.3*  --         Recent Labs   Lab 01/12/25  1614 01/13/25  1104 01/13/25  1536 01/13/25  2103 01/14/25  0843 01/14/25  1120   POCTGLUCOSE 191* 290* 229* 186* 148* 153*       Scheduled Meds:   albuterol sulfate  2.5 mg Nebulization Q4H WAKE    aspirin  81 mg Per G Tube Daily    insulin aspart U-100  3 Units Subcutaneous Q6H    insulin glargine U-100  5 Units Subcutaneous BID    k phos di & mono-sod phos mono  500 mg Per G Tube TID WM    miconazole NITRATE 2 %   Topical  "(Top) BID    multivitamin  1 tablet Per G Tube Daily    mupirocin   Nasal BID    pantoprazole  40 mg Per G Tube BID    vancomycin  125 mg Per G Tube Q6H    white petrolatum   Topical (Top) BID     Continuous Infusions:   heparin (porcine) in D5W  0-40 Units/kg/hr Intravenous Continuous 11.8 mL/hr at 01/14/25 0729 21 Units/kg/hr at 01/14/25 0729       As Needed:    Current Facility-Administered Medications:     0.9%  NaCl infusion (for blood administration), , Intravenous, Q24H PRN    acetaminophen, 650 mg, Per G Tube, Q4H PRN    albuterol, 2 puff, Inhalation, Q6H PRN **AND** MDI Q6H PRN, , , Q6H PRN    dextrose 50%, 12.5 g, Intravenous, PRN    dextrose 50%, 25 g, Intravenous, PRN    glucagon (human recombinant), 1 mg, Intramuscular, PRN    glucose, 16 g, Per G Tube, PRN    glucose, 24 g, Per G Tube, PRN    heparin (PORCINE), 60 Units/kg, Intravenous, PRN    heparin (PORCINE), 30 Units/kg, Intravenous, PRN    insulin aspart U-100, 0-10 Units, Subcutaneous, QID (AC + HS) PRN    loperamide, 2 mg, Per G Tube, QID PRN    melatonin, 6 mg, Per G Tube, Nightly PRN    naloxone, 0.02 mg, Intravenous, PRN    ondansetron, 4 mg, Oral, Q8H PRN    oxyCODONE-acetaminophen, 1 tablet, Per G Tube, Q4H PRN    prochlorperazine, 5 mg, Intravenous, Q4H PRN    sodium chloride 0.9%, 10 mL, Intravenous, PRN    sodium chloride 0.9%, 10 mL, Intravenous, Q12H PRN    Flushing PICC/Midline Protocol, , , Until Discontinued **AND** sodium chloride 0.9%, 10 mL, Intravenous, Q12H PRN    Assessment and Plan  / Problems managed today    * C. difficile colitis  - CT abdomen 12/31 with diverticulitis with contained perforation. No associated rim enhancement to suggest drainable abscess at this time.  - VSS without leukocytosis  ;  non-surgical abdomen on exam  - CRS consult appreciated.   - Bloody clots starting 1/7 - CTA abdomen showed "Rectosigmoid proctocolitis, of numerous potential etiologies. Questionable cecitis as well. Correlate clinically, " "including for the possibility of early/mild pseudomembranous colitis."  - c diff antigen positive, c diff toxin, c diff toxin PCR positive  - started on metronidazole IV and vancomycin - ID consulted    - decreased vancomycin to 125 mg QID and stopped metronidazole.   - still with diarrhea. Currently has rectal tube    Acute deep vein thrombosis (DVT) of right upper extremity  Given recent witness GI bleeds. Start heparin drip. If no bleed for 48 h, will switch to apixaban versus rivaroxaban    Fever  Patient had temp 101.2. She was on Pricilla hugger at time. UA done after horton catheter removed was slightly positive. Blood culture pending. CXR pending. Spoke to ID. Prefer to wait for culture results and monitor vitals off pricilla hugger to consider antibiotic treatment as it less likely she has new infectious process. Patient afebrile off pricilla hugger and WBC normal. Urine culture growing GNR but Horton removed since prior to culture. As patient clinically improved overall, will defer adding anitbiotic at this time.     Severe malnutrition  Nutrition consulted. Most recent weight and BMI monitored-     Measurements:  Wt Readings from Last 1 Encounters:   01/05/25 56.4 kg (124 lb 5.4 oz)   Body mass index is 22.03 kg/m².    Patient has been screened and assessed by RD.    Malnutrition Type:  Context:    Level:      Malnutrition Characteristic Summary:       Interventions/Recommendations (treatment strategy):  1. Continuous TF recommendation: Glucerna 1.5 @ 50 mL/hr to provide 1200 mL total volume, 1800 kcal, 150 g CHO, 99 g protein, 19 g fiber, 910 mL free water, 120% DRI   -150 mL flush q4 hrs to provide additional 900 mL free water (Total 1810mL) - nursing, please continue documenting TF rate via flowsheets 2. RD to monitor intake, labs, weight   Resume tube feeds as able  Refeeding syndrome replace phosphorus, potassium and magnesium as needed   IV Kphos as neutraphos may exacerbate diarrhea    Acute blood loss " anemia  Anemia is likely due to acute blood loss which was from C diff colitis . Most recent hemoglobin and hematocrit are listed below.  Recent Labs     01/09/25  1157 01/09/25  2119 01/10/25  0333   HGB 6.9* 9.9* 13.5   HCT 20.7* 29.2* 39.9     Plan  - Monitor serial CBC: Daily  - Transfuse PRBC if patient becomes hemodynamically unstable, symptomatic or H/H drops below 7/21.  - Patient has received 3 units of PRBCs on 1/10/2025  - Patient's anemia is currently improving  - transfused above threshhold  - UGI - had blood streaked vomitus 1/12/2025. Started on protonix po BID. Will discontinue if no further reports of blood while on heparin drip for 48. Will switch to famotidine due to recurring c diff risks with PPI./    Chronic hypotension  Chronic due to malnutrition  Will defer volume resuscitation unless MAP <60    Dehydration  hypernatremia  Due to diarrhea and holding of feedings  Resolved with IV D5    Acute hypoxemic respiratory failure  Patient with Hypoxic Respiratory failure which is Acute.  she is not on home oxygen. Supplemental oxygen was provided and noted-      Signs/symptoms of respiratory failure include- increased work of breathing and lethargy. Contributing diagnoses includes - Pleural effusion Labs and images were reviewed. Patient Has not had a recent ABG. Will treat underlying causes and adjust management of respiratory failure as follows-     - XR on 01/08 with evidence of hypervolemia--Lasix 40mg IV qd on 01  - resolving as on RA at times  - aggressive pulmonary toileting    LFT elevation  Without inciting event on labs 01/04. No evidence of hypervolemia on CXR, no increased O2 requirement, no abdominal palpation. Shock liver a consideration given hypotension with MAP around 65 that morning.    - US Liver with doppler negative for acute abormality  - continue to monitor with daily labs, avoid hepatotoxic medications  - improving      Atelectasis of both lungs  I have personally reviewed  Chest X-ray. Patient with atelectasis on interpretation. Likely Non-Obstructive atelectasis secondary to pleural effusion. Signs and symptoms include Shortness of breath and Hypoxemia Will begin treatment with upright positioning.    Hypophosphatemia  Patient's most recent phosphorus results are listed below.   Recent Labs     01/12/25  1053 01/13/25  0818 01/14/25  0523   PHOS 3.6 2.7 2.7     Plan  - Will treat hypophosphatemia with prn supplementation  - refeeding. Replace as needed    Hypokalemia  Patient's most recent potassium results are listed below.   Recent Labs     01/12/25  1053 01/13/25  0818 01/14/25  0523   K 5.4* 4.2 4.3     Plan  - Replete potassium per protocol  - Monitor potassium Daily  - Patient's hypokalemia is improving    Hypernatremia  Hypernatremia is likely due to Dehydration. monitor with hydration   Recent Labs     01/12/25  1053 01/13/25  0818 01/14/25  0523    132* 135*     Improved with IV D5.   Resolved and now on free water flushes    Dysphagia  - sp PEG for nutrition and meds  ;  okay for comfort po feeds per recent SLP recs  - NPO as above at this time    Multiple wounds of skin  - Wound care consulted  - turn q2  - waffle mattress overlay  - clean and dressed    History of stroke with residual effects  - chronic left hemiplegia, bed bound, sp PEG though able to tolerate po comfort feed  - continue home ASA and statin  - turn q2h      12/31 CT head -No acute intracranial process. Prominent involutional changes and chronic microvascular ischemic changes in the periventricular white matter.  Small areas of probable remote lacunar infarction in the bilateral basal ganglia and small probable chronic right parietal cortical infarct.     - Given ongoing lethargy repeat CT head negative for acute process  - EEG negative for epileptic activity    Insomnia  - home trazadone      Mixed hyperlipidemia  - home statin      Diet:   Glucerna 50 mL/h  GI PPx: not needed  DVT PPx:   SCD/MIGUEL ANGEL  Airways: 2L oxygen  Wounds: left proximal arm     Goals of Care:  Return to prior functional status     Discharge Planning   ANA: 1/16/2025   Is the patient medically ready for discharge?:     Reason for patient still in hospital (select all that apply): Patient trending condition and Treatment  Discharge Plan A: Home, Home with family, Home Health   Discharge Delays: None known at this time    Tiana Lazar MD

## 2025-01-14 NOTE — ASSESSMENT & PLAN NOTE
Given recent witness GI bleeds. Start heparin drip. No bleed for two days. Switch to rivaroxaban.

## 2025-01-15 PROBLEM — R53.81 DEBILITY: Status: ACTIVE | Noted: 2025-01-15

## 2025-01-15 PROBLEM — Z51.5 PALLIATIVE CARE ENCOUNTER: Status: ACTIVE | Noted: 2025-01-15

## 2025-01-15 PROBLEM — Z78.9 POOR PROGNOSIS: Status: ACTIVE | Noted: 2025-01-15

## 2025-01-15 PROBLEM — Z71.89 ADVANCE CARE PLANNING: Status: ACTIVE | Noted: 2025-01-15

## 2025-01-15 LAB
ALBUMIN SERPL BCP-MCNC: 1.4 G/DL (ref 3.5–5.2)
ALP SERPL-CCNC: 328 U/L (ref 40–150)
ALT SERPL W/O P-5'-P-CCNC: 58 U/L (ref 10–44)
ANION GAP SERPL CALC-SCNC: 12 MMOL/L (ref 8–16)
APTT PPP: 59 SEC (ref 21–32)
APTT PPP: 65.5 SEC (ref 21–32)
AST SERPL-CCNC: 54 U/L (ref 10–40)
BACTERIA UR CULT: ABNORMAL
BASOPHILS # BLD AUTO: 0.02 K/UL (ref 0–0.2)
BASOPHILS NFR BLD: 0.2 % (ref 0–1.9)
BILIRUB SERPL-MCNC: 0.6 MG/DL (ref 0.1–1)
BUN SERPL-MCNC: 19 MG/DL (ref 8–23)
CALCIUM SERPL-MCNC: 7.9 MG/DL (ref 8.7–10.5)
CHLORIDE SERPL-SCNC: 103 MMOL/L (ref 95–110)
CO2 SERPL-SCNC: 22 MMOL/L (ref 23–29)
CREAT SERPL-MCNC: 0.7 MG/DL (ref 0.5–1.4)
DIFFERENTIAL METHOD BLD: ABNORMAL
EOSINOPHIL # BLD AUTO: 0 K/UL (ref 0–0.5)
EOSINOPHIL NFR BLD: 0.1 % (ref 0–8)
ERYTHROCYTE [DISTWIDTH] IN BLOOD BY AUTOMATED COUNT: 24.9 % (ref 11.5–14.5)
EST. GFR  (NO RACE VARIABLE): >60 ML/MIN/1.73 M^2
GLUCOSE SERPL-MCNC: 80 MG/DL (ref 70–110)
HCT VFR BLD AUTO: 29 % (ref 37–48.5)
HGB BLD-MCNC: 9.8 G/DL (ref 12–16)
IMM GRANULOCYTES # BLD AUTO: 0.24 K/UL (ref 0–0.04)
IMM GRANULOCYTES NFR BLD AUTO: 2.3 % (ref 0–0.5)
LYMPHOCYTES # BLD AUTO: 2.1 K/UL (ref 1–4.8)
LYMPHOCYTES NFR BLD: 20.9 % (ref 18–48)
MAGNESIUM SERPL-MCNC: 2 MG/DL (ref 1.6–2.6)
MCH RBC QN AUTO: 26.4 PG (ref 27–31)
MCHC RBC AUTO-ENTMCNC: 33.8 G/DL (ref 32–36)
MCV RBC AUTO: 78 FL (ref 82–98)
MONOCYTES # BLD AUTO: 1 K/UL (ref 0.3–1)
MONOCYTES NFR BLD: 9.4 % (ref 4–15)
NEUTROPHILS # BLD AUTO: 6.9 K/UL (ref 1.8–7.7)
NEUTROPHILS NFR BLD: 67.1 % (ref 38–73)
NRBC BLD-RTO: 1 /100 WBC
PHOSPHATE SERPL-MCNC: 3 MG/DL (ref 2.7–4.5)
PLATELET # BLD AUTO: 172 K/UL (ref 150–450)
PMV BLD AUTO: 10.7 FL (ref 9.2–12.9)
POCT GLUCOSE: 96 MG/DL (ref 70–110)
POCT GLUCOSE: 97 MG/DL (ref 70–110)
POCT GLUCOSE: 97 MG/DL (ref 70–110)
POTASSIUM SERPL-SCNC: 4.8 MMOL/L (ref 3.5–5.1)
PROT SERPL-MCNC: 5.8 G/DL (ref 6–8.4)
RBC # BLD AUTO: 3.71 M/UL (ref 4–5.4)
SODIUM SERPL-SCNC: 137 MMOL/L (ref 136–145)
WBC # BLD AUTO: 10.24 K/UL (ref 3.9–12.7)

## 2025-01-15 PROCEDURE — 85730 THROMBOPLASTIN TIME PARTIAL: CPT | Mod: 91 | Performed by: INTERNAL MEDICINE

## 2025-01-15 PROCEDURE — 25000242 PHARM REV CODE 250 ALT 637 W/ HCPCS: Performed by: INTERNAL MEDICINE

## 2025-01-15 PROCEDURE — 25000003 PHARM REV CODE 250: Performed by: INTERNAL MEDICINE

## 2025-01-15 PROCEDURE — 25000003 PHARM REV CODE 250: Performed by: HOSPITALIST

## 2025-01-15 PROCEDURE — 99223 1ST HOSP IP/OBS HIGH 75: CPT | Mod: ,,, | Performed by: CLINICAL NURSE SPECIALIST

## 2025-01-15 PROCEDURE — 99900035 HC TECH TIME PER 15 MIN (STAT)

## 2025-01-15 PROCEDURE — 94761 N-INVAS EAR/PLS OXIMETRY MLT: CPT

## 2025-01-15 PROCEDURE — 85025 COMPLETE CBC W/AUTO DIFF WBC: CPT

## 2025-01-15 PROCEDURE — 21400001 HC TELEMETRY ROOM

## 2025-01-15 PROCEDURE — 80053 COMPREHEN METABOLIC PANEL: CPT

## 2025-01-15 PROCEDURE — 11000001 HC ACUTE MED/SURG PRIVATE ROOM

## 2025-01-15 PROCEDURE — 27000207 HC ISOLATION

## 2025-01-15 PROCEDURE — 25000003 PHARM REV CODE 250

## 2025-01-15 PROCEDURE — 94640 AIRWAY INHALATION TREATMENT: CPT

## 2025-01-15 PROCEDURE — 83735 ASSAY OF MAGNESIUM: CPT | Performed by: INTERNAL MEDICINE

## 2025-01-15 PROCEDURE — 36415 COLL VENOUS BLD VENIPUNCTURE: CPT | Performed by: INTERNAL MEDICINE

## 2025-01-15 PROCEDURE — 84100 ASSAY OF PHOSPHORUS: CPT | Performed by: INTERNAL MEDICINE

## 2025-01-15 RX ORDER — FAMOTIDINE 20 MG/1
40 TABLET, FILM COATED ORAL NIGHTLY
Status: DISCONTINUED | OUTPATIENT
Start: 2025-01-15 | End: 2025-01-15

## 2025-01-15 RX ORDER — FAMOTIDINE 20 MG/1
40 TABLET, FILM COATED ORAL DAILY
Status: DISCONTINUED | OUTPATIENT
Start: 2025-01-16 | End: 2025-01-16

## 2025-01-15 RX ORDER — INSULIN GLARGINE 100 [IU]/ML
5 INJECTION, SOLUTION SUBCUTANEOUS NIGHTLY
Status: DISCONTINUED | OUTPATIENT
Start: 2025-01-16 | End: 2025-01-18 | Stop reason: HOSPADM

## 2025-01-15 RX ORDER — ONDANSETRON 4 MG/1
4 TABLET, ORALLY DISINTEGRATING ORAL EVERY 8 HOURS PRN
Status: DISCONTINUED | OUTPATIENT
Start: 2025-01-15 | End: 2025-01-18 | Stop reason: HOSPADM

## 2025-01-15 RX ORDER — FAMOTIDINE 20 MG/1
20 TABLET, FILM COATED ORAL DAILY
Status: DISCONTINUED | OUTPATIENT
Start: 2025-01-16 | End: 2025-01-15

## 2025-01-15 RX ADMIN — VANCOMYCIN HYDROCHLORIDE 125 MG: KIT at 01:01

## 2025-01-15 RX ADMIN — DIBASIC SODIUM PHOSPHATE, MONOBASIC POTASSIUM PHOSPHATE AND MONOBASIC SODIUM PHOSPHATE 2 TABLET: 852; 155; 130 TABLET ORAL at 09:01

## 2025-01-15 RX ADMIN — VANCOMYCIN HYDROCHLORIDE 125 MG: KIT at 11:01

## 2025-01-15 RX ADMIN — ALBUTEROL SULFATE 2.5 MG: 2.5 SOLUTION RESPIRATORY (INHALATION) at 09:01

## 2025-01-15 RX ADMIN — INSULIN GLARGINE 5 UNITS: 100 INJECTION, SOLUTION SUBCUTANEOUS at 09:01

## 2025-01-15 RX ADMIN — MICONAZOLE NITRATE 2 % TOPICAL POWDER: at 09:01

## 2025-01-15 RX ADMIN — MICONAZOLE NITRATE 2 % TOPICAL POWDER: at 08:01

## 2025-01-15 RX ADMIN — RIVAROXABAN 15 MG: 15 TABLET, FILM COATED ORAL at 05:01

## 2025-01-15 RX ADMIN — VANCOMYCIN HYDROCHLORIDE 125 MG: KIT at 05:01

## 2025-01-15 RX ADMIN — DIBASIC SODIUM PHOSPHATE, MONOBASIC POTASSIUM PHOSPHATE AND MONOBASIC SODIUM PHOSPHATE 2 TABLET: 852; 155; 130 TABLET ORAL at 05:01

## 2025-01-15 RX ADMIN — PANTOPRAZOLE SODIUM 40 MG: 40 GRANULE, DELAYED RELEASE ORAL at 09:01

## 2025-01-15 RX ADMIN — INSULIN ASPART 3 UNITS: 100 INJECTION, SOLUTION INTRAVENOUS; SUBCUTANEOUS at 01:01

## 2025-01-15 RX ADMIN — THERA TABS 1 TABLET: TAB at 09:01

## 2025-01-15 RX ADMIN — INSULIN ASPART 3 UNITS: 100 INJECTION, SOLUTION INTRAVENOUS; SUBCUTANEOUS at 09:01

## 2025-01-15 RX ADMIN — WHITE PETROLATUM: 1.75 OINTMENT TOPICAL at 09:01

## 2025-01-15 RX ADMIN — ALBUTEROL SULFATE 2.5 MG: 2.5 SOLUTION RESPIRATORY (INHALATION) at 02:01

## 2025-01-15 RX ADMIN — ALBUTEROL SULFATE 2.5 MG: 2.5 SOLUTION RESPIRATORY (INHALATION) at 08:01

## 2025-01-15 RX ADMIN — ASPIRIN 81 MG CHEWABLE TABLET 81 MG: 81 TABLET CHEWABLE at 09:01

## 2025-01-15 RX ADMIN — OXYCODONE AND ACETAMINOPHEN 1 TABLET: 7.5; 325 TABLET ORAL at 08:01

## 2025-01-15 RX ADMIN — INSULIN ASPART 3 UNITS: 100 INJECTION, SOLUTION INTRAVENOUS; SUBCUTANEOUS at 06:01

## 2025-01-15 RX ADMIN — DIBASIC SODIUM PHOSPHATE, MONOBASIC POTASSIUM PHOSPHATE AND MONOBASIC SODIUM PHOSPHATE 2 TABLET: 852; 155; 130 TABLET ORAL at 01:01

## 2025-01-15 RX ADMIN — WHITE PETROLATUM: 1.75 OINTMENT TOPICAL at 08:01

## 2025-01-15 NOTE — PLAN OF CARE
Problem: Skin Injury Risk Increased  Goal: Skin Health and Integrity  Outcome: Progressing     Problem: Infection  Goal: Absence of Infection Signs and Symptoms  Outcome: Progressing     Problem: Adult Inpatient Plan of Care  Goal: Plan of Care Review  Outcome: Progressing  Goal: Patient-Specific Goal (Individualized)  Outcome: Progressing  Goal: Absence of Hospital-Acquired Illness or Injury  Outcome: Progressing  Goal: Optimal Comfort and Wellbeing  Outcome: Progressing  Goal: Readiness for Transition of Care  Outcome: Progressing     Problem: Diabetes Comorbidity  Goal: Blood Glucose Level Within Targeted Range  Outcome: Progressing     Problem: Acute Kidney Injury/Impairment  Goal: Fluid and Electrolyte Balance  Outcome: Progressing  Goal: Improved Oral Intake  Outcome: Progressing  Goal: Effective Renal Function  Outcome: Progressing     Problem: Wound  Goal: Optimal Coping  Outcome: Progressing  Goal: Optimal Functional Ability  Outcome: Progressing  Goal: Absence of Infection Signs and Symptoms  Outcome: Progressing  Goal: Improved Oral Intake  Outcome: Progressing  Goal: Optimal Pain Control and Function  Outcome: Progressing  Goal: Skin Health and Integrity  Outcome: Progressing  Goal: Optimal Wound Healing  Outcome: Progressing     Problem: Fall Injury Risk  Goal: Absence of Fall and Fall-Related Injury  Outcome: Progressing     Problem: Enteral Nutrition  Goal: Absence of Aspiration Signs and Symptoms  Outcome: Progressing  Goal: Safe, Effective Therapy Delivery  Outcome: Progressing  Goal: Feeding Tolerance  Outcome: Progressing     Problem: Fluid Volume Deficit  Goal: Fluid Balance  Outcome: Progressing     Problem: Electrolyte Imbalance  Goal: Electrolyte Balance  Outcome: Progressing     Problem: Nausea and Vomiting  Goal: Nausea and Vomiting Relief  Outcome: Progressing     Problem: Mobility Impairment  Goal: Optimal Mobility  Outcome: Progressing     Problem: Diarrhea  Goal: Effective Diarrhea  Management  Outcome: Progressing     Problem: Urinary Retention  Goal: Effective Urinary Elimination  Outcome: Progressing     Problem: Coping Ineffective  Goal: Effective Coping  Outcome: Progressing

## 2025-01-15 NOTE — PLAN OF CARE
Advance Care Planning   [facility] Surg  Palliative Care       Patient Name: Emily Martinez  MRN: [id number]  Admission Date: 12/30/2024  Hospital Length of Stay: 15 days  Code Status: Full Code   Attending Provider: Tiana Lazar MD  Palliative Care Provider:   Primary Care Physician: Alireza Sosa MD  Principal Problem:C. difficile colitis     LCSW, along with DALE NICKERSON, met with pt at bedside. Pt alert and oriented to self and location, Pt denies pain/discomfort, but otherwise does not initiate conversation. Pt agreeable to pal med calling and speaking with her dtr, with whom she states she lives with. Denies needs at this time. Per dtr, plan is for pt to return home with home health.     Negrita Langston LCSW-ANGELINA, [facility]-  Department of Palliative Medicine

## 2025-01-15 NOTE — PLAN OF CARE
Pt current with [facility], [facility], and [facility].Pt bedbound and unable to ambulate. Pt not medically ready for d/c.  Palliative consulted. Per Medical Team, GI will be consulted. [facility] will continue to follow.      12:01 PM  [facility] telephoned Laura honeycutt/ [facility] [phone] and left a message advising pt not medically ready for d/c.       Discharge Plan A and Plan B have been determined by review of patient's clinical status, future medical and therapeutic needs, and coverage/benefits for post-acute care in coordination with multidisciplinary team members.    Albina Santos LMSW  Part-Time-  [facility]  Ext. [phone]

## 2025-01-15 NOTE — HPI
Pt is a 59-year-old female, history of a stroke with left hemiplegia, bed-bound, diabetes, hypertension, recurrent infections, dysphagia sp PEG, multiple skin wounds, multiple recent admissions in the last several months in the [facility], most recently last week for UTI, discharged with Keflex, living at home with her daughter, now brought in for diarrhea.  Per chart review, daughter states the patient has had diarrhea for about a week and a half.  She is having about 4-5 episodes a day, watery, nonbloody.  She has intermittently also vomiting.  Patient has a PEG tube for nutrition but per most recent SLP note can also take p.o. nutrition but daughter says she has had minimal p.o. intake because she has said she is not hungry.  No fevers noted.  Daughter is frustrated as she feels like when patient gets admitted, she is temporarily better and then very quickly becomes ill again.  All of their care has been in the [facility] but they decided to come to [facility] today as they were hopeful that we would be able to get her better.       Of note, urine culture from 6 days ago is growing out ESBL E coli, greater than 100,000 colonies     In the ED patient afebrile and hemodynamically stable saturating well on room air at rest. No leukocytosis. UA still concerning for UTI and patient started on meropenem. CT abdomen performed and noted acute diverticulitis with area of concern for contained perforation. Patient without abdominal tenderness. Patient admitted to the care of medicine for further evaluation and management.

## 2025-01-15 NOTE — PROGRESS NOTES
[facility] Medicine  Progress note    Team: [facility] [place] [facility] MED R Tiana Lazar MD  Admit Date: 12/30/2024  Code status: Full Code    Principal Problem:  C. difficile colitis    Interval hx: No report of blood stool or vomitus while on heparin drip    PEx  Temp:  [97.5 °F (36.4 °C)-99.8 °F (37.7 °C)]   Pulse:  [80-99]   Resp:  [16-20]   BP: (109-137)/(70-85)   SpO2:  [96 %-99 %]     Intake/Output Summary (Last 24 hours) at 1/15/2025 1127  Last data filed at 1/15/2025 0608  Gross per 24 hour   Intake --   Output 1165 ml   Net -1165 ml       General Appearance: no acute distress, WD WN  Heart: regular rate and rhythm, no heave  Respiratory: Normal respiratory effort, symmetric excursion, bilateral vesicular breath sounds   Abdomen: Soft, non-tender; bowel sounds active  Skin: intact, no rash, no ulcers  Neurologic:  No focal numbness or weakness  Mental status: Alert, oriented x 4, affect appropriate    Recent Labs   Lab 01/13/25  0538 01/14/25  0523 01/15/25  0241   WBC 12.61 10.75 10.24   HGB 11.2* 10.1* 9.8*   HCT 32.7* 29.9* 29.0*   * 141* 172     Recent Labs   Lab 01/13/25  0818 01/14/25  0523 01/15/25  0241   * 135* 137   K 4.2 4.3 4.8    105 103   CO2 22* 21* 22*   BUN 18 18 19   CREATININE 0.9 0.7 0.7   * 122* 80   CALCIUM 7.8* 7.7* 7.9*   MG 2.1 2.0 2.0   PHOS 2.7 2.7 3.0     Recent Labs   Lab 01/13/25  0818 01/13/25  1831 01/14/25  0523 01/15/25  0241   ALKPHOS 389*  --  354* 328*   ALT 82*  --  64* 58*   AST 53*  --  49* 54*   ALBUMIN 1.5*  --  1.4* 1.4*   PROT 5.9*  --  5.7* 5.8*   BILITOT 0.8  --  0.6 0.6   INR  --  1.3*  --   --         Recent Labs   Lab 01/13/25  2103 01/14/25  0843 01/14/25  1120 01/14/25  1637 01/14/25  2332 01/15/25  0603   POCTGLUCOSE 186* 148* 153* 157* 142* 97       Scheduled Meds:   albuterol sulfate  2.5 mg Nebulization Q4H WAKE    aspirin  81 mg Per G Tube Daily    [START ON 1/16/2025] famotidine  40 mg Per G Tube Daily    insulin aspart U-100  3 Units  "Subcutaneous Q6H    [START ON 1/16/2025] insulin glargine U-100  5 Units Subcutaneous QHS    k phos di & mono-sod phos mono  500 mg Per G Tube TID WM    [START ON 1/16/2025] k phos di & mono-sod phos mono  500 mg Per G Tube Daily    miconazole NITRATE 2 %   Topical (Top) BID    multivitamin  1 tablet Per G Tube Daily    rivaroxaban  15 mg Per G Tube BID    vancomycin  125 mg Per G Tube Q6H    white petrolatum   Topical (Top) BID     As Needed:    Current Facility-Administered Medications:     0.9%  NaCl infusion (for blood administration), , Intravenous, Q24H PRN    acetaminophen, 650 mg, Per G Tube, Q4H PRN    albuterol, 2 puff, Inhalation, Q6H PRN **AND** MDI Q6H PRN, , , Q6H PRN    dextrose 50%, 12.5 g, Intravenous, PRN    dextrose 50%, 25 g, Intravenous, PRN    glucagon (human recombinant), 1 mg, Intramuscular, PRN    glucose, 16 g, Per G Tube, PRN    glucose, 24 g, Per G Tube, PRN    insulin aspart U-100, 0-10 Units, Subcutaneous, QID (AC + HS) PRN    melatonin, 6 mg, Per G Tube, Nightly PRN    naloxone, 0.02 mg, Intravenous, PRN    ondansetron, 4 mg, Per G Tube, Q8H PRN    oxyCODONE-acetaminophen, 1 tablet, Per G Tube, Q4H PRN    prochlorperazine, 5 mg, Intravenous, Q4H PRN    sodium chloride 0.9%, 10 mL, Intravenous, PRN    sodium chloride 0.9%, 10 mL, Intravenous, Q12H PRN    Flushing PICC/Midline Protocol, , , Until Discontinued **AND** sodium chloride 0.9%, 10 mL, Intravenous, Q12H PRN    Assessment and Plan  / Problems managed today    * C. difficile colitis  - CT abdomen 12/31 with diverticulitis with contained perforation. No associated rim enhancement to suggest drainable abscess at this time.  - VSS without leukocytosis ;  non-surgical abdomen on exam  - CRS consult appreciated.   - Bloody clots starting 1/7 - CTA abdomen showed "Rectosigmoid proctocolitis, of numerous potential etiologies. Questionable cecitis as well. Correlate clinically, including for the possibility of early/mild pseudomembranous " "colitis."  - c diff antigen positive, c diff toxin, c diff toxin PCR positive  - started on metronidazole IV and vancomycin - ID consulted    - decreased vancomycin to 125 mg QID and stopped metronidazole.   - still with diarrhea. Currently has rectal tube.  - discussed with GI about on-going diarrhea requiring rectal tube   Can not use anti-diarrheals unless confirmed c. Diff negative   Check for GI pathogens and stool cultures   Continue supportive care   If rectal tube is still required, may need to consult GI for further diarrhea management and may need LTAC if can not be removed    Poor prognosis  We discussed on-going malnutrition and wounds due to FTT now worsened by c diff with fairly significant diarrhea. Debility progressing since stroke and now with contractures. Frequent re-hospitalizations due to FTT and infections. We have no means to stop future infections and other acute illness and soon may reach all limits of medical therapy to keep her alive. Palliative consulted.     Acute deep vein thrombosis (DVT) of right upper extremity  Given recent witness GI bleeds. Start heparin drip. No bleed for two days. Switch to rivaroxaban.     Fever  Patient had temp 101.2. She was on Pricilla hugger at time. UA done after horton catheter removed was slightly positive. Blood culture pending. CXR pending. Spoke to ID. Prefer to wait for culture results and monitor vitals off pricilla hugger to consider antibiotic treatment as it less likely she has new infectious process. Patient afebrile off pricilla hugger and WBC normal. Urine culture growing GNR but Horton removed. As patient clinically improved overall, will defer adding anitbiotic at this time.     Severe malnutrition  Refeeding syndrome  Nutrition consulted. Most recent weight and BMI monitored-     Measurements:  Wt Readings from Last 1 Encounters:   01/05/25 56.4 kg (124 lb 5.4 oz)   Body mass index is 22.03 kg/m².    Patient has been screened and assessed by " RD.    Malnutrition Type:  Context:    Level:      Malnutrition Characteristic Summary:       Interventions/Recommendations (treatment strategy):  1. Continuous TF recommendation: Glucerna 1.5 @ 50 mL/hr to provide 1200 mL total volume, 1800 kcal, 150 g CHO, 99 g protein, 19 g fiber, 910 mL free water, 120% DRI   -150 mL flush q4 hrs to provide additional 900 mL free water (Total 1810mL) - nursing, please continue documenting TF rate via flowsheets 2. RD to monitor intake, labs, weight    Refeeding syndrome as defined by severe phosphatemia despite initial replacement attempts.    Phosphorus, magnesium and potassium replaced    Acute blood loss anemia  Anemia is likely due to acute blood loss which was from C diff colitis . Most recent hemoglobin and hematocrit are listed below.  Recent Labs     01/13/25  0538 01/14/25  0523 01/15/25  0241   HGB 11.2* 10.1* 9.8*   HCT 32.7* 29.9* 29.0*     Plan  - Monitor serial CBC: Daily  - Transfuse PRBC if patient becomes hemodynamically unstable, symptomatic or H/H drops below 7/21.  - Patient has received 3 units of PRBCs on 1/10/2025  - Patient's anemia is currently improving  - transfused above threshhold  - UGI - had blood streaked vomitus 1/12/2025. Started on protonix po BID. No further reports of blood while on heparin drip for 2 days. Switched to famotidine due to recurring c diff risks with PPI  - Hgb slowly drifting down, not from acute loss, likely due to inflammation from acute infections disease and critical illness    Chronic hypotension  Chronic due to malnutrition  Will defer volume resuscitation unless MAP <60    Dehydration  hypernatremia  Due to diarrhea and holding of feedings  Resolved with IV D5    Acute hypoxemic respiratory failure  Patient with Hypoxic Respiratory failure which is Acute.  she is not on home oxygen. Supplemental oxygen was provided and noted-      Signs/symptoms of respiratory failure include- increased work of breathing and lethargy.  Contributing diagnoses includes - Pleural effusion Labs and images were reviewed. Patient Has not had a recent ABG. Will treat underlying causes and adjust management of respiratory failure as follows-     - XR on 01/08 with evidence of hypervolemia--Lasix 40mg IV qd on 01  - resolving as on RA at times  - aggressive pulmonary toileting    LFT elevation  Without inciting event on labs 01/04. No evidence of hypervolemia on CXR, no increased O2 requirement, no abdominal palpation. Shock liver a consideration given hypotension with MAP around 65 that morning.    - US Liver with doppler negative for acute abormality  - continue to monitor with daily labs, avoid hepatotoxic medications  - improving      Atelectasis of both lungs  I have personally reviewed Chest X-ray. Patient with atelectasis on interpretation. Likely Non-Obstructive atelectasis secondary to pleural effusion. Signs and symptoms include Shortness of breath and Hypoxemia Will begin treatment with upright positioning.    Hypophosphatemia  Patient's most recent phosphorus results are listed below.   Recent Labs     01/12/25  1053 01/13/25  0818 01/14/25  0523   PHOS 3.6 2.7 2.7     Plan  - Will treat hypophosphatemia with prn supplementation  - refeeding. Replace as needed    Hypokalemia  Patient's most recent potassium results are listed below.   Recent Labs     01/12/25  1053 01/13/25  0818 01/14/25  0523   K 5.4* 4.2 4.3     Plan  - Replete potassium per protocol  - Monitor potassium Daily  - Patient's hypokalemia is improving    Hypernatremia  Hypernatremia is likely due to Dehydration. monitor with hydration   Recent Labs     01/12/25  1053 01/13/25  0818 01/14/25  0523    132* 135*     Improved with IV D5.   Resolved and now on free water flushes    Dysphagia  - sp PEG for nutrition and meds  ;  okay for comfort po feeds per recent SLP recs  - NPO as above at this time    Multiple wounds of skin  - Wound care consulted  - turn q2  - waffle  mattress overlay  - clean and dressed    History of stroke with residual effects  - chronic left hemiplegia, bed bound, sp PEG though able to tolerate po comfort feed  - continue home ASA and statin  - turn q2h      12/31 CT head -No acute intracranial process. Prominent involutional changes and chronic microvascular ischemic changes in the periventricular white matter.  Small areas of probable remote lacunar infarction in the bilateral basal ganglia and small probable chronic right parietal cortical infarct.     - Given ongoing lethargy repeat CT head negative for acute process  - EEG negative for epileptic activity    Insomnia  - home trazadone      Mixed hyperlipidemia  - home statin      Diet:   Glucerna 50 mL/h  GI PPx: not needed  DVT PPx:  SCD/MIGUEL ANGEL  Airways: 2L oxygen  Wounds: left proximal arm     Goals of Care:  Return to prior functional status     Discharge Planning   ANA: 1/16/2025   Is the patient medically ready for discharge?:     Reason for patient still in hospital (select all that apply): Patient trending condition and Treatment  Discharge Plan A: Home, Home with family, Home Health   Discharge Delays: None known at this time    Tiana Lazar MD

## 2025-01-15 NOTE — SUBJECTIVE & OBJECTIVE
Interval History: Pt to d/c home with family.     Past Medical History:   Diagnosis Date    Diabetes mellitus type I     Hyperlipidemia     Hypertension     Right sided weakness 2019       Past Surgical History:   Procedure Laterality Date     SECTION         Review of patient's allergies indicates:   Allergen Reactions    Sulfur        Medications:  Continuous Infusions:  Scheduled Meds:   albuterol sulfate  2.5 mg Nebulization Q4H WAKE    aspirin  81 mg Per G Tube Daily    [START ON 2025] famotidine  40 mg Per G Tube Daily    insulin aspart U-100  3 Units Subcutaneous Q6H    [START ON 2025] insulin glargine U-100  5 Units Subcutaneous QHS    k phos di & mono-sod phos mono  500 mg Per G Tube TID WM    [START ON 2025] k phos di & mono-sod phos mono  500 mg Per G Tube Daily    miconazole NITRATE 2 %   Topical (Top) BID    multivitamin  1 tablet Per G Tube Daily    rivaroxaban  15 mg Per G Tube BID    vancomycin  125 mg Per G Tube Q6H    white petrolatum   Topical (Top) BID     PRN Meds:  Current Facility-Administered Medications:     0.9%  NaCl infusion (for blood administration), , Intravenous, Q24H PRN    acetaminophen, 650 mg, Per G Tube, Q4H PRN    albuterol, 2 puff, Inhalation, Q6H PRN **AND** MDI Q6H PRN, , , Q6H PRN    dextrose 50%, 12.5 g, Intravenous, PRN    dextrose 50%, 25 g, Intravenous, PRN    glucagon (human recombinant), 1 mg, Intramuscular, PRN    glucose, 16 g, Per G Tube, PRN    glucose, 24 g, Per G Tube, PRN    insulin aspart U-100, 0-10 Units, Subcutaneous, QID (AC + HS) PRN    melatonin, 6 mg, Per G Tube, Nightly PRN    naloxone, 0.02 mg, Intravenous, PRN    ondansetron, 4 mg, Per G Tube, Q8H PRN    oxyCODONE-acetaminophen, 1 tablet, Per G Tube, Q4H PRN    prochlorperazine, 5 mg, Intravenous, Q4H PRN    sodium chloride 0.9%, 10 mL, Intravenous, PRN    sodium chloride 0.9%, 10 mL, Intravenous, Q12H PRN    Flushing PICC/Midline Protocol, , , Until Discontinued **AND**  sodium chloride 0.9%, 10 mL, Intravenous, Q12H PRN    Family History       Problem Relation (Age of Onset)    Cancer Sister    Diabetes Mother, Sister, Brother, Maternal Aunt    Heart disease Maternal Aunt    Hyperlipidemia Mother, Sister, Brother    Hypertension Mother, Sister, Brother    Stroke Mother          Tobacco Use    Smoking status: Every Day     Current packs/day: 1.50     Average packs/day: 1.5 packs/day for 35.0 years (52.5 ttl pk-yrs)     Types: Cigarettes    Smokeless tobacco: Never   Substance and Sexual Activity    Alcohol use: Not Currently    Drug use: Not Currently    Sexual activity: Not on file       Review of Systems   Constitutional:  Positive for activity change and appetite change.   Skin:  Positive for wound.   Neurological:  Positive for weakness.   Psychiatric/Behavioral:  Positive for decreased concentration.      Objective:     Vital Signs (Most Recent):  Temp: 97.8 °F (36.6 °C) (01/15/25 1201)  Pulse: 92 (01/15/25 1201)  Resp: 20 (01/15/25 0832)  BP: 113/82 (01/15/25 1201)  SpO2: 97 % (01/15/25 0832) Vital Signs (24h Range):  Temp:  [97.5 °F (36.4 °C)-99.8 °F (37.7 °C)] 97.8 °F (36.6 °C)  Pulse:  [80-99] 92  Resp:  [16-20] 20  SpO2:  [96 %-99 %] 97 %  BP: (109-137)/(70-85) 113/82     Weight: 57.3 kg (126 lb 5.2 oz)  Body mass index is 22.38 kg/m².       Physical Exam  Constitutional:       General: She is not in acute distress.     Appearance: She is ill-appearing.   HENT:      Head: Normocephalic and atraumatic.   Pulmonary:      Effort: Pulmonary effort is normal. No respiratory distress.   Abdominal:      Comments: PEG in place.    Musculoskeletal:      Comments: Weakness noted, lower extremities, contractures noted.    Skin:     General: Skin is warm and dry.   Neurological:      Mental Status: She is alert and oriented to person, place, and time.            Review of Symptoms      Symptom Assessment (ESAS 0-10 Scale)  Pain:  0  Dyspnea:  0  Anxiety:  0  Nausea:  0  Depression:   0  Anorexia:  0  Fatigue:  0  Insomnia:  0  Restlessness:  0  Agitation:  0         Performance Status:  20    Living Arrangements:  Lives with family    Psychosocial/Cultural:   See Palliative Psychosocial Note: No  Family lives with daughter. Pt's daughter, Emily works at [employer] and will be taking leave to care for pt at home.   **Primary  to Follow**  Palliative Care  Consult: No    Spiritual:  F - Tiff and Belief:  Orthodox  A - Address in Care:  Will ask  to see.         Advance Care Planning   Advance Care Planning       Significant Labs: All pertinent labs within the past 24 hours have been reviewed.  CBC:   Recent Labs   Lab 01/15/25  0241   WBC 10.24   HGB 9.8*   HCT 29.0*   MCV 78*        BMP:  Recent Labs   Lab 01/15/25  0241   GLU 80      K 4.8      CO2 22*   BUN 19   CREATININE 0.7   CALCIUM 7.9*   MG 2.0     LFT:  Lab Results   Component Value Date    AST 54 (H) 01/15/2025    ALKPHOS 328 (H) 01/15/2025    BILITOT 0.6 01/15/2025     Albumin:   Albumin   Date Value Ref Range Status   01/15/2025 1.4 (L) 3.5 - 5.2 g/dL Final     Protein:   Total Protein   Date Value Ref Range Status   01/15/2025 5.8 (L) 6.0 - 8.4 g/dL Final     Lactic acid:   Lab Results   Component Value Date    LACTATE 1.2 01/08/2025    LACTATE 2.0 12/30/2024       Significant Imaging: I have reviewed all pertinent imaging results/findings within the past 24 hours.

## 2025-01-15 NOTE — ASSESSMENT & PLAN NOTE
Impression: Pt is a 60 y/o female with with multiple co-morbidities including HTN, DM and prior CVA with significant functional deficits including dysphagia with PEG tube for feeds, left hemiplagia. Dependent on support for ADLs - has help from daughter/granddaughter. Pt admitted with C-diff, severe malnutrition. Pt is a full code.     Reason for consult: ACP/GOC Communicated with Dr. Lazar    GOC/ACP    No family at bedside. Called and spoke to daughter, Emily who works at a school during the day but will be starting leave to care for pt at home.     Emily reports her and her sister are aware of pt's medical issues. Per daughter pt has had significant weight loss recently. She is aware pt continues to have infection. Daughter reports pt is in bed at home and family cares for her.     We discussed how pt will continue to decline form her medical issues. Daughter would like pt to go home with home health but open to comfort care approach as pt declines more.     Code status discussed. Per daughter, pt has voice she wanted to be a full code. Per daughter, she will discuss more with family. Pt full code at this time.     Symptom management:     Debility:   Pt is bed-bound, total care.   Pt being repositioned at least q 2 hrs per nursing.     Severe protein, malnutrition  Pt has PEG tube and eats by mouth.   Nutrition seeing pt.     Plan:   Pal care will continue to follow.

## 2025-01-15 NOTE — PLAN OF CARE
Problem: Diabetes Comorbidity  Goal: Blood Glucose Level Within Targeted Range  Outcome: Progressing     Problem: Urinary Retention  Goal: Effective Urinary Elimination  Outcome: Progressing     Problem: Fluid Volume Deficit  Goal: Fluid Balance  Outcome: Progressing       Pt yells throughout the night check on patient she says she looking for her granddaughter, redirected patient several times that she is in the hospital. Tube feeding and bag hung this morning. Plan of care continued.

## 2025-01-15 NOTE — CONSULTS
[place] [facility] Surg  Palliative Medicine  Consult Note    Patient Name: Emily Martinez  MRN: [id number]  Admission Date: 12/30/2024  Hospital Length of Stay: 15 days  Code Status: Full Code   Attending Provider: Tiana Lazar MD  Consulting Provider: HEIDI Estrella  Primary Care Physician: Alireza Sosa MD  Principal Problem:C. difficile colitis    Patient information was obtained from patient, relative(s), and primary team.      Inpatient consult to Palliative Care  Consult performed by: Mili Hudson CNS  Consult ordered by: Tiana Lazar MD        Assessment/Plan:     Palliative Care  Palliative care encounter  Impression: Pt is a 58 y/o female with with multiple co-morbidities including HTN, DM and prior CVA with significant functional deficits including dysphagia with PEG tube for feeds, left hemiplagia. Dependent on support for ADLs - has help from daughter/granddaughter. Pt admitted with C-diff, severe malnutrition. Pt is a full code.     Reason for consult: ACP/GOC Communicated with Dr. Lazar    GOC/ACP    No family at bedside. Called and spoke to daughter, Emily who works at a school during the day but will be starting leave to care for pt at home.     Emily reports her and her sister are aware of pt's medical issues. Per daughter pt has had significant weight loss recently. She is aware pt continues to have infection. Daughter reports pt is in bed at home and family cares for her.     We discussed how pt will continue to decline form her medical issues. Daughter would like pt to go home with home health but open to comfort care approach as pt declines more.     Code status discussed. Per daughter, pt has voice she wanted to be a full code. Per daughter, she will discuss more with family. Pt full code at this time.     Symptom management:     Debility:   Pt is bed-bound, total care.   Pt being repositioned at least q 2 hrs per nursing.     Severe protein, malnutrition  Pt has  PEG tube and eats by mouth.   Nutrition seeing pt.     Plan:   Pal care will continue to follow.                   Thank you for your consult. I will follow-up with patient. Please contact us if you have any additional questions.    Subjective:     HPI:   Pt is a 59-year-old female, history of a stroke with left hemiplegia, bed-bound, diabetes, hypertension, recurrent infections, dysphagia sp PEG, multiple skin wounds, multiple recent admissions in the last several months in the [facility] system, most recently last week for UTI, discharged with Keflex, living at home with her daughter, now brought in for diarrhea.  Per chart review, daughter states the patient has had diarrhea for about a week and a half.  She is having about 4-5 episodes a day, watery, nonbloody.  She has intermittently also vomiting.  Patient has a PEG tube for nutrition but per most recent SLP note can also take p.o. nutrition but daughter says she has had minimal p.o. intake because she has said she is not hungry.  No fevers noted.  Daughter is frustrated as she feels like when patient gets admitted, she is temporarily better and then very quickly becomes ill again.  All of their care has been in the [facility] system but they decided to come to [facility] today as they were hopeful that we would be able to get her better.       Of note, urine culture from 6 days ago is growing out ESBL E coli, greater than 100,000 colonies     In the ED patient afebrile and hemodynamically stable saturating well on room air at rest. No leukocytosis. UA still concerning for UTI and patient started on meropenem. CT abdomen performed and noted acute diverticulitis with area of concern for contained perforation. Patient without abdominal tenderness. Patient admitted to the [facility] medicine for further evaluation and management.           Hospital Course:  No notes on file    Interval History: Pt to d/c home with family.     Past Medical History:   Diagnosis Date    Diabetes  mellitus type I     Hyperlipidemia     Hypertension     Right sided weakness 2019       Past Surgical History:   Procedure Laterality Date     SECTION         Review of patient's allergies indicates:   Allergen Reactions    Sulfur        Medications:  Continuous Infusions:  Scheduled Meds:   albuterol sulfate  2.5 mg Nebulization Q4H WAKE    aspirin  81 mg Per G Tube Daily    [START ON 2025] famotidine  40 mg Per G Tube Daily    insulin aspart U-100  3 Units Subcutaneous Q6H    [START ON 2025] insulin glargine U-100  5 Units Subcutaneous QHS    k phos di & mono-sod phos mono  500 mg Per G Tube TID WM    [START ON 2025] k phos di & mono-sod phos mono  500 mg Per G Tube Daily    miconazole NITRATE 2 %   Topical (Top) BID    multivitamin  1 tablet Per G Tube Daily    rivaroxaban  15 mg Per G Tube BID    vancomycin  125 mg Per G Tube Q6H    white petrolatum   Topical (Top) BID     PRN Meds:  Current Facility-Administered Medications:     0.9%  NaCl infusion (for blood administration), , Intravenous, Q24H PRN    acetaminophen, 650 mg, Per G Tube, Q4H PRN    albuterol, 2 puff, Inhalation, Q6H PRN **AND** MDI Q6H PRN, , , Q6H PRN    dextrose 50%, 12.5 g, Intravenous, PRN    dextrose 50%, 25 g, Intravenous, PRN    glucagon (human recombinant), 1 mg, Intramuscular, PRN    glucose, 16 g, Per G Tube, PRN    glucose, 24 g, Per G Tube, PRN    insulin aspart U-100, 0-10 Units, Subcutaneous, QID (AC + HS) PRN    melatonin, 6 mg, Per G Tube, Nightly PRN    naloxone, 0.02 mg, Intravenous, PRN    ondansetron, 4 mg, Per G Tube, Q8H PRN    oxyCODONE-acetaminophen, 1 tablet, Per G Tube, Q4H PRN    prochlorperazine, 5 mg, Intravenous, Q4H PRN    sodium chloride 0.9%, 10 mL, Intravenous, PRN    sodium chloride 0.9%, 10 mL, Intravenous, Q12H PRN    Flushing PICC/Midline Protocol, , , Until Discontinued **AND** sodium chloride 0.9%, 10 mL, Intravenous, Q12H PRN    Family History       Problem Relation (Age of  Onset)    Cancer Sister    Diabetes Mother, Sister, Brother, Maternal Aunt    Heart disease Maternal Aunt    Hyperlipidemia Mother, Sister, Brother    Hypertension Mother, Sister, Brother    Stroke Mother          Tobacco Use    Smoking status: Every Day     Current packs/day: 1.50     Average packs/day: 1.5 packs/day for 35.0 years (52.5 ttl pk-yrs)     Types: Cigarettes    Smokeless tobacco: Never   Substance and Sexual Activity    Alcohol use: Not Currently    Drug use: Not Currently    Sexual activity: Not on file       Review of Systems   Constitutional:  Positive for activity change and appetite change.   Skin:  Positive for wound.   Neurological:  Positive for weakness.   Psychiatric/Behavioral:  Positive for decreased concentration.      Objective:     Vital Signs (Most Recent):  Temp: 97.8 °F (36.6 °C) (01/15/25 1201)  Pulse: 92 (01/15/25 1201)  Resp: 20 (01/15/25 0832)  BP: 113/82 (01/15/25 1201)  SpO2: 97 % (01/15/25 0832) Vital Signs (24h Range):  Temp:  [97.5 °F (36.4 °C)-99.8 °F (37.7 °C)] 97.8 °F (36.6 °C)  Pulse:  [80-99] 92  Resp:  [16-20] 20  SpO2:  [96 %-99 %] 97 %  BP: (109-137)/(70-85) 113/82     Weight: 57.3 kg (126 lb 5.2 oz)  Body mass index is 22.38 kg/m².       Physical Exam  Constitutional:       General: She is not in acute distress.     Appearance: She is ill-appearing.   HENT:      Head: Normocephalic and atraumatic.   Pulmonary:      Effort: Pulmonary effort is normal. No respiratory distress.   Abdominal:      Comments: PEG in place.    Musculoskeletal:      Comments: Weakness noted, lower extremities, contractures noted.    Skin:     General: Skin is warm and dry.   Neurological:      Mental Status: She is alert and oriented to person, place, and time.            Review of Symptoms      Symptom Assessment (ESAS 0-10 Scale)  Pain:  0  Dyspnea:  0  Anxiety:  0  Nausea:  0  Depression:  0  Anorexia:  0  Fatigue:  0  Insomnia:  0  Restlessness:  0  Agitation:  0         Performance  Status:  20    Living Arrangements:  Lives with family    Psychosocial/Cultural:   See Palliative Psychosocial Note: No  Family lives with daughter. Pt's daughter, Emily works at [employer] and will be taking leave to care for pt at home.   **Primary  to Follow**  Palliative Care  Consult: No    Spiritual:  F - Tiff and Belief:  Faith  A - Address in Care:  Will ask  to see.         Advance Care Planning  Advance Care Planning       Significant Labs: All pertinent labs within the past 24 hours have been reviewed.  CBC:   Recent Labs   Lab 01/15/25  0241   WBC 10.24   HGB 9.8*   HCT 29.0*   MCV 78*        BMP:  Recent Labs   Lab 01/15/25  0241   GLU 80      K 4.8      CO2 22*   BUN 19   CREATININE 0.7   CALCIUM 7.9*   MG 2.0     LFT:  Lab Results   Component Value Date    AST 54 (H) 01/15/2025    ALKPHOS 328 (H) 01/15/2025    BILITOT 0.6 01/15/2025     Albumin:   Albumin   Date Value Ref Range Status   01/15/2025 1.4 (L) 3.5 - 5.2 g/dL Final     Protein:   Total Protein   Date Value Ref Range Status   01/15/2025 5.8 (L) 6.0 - 8.4 g/dL Final     Lactic acid:   Lab Results   Component Value Date    LACTATE 1.2 01/08/2025    LACTATE 2.0 12/30/2024       Significant Imaging: I have reviewed all pertinent imaging results/findings within the past 24 hours.      I spent a total of 75 minutes on the day of the visit. This includes face to face time in discussion of goals of care, symptom assessment, coordination of care and emotional support.  This also includes non-face to face time preparing to see the patient (eg, review of tests/imaging), obtaining and/or reviewing separately obtained history, documenting clinical information in the electronic or other health record, independently interpreting results and communicating results to the patient/family/caregiver, or care coordinator.    Mili Hudson, CNS  Palliative Medicine  [facility]  Surg

## 2025-01-15 NOTE — ASSESSMENT & PLAN NOTE
We discussed on-going malnutrition and wounds due to FTT now worsened by c diff with fairly significant diarrhea. Debility progressing since stroke and now with contractures. Frequent re-hospitalizations due to FTT and infections. We have no means to stop future infections and other acute illness and soon may reach all limits of medical therapy to keep her alive.

## 2025-01-16 LAB
ALBUMIN SERPL BCP-MCNC: 1.5 G/DL (ref 3.5–5.2)
ALP SERPL-CCNC: 331 U/L (ref 40–150)
ALT SERPL W/O P-5'-P-CCNC: 47 U/L (ref 10–44)
ANION GAP SERPL CALC-SCNC: 5 MMOL/L (ref 8–16)
AST SERPL-CCNC: 40 U/L (ref 10–40)
ASTROVIRUS: NOT DETECTED
BASOPHILS # BLD AUTO: 0.04 K/UL (ref 0–0.2)
BASOPHILS NFR BLD: 0.4 % (ref 0–1.9)
BILIRUB SERPL-MCNC: 0.5 MG/DL (ref 0.1–1)
BUN SERPL-MCNC: 23 MG/DL (ref 8–23)
C COLI+JEJ+UPSA DNA STL QL NAA+NON-PROBE: NOT DETECTED
CALCIUM SERPL-MCNC: 8.1 MG/DL (ref 8.7–10.5)
CHLORIDE SERPL-SCNC: 105 MMOL/L (ref 95–110)
CO2 SERPL-SCNC: 25 MMOL/L (ref 23–29)
CREAT SERPL-MCNC: 0.7 MG/DL (ref 0.5–1.4)
CYCLOSPORA CAYETANENSIS: NOT DETECTED
DIFFERENTIAL METHOD BLD: ABNORMAL
ENTEROAGGREGATIVE E COLI: NOT DETECTED
ENTEROPATHOGENIC E COLI: NOT DETECTED
EOSINOPHIL # BLD AUTO: 0 K/UL (ref 0–0.5)
EOSINOPHIL NFR BLD: 0 % (ref 0–8)
ERYTHROCYTE [DISTWIDTH] IN BLOOD BY AUTOMATED COUNT: 25.1 % (ref 11.5–14.5)
EST. GFR  (NO RACE VARIABLE): >60 ML/MIN/1.73 M^2
GLUCOSE SERPL-MCNC: 173 MG/DL (ref 70–110)
GPP - ADENOVIRUS 40/41: NOT DETECTED
GPP - CRYPTOSPORIDIUM: NOT DETECTED
GPP - ENTAMOEBA HISTOLYTICA: NOT DETECTED
GPP - ENTEROTOXIGENIC E COLI (ETEC): NOT DETECTED
GPP - GIARDIA LAMBLIA: NOT DETECTED
GPP - NOROVIRUS GI/GII: NOT DETECTED
GPP - ROTAVIRUS A: NOT DETECTED
GPP - SALMONELLA: NOT DETECTED
GPP - VIBRIO CHOLERA: NOT DETECTED
GPP - YERSINIA ENTEROCOLITICA: NOT DETECTED
HCT VFR BLD AUTO: 30.2 % (ref 37–48.5)
HGB BLD-MCNC: 9.7 G/DL (ref 12–16)
IMM GRANULOCYTES # BLD AUTO: 0.18 K/UL (ref 0–0.04)
IMM GRANULOCYTES NFR BLD AUTO: 1.8 % (ref 0–0.5)
LACTATE PLASV-SCNC: NOT DETECTED MMOL/L
LYMPHOCYTES # BLD AUTO: 1.6 K/UL (ref 1–4.8)
LYMPHOCYTES NFR BLD: 16 % (ref 18–48)
MAGNESIUM SERPL-MCNC: 2.1 MG/DL (ref 1.6–2.6)
MCH RBC QN AUTO: 26 PG (ref 27–31)
MCHC RBC AUTO-ENTMCNC: 32.1 G/DL (ref 32–36)
MCV RBC AUTO: 81 FL (ref 82–98)
MONOCYTES # BLD AUTO: 1 K/UL (ref 0.3–1)
MONOCYTES NFR BLD: 10 % (ref 4–15)
NEUTROPHILS # BLD AUTO: 7.4 K/UL (ref 1.8–7.7)
NEUTROPHILS NFR BLD: 71.8 % (ref 38–73)
NRBC BLD-RTO: 1 /100 WBC
PHOSPHATE SERPL-MCNC: 2.8 MG/DL (ref 2.7–4.5)
PLATELET # BLD AUTO: 263 K/UL (ref 150–450)
PLESIOMONAS SHIGELLOIDES: NOT DETECTED
PMV BLD AUTO: 10 FL (ref 9.2–12.9)
POCT GLUCOSE: 138 MG/DL (ref 70–110)
POCT GLUCOSE: 146 MG/DL (ref 70–110)
POCT GLUCOSE: 190 MG/DL (ref 70–110)
POCT GLUCOSE: 203 MG/DL (ref 70–110)
POTASSIUM SERPL-SCNC: 5.4 MMOL/L (ref 3.5–5.1)
PROT SERPL-MCNC: 6.1 G/DL (ref 6–8.4)
RBC # BLD AUTO: 3.73 M/UL (ref 4–5.4)
SAPOVIRUS: NOT DETECTED
SHIGELLA SP+EIEC IPAH STL QL NAA+PROBE: NOT DETECTED
SODIUM SERPL-SCNC: 135 MMOL/L (ref 136–145)
VIBRIO: NOT DETECTED
WBC # BLD AUTO: 10.25 K/UL (ref 3.9–12.7)

## 2025-01-16 PROCEDURE — 21400001 HC TELEMETRY ROOM

## 2025-01-16 PROCEDURE — 94761 N-INVAS EAR/PLS OXIMETRY MLT: CPT

## 2025-01-16 PROCEDURE — 85025 COMPLETE CBC W/AUTO DIFF WBC: CPT

## 2025-01-16 PROCEDURE — 25000003 PHARM REV CODE 250

## 2025-01-16 PROCEDURE — 94640 AIRWAY INHALATION TREATMENT: CPT

## 2025-01-16 PROCEDURE — 87045 FECES CULTURE AEROBIC BACT: CPT | Performed by: INTERNAL MEDICINE

## 2025-01-16 PROCEDURE — 84100 ASSAY OF PHOSPHORUS: CPT | Performed by: INTERNAL MEDICINE

## 2025-01-16 PROCEDURE — 25000003 PHARM REV CODE 250: Performed by: INTERNAL MEDICINE

## 2025-01-16 PROCEDURE — 87427 SHIGA-LIKE TOXIN AG IA: CPT | Mod: 59 | Performed by: INTERNAL MEDICINE

## 2025-01-16 PROCEDURE — 36415 COLL VENOUS BLD VENIPUNCTURE: CPT

## 2025-01-16 PROCEDURE — 87449 NOS EACH ORGANISM AG IA: CPT | Performed by: INTERNAL MEDICINE

## 2025-01-16 PROCEDURE — 94668 MNPJ CHEST WALL SBSQ: CPT

## 2025-01-16 PROCEDURE — 25000003 PHARM REV CODE 250: Performed by: HOSPITALIST

## 2025-01-16 PROCEDURE — 27000207 HC ISOLATION

## 2025-01-16 PROCEDURE — 87046 STOOL CULTR AEROBIC BACT EA: CPT | Performed by: INTERNAL MEDICINE

## 2025-01-16 PROCEDURE — 99900035 HC TECH TIME PER 15 MIN (STAT)

## 2025-01-16 PROCEDURE — 94664 DEMO&/EVAL PT USE INHALER: CPT

## 2025-01-16 PROCEDURE — 11000001 HC ACUTE MED/SURG PRIVATE ROOM

## 2025-01-16 PROCEDURE — 83735 ASSAY OF MAGNESIUM: CPT | Performed by: INTERNAL MEDICINE

## 2025-01-16 PROCEDURE — 80053 COMPREHEN METABOLIC PANEL: CPT

## 2025-01-16 PROCEDURE — 87507 IADNA-DNA/RNA PROBE TQ 12-25: CPT | Performed by: INTERNAL MEDICINE

## 2025-01-16 PROCEDURE — 25000242 PHARM REV CODE 250 ALT 637 W/ HCPCS: Performed by: INTERNAL MEDICINE

## 2025-01-16 RX ORDER — FAMOTIDINE 20 MG/1
20 TABLET, FILM COATED ORAL 2 TIMES DAILY
Status: DISCONTINUED | OUTPATIENT
Start: 2025-01-16 | End: 2025-01-18 | Stop reason: HOSPADM

## 2025-01-16 RX ORDER — DIPHENHYDRAMINE HCL 12.5MG/5ML
25 ELIXIR ORAL EVERY 6 HOURS PRN
Status: DISCONTINUED | OUTPATIENT
Start: 2025-01-16 | End: 2025-01-17

## 2025-01-16 RX ORDER — DIPHENHYDRAMINE HCL 25 MG
25 CAPSULE ORAL EVERY 6 HOURS PRN
Status: DISCONTINUED | OUTPATIENT
Start: 2025-01-16 | End: 2025-01-16

## 2025-01-16 RX ADMIN — OXYCODONE AND ACETAMINOPHEN 1 TABLET: 7.5; 325 TABLET ORAL at 05:01

## 2025-01-16 RX ADMIN — Medication 6 MG: at 10:01

## 2025-01-16 RX ADMIN — INSULIN ASPART 3 UNITS: 100 INJECTION, SOLUTION INTRAVENOUS; SUBCUTANEOUS at 12:01

## 2025-01-16 RX ADMIN — ALBUTEROL SULFATE 2.5 MG: 2.5 SOLUTION RESPIRATORY (INHALATION) at 04:01

## 2025-01-16 RX ADMIN — OXYCODONE AND ACETAMINOPHEN 1 TABLET: 7.5; 325 TABLET ORAL at 10:01

## 2025-01-16 RX ADMIN — RIVAROXABAN 15 MG: 15 TABLET, FILM COATED ORAL at 06:01

## 2025-01-16 RX ADMIN — DIPHENHYDRAMINE HYDROCHLORIDE 25 MG: 25 SOLUTION ORAL at 06:01

## 2025-01-16 RX ADMIN — ALBUTEROL SULFATE 2.5 MG: 2.5 SOLUTION RESPIRATORY (INHALATION) at 07:01

## 2025-01-16 RX ADMIN — WHITE PETROLATUM: 1.75 OINTMENT TOPICAL at 10:01

## 2025-01-16 RX ADMIN — VANCOMYCIN HYDROCHLORIDE 125 MG: KIT at 06:01

## 2025-01-16 RX ADMIN — ASPIRIN 81 MG CHEWABLE TABLET 81 MG: 81 TABLET CHEWABLE at 09:01

## 2025-01-16 RX ADMIN — INSULIN GLARGINE 5 UNITS: 100 INJECTION, SOLUTION SUBCUTANEOUS at 10:01

## 2025-01-16 RX ADMIN — INSULIN ASPART 3 UNITS: 100 INJECTION, SOLUTION INTRAVENOUS; SUBCUTANEOUS at 05:01

## 2025-01-16 RX ADMIN — WHITE PETROLATUM: 1.75 OINTMENT TOPICAL at 09:01

## 2025-01-16 RX ADMIN — INSULIN ASPART 2 UNITS: 100 INJECTION, SOLUTION INTRAVENOUS; SUBCUTANEOUS at 06:01

## 2025-01-16 RX ADMIN — FAMOTIDINE 20 MG: 20 TABLET ORAL at 09:01

## 2025-01-16 RX ADMIN — MICONAZOLE NITRATE 2 % TOPICAL POWDER: at 09:01

## 2025-01-16 RX ADMIN — RIVAROXABAN 15 MG: 15 TABLET, FILM COATED ORAL at 05:01

## 2025-01-16 RX ADMIN — VANCOMYCIN HYDROCHLORIDE 125 MG: KIT at 05:01

## 2025-01-16 RX ADMIN — ALBUTEROL SULFATE 2.5 MG: 2.5 SOLUTION RESPIRATORY (INHALATION) at 01:01

## 2025-01-16 RX ADMIN — FAMOTIDINE 20 MG: 20 TABLET ORAL at 10:01

## 2025-01-16 RX ADMIN — DIBASIC SODIUM PHOSPHATE, MONOBASIC POTASSIUM PHOSPHATE AND MONOBASIC SODIUM PHOSPHATE 2 TABLET: 852; 155; 130 TABLET ORAL at 09:01

## 2025-01-16 RX ADMIN — MICONAZOLE NITRATE 2 % TOPICAL POWDER: at 10:01

## 2025-01-16 RX ADMIN — VANCOMYCIN HYDROCHLORIDE 125 MG: KIT at 12:01

## 2025-01-16 RX ADMIN — INSULIN ASPART 3 UNITS: 100 INJECTION, SOLUTION INTRAVENOUS; SUBCUTANEOUS at 06:01

## 2025-01-16 RX ADMIN — THERA TABS 1 TABLET: TAB at 09:01

## 2025-01-16 NOTE — ASSESSMENT & PLAN NOTE
Patient with Acute on chronic debility due to hemiplegia/hemiparesis. The patient's latest AMPAC (Activity Measure for Post Acute Care) Score is listed below.    AM-PAC Score - How much help does the patient need for each activity listed  Basic Mobility Total Score: 23  Turning over in bed (including adjusting bedclothes, sheets and blankets)?: A little  Sitting down on and standing up from a chair with arms (e.g., wheelchair, bedside commode, etc.): None  Moving from lying on back to sitting on the side of the bed?: None  Moving to and from a bed to a chair (including a wheelchair)?: None  Need to walk in hospital room?: None  Climbing 3-5 steps with a railing?: None    Plan  - PT/OT consulted

## 2025-01-16 NOTE — PLAN OF CARE
Problem: Adult Inpatient Plan of Care  Goal: Readiness for Transition of Care  Outcome: Progressing     Problem: Diabetes Comorbidity  Goal: Blood Glucose Level Within Targeted Range  Outcome: Progressing     Problem: Acute Kidney Injury/Impairment  Goal: Effective Renal Function  Outcome: Progressing     Problem: Enteral Nutrition  Goal: Feeding Tolerance  Outcome: Progressing

## 2025-01-16 NOTE — PROGRESS NOTES
[facility] Medicine  Progress Note    Patient Name: Emily Martinez  MRN: [id number]  Patient Class: IP- Inpatient   Admission Date: 12/30/2024  Length of Stay: 16 days  Attending Physician: Valerio Jones MD  Primary Care Provider: Alireza Sosa MD        Subjective     Principal Problem:C. difficile colitis        HPI:  59-year-old female, history of a stroke with left hemiplegia, bed-bound, diabetes, hypertension, recurrent infections, dysphagia sp PEG, multiple skin wounds, multiple recent admissions in the last several months in the [facility] system, most recently last week for UTI, discharged with Keflex, living at home with her daughter, now brought in for diarrhea.  Daughter states the patient has had diarrhea for about a week and a half.  She is having about 4-5 episodes a day, watery, nonbloody.  She has intermittently also vomiting.  Patient has a PEG tube for nutrition but per most recent SLP note can also take p.o. nutrition but daughter says she has had minimal p.o. intake because she has said she is not hungry.  No fevers noted.  Daughter is frustrated as she feels like when patient gets admitted, she is temporarily better and then very quickly becomes ill again.  All of their care has been in the [facility] system but they decided to come to [facility] today as they were hopeful that we would be able to get her better.       Of note, urine culture from 6 days ago is growing out ESBL E coli, greater than 100,000 colonies    In the ED patient afebrile and hemodynamically stable saturating well on room air at rest. No leukocytosis. UA still concerning for UTI and patient started on meropenem. CT abdomen performed and noted acute diverticulitis with area of concern for contained perforation. Patient without abdominal tenderness. Patient admitted to the [facility] for further evaluation and management.    Overview/Hospital Course:  12/31 CT head -No acute intracranial process. Prominent  involutional changes and chronic microvascular ischemic changes in the periventricular white matter.  Small areas of probable remote lacunar infarction in the bilateral basal ganglia and small probable chronic right parietal cortical infarct. CT abdomen - racute diverticulitis of the mid sigmoid colon with adjacent suspected contained perforation.  No associated rim enhancement to suggest drainable abscess at this time.  No bowel obstruction. Apparent circumferential wall thickening of the distal rectum/anus which could be related to underdistention versus nonspecific proctitis.  No significant perirectal inflammatory change or evidence perirectal or perianal drainable abscess.  Left more than right basilar patchy nonspecific pulmonary mosaic attenuation with bronchial wall thickening pulmonary edema or small airways process / right basilar patchy clusters of small nodules suggesting infectious or inflammatory small airways process versus aspiration.  No large consolidation. s/p CRS eval - hemodynamically normal with benign abdominal exam, and reassuring labwork. No acute surgical intervention indicated. continue NPO status, with IVF hydration and IV meropenem.  trend CRP 59-->66. CRS eval -  continue to trend CRP  if downtrending and passing flatus, okay to start on clears and downtitrate IVFC.  1/1 UC GNRs.  ID eval - continue meropenem. If no Pseudomonas isolated, okay to de-escalate to ertapenem. C diff assay pending.    Advancing TF per CRS recs to home regimen. De-escalated to ertapenem for 7-10day treatment course (EED 01/09/25) for diverticulitis and ESBL Ecoli cystitis. Not acute care at home candidate. Transaminitis with negative Liver US, suspect shock liver after hypotension on 01/04 given improvement in LFTs and US Liver negative for acute abnormality. Continued lethargy evaluated with CT head and EEG which were negative for acute findings. Reported BM with clots and worsening hypotension prompted Hgb  check (stable), CTA AP to evaluate for lower GIB and/or intra-abdominal infective source, IVF bolus and CRS re-consult, blood cultures re-ordered, but pt already on Ertapenem. CTA abdomen showed changes associated with megacolon. CRS saw patient. Poor surgical candidate. c diff antigen positive, c diff toxin neg, c diff toxin PCR positive. On oral vancomycin. Improved hemodynamically stability, oxygenation, leukocytosis, and stools not bloody, but diarrhea is constant (not by volume, just constantly streaming out of rectum). Developed DVT in right brachial vein. Tolerated heparin drip for two days. Now on rivaroxaban. Poor prognosis. Recurring admissions related to failure to thrive and infection. Palliative care saw patient and family wishes to proceed with current care and Full code pending continued family discussions.     Interval History: NAEON, continued loose stools prompting replacement of rectal tube. Wound Care consult to evaluate for sacral wound.     Review of Systems  Objective:     Vital Signs (Most Recent):  Temp: 98.2 °F (36.8 °C) (01/16/25 0959)  Pulse: 91 (01/16/25 0959)  Resp: 20 (01/16/25 0959)  BP: 116/81 (01/16/25 0959)  SpO2: 99 % (01/16/25 0959) Vital Signs (24h Range):  Temp:  [98.2 °F (36.8 °C)-99.7 °F (37.6 °C)] 98.2 °F (36.8 °C)  Pulse:  [] 91  Resp:  [16-20] 20  SpO2:  [90 %-99 %] 99 %  BP: (101-116)/(69-81) 116/81     Weight: 57.3 kg (126 lb 5.2 oz)  Body mass index is 22.38 kg/m².    Intake/Output Summary (Last 24 hours) at 1/16/2025 1252  Last data filed at 1/16/2025 0554  Gross per 24 hour   Intake --   Output 650 ml   Net -650 ml         Physical Exam  Vitals and nursing note reviewed.   Constitutional:       General: She is not in acute distress.     Appearance: She is ill-appearing. She is not toxic-appearing or diaphoretic.      Comments: Chronically ill appearing, cachectic   HENT:      Head: Normocephalic and atraumatic.      Comments: Bilateral temporal wasting.      "Nose: Nose normal.   Eyes:      General: No scleral icterus.     Extraocular Movements: Extraocular movements intact.      Conjunctiva/sclera: Conjunctivae normal.   Cardiovascular:      Rate and Rhythm: Normal rate and regular rhythm.   Pulmonary:      Effort: Pulmonary effort is normal. No respiratory distress.      Breath sounds: Decreased air movement present. No wheezing or rales.   Abdominal:      General: Abdomen is flat. There is no distension.      Palpations: Abdomen is soft.      Tenderness: There is no abdominal tenderness. There is no guarding.   Musculoskeletal:      Cervical back: Normal range of motion.      Right lower leg: No edema.      Left lower leg: No edema.      Comments: Left hemiplegia, left UE and LE contracted/stiff.     Skin:     General: Skin is warm and dry.      Comments: Multiple wounds photos in chart   Neurological:      Mental Status: She is alert. Mental status is at baseline.      Cranial Nerves: Dysarthria present.             Significant Labs: All pertinent labs within the past 24 hours have been reviewed.    Significant Imaging: I have reviewed all pertinent imaging results/findings within the past 24 hours.    Assessment and Plan     * C. difficile colitis  - CT abdomen 12/31 with diverticulitis with contained perforation. No associated rim enhancement to suggest drainable abscess at this time.  - VSS without leukocytosis ;  non-surgical abdomen on exam  - CRS consult appreciated.   - Bloody clots starting 1/7 - CTA abdomen showed "Rectosigmoid proctocolitis, of numerous potential etiologies. Questionable cecitis as well. Correlate clinically, including for the possibility of early/mild pseudomembranous colitis."  - c diff antigen positive, c diff toxin, c diff toxin PCR positive  - started on metronidazole IV and vancomycin - ID consulted    - decreased vancomycin to 125 mg QID and stopped metronidazole.   - still with diarrhea. Currently has rectal tube.  - discussed with " GI about on-going diarrhea requiring rectal tube   Can not use anti-diarrheals unless confirmed c. Diff negative   F/u stool cultures   Continue supportive care   If rectal tube is still required, may need to consult GI for further diarrhea management and may need LTAC if can not be removed    Advance care planning  See palliative      Debility  Patient with Acute on chronic debility due to hemiplegia/hemiparesis. The patient's latest AMPAC (Activity Measure for Post Acute Care) Score is listed below.    AM-PAC Score - How much help does the patient need for each activity listed  Basic Mobility Total Score: 23  Turning over in bed (including adjusting bedclothes, sheets and blankets)?: A little  Sitting down on and standing up from a chair with arms (e.g., wheelchair, bedside commode, etc.): None  Moving from lying on back to sitting on the side of the bed?: None  Moving to and from a bed to a chair (including a wheelchair)?: None  Need to walk in hospital room?: None  Climbing 3-5 steps with a railing?: None    Plan  - PT/OT consulted            Palliative care encounter  Palliative consulted and, per [facility] conversations, family wishes to continue full code and current measures pending further family discussions.       Poor prognosis  We discussed on-going malnutrition and wounds due to FTT now worsened by c diff with fairly significant diarrhea. Debility progressing since stroke and now with contractures. Frequent re-hospitalizations due to FTT and infections. We have no means to stop future infections and other acute illness and soon may reach all limits of medical therapy to keep her alive.     Acute deep vein thrombosis (DVT) of right upper extremity  Given recent witness GI bleeds. Start heparin drip. No bleed for two days. Switch to rivaroxaban.     Fever  Patient had temp 101.2. She was on Pricilla hugger at time. UA done after horton catheter removed was slightly positive. Blood culture pending. CXR pending. Spoke  to ID. Prefer to wait for culture results and monitor vitals off mic hugger to consider antibiotic treatment as it less likely she has new infectious process. Patient afebrile off mic hugger and WBC normal. Urine culture growing GNR but Hernandez removed. As patient clinically improved overall, will defer adding anitbiotic at this time.     Severe malnutrition  Refeeding syndrome  Nutrition consulted. Most recent weight and BMI monitored-     Measurements:  Wt Readings from Last 1 Encounters:   01/05/25 56.4 kg (124 lb 5.4 oz)   Body mass index is 22.03 kg/m².    Patient has been screened and assessed by RD.    Malnutrition Type:  Context:    Level:      Malnutrition Characteristic Summary:       Interventions/Recommendations (treatment strategy):  1. Continuous TF recommendation: Glucerna 1.5 @ 50 mL/hr to provide 1200 mL total volume, 1800 kcal, 150 g CHO, 99 g protein, 19 g fiber, 910 mL free water, 120% DRI   -150 mL flush q4 hrs to provide additional 900 mL free water (Total 1810mL) - nursing, please continue documenting TF rate via flowsheets 2. RD to monitor intake, labs, weight    Refeeding syndrome as defined by severe phosphatemia despite initial replacement attempts.    Phosphorus, magnesium and potassium replaced    Acute blood loss anemia  Anemia is likely due to acute blood loss which was from C diff colitis . Most recent hemoglobin and hematocrit are listed below.  Recent Labs     01/13/25  0538 01/14/25  0523 01/15/25  0241   HGB 11.2* 10.1* 9.8*   HCT 32.7* 29.9* 29.0*     Plan  - Monitor serial CBC: Daily  - Transfuse PRBC if patient becomes hemodynamically unstable, symptomatic or H/H drops below 7/21.  - Patient has received 3 units of PRBCs on 1/10/2025  - Patient's anemia is currently improving  - transfused above threshhold  - UGI - had blood streaked vomitus 1/12/2025. Started on protonix po BID. No further reports of blood while on heparin drip for 2 days. Switched to famotidine due to  recurring c diff risks with PPI  - Hgb slowly drifting down, not from acute loss, likely due to inflammation from acute infections disease and critical illness    Chronic hypotension  Chronic due to malnutrition  Will defer volume resuscitation unless MAP <60    Dehydration  hypernatremia  Due to diarrhea and holding of feedings  Resolved with IV D5    Acute hypoxemic respiratory failure  Patient with Hypoxic Respiratory failure which is Acute.  she is not on home oxygen. Supplemental oxygen was provided and noted-      Signs/symptoms of respiratory failure include- increased work of breathing and lethargy. Contributing diagnoses includes - Pleural effusion Labs and images were reviewed. Patient Has not had a recent ABG. Will treat underlying causes and adjust management of respiratory failure as follows-     - XR on 01/08 with evidence of hypervolemia--Lasix 40mg IV qd on 01  - resolving as on RA at times  - aggressive pulmonary toileting    LFT elevation  Without inciting event on labs 01/04. No evidence of hypervolemia on CXR, no increased O2 requirement, no abdominal palpation. Shock liver a consideration given hypotension with MAP around 65 that morning.    - US Liver with doppler negative for acute abormality  - continue to monitor with daily labs, avoid hepatotoxic medications  - improving      Atelectasis of both lungs  I have personally reviewed Chest X-ray. Patient with atelectasis on interpretation. Likely Non-Obstructive atelectasis secondary to pleural effusion. Signs and symptoms include Shortness of breath and Hypoxemia Will begin treatment with upright positioning.    Hypophosphatemia  Patient's most recent phosphorus results are listed below.   Recent Labs     01/13/25  0818 01/14/25  0523 01/15/25  0241   PHOS 2.7 2.7 3.0     Plan  - Will treat hypophosphatemia with prn supplementation  - refeeding. Replace as needed    Hypokalemia  Patient's most recent potassium results are listed below.   Recent  "Labs     01/12/25  1053 01/13/25  0818 01/14/25  0523   K 5.4* 4.2 4.3     Plan  - Replete potassium per protocol  - Monitor potassium Daily  - Patient's hypokalemia is improving    Hypernatremia  Hypernatremia is likely due to Dehydration. monitor with hydration   Recent Labs     01/13/25  0818 01/14/25  0523 01/15/25  0241   * 135* 137     Improved with IV D5.   Resolved and now on free water flushes    Dysphagia  - sp PEG for nutrition and meds  ;  okay for comfort po feeds per recent SLP recs  - NPO as above at this time    Multiple wounds of skin  - Wound care consulted  - turn q2  - waffle mattress overlay  - clean and dressed    History of stroke with residual effects  - chronic left hemiplegia, bed bound, sp PEG though able to tolerate po comfort feed  - continue home ASA and statin  - turn q2h      12/31 CT head -No acute intracranial process. Prominent involutional changes and chronic microvascular ischemic changes in the periventricular white matter.  Small areas of probable remote lacunar infarction in the bilateral basal ganglia and small probable chronic right parietal cortical infarct.     - Given ongoing lethargy repeat CT head negative for acute process  - EEG negative for epileptic activity    Insomnia  - home trazadone      Mixed hyperlipidemia  - home statin        VTE Risk Mitigation (From admission, onward)           Ordered     rivaroxaban tablet 20 mg  With dinner        Placed in "Followed by" Linked Group    01/16/25 1257     rivaroxaban tablet 15 mg  2 times daily        Placed in "Followed by" Linked Group    01/16/25 1257     IP VTE HIGH RISK PATIENT  Once         12/30/24 1937     Place sequential compression device  Until discontinued         12/30/24 1937     Place sequential compression device  Until discontinued         12/30/24 1929                    Discharge Planning   ANA: 1/21/2025     Code Status: Full Code   Medical Readiness for Discharge Date:   Discharge Plan A: " Home, Home with family, Home Health   Discharge Delays: (!) Patient/Caregiver Education Not Complete                    Valerio Jones MD  Department of Hospital Medicine   [facility]

## 2025-01-16 NOTE — PLAN OF CARE
[facility] - Med Surg  Discharge Reassessment    Primary Care Provider: Alireza Sosa MD    Expected Discharge Date: 1/21/2025    Met with pt's daughter, Aneta [phone]  to review discharge recommendation of LTAC and is agreeable to plan    SW spoke to Anika honeycutt/ Linnea [phone] advised [facility] pt accepted for LTAC-request for insurance auth submitted.     Patient/family provided list of facilities in-network with patient's payor plan. Providers that are owned, operated, or affiliated with [facility] are included on the list.     Notified that referral sent to below listed facilities from in-network list based on proximity to home/family support:   [facility]  Patient/family instructed to identify preference.    Preferred Facility: (if more than 1, listed in order of descending preference)  [facility]    If an additional preferred facility not listed above is identified, additional referral to be sent. If above facilities unable to accept, will send additional referrals to in-network providers.     Discharge Plan A and Plan B have been determined by review of patient's clinical status, future medical and therapeutic needs, and coverage/benefits for post-acute care in coordination with multidisciplinary team members.    Reassessment (most recent)       Discharge Reassessment - 01/16/25 1701          Discharge Reassessment    Assessment Type Discharge Planning Reassessment     Did the patient's condition or plan change since previous assessment? No     Discharge Plan discussed with: Adult children     Communicated ANA with patient/caregiver Yes     Discharge Plan A Long-term acute care facility (LTAC)     Discharge Plan B Home;Home with family;Home Health;Other   [facility]/ [facility]    DME Needed Upon Discharge  none     Transition of Care Barriers None     Why the patient remains in the hospital Requires continued medical care        Post-Acute Status     Post-Acute Authorization Home Health;IV Infusion;Placement     Post-Acute Placement Status Pending payor review/awaiting authorization (if required)     Home Health Status Pending medical clearance/testing   [facility]    Coverage [facility]     IV Infusion Status --   Pt current with [facility] for tube feeds.    Other Status See Comments   [facility].    Discharge Delays None known at this time                   Albina Santos LMSW  Part-Time-  [facility]  Ext. [phone]

## 2025-01-16 NOTE — PROGRESS NOTES
Pharmacist Renal Dose Adjustment Note    Emily Martinez is a 59 y.o. female being treated with the medication famotidine     Patient Data:    Vital Signs (Most Recent):  Temp: 99.3 °F (37.4 °C) (01/16/25 0536)  Pulse: 100 (01/16/25 0731)  Resp: 20 (01/16/25 0731)  BP: 115/77 (01/16/25 0536)  SpO2: (!) 90 % (01/16/25 0536) Vital Signs (72h Range):  Temp:  [97 °F (36.1 °C)-99.8 °F (37.7 °C)]   Pulse:  []   Resp:  [16-22]   BP: ()/(58-85)   SpO2:  [88 %-100 %]      Recent Labs   Lab 01/13/25  0818 01/14/25  0523 01/15/25  0241   CREATININE 0.9 0.7 0.7     Serum creatinine: 0.7 mg/dL 01/15/25 0241  Estimated creatinine clearance: 69.8 mL/min    Famotidine 40mg QD will be changed to famotidine 20mg BID    Pharmacist's Name: Ramana Lara  Pharmacist's Extension: [phone]

## 2025-01-16 NOTE — PLAN OF CARE
Recommendations     1. Continuous TF recommendation: Glucerna 1.5 @ 50 mL/hr to provide 1200 mL total volume, 1800 kcal, 150 g CHO, 99 g protein, 19 g fiber, 910 mL free water, 120% DRI     -150 mL flush q4 hrs to provide additional 900 mL free water (Total 1810mL)   - nursing, please continue documenting TF rate via flowsheets   2. RD to monitor intake, labs, weight  3. Will add kiran BID to TF as modifier     Goals:   1. % nutritional needs met with diet   2. Maintain weight during admission  Nutrition Goal Status: progressing towards goal  Communication of RD Recs:  (POC)

## 2025-01-16 NOTE — ASSESSMENT & PLAN NOTE
Palliative consulted and, per [facility] conversations, family wishes to continue full code and current measures pending further family discussions.

## 2025-01-16 NOTE — SUBJECTIVE & OBJECTIVE
Interval History: NAEON, continued loose stools prompting replacement of rectal tube. Wound Care consult to evaluate for sacral wound.     Review of Systems  Objective:     Vital Signs (Most Recent):  Temp: 98.2 °F (36.8 °C) (01/16/25 0959)  Pulse: 91 (01/16/25 0959)  Resp: 20 (01/16/25 0959)  BP: 116/81 (01/16/25 0959)  SpO2: 99 % (01/16/25 0959) Vital Signs (24h Range):  Temp:  [98.2 °F (36.8 °C)-99.7 °F (37.6 °C)] 98.2 °F (36.8 °C)  Pulse:  [] 91  Resp:  [16-20] 20  SpO2:  [90 %-99 %] 99 %  BP: (101-116)/(69-81) 116/81     Weight: 57.3 kg (126 lb 5.2 oz)  Body mass index is 22.38 kg/m².    Intake/Output Summary (Last 24 hours) at 1/16/2025 1252  Last data filed at 1/16/2025 0554  Gross per 24 hour   Intake --   Output 650 ml   Net -650 ml         Physical Exam  Vitals and nursing note reviewed.   Constitutional:       General: She is not in acute distress.     Appearance: She is ill-appearing. She is not toxic-appearing or diaphoretic.      Comments: Chronically ill appearing, cachectic   HENT:      Head: Normocephalic and atraumatic.      Comments: Bilateral temporal wasting.     Nose: Nose normal.   Eyes:      General: No scleral icterus.     Extraocular Movements: Extraocular movements intact.      Conjunctiva/sclera: Conjunctivae normal.   Cardiovascular:      Rate and Rhythm: Normal rate and regular rhythm.   Pulmonary:      Effort: Pulmonary effort is normal. No respiratory distress.      Breath sounds: Decreased air movement present. No wheezing or rales.   Abdominal:      General: Abdomen is flat. There is no distension.      Palpations: Abdomen is soft.      Tenderness: There is no abdominal tenderness. There is no guarding.   Musculoskeletal:      Cervical back: Normal range of motion.      Right lower leg: No edema.      Left lower leg: No edema.      Comments: Left hemiplegia, left UE and LE contracted/stiff.     Skin:     General: Skin is warm and dry.      Comments: Multiple wounds photos in  chart   Neurological:      Mental Status: She is alert. Mental status is at baseline.      Cranial Nerves: Dysarthria present.             Significant Labs: All pertinent labs within the past 24 hours have been reviewed.    Significant Imaging: I have reviewed all pertinent imaging results/findings within the past 24 hours.

## 2025-01-16 NOTE — PROGRESS NOTES
[facility] Surg  Wound Care    Patient Name:  Emily Martinez   MRN:  [id number]  Date: 1/16/2025  Diagnosis: C. difficile colitis    History:     Past Medical History:   Diagnosis Date    Diabetes mellitus type I     Hyperlipidemia     Hypertension     Right sided weakness 6/27/2019       Social History     Socioeconomic History    Marital status: Single   Tobacco Use    Smoking status: Every Day     Current packs/day: 1.50     Average packs/day: 1.5 packs/day for 35.0 years (52.5 ttl pk-yrs)     Types: Cigarettes    Smokeless tobacco: Never   Substance and Sexual Activity    Alcohol use: Not Currently    Drug use: Not Currently     Social Drivers of Health     Financial Resource Strain: Low Risk  (12/31/2024)    Overall Financial Resource Strain (CARDIA)     Difficulty of Paying Living Expenses: Not hard at all   Food Insecurity: No Food Insecurity (12/31/2024)    Hunger Vital Sign     Worried About Running Out of Food in the Last Year: Never true     Ran Out of Food in the Last Year: Never true   Transportation Needs: No Transportation Needs (12/31/2024)    TRANSPORTATION NEEDS     Transportation : No   Physical Activity: Inactive (12/31/2024)    Exercise Vital Sign     Days of Exercise per Week: 0 days     Minutes of Exercise per Session: 0 min   Stress: No Stress Concern Present (12/31/2024)    [employer] - Occupational Stress Questionnaire     Feeling of Stress : Only a little   Housing Stability: Low Risk  (12/31/2024)    Housing Stability Vital Sign     Unable to Pay for Housing in the Last Year: No     Homeless in the Last Year: No       Precautions:     Allergies as of 12/30/2024 - Reviewed 12/30/2024   Allergen Reaction Noted    Sulfur  10/30/2014       [facility] Assessment Details/Treatment   Patient seen for wound care consultation.  Chart reviewed for this encounter.  See flow sheet for findings.    Pt in bed and agreeable to care. Family at the bedside. Pt positioned on her  right side with maximum assistance. Pt cleansed due to incontinent stool episode. Scatter partial thickness skin losses to the buttocks and posterior thighs with pink, moist and yellow wound beds- likely related to moisture, incontinence and friction. Triad applied. Pt is on a waffle mattress overlay and positioned on her left side with a body positioning wedge. Pt and pt's family educated on the wound care and the importance of turning every 2 hours.     Recommendations:  - Buttocks and posterior thighs: bedside nursing to cleanse with bath wipes, pat dry and apply triad bid/prn; no dressings  - Turning every 2 hours  - Heel lift boots  - Immerse mattress      01/16/25 1026        Wound 01/16/25 1026 Moisture associated dermatitis Buttocks   Date First Assessed/Time First Assessed: 01/16/25 1026   Present on Original Admission: No  Primary Wound Type: Moisture associated dermatitis  Location: Buttocks   Wound Image    Dressing Appearance Open to air   Drainage Amount Scant   Appearance Pink;Yellow;Moist   Periwound Area Intact;Moist   Care Cleansed with:;Other (see comments)  (bath wipes)   Dressing Applied;Other (comment)  (triad)   Periwound Care Moisture barrier applied  (triad)        Wound 01/16/25 1026 Moisture associated dermatitis Left posterior;proximal Thigh   Date First Assessed/Time First Assessed: 01/16/25 1026   Present on Original Admission: No  Primary Wound Type: Moisture associated dermatitis  Side: Left  Orientation: posterior;proximal  Location: Thigh   Wound Image    Dressing Appearance Open to air   Drainage Amount Scant   Appearance Pink;Moist   Tissue loss description Partial thickness   Periwound Area Intact;Dry   Care Cleansed with:;Other (see comments)  (bath wipes)   Dressing Applied;Other (comment)  (triad)   Periwound Care Moisture barrier applied  (triad)     Anand score: 15  Recommendations made to primary team for above plan via secure chat. Wound care will follow-up as needed.    01/16/2025

## 2025-01-16 NOTE — PROGRESS NOTES
[facility] - Med Surg  Adult Nutrition  Progress Note    SUMMARY       Recommendations    1. Continuous TF recommendation: Glucerna 1.5 @ 50 mL/hr to provide 1200 mL total volume, 1800 kcal, 150 g CHO, 99 g protein, 19 g fiber, 910 mL free water, 120% DRI     -150 mL flush q4 hrs to provide additional 900 mL free water (Total 1810mL)   - nursing, please continue documenting TF rate via flowsheets   2. RD to monitor intake, labs, weight  3. Will add kiran BID to TF as modifier    Goals:   1. % nutritional needs met with diet   2. Maintain weight during admission  Nutrition Goal Status: progressing towards goal  Communication of RD Recs:  (POC)    Assessment and Plan    Nutrition Problem  Increased nutrient needs (protein, vit/min)     Related to (etiology):   Metabolic demand of healing     Signs and Symptoms (as evidenced by):   stage 2 pressure injury (left arm and left upper quadrant)      Interventions/Recommendations (treatment strategy):  Collaboration with other providers     Nutrition Diagnosis Status:   Continues        Nutrition Problem  Difficulty swallowing      Related to (etiology):   Dysphagia      Signs and Symptoms (as evidenced by):   PEG tube      Interventions/Recommendations (treatment strategy):  Collaboration of nutrition care with other providers   EN    Reason for Assessment    Reason For Assessment: RD follow-up  Diagnosis: other (see comments) (C. difficile colitis)  General Information Comments: Pt admitted with C. difficile colitis. TF currently running at goal rate. Pt's wounds have worsened by c.diff with fairly significant diarrhea. 2+ and 3+ edema. BM: 1/15.  Nutrition Discharge Planning: Pending clinical course    Nutrition/Diet History    Spiritual, Cultural Beliefs, [employer]ish Practices, Values that Affect Care: no  Food Allergies: NKFA  Factors Affecting Nutritional Intake: NPO    Nutrition Related Social Determinants of Health: SDOH: Adequate food in home environment         "  Food Insecurity: No Food Insecurity (12/31/2024)     Hunger Vital Sign      Worried About Running Out of Food in the Last Year: Never true      Ran Out of Food in the Last Year: Never true      Anthropometrics    Height: 5' 3" (160 cm)  Height (inches): 63 in  Height Method: Estimated  Weight: 57.3 kg (126 lb 5.2 oz)  Weight (lb): 126.32 lb  Weight Method: Bed Scale  Ideal Body Weight (IBW), Female: 115 lb  % Ideal Body Weight, Female (lb): 121.73 %  BMI (Calculated): 22.4  BMI Grade: 18.5-24.9 - normal     Lab/Procedures/Meds    Pertinent Labs Reviewed: reviewed  Pertinent Labs Comments: RBC 3.73 low, H/H 9.7/30.2 low, Na 135 low, K 5.4 high, glucose 173 high, Ca 8.1 low,  high, albumin 1.5 low, ALT 47 high  Pertinent Medications Reviewed: reviewed  Pertinent Medications Comments: albuterol, aspirin, famotidine, aspart, lantus, MVI, rivaroxaban, vancomycin    Estimated/Assessed Needs    Weight Used For Calorie Calculations: 63.5 kg (139 lb 15.9 oz)  Energy Calorie Requirements (kcal): 5698-3738 kcal/day (30-35 kcal/kg)  Energy Need Method: Kcal/kg  Protein Requirements: 76-95 g/day (1.2-1.5 g/kg)  Weight Used For Protein Calculations: 63.5 kg (139 lb 15.9 oz)     Estimated Fluid Requirement Method: RDA Method  RDA Method (mL): 1905  CHO Requirement: 238-278 g/day    Nutrition Prescription Ordered    Current Diet Order: NPO    Evaluation of Received Nutrient/Fluid Intake    Free Water Flush Fluid (mL):  (per MD/provider)  % Intake of Estimated Energy Needs: 75 - 100 %  % Meal Intake: NPO    Nutrition Risk    Level of Risk/Frequency of Follow-up: high     Monitor and Evaluation    Food and Nutrient Intake: enteral nutrition intake  Food and Nutrient Adminstration: enteral and parenteral nutrition administration  Knowledge/Beliefs/Attitudes: food and nutrition knowledge/skill  Physical Activity and Function: nutrition-related ADLs and IADLs  Anthropometric Measurements: weight, weight change, body mass " index  Biochemical Data, Medical Tests and Procedures: electrolyte and renal panel, gastrointestinal profile, glucose/endocrine profile, inflammatory profile, lipid profile  Nutrition-Focused Physical Findings: overall appearance     Nutrition Follow-Up    RD Follow-up?: Yes

## 2025-01-17 PROBLEM — E87.5 HYPERKALEMIA: Status: ACTIVE | Noted: 2025-01-17

## 2025-01-17 PROBLEM — E87.6 HYPOKALEMIA: Status: RESOLVED | Noted: 2025-01-02 | Resolved: 2025-01-17

## 2025-01-17 LAB
ALBUMIN SERPL BCP-MCNC: 1.6 G/DL (ref 3.5–5.2)
ALP SERPL-CCNC: 296 U/L (ref 40–150)
ALT SERPL W/O P-5'-P-CCNC: 44 U/L (ref 10–44)
ANION GAP SERPL CALC-SCNC: 10 MMOL/L (ref 8–16)
ANION GAP SERPL CALC-SCNC: 10 MMOL/L (ref 8–16)
ANION GAP SERPL CALC-SCNC: 5 MMOL/L (ref 8–16)
ANION GAP SERPL CALC-SCNC: 6 MMOL/L (ref 8–16)
AST SERPL-CCNC: 48 U/L (ref 10–40)
BACTERIA BLD CULT: NORMAL
BASOPHILS # BLD AUTO: 0.06 K/UL (ref 0–0.2)
BASOPHILS NFR BLD: 0.5 % (ref 0–1.9)
BILIRUB SERPL-MCNC: 0.5 MG/DL (ref 0.1–1)
BUN SERPL-MCNC: 19 MG/DL (ref 8–23)
BUN SERPL-MCNC: 20 MG/DL (ref 8–23)
BUN SERPL-MCNC: 21 MG/DL (ref 8–23)
BUN SERPL-MCNC: 22 MG/DL (ref 8–23)
CALCIUM SERPL-MCNC: 8.3 MG/DL (ref 8.7–10.5)
CALCIUM SERPL-MCNC: 8.4 MG/DL (ref 8.7–10.5)
CALCIUM SERPL-MCNC: 8.6 MG/DL (ref 8.7–10.5)
CALCIUM SERPL-MCNC: 8.6 MG/DL (ref 8.7–10.5)
CHLORIDE SERPL-SCNC: 104 MMOL/L (ref 95–110)
CHLORIDE SERPL-SCNC: 104 MMOL/L (ref 95–110)
CHLORIDE SERPL-SCNC: 105 MMOL/L (ref 95–110)
CHLORIDE SERPL-SCNC: 106 MMOL/L (ref 95–110)
CO2 SERPL-SCNC: 22 MMOL/L (ref 23–29)
CO2 SERPL-SCNC: 22 MMOL/L (ref 23–29)
CO2 SERPL-SCNC: 25 MMOL/L (ref 23–29)
CO2 SERPL-SCNC: 26 MMOL/L (ref 23–29)
CREAT SERPL-MCNC: 0.6 MG/DL (ref 0.5–1.4)
CREAT SERPL-MCNC: 0.6 MG/DL (ref 0.5–1.4)
CREAT SERPL-MCNC: 0.7 MG/DL (ref 0.5–1.4)
CREAT SERPL-MCNC: 0.8 MG/DL (ref 0.5–1.4)
DIFFERENTIAL METHOD BLD: ABNORMAL
E COLI SXT1 STL QL IA: NEGATIVE
E COLI SXT2 STL QL IA: NEGATIVE
EOSINOPHIL # BLD AUTO: 0 K/UL (ref 0–0.5)
EOSINOPHIL NFR BLD: 0 % (ref 0–8)
ERYTHROCYTE [DISTWIDTH] IN BLOOD BY AUTOMATED COUNT: 23.7 % (ref 11.5–14.5)
EST. GFR  (NO RACE VARIABLE): >60 ML/MIN/1.73 M^2
GLUCOSE SERPL-MCNC: 111 MG/DL (ref 70–110)
GLUCOSE SERPL-MCNC: 124 MG/DL (ref 70–110)
GLUCOSE SERPL-MCNC: 134 MG/DL (ref 70–110)
GLUCOSE SERPL-MCNC: 99 MG/DL (ref 70–110)
HCT VFR BLD AUTO: 30.7 % (ref 37–48.5)
HGB BLD-MCNC: 10.1 G/DL (ref 12–16)
IMM GRANULOCYTES # BLD AUTO: 0.19 K/UL (ref 0–0.04)
IMM GRANULOCYTES NFR BLD AUTO: 1.7 % (ref 0–0.5)
LYMPHOCYTES # BLD AUTO: 1.9 K/UL (ref 1–4.8)
LYMPHOCYTES NFR BLD: 16.5 % (ref 18–48)
MAGNESIUM SERPL-MCNC: 2.1 MG/DL (ref 1.6–2.6)
MCH RBC QN AUTO: 26.4 PG (ref 27–31)
MCHC RBC AUTO-ENTMCNC: 32.9 G/DL (ref 32–36)
MCV RBC AUTO: 80 FL (ref 82–98)
MONOCYTES # BLD AUTO: 1.3 K/UL (ref 0.3–1)
MONOCYTES NFR BLD: 11.1 % (ref 4–15)
NEUTROPHILS # BLD AUTO: 7.9 K/UL (ref 1.8–7.7)
NEUTROPHILS NFR BLD: 70.2 % (ref 38–73)
NRBC BLD-RTO: 1 /100 WBC
PHOSPHATE SERPL-MCNC: 4 MG/DL (ref 2.7–4.5)
PLATELET # BLD AUTO: 325 K/UL (ref 150–450)
PMV BLD AUTO: 9.6 FL (ref 9.2–12.9)
POCT GLUCOSE: 102 MG/DL (ref 70–110)
POCT GLUCOSE: 115 MG/DL (ref 70–110)
POCT GLUCOSE: 197 MG/DL (ref 70–110)
POCT GLUCOSE: 274 MG/DL (ref 70–110)
POCT GLUCOSE: 98 MG/DL (ref 70–110)
POTASSIUM SERPL-SCNC: 5.7 MMOL/L (ref 3.5–5.1)
POTASSIUM SERPL-SCNC: 6.3 MMOL/L (ref 3.5–5.1)
POTASSIUM SERPL-SCNC: 6.4 MMOL/L (ref 3.5–5.1)
POTASSIUM SERPL-SCNC: 6.7 MMOL/L (ref 3.5–5.1)
PROT SERPL-MCNC: 6.7 G/DL (ref 6–8.4)
RBC # BLD AUTO: 3.82 M/UL (ref 4–5.4)
SODIUM SERPL-SCNC: 135 MMOL/L (ref 136–145)
SODIUM SERPL-SCNC: 136 MMOL/L (ref 136–145)
SODIUM SERPL-SCNC: 136 MMOL/L (ref 136–145)
SODIUM SERPL-SCNC: 138 MMOL/L (ref 136–145)
WBC # BLD AUTO: 11.28 K/UL (ref 3.9–12.7)

## 2025-01-17 PROCEDURE — 80048 BASIC METABOLIC PNL TOTAL CA: CPT | Mod: 91,XB

## 2025-01-17 PROCEDURE — 25000003 PHARM REV CODE 250

## 2025-01-17 PROCEDURE — 93005 ELECTROCARDIOGRAM TRACING: CPT

## 2025-01-17 PROCEDURE — 25000242 PHARM REV CODE 250 ALT 637 W/ HCPCS

## 2025-01-17 PROCEDURE — 84100 ASSAY OF PHOSPHORUS: CPT | Performed by: INTERNAL MEDICINE

## 2025-01-17 PROCEDURE — 93010 ELECTROCARDIOGRAM REPORT: CPT | Mod: ,,, | Performed by: INTERNAL MEDICINE

## 2025-01-17 PROCEDURE — 25000003 PHARM REV CODE 250: Performed by: HOSPITALIST

## 2025-01-17 PROCEDURE — 21400001 HC TELEMETRY ROOM

## 2025-01-17 PROCEDURE — 63600175 PHARM REV CODE 636 W HCPCS

## 2025-01-17 PROCEDURE — 25000242 PHARM REV CODE 250 ALT 637 W/ HCPCS: Performed by: HOSPITALIST

## 2025-01-17 PROCEDURE — 83735 ASSAY OF MAGNESIUM: CPT | Performed by: INTERNAL MEDICINE

## 2025-01-17 PROCEDURE — 94761 N-INVAS EAR/PLS OXIMETRY MLT: CPT

## 2025-01-17 PROCEDURE — 97535 SELF CARE MNGMENT TRAINING: CPT

## 2025-01-17 PROCEDURE — 85025 COMPLETE CBC W/AUTO DIFF WBC: CPT

## 2025-01-17 PROCEDURE — 36415 COLL VENOUS BLD VENIPUNCTURE: CPT | Mod: XB

## 2025-01-17 PROCEDURE — 92610 EVALUATE SWALLOWING FUNCTION: CPT

## 2025-01-17 PROCEDURE — 25000003 PHARM REV CODE 250: Performed by: INTERNAL MEDICINE

## 2025-01-17 PROCEDURE — 25000242 PHARM REV CODE 250 ALT 637 W/ HCPCS: Performed by: INTERNAL MEDICINE

## 2025-01-17 PROCEDURE — 80053 COMPREHEN METABOLIC PANEL: CPT

## 2025-01-17 PROCEDURE — 27000207 HC ISOLATION

## 2025-01-17 PROCEDURE — 94640 AIRWAY INHALATION TREATMENT: CPT

## 2025-01-17 PROCEDURE — 99900035 HC TECH TIME PER 15 MIN (STAT)

## 2025-01-17 PROCEDURE — 36415 COLL VENOUS BLD VENIPUNCTURE: CPT | Mod: XB | Performed by: HOSPITALIST

## 2025-01-17 PROCEDURE — 82550 ASSAY OF CK (CPK): CPT | Performed by: HOSPITALIST

## 2025-01-17 RX ORDER — MICONAZOLE NITRATE 2 G/100G
POWDER TOPICAL 2 TIMES DAILY
Qty: 85 G | Refills: 0 | Status: SHIPPED | OUTPATIENT
Start: 2025-01-17

## 2025-01-17 RX ORDER — DIPHENHYDRAMINE HCL 50 MG
50 CAPSULE ORAL EVERY 6 HOURS PRN
Qty: 60 CAPSULE | Refills: 0 | Status: SHIPPED | OUTPATIENT
Start: 2025-01-17

## 2025-01-17 RX ORDER — INSULIN ASPART 100 [IU]/ML
3 INJECTION, SOLUTION INTRAVENOUS; SUBCUTANEOUS EVERY 6 HOURS
Qty: 9 ML | Refills: 0 | Status: SHIPPED | OUTPATIENT
Start: 2025-01-17 | End: 2025-04-17

## 2025-01-17 RX ORDER — FAMOTIDINE 20 MG/1
20 TABLET, FILM COATED ORAL 2 TIMES DAILY
Qty: 60 TABLET | Refills: 11 | Status: SHIPPED | OUTPATIENT
Start: 2025-01-17 | End: 2026-01-17

## 2025-01-17 RX ORDER — INSULIN GLARGINE 100 [IU]/ML
5 INJECTION, SOLUTION SUBCUTANEOUS NIGHTLY
Qty: 3 ML | Refills: 0 | Status: SHIPPED | OUTPATIENT
Start: 2025-01-17 | End: 2025-04-17

## 2025-01-17 RX ORDER — CALCIUM GLUCONATE 20 MG/ML
1 INJECTION, SOLUTION INTRAVENOUS ONCE
Status: COMPLETED | OUTPATIENT
Start: 2025-01-17 | End: 2025-01-17

## 2025-01-17 RX ORDER — FUROSEMIDE 10 MG/ML
20 INJECTION INTRAMUSCULAR; INTRAVENOUS ONCE
Status: COMPLETED | OUTPATIENT
Start: 2025-01-17 | End: 2025-01-17

## 2025-01-17 RX ORDER — ALBUTEROL SULFATE 2.5 MG/.5ML
10 SOLUTION RESPIRATORY (INHALATION) ONCE
Status: COMPLETED | OUTPATIENT
Start: 2025-01-17 | End: 2025-01-17

## 2025-01-17 RX ORDER — DIPHENHYDRAMINE HCL 12.5MG/5ML
25 ELIXIR ORAL EVERY 6 HOURS
Status: DISCONTINUED | OUTPATIENT
Start: 2025-01-17 | End: 2025-01-18 | Stop reason: HOSPADM

## 2025-01-17 RX ORDER — CALCIUM GLUCONATE 20 MG/ML
1 INJECTION, SOLUTION INTRAVENOUS EVERY 10 MIN PRN
Status: DISCONTINUED | OUTPATIENT
Start: 2025-01-17 | End: 2025-01-17

## 2025-01-17 RX ORDER — OXYCODONE AND ACETAMINOPHEN 7.5; 325 MG/1; MG/1
1 TABLET ORAL EVERY 4 HOURS PRN
Qty: 42 TABLET | Refills: 0 | Status: SHIPPED | OUTPATIENT
Start: 2025-01-17

## 2025-01-17 RX ORDER — ALBUTEROL SULFATE 90 UG/1
2 INHALANT RESPIRATORY (INHALATION) EVERY 6 HOURS PRN
Qty: 18 G | Refills: 0 | Status: SHIPPED | OUTPATIENT
Start: 2025-01-17 | End: 2026-01-17

## 2025-01-17 RX ADMIN — Medication 6 MG: at 09:01

## 2025-01-17 RX ADMIN — MICONAZOLE NITRATE 2 % TOPICAL POWDER: at 09:01

## 2025-01-17 RX ADMIN — INSULIN ASPART 3 UNITS: 100 INJECTION, SOLUTION INTRAVENOUS; SUBCUTANEOUS at 01:01

## 2025-01-17 RX ADMIN — INSULIN ASPART 3 UNITS: 100 INJECTION, SOLUTION INTRAVENOUS; SUBCUTANEOUS at 06:01

## 2025-01-17 RX ADMIN — INSULIN ASPART 6 UNITS: 100 INJECTION, SOLUTION INTRAVENOUS; SUBCUTANEOUS at 06:01

## 2025-01-17 RX ADMIN — ALBUTEROL SULFATE 2.5 MG: 2.5 SOLUTION RESPIRATORY (INHALATION) at 12:01

## 2025-01-17 RX ADMIN — OXYCODONE AND ACETAMINOPHEN 1 TABLET: 7.5; 325 TABLET ORAL at 12:01

## 2025-01-17 RX ADMIN — THERA TABS 1 TABLET: TAB at 08:01

## 2025-01-17 RX ADMIN — DIPHENHYDRAMINE HYDROCHLORIDE 25 MG: 25 SOLUTION ORAL at 03:01

## 2025-01-17 RX ADMIN — DIPHENHYDRAMINE HYDROCHLORIDE 25 MG: 25 SOLUTION ORAL at 01:01

## 2025-01-17 RX ADMIN — ASPIRIN 81 MG CHEWABLE TABLET 81 MG: 81 TABLET CHEWABLE at 08:01

## 2025-01-17 RX ADMIN — RIVAROXABAN 15 MG: 15 TABLET, FILM COATED ORAL at 05:01

## 2025-01-17 RX ADMIN — FAMOTIDINE 20 MG: 20 TABLET ORAL at 08:01

## 2025-01-17 RX ADMIN — CALCIUM GLUCONATE 1 G: 20 INJECTION, SOLUTION INTRAVENOUS at 09:01

## 2025-01-17 RX ADMIN — SODIUM ZIRCONIUM CYCLOSILICATE 10 G: 10 POWDER, FOR SUSPENSION ORAL at 10:01

## 2025-01-17 RX ADMIN — FAMOTIDINE 20 MG: 20 TABLET ORAL at 09:01

## 2025-01-17 RX ADMIN — ALBUTEROL SULFATE 2.5 MG: 2.5 SOLUTION RESPIRATORY (INHALATION) at 07:01

## 2025-01-17 RX ADMIN — ALBUTEROL SULFATE 10 MG: 2.5 SOLUTION RESPIRATORY (INHALATION) at 10:01

## 2025-01-17 RX ADMIN — INSULIN GLARGINE 5 UNITS: 100 INJECTION, SOLUTION SUBCUTANEOUS at 09:01

## 2025-01-17 RX ADMIN — INSULIN ASPART 3 UNITS: 100 INJECTION, SOLUTION INTRAVENOUS; SUBCUTANEOUS at 12:01

## 2025-01-17 RX ADMIN — WHITE PETROLATUM: 1.75 OINTMENT TOPICAL at 09:01

## 2025-01-17 RX ADMIN — VANCOMYCIN HYDROCHLORIDE 125 MG: KIT at 05:01

## 2025-01-17 RX ADMIN — ALBUTEROL SULFATE 10 MG: 2.5 SOLUTION RESPIRATORY (INHALATION) at 09:01

## 2025-01-17 RX ADMIN — INSULIN HUMAN 10 UNITS: 100 INJECTION, SOLUTION PARENTERAL at 05:01

## 2025-01-17 RX ADMIN — FUROSEMIDE 20 MG: 10 INJECTION, SOLUTION INTRAMUSCULAR; INTRAVENOUS at 05:01

## 2025-01-17 RX ADMIN — INSULIN ASPART 2 UNITS: 100 INJECTION, SOLUTION INTRAVENOUS; SUBCUTANEOUS at 12:01

## 2025-01-17 RX ADMIN — WHITE PETROLATUM: 1.75 OINTMENT TOPICAL at 02:01

## 2025-01-17 RX ADMIN — ALBUTEROL SULFATE 2.5 MG: 2.5 SOLUTION RESPIRATORY (INHALATION) at 02:01

## 2025-01-17 RX ADMIN — VANCOMYCIN HYDROCHLORIDE 125 MG: KIT at 01:01

## 2025-01-17 RX ADMIN — OXYCODONE AND ACETAMINOPHEN 1 TABLET: 7.5; 325 TABLET ORAL at 09:01

## 2025-01-17 RX ADMIN — DEXTROSE MONOHYDRATE 25 G: 25 INJECTION, SOLUTION INTRAVENOUS at 05:01

## 2025-01-17 RX ADMIN — DEXTROSE MONOHYDRATE 50 G: 25 INJECTION, SOLUTION INTRAVENOUS at 05:01

## 2025-01-17 RX ADMIN — DIBASIC SODIUM PHOSPHATE, MONOBASIC POTASSIUM PHOSPHATE AND MONOBASIC SODIUM PHOSPHATE 2 TABLET: 852; 155; 130 TABLET ORAL at 08:01

## 2025-01-17 RX ADMIN — DIPHENHYDRAMINE HYDROCHLORIDE 25 MG: 25 SOLUTION ORAL at 11:01

## 2025-01-17 RX ADMIN — DIPHENHYDRAMINE HYDROCHLORIDE 25 MG: 25 SOLUTION ORAL at 09:01

## 2025-01-17 RX ADMIN — VANCOMYCIN HYDROCHLORIDE 125 MG: KIT at 11:01

## 2025-01-17 RX ADMIN — VANCOMYCIN HYDROCHLORIDE 125 MG: KIT at 12:01

## 2025-01-17 NOTE — ASSESSMENT & PLAN NOTE
- Wound care consulted  - turn q2  - waffle mattress overlay  - clean and dressed      01/16/25 1026         Wound 01/16/25 1026 Moisture associated dermatitis Buttocks   Date First Assessed/Time First Assessed: 01/16/25 1026   Present on Original Admission: No  Primary Wound Type: Moisture associated dermatitis  Location: Buttocks         Dressing Appearance Open to air   Drainage Amount Scant   Appearance Pink;Yellow;Moist   Periwound Area Intact;Moist   Care Cleansed with:;Other (see comments)  (bath wipes)   Dressing Applied;Other (comment)  (triad)   Periwound Care Moisture barrier applied  (triad)        Wound 01/16/25 1026 Moisture associated dermatitis Left posterior;proximal Thigh   Date First Assessed/Time First Assessed: 01/16/25 1026   Present on Original Admission: No  Primary Wound Type: Moisture associated dermatitis  Side: Left  Orientation: posterior;proximal  Location: Thigh         Dressing Appearance Open to air   Drainage Amount Scant   Appearance Pink;Moist   Tissue loss description Partial thickness   Periwound Area Intact;Dry   Care Cleansed with:;Other (see comments)  (bath wipes)   Dressing Applied;Other (comment)  (triad)   Periwound Care Moisture barrier applied  (triad)

## 2025-01-17 NOTE — ASSESSMENT & PLAN NOTE
- sp PEG for nutrition and meds  ;  okay for comfort po feeds per recent SLP recs  - soft/bite sized per SLP recs on 01/17 with concurrent tube feeds

## 2025-01-17 NOTE — DISCHARGE SUMMARY
[facility] Medicine  Discharge Summary      Patient Name: Emily Martinez  MRN: [id number]  SALVADOR: [id number]  Patient Class: IP- Inpatient  Admission Date: 12/30/2024  Hospital Length of Stay: 17 days  Discharge Date and Time:  01/17/2025 2:59 PM  Attending Physician: Valerio Jones MD   Discharging Provider: Valerio Jones MD  Primary Care Provider: Alireza Sosa MD  [facility] Medicine Team: [facility] Valerio Jones MD  Primary Care Team: [facility]    HPI:   59-year-old female, history of a stroke with left hemiplegia, bed-bound, diabetes, hypertension, recurrent infections, dysphagia sp PEG, multiple skin wounds, multiple recent admissions in the last several months in the [facility] system, most recently last week for UTI, discharged with Keflex, living at home with her daughter, now brought in for diarrhea.  Daughter states the patient has had diarrhea for about a week and a half.  She is having about 4-5 episodes a day, watery, nonbloody.  She has intermittently also vomiting.  Patient has a PEG tube for nutrition but per most recent SLP note can also take p.o. nutrition but daughter says she has had minimal p.o. intake because she has said she is not hungry.  No fevers noted.  Daughter is frustrated as she feels like when patient gets admitted, she is temporarily better and then very quickly becomes ill again.  All of their care has been in the [facility] system but they decided to come to [facility] today as they were hopeful that we would be able to get her better.       Of note, urine culture from 6 days ago is growing out ESBL E coli, greater than 100,000 colonies    In the ED patient afebrile and hemodynamically stable saturating well on room air at rest. No leukocytosis. UA still concerning for UTI and patient started on meropenem. CT abdomen performed and noted acute diverticulitis with area of concern for contained perforation. Patient without abdominal tenderness. Patient admitted  to the [facility] medicine for further evaluation and management.    * No surgery found *      Hospital Course:   12/31 CT head -No acute intracranial process. Prominent involutional changes and chronic microvascular ischemic changes in the periventricular white matter.  Small areas of probable remote lacunar infarction in the bilateral basal ganglia and small probable chronic right parietal cortical infarct. CT abdomen - racute diverticulitis of the mid sigmoid colon with adjacent suspected contained perforation.  No associated rim enhancement to suggest drainable abscess at this time.  No bowel obstruction. Apparent circumferential wall thickening of the distal rectum/anus which could be related to underdistention versus nonspecific proctitis.  No significant perirectal inflammatory change or evidence perirectal or perianal drainable abscess.  Left more than right basilar patchy nonspecific pulmonary mosaic attenuation with bronchial wall thickening pulmonary edema or small airways process / right basilar patchy clusters of small nodules suggesting infectious or inflammatory small airways process versus aspiration.  No large consolidation. s/p CRS eval - hemodynamically normal with benign abdominal exam, and reassuring labwork. No acute surgical intervention indicated. continue NPO status, with IVF hydration and IV meropenem.  trend CRP 59-->66. CRS eval -  continue to trend CRP  if downtrending and passing flatus, okay to start on clears and downtitrate IVFC.  1/1 UC GNRs.  ID eval - continue meropenem. If no Pseudomonas isolated, okay to de-escalate to ertapenem. C diff assay pending.    Advancing TF per CRS recs to home regimen. De-escalated to ertapenem for 7-10day treatment course (EED 01/09/25) for diverticulitis and ESBL Ecoli cystitis. Not acute care at home candidate. Transaminitis with negative Liver US, suspect shock liver after hypotension on 01/04 given improvement in LFTs and US Liver negative for acute  abnormality. Continued lethargy evaluated with CT head and EEG which were negative for acute findings. Reported BM with clots and worsening hypotension prompted Hgb check (stable), CTA AP to evaluate for lower GIB and/or intra-abdominal infective source, IVF bolus and CRS re-consult, blood cultures re-ordered, but pt already on Ertapenem. CTA abdomen showed changes associated with megacolon. CRS saw patient. Poor surgical candidate. c diff antigen positive, c diff toxin neg, c diff toxin PCR positive. On oral vancomycin. Improved hemodynamically stability, oxygenation, leukocytosis, and stools not bloody, but diarrhea is constant (not by volume, just constantly streaming out of rectum). Developed DVT in right brachial vein. Tolerated heparin drip for two days. Now on rivaroxaban. Poor prognosis. Recurring admissions related to failure to thrive and infection. Palliative care saw patient and family wishes to proceed with current care and Full code pending continued family discussions. Mild hyperkalemia treated with calcium gluconate, albuterol with improvement and without EKG changes on telemetry. Pt discharged to LTAC for continued care of buttock/sacral wounds, rectal tube in place in stable condition for continued PT/OT.      Goals of Care Treatment Preferences:  Code Status: Full Code      SDOH Screening:  The patient was screened for utility difficulties, food insecurity, transport difficulties, housing insecurity, and interpersonal safety and there were no concerns identified this admission.     Consults:   Consults (From admission, onward)          Status Ordering Provider     Inpatient consult to Palliative Care  Once        Provider:  (Not yet assigned)    Completed RAVI RATLIFF     Inpatient consult to Midline team  Once        Provider:  (Not yet assigned)    Completed RAVI RATLIFF     Inpatient consult to PICC team ([facility])  Once        Provider:  (Not yet assigned)    Completed RAVI RATLIFF      Inpatient consult to Midline team  Once        Provider:  (Not yet assigned)    Completed RAVI RATLIFF     Inpatient consult to Infectious Diseases  Once        Provider:  (Not yet assigned)    Completed RAVI RATLIFF     Inpatient consult to Colorectal Surgery  Once        Provider:  (Not yet assigned)    Completed JULIENNE GAO     Inpatient consult to Midline team  Once        Provider:  (Not yet assigned)    Completed ALBINO JAY     Inpatient consult to Registered Dietitian/Nutritionist  Once        Provider:  (Not yet assigned)    Completed ALBINO JAY     Inpatient consult to Infectious Diseases  Once        Provider:  (Not yet assigned)    Completed ALBINO JAY     Inpatient consult to Colorectal Surgery  Once        Provider:  (Not yet assigned)    Completed DANELLE ROLLE     Inpatient consult to Infectious Diseases  Once        Provider:  (Not yet assigned)    Completed DANELLE ROLLE          Interval History: Tolerating soft/bite sized diet with thin liquids in conjunction with feeding tube. Hyperkalemia managed with albuterol, calcium gluconate, lokelma without associated EKG changes on telemetry. Discharged with short course of lokelma for continued, but improving, hyperkalemia.     Review of Systems  Objective:     Vital Signs (Most Recent):  Temp: 98.8 °F (37.1 °C) (01/17/25 1243)  Pulse: 101 (01/17/25 1243)  Resp: 18 (01/17/25 1246)  BP: 110/61 (01/17/25 1243)  SpO2: (!) 94 % (01/17/25 1243) Vital Signs (24h Range):  Temp:  [97.7 °F (36.5 °C)-98.8 °F (37.1 °C)] 98.8 °F (37.1 °C)  Pulse:  [] 101  Resp:  [16-20] 18  SpO2:  [93 %-98 %] 94 %  BP: (107-118)/(61-81) 110/61     Weight: 57.3 kg (126 lb 5.2 oz)  Body mass index is 22.38 kg/m².    Intake/Output Summary (Last 24 hours) at 1/17/2025 1427  Last data filed at 1/17/2025 0717  Gross per 24 hour   Intake 500 ml   Output 700 ml   Net -200 ml         Physical Exam  Vitals and nursing note reviewed.  "  Constitutional:       General: She is not in acute distress.     Appearance: She is ill-appearing. She is not toxic-appearing or diaphoretic.      Comments: Chronically ill appearing, cachectic   HENT:      Head: Normocephalic and atraumatic.      Comments: Bilateral temporal wasting.     Nose: Nose normal.   Eyes:      General: No scleral icterus.     Extraocular Movements: Extraocular movements intact.      Conjunctiva/sclera: Conjunctivae normal.   Cardiovascular:      Rate and Rhythm: Normal rate and regular rhythm.   Pulmonary:      Effort: Pulmonary effort is normal. No respiratory distress.      Breath sounds: Decreased air movement present. No wheezing or rales.   Abdominal:      General: Abdomen is flat. There is no distension.      Palpations: Abdomen is soft.      Tenderness: There is no abdominal tenderness. There is no guarding.      Comments: G-tube in place without associated tenderness, erythema, no drainage   Musculoskeletal:      Cervical back: Normal range of motion.      Right lower leg: No edema.      Left lower leg: No edema.      Comments: Left hemiplegia, left UE and LE contracted/stiff.     Skin:     General: Skin is warm and dry.      Comments: Multiple wounds photos in chart   Neurological:      Mental Status: She is alert. Mental status is at baseline.      Cranial Nerves: Dysarthria present.             Significant Labs: All pertinent labs within the past 24 hours have been reviewed.    Significant Imaging: I have reviewed all pertinent imaging results/findings within the past 24 hours.    * C. difficile colitis  - CT abdomen 12/31 with diverticulitis with contained perforation. No associated rim enhancement to suggest drainable abscess at this time.  - VSS without leukocytosis ;  non-surgical abdomen on exam  - CRS consult appreciated.   - Bloody clots starting 1/7 - CTA abdomen showed "Rectosigmoid proctocolitis, of numerous potential etiologies. Questionable cecitis as well. " "Correlate clinically, including for the possibility of early/mild pseudomembranous colitis."  - c diff antigen positive, c diff toxin, c diff toxin PCR positive  - started on metronidazole IV and vancomycin - ID consulted    - decreased vancomycin to 125 mg QID and stopped metronidazole.   - still with diarrhea. Currently has rectal tube.  - discussed with GI about on-going diarrhea requiring rectal tube   Can not use anti-diarrheals unless confirmed c. Diff negative   F/u stool cultures   Continue supportive care   If rectal tube is still required, may need to consult GI for further diarrhea management and may need LTAC if can not be removed    Hyperkalemia  Hyperkalemia is likely due to Medication induced.The patients most recent potassium results are listed below.  Recent Labs     01/16/25  0650 01/17/25  0709 01/17/25  0842   K 5.4* 6.4* 5.7*     Plan  - Monitor for arrhythmias with EKG and/or continuous telemetry.   - Treat the hyperkalemia with Calcium gluconate and Nebulized albuterol sulfate.   - Monitor potassium: Every 12 hours  - The patient's hyperkalemia is stable            Advance care planning  See palliative      Debility  Patient with Acute on chronic debility due to hemiplegia/hemiparesis. The patient's latest AMPAC (Activity Measure for Post Acute Care) Score is listed below.    AM-PAC Score - How much help does the patient need for each activity listed  Basic Mobility Total Score: 23  Turning over in bed (including adjusting bedclothes, sheets and blankets)?: A little  Sitting down on and standing up from a chair with arms (e.g., wheelchair, bedside commode, etc.): None  Moving from lying on back to sitting on the side of the bed?: None  Moving to and from a bed to a chair (including a wheelchair)?: None  Need to walk in hospital room?: None  Climbing 3-5 steps with a railing?: None    Plan  - PT/OT consulted            Palliative care encounter  Palliative consulted and, per [facility] conversations, " family wishes to continue full code and current measures pending further family discussions.       Poor prognosis  We discussed on-going malnutrition and wounds due to FTT now worsened by c diff with fairly significant diarrhea. Debility progressing since stroke and now with contractures. Frequent re-hospitalizations due to FTT and infections. We have no means to stop future infections and other acute illness and soon may reach all limits of medical therapy to keep her alive.     Acute deep vein thrombosis (DVT) of right upper extremity  Given recent witness GI bleeds. Start heparin drip. No bleed for two days. Switch to rivaroxaban.     Fever  Patient had temp 101.2. She was on Prciilla hugger at time. UA done after horton catheter removed was slightly positive. Blood culture pending. CXR pending. Spoke to ID. Prefer to wait for culture results and monitor vitals off pricilla hugger to consider antibiotic treatment as it less likely she has new infectious process. Patient afebrile off pricilla hugger and WBC normal. Urine culture growing GNR but Horton removed. As patient clinically improved overall, will defer adding anitbiotic at this time.     Severe malnutrition  Refeeding syndrome  Nutrition consulted. Most recent weight and BMI monitored-     Measurements:  Wt Readings from Last 1 Encounters:   01/05/25 56.4 kg (124 lb 5.4 oz)   Body mass index is 22.03 kg/m².    Patient has been screened and assessed by RD.    Malnutrition Type:  Context:    Level:      Malnutrition Characteristic Summary:       Interventions/Recommendations (treatment strategy):  1. Continuous TF recommendation: Glucerna 1.5 @ 50 mL/hr to provide 1200 mL total volume, 1800 kcal, 150 g CHO, 99 g protein, 19 g fiber, 910 mL free water, 120% DRI   -150 mL flush q4 hrs to provide additional 900 mL free water (Total 1810mL) - nursing, please continue documenting TF rate via flowsheets 2. RD to monitor intake, labs, weight    Refeeding syndrome as defined by  severe phosphatemia despite initial replacement attempts.    Phosphorus, magnesium and potassium replaced    Acute blood loss anemia  Anemia is likely due to acute blood loss which was from C diff colitis . Most recent hemoglobin and hematocrit are listed below.  Recent Labs     01/13/25  0538 01/14/25  0523 01/15/25  0241   HGB 11.2* 10.1* 9.8*   HCT 32.7* 29.9* 29.0*     Plan  - Monitor serial CBC: Daily  - Transfuse PRBC if patient becomes hemodynamically unstable, symptomatic or H/H drops below 7/21.  - Patient has received 3 units of PRBCs on 1/10/2025  - Patient's anemia is currently improving  - transfused above threshhold  - UGI - had blood streaked vomitus 1/12/2025. Started on protonix po BID. No further reports of blood while on heparin drip for 2 days. Switched to famotidine due to recurring c diff risks with PPI  - Hgb slowly drifting down, not from acute loss, likely due to inflammation from acute infections disease and critical illness    Chronic hypotension  Chronic due to malnutrition  Will defer volume resuscitation unless MAP <60    Dehydration  hypernatremia  Due to diarrhea and holding of feedings  Resolved with IV D5    Acute hypoxemic respiratory failure  Patient with Hypoxic Respiratory failure which is Acute.  she is not on home oxygen. Supplemental oxygen was provided and noted-      Signs/symptoms of respiratory failure include- increased work of breathing and lethargy. Contributing diagnoses includes - Pleural effusion Labs and images were reviewed. Patient Has not had a recent ABG. Will treat underlying causes and adjust management of respiratory failure as follows-     - XR on 01/08 with evidence of hypervolemia--Lasix 40mg IV qd on 01  - resolving as on RA at times  - aggressive pulmonary toileting    LFT elevation  Without inciting event on labs 01/04. No evidence of hypervolemia on CXR, no increased O2 requirement, no abdominal palpation. Shock liver a consideration given hypotension  with MAP around 65 that morning.    - US Liver with doppler negative for acute abormality  - continue to monitor with daily labs, avoid hepatotoxic medications  - improving      Atelectasis of both lungs  I have personally reviewed Chest X-ray. Patient with atelectasis on interpretation. Likely Non-Obstructive atelectasis secondary to pleural effusion. Signs and symptoms include Shortness of breath and Hypoxemia Will begin treatment with upright positioning.    Hypophosphatemia  Patient's most recent phosphorus results are listed below.   Recent Labs     01/13/25  0818 01/14/25 0523 01/15/25  0241   PHOS 2.7 2.7 3.0     Plan  - Will treat hypophosphatemia with prn supplementation  - refeeding. Replace as needed    Hypokalemia  Patient's most recent potassium results are listed below.   Recent Labs     01/12/25  1053 01/13/25 0818 01/14/25  0523   K 5.4* 4.2 4.3     Plan  - Replete potassium per protocol  - Monitor potassium Daily  - Patient's hypokalemia is improving    Hypernatremia  Hypernatremia is likely due to Dehydration. monitor with hydration   Recent Labs     01/13/25  0818 01/14/25  0523 01/15/25  0241   * 135* 137     Improved with IV D5.   Resolved and now on free water flushes    Dysphagia  - sp PEG for nutrition and meds  ;  okay for comfort po feeds per recent SLP recs  - NPO as above at this time    Multiple wounds of skin  - Wound care consulted  - turn q2  - waffle mattress overlay  - clean and dressed    History of stroke with residual effects  - chronic left hemiplegia, bed bound, sp PEG though able to tolerate po comfort feed  - continue home ASA and statin  - turn q2h      12/31 CT head -No acute intracranial process. Prominent involutional changes and chronic microvascular ischemic changes in the periventricular white matter.  Small areas of probable remote lacunar infarction in the bilateral basal ganglia and small probable chronic right parietal cortical infarct.     - Given ongoing  lethargy repeat CT head negative for acute process  - EEG negative for epileptic activity    Insomnia  - home trazadone      Mixed hyperlipidemia  - home statin        Final Active Diagnoses:    Diagnosis Date Noted POA    PRINCIPAL PROBLEM:  C. difficile colitis [A04.72] 12/30/2024 Yes    Hyperkalemia [E87.5] 01/17/2025 Unknown    Poor prognosis [Z78.9] 01/15/2025 Yes    Palliative care encounter [Z51.5] 01/15/2025 Not Applicable    Debility [R53.81] 01/15/2025 Unknown    Advance care planning [Z71.89] 01/15/2025 Not Applicable    Acute deep vein thrombosis (DVT) of right upper extremity [I82.621] 01/14/2025 Yes    Fever [R50.9] 01/13/2025 Yes    Severe malnutrition [E43] 01/11/2025 Yes    Chronic hypotension [I95.89] 01/10/2025 Yes    Acute blood loss anemia [D62] 01/10/2025 Yes    Dehydration [E86.0] 01/09/2025 Yes    Acute hypoxemic respiratory failure [J96.01] 01/07/2025 Yes    LFT elevation [R79.89] 01/04/2025 Yes    Hypokalemia [E87.6] 01/02/2025 No    Hypophosphatemia [E83.39] 01/02/2025 Yes    Atelectasis of both lungs [J98.11] 01/02/2025 Yes    Hypernatremia [E87.0] 12/31/2024 Yes    History of stroke with residual effects [I69.30] 12/30/2024 Not Applicable    Multiple wounds of skin [T14.8XXA] 12/30/2024 Yes    Dysphagia [R13.10] 12/30/2024 Yes    Insomnia [G47.00] 04/06/2019 Yes    Mixed hyperlipidemia [E78.2] 04/01/2019 Yes      Problems Resolved During this Admission:    Diagnosis Date Noted Date Resolved POA    Tobacco dependency [F17.200] 12/31/2024 12/31/2024 Unknown    UTI (urinary tract infection) [N39.0] 12/30/2024 01/10/2025 Yes       Discharged Condition: fair    Disposition:     Follow Up:    Patient Instructions:   No discharge procedures on file.    Significant Diagnostic Studies: N/A    Pending Diagnostic Studies:       Procedure Component Value Units Date/Time    Basic metabolic panel [[id number]] Collected: 01/17/25 1353    Order Status: Sent Lab Status: In process Updated: 01/17/25 9587     Specimen: Blood     CBC with Automated Differential [[id number]] Collected: 01/01/25 0811    Order Status: Sent Lab Status: No result     Specimen: Blood            Medications:  Reconciled Home Medications:      Medication List        START taking these medications      famotidine 20 MG tablet  Commonly known as: PEPCID  1 tablet (20 mg total) by Per G Tube route 2 (two) times daily.     insulin glargine U-100 (Lantus) 100 unit/mL (3 mL) Inpn pen  Inject 5 Units into the skin every evening.     miconazole NITRATE 2 % 2 % top powder  Commonly known as: MICOTIN  Apply topically 2 (two) times daily.  Replaces: miconazole nitrate 200 mg/5 gram (4 %) Crea     multivitamin Tab  1 tablet by Per G Tube route once daily.     oxyCODONE-acetaminophen 7.5-325 mg per tablet  Commonly known as: PERCOCET  1 tablet by Per G Tube route every 4 (four) hours as needed for Pain.     rivaroxaban 2.5 mg Tab  Commonly known as: XARELTO  6 tablets (15 mg total) by Per G Tube route 2 (two) times daily with meals for 20 days, THEN 8 tablets (20 mg total) before evening meal.  Start taking on: January 17, 2025     sodium zirconium cyclosilicate 10 gram packet  Commonly known as: Lokelma  1 packet (10 g total) by Per G Tube route once daily. Mix entire contents of packet(s) into drinking glass containing 3 tablespoons of water; stir well and drink immediately. Add water and repeat until no powder remains to receive entire dose. for 7 days     vancomycin 125 mg/5 mL Soln  5 mLs (125 mg total) by Per G Tube route every 6 (six) hours. for 1 day     white petrolatum 41 % Oint  Apply topically 2 (two) times a day.            CHANGE how you take these medications      * albuterol 90 mcg/actuation inhaler  Commonly known as: PROAIR HFA  inhale ONE TO TWO puffs EVERY 6 HOURS into lungs AS NEEDED FOR WHEEZING AND SHORTNESS OF BREATH  What changed:   Another medication with the same name was added. Make sure you understand how and when to take  each.  Another medication with the same name was removed. Continue taking this medication, and follow the directions you see here.     * albuterol 90 mcg/actuation inhaler  Commonly known as: PROVENTIL/VENTOLIN HFA  Inhale 2 puffs into the lungs every 6 (six) hours as needed for Wheezing. Rescue  What changed: You were already taking a medication with the same name, and this prescription was added. Make sure you understand how and when to take each.  Replaces: albuterol 1.25 mg/3 mL Nebu     atorvastatin 40 MG tablet  Commonly known as: LIPITOR  TAKE 1 TABLET(40 MG) BY MOUTH EVERY DAY  What changed: Another medication with the same name was removed. Continue taking this medication, and follow the directions you see here.     blood-glucose meter kit  Commonly known as: FREESTYLE SYSTEM KIT  Use daily- TID  What changed: Another medication with the same name was removed. Continue taking this medication, and follow the directions you see here.     capsicum 0.075% 0.075 % topical cream  Commonly known as: ZOSTRIX  Apply topically 3 (three) times daily.  What changed: Another medication with the same name was removed. Continue taking this medication, and follow the directions you see here.     diphenhydrAMINE 50 MG capsule  Commonly known as: BENADRYL  Take 1 capsule (50 mg total) by mouth every 6 (six) hours as needed for Itching.  What changed: when to take this     gabapentin 300 MG capsule  Commonly known as: NEURONTIN  Take 1 capsule (300 mg total) by mouth 2 (two) times daily.  What changed: Another medication with the same name was removed. Continue taking this medication, and follow the directions you see here.     insulin aspart U-100 100 unit/mL (3 mL) Inpn pen  Commonly known as: NovoLOG  Inject 3 Units into the skin every 6 (six) hours.  What changed:   how much to take  when to take this     lancets Misc  Commonly known as: ONETOUCH ULTRASOFT LANCETS  Use 1 TID as directed for diabetes checking  What  "changed: Another medication with the same name was removed. Continue taking this medication, and follow the directions you see here.     metFORMIN 1000 MG tablet  Commonly known as: GLUCOPHAGE  TAKE 1 TABLET(1000 MG) BY MOUTH TWICE DAILY  What changed: Another medication with the same name was removed. Continue taking this medication, and follow the directions you see here.           * This list has 2 medication(s) that are the same as other medications prescribed for you. Read the directions carefully, and ask your doctor or other care provider to review them with you.                CONTINUE taking these medications      acetaminophen 650 MG Tbsr  Commonly known as: TYLENOL  Take 1 tablet (650 mg total) by mouth every 8 (eight) hours. For back pain     aspirin 81 MG EC tablet  Commonly known as: ECOTRIN  Take 1 tablet (81 mg total) by mouth once daily.     blood sugar diagnostic Strp  Commonly known as: TRUE METRIX GLUCOSE TEST STRIP  TO CHECK BLOOD GLUCOSE THREE TIMES DAILY     clopidogreL 75 mg tablet  Commonly known as: PLAVIX  Take 1 tablet (75 mg total) by mouth once daily.     diaper,brief,adult,disposable Misc  1 each by Misc.(Non-Drug; Combo Route) route 3 (three) times daily.     fluticasone propionate 50 mcg/actuation nasal spray  Commonly known as: FLONASE  1 spray (50 mcg total) by Each Nostril route once daily.     food supplemt, lactose-reduced Liqd  Commonly known as: ENSURE MAX PROTEIN  Take 118 mLs by mouth 3 (three) times daily.     FREESTYLE LEATHA 14 DAY SENSOR Kit  Generic drug: flash glucose sensor  1 each by Misc.(Non-Drug; Combo Route) route once daily.     pen needle, diabetic 29 gauge x 1/2" Ndle  Commonly known as: BD ULTRA-FINE ORIG PEN NEEDLE  USE TO TEST THREE TIMES DAILY MUST LAST 33 DAYS     ULTRA-LIGHT ROLLATOR Misc  Generic drug: walker  1 each by Misc.(Non-Drug; Combo Route) route once daily at 6am.     wheelchair Kelsey  1 each by Misc.(Non-Drug; Combo Route) route 2 (two) times " daily. Power Wheel Chair            STOP taking these medications      albuterol 1.25 mg/3 mL Nebu  Commonly known as: ACCUNEB  Replaced by: albuterol 90 mcg/actuation inhaler  You also have another medication with the same name that you need to continue taking as instructed.     amitriptyline 10 MG tablet  Commonly known as: ELAVIL     amitriptyline 25 MG tablet  Commonly known as: ELAVIL     amLODIPine 10 MG tablet  Commonly known as: NORVASC     cephALEXin 500 MG capsule  Commonly known as: KEFLEX     diazePAM 5 MG tablet  Commonly known as: VALIUM     diclofenac 75 MG EC tablet  Commonly known as: VOLTAREN      mg capsule  Generic drug: docusate sodium     glipiZIDE 5 MG Tr24     hydroCHLOROthiazide 25 MG tablet  Commonly known as: HYDRODIURIL     hydrocortisone 2.5 % cream     hydrOXYzine HCL 25 MG tablet  Commonly known as: ATARAX     hydrOXYzine pamoate 25 MG Cap  Commonly known as: VISTARIL     ibuprofen 800 MG tablet  Commonly known as: ADVIL,MOTRIN     latanoprost 0.005 % ophthalmic solution     LEVEMIR FLEXPEN 100 unit/mL (3 mL) Inpn pen  Generic drug: insulin detemir U-100 (Levemir)     miconazole nitrate 200 mg/5 gram (4 %) Crea  Replaced by: miconazole NITRATE 2 % 2 % top powder     mupirocin 2 % ointment  Commonly known as: BACTROBAN     nicotine 21 mg/24 hr  Commonly known as: NICODERM CQ     olmesartan 40 MG tablet  Commonly known as: BENICAR     olopatadine 0.1 % ophthalmic solution  Commonly known as: PATANOL     omeprazole 40 MG capsule  Commonly known as: PRILOSEC     OZEMPIC 1 mg/dose (2 mg/1.5 mL) Pnij  Generic drug: semaglutide     pantoprazole 40 MG tablet  Commonly known as: PROTONIX     ramelteon 8 mg tablet  Commonly known as: ROZEREM     silver sulfADIAZINE 1% 1 % cream  Commonly known as: SILVADENE     ticagrelor 90 mg tablet  Commonly known as: BRILINTA     traMADoL 50 mg tablet  Commonly known as: ULTRAM     triamcinolone acetonide 0.1% 0.1 % ointment  Commonly known as:  KENALOG     TRULANCE 3 mg Tab  Generic drug: plecanatide              Indwelling Lines/Drains at time of discharge:   Lines/Drains/Airways       Drain  Duration                  Gastrostomy/Enterostomy 01/02/25 1530 feeding 14 days    Female External Urinary Catheter w/ Suction 01/12/25 1800 4 days         Rectal Tube 01/16/25 1045 rectal tube w/ balloon (indicate number of mLs) 1 day                    Time spent on the discharge of patient: 50 minutes         Valerio Jones MD  Department of Hospital Medicine  [facility]

## 2025-01-17 NOTE — PROGRESS NOTES
Rectal tube removed because stool out put was 120 since early this morning, after removal pr had a mucus stool with slight blood streaks. Reported to Dr. Menon called with a critical potassium of 6.7 reported to Dr.  collected the specimen via finger stick because she couldn't get a vein.made Dr liriano

## 2025-01-17 NOTE — ASSESSMENT & PLAN NOTE
Patient's most recent phosphorus results are listed below.   Recent Labs     01/15/25  0241 01/16/25  0650 01/17/25  0709   PHOS 3.0 2.8 4.0       Plan  - Will treat hypophosphatemia with prn supplementation  - refeeding. Replace as needed

## 2025-01-17 NOTE — ASSESSMENT & PLAN NOTE
Hyperkalemia is likely due to Medication induced.The patients most recent potassium results are listed below.  Recent Labs     01/16/25  0650 01/17/25  0709 01/17/25  0842   K 5.4* 6.4* 5.7*     Plan  - Monitor for arrhythmias with EKG and/or continuous telemetry.   - Treat the hyperkalemia with Calcium gluconate and Nebulized albuterol sulfate.   - Monitor potassium: Every 12 hours  - The patient's hyperkalemia is stable

## 2025-01-17 NOTE — ASSESSMENT & PLAN NOTE
"CC: Postop visit/Wound check    Susan Anaya is a 36 y.o. female  post-op from an I&D and resection of abscess tract on 2017.  Patient is doing well postoperatively and is here for a wound check. No pain.  Incision site almost completely healed - 1 suture coming through site.  No purulent drainage reported.    Past Medical History:   Diagnosis Date    Abnormal Pap smear     Abnormal Pap smear of cervix     Abnormal Pap smear of vagina         Allergy     Asthma     h/o    Celiac disease     gluten sensitivity    General anesthetics causing adverse effect in therapeutic use     PONV (postoperative nausea and vomiting)      Past Surgical History:   Procedure Laterality Date     SECTION      X 2     COSMETIC SURGERY      liposuction onpelvic area    PELVIC LAPAROSCOPY       Family History   Problem Relation Age of Onset    Hypertension Father     Breast cancer Paternal Aunt     Cancer Maternal Grandfather 80     lung ca    Diabetes Maternal Grandfather 60    Hypertension Paternal Grandmother     Hypertension Paternal Grandfather     Aneurysm Paternal Grandfather     Asthma Son     Hypotension Maternal Grandmother     Breast cancer Maternal Grandmother     Colon cancer Neg Hx     Ovarian cancer Neg Hx      Social History   Substance Use Topics    Smoking status: Never Smoker    Smokeless tobacco: Never Used    Alcohol use No     OB History    Para Term  AB Living   2 2 2     2   SAB TAB Ectopic Multiple Live Births           2      # Outcome Date GA Lbr Parth/2nd Weight Sex Delivery Anes PTL Lv   2 Term 10/08/08   3.147 kg (6 lb 15 oz) M CS-LTranv Spinal N LANA   1 Term 08   4.082 kg (9 lb) M CS-LTranv Spinal N LANA          /76   Ht 5' 5" (1.651 m)   Wt 81.3 kg (179 lb 3.7 oz)   LMP 2017   BMI 29.83 kg/m²     ROS:  GENERAL: No fever, chills, fatigability or weight loss.  VULVAR: No pain, no lesions and no itching.  VAGINAL: No " Palliative consulted and, per GOC conversations, family wishes to continue full code and current measures pending further family discussions.      relaxation, no itching, no discharge, no abnormal bleeding and no lesions.  ABDOMEN: No abdominal pain. Denies nausea. Denies vomiting. No diarrhea. No constipation  BREAST: Denies pain. No lumps. No discharge.  URINARY: No incontinence, no nocturia, no frequency and no dysuria.  CARDIOVASCULAR: No chest pain. No shortness of breath. No leg cramps.  NEUROLOGICAL: No headaches. No vision changes.    PE:   ABDOMEN:  Soft, non-tender, non-distended.  PELVIC:  Bimanual exam deferred  Right perineum lesion - healing well.  1 vicryl suture coming through small incision site.  No drainage.        ICD-10-CM ICD-9-CM    1. Surgery follow-up examination Z09 V67.00    2. Status post incision and drainage Z98.890 V45.89        Cleared for taking baths/rioutine activity    Patient to contact office for any drainage     Follow-up: Annual exam    Mac Gomes IV, MD

## 2025-01-17 NOTE — ASSESSMENT & PLAN NOTE
Hypernatremia is likely due to Dehydration. monitor with hydration   Recent Labs     01/16/25  0650 01/17/25  0709 01/17/25  0842   * 135* 136     Improved with IV D5.   Resolved and now on free water flushes

## 2025-01-17 NOTE — ASSESSMENT & PLAN NOTE
Refeeding syndrome  Nutrition consulted. Most recent weight and BMI monitored-     Measurements:  Wt Readings from Last 1 Encounters:   01/15/25 57.3 kg (126 lb 5.2 oz)   Body mass index is 22.38 kg/m².    Patient has been screened and assessed by RD.    Malnutrition Type:  Context:    Level:      Malnutrition Characteristic Summary:       Interventions/Recommendations (treatment strategy):  1. Continuous TF recommendation: Glucerna 1.5 @ 50 mL/hr to provide 1200 mL total volume, 1800 kcal, 150 g CHO, 99 g protein, 19 g fiber, 910 mL free water, 120% DRI   -150 mL flush q4 hrs to provide additional 900 mL free water (Total 1810mL) - nursing, please continue documenting TF rate via flowsheets 2. RD to monitor intake, labs, weight    Refeeding syndrome as defined by severe phosphatemia despite initial replacement attempts.    Phosphorus, magnesium and potassium replaced

## 2025-01-17 NOTE — PROGRESS NOTES
lab called with a critical potassium of 6.4 and the sample was slightly hemolyzed, reported to med team..

## 2025-01-17 NOTE — SUBJECTIVE & OBJECTIVE
Interval History: Pt with symptomatic improvement and able to tolerate soft//bite sized diet with thin liquids in conjunction with feeding tube. Hyperkalemia managed with albuterol and calcium gluconate without associated EKG changes on telemetry.     Review of Systems  Objective:     Vital Signs (Most Recent):  Temp: 98.8 °F (37.1 °C) (01/17/25 1243)  Pulse: 101 (01/17/25 1243)  Resp: 18 (01/17/25 1246)  BP: 110/61 (01/17/25 1243)  SpO2: (!) 94 % (01/17/25 1243) Vital Signs (24h Range):  Temp:  [97.7 °F (36.5 °C)-98.8 °F (37.1 °C)] 98.8 °F (37.1 °C)  Pulse:  [] 101  Resp:  [16-20] 18  SpO2:  [93 %-98 %] 94 %  BP: (107-118)/(61-81) 110/61     Weight: 57.3 kg (126 lb 5.2 oz)  Body mass index is 22.38 kg/m².    Intake/Output Summary (Last 24 hours) at 1/17/2025 1427  Last data filed at 1/17/2025 0717  Gross per 24 hour   Intake 500 ml   Output 700 ml   Net -200 ml         Physical Exam  Vitals and nursing note reviewed.   Constitutional:       General: She is not in acute distress.     Appearance: She is ill-appearing. She is not toxic-appearing or diaphoretic.      Comments: Chronically ill appearing, cachectic   HENT:      Head: Normocephalic and atraumatic.      Comments: Bilateral temporal wasting.     Nose: Nose normal.   Eyes:      General: No scleral icterus.     Extraocular Movements: Extraocular movements intact.      Conjunctiva/sclera: Conjunctivae normal.   Cardiovascular:      Rate and Rhythm: Normal rate and regular rhythm.   Pulmonary:      Effort: Pulmonary effort is normal. No respiratory distress.      Breath sounds: Decreased air movement present. No wheezing or rales.   Abdominal:      General: Abdomen is flat. There is no distension.      Palpations: Abdomen is soft.      Tenderness: There is no abdominal tenderness. There is no guarding.      Comments: G-tube in place without associated tenderness, erythema, no drainage   Musculoskeletal:      Cervical back: Normal range of motion.       Right lower leg: No edema.      Left lower leg: No edema.      Comments: Left hemiplegia, left UE and LE contracted/stiff.     Skin:     General: Skin is warm and dry.      Comments: Multiple wounds photos in chart   Neurological:      Mental Status: She is alert. Mental status is at baseline.      Cranial Nerves: Dysarthria present.             Significant Labs: All pertinent labs within the past 24 hours have been reviewed.    Significant Imaging: I have reviewed all pertinent imaging results/findings within the past 24 hours.

## 2025-01-17 NOTE — ASSESSMENT & PLAN NOTE
Patient's most recent potassium results are listed below.   Recent Labs     01/16/25  0650 01/17/25  0709 01/17/25  0842   K 5.4* 6.4* 5.7*       Plan  - Replete potassium per protocol  - Monitor potassium Daily  - Patient's hypokalemia is improving

## 2025-01-17 NOTE — ASSESSMENT & PLAN NOTE
Anemia is likely due to acute blood loss which was from C diff colitis . Most recent hemoglobin and hematocrit are listed below.  Recent Labs     01/15/25  0241 01/16/25  0650 01/17/25  0709   HGB 9.8* 9.7* 10.1*   HCT 29.0* 30.2* 30.7*       Plan  - Monitor serial CBC: Daily  - Transfuse PRBC if patient becomes hemodynamically unstable, symptomatic or H/H drops below 7/21.  - Patient has received 3 units of PRBCs on 1/10/2025  - Patient's anemia is currently improving  - transfused above threshhold  - UGI - had blood streaked vomitus 1/12/2025. Started on protonix po BID. No further reports of blood while on heparin drip for 2 days. Switched to famotidine due to recurring c diff risks with PPI  - Hgb slowly drifting down, not from acute loss, likely due to inflammation from acute infections disease and critical illness

## 2025-01-17 NOTE — PLAN OF CARE
Problem: SLP  Goal: SLP Goal  Description: Speech Pathology Goals  To be met by 2/1/25    1. Pt will exhibit a functional swallow for tolerance of a soft and bite sized diet with thin liquids in order to maintain adequate hydration and nutrition          Outcome: Progressing     Clinical swallow evaluation completed. Recommend a soft and bite sized diet (IDDSI 6) with thin liquids (IDDSI 0) and continued use of long term means of alternative hydration/nutrition/medication. SLP to continue to follow.

## 2025-01-17 NOTE — PLAN OF CARE
[facility]    FACILITY TRANSFER ORDERS      Patient Name: Emily Martinez  YOB: 1965    PCP: Alireza Sosa MD   PCP Address: [address] [place] [address] / ANGEL BRODY [facility]  PCP Phone Number: [phone]  PCP Fax: [phone]    Encounter Date: 01/18/2025    Admit to: [facility]    Vital Signs:  Routine    Diagnoses:   Active Hospital Problems    Diagnosis  POA    *C. difficile colitis [A04.72]  Yes    Hyperkalemia [E87.5]  Unknown    Poor prognosis [Z78.9]  Yes    Palliative care encounter [Z51.5]  Not Applicable    Debility [R53.81]  Unknown    Advance care planning [Z71.89]  Not Applicable    Acute deep vein thrombosis (DVT) of right upper extremity [I82.621]  Yes    Fever [R50.9]  Yes    Severe malnutrition [E43]  Yes    Chronic hypotension [I95.89]  Yes    Acute blood loss anemia [D62]  Yes    Dehydration [E86.0]  Yes    Acute hypoxemic respiratory failure [J96.01]  Yes    LFT elevation [R79.89]  Yes    Hypokalemia [E87.6]  No    Hypophosphatemia [E83.39]  Yes    Atelectasis of both lungs [J98.11]  Yes    Hypernatremia [E87.0]  Yes    History of stroke with residual effects [I69.30]  Not Applicable    Multiple wounds of skin [T14.8XXA]  Yes    Dysphagia [R13.10]  Yes    Insomnia [G47.00]  Yes    Mixed hyperlipidemia [E78.2]  Yes      Resolved Hospital Problems    Diagnosis Date Resolved POA    Tobacco dependency [F17.200] 12/31/2024 Unknown    UTI (urinary tract infection) [N39.0] 01/10/2025 Yes       Allergies:  Review of patient's allergies indicates:   Allergen Reactions    Sulfur        Diet:  Soft bite sized diet (IDDSI 6) with thin liquids and continued use of G-tube until transitioned back to full diet.  Concurrent tube feeds: Continuous TF recommendation: Glucerna 1.5 @ 50 mL/hr to provide 1200 mL total volume, 1800 kcal, 150 g CHO, 99 g protein, 19 g fiber, 910 mL free water, 120% DRI     -150 mL flush q4 hrs to provide additional 900 mL free water (Total 1810mL)      Activities: Activity as tolerated    Goals of Care Treatment Preferences:  Code Status: Full Code      Nursing: Per facility protocol     Labs: Per facility protocol    CONSULTS:    Physical Therapy to evaluate and treat. , Occupational Therapy to evaluate and treat., Speech Therapy to evaluate and treat for Language, Swallowing, and Cognition., and  to evaluate for community resources/long-range planning.    MISCELLANEOUS CARE:  PEG Care: Clean site every 24 hours. , Routine Skin for Bedridden Patients: Apply moisture barrier cream to all skin folds and wet areas in perineal area daily and after baths and all bowel movements., and Diabetes Care:   SN to perform and educate Diabetic management with blood glucose monitoring:    WOUND CARE ORDERS  Yes:    - Buttocks and posterior thighs: bedside nursing to cleanse with bath wipes, pat dry and apply triad bid/prn; no dressings  - Turning every 2 hours  - Heel lift boots  - low air loss mattress        01/16/25 1026        Wound 01/16/25 1026 Moisture associated dermatitis Buttocks   Date First Assessed/Time First Assessed: 01/16/25 1026   Present on Original Admission: No  Primary Wound Type: Moisture associated dermatitis  Location: Buttocks       Dressing Appearance Open to air   Drainage Amount Scant   Appearance Pink;Yellow;Moist   Periwound Area Intact;Moist   Care Cleansed with:;Other (see comments)  (bath wipes)   Dressing Applied;Other (comment)  (triad)   Periwound Care Moisture barrier applied  (triad)        Wound 01/16/25 1026 Moisture associated dermatitis Left posterior;proximal Thigh   Date First Assessed/Time First Assessed: 01/16/25 1026   Present on Original Admission: No  Primary Wound Type: Moisture associated dermatitis  Side: Left  Orientation: posterior;proximal  Location: Thigh       Dressing Appearance Open to air   Drainage Amount Scant   Appearance Pink;Moist   Tissue loss description Partial thickness   Periwound Area  Intact;Dry   Care Cleansed with:;Other (see comments)  (bath wipes)   Dressing Applied;Other (comment)  (triad)   Periwound Care Moisture barrier applied  (triad)       Medications: Review discharge medications with patient and family and provide education.         Medication List        START taking these medications      famotidine 20 MG tablet  Commonly known as: PEPCID  1 tablet (20 mg total) by Per G Tube route 2 (two) times daily.     insulin glargine U-100 (Lantus) 100 unit/mL (3 mL) Inpn pen  Inject 5 Units into the skin every evening.     miconazole NITRATE 2 % 2 % top powder  Commonly known as: MICOTIN  Apply topically 2 (two) times daily.  Replaces: miconazole nitrate 200 mg/5 gram (4 %) Crea     multivitamin Tab  1 tablet by Per G Tube route once daily.     oxyCODONE-acetaminophen 7.5-325 mg per tablet  Commonly known as: PERCOCET  1 tablet by Per G Tube route every 4 (four) hours as needed for Pain.     rivaroxaban 2.5 mg Tab  Commonly known as: XARELTO  6 tablets (15 mg total) by Per G Tube route 2 (two) times daily with meals for 20 days, THEN 8 tablets (20 mg total) before evening meal.  Start taking on: January 17, 2025     sodium zirconium cyclosilicate 10 gram packet  Commonly known as: Lokelma  1 packet (10 g total) by Per G Tube route once daily. Mix entire contents of packet(s) into drinking glass containing 3 tablespoons of water; stir well and drink immediately. Add water and repeat until no powder remains to receive entire dose. for 7 days     vancomycin 125 mg/5 mL Soln  5 mLs (125 mg total) by Per G Tube route every 6 (six) hours. for 1 day     white petrolatum 41 % Oint  Apply topically 2 (two) times a day.            CHANGE how you take these medications      * albuterol 90 mcg/actuation inhaler  Commonly known as: PROAIR HFA  inhale ONE TO TWO puffs EVERY 6 HOURS into lungs AS NEEDED FOR WHEEZING AND SHORTNESS OF BREATH  What changed:   Another medication with the same name was added.  Make sure you understand how and when to take each.  Another medication with the same name was removed. Continue taking this medication, and follow the directions you see here.     * albuterol 90 mcg/actuation inhaler  Commonly known as: PROVENTIL/VENTOLIN HFA  Inhale 2 puffs into the lungs every 6 (six) hours as needed for Wheezing. Rescue  What changed: You were already taking a medication with the same name, and this prescription was added. Make sure you understand how and when to take each.  Replaces: albuterol 1.25 mg/3 mL Nebu     atorvastatin 40 MG tablet  Commonly known as: LIPITOR  TAKE 1 TABLET(40 MG) BY MOUTH EVERY DAY  What changed: Another medication with the same name was removed. Continue taking this medication, and follow the directions you see here.     blood-glucose meter kit  Commonly known as: FREESTYLE SYSTEM KIT  Use daily- TID  What changed: Another medication with the same name was removed. Continue taking this medication, and follow the directions you see here.     capsicum 0.075% 0.075 % topical cream  Commonly known as: ZOSTRIX  Apply topically 3 (three) times daily.  What changed: Another medication with the same name was removed. Continue taking this medication, and follow the directions you see here.     diphenhydrAMINE 50 MG capsule  Commonly known as: BENADRYL  Take 1 capsule (50 mg total) by mouth every 6 (six) hours as needed for Itching.  What changed: when to take this     gabapentin 300 MG capsule  Commonly known as: NEURONTIN  Take 1 capsule (300 mg total) by mouth 2 (two) times daily.  What changed: Another medication with the same name was removed. Continue taking this medication, and follow the directions you see here.     insulin aspart U-100 100 unit/mL (3 mL) Inpn pen  Commonly known as: NovoLOG  Inject 3 Units into the skin every 6 (six) hours.  What changed:   how much to take  when to take this     lancets Misc  Commonly known as: ONETOUCH ULTRASOFT LANCETS  Use 1 TID  "as directed for diabetes checking  What changed: Another medication with the same name was removed. Continue taking this medication, and follow the directions you see here.     metFORMIN 1000 MG tablet  Commonly known as: GLUCOPHAGE  TAKE 1 TABLET(1000 MG) BY MOUTH TWICE DAILY  What changed: Another medication with the same name was removed. Continue taking this medication, and follow the directions you see here.           * This list has 2 medication(s) that are the same as other medications prescribed for you. Read the directions carefully, and ask your doctor or other care provider to review them with you.                CONTINUE taking these medications      acetaminophen 650 MG Tbsr  Commonly known as: TYLENOL  Take 1 tablet (650 mg total) by mouth every 8 (eight) hours. For back pain     aspirin 81 MG EC tablet  Commonly known as: ECOTRIN  Take 1 tablet (81 mg total) by mouth once daily.     blood sugar diagnostic Strp  Commonly known as: TRUE METRIX GLUCOSE TEST STRIP  TO CHECK BLOOD GLUCOSE THREE TIMES DAILY     clopidogreL 75 mg tablet  Commonly known as: PLAVIX  Take 1 tablet (75 mg total) by mouth once daily.     diaper,brief,adult,disposable Misc  1 each by Misc.(Non-Drug; Combo Route) route 3 (three) times daily.     fluticasone propionate 50 mcg/actuation nasal spray  Commonly known as: FLONASE  1 spray (50 mcg total) by Each Nostril route once daily.     food supplemt, lactose-reduced Liqd  Commonly known as: ENSURE MAX PROTEIN  Take 118 mLs by mouth 3 (three) times daily.     FREESTYLE LEATHA 14 DAY SENSOR Kit  Generic drug: flash glucose sensor  1 each by Misc.(Non-Drug; Combo Route) route once daily.     pen needle, diabetic 29 gauge x 1/2" Ndle  Commonly known as: BD ULTRA-FINE ORIG PEN NEEDLE  USE TO TEST THREE TIMES DAILY MUST LAST 33 DAYS     ULTRA-LIGHT ROLLATOR Misc  Generic drug: walker  1 each by Misc.(Non-Drug; Combo Route) route once daily at 6am.     wheelchair Kelsey  1 each by " Misc.(Non-Drug; Combo Route) route 2 (two) times daily. Power Wheel Chair            STOP taking these medications      albuterol 1.25 mg/3 mL Nebu  Commonly known as: ACCUNEB  Replaced by: albuterol 90 mcg/actuation inhaler  You also have another medication with the same name that you need to continue taking as instructed.     amitriptyline 10 MG tablet  Commonly known as: ELAVIL     amitriptyline 25 MG tablet  Commonly known as: ELAVIL     amLODIPine 10 MG tablet  Commonly known as: NORVASC     cephALEXin 500 MG capsule  Commonly known as: KEFLEX     diazePAM 5 MG tablet  Commonly known as: VALIUM     diclofenac 75 MG EC tablet  Commonly known as: VOLTAREN      mg capsule  Generic drug: docusate sodium     glipiZIDE 5 MG Tr24     hydroCHLOROthiazide 25 MG tablet  Commonly known as: HYDRODIURIL     hydrocortisone 2.5 % cream     hydrOXYzine HCL 25 MG tablet  Commonly known as: ATARAX     hydrOXYzine pamoate 25 MG Cap  Commonly known as: VISTARIL     ibuprofen 800 MG tablet  Commonly known as: ADVIL,MOTRIN     latanoprost 0.005 % ophthalmic solution     LEVEMIR FLEXPEN 100 unit/mL (3 mL) Inpn pen  Generic drug: insulin detemir U-100 (Levemir)     miconazole nitrate 200 mg/5 gram (4 %) Crea  Replaced by: miconazole NITRATE 2 % 2 % top powder     mupirocin 2 % ointment  Commonly known as: BACTROBAN     nicotine 21 mg/24 hr  Commonly known as: NICODERM CQ     olmesartan 40 MG tablet  Commonly known as: BENICAR     olopatadine 0.1 % ophthalmic solution  Commonly known as: PATANOL     omeprazole 40 MG capsule  Commonly known as: PRILOSEC     OZEMPIC 1 mg/dose (2 mg/1.5 mL) Pnij  Generic drug: semaglutide     pantoprazole 40 MG tablet  Commonly known as: PROTONIX     ramelteon 8 mg tablet  Commonly known as: ROZEREM     silver sulfADIAZINE 1% 1 % cream  Commonly known as: SILVADENE     ticagrelor 90 mg tablet  Commonly known as: BRILINTA     traMADoL 50 mg tablet  Commonly known as: ULTRAM     triamcinolone  acetonide 0.1% 0.1 % ointment  Commonly known as: KENALOG     TRULANCE 3 mg Tab  Generic drug: plecanatide                Immunizations Administered as of 1/17/2025       No immunizations on file.            This patient has had both covid vaccinations    Some patients may experience side effects after vaccination.  These may include fever, headache, muscle or joint aches.  Most symptoms resolve with 24-48 hours and do not require urgent medical evaluation unless they persist for more than 72 hours or symptoms are concerning for an unrelated medical condition.          _________________________________  Valerio Jones MD  01/18/2025

## 2025-01-17 NOTE — PLAN OF CARE
SAMI in communication with pt's daughter, Aneta [phone], regarding d/c plan to LTAC.  SAMI advised insurance auth received for [facility] and pt will d/c to facility on Saturday.      SW spoke to Daja [phone] w/ [facility] and confirmed pt can d/c to facility over the weekend.  Daja advised SW to have team member covering to contact her for report information.  Orders sent.  SAMI notified CM/SW team covering floor over the weekend that patient can d/c to facility.      Discharge Plan A and Plan B have been determined by review of patient's clinical status, future medical and therapeutic needs, and coverage/benefits for post-acute care in coordination with multidisciplinary team members.     01/17/25 1646   Post-Acute Status   Post-Acute Authorization Placement;Home Health;IV Infusion   Post-Acute Placement Status Pending medical clearance/testing   Home Health Status Patient declined/refused   Coverage [facility]   IV Infusion Status   (Pt currently receives tube feeds with [facility].  Pt will discharge to LTAC at this time.)   Other Status See Comments  ([facility] PCA services.)   Discharge Delays None known at this time   Discharge Plan   Discharge Plan A Long-term acute care facility (LTAC)   Discharge Plan B Home;Home with family;Home Health;Other  ([facility] for tube feeds/ [facility] for PCA/ [facility])     Albina Santos LMSW  Part-Time-  [facility]  Ext. [phone]

## 2025-01-17 NOTE — PT/OT/SLP EVAL
"Speech Language Pathology Evaluation  Bedside Swallow    Patient Name:  Emily Martinez   MRN:  [id number]  Admitting Diagnosis: C. difficile colitis    Recommendations:                 General Recommendations:  Follow up to ensure ongoing diet tolerance  Diet recommendations:  Soft & Bite Sized Diet - IDDSI Level 6, Thin liquids - IDDSI Level 0   Aspiration Precautions: Assistance with meals, Continue alternate means of nutrition via PEG, Eliminate distractions, Feed only when awake/alert, HOB to 90 degrees, and Small bites/sips   General Precautions: Standard, aspiration, fall  Communication strategies:  provide increased time to answer    Assessment:     Emily Martinez is a 59 y.o. female who presents with a functional swallow for PO intake of baseline soft solids with thin liquids. Pt will continue to benefit from use of long term means of alternative hydration and nutrition with PO intake for supplementation/pleasure. SLP to continue to follow.      History:     Past Medical History:   Diagnosis Date    Diabetes mellitus type I     Hyperlipidemia     Hypertension     Right sided weakness 2019       Past Surgical History:   Procedure Laterality Date     SECTION         Prior Intubation HX:  none this admit    Modified Barium Swallow: none on file    Chest X-Rays: 25- Some increase in compression atelectasis right lung base secondary elevation right hemidiaphragm and further prominence of left perihilar lung base partially consolidating infiltrates.     Prior diet: Per family report and most recent speech notes from outside facility, pt consuming minimal bites of soft and bite sized diet with thin liquids and use of PEG as primary means of nutrition/hydration.     Occupation/hobbies/homemaking: none stated    Subjective     Spoke with nursing prior to session. Pt found resting in bed with nice and daughter at bedside upon SLP entry into room.     Patient goals: "Stop it"      Pain/Comfort:  Pain " "Rating 1:  (pt endorsing pain with repositioning though subsided upon ceasation of movement)    Respiratory Status: Room air    Objective:     Oral Musculature Evaluation  Oral Musculature: general weakness  Dentition: scattered dentition  Secretion Management: adequate  Mucosal Quality: adequate  Oral Labial Strength and Mobility: functional seal, functional coordination  Lingual Strength and Mobility: functional protrusion, functional anterior elevation, functional lateral movement  Voice Prior to PO Intake: clear, adequate intensity    Bedside Swallow Eval:   Consistencies Assessed:  Thin liquids: self-fed by the single open cup and straw sips  Puree: clinician-fed by the tsp of pudding  Semi solid: clinician-fed zhou cracker pieces coated in pudding  Solid: clinician-fed zhou cracker      Oral Phase:   Mildly increased mastication time with trace oral residue noted intermittently- pt benefited from infrequent liquid wash to clear oral cavity from residue     Pharyngeal Phase:   no overt clinical signs/symptoms of aspiration  no overt clinical signs/symptoms of pharyngeal dysphagia    Compensatory Strategies  None    Treatment: Pt awake and alert throughout session though with increased agitation noted during repositioning. Family at bedside reported pt heavily utilizing PEG and only consuming "a few bites maybe" of soft solids before evidence of N/V though family reported no emesis of tube feeds pf any time. Observed with right gaze preference though pt able to achieve visual attention past midline consistently. Consistent fidgeting in bed noted with pt sustaining right sided head and trunk posturing despite multiple ongoing attempts. Pt initially declining oral intake though was amenable to minimal trials given consistent family and SLP encouragement. Decreased awareness of PO trials presented on left side observed as pt with better awareness and reception of oral trials when presented on left side. PO " ultimately discontinued 2/2 pt declining further intake. SLP provided education regarding overall impressions, re-initiation of soft and bite sized diet with thin liquids, continued use of PEG as primary means of nutrition/hydration/medication, safe swallow strategies, and ongoing SLP POC. Pt verbalized understanding but would continue to benefit from ongoing education. Pt verbalized understanding and had no additional questions or concerns upon SLP exit.     Goals:   Multidisciplinary Problems       SLP Goals          Problem: SLP    Goal Priority Disciplines Outcome   SLP Goal     SLP Progressing   Description: Speech Pathology Goals  To be met by 2/1/25    1. Pt will exhibit a functional swallow for tolerance of a soft and bite sized diet with thin liquids in order to maintain adequate hydration and nutrition                               Plan:     Patient to be seen:  3 x/week   Plan of Care expires:  02/16/25  Plan of Care reviewed with:  patient, family   SLP Follow-Up:  Yes       Discharge recommendations:  Low Intensity Therapy   Barriers to Discharge:  Level of Skilled Assistance Needed      Time Tracking:     SLP Treatment Date:   01/17/25  Speech Start Time:  1042  Speech Stop Time:  1059     Speech Total Time (min):  17 min    Billable Minutes: Eval Swallow and Oral Function 9 and Self Care/Home Management Training 8      01/17/2025

## 2025-01-17 NOTE — PROGRESS NOTES
[facility] - [facility] Medicine  Progress Note    Patient Name: Emily Martinez  MRN: [id number]  Patient Class: IP- Inpatient   Admission Date: 12/30/2024  Length of Stay: 17 days  Attending Physician: Valerio Jones MD  Primary Care Provider: Alireza Sosa MD        Subjective     Principal Problem:C. difficile colitis        HPI:  59-year-old female, history of a stroke with left hemiplegia, bed-bound, diabetes, hypertension, recurrent infections, dysphagia sp PEG, multiple skin wounds, multiple recent admissions in the last several months in [place] [facility], most recently last week for UTI, discharged with Keflex, living at home with her daughter, now brought in for diarrhea.  Daughter states the patient has had diarrhea for about a week and a half.  She is having about 4-5 episodes a day, watery, nonbloody.  She has intermittently also vomiting.  Patient has a PEG tube for nutrition but per most recent SLP note can also take p.o. nutrition but daughter says she has had minimal p.o. intake because she has said she is not hungry.  No fevers noted.  Daughter is frustrated as she feels like when patient gets admitted, she is temporarily better and then very quickly becomes ill again.  All of their care has been in [place] [facility] [place] but they decided to come to [facility] today as they were hopeful that we would be able to get her better.       Of note, urine culture from 6 days ago is growing out ESBL E coli, greater than 100,000 colonies    In the ED patient afebrile and hemodynamically stable saturating well on room air at rest. No leukocytosis. UA still concerning for UTI and patient started on meropenem. CT abdomen performed and noted acute diverticulitis with area of concern for contained perforation. Patient without abdominal tenderness. Patient admitted to the [facility] for further evaluation and management.    Overview/Hospital Course:  12/31 CT head -No acute intracranial process. Prominent  involutional changes and chronic microvascular ischemic changes in the periventricular white matter.  Small areas of probable remote lacunar infarction in the bilateral basal ganglia and small probable chronic right parietal cortical infarct. CT abdomen - racute diverticulitis of the mid sigmoid colon with adjacent suspected contained perforation.  No associated rim enhancement to suggest drainable abscess at this time.  No bowel obstruction. Apparent circumferential wall thickening of the distal rectum/anus which could be related to underdistention versus nonspecific proctitis.  No significant perirectal inflammatory change or evidence perirectal or perianal drainable abscess.  Left more than right basilar patchy nonspecific pulmonary mosaic attenuation with bronchial wall thickening pulmonary edema or small airways process / right basilar patchy clusters of small nodules suggesting infectious or inflammatory small airways process versus aspiration.  No large consolidation. s/p CRS eval - hemodynamically normal with benign abdominal exam, and reassuring labwork. No acute surgical intervention indicated. continue NPO status, with IVF hydration and IV meropenem.  trend CRP 59-->66. CRS eval -  continue to trend CRP  if downtrending and passing flatus, okay to start on clears and downtitrate IVFC.  1/1 UC GNRs.  ID eval - continue meropenem. If no Pseudomonas isolated, okay to de-escalate to ertapenem. C diff assay pending.    Advancing TF per CRS recs to home regimen. De-escalated to ertapenem for 7-10day treatment course (EED 01/09/25) for diverticulitis and ESBL Ecoli cystitis. Not acute care at home candidate. Transaminitis with negative Liver US, suspect shock liver after hypotension on 01/04 given improvement in LFTs and US Liver negative for acute abnormality. Continued lethargy evaluated with CT head and EEG which were negative for acute findings. Reported BM with clots and worsening hypotension prompted Hgb  check (stable), CTA AP to evaluate for lower GIB and/or intra-abdominal infective source, IVF bolus and CRS re-consult, blood cultures re-ordered, but pt already on Ertapenem. CTA abdomen showed changes associated with megacolon. CRS saw patient. Poor surgical candidate. c diff antigen positive, c diff toxin neg, c diff toxin PCR positive. On oral vancomycin. Improved hemodynamically stability, oxygenation, leukocytosis, and stools not bloody, but diarrhea is constant (not by volume, just constantly streaming out of rectum). Developed DVT in right brachial vein. Tolerated heparin drip for two days. Now on rivaroxaban. Poor prognosis. Recurring admissions related to failure to thrive and infection. Palliative care saw patient and family wishes to proceed with current care and Full code pending continued family discussions. Mild hyperkalemia treated with calcium gluconate, albuterol with improvement and without EKG changes on telemetry. Pt discharged to LTAC for continued care of buttock/sacral wounds, rectal tube in place in stable condition for continued PT/OT.     Interval History: Pt with symptomatic improvement and able to tolerate soft//bite sized diet with thin liquids in conjunction with feeding tube. Hyperkalemia managed with albuterol and calcium gluconate without associated EKG changes on telemetry.     Review of Systems  Objective:     Vital Signs (Most Recent):  Temp: 98.8 °F (37.1 °C) (01/17/25 1243)  Pulse: 101 (01/17/25 1243)  Resp: 18 (01/17/25 1246)  BP: 110/61 (01/17/25 1243)  SpO2: (!) 94 % (01/17/25 1243) Vital Signs (24h Range):  Temp:  [97.7 °F (36.5 °C)-98.8 °F (37.1 °C)] 98.8 °F (37.1 °C)  Pulse:  [] 101  Resp:  [16-20] 18  SpO2:  [93 %-98 %] 94 %  BP: (107-118)/(61-81) 110/61     Weight: 57.3 kg (126 lb 5.2 oz)  Body mass index is 22.38 kg/m².    Intake/Output Summary (Last 24 hours) at 1/17/2025 5675  Last data filed at 1/17/2025 0717  Gross per 24 hour   Intake 500 ml   Output 700 ml    Net -200 ml         Physical Exam  Vitals and nursing note reviewed.   Constitutional:       General: She is not in acute distress.     Appearance: She is ill-appearing. She is not toxic-appearing or diaphoretic.      Comments: Chronically ill appearing, cachectic   HENT:      Head: Normocephalic and atraumatic.      Comments: Bilateral temporal wasting.     Nose: Nose normal.   Eyes:      General: No scleral icterus.     Extraocular Movements: Extraocular movements intact.      Conjunctiva/sclera: Conjunctivae normal.   Cardiovascular:      Rate and Rhythm: Normal rate and regular rhythm.   Pulmonary:      Effort: Pulmonary effort is normal. No respiratory distress.      Breath sounds: Decreased air movement present. No wheezing or rales.   Abdominal:      General: Abdomen is flat. There is no distension.      Palpations: Abdomen is soft.      Tenderness: There is no abdominal tenderness. There is no guarding.      Comments: G-tube in place without associated tenderness, erythema, no drainage   Musculoskeletal:      Cervical back: Normal range of motion.      Right lower leg: No edema.      Left lower leg: No edema.      Comments: Left hemiplegia, left UE and LE contracted/stiff.     Skin:     General: Skin is warm and dry.      Comments: Multiple wounds photos in chart   Neurological:      Mental Status: She is alert. Mental status is at baseline.      Cranial Nerves: Dysarthria present.             Significant Labs: All pertinent labs within the past 24 hours have been reviewed.    Significant Imaging: I have reviewed all pertinent imaging results/findings within the past 24 hours.    Assessment and Plan     * C. difficile colitis  - CT abdomen 12/31 with diverticulitis with contained perforation. No associated rim enhancement to suggest drainable abscess at this time.  - VSS without leukocytosis ;  non-surgical abdomen on exam  - CRS consult appreciated.   - Bloody clots starting 1/7 - CTA abdomen showed  ""Rectosigmoid proctocolitis, of numerous potential etiologies. Questionable cecitis as well. Correlate clinically, including for the possibility of early/mild pseudomembranous colitis."  - c diff antigen positive, c diff toxin, c diff toxin PCR positive  - started on metronidazole IV and vancomycin - ID consulted    - decreased vancomycin to 125 mg QID and stopped metronidazole.   - still with diarrhea. Currently has rectal tube.  - discussed with GI about on-going diarrhea requiring rectal tube   Can not use anti-diarrheals unless confirmed c. Diff negative   F/u stool cultures   Continue supportive care   If rectal tube is still required, may need to consult GI for further diarrhea management and may need LTAC if can not be removed    Hyperkalemia  Hyperkalemia is likely due to Medication induced.The patients most recent potassium results are listed below.  Recent Labs     01/16/25  0650 01/17/25  0709 01/17/25  0842   K 5.4* 6.4* 5.7*     Plan  - Monitor for arrhythmias with EKG and/or continuous telemetry.   - Treat the hyperkalemia with Calcium gluconate and Nebulized albuterol sulfate.   - Monitor potassium: Every 12 hours  - The patient's hyperkalemia is stable            Advance care planning  See palliative      Debility  Patient with Acute on chronic debility due to hemiplegia/hemiparesis. The patient's latest AMPAC (Activity Measure for Post Acute Care) Score is listed below.    AM-PAC Score - How much help does the patient need for each activity listed  Basic Mobility Total Score: 23  Turning over in bed (including adjusting bedclothes, sheets and blankets)?: A little  Sitting down on and standing up from a chair with arms (e.g., wheelchair, bedside commode, etc.): None  Moving from lying on back to sitting on the side of the bed?: None  Moving to and from a bed to a chair (including a wheelchair)?: None  Need to walk in hospital room?: None  Climbing 3-5 steps with a railing?: None    Plan  - PT/OT " consulted            Palliative care encounter  Palliative consulted and, per [facility] conversations, family wishes to continue full code and current measures pending further family discussions.       Poor prognosis  We discussed on-going malnutrition and wounds due to FTT now worsened by c diff with fairly significant diarrhea. Debility progressing since stroke and now with contractures. Frequent re-hospitalizations due to FTT and infections. We have no means to stop future infections and other acute illness and soon may reach all limits of medical therapy to keep her alive.     Acute deep vein thrombosis (DVT) of right upper extremity  Given recent witness GI bleeds. Start heparin drip. No bleed for two days. Switch to rivaroxaban.     Fever  Patient had temp 101.2. She was on Pricilla hugger at time. UA done after horton catheter removed was slightly positive. Blood culture pending. CXR pending. Spoke to ID. Prefer to wait for culture results and monitor vitals off pricilla hugger to consider antibiotic treatment as it less likely she has new infectious process. Patient afebrile off pricilal hugger and WBC normal. Urine culture growing GNR but Horton removed. As patient clinically improved overall, will defer adding anitbiotic at this time.     Severe malnutrition  Refeeding syndrome  Nutrition consulted. Most recent weight and BMI monitored-     Measurements:  Wt Readings from Last 1 Encounters:   01/15/25 57.3 kg (126 lb 5.2 oz)   Body mass index is 22.38 kg/m².    Patient has been screened and assessed by RD.    Malnutrition Type:  Context:    Level:      Malnutrition Characteristic Summary:       Interventions/Recommendations (treatment strategy):  1. Continuous TF recommendation: Glucerna 1.5 @ 50 mL/hr to provide 1200 mL total volume, 1800 kcal, 150 g CHO, 99 g protein, 19 g fiber, 910 mL free water, 120% DRI   -150 mL flush q4 hrs to provide additional 900 mL free water (Total 1810mL) - nursing, please continue documenting  TF rate via flowsheets 2. RD to monitor intake, labs, weight    Refeeding syndrome as defined by severe phosphatemia despite initial replacement attempts.    Phosphorus, magnesium and potassium replaced    Acute blood loss anemia  Anemia is likely due to acute blood loss which was from C diff colitis . Most recent hemoglobin and hematocrit are listed below.  Recent Labs     01/15/25  0241 01/16/25  0650 01/17/25  0709   HGB 9.8* 9.7* 10.1*   HCT 29.0* 30.2* 30.7*       Plan  - Monitor serial CBC: Daily  - Transfuse PRBC if patient becomes hemodynamically unstable, symptomatic or H/H drops below 7/21.  - Patient has received 3 units of PRBCs on 1/10/2025  - Patient's anemia is currently improving  - transfused above threshhold  - UGI - had blood streaked vomitus 1/12/2025. Started on protonix po BID. No further reports of blood while on heparin drip for 2 days. Switched to famotidine due to recurring c diff risks with PPI  - Hgb slowly drifting down, not from acute loss, likely due to inflammation from acute infections disease and critical illness    Chronic hypotension  Chronic due to malnutrition  Will defer volume resuscitation unless MAP <60    Dehydration  hypernatremia  Due to diarrhea and holding of feedings  Resolved with IV D5    Acute hypoxemic respiratory failure  Patient with Hypoxic Respiratory failure which is Acute.  she is not on home oxygen. Supplemental oxygen was provided and noted-      Signs/symptoms of respiratory failure include- increased work of breathing and lethargy. Contributing diagnoses includes - Pleural effusion Labs and images were reviewed. Patient Has not had a recent ABG. Will treat underlying causes and adjust management of respiratory failure as follows-     - XR on 01/08 with evidence of hypervolemia--Lasix 40mg IV qd on 01  - resolving as on RA at times  - aggressive pulmonary toileting    LFT elevation  Without inciting event on labs 01/04. No evidence of hypervolemia on CXR,  no increased O2 requirement, no abdominal palpation. Shock liver a consideration given hypotension with MAP around 65 that morning.    - US Liver with doppler negative for acute abormality  - continue to monitor with daily labs, avoid hepatotoxic medications  - improving      Atelectasis of both lungs  I have personally reviewed Chest X-ray. Patient with atelectasis on interpretation. Likely Non-Obstructive atelectasis secondary to pleural effusion. Signs and symptoms include Shortness of breath and Hypoxemia Will begin treatment with upright positioning.    Hypophosphatemia  Patient's most recent phosphorus results are listed below.   Recent Labs     01/15/25  0241 01/16/25  0650 01/17/25  0709   PHOS 3.0 2.8 4.0       Plan  - Will treat hypophosphatemia with prn supplementation  - refeeding. Replace as needed    Hypokalemia  Patient's most recent potassium results are listed below.   Recent Labs     01/16/25  0650 01/17/25  0709 01/17/25  0842   K 5.4* 6.4* 5.7*       Plan  - Replete potassium per protocol  - Monitor potassium Daily  - Patient's hypokalemia is improving    Hypernatremia  Hypernatremia is likely due to Dehydration. monitor with hydration   Recent Labs     01/16/25  0650 01/17/25  0709 01/17/25  0842   * 135* 136     Improved with IV D5.   Resolved and now on free water flushes    Dysphagia  - sp PEG for nutrition and meds  ;  okay for comfort po feeds per recent SLP recs  - soft/bite sized per SLP recs on 01/17 with concurrent tube feeds    Multiple wounds of skin  - Wound care consulted  - turn q2  - waffle mattress overlay  - clean and dressed      01/16/25 1026         Wound 01/16/25 1026 Moisture associated dermatitis Buttocks   Date First Assessed/Time First Assessed: 01/16/25 1026   Present on Original Admission: No  Primary Wound Type: Moisture associated dermatitis  Location: Buttocks         Dressing Appearance Open to air   Drainage Amount Scant   Appearance Pink;Yellow;Moist  "  Periwound Area Intact;Moist   Care Cleansed with:;Other (see comments)  (bath wipes)   Dressing Applied;Other (comment)  (triad)   Periwound Care Moisture barrier applied  (triad)        Wound 01/16/25 1026 Moisture associated dermatitis Left posterior;proximal Thigh   Date First Assessed/Time First Assessed: 01/16/25 1026   Present on Original Admission: No  Primary Wound Type: Moisture associated dermatitis  Side: Left  Orientation: posterior;proximal  Location: Thigh         Dressing Appearance Open to air   Drainage Amount Scant   Appearance Pink;Moist   Tissue loss description Partial thickness   Periwound Area Intact;Dry   Care Cleansed with:;Other (see comments)  (bath wipes)   Dressing Applied;Other (comment)  (triad)   Periwound Care Moisture barrier applied  (triad)       History of stroke with residual effects  - chronic left hemiplegia, bed bound, sp PEG though able to tolerate po comfort feed  - continue home ASA and statin  - turn q2h      12/31 CT head -No acute intracranial process. Prominent involutional changes and chronic microvascular ischemic changes in the periventricular white matter.  Small areas of probable remote lacunar infarction in the bilateral basal ganglia and small probable chronic right parietal cortical infarct.     - Given ongoing lethargy repeat CT head negative for acute process  - EEG negative for epileptic activity    Insomnia  - home trazadone      Mixed hyperlipidemia  - home statin        VTE Risk Mitigation (From admission, onward)           Ordered     rivaroxaban tablet 20 mg  With dinner        Placed in "Followed by" Linked Group    01/16/25 1257     rivaroxaban tablet 15 mg  2 times daily        Placed in "Followed by" Linked Group    01/16/25 1257     IP VTE HIGH RISK PATIENT  Once         12/30/24 1937     Place sequential compression device  Until discontinued         12/30/24 1937     Place sequential compression device  Until discontinued         12/30/24 1929 "                    Discharge Planning   ANA: 1/21/2025     Code Status: Full Code   Medical Readiness for Discharge Date:   Discharge Plan A: Long-term acute care facility (LTAC)   Discharge Delays: None known at this time                    Valerio Jones MD  Department of Hospital Medicine   [facility]

## 2025-01-18 VITALS
BODY MASS INDEX: 22.38 KG/M2 | TEMPERATURE: 99 F | WEIGHT: 126.31 LBS | RESPIRATION RATE: 18 BRPM | HEIGHT: 63 IN | HEART RATE: 88 BPM | DIASTOLIC BLOOD PRESSURE: 66 MMHG | OXYGEN SATURATION: 92 % | SYSTOLIC BLOOD PRESSURE: 141 MMHG

## 2025-01-18 LAB
ALBUMIN SERPL BCP-MCNC: 1.6 G/DL (ref 3.5–5.2)
ALP SERPL-CCNC: 269 U/L (ref 40–150)
ALT SERPL W/O P-5'-P-CCNC: 40 U/L (ref 10–44)
ANION GAP SERPL CALC-SCNC: 10 MMOL/L (ref 8–16)
ANION GAP SERPL CALC-SCNC: 6 MMOL/L (ref 8–16)
ANION GAP SERPL CALC-SCNC: 6 MMOL/L (ref 8–16)
ANION GAP SERPL CALC-SCNC: 8 MMOL/L (ref 8–16)
AST SERPL-CCNC: 37 U/L (ref 10–40)
BACTERIA STL CULT: NORMAL
BASOPHILS # BLD AUTO: 0.07 K/UL (ref 0–0.2)
BASOPHILS NFR BLD: 0.6 % (ref 0–1.9)
BILIRUB SERPL-MCNC: 0.4 MG/DL (ref 0.1–1)
BUN SERPL-MCNC: 20 MG/DL (ref 8–23)
BUN SERPL-MCNC: 21 MG/DL (ref 8–23)
CALCIUM SERPL-MCNC: 8.4 MG/DL (ref 8.7–10.5)
CALCIUM SERPL-MCNC: 8.4 MG/DL (ref 8.7–10.5)
CALCIUM SERPL-MCNC: 8.7 MG/DL (ref 8.7–10.5)
CALCIUM SERPL-MCNC: 8.8 MG/DL (ref 8.7–10.5)
CHLORIDE SERPL-SCNC: 101 MMOL/L (ref 95–110)
CHLORIDE SERPL-SCNC: 102 MMOL/L (ref 95–110)
CK SERPL-CCNC: 38 U/L (ref 20–180)
CO2 SERPL-SCNC: 23 MMOL/L (ref 23–29)
CO2 SERPL-SCNC: 25 MMOL/L (ref 23–29)
CREAT SERPL-MCNC: 0.7 MG/DL (ref 0.5–1.4)
DIFFERENTIAL METHOD BLD: ABNORMAL
EOSINOPHIL # BLD AUTO: 0 K/UL (ref 0–0.5)
EOSINOPHIL NFR BLD: 0 % (ref 0–8)
ERYTHROCYTE [DISTWIDTH] IN BLOOD BY AUTOMATED COUNT: 23.1 % (ref 11.5–14.5)
EST. GFR  (NO RACE VARIABLE): >60 ML/MIN/1.73 M^2
GLUCOSE SERPL-MCNC: 105 MG/DL (ref 70–110)
GLUCOSE SERPL-MCNC: 121 MG/DL (ref 70–110)
GLUCOSE SERPL-MCNC: 121 MG/DL (ref 70–110)
GLUCOSE SERPL-MCNC: 67 MG/DL (ref 70–110)
HCT VFR BLD AUTO: 28 % (ref 37–48.5)
HGB BLD-MCNC: 9.2 G/DL (ref 12–16)
IMM GRANULOCYTES # BLD AUTO: 0.13 K/UL (ref 0–0.04)
IMM GRANULOCYTES NFR BLD AUTO: 1.1 % (ref 0–0.5)
LYMPHOCYTES # BLD AUTO: 2.1 K/UL (ref 1–4.8)
LYMPHOCYTES NFR BLD: 17.8 % (ref 18–48)
MAGNESIUM SERPL-MCNC: 2 MG/DL (ref 1.6–2.6)
MCH RBC QN AUTO: 26.9 PG (ref 27–31)
MCHC RBC AUTO-ENTMCNC: 32.9 G/DL (ref 32–36)
MCV RBC AUTO: 82 FL (ref 82–98)
MONOCYTES # BLD AUTO: 1.4 K/UL (ref 0.3–1)
MONOCYTES NFR BLD: 12 % (ref 4–15)
NEUTROPHILS # BLD AUTO: 8.2 K/UL (ref 1.8–7.7)
NEUTROPHILS NFR BLD: 68.5 % (ref 38–73)
NRBC BLD-RTO: 1 /100 WBC
OHS QRS DURATION: 80 MS
OHS QTC CALCULATION: 440 MS
PHOSPHATE SERPL-MCNC: 4.7 MG/DL (ref 2.7–4.5)
PLATELET # BLD AUTO: 446 K/UL (ref 150–450)
PMV BLD AUTO: 10.3 FL (ref 9.2–12.9)
POCT GLUCOSE: 101 MG/DL (ref 70–110)
POCT GLUCOSE: 107 MG/DL (ref 70–110)
POCT GLUCOSE: 167 MG/DL (ref 70–110)
POTASSIUM SERPL-SCNC: 5.1 MMOL/L (ref 3.5–5.1)
POTASSIUM SERPL-SCNC: 5.2 MMOL/L (ref 3.5–5.1)
POTASSIUM SERPL-SCNC: 5.2 MMOL/L (ref 3.5–5.1)
POTASSIUM SERPL-SCNC: 5.3 MMOL/L (ref 3.5–5.1)
PROT SERPL-MCNC: 6.6 G/DL (ref 6–8.4)
RBC # BLD AUTO: 3.42 M/UL (ref 4–5.4)
SODIUM SERPL-SCNC: 132 MMOL/L (ref 136–145)
SODIUM SERPL-SCNC: 132 MMOL/L (ref 136–145)
SODIUM SERPL-SCNC: 134 MMOL/L (ref 136–145)
SODIUM SERPL-SCNC: 135 MMOL/L (ref 136–145)
WBC # BLD AUTO: 11.9 K/UL (ref 3.9–12.7)

## 2025-01-18 PROCEDURE — 94761 N-INVAS EAR/PLS OXIMETRY MLT: CPT

## 2025-01-18 PROCEDURE — 85025 COMPLETE CBC W/AUTO DIFF WBC: CPT

## 2025-01-18 PROCEDURE — 25000003 PHARM REV CODE 250

## 2025-01-18 PROCEDURE — 94640 AIRWAY INHALATION TREATMENT: CPT

## 2025-01-18 PROCEDURE — 80048 BASIC METABOLIC PNL TOTAL CA: CPT | Mod: XB

## 2025-01-18 PROCEDURE — 25000003 PHARM REV CODE 250: Performed by: HOSPITALIST

## 2025-01-18 PROCEDURE — 25000242 PHARM REV CODE 250 ALT 637 W/ HCPCS: Performed by: INTERNAL MEDICINE

## 2025-01-18 PROCEDURE — 80053 COMPREHEN METABOLIC PANEL: CPT

## 2025-01-18 PROCEDURE — 83735 ASSAY OF MAGNESIUM: CPT | Performed by: INTERNAL MEDICINE

## 2025-01-18 PROCEDURE — 84100 ASSAY OF PHOSPHORUS: CPT | Performed by: INTERNAL MEDICINE

## 2025-01-18 PROCEDURE — 36415 COLL VENOUS BLD VENIPUNCTURE: CPT

## 2025-01-18 RX ADMIN — ALBUTEROL SULFATE 2.5 MG: 2.5 SOLUTION RESPIRATORY (INHALATION) at 12:01

## 2025-01-18 RX ADMIN — DIPHENHYDRAMINE HYDROCHLORIDE 25 MG: 25 SOLUTION ORAL at 05:01

## 2025-01-18 RX ADMIN — ASPIRIN 81 MG CHEWABLE TABLET 81 MG: 81 TABLET CHEWABLE at 09:01

## 2025-01-18 RX ADMIN — INSULIN ASPART 3 UNITS: 100 INJECTION, SOLUTION INTRAVENOUS; SUBCUTANEOUS at 12:01

## 2025-01-18 RX ADMIN — ALBUTEROL SULFATE 2.5 MG: 2.5 SOLUTION RESPIRATORY (INHALATION) at 07:01

## 2025-01-18 RX ADMIN — FAMOTIDINE 20 MG: 20 TABLET ORAL at 09:01

## 2025-01-18 RX ADMIN — OXYCODONE AND ACETAMINOPHEN 1 TABLET: 7.5; 325 TABLET ORAL at 04:01

## 2025-01-18 RX ADMIN — MICONAZOLE NITRATE 2 % TOPICAL POWDER: at 09:01

## 2025-01-18 RX ADMIN — WHITE PETROLATUM: 1.75 OINTMENT TOPICAL at 09:01

## 2025-01-18 RX ADMIN — THERA TABS 1 TABLET: TAB at 09:01

## 2025-01-18 RX ADMIN — VANCOMYCIN HYDROCHLORIDE 125 MG: KIT at 11:01

## 2025-01-18 RX ADMIN — INSULIN ASPART 2 UNITS: 100 INJECTION, SOLUTION INTRAVENOUS; SUBCUTANEOUS at 12:01

## 2025-01-18 RX ADMIN — VANCOMYCIN HYDROCHLORIDE 125 MG: KIT at 05:01

## 2025-01-18 RX ADMIN — DIPHENHYDRAMINE HYDROCHLORIDE 25 MG: 25 SOLUTION ORAL at 11:01

## 2025-01-18 RX ADMIN — RIVAROXABAN 15 MG: 15 TABLET, FILM COATED ORAL at 05:01

## 2025-01-18 NOTE — NURSING
Patient leaving via ambulance to LTAC with all her personal belongings. Patient's midline and rectal tube in place.

## 2025-01-18 NOTE — PLAN OF CARE
Problem: Skin Injury Risk Increased  Goal: Skin Health and Integrity  Outcome: Met     Problem: Infection  Goal: Absence of Infection Signs and Symptoms  Outcome: Met     Problem: Adult Inpatient Plan of Care  Goal: Plan of Care Review  Outcome: Met  Goal: Patient-Specific Goal (Individualized)  Outcome: Met  Goal: Absence of Hospital-Acquired Illness or Injury  Outcome: Met  Goal: Optimal Comfort and Wellbeing  Outcome: Met  Goal: Readiness for Transition of Care  Outcome: Met     Problem: Diabetes Comorbidity  Goal: Blood Glucose Level Within Targeted Range  Outcome: Met     Problem: Acute Kidney Injury/Impairment  Goal: Fluid and Electrolyte Balance  Outcome: Met  Goal: Improved Oral Intake  Outcome: Met  Goal: Effective Renal Function  Outcome: Met     Problem: Wound  Goal: Optimal Coping  Outcome: Met  Goal: Optimal Functional Ability  Outcome: Met  Goal: Absence of Infection Signs and Symptoms  Outcome: Met  Goal: Improved Oral Intake  Outcome: Met  Goal: Optimal Pain Control and Function  Outcome: Met  Goal: Skin Health and Integrity  Outcome: Met  Goal: Optimal Wound Healing  Outcome: Met     Problem: Fall Injury Risk  Goal: Absence of Fall and Fall-Related Injury  Outcome: Met     Problem: Enteral Nutrition  Goal: Absence of Aspiration Signs and Symptoms  Outcome: Met  Goal: Safe, Effective Therapy Delivery  Outcome: Met  Goal: Feeding Tolerance  Outcome: Met     Problem: Fluid Volume Deficit  Goal: Fluid Balance  Outcome: Met     Problem: Electrolyte Imbalance  Goal: Electrolyte Balance  Outcome: Met     Problem: Nausea and Vomiting  Goal: Nausea and Vomiting Relief  Outcome: Met     Problem: Mobility Impairment  Goal: Optimal Mobility  Outcome: Met     Problem: Diarrhea  Goal: Effective Diarrhea Management  Outcome: Met     Problem: Urinary Retention  Goal: Effective Urinary Elimination  Outcome: Met     Problem: Coping Ineffective  Goal: Effective Coping  Outcome: Met

## 2025-01-18 NOTE — PLAN OF CARE
[facility] - Med Surg  Discharge Final Note    Primary Care Provider: Alireza Sosa MD    Expected Discharge Date: 1/18/2025    Patient to be discharged to [facility].  [facility] to provide stretcher transportation.    Nurse to call report to Geoff at [phone], going to room 4232.  Stretcher requested for 12:30 which is not a guaranteed arrival time.      Final Discharge Note (most recent)       Final Note - 01/18/25 1050          Final Note    Assessment Type Final Discharge Note     Anticipated Discharge Disposition Long Term Acute Care        Post-Acute Status    Post-Acute Authorization Placement     Post-Acute Placement Status Set-up Complete/Auth obtained     Home Health Status Discharge Plan Changed     IV Infusion Status Patient declined/refused     Discharge Delays None known at this time                     Important Message from Medicare             After-discharge care                Destination       [facility]   Service: Long Term Acute Care    [address] [place] [address] 4TH FLOOR  [place] [address]   Phone: [phone]

## 2025-01-18 NOTE — PROGRESS NOTES
Rectal tube replaced after communication with MD. Pt had loose stools concerning to further damage her skin. Isolation maintained.

## 2025-02-12 ENCOUNTER — HOSPITAL ENCOUNTER (INPATIENT)
Facility: HOSPITAL | Age: 60
LOS: 7 days | Discharge: HOME HEALTH | DRG: 871 | End: 2025-02-19
Attending: STUDENT IN AN ORGANIZED HEALTH CARE EDUCATION/TRAINING PROGRAM | Admitting: INTERNAL MEDICINE
Payer: MEDICAID

## 2025-02-12 DIAGNOSIS — Z71.89 ADVANCE CARE PLANNING: ICD-10-CM

## 2025-02-12 DIAGNOSIS — R41.82 ALTERED MENTAL STATUS, UNSPECIFIED ALTERED MENTAL STATUS TYPE: ICD-10-CM

## 2025-02-12 DIAGNOSIS — G93.41 SEPSIS WITH ENCEPHALOPATHY WITHOUT SEPTIC SHOCK, DUE TO UNSPECIFIED ORGANISM: ICD-10-CM

## 2025-02-12 DIAGNOSIS — R65.20 SEPSIS WITH ENCEPHALOPATHY WITHOUT SEPTIC SHOCK, DUE TO UNSPECIFIED ORGANISM: ICD-10-CM

## 2025-02-12 DIAGNOSIS — E11.00 HYPEROSMOLAR HYPERGLYCEMIC STATE (HHS): Primary | ICD-10-CM

## 2025-02-12 DIAGNOSIS — E87.0 HYPERNATREMIA: ICD-10-CM

## 2025-02-12 DIAGNOSIS — E87.5 HYPERKALEMIA: ICD-10-CM

## 2025-02-12 DIAGNOSIS — A41.9 SEPSIS WITH ENCEPHALOPATHY WITHOUT SEPTIC SHOCK, DUE TO UNSPECIFIED ORGANISM: ICD-10-CM

## 2025-02-12 PROBLEM — E78.2 MIXED HYPERLIPIDEMIA: Chronic | Status: ACTIVE | Noted: 2019-04-01

## 2025-02-12 PROBLEM — R10.9 ABDOMINAL PAIN: Status: RESOLVED | Noted: 2025-02-12 | Resolved: 2025-02-12

## 2025-02-12 PROBLEM — E11.10 DKA (DIABETIC KETOACIDOSIS): Status: ACTIVE | Noted: 2024-03-25

## 2025-02-12 PROBLEM — R10.9 ABDOMINAL PAIN: Status: ACTIVE | Noted: 2025-02-12

## 2025-02-12 PROBLEM — L98.429 ULCER OF SACRAL REGION, STAGE 2: Status: ACTIVE | Noted: 2025-02-12

## 2025-02-12 PROBLEM — N18.4 TYPE 2 DIABETES MELLITUS WITH STAGE 4 CHRONIC KIDNEY DISEASE, WITH LONG-TERM CURRENT USE OF INSULIN: Status: ACTIVE | Noted: 2019-04-01

## 2025-02-12 PROBLEM — J69.0 ASPIRATION PNEUMONIA: Status: ACTIVE | Noted: 2024-09-04

## 2025-02-12 PROBLEM — M86.9 OSTEOMYELITIS: Status: ACTIVE | Noted: 2024-07-14

## 2025-02-12 PROBLEM — I63.59 ISCHEMIC CEREBRAL STROKE DUE TO INTRACRANIAL LARGE ARTERY ATHEROSCLEROSIS: Status: ACTIVE | Noted: 2024-04-04

## 2025-02-12 PROBLEM — G93.40 ENCEPHALOPATHY: Status: ACTIVE | Noted: 2019-09-06

## 2025-02-12 PROBLEM — Z86.73 HISTORY OF CVA (CEREBROVASCULAR ACCIDENT): Status: ACTIVE | Noted: 2024-06-12

## 2025-02-12 PROBLEM — Z79.4 TYPE 2 DIABETES MELLITUS WITH STAGE 4 CHRONIC KIDNEY DISEASE, WITH LONG-TERM CURRENT USE OF INSULIN: Status: ACTIVE | Noted: 2019-04-01

## 2025-02-12 PROBLEM — I65.21 INTRACRANIAL CAROTID STENOSIS, RIGHT: Status: ACTIVE | Noted: 2024-04-02

## 2025-02-12 PROBLEM — R53.81 DEBILITY: Status: ACTIVE | Noted: 2024-04-02

## 2025-02-12 PROBLEM — I67.4 ENCEPHALOPATHY, HYPERTENSIVE: Status: ACTIVE | Noted: 2025-02-12

## 2025-02-12 PROBLEM — E11.22 TYPE 2 DIABETES MELLITUS WITH STAGE 4 CHRONIC KIDNEY DISEASE, WITH LONG-TERM CURRENT USE OF INSULIN: Status: ACTIVE | Noted: 2019-04-01

## 2025-02-12 PROBLEM — R53.1 RIGHT SIDED WEAKNESS: Status: ACTIVE | Noted: 2019-08-13

## 2025-02-12 PROBLEM — I10 ESSENTIAL HYPERTENSION: Chronic | Status: ACTIVE | Noted: 2019-04-01

## 2025-02-12 PROBLEM — R62.7 FAILURE TO THRIVE IN ADULT: Status: ACTIVE | Noted: 2024-11-25

## 2025-02-12 PROBLEM — I63.50: Status: ACTIVE | Noted: 2025-02-12

## 2025-02-12 LAB
ALBUMIN SERPL BCP-MCNC: 2.4 G/DL (ref 3.5–5.2)
ALLENS TEST: ABNORMAL
ALP SERPL-CCNC: 119 U/L (ref 40–150)
ALT SERPL W/O P-5'-P-CCNC: 18 U/L (ref 10–44)
ANION GAP SERPL CALC-SCNC: 12 MMOL/L (ref 8–16)
ANION GAP SERPL CALC-SCNC: 13 MMOL/L (ref 8–16)
ANION GAP SERPL CALC-SCNC: 16 MMOL/L (ref 8–16)
ANION GAP SERPL CALC-SCNC: 19 MMOL/L (ref 8–16)
ANION GAP SERPL CALC-SCNC: 19 MMOL/L (ref 8–16)
AST SERPL-CCNC: 23 U/L (ref 10–40)
B-OH-BUTYR BLD STRIP-SCNC: 1.7 MMOL/L (ref 0–0.5)
BACTERIA #/AREA URNS AUTO: NORMAL /HPF
BASOPHILS # BLD AUTO: 0.06 K/UL (ref 0–0.2)
BASOPHILS NFR BLD: 0.3 % (ref 0–1.9)
BILIRUB SERPL-MCNC: 0.3 MG/DL (ref 0.1–1)
BILIRUB UR QL STRIP: NEGATIVE
BIPAP: 0
BUN SERPL-MCNC: 101 MG/DL (ref 8–23)
BUN SERPL-MCNC: 107 MG/DL (ref 8–23)
BUN SERPL-MCNC: 108 MG/DL (ref 8–23)
BUN SERPL-MCNC: 110 MG/DL (ref 8–23)
BUN SERPL-MCNC: 95 MG/DL (ref 8–23)
CALCIUM SERPL-MCNC: 8.9 MG/DL (ref 8.7–10.5)
CALCIUM SERPL-MCNC: 9.2 MG/DL (ref 8.7–10.5)
CALCIUM SERPL-MCNC: 9.5 MG/DL (ref 8.7–10.5)
CALCIUM SERPL-MCNC: 9.7 MG/DL (ref 8.7–10.5)
CALCIUM SERPL-MCNC: 9.8 MG/DL (ref 8.7–10.5)
CHLORIDE SERPL-SCNC: 118 MMOL/L (ref 95–110)
CHLORIDE SERPL-SCNC: 120 MMOL/L (ref 95–110)
CHLORIDE SERPL-SCNC: 122 MMOL/L (ref 95–110)
CHLORIDE SERPL-SCNC: 122 MMOL/L (ref 95–110)
CHLORIDE SERPL-SCNC: 125 MMOL/L (ref 95–110)
CK SERPL-CCNC: 179 U/L (ref 20–180)
CLARITY UR REFRACT.AUTO: CLEAR
CO2 SERPL-SCNC: 15 MMOL/L (ref 23–29)
CO2 SERPL-SCNC: 17 MMOL/L (ref 23–29)
CO2 SERPL-SCNC: 17 MMOL/L (ref 23–29)
CO2 SERPL-SCNC: 18 MMOL/L (ref 23–29)
CO2 SERPL-SCNC: 26 MMOL/L (ref 23–29)
COLOR UR AUTO: YELLOW
CORRECTED TEMPERATURE (PCO2): 42.8 MMHG
CORRECTED TEMPERATURE (PH): 7.33
CORRECTED TEMPERATURE (PO2): 52.4 MMHG
CREAT SERPL-MCNC: 1.6 MG/DL (ref 0.5–1.4)
CREAT SERPL-MCNC: 1.6 MG/DL (ref 0.5–1.4)
CREAT SERPL-MCNC: 2 MG/DL (ref 0.5–1.4)
CREAT SERPL-MCNC: 2 MG/DL (ref 0.5–1.4)
CREAT SERPL-MCNC: 2.4 MG/DL (ref 0.5–1.4)
DIFFERENTIAL METHOD BLD: ABNORMAL
EOSINOPHIL # BLD AUTO: 0 K/UL (ref 0–0.5)
EOSINOPHIL NFR BLD: 0.1 % (ref 0–8)
ERYTHROCYTE [DISTWIDTH] IN BLOOD BY AUTOMATED COUNT: 18.9 % (ref 11.5–14.5)
EST. GFR  (NO RACE VARIABLE): 22.4 ML/MIN/1.73 M^2
EST. GFR  (NO RACE VARIABLE): 27.9 ML/MIN/1.73 M^2
EST. GFR  (NO RACE VARIABLE): 27.9 ML/MIN/1.73 M^2
EST. GFR  (NO RACE VARIABLE): 36.5 ML/MIN/1.73 M^2
EST. GFR  (NO RACE VARIABLE): 36.5 ML/MIN/1.73 M^2
FIO2: 21 %
GLUCOSE SERPL-MCNC: 1131 MG/DL (ref 70–110)
GLUCOSE SERPL-MCNC: 506 MG/DL (ref 70–110)
GLUCOSE SERPL-MCNC: 655 MG/DL (ref 70–110)
GLUCOSE SERPL-MCNC: 809 MG/DL (ref 70–110)
GLUCOSE SERPL-MCNC: 943 MG/DL (ref 70–110)
GLUCOSE UR QL STRIP: ABNORMAL
HCT VFR BLD AUTO: 37.4 % (ref 37–48.5)
HCT VFR BLD CALC: 34.2 %
HGB BLD-MCNC: 10.6 G/DL (ref 12–16)
HGB UR QL STRIP: NEGATIVE
IMM GRANULOCYTES # BLD AUTO: 0.09 K/UL (ref 0–0.04)
IMM GRANULOCYTES NFR BLD AUTO: 0.5 % (ref 0–0.5)
KETONES UR QL STRIP: NEGATIVE
LACTATE SERPL-SCNC: 3.7 MMOL/L (ref 0.5–2.2)
LACTATE SERPL-SCNC: 4.1 MMOL/L (ref 0.5–2.2)
LACTATE SERPL-SCNC: 6.5 MMOL/L (ref 0.5–2.2)
LACTATE SERPL-SCNC: 9 MMOL/L (ref 0.5–2.2)
LDH SERPL L TO P-CCNC: 6.3 MMOL/L
LDH SERPL L TO P-CCNC: 8.51 MMOL/L (ref 0.5–2.2)
LEUKOCYTE ESTERASE UR QL STRIP: ABNORMAL
LIPASE SERPL-CCNC: 47 U/L (ref 4–60)
LYMPHOCYTES # BLD AUTO: 2.1 K/UL (ref 1–4.8)
LYMPHOCYTES NFR BLD: 11.8 % (ref 18–48)
MAGNESIUM SERPL-MCNC: 3 MG/DL (ref 1.6–2.6)
MCH RBC QN AUTO: 27.7 PG (ref 27–31)
MCHC RBC AUTO-ENTMCNC: 28.3 G/DL (ref 32–36)
MCV RBC AUTO: 98 FL (ref 82–98)
MICROSCOPIC COMMENT: NORMAL
MONOCYTES # BLD AUTO: 1.4 K/UL (ref 0.3–1)
MONOCYTES NFR BLD: 7.8 % (ref 4–15)
NEUTROPHILS # BLD AUTO: 14.2 K/UL (ref 1.8–7.7)
NEUTROPHILS NFR BLD: 79.5 % (ref 38–73)
NITRITE UR QL STRIP: NEGATIVE
NRBC BLD-RTO: 0 /100 WBC
OHS QRS DURATION: 70 MS
OHS QTC CALCULATION: 503 MS
OSMOLALITY SERPL: 445 MOSM/KG (ref 275–295)
PCO2 BLDA: 42.8 MMHG
PH SMN: 7.33 [PH]
PH UR STRIP: 6 [PH] (ref 5–8)
PHOSPHATE SERPL-MCNC: 3.5 MG/DL (ref 2.7–4.5)
PHOSPHATE SERPL-MCNC: 4 MG/DL (ref 2.7–4.5)
PHOSPHATE SERPL-MCNC: 4.6 MG/DL (ref 2.7–4.5)
PLATELET # BLD AUTO: 513 K/UL (ref 150–450)
PMV BLD AUTO: 12 FL (ref 9.2–12.9)
PO2 BLDA: 52.4 MMHG
POC BASE DEFICIT: -3.2 MMOL/L
POC HCO3: 21.4 MMOL/L
POC PERFORMED BY: NORMAL
POC TEMPERATURE: 37 C
POCT GLUCOSE: 248 MG/DL (ref 70–110)
POCT GLUCOSE: 325 MG/DL (ref 70–110)
POCT GLUCOSE: 492 MG/DL (ref 70–110)
POCT GLUCOSE: >500 MG/DL (ref 70–110)
POCT GLUCOSE: >500 MG/DL (ref 70–110)
POTASSIUM SERPL-SCNC: 4.2 MMOL/L (ref 3.5–5.1)
POTASSIUM SERPL-SCNC: 4.4 MMOL/L (ref 3.5–5.1)
POTASSIUM SERPL-SCNC: 4.9 MMOL/L (ref 3.5–5.1)
POTASSIUM SERPL-SCNC: 5.1 MMOL/L (ref 3.5–5.1)
POTASSIUM SERPL-SCNC: 6.5 MMOL/L (ref 3.5–5.1)
PROT SERPL-MCNC: 7.7 G/DL (ref 6–8.4)
PROT UR QL STRIP: NEGATIVE
PROVIDER CREDENTIALS: ABNORMAL
PROVIDER NOTIFIED: ABNORMAL
RBC # BLD AUTO: 3.82 M/UL (ref 4–5.4)
SAMPLE: ABNORMAL
SITE: ABNORMAL
SODIUM SERPL-SCNC: 154 MMOL/L (ref 136–145)
SODIUM SERPL-SCNC: 154 MMOL/L (ref 136–145)
SODIUM SERPL-SCNC: 155 MMOL/L (ref 136–145)
SODIUM SERPL-SCNC: 156 MMOL/L (ref 136–145)
SODIUM SERPL-SCNC: 160 MMOL/L (ref 136–145)
SP GR UR STRIP: 1.02 (ref 1–1.03)
SPECIMEN SOURCE: NORMAL
TIME NOTIFIED: 1411
TROPONIN I SERPL DL<=0.01 NG/ML-MCNC: 9 NG/L (ref 0–14)
URN SPEC COLLECT METH UR: ABNORMAL
VERBAL RESULT READBACK PERFORMED: YES
WBC # BLD AUTO: 17.87 K/UL (ref 3.9–12.7)
WBC #/AREA URNS AUTO: 0 /HPF (ref 0–5)
YEAST UR QL AUTO: NORMAL

## 2025-02-12 PROCEDURE — 83605 ASSAY OF LACTIC ACID: CPT

## 2025-02-12 PROCEDURE — 93010 ELECTROCARDIOGRAM REPORT: CPT | Mod: ,,, | Performed by: INTERNAL MEDICINE

## 2025-02-12 PROCEDURE — 87077 CULTURE AEROBIC IDENTIFY: CPT | Performed by: PHYSICIAN ASSISTANT

## 2025-02-12 PROCEDURE — 83735 ASSAY OF MAGNESIUM: CPT

## 2025-02-12 PROCEDURE — 27000221 HC OXYGEN, UP TO 24 HOURS

## 2025-02-12 PROCEDURE — 63600175 PHARM REV CODE 636 W HCPCS: Performed by: INTERNAL MEDICINE

## 2025-02-12 PROCEDURE — 63600175 PHARM REV CODE 636 W HCPCS: Performed by: STUDENT IN AN ORGANIZED HEALTH CARE EDUCATION/TRAINING PROGRAM

## 2025-02-12 PROCEDURE — 63600175 PHARM REV CODE 636 W HCPCS: Performed by: PHYSICIAN ASSISTANT

## 2025-02-12 PROCEDURE — 80048 BASIC METABOLIC PNL TOTAL CA: CPT | Mod: 91,XB

## 2025-02-12 PROCEDURE — 99900035 HC TECH TIME PER 15 MIN (STAT)

## 2025-02-12 PROCEDURE — 25000003 PHARM REV CODE 250

## 2025-02-12 PROCEDURE — 87150 DNA/RNA AMPLIFIED PROBE: CPT | Performed by: PHYSICIAN ASSISTANT

## 2025-02-12 PROCEDURE — 82010 KETONE BODYS QUAN: CPT | Performed by: PHYSICIAN ASSISTANT

## 2025-02-12 PROCEDURE — 94640 AIRWAY INHALATION TREATMENT: CPT

## 2025-02-12 PROCEDURE — 82550 ASSAY OF CK (CPK): CPT

## 2025-02-12 PROCEDURE — 51702 INSERT TEMP BLADDER CATH: CPT

## 2025-02-12 PROCEDURE — 83930 ASSAY OF BLOOD OSMOLALITY: CPT | Performed by: PHYSICIAN ASSISTANT

## 2025-02-12 PROCEDURE — 83690 ASSAY OF LIPASE: CPT | Performed by: PHYSICIAN ASSISTANT

## 2025-02-12 PROCEDURE — 87040 BLOOD CULTURE FOR BACTERIA: CPT | Performed by: PHYSICIAN ASSISTANT

## 2025-02-12 PROCEDURE — 83605 ASSAY OF LACTIC ACID: CPT | Mod: 91

## 2025-02-12 PROCEDURE — 20000000 HC ICU ROOM

## 2025-02-12 PROCEDURE — 94761 N-INVAS EAR/PLS OXIMETRY MLT: CPT | Mod: XB

## 2025-02-12 PROCEDURE — 93005 ELECTROCARDIOGRAM TRACING: CPT

## 2025-02-12 PROCEDURE — 25000242 PHARM REV CODE 250 ALT 637 W/ HCPCS: Performed by: PHYSICIAN ASSISTANT

## 2025-02-12 PROCEDURE — 63600175 PHARM REV CODE 636 W HCPCS

## 2025-02-12 PROCEDURE — 85025 COMPLETE CBC W/AUTO DIFF WBC: CPT | Performed by: PHYSICIAN ASSISTANT

## 2025-02-12 PROCEDURE — 81001 URINALYSIS AUTO W/SCOPE: CPT | Performed by: PHYSICIAN ASSISTANT

## 2025-02-12 PROCEDURE — 96375 TX/PRO/DX INJ NEW DRUG ADDON: CPT

## 2025-02-12 PROCEDURE — 87186 SC STD MICRODIL/AGAR DIL: CPT | Performed by: PHYSICIAN ASSISTANT

## 2025-02-12 PROCEDURE — 99285 EMERGENCY DEPT VISIT HI MDM: CPT | Mod: 25

## 2025-02-12 PROCEDURE — 82962 GLUCOSE BLOOD TEST: CPT

## 2025-02-12 PROCEDURE — 80053 COMPREHEN METABOLIC PANEL: CPT | Performed by: PHYSICIAN ASSISTANT

## 2025-02-12 PROCEDURE — 82803 BLOOD GASES ANY COMBINATION: CPT

## 2025-02-12 PROCEDURE — 84100 ASSAY OF PHOSPHORUS: CPT | Performed by: PHYSICIAN ASSISTANT

## 2025-02-12 PROCEDURE — 99291 CRITICAL CARE FIRST HOUR: CPT | Mod: ,,, | Performed by: INTERNAL MEDICINE

## 2025-02-12 PROCEDURE — 96367 TX/PROPH/DG ADDL SEQ IV INF: CPT

## 2025-02-12 PROCEDURE — 25000003 PHARM REV CODE 250: Performed by: PHYSICIAN ASSISTANT

## 2025-02-12 PROCEDURE — 96365 THER/PROPH/DIAG IV INF INIT: CPT | Mod: 59

## 2025-02-12 PROCEDURE — 84484 ASSAY OF TROPONIN QUANT: CPT | Performed by: PHYSICIAN ASSISTANT

## 2025-02-12 RX ORDER — DEXTROSE MONOHYDRATE AND SODIUM CHLORIDE 5; .45 G/100ML; G/100ML
INJECTION, SOLUTION INTRAVENOUS CONTINUOUS PRN
Status: DISCONTINUED | OUTPATIENT
Start: 2025-02-12 | End: 2025-02-12

## 2025-02-12 RX ORDER — POLYETHYLENE GLYCOL 3350 17 G/17G
17 POWDER, FOR SOLUTION ORAL DAILY
Status: DISCONTINUED | OUTPATIENT
Start: 2025-02-12 | End: 2025-02-13

## 2025-02-12 RX ORDER — FAMOTIDINE 20 MG/1
20 TABLET, FILM COATED ORAL DAILY
Status: DISCONTINUED | OUTPATIENT
Start: 2025-02-13 | End: 2025-02-14

## 2025-02-12 RX ORDER — CALCIUM GLUCONATE 20 MG/ML
1 INJECTION, SOLUTION INTRAVENOUS
Status: COMPLETED | OUTPATIENT
Start: 2025-02-12 | End: 2025-02-12

## 2025-02-12 RX ORDER — SODIUM CHLORIDE 9 MG/ML
1000 INJECTION, SOLUTION INTRAVENOUS CONTINUOUS
Status: DISCONTINUED | OUTPATIENT
Start: 2025-02-12 | End: 2025-02-12

## 2025-02-12 RX ORDER — FAMOTIDINE 20 MG/1
20 TABLET, FILM COATED ORAL 2 TIMES DAILY
Status: DISCONTINUED | OUTPATIENT
Start: 2025-02-12 | End: 2025-02-12

## 2025-02-12 RX ORDER — SODIUM CHLORIDE 0.9 % (FLUSH) 0.9 %
10 SYRINGE (ML) INJECTION
Status: DISCONTINUED | OUTPATIENT
Start: 2025-02-12 | End: 2025-02-12

## 2025-02-12 RX ORDER — ALBUTEROL SULFATE 2.5 MG/.5ML
10 SOLUTION RESPIRATORY (INHALATION)
Status: COMPLETED | OUTPATIENT
Start: 2025-02-12 | End: 2025-02-12

## 2025-02-12 RX ORDER — ACETAMINOPHEN 325 MG/1
650 TABLET ORAL EVERY 4 HOURS PRN
Status: DISCONTINUED | OUTPATIENT
Start: 2025-02-12 | End: 2025-02-19 | Stop reason: HOSPADM

## 2025-02-12 RX ORDER — MEROPENEM 1 G/1
1 INJECTION, POWDER, FOR SOLUTION INTRAVENOUS
Status: DISCONTINUED | OUTPATIENT
Start: 2025-02-12 | End: 2025-02-13

## 2025-02-12 RX ORDER — SODIUM CHLORIDE, SODIUM LACTATE, POTASSIUM CHLORIDE, CALCIUM CHLORIDE 600; 310; 30; 20 MG/100ML; MG/100ML; MG/100ML; MG/100ML
INJECTION, SOLUTION INTRAVENOUS CONTINUOUS
Status: DISCONTINUED | OUTPATIENT
Start: 2025-02-12 | End: 2025-02-12

## 2025-02-12 RX ORDER — DEXTROSE MONOHYDRATE AND SODIUM CHLORIDE 5; .45 G/100ML; G/100ML
INJECTION, SOLUTION INTRAVENOUS CONTINUOUS PRN
Status: DISCONTINUED | OUTPATIENT
Start: 2025-02-12 | End: 2025-02-13

## 2025-02-12 RX ORDER — AMOXICILLIN 250 MG
1 CAPSULE ORAL DAILY
Status: DISCONTINUED | OUTPATIENT
Start: 2025-02-13 | End: 2025-02-18

## 2025-02-12 RX ORDER — GABAPENTIN 300 MG/1
300 CAPSULE ORAL 2 TIMES DAILY
Status: DISCONTINUED | OUTPATIENT
Start: 2025-02-12 | End: 2025-02-12

## 2025-02-12 RX ORDER — VASOPRESSIN 20 [USP'U]/ML
INJECTION, SOLUTION INTRAMUSCULAR; SUBCUTANEOUS
Status: DISPENSED
Start: 2025-02-12 | End: 2025-02-13

## 2025-02-12 RX ORDER — ASPIRIN 81 MG/1
81 TABLET ORAL DAILY
Status: DISCONTINUED | OUTPATIENT
Start: 2025-02-12 | End: 2025-02-12

## 2025-02-12 RX ORDER — AZITHROMYCIN 250 MG/1
500 TABLET, FILM COATED ORAL DAILY
Status: COMPLETED | OUTPATIENT
Start: 2025-02-13 | End: 2025-02-15

## 2025-02-12 RX ORDER — ATORVASTATIN CALCIUM 40 MG/1
40 TABLET, FILM COATED ORAL DAILY
Status: DISCONTINUED | OUTPATIENT
Start: 2025-02-12 | End: 2025-02-19 | Stop reason: HOSPADM

## 2025-02-12 RX ORDER — SODIUM CHLORIDE 0.9 % (FLUSH) 0.9 %
10 SYRINGE (ML) INJECTION
Status: DISCONTINUED | OUTPATIENT
Start: 2025-02-12 | End: 2025-02-19 | Stop reason: HOSPADM

## 2025-02-12 RX ORDER — HEPARIN SODIUM 5000 [USP'U]/ML
5000 INJECTION, SOLUTION INTRAVENOUS; SUBCUTANEOUS EVERY 8 HOURS
Status: DISCONTINUED | OUTPATIENT
Start: 2025-02-12 | End: 2025-02-12

## 2025-02-12 RX ADMIN — SODIUM CHLORIDE, POTASSIUM CHLORIDE, SODIUM LACTATE AND CALCIUM CHLORIDE 1000 ML: 600; 310; 30; 20 INJECTION, SOLUTION INTRAVENOUS at 02:02

## 2025-02-12 RX ADMIN — ACETAMINOPHEN 650 MG: 325 TABLET ORAL at 11:02

## 2025-02-12 RX ADMIN — SODIUM CHLORIDE, POTASSIUM CHLORIDE, SODIUM LACTATE AND CALCIUM CHLORIDE: 600; 310; 30; 20 INJECTION, SOLUTION INTRAVENOUS at 03:02

## 2025-02-12 RX ADMIN — ALBUTEROL SULFATE 10 MG: 2.5 SOLUTION RESPIRATORY (INHALATION) at 12:02

## 2025-02-12 RX ADMIN — SODIUM CHLORIDE, POTASSIUM CHLORIDE, SODIUM LACTATE AND CALCIUM CHLORIDE 1000 ML: 600; 310; 30; 20 INJECTION, SOLUTION INTRAVENOUS at 12:02

## 2025-02-12 RX ADMIN — ATORVASTATIN CALCIUM 40 MG: 40 TABLET, FILM COATED ORAL at 05:02

## 2025-02-12 RX ADMIN — PIPERACILLIN SODIUM AND TAZOBACTAM SODIUM 4.5 G: 4; .5 INJECTION, POWDER, FOR SOLUTION INTRAVENOUS at 11:02

## 2025-02-12 RX ADMIN — INSULIN HUMAN 10 UNITS: 100 INJECTION, SOLUTION PARENTERAL at 12:02

## 2025-02-12 RX ADMIN — VANCOMYCIN HYDROCHLORIDE 1500 MG: 1.5 INJECTION, POWDER, LYOPHILIZED, FOR SOLUTION INTRAVENOUS at 12:02

## 2025-02-12 RX ADMIN — MEROPENEM 1 G: 1 INJECTION, POWDER, FOR SOLUTION INTRAVENOUS at 05:02

## 2025-02-12 RX ADMIN — CALCIUM GLUCONATE 1 G: 20 INJECTION, SOLUTION INTRAVENOUS at 12:02

## 2025-02-12 RX ADMIN — INSULIN HUMAN 0.1 UNITS/KG/HR: 1 INJECTION, SOLUTION INTRAVENOUS at 02:02

## 2025-02-12 RX ADMIN — POTASSIUM CHLORIDE: 2 INJECTION, SOLUTION, CONCENTRATE INTRAVENOUS at 08:02

## 2025-02-12 NOTE — CLINICAL REVIEW
IP Sepsis Screen (most recent)       Sepsis Screen (IP) - 02/12/25 1406       Is the patient's history or complaint suggestive of a possible infection? Yes  -TR    Are there at least two of the following signs and symptoms present? Yes  -TR    Sepsis signs/symptoms - Tachycardia Tachycardia     >90  -TR    Sepsis signs/symptoms - Tachypnea Tachypnea     >20  -TR    Sepsis signs/symptoms - WBC WBC < 4,000 or WBC > 12,000  -TR    Are any of the following organ dysfunction criteria present and not considered to be due to a chronic condition? Yes  -TR    Organ Dysfunction Criteria Creatinine > 2.0  -TR    Organ Dysfunction Criteria Lactate > 2.0  -TR    Initiate Sepsis Protocol No   IVAB and IVF initiated in ED; blood cx in process; repeat LA ordered; u/a with cx ordered -TR    Reason sepsis not considered Pt. receiving appropriate management  -TR              User Key  (r) = Recorded By, (t) = Taken By, (c) = Cosigned By      Initials Name    Che Yang RN

## 2025-02-12 NOTE — PROGRESS NOTES
Pharmacist Renal Dose Adjustment Note    Emily Martinez is a 59 y.o. female being treated with the medication Rivaroxaban.    Patient Data:    Vital Signs (Most Recent):  Temp: 99.7 °F (37.6 °C) (02/12/25 1046)  Pulse: (!) 112 (02/12/25 1233)  Resp: (!) 28 (02/12/25 1233)  BP: 113/78 (02/12/25 1232)  SpO2: 96 % (02/12/25 1233) Vital Signs (72h Range):  Temp:  [99.7 °F (37.6 °C)-100 °F (37.8 °C)]   Pulse:  [112-124]   Resp:  [25-28]   BP: ()/(72-80)   SpO2:  [95 %-100 %]      Recent Labs   Lab 02/12/25  1112   CREATININE 2.4*     Serum creatinine: 2.4 mg/dL (H) 02/12/25 1112  Estimated creatinine clearance: 20.4 mL/min (A)    Medication:Rivaroxaban dose: 20 mg frequency daily will be changed to medication:Rivaroxaban dose:15 mg frequency:daily    Pharmacist's Name: Myrna Guaman  Pharmacist's Extension: [phone]

## 2025-02-12 NOTE — SUBJECTIVE & OBJECTIVE
Past Medical History:   Diagnosis Date    Diabetes mellitus type I     Hyperlipidemia     Hypertension     Right sided weakness 2019       Past Surgical History:   Procedure Laterality Date     SECTION         Review of patient's allergies indicates:   Allergen Reactions    Sulfa (sulfonamide antibiotics) Itching    Sulfur        Family History       Problem Relation (Age of Onset)    Cancer Sister    Diabetes Mother, Sister, Brother, Maternal Aunt    Heart disease Maternal Aunt    Hyperlipidemia Mother, Sister, Brother    Hypertension Mother, Sister, Brother    Stroke Mother          Tobacco Use    Smoking status: Every Day     Current packs/day: 1.50     Average packs/day: 1.5 packs/day for 35.0 years (52.5 ttl pk-yrs)     Types: Cigarettes    Smokeless tobacco: Never   Substance and Sexual Activity    Alcohol use: Not Currently    Drug use: Not Currently    Sexual activity: Not on file      Review of Systems  Objective:     Vital Signs (Most Recent):  Temp: 99.7 °F (37.6 °C) (25 1046)  Pulse: (!) 112 (25 1233)  Resp: (!) 28 (25 1233)  BP: 113/78 (25 1232)  SpO2: 96 % (25 1233) Vital Signs (24h Range):  Temp:  [99.7 °F (37.6 °C)-100 °F (37.8 °C)] 99.7 °F (37.6 °C)  Pulse:  [112-124] 112  Resp:  [25-28] 28  SpO2:  [95 %-100 %] 96 %  BP: ()/(72-80) 113/78   Weight: 57.2 kg (126 lb)  Body mass index is 22.32 kg/m².      Intake/Output Summary (Last 24 hours) at 2025 1410  Last data filed at 2025 1408  Gross per 24 hour   Intake 1966.07 ml   Output --   Net 1966.07 ml          Physical Exam  Vitals and nursing note reviewed. Exam conducted with a chaperone present.   Constitutional:       Appearance: Normal appearance. She is normal weight. She is ill-appearing. She is not diaphoretic.   HENT:      Head: Normocephalic and atraumatic.      Right Ear: External ear normal.      Left Ear: External ear normal.      Nose: Nose normal.      Mouth/Throat:      Mouth:  Mucous membranes are dry.   Eyes:      General: No scleral icterus.        Right eye: No discharge.         Left eye: No discharge.   Cardiovascular:      Rate and Rhythm: Regular rhythm. Tachycardia present.      Heart sounds: No murmur heard.     No friction rub. No gallop.   Pulmonary:      Effort: Pulmonary effort is normal. No respiratory distress.      Breath sounds: Normal breath sounds. No stridor. No wheezing or rales.   Abdominal:      General: There is no distension.      Palpations: Abdomen is soft. There is no mass.      Tenderness: There is abdominal tenderness. There is no guarding or rebound.      Hernia: No hernia is present.   Musculoskeletal:         General: No swelling, tenderness, deformity or signs of injury. Normal range of motion.      Right lower leg: No edema.      Left lower leg: No edema.   Skin:     Findings: No bruising, erythema or lesion.   Neurological:      Mental Status: She is alert. She is disoriented.      Comments: Nonverbal. Does not track with eyes.            Vents:     Lines/Drains/Airways       Drain  Duration                  Gastrostomy/Enterostomy 01/02/25 1530 feeding 40 days              Peripheral Intravenous Line  Duration                  Midline Catheter - Single Lumen 01/14/25 1133 Left cephalic vein (lateral side of arm) 20g x 10cm 29 days         Peripheral IV - Single Lumen 02/12/25 0000 20 G Anterior;Distal;Left Forearm <1 day         Peripheral IV - Single Lumen 02/12/25 1234 20 G Posterior;Right Wrist <1 day                  Significant Labs:    CBC/Anemia Profile:  Recent Labs   Lab 02/12/25  1112 02/12/25  1122   WBC 17.87*  --    HGB 10.6*  --    HCT 37.4 34.2   *  --    MCV 98  --    RDW 18.9*  --         Chemistries:  Recent Labs   Lab 02/12/25  1112 02/12/25  1327   *  --    K 6.5*  --    *  --    CO2 17*  --    *  --    CREATININE 2.4*  --    CALCIUM 9.7  --    ALBUMIN 2.4*  --    PROT 7.7  --    BILITOT 0.3  --    ALKPHOS  119  --    ALT 18  --    AST 23  --    PHOS  --  4.6*       All pertinent labs within the past 24 hours have been reviewed.    Significant Imaging: I have reviewed all pertinent imaging results/findings within the past 24 hours.

## 2025-02-12 NOTE — CONSULTS
Patient evaluated by Critical Care Medicine. Will be admitted to ICU. Full H&P to follow.    Radhika Milner DO  [employer] PCCM Fellow

## 2025-02-12 NOTE — ASSESSMENT & PLAN NOTE
"Patient presents with AMS and family member stating recent abdominal pain. Can consider UTI vs sepsis vs HHS vs PNA.    - Being treated with meropenem for concern of PNA.   - Will perform UA for evaluation of UTI.  - elevated glucose with slightly elevated beta-hydroxybutyrate. Being given IV fluids and insulin.  - CT abdomenpelvis wo contrast showing concern for "Questionable developing infectious consolidation within the right lower lobe versus atelectasis or scarring. "  Although findings may be related to previous PNA, will cover with abx for possible new PNA with vancomycin and meropenem.  "

## 2025-02-12 NOTE — ED NOTES
"Assumed care of pt at this time.    LOC: The patient is awake, alert and aware of environment with an appropriate affect, the patient is not speaking, per daughter at bedside pt normally can speak.  APPEARANCE: Patient appears comfortable and in no acute distress, patient is clean and well groomed.  SKIN: The skin is warm and dry, color consistent with ethnicity, patient has normal skin turgor and moist mucus membranes. Multiple wounds, pictures updated in chart.  MUSCULOSKELETAL: Patient is "paralyzed on the left side" per daughter at bedside due to previous stroke.  RESPIRATORY: Airway is open and patent, respirations are spontaneous, patient has a normal effort and rate, no accessory muscle. No labored breathing noted, SATs 96%.  CARDIAC: Pt placed on cardiac monitor. Patient has a tachy rate at 116bpm and regular rhythm, no edema noted, capillary refill < 3 seconds.   GASTRO: Soft and non tender to palpation, no distention noted. No guarding or grimacing with palpitations.  : Pt denies any pain or frequency with urination.  NEURO: Pt opens eyes spontaneously, behavior appropriate to situation, does not follows commands.  "

## 2025-02-12 NOTE — H&P
[facility] - Emergency Dept  Critical Care Medicine  History & Physical    Patient Name: Emily Martinez  MRN: [id number]  Admission Date: 2025  Hospital Length of Stay: 0 days  Code Status: Full Code  Attending Physician: Noman Louie MD   Primary Care Provider: Alireza Sosa MD   Principal Problem: <principal problem not specified>    Subjective:     HPI:  Patient is a 58 y/o female with PMHx of diabetes (for which she takes insulin 3 times a day), history of CVA with chronic left hemiplegia, hypertension, CKD 4 presents by EMS for altered mental status. Patient brought to ED due to concerns for AMS and abdominal pain. Patient lives with daughter. Per daughter, patient has been complaining of abdominal pain and became less verbal since last night at 9pm. Decreased fluid intake for the past two days. Usually takes glucose readings at home, but machine had broke and replacement was delayed for today due to being back ordered. Patient recently discharged from hospital for UTI with E coli ESBL.  Currently considering sepsis vs pneumonia vs UTI as etiology of AMS.     Hospital/ICU Course:  No notes on file     Past Medical History:   Diagnosis Date    Diabetes mellitus type I     Hyperlipidemia     Hypertension     Right sided weakness 2019       Past Surgical History:   Procedure Laterality Date     SECTION         Review of patient's allergies indicates:   Allergen Reactions    Sulfa (sulfonamide antibiotics) Itching    Sulfur        Family History       Problem Relation (Age of Onset)    Cancer Sister    Diabetes Mother, Sister, Brother, Maternal Aunt    Heart disease Maternal Aunt    Hyperlipidemia Mother, Sister, Brother    Hypertension Mother, Sister, Brother    Stroke Mother          Tobacco Use    Smoking status: Every Day     Current packs/day: 1.50     Average packs/day: 1.5 packs/day for 35.0 years (52.5 ttl pk-yrs)     Types: Cigarettes    Smokeless tobacco: Never   Substance  and Sexual Activity    Alcohol use: Not Currently    Drug use: Not Currently    Sexual activity: Not on file      Review of Systems  Objective:     Vital Signs (Most Recent):  Temp: 99.7 °F (37.6 °C) (02/12/25 1046)  Pulse: (!) 112 (02/12/25 1233)  Resp: (!) 28 (02/12/25 1233)  BP: 113/78 (02/12/25 1232)  SpO2: 96 % (02/12/25 1233) Vital Signs (24h Range):  Temp:  [99.7 °F (37.6 °C)-100 °F (37.8 °C)] 99.7 °F (37.6 °C)  Pulse:  [112-124] 112  Resp:  [25-28] 28  SpO2:  [95 %-100 %] 96 %  BP: ()/(72-80) 113/78   Weight: 57.2 kg (126 lb)  Body mass index is 22.32 kg/m².      Intake/Output Summary (Last 24 hours) at 2/12/2025 1410  Last data filed at 2/12/2025 1408  Gross per 24 hour   Intake 1966.07 ml   Output --   Net 1966.07 ml          Physical Exam  Vitals and nursing note reviewed. Exam conducted with a chaperone present.   Constitutional:       Appearance: Normal appearance. She is normal weight. She is ill-appearing. She is not diaphoretic.   HENT:      Head: Normocephalic and atraumatic.      Right Ear: External ear normal.      Left Ear: External ear normal.      Nose: Nose normal.      Mouth/Throat:      Mouth: Mucous membranes are dry.   Eyes:      General: No scleral icterus.        Right eye: No discharge.         Left eye: No discharge.   Cardiovascular:      Rate and Rhythm: Regular rhythm. Tachycardia present.      Heart sounds: No murmur heard.     No friction rub. No gallop.   Pulmonary:      Effort: Pulmonary effort is normal. No respiratory distress.      Breath sounds: Normal breath sounds. No stridor. No wheezing or rales.   Abdominal:      General: There is no distension.      Palpations: Abdomen is soft. There is no mass.      Tenderness: There is abdominal tenderness. There is no guarding or rebound.      Hernia: No hernia is present.   Musculoskeletal:         General: No swelling, tenderness, deformity or signs of injury. Normal range of motion.      Right lower leg: No edema.      Left  "lower leg: No edema.   Skin:     Findings: No bruising, erythema or lesion.   Neurological:      Mental Status: She is alert. She is disoriented.      Comments: Nonverbal. Does not track with eyes.            Vents:     Lines/Drains/Airways       Drain  Duration                  Gastrostomy/Enterostomy 01/02/25 1530 feeding 40 days              Peripheral Intravenous Line  Duration                  Midline Catheter - Single Lumen 01/14/25 1133 Left cephalic vein (lateral side of arm) 20g x 10cm 29 days         Peripheral IV - Single Lumen 02/12/25 0000 20 G Anterior;Distal;Left Forearm <1 day         Peripheral IV - Single Lumen 02/12/25 1234 20 G Posterior;Right Wrist <1 day                  Significant Labs:    CBC/Anemia Profile:  Recent Labs   Lab 02/12/25  1112 02/12/25  1122   WBC 17.87*  --    HGB 10.6*  --    HCT 37.4 34.2   *  --    MCV 98  --    RDW 18.9*  --         Chemistries:  Recent Labs   Lab 02/12/25  1112 02/12/25  1327   *  --    K 6.5*  --    *  --    CO2 17*  --    *  --    CREATININE 2.4*  --    CALCIUM 9.7  --    ALBUMIN 2.4*  --    PROT 7.7  --    BILITOT 0.3  --    ALKPHOS 119  --    ALT 18  --    AST 23  --    PHOS  --  4.6*       All pertinent labs within the past 24 hours have been reviewed.    Significant Imaging: I have reviewed all pertinent imaging results/findings within the past 24 hours.  Assessment/Plan:     Neuro  AMS (altered mental status)  Patient presents with AMS and family member stating recent abdominal pain. Can consider UTI vs sepsis vs HHS vs PNA.    - Being treated with meropenem for concern of PNA.   - Will perform UA for evaluation of UTI.  - elevated glucose with slightly elevated beta-hydroxybutyrate. Being given IV fluids and insulin.  - CT abdomenpelvis wo contrast showing concern for "Questionable developing infectious consolidation within the right lower lobe versus atelectasis or scarring. "  Although findings may be related to " "previous PNA, will cover with abx for possible new PNA with vancomycin, azithromycin and meropenem.    Cerebrovascular accident (CVA)  Left sided hemiplegia. Negative CT head for any acute intracranial abnormalities on arrival to ED on 2/12/2025.    Pulmonary  Aspiration pneumonia  CT abdomen pelvis concerning for "Questionable developing infectious consolidation within the right lower lobe versus atelectasis or scarring." Family denies any recent aspiration events.     Renal/  Hyperkalemia  Patient presents with hyperkalemia of 6.5.   -Monitor K daily  - Treat with IV fluids and shifting with insulin drip    Hypernatremia  Presents with Na of 154.  - Treated with IV fluids  - Monitor Na daily    NICOLASA (acute kidney injury)  Presents with NICOLASA.  -continue to monitor renal fx  -administer IV fluids as tolerated    Endocrine  Type 2 diabetes mellitus with stage 4 chronic kidney disease, with long-term current use of insulin  Hx of DM  -Treating with insulin  - Monitor glucose   -Monitor electrolytes  - Receiving IV fluids as necessary        Critical Care Daily Checklist:    A: Awake: RASS Goal/Actual Goal:    Actual:     B: Spontaneous Breathing Trial Performed?     C: SAT & SBT Coordinated?  N/A                      D: Delirium: CAM-ICU     E: Early Mobility Performed? Yes   F: Feeding Goal:    Status:     Current Diet Order   Procedures    Diet NPO      AS: Analgesia/Sedation acetaminophen   T: Thromboembolic Prophylaxis rivaroxaban   H: HOB > 300 Yes   U: Stress Ulcer Prophylaxis (if needed) famotidine   G: Glucose Control Dextrose prn   B: Bowel Function     I: Indwelling Catheter (Lines & Hernandez) Necessity Midline catheter, PIV x2, gastrostomy   D: De-escalation of Antimicrobials/Pharmacotherapies No    Plan for the day/ETD Iv fluids, abx, insulin drip, monitor electrolytes    Code Status:  Family/Goals of Care: Full Code         Critical secondary to Patient has a condition that poses threat to life and bodily " function: AMS, hyperkalemia, hypernatremia, hyperosmolality    Critical care was time spent personally by me on the following activities: development of treatment plan with patient or surrogate and bedside caregivers, discussions with consultants, evaluation of patient's response to treatment, examination of patient, ordering and performing treatments and interventions, ordering and review of laboratory studies, ordering and review of radiographic studies, pulse oximetry, re-evaluation of patient's condition. This critical care time did not overlap with that of any other provider or involve time for any procedures.     Rubi Foley MD  Critical Care Medicine  [facility] - Emergency Dept

## 2025-02-12 NOTE — ASSESSMENT & PLAN NOTE
"This patient does not have evidence of infective focus  My overall impression is sepsis.  Source: Unknown  Antibiotics given-   Antibiotics (72h ago, onward)      Start     Stop Route Frequency Ordered    02/12/25 1530  meropenem injection 1 g         -- IV Every 12 hours (non-standard times) 02/12/25 1401    02/12/25 1501  vancomycin - pharmacy to dose  (vancomycin IVPB (PEDS and ADULTS))        Placed in "And" Linked Group    -- IV pharmacy to manage frequency 02/12/25 1401          Latest lactate reviewed-  Recent Labs   Lab 02/12/25  1406 02/12/25  1411   LACTATE 9.0*  --    POCLAC  --  8.51*     Organ dysfunction indicated by Acute kidney injury    Fluid challenge Actual Body weight- Patient will receive 30ml/kg actual body weight to calculate fluid bolus for treatment of septic shock.     Post- resuscitation assessment Yes - I attest a sepsis perfusion exam was performed within 6 hours of sepsis, severe sepsis, or septic shock presentation, following fluid resuscitation.      Will Start Pressors- Levophed for MAP of 65  Source control achieved by: being treated with meropenem and vancomycin  "

## 2025-02-12 NOTE — ASSESSMENT & PLAN NOTE
Hx of DM  -Treating with insulin  - Monitor glucose   -Monitor electrolytes  - Receiving IV fluids as necessary

## 2025-02-12 NOTE — ASSESSMENT & PLAN NOTE
"CT abdomen pelvis concerning for "Questionable developing infectious consolidation within the right lower lobe versus atelectasis or scarring." Family denies any recent aspiration events.   "

## 2025-02-12 NOTE — ED NOTES
I-STAT Chem-8+ Results:   Value Reference Range   Sodium 155 136-145 mmol/L   Potassium  6.7 3.5-5.1 mmol/L   Chloride 121  mmol/L   Ionized Calcium 1.23 1.06-1.42 mmol/L   CO2 (measured) 20 23-29 mmol/L   Glucose >700  mg/dL    6-30 mg/dL   Creatinine 1.7 0.5-1.4 mg/dL   Hematocrit 29 36-54%

## 2025-02-12 NOTE — HPI
Patient is a 60 y/o female with PMHx of diabetes (for which she takes insulin 3 times a day), history of CVA with chronic left hemiplegia, hypertension, CKD 4 presents by EMS for altered mental status. Patient brought to ED due to concerns for AMS and abdominal pain. Patient lives with daughter. Per daughter, patient has been complaining of abdominal pain and became less verbal since last night at 9pm. Decreased fluid intake for the past two days. Usually takes glucose readings at home, but machine had broke and replacement was delayed for today due to being back ordered. Patient recently discharged from hospital for UTI with E coli ESBL.  Currently considering sepsis vs pneumonia vs UTI as etiology of AMS.

## 2025-02-12 NOTE — PROGRESS NOTES
"Pharmacokinetic Initial Assessment: IV Vancomycin    Assessment/Plan:    Initiate intravenous vancomycin with loading dose of 1500 mg once with subsequent doses when random concentrations are less than 20 mcg/mL  Desired empiric serum trough concentration is 15 to 20 mcg/mL  Draw vancomycin random level on 02/13/2025 at 1200.  Pharmacy will continue to follow and monitor vancomycin.      Please contact pharmacy at extension [phone] with any questions regarding this assessment.     Thank you for the consult,   Myrna Guaman       Patient brief summary:  Emily Martinez is a 59 y.o. female initiated on antimicrobial therapy with IV Vancomycin for treatment of suspected sepsis    Drug Allergies:   Review of patient's allergies indicates:   Allergen Reactions    Sulfa (sulfonamide antibiotics) Itching    Sulfur        Actual Body Weight:   57.2 kg    Renal Function:   Estimated Creatinine Clearance: 20.4 mL/min (A) (based on SCr of 2.4 mg/dL (H)).,     Dialysis Method (if applicable):  N/A    CBC (last 72 hours):  Recent Labs   Lab Result Units 02/12/25  1112   WBC K/uL 17.87*   Hemoglobin g/dL 10.6*   Hematocrit % 37.4   Platelets K/uL 513*   Gran % % 79.5*   Lymph % % 11.8*   Mono % % 7.8   Eosinophil % % 0.1   Basophil % % 0.3   Differential Method  Automated       Metabolic Panel (last 72 hours):  Recent Labs   Lab Result Units 02/12/25  1112 02/12/25  1327   Sodium mmol/L 154*  --    Potassium mmol/L 6.5*  --    Chloride mmol/L 118*  --    CO2 mmol/L 17*  --    Glucose mg/dL 1,131*  --    BUN mg/dL 110*  --    Creatinine mg/dL 2.4*  --    Albumin g/dL 2.4*  --    Total Bilirubin mg/dL 0.3  --    Alkaline Phosphatase U/L 119  --    AST U/L 23  --    ALT U/L 18  --    Phosphorus mg/dL  --  4.6*       Drug levels (last 3 results):  No results for input(s): "VANCOMYCINRA", "VANCORANDOM", "VANCOMYCINPE", "VANCOPEAK", "VANCOMYCINTR", "VANCOTROUGH" in the last 72 hours.    Microbiologic Results:  Microbiology Results " (last 7 days)       Procedure Component Value Units Date/Time    Blood culture x two cultures. Draw prior to antibiotics. [[id number]] Collected: 02/12/25 1112    Order Status: Sent Specimen: Blood from Peripheral, Forearm, Left Updated: 02/12/25 1121    Blood culture x two cultures. Draw prior to antibiotics. [[id number]] Collected: 02/12/25 1112    Order Status: Sent Specimen: Blood from Peripheral, Forearm, Left Updated: 02/12/25 1121    Influenza A & B by Molecular [[id number]] Collected: 02/12/25 1050    Order Status: Sent Specimen: Nasopharyngeal Swab Updated: 02/12/25 1051

## 2025-02-12 NOTE — ASSESSMENT & PLAN NOTE
Left sided hemiplegia. Negative CT head for any acute intracranial abnormalities on arrival to ED on 2/12/2025.

## 2025-02-12 NOTE — ED PROVIDER NOTES
Encounter Date: 2025       History     Chief Complaint   Patient presents with    Hyperglycemia     Pt arrived via EMS from home reporting CBG reading HI. EMS administered 300mL of NS prior to arrival.     59-year-old female with multiple comorbidities including type diabetes (Type 1 vs 2? Both listed in history), history of CVA with chronic left hemiplegia, hypertension, CKD 4 presents by EMS for altered mental status.  History is provided by EMS as well as the patient's daughter as the patient is unable to provide any history.  Per daughter, this morning the patient was staring off into space and not responding to questions.  At baseline, she is confused with dementia but conversational.  Upon EMS arrival she is tachypneic, tachycardic and mildly hypoxic with O2 sats in the low 90s on room air.  This improved the and placed on 2 liters/minute by nasal cannula.  CBG en route greater than 500.      Review of patient's allergies indicates:   Allergen Reactions    Sulfa (sulfonamide antibiotics) Itching    Sulfur      Past Medical History:   Diagnosis Date    Diabetes mellitus type I     Hyperlipidemia     Hypertension     Right sided weakness 2019     Past Surgical History:   Procedure Laterality Date     SECTION       Family History   Problem Relation Name Age of Onset    Hypertension Mother      Stroke Mother      Hyperlipidemia Mother      Diabetes Mother      Hypertension Sister      Cancer Sister      Hyperlipidemia Sister      Diabetes Sister      Hypertension Brother      Hyperlipidemia Brother      Diabetes Brother      Heart disease Maternal Aunt      Diabetes Maternal Aunt       Social History     Tobacco Use    Smoking status: Every Day     Current packs/day: 1.50     Average packs/day: 1.5 packs/day for 35.0 years (52.5 ttl pk-yrs)     Types: Cigarettes    Smokeless tobacco: Never   Substance Use Topics    Alcohol use: Not Currently    Drug use: Not Currently     Review of  Systems    Physical Exam     Initial Vitals [02/12/25 1013]   BP Pulse Resp Temp SpO2   129/80 (!) 124 (!) 26 100 °F (37.8 °C) 100 %      MAP       --         Physical Exam    Nursing note and vitals reviewed.  Constitutional: She appears lethargic. She is not diaphoretic. She appears ill. No distress.   HENT:   Head: Normocephalic and atraumatic.   Cardiovascular:  Regular rhythm, normal heart sounds and intact distal pulses.     Exam reveals no gallop and no friction rub.       No murmur heard.  Tachycardic   Pulmonary/Chest: Breath sounds normal. No respiratory distress. She has no wheezes. She has no rhonchi. She has no rales. She exhibits no tenderness.   Abdominal: Abdomen is soft. Bowel sounds are normal. She exhibits no distension and no mass. There is abdominal tenderness.   PEG tube in place, generalized abdominal tenderness, moans when abdomen is palpated There is no rebound and no guarding.   Genitourinary: Rectum:      Guaiac result negative.   Guaiac negative stool. : Acceptable.  Musculoskeletal:         General: Normal range of motion.     Neurological: She appears lethargic. GCS eye subscore is 4. GCS verbal subscore is 3. GCS motor subscore is 4.   Eyes open, will grown and occasionally answer questions but not following commands.    + left-sided weakness.  No facial droop.  Speech is not obviously slurred but difficult to ascertain   Skin:   There is some skin breakdown over the sacrum, no foul-smelling discharge         ED Course   Procedures  Labs Reviewed   CBC W/ AUTO DIFFERENTIAL - Abnormal       Result Value    WBC 17.87 (*)     RBC 3.82 (*)     Hemoglobin 10.6 (*)     Hematocrit 37.4      MCV 98      MCH 27.7      MCHC 28.3 (*)     RDW 18.9 (*)     Platelets 513 (*)     MPV 12.0      Immature Granulocytes 0.5      Gran # (ANC) 14.2 (*)     Immature Grans (Abs) 0.09 (*)     Lymph # 2.1      Mono # 1.4 (*)     Eos # 0.0      Baso # 0.06      nRBC 0      Gran % 79.5 (*)      Lymph % 11.8 (*)     Mono % 7.8      Eosinophil % 0.1      Basophil % 0.3      Differential Method Automated     COMPREHENSIVE METABOLIC PANEL - Abnormal    Sodium 154 (*)     Potassium 6.5 (*)     Chloride 118 (*)     CO2 17 (*)     Glucose 1,131 (*)      (*)     Creatinine 2.4 (*)     Calcium 9.7      Total Protein 7.7      Albumin 2.4 (*)     Total Bilirubin 0.3      Alkaline Phosphatase 119      AST 23      ALT 18      eGFR 22.4 (*)     Anion Gap 19 (*)    URINALYSIS, REFLEX TO URINE CULTURE - Abnormal    Specimen UA Urine, Catheterized      Color, UA Yellow      Appearance, UA Clear      pH, UA 6.0      Specific Gravity, UA 1.020      Protein, UA Negative      Glucose, UA 4+ (*)     Ketones, UA Negative      Bilirubin (UA) Negative      Occult Blood UA Negative      Nitrite, UA Negative      Leukocytes, UA 2+ (*)     Narrative:     Specimen Source->Urine   BETA - HYDROXYBUTYRATE, SERUM - Abnormal    Beta-Hydroxybutyrate 1.7 (*)    OSMOLALITY, SERUM - Abnormal    Osmolality 445 (*)     Narrative:        Osmo critical result(s) called and verbal readback obtained from   Libertad Rodriguez RN  by HAKAN 02/12/2025 14:41   PHOSPHORUS - Abnormal    Phosphorus 4.6 (*)    LACTIC ACID, PLASMA - Abnormal    Lactate (Lactic Acid) 9.0 (*)    MAGNESIUM - Abnormal    Magnesium 3.0 (*)    BASIC METABOLIC PANEL - Abnormal    Sodium 154 (*)     Potassium 4.9      Chloride 120 (*)     CO2 15 (*)     Glucose 943 (*)      (*)     Creatinine 2.0 (*)     Calcium 9.5      Anion Gap 19 (*)     eGFR 27.9 (*)     Narrative:     Now and then every 2 hours while glucose remains above or  below critical value; then reduce to every 4 hours.   POCT GLUCOSE - Abnormal    POCT Glucose >500 (*)    ISTAT LACTATE - Abnormal    POC Lactate 8.51 (*)     Verbal Result Readback Performed Yes      Provider Credentials: MD      Provider Notified: APPLE      Time Notifed: 1411      Sample VENOUS      Site Other      Allens Test N/A      CULTURE, BLOOD   CULTURE, BLOOD   INFLUENZA A & B BY MOLECULAR   CULTURE, RESPIRATORY   LIPASE    Lipase 47     TROPONIN I HIGH SENSITIVITY    Troponin I High Sensitivity 9     URINALYSIS MICROSCOPIC    WBC, UA 0      Bacteria None      Yeast, UA None      Microscopic Comment SEE COMMENT      Narrative:     Specimen Source->Urine   CK   SARS-COV-2 RNA AMPLIFICATION, QUAL   URINALYSIS, REFLEX TO URINE CULTURE   BASIC METABOLIC PANEL   LACTIC ACID, PLASMA   CK   PHOSPHORUS   PHOSPHORUS   BASIC METABOLIC PANEL   BASIC METABOLIC PANEL   BASIC METABOLIC PANEL   LACTIC ACID, PLASMA   POCT GLUCOSE MONITORING CONTINUOUS   ISTAT CHEM8   ISTAT CHEM8   POCT GLUCOSE MONITORING CONTINUOUS     EKG Readings: (Independently Interpreted)   Initial Reading: No STEMI. Rhythm: Sinus Tachycardia. Heart Rate: 119. ST Segments: Normal ST Segments. Clinical Impression: Sinus Tachycardia     ECG Results              EKG 12-lead (Final result)        Collection Time Result Time QRS Duration OHS QTC Calculation    02/12/25 10:40:48 02/12/25 13:26:49 70 503                     Final result by Interface, Lab In [facility] (02/12/25 13:26:55)                   Narrative:    Test Reason : Z13.6,    Vent. Rate : 119 BPM     Atrial Rate : 119 BPM     P-R Int : 114 ms          QRS Dur :  70 ms      QT Int : 358 ms       P-R-T Axes :  60   3  57 degrees    QTcB Int : 503 ms    Sinus tachycardia  Nonspecific ST abnormality  Prolonged QT  Abnormal ECG  When compared with ECG of 17-Jan-2025 22:52,  Premature atrial complexes are no longer Present  Nonspecific T wave abnormality no longer evident in Inferior leads  T wave inversion less evident in Anterior leads  Confirmed by Neil Bacon ([id number]) on 2/12/2025 1:26:43 PM    Referred By:            Confirmed By: Neil Bacon                                  Imaging Results              CT Head Without Contrast (Final result)  Result time 02/12/25 12:13:20      Final result by Johny Valle DO (02/12/25  12:13:20)                   Impression:      No acute intracranial findings as detailed above specifically without evidence for acute intracranial hemorrhage or sulcal effacement to suggest large territory recent infarction    Continued cerebral volume loss with relatively stable prominence of the lateral and 3rd ventricles while this may be compensatory to volume loss normal pressure hydrocephalus not excluded    Clinical correlation and further evaluation as warranted.      Electronically signed by: Johny Valle DO  Date:    02/12/2025  Time:    12:13               Narrative:    EXAMINATION:  CT HEAD WITHOUT CONTRAST    CLINICAL HISTORY:  Mental status change, unknown cause;    TECHNIQUE:  Multiple sequential 5 mm axial images of the head without contrast.  Coronal and sagittal reformatted imaging from the axial acquisition.    COMPARISON:  CT 01/07/2025    FINDINGS:  Study distorted by motion artifacts.  Moderate region of hypoattenuation right parietal lobe may be prior infarction with encephalomalacia similar.  Patchy and confluent decreased attenuation elsewhere supratentorial white matter while nonspecific may be advanced chronic microvascular ischemic change.  Allowing for artifact from motion there is no evidence for acute intracranial hemorrhage.  Prominence of the lateral 3rd ventricles somewhat disproportionate to the degree of volume loss but similar to prior without evidence for acute hydrocephalus.  Component of normal pressure hydrocephalus not excluded.  No significant mass effect or midline shift.  Visualized paranasal sinuses and mastoid air cells are clear..                                        CT Abdomen Pelvis  Without Contrast (Final result)  Result time 02/12/25 12:19:23      Final result by Migue Harmon MD (02/12/25 12:19:23)                   Impression:      This report was flagged in Epic as abnormal.    1. Large amount of stool in the rectum, correlation with any history of  constipation or impaction.  There is wall thickening involving the sigmoid colon, further evaluation with colonoscopy recommended if not recently performed.  Please note, patient likely had infectious or inflammatory process in the region on previous exam, and this may reflect residual wall thickening from the same.  2. No findings to suggest obstructive uropathy.  3. The urinary bladder is distended, correlation with any history of urinary retention or outlet obstruction.  4. Questionable developing infectious consolidation within the right lower lobe versus atelectasis or scarring.  The process was more evident on the previous exam, and current findings may reflect residual from the same.  Correlation is needed.  5. Please see above for several additional findings.      Electronically signed by: Migue Harmon MD  Date:    02/12/2025  Time:    12:19               Narrative:    EXAMINATION:  CT ABDOMEN PELVIS WITHOUT CONTRAST    CLINICAL HISTORY:  Abdominal pain, acute, nonlocalized;    TECHNIQUE:  Low dose axial images, sagittal and coronal reformations were obtained from the lung bases to the pubic symphysis.  Oral contrast was not administered.    COMPARISON:  CTA acute GI bleed 01/08/2025    FINDINGS:  Images of the lower thorax are remarkable for bilateral dependent atelectasis/scarring noting developing right lower lobe infectious consolidation not excluded.    The liver, spleen, pancreas and right adrenal gland have a grossly unremarkable noncontrast appearance.  There is nodular thickening of the left adrenal gland measuring up to 1 cm, nonspecific.  There is cholelithiasis without secondary findings to suggest acute cholecystitis.  There is a small hiatal hernia.  The stomach is nondistended, no significant gastric wall thickening.  There are a few scattered nonenlarged abdominal lymph nodes.    The kidneys have a grossly unremarkable noncontrast appearance without hydronephrosis or nephrolithiasis.  The  bilateral ureters are unable to be followed in their entirety to the urinary bladder, no definite calculi seen or secondary findings to suggest obstructive uropathy.  The urinary bladder is mildly distended without wall thickening.  The uterus is absent the adnexa is unremarkable.    There is a large amount of stool in the rectum.  There are several scattered colonic diverticula without surrounding inflammation to suggest diverticulitis.  There is apparent wall thickening involving the proximal sigmoid colon versus sequela of nondistention.  The terminal ileum and appendix are unremarkable.  The small bowel is grossly unremarkable.  There is a gastrostomy tube in place without obvious complication.  There are several scattered shotty periaortic, pericaval, and mesenteric lymph nodes.  There is atherosclerotic calcification of the aorta and its branches.  No focal organized pelvic fluid collection.    There are degenerative changes of the spine.  No significant inguinal lymphadenopathy.                                       X-Ray Chest AP Portable (Final result)  Result time 02/12/25 10:59:39      Final result by Chente Guzman MD (02/12/25 10:59:39)                   Impression:      No acute abnormality.      Electronically signed by: Chente Guzman MD  Date:    02/12/2025  Time:    10:59               Narrative:    EXAMINATION:  XR CHEST AP PORTABLE    CLINICAL HISTORY:  Sepsis;    TECHNIQUE:  Single views of the chest were performed.    COMPARISON:  Chest radiograph dated 1/13/2025    FINDINGS:  The trachea and cardiomediastinal silhouette remains stable.  There is no evidence of pleural effusions, pneumothoraces or consolidations.  Persistent elevation of the right hemidiaphragm.  Osseous structures demonstrate no evidence for acute fractures or dislocations.                                       Medications   acetaminophen tablet 650 mg (has no administration in time range)   atorvastatin tablet 40 mg (has no  administration in time range)   famotidine tablet 20 mg (has no administration in time range)   gabapentin capsule 300 mg (has no administration in time range)   rivaroxaban tablet 15 mg (has no administration in time range)   sodium chloride 0.9% flush 10 mL (has no administration in time range)   dextrose 5 % and 0.45 % NaCl infusion (has no administration in time range)   dextrose 50% injection 25 g (has no administration in time range)   dextrose 50% injection 12.5 g (has no administration in time range)   insulin regular in 0.9 % NaCl 100 unit/100 mL (1 unit/mL) infusion (0.1 Units/kg/hr × 57.2 kg Intravenous New Bag 2/12/25 1416)   meropenem injection 1 g (has no administration in time range)   vancomycin - pharmacy to dose (has no administration in time range)   lactated ringers infusion ( Intravenous New Bag 2/12/25 1512)   azithromycin tablet 500 mg (has no administration in time range)   senna-docusate 8.6-50 mg per tablet 1 tablet (has no administration in time range)   polyethylene glycol packet 17 g (has no administration in time range)   vancomycin 1,500 mg in 0.9% NaCl 250 mL IVPB (admixture device) (0 mg Intravenous Stopped 2/12/25 1400)   lactated ringers bolus 1,716 mL (0 mLs Intravenous Stopped 2/12/25 1408)   insulin regular injection 10 Units 0.1 mL (10 Units Intravenous Given 2/12/25 1234)   albuterol sulfate nebulizer solution 10 mg (10 mg Nebulization Given 2/12/25 1233)   calcium gluconate 1 g in NS IVPB (premixed) (0 g Intravenous Stopped 2/12/25 1340)   lactated ringers bolus 1,000 mL (0 mLs Intravenous Stopped 2/12/25 1514)     Medical Decision Making  59-year-old female presenting for altered mental status.  She is tachycardic, tachypneic and very ill-appearing.  Is awake but not answering questions and not following commands.    Differential diagnosis:  Differential is wide, initial concern for DKA versus sepsis  Electrolyte derangement   Dehydration   UTI   This is a sacral decubitus,  does not appear overtly infected, osteomyelitis is certainly a possibility   She does have some abdominal tenderness, intra-abdominal school surgical pathology is certainly a possibility   She has left-sided weakness which is her baseline, does not appear to have new focal deficits.  I have a lower suspicion for intracranial pathology    Will do septic workup, broad-spectrum antibiotics, obtain imaging, EKG and reassess.  Patient with CKD 4, will hydrate gently pending further lab workup.  Received 500 cc NS EN route with EMS.    Workup is notable for lactic acidosis, leukocytosis as well as significant metabolic derangements.  Case is profoundly elevated hypernatremia, hyperkalemia.  Patient given fluids, calcium, albuterol on broad-spectrum antibiotics.  Her mental status has not improved in the emergency department.  Patient seen and evaluated by critical care and will be admitted to MICU for further management.  Family comfortable with admission.  I discussed this patient with supervising physician who also evaluated this patient.      Amount and/or Complexity of Data Reviewed  Independent Historian: caregiver and EMS  Labs: ordered. Decision-making details documented in ED Course.  Radiology: ordered and independent interpretation performed. Decision-making details documented in ED Course.  ECG/medicine tests: ordered and independent interpretation performed.    Risk  OTC drugs.  Prescription drug management.  Decision regarding hospitalization.               ED Course as of 02/12/25 1610   Wed Feb 12, 2025   1023 POCT Glucose(!!): >500 [CC]   1032 Case discussed with the patient's daughter, Aneta with whom she lives.  She reports that at baseline, the patient has left-sided weakness and is confused but conversational.  This morning, daughter noticed that she was just staring off into space and would not answer questions.  Daughter reports that she has been lying on her right side due to a sacral wound. [CC]    1116 POC Lactate: 6.3 [CC]   1116 POC PH: 7.331 [CC]   1126 Beta-Hydroxybutyrate(!): 1.7 [CC]   1130 X-Ray Chest AP Portable  Per my independent interpretation, evaluation limited by positioning but no focal consolidation [CC]   1210 WBC(!): 17.87 [CC]   1221 Sodium(!): 154 [CC]   1221 Glucose(!!): 1,131 [CC]   1221 BUN(!): 110 [CC]   1221 Potassium(!!): 6.5 [CC]   1236 Case discussed with MICU who will come evaluate the patient [CC]   1415 POC Lactate(!!): 8.51 [CC]   1538 Osmolality(!!): 445 [CC]      ED Course User Index  [CC] Lanie Snow PA-C                           Clinical Impression:  Final diagnoses:  [E11.00] Hyperosmolar hyperglycemic state (HHS) (Primary)  [E87.0] Hypernatremia  [E87.5] Hyperkalemia  [A41.9, R65.20, G93.41] Sepsis with encephalopathy without septic shock, due to unspecified organism          ED Disposition Condition    Admit Stable                Lanie Snow PA-C  02/12/25 1610

## 2025-02-12 NOTE — ED NOTES
I-STAT Chem-8+ Results:   Value Reference Range   Sodium 156 136-145 mmol/L   Potassium  5.6 3.5-5.1 mmol/L   Chloride 122  mmol/L   Ionized Calcium 1.26 1.06-1.42 mmol/L   CO2 (measured) 222 23-29 mmol/L   Glucose >700  mg/dL    6-30 mg/dL   Creatinine 1.6 0.5-1.4 mg/dL   Hematocrit 30 36-54%

## 2025-02-12 NOTE — ASSESSMENT & PLAN NOTE
Patient presents with hyperkalemia of 6.5.   -Monitor K daily  - Treat with IV fluids and shifting with insulin drip

## 2025-02-13 LAB
ANION GAP SERPL CALC-SCNC: 10 MMOL/L (ref 8–16)
ANION GAP SERPL CALC-SCNC: 10 MMOL/L (ref 8–16)
ANION GAP SERPL CALC-SCNC: 11 MMOL/L (ref 8–16)
ANION GAP SERPL CALC-SCNC: 8 MMOL/L (ref 8–16)
ANION GAP SERPL CALC-SCNC: 9 MMOL/L (ref 8–16)
BASOPHILS # BLD AUTO: 0.08 K/UL (ref 0–0.2)
BASOPHILS NFR BLD: 0.5 % (ref 0–1.9)
BUN SERPL-MCNC: 119 MG/DL (ref 6–30)
BUN SERPL-MCNC: 132 MG/DL (ref 6–30)
BUN SERPL-MCNC: 69 MG/DL (ref 8–23)
BUN SERPL-MCNC: 78 MG/DL (ref 8–23)
BUN SERPL-MCNC: 81 MG/DL (ref 8–23)
BUN SERPL-MCNC: 82 MG/DL (ref 8–23)
BUN SERPL-MCNC: 89 MG/DL (ref 8–23)
BUN SERPL-MCNC: 95 MG/DL (ref 8–23)
BUN SERPL-MCNC: 95 MG/DL (ref 8–23)
CALCIUM SERPL-MCNC: 8.9 MG/DL (ref 8.7–10.5)
CALCIUM SERPL-MCNC: 9 MG/DL (ref 8.7–10.5)
CALCIUM SERPL-MCNC: 9.4 MG/DL (ref 8.7–10.5)
CALCIUM SERPL-MCNC: 9.4 MG/DL (ref 8.7–10.5)
CALCIUM SERPL-MCNC: 9.5 MG/DL (ref 8.7–10.5)
CALCIUM SERPL-MCNC: 9.6 MG/DL (ref 8.7–10.5)
CALCIUM SERPL-MCNC: 9.9 MG/DL (ref 8.7–10.5)
CHLORIDE SERPL-SCNC: 121 MMOL/L (ref 95–110)
CHLORIDE SERPL-SCNC: 122 MMOL/L (ref 95–110)
CHLORIDE SERPL-SCNC: 124 MMOL/L (ref 95–110)
CHLORIDE SERPL-SCNC: 124 MMOL/L (ref 95–110)
CHLORIDE SERPL-SCNC: 125 MMOL/L (ref 95–110)
CHLORIDE SERPL-SCNC: 126 MMOL/L (ref 95–110)
CHLORIDE SERPL-SCNC: 127 MMOL/L (ref 95–110)
CO2 SERPL-SCNC: 21 MMOL/L (ref 23–29)
CO2 SERPL-SCNC: 23 MMOL/L (ref 23–29)
CO2 SERPL-SCNC: 24 MMOL/L (ref 23–29)
CO2 SERPL-SCNC: 25 MMOL/L (ref 23–29)
CO2 SERPL-SCNC: 26 MMOL/L (ref 23–29)
CREAT SERPL-MCNC: 1 MG/DL (ref 0.5–1.4)
CREAT SERPL-MCNC: 1.2 MG/DL (ref 0.5–1.4)
CREAT SERPL-MCNC: 1.2 MG/DL (ref 0.5–1.4)
CREAT SERPL-MCNC: 1.3 MG/DL (ref 0.5–1.4)
CREAT SERPL-MCNC: 1.4 MG/DL (ref 0.5–1.4)
CREAT SERPL-MCNC: 1.6 MG/DL (ref 0.5–1.4)
CREAT SERPL-MCNC: 1.7 MG/DL (ref 0.5–1.4)
DIFFERENTIAL METHOD BLD: ABNORMAL
EOSINOPHIL # BLD AUTO: 0.2 K/UL (ref 0–0.5)
EOSINOPHIL NFR BLD: 1.4 % (ref 0–8)
ERYTHROCYTE [DISTWIDTH] IN BLOOD BY AUTOMATED COUNT: 18.4 % (ref 11.5–14.5)
EST. GFR  (NO RACE VARIABLE): 42.8 ML/MIN/1.73 M^2
EST. GFR  (NO RACE VARIABLE): 46.8 ML/MIN/1.73 M^2
EST. GFR  (NO RACE VARIABLE): 51.5 ML/MIN/1.73 M^2
EST. GFR  (NO RACE VARIABLE): 51.5 ML/MIN/1.73 M^2
EST. GFR  (NO RACE VARIABLE): >60 ML/MIN/1.73 M^2
GLUCOSE SERPL-MCNC: 142 MG/DL (ref 70–110)
GLUCOSE SERPL-MCNC: 146 MG/DL (ref 70–110)
GLUCOSE SERPL-MCNC: 179 MG/DL (ref 70–110)
GLUCOSE SERPL-MCNC: 187 MG/DL (ref 70–110)
GLUCOSE SERPL-MCNC: 232 MG/DL (ref 70–110)
GLUCOSE SERPL-MCNC: 240 MG/DL (ref 70–110)
GLUCOSE SERPL-MCNC: 263 MG/DL (ref 70–110)
GLUCOSE SERPL-MCNC: >700 MG/DL (ref 70–110)
GLUCOSE SERPL-MCNC: >700 MG/DL (ref 70–110)
HCT VFR BLD AUTO: 29.4 % (ref 37–48.5)
HCT VFR BLD CALC: 29 %PCV (ref 36–54)
HCT VFR BLD CALC: 30 %PCV (ref 36–54)
HGB BLD-MCNC: 9.1 G/DL (ref 12–16)
IMM GRANULOCYTES # BLD AUTO: 0.06 K/UL (ref 0–0.04)
IMM GRANULOCYTES NFR BLD AUTO: 0.4 % (ref 0–0.5)
LACTATE SERPL-SCNC: 2.3 MMOL/L (ref 0.5–2.2)
LIPASE SERPL-CCNC: 30 U/L (ref 4–60)
LYMPHOCYTES # BLD AUTO: 2.2 K/UL (ref 1–4.8)
LYMPHOCYTES NFR BLD: 14.5 % (ref 18–48)
MCH RBC QN AUTO: 28.4 PG (ref 27–31)
MCHC RBC AUTO-ENTMCNC: 31 G/DL (ref 32–36)
MCV RBC AUTO: 92 FL (ref 82–98)
MONOCYTES # BLD AUTO: 1.6 K/UL (ref 0.3–1)
MONOCYTES NFR BLD: 10.2 % (ref 4–15)
MRSA ID BY PCR: NEGATIVE
NEUTROPHILS # BLD AUTO: 11.3 K/UL (ref 1.8–7.7)
NEUTROPHILS NFR BLD: 73 % (ref 38–73)
NRBC BLD-RTO: 0 /100 WBC
PHOSPHATE SERPL-MCNC: 3.5 MG/DL (ref 2.7–4.5)
PHOSPHATE SERPL-MCNC: 3.9 MG/DL (ref 2.7–4.5)
PHOSPHATE SERPL-MCNC: 4 MG/DL (ref 2.7–4.5)
PHOSPHATE SERPL-MCNC: 4.1 MG/DL (ref 2.7–4.5)
PHOSPHATE SERPL-MCNC: 4.5 MG/DL (ref 2.7–4.5)
PLATELET # BLD AUTO: 423 K/UL (ref 150–450)
PMV BLD AUTO: 11.6 FL (ref 9.2–12.9)
POC IONIZED CALCIUM: 1.23 MMOL/L (ref 1.06–1.42)
POC IONIZED CALCIUM: 1.26 MMOL/L (ref 1.06–1.42)
POC TCO2 (MEASURED): 20 MMOL/L (ref 23–29)
POC TCO2 (MEASURED): 22 MMOL/L (ref 23–29)
POCT GLUCOSE: 134 MG/DL (ref 70–110)
POCT GLUCOSE: 140 MG/DL (ref 70–110)
POCT GLUCOSE: 142 MG/DL (ref 70–110)
POCT GLUCOSE: 150 MG/DL (ref 70–110)
POCT GLUCOSE: 151 MG/DL (ref 70–110)
POCT GLUCOSE: 156 MG/DL (ref 70–110)
POCT GLUCOSE: 156 MG/DL (ref 70–110)
POCT GLUCOSE: 158 MG/DL (ref 70–110)
POCT GLUCOSE: 174 MG/DL (ref 70–110)
POCT GLUCOSE: 190 MG/DL (ref 70–110)
POCT GLUCOSE: 275 MG/DL (ref 70–110)
POCT GLUCOSE: 299 MG/DL (ref 70–110)
POCT GLUCOSE: >500 MG/DL (ref 70–110)
POTASSIUM BLD-SCNC: 5.6 MMOL/L (ref 3.5–5.1)
POTASSIUM BLD-SCNC: 6.7 MMOL/L (ref 3.5–5.1)
POTASSIUM SERPL-SCNC: 3.9 MMOL/L (ref 3.5–5.1)
POTASSIUM SERPL-SCNC: 4 MMOL/L (ref 3.5–5.1)
POTASSIUM SERPL-SCNC: 4.1 MMOL/L (ref 3.5–5.1)
POTASSIUM SERPL-SCNC: 4.2 MMOL/L (ref 3.5–5.1)
POTASSIUM SERPL-SCNC: 4.5 MMOL/L (ref 3.5–5.1)
POTASSIUM SERPL-SCNC: 5 MMOL/L (ref 3.5–5.1)
POTASSIUM SERPL-SCNC: 5 MMOL/L (ref 3.5–5.1)
RBC # BLD AUTO: 3.2 M/UL (ref 4–5.4)
SAMPLE: ABNORMAL
SAMPLE: ABNORMAL
SODIUM BLD-SCNC: 155 MMOL/L (ref 136–145)
SODIUM BLD-SCNC: 156 MMOL/L (ref 136–145)
SODIUM SERPL-SCNC: 157 MMOL/L (ref 136–145)
SODIUM SERPL-SCNC: 157 MMOL/L (ref 136–145)
SODIUM SERPL-SCNC: 158 MMOL/L (ref 136–145)
SODIUM SERPL-SCNC: 158 MMOL/L (ref 136–145)
SODIUM SERPL-SCNC: 159 MMOL/L (ref 136–145)
SODIUM SERPL-SCNC: 159 MMOL/L (ref 136–145)
SODIUM SERPL-SCNC: 161 MMOL/L (ref 136–145)
STAPH AUREUS ID BY PCR: NEGATIVE
VANCOMYCIN SERPL-MCNC: 17 UG/ML
WBC # BLD AUTO: 15.41 K/UL (ref 3.9–12.7)

## 2025-02-13 PROCEDURE — 25000003 PHARM REV CODE 250

## 2025-02-13 PROCEDURE — 94761 N-INVAS EAR/PLS OXIMETRY MLT: CPT

## 2025-02-13 PROCEDURE — 84100 ASSAY OF PHOSPHORUS: CPT | Mod: 91 | Performed by: PHYSICIAN ASSISTANT

## 2025-02-13 PROCEDURE — 63600175 PHARM REV CODE 636 W HCPCS: Performed by: INTERNAL MEDICINE

## 2025-02-13 PROCEDURE — 99223 1ST HOSP IP/OBS HIGH 75: CPT | Mod: ,,, | Performed by: STUDENT IN AN ORGANIZED HEALTH CARE EDUCATION/TRAINING PROGRAM

## 2025-02-13 PROCEDURE — 83690 ASSAY OF LIPASE: CPT

## 2025-02-13 PROCEDURE — 20000000 HC ICU ROOM

## 2025-02-13 PROCEDURE — 85025 COMPLETE CBC W/AUTO DIFF WBC: CPT

## 2025-02-13 PROCEDURE — 63600175 PHARM REV CODE 636 W HCPCS: Performed by: STUDENT IN AN ORGANIZED HEALTH CARE EDUCATION/TRAINING PROGRAM

## 2025-02-13 PROCEDURE — 80048 BASIC METABOLIC PNL TOTAL CA: CPT | Mod: 91

## 2025-02-13 PROCEDURE — 63600175 PHARM REV CODE 636 W HCPCS

## 2025-02-13 PROCEDURE — S5010 5% DEXTROSE AND 0.45% SALINE: HCPCS

## 2025-02-13 PROCEDURE — 80048 BASIC METABOLIC PNL TOTAL CA: CPT | Mod: 91 | Performed by: INTERNAL MEDICINE

## 2025-02-13 PROCEDURE — 83605 ASSAY OF LACTIC ACID: CPT | Performed by: INTERNAL MEDICINE

## 2025-02-13 PROCEDURE — 80048 BASIC METABOLIC PNL TOTAL CA: CPT

## 2025-02-13 PROCEDURE — 87040 BLOOD CULTURE FOR BACTERIA: CPT | Mod: 59

## 2025-02-13 PROCEDURE — 63700000 PHARM REV CODE 250 ALT 637 W/O HCPCS

## 2025-02-13 PROCEDURE — 99291 CRITICAL CARE FIRST HOUR: CPT | Mod: ,,, | Performed by: INTERNAL MEDICINE

## 2025-02-13 PROCEDURE — 80202 ASSAY OF VANCOMYCIN: CPT | Performed by: INTERNAL MEDICINE

## 2025-02-13 PROCEDURE — 25000003 PHARM REV CODE 250: Performed by: INTERNAL MEDICINE

## 2025-02-13 RX ORDER — INSULIN GLARGINE 100 [IU]/ML
5 INJECTION, SOLUTION SUBCUTANEOUS DAILY
Status: DISCONTINUED | OUTPATIENT
Start: 2025-02-13 | End: 2025-02-13

## 2025-02-13 RX ORDER — SODIUM CHLORIDE 450 MG/100ML
INJECTION, SOLUTION INTRAVENOUS CONTINUOUS
Status: DISCONTINUED | OUTPATIENT
Start: 2025-02-13 | End: 2025-02-16

## 2025-02-13 RX ORDER — DEXTROSE MONOHYDRATE 100 MG/ML
INJECTION, SOLUTION INTRAVENOUS CONTINUOUS PRN
Status: DISCONTINUED | OUTPATIENT
Start: 2025-02-13 | End: 2025-02-19 | Stop reason: HOSPADM

## 2025-02-13 RX ORDER — ENOXAPARIN SODIUM 100 MG/ML
40 INJECTION SUBCUTANEOUS EVERY 24 HOURS
Status: DISCONTINUED | OUTPATIENT
Start: 2025-02-13 | End: 2025-02-19 | Stop reason: HOSPADM

## 2025-02-13 RX ORDER — DEXTROSE, SODIUM CHLORIDE, SODIUM LACTATE, POTASSIUM CHLORIDE, AND CALCIUM CHLORIDE 5; .6; .31; .03; .02 G/100ML; G/100ML; G/100ML; G/100ML; G/100ML
INJECTION, SOLUTION INTRAVENOUS CONTINUOUS
Status: DISCONTINUED | OUTPATIENT
Start: 2025-02-13 | End: 2025-02-13

## 2025-02-13 RX ORDER — DEXTROSE MONOHYDRATE AND SODIUM CHLORIDE 5; .45 G/100ML; G/100ML
INJECTION, SOLUTION INTRAVENOUS CONTINUOUS
Status: DISCONTINUED | OUTPATIENT
Start: 2025-02-13 | End: 2025-02-13

## 2025-02-13 RX ORDER — CEFTRIAXONE 2 G/1
2 INJECTION, POWDER, FOR SOLUTION INTRAMUSCULAR; INTRAVENOUS
Status: DISCONTINUED | OUTPATIENT
Start: 2025-02-14 | End: 2025-02-14

## 2025-02-13 RX ORDER — INSULIN GLARGINE 100 [IU]/ML
10 INJECTION, SOLUTION SUBCUTANEOUS DAILY
Status: DISCONTINUED | OUTPATIENT
Start: 2025-02-14 | End: 2025-02-14

## 2025-02-13 RX ORDER — GLUCAGON 1 MG
1 KIT INJECTION
Status: DISCONTINUED | OUTPATIENT
Start: 2025-02-13 | End: 2025-02-19 | Stop reason: HOSPADM

## 2025-02-13 RX ORDER — POLYETHYLENE GLYCOL 3350 17 G/17G
17 POWDER, FOR SOLUTION ORAL DAILY
Status: DISCONTINUED | OUTPATIENT
Start: 2025-02-14 | End: 2025-02-18

## 2025-02-13 RX ORDER — INSULIN GLARGINE 100 [IU]/ML
5 INJECTION, SOLUTION SUBCUTANEOUS ONCE
Status: COMPLETED | OUTPATIENT
Start: 2025-02-13 | End: 2025-02-13

## 2025-02-13 RX ORDER — INSULIN ASPART 100 [IU]/ML
0-10 INJECTION, SOLUTION INTRAVENOUS; SUBCUTANEOUS EVERY 4 HOURS PRN
Status: DISCONTINUED | OUTPATIENT
Start: 2025-02-13 | End: 2025-02-19 | Stop reason: HOSPADM

## 2025-02-13 RX ADMIN — AZITHROMYCIN DIHYDRATE 500 MG: 250 TABLET ORAL at 08:02

## 2025-02-13 RX ADMIN — DEXTROSE AND SODIUM CHLORIDE: 5; 450 INJECTION, SOLUTION INTRAVENOUS at 01:02

## 2025-02-13 RX ADMIN — MEROPENEM 2 G: 2 INJECTION, POWDER, FOR SOLUTION INTRAVENOUS at 12:02

## 2025-02-13 RX ADMIN — VANCOMYCIN HYDROCHLORIDE 750 MG: 750 INJECTION, POWDER, LYOPHILIZED, FOR SOLUTION INTRAVENOUS at 05:02

## 2025-02-13 RX ADMIN — INSULIN ASPART 6 UNITS: 100 INJECTION, SOLUTION INTRAVENOUS; SUBCUTANEOUS at 01:02

## 2025-02-13 RX ADMIN — ACETAMINOPHEN 650 MG: 325 TABLET ORAL at 10:02

## 2025-02-13 RX ADMIN — ENOXAPARIN SODIUM 40 MG: 40 INJECTION SUBCUTANEOUS at 05:02

## 2025-02-13 RX ADMIN — POLYETHYLENE GLYCOL 3350 17 G: 17 POWDER, FOR SOLUTION ORAL at 08:02

## 2025-02-13 RX ADMIN — DEXTROSE AND SODIUM CHLORIDE: 5; 450 INJECTION, SOLUTION INTRAVENOUS at 02:02

## 2025-02-13 RX ADMIN — MEROPENEM 1 G: 1 INJECTION, POWDER, FOR SOLUTION INTRAVENOUS at 04:02

## 2025-02-13 RX ADMIN — INSULIN GLARGINE 5 UNITS: 100 INJECTION, SOLUTION SUBCUTANEOUS at 08:02

## 2025-02-13 RX ADMIN — ATORVASTATIN CALCIUM 40 MG: 40 TABLET, FILM COATED ORAL at 08:02

## 2025-02-13 RX ADMIN — FAMOTIDINE 20 MG: 20 TABLET ORAL at 08:02

## 2025-02-13 RX ADMIN — ACETAMINOPHEN 650 MG: 325 TABLET ORAL at 04:02

## 2025-02-13 RX ADMIN — SENNOSIDES AND DOCUSATE SODIUM 1 TABLET: 50; 8.6 TABLET ORAL at 08:02

## 2025-02-13 RX ADMIN — SODIUM CHLORIDE, SODIUM LACTATE, POTASSIUM CHLORIDE, CALCIUM CHLORIDE AND DEXTROSE MONOHYDRATE: 5; 600; 310; 30; 20 INJECTION, SOLUTION INTRAVENOUS at 04:02

## 2025-02-13 RX ADMIN — SODIUM CHLORIDE: 450 INJECTION, SOLUTION INTRAVENOUS at 05:02

## 2025-02-13 RX ADMIN — INSULIN HUMAN 0.05 UNITS/KG/HR: 1 INJECTION, SOLUTION INTRAVENOUS at 12:02

## 2025-02-13 RX ADMIN — INSULIN GLARGINE 5 UNITS: 100 INJECTION, SOLUTION SUBCUTANEOUS at 05:02

## 2025-02-13 NOTE — HOSPITAL COURSE
2/13/2025: Improvement in glucose and improvement of major electrolyte disturbances. IV fluid replenishment with 5% dextrose .45% normal saline. Lantus increased to 10 U daily. Able to speak and responds to questions. Oriented to person and place. Following commands appropriately.     2/14/2025: At basline mental status. Further improvement of electrolytes with IV .45% NS and lantus 5U. Tube feeds set as recommended by nutrition.

## 2025-02-13 NOTE — SUBJECTIVE & OBJECTIVE
Past Medical History:   Diagnosis Date    Diabetes mellitus type I     Hyperlipidemia     Hypertension     Right sided weakness 2019       Past Surgical History:   Procedure Laterality Date     SECTION         Review of patient's allergies indicates:   Allergen Reactions    Sulfa (sulfonamide antibiotics) Itching    Sulfur        Medications:  Medications Prior to Admission   Medication Sig    acetaminophen (TYLENOL) 650 MG TbSR Take 1 tablet (650 mg total) by mouth every 8 (eight) hours. For back pain    albuterol (PROAIR HFA) 90 mcg/actuation inhaler inhale ONE TO TWO puffs EVERY 6 HOURS into lungs AS NEEDED FOR WHEEZING AND SHORTNESS OF BREATH    albuterol (PROVENTIL/VENTOLIN HFA) 90 mcg/actuation inhaler Inhale 2 puffs into the lungs every 6 (six) hours as needed for Wheezing. Rescue    aspirin (ECOTRIN) 81 MG EC tablet Take 1 tablet (81 mg total) by mouth once daily.    atorvastatin (LIPITOR) 40 MG tablet TAKE 1 TABLET(40 MG) BY MOUTH EVERY DAY    blood sugar diagnostic (TRUE METRIX GLUCOSE TEST STRIP) Strp TO CHECK BLOOD GLUCOSE THREE TIMES DAILY    blood-glucose meter (FREESTYLE SYSTEM KIT) kit Use daily- TID    capsicum 0.075% (ZOSTRIX) 0.075 % topical cream Apply topically 3 (three) times daily.    clopidogreL (PLAVIX) 75 mg tablet Take 1 tablet (75 mg total) by mouth once daily.    diaper,brief,adult,disposable Misc 1 each by Misc.(Non-Drug; Combo Route) route 3 (three) times daily.    diphenhydrAMINE (BENADRYL) 50 MG capsule Take 1 capsule (50 mg total) by mouth every 6 (six) hours as needed for Itching.    famotidine (PEPCID) 20 MG tablet 1 tablet (20 mg total) by Per G Tube route 2 (two) times daily.    flash glucose sensor (FREESTYLE LEATHA 14 DAY SENSOR) Kit 1 each by Misc.(Non-Drug; Combo Route) route once daily.    fluticasone propionate (FLONASE) 50 mcg/actuation nasal spray 1 spray (50 mcg total) by Each Nostril route once daily.    food supplemt, lactose-reduced (ENSURE MAX PROTEIN)  "Liqd Take 118 mLs by mouth 3 (three) times daily.    gabapentin (NEURONTIN) 300 MG capsule Take 1 capsule (300 mg total) by mouth 2 (two) times daily.    insulin aspart U-100 (NOVOLOG) 100 unit/mL (3 mL) InPn pen Inject 3 Units into the skin every 6 (six) hours.    insulin glargine U-100, Lantus, 100 unit/mL (3 mL) SubQ InPn pen Inject 5 Units into the skin every evening.    lancets (ONETOUCH ULTRASOFT LANCETS) Misc Use 1 TID as directed for diabetes checking    metFORMIN (GLUCOPHAGE) 1000 MG tablet TAKE 1 TABLET(1000 MG) BY MOUTH TWICE DAILY    miconazole NITRATE 2 % (MICOTIN) 2 % top powder Apply topically 2 (two) times daily.    multivitamin Tab 1 tablet by Per G Tube route once daily.    oxyCODONE-acetaminophen (PERCOCET) 7.5-325 mg per tablet 1 tablet by Per G Tube route every 4 (four) hours as needed for Pain.    pen needle, diabetic (BD ULTRA-FINE ORIG PEN NEEDLE) 29 gauge x 1/2" Ndle USE TO TEST THREE TIMES DAILY MUST LAST 33 DAYS    rivaroxaban (XARELTO) 2.5 mg Tab 6 tablets (15 mg total) by Per G Tube route 2 (two) times daily with meals for 20 days, THEN 8 tablets (20 mg total) before evening meal.    walker (ULTRA-LIGHT ROLLATOR) Misc 1 each by Misc.(Non-Drug; Combo Route) route once daily at 6am.    wheelchair Kelsey 1 each by Misc.(Non-Drug; Combo Route) route 2 (two) times daily. Power Wheel Chair    white petrolatum 41 % Oint Apply topically 2 (two) times a day.     Antibiotics (From admission, onward)      Start     Stop Route Frequency Ordered    02/13/25 1200  meropenem 2 g in 0.9% NaCl 100 mL IVPB (MB+)         -- IV Every 12 hours (non-standard times) 02/13/25 0952    02/13/25 0900  azithromycin tablet 500 mg         02/16/25 0859 PER G TUBE Daily 02/12/25 1545    02/12/25 1501  vancomycin - pharmacy to dose  (vancomycin IVPB (PEDS and ADULTS))        Placed in "And" Linked Group    -- IV pharmacy to manage frequency 02/12/25 1401          Antifungals (From admission, onward)      None      "     Antivirals (From admission, onward)      None             Immunization History   Administered Date(s) Administered    COVID-19, MRNA, LN-S, PF (MODERNA FULL 0.5 ML DOSE) 03/31/2021, 05/01/2021    Hepatitis A, Adult 09/19/2005    Influenza - Quadrivalent - PF *Preferred* (6 months and older) 10/21/2021    Influenza - Trivalent - Afluria, Fluzone MDV 09/19/2005, 10/13/2011, 11/26/2019    PPD Test 07/24/2024    Tdap 03/21/2024       Family History       Problem Relation (Age of Onset)    Cancer Sister    Diabetes Mother, Sister, Brother, Maternal Aunt    Heart disease Maternal Aunt    Hyperlipidemia Mother, Sister, Brother    Hypertension Mother, Sister, Brother    Stroke Mother          Social History     Socioeconomic History    Marital status: Single   Tobacco Use    Smoking status: Every Day     Current packs/day: 1.50     Average packs/day: 1.5 packs/day for 35.0 years (52.5 ttl pk-yrs)     Types: Cigarettes    Smokeless tobacco: Never   Substance and Sexual Activity    Alcohol use: Not Currently    Drug use: Not Currently     Social Drivers of Health     Financial Resource Strain: Low Risk  (12/31/2024)    Overall Financial Resource Strain (CARDIA)     Difficulty of Paying Living Expenses: Not hard at all   Food Insecurity: No Food Insecurity (12/31/2024)    Hunger Vital Sign     Worried About Running Out of Food in the Last Year: Never true     Ran Out of Food in the Last Year: Never true   Transportation Needs: No Transportation Needs (12/31/2024)    TRANSPORTATION NEEDS     Transportation : No   Physical Activity: Inactive (12/31/2024)    Exercise Vital Sign     Days of Exercise per Week: 0 days     Minutes of Exercise per Session: 0 min   Stress: No Stress Concern Present (12/31/2024)    [employer] - Occupational Stress Questionnaire     Feeling of Stress : Only a little   Housing Stability: Unknown (12/31/2024)    Housing Stability Vital Sign     Unable to Pay for Housing in  the Last Year: No     Homeless in the Last Year: No     Review of Systems   Constitutional:  Negative for chills and fever.   Respiratory:  Negative for cough and shortness of breath.    Gastrointestinal:  Negative for abdominal pain, diarrhea, nausea and vomiting.   All other systems reviewed and are negative.    Objective:     Vital Signs (Most Recent):  Temp: 97.7 °F (36.5 °C) (02/13/25 1100)  Pulse: 85 (02/13/25 1100)  Resp: (!) 24 (02/13/25 1100)  BP: 109/75 (02/13/25 1100)  SpO2: 99 % (02/13/25 1100) Vital Signs (24h Range):  Temp:  [97.7 °F (36.5 °C)-98.7 °F (37.1 °C)] 97.7 °F (36.5 °C)  Pulse:  [] 85  Resp:  [14-28] 24  SpO2:  [91 %-100 %] 99 %  BP: ()/(62-92) 109/75     Weight: 57.2 kg (126 lb)  Body mass index is 22.32 kg/m².    Estimated Creatinine Clearance: 37.6 mL/min (based on SCr of 1.3 mg/dL).     Physical Exam  Constitutional:       General: She is not in acute distress.     Appearance: She is not ill-appearing or toxic-appearing.   Eyes:      General:         Right eye: No discharge.         Left eye: No discharge.   Pulmonary:      Effort: Pulmonary effort is normal. No respiratory distress.      Breath sounds: No rhonchi.   Abdominal:      General: There is no distension.      Palpations: Abdomen is soft.      Tenderness: There is no abdominal tenderness.      Comments: PEG - without cellulitic changes   Genitourinary:     Comments: horton  Musculoskeletal:      Comments: contracted   Skin:     General: Skin is warm and dry.   Neurological:      Mental Status: She is alert.          Significant Labs:   Microbiology Results (last 7 days)       Procedure Component Value Units Date/Time    MRSA/SA Rapid ID by PCR from Blood culture [[id number]] Collected: 02/12/25 1112    Order Status: No result Updated: 02/13/25 1029    Blood culture x two cultures. Draw prior to antibiotics. [[id number]] Collected: 02/12/25 1112    Order Status: Completed Specimen: Blood from Peripheral, Forearm, Left  Updated: 02/13/25 1027     Blood Culture, Routine Gram stain aer bottle: Gram positive cocci in clusters resembling Staph      Gram stain lucy bottle: Gram positive cocci in clusters resembling Staph      Results called to and read back by: Ana Espinoza RN      02/13/2025  10:26    Narrative:      Aerobic and anaerobic    Blood culture x two cultures. Draw prior to antibiotics. [[id number]] Collected: 02/12/25 1112    Order Status: Completed Specimen: Blood from Peripheral, Forearm, Left Updated: 02/13/25 1026     Blood Culture, Routine Gram stain aer bottle: Gram positive cocci in clusters resembling Staph      Gram stain lucy bottle: Gram positive cocci in clusters resembling Staph      Results called to and read back by: Dari Espinoza RN      02/13/2025  10:25    Narrative:      Aerobic and anaerobic    Culture, Respiratory with Gram Stain [[id number]]     Order Status: No result Specimen: Respiratory from Sputum, Induced     Influenza A & B by Molecular [[id number]] Collected: 02/12/25 1050    Order Status: Sent Specimen: Nasopharyngeal Swab Updated: 02/12/25 1051            Significant Imaging: I have reviewed all pertinent imaging results/findings within the past 24 hours.

## 2025-02-13 NOTE — CONSULTS
[facility] ICU  Infectious Disease  Consult Note    Patient Name: Emily Martinez  MRN: [id number]  Admission Date: 2/12/2025  Hospital Length of Stay: 1 days  Attending Physician: Noman Louie MD  Primary Care Provider: Alireza Sosa MD     Isolation Status: No active isolations    Patient information was obtained from patient, past medical records, ER records, and primary team.      Inpatient consult to Infectious Diseases  Consult performed by: Elvia Abdalla MD  Consult ordered by: Noman Louie MD        Assessment/Plan:     Pulmonary  Aspiration pneumonia  I independently reviewed patient's lab work and images as documented. 60 yo female with DM, prior CVA with L hemiplegia and CKD admitted for AMS found to have HHS. ID consulted for prior MDRO organisms. Current admission course notable for blood cx on admit with GPC resembling staph (obtained same time/same location), PCR negative for staph aureus. CT with large stool burden, no obstructive uropathy, and RLL atelectasis/?consolidation. UA unrevealing. Pt currently has formed stools. Suspect blood cx on admit to be a contaminant. Leukocytosis noted, suspect 2/2 to HHS.     Recommendations:  -de-escalate to ceftriaxone x 5d for CAP/potential aspiration, last day 2/16  -continue vancomycin pharm to dose pending repeat blood cx  -repeat blood cx x 2, if negative x 48hr, stop vancomycin   -aspiration precautions        Thank you for your consult. Will sign off, please call with questions, new culture data or clinical changes. Above d/w primary team.         Elvia Abdalla MD  Infectious Disease  [facility]    Subjective:     Principal Problem: <principal problem not specified>    HPI: 60 yo female with DM, prior CVA with L hemiplegia and CKD admitted for AMS found to have HHS. ID consulted for prior MDRO organisms. Current admission course notable for blood cx on admit with GPC resembling staph (obtained same  time/same location), PCR negative for staph aureus. CT with large stool burden, no obstructive uropathy, and RLL atelectasis. UA unrevealing. Pt denied diarrhea, cough or sob. Pt is currently on azithro, tulio and vanc. Per chart review, pt had positive ESBL Ecoli ucx  and Cdiff in 2025, completed a course of erta/PO vanco. She was recently admitted last month for diverticulitis/contained perf and completed ertapenem at that time.           Past Medical History:   Diagnosis Date    Diabetes mellitus type I     Hyperlipidemia     Hypertension     Right sided weakness 2019       Past Surgical History:   Procedure Laterality Date     SECTION         Review of patient's allergies indicates:   Allergen Reactions    Sulfa (sulfonamide antibiotics) Itching    Sulfur        Medications:  Medications Prior to Admission   Medication Sig    acetaminophen (TYLENOL) 650 MG TbSR Take 1 tablet (650 mg total) by mouth every 8 (eight) hours. For back pain    albuterol (PROAIR HFA) 90 mcg/actuation inhaler inhale ONE TO TWO puffs EVERY 6 HOURS into lungs AS NEEDED FOR WHEEZING AND SHORTNESS OF BREATH    albuterol (PROVENTIL/VENTOLIN HFA) 90 mcg/actuation inhaler Inhale 2 puffs into the lungs every 6 (six) hours as needed for Wheezing. Rescue    aspirin (ECOTRIN) 81 MG EC tablet Take 1 tablet (81 mg total) by mouth once daily.    atorvastatin (LIPITOR) 40 MG tablet TAKE 1 TABLET(40 MG) BY MOUTH EVERY DAY    blood sugar diagnostic (TRUE METRIX GLUCOSE TEST STRIP) Strp TO CHECK BLOOD GLUCOSE THREE TIMES DAILY    blood-glucose meter (FREESTYLE SYSTEM KIT) kit Use daily- TID    capsicum 0.075% (ZOSTRIX) 0.075 % topical cream Apply topically 3 (three) times daily.    clopidogreL (PLAVIX) 75 mg tablet Take 1 tablet (75 mg total) by mouth once daily.    diaper,brief,adult,disposable Misc 1 each by Misc.(Non-Drug; Combo Route) route 3 (three) times daily.    diphenhydrAMINE (BENADRYL) 50 MG capsule Take 1 capsule (50  "mg total) by mouth every 6 (six) hours as needed for Itching.    famotidine (PEPCID) 20 MG tablet 1 tablet (20 mg total) by Per G Tube route 2 (two) times daily.    flash glucose sensor (FREESTYLE LEATHA 14 DAY SENSOR) Kit 1 each by Misc.(Non-Drug; Combo Route) route once daily.    fluticasone propionate (FLONASE) 50 mcg/actuation nasal spray 1 spray (50 mcg total) by Each Nostril route once daily.    food supplemt, lactose-reduced (ENSURE MAX PROTEIN) Liqd Take 118 mLs by mouth 3 (three) times daily.    gabapentin (NEURONTIN) 300 MG capsule Take 1 capsule (300 mg total) by mouth 2 (two) times daily.    insulin aspart U-100 (NOVOLOG) 100 unit/mL (3 mL) InPn pen Inject 3 Units into the skin every 6 (six) hours.    insulin glargine U-100, Lantus, 100 unit/mL (3 mL) SubQ InPn pen Inject 5 Units into the skin every evening.    lancets (ONETOUCH ULTRASOFT LANCETS) Misc Use 1 TID as directed for diabetes checking    metFORMIN (GLUCOPHAGE) 1000 MG tablet TAKE 1 TABLET(1000 MG) BY MOUTH TWICE DAILY    miconazole NITRATE 2 % (MICOTIN) 2 % top powder Apply topically 2 (two) times daily.    multivitamin Tab 1 tablet by Per G Tube route once daily.    oxyCODONE-acetaminophen (PERCOCET) 7.5-325 mg per tablet 1 tablet by Per G Tube route every 4 (four) hours as needed for Pain.    pen needle, diabetic (BD ULTRA-FINE ORIG PEN NEEDLE) 29 gauge x 1/2" Ndle USE TO TEST THREE TIMES DAILY MUST LAST 33 DAYS    rivaroxaban (XARELTO) 2.5 mg Tab 6 tablets (15 mg total) by Per G Tube route 2 (two) times daily with meals for 20 days, THEN 8 tablets (20 mg total) before evening meal.    walker (ULTRA-LIGHT ROLLATOR) Misc 1 each by Misc.(Non-Drug; Combo Route) route once daily at 6am.    wheelchair Kelsey 1 each by Misc.(Non-Drug; Combo Route) route 2 (two) times daily. Power Wheel Chair    white petrolatum 41 % Oint Apply topically 2 (two) times a day.     Antibiotics (From admission, onward)      Start     Stop Route Frequency Ordered    " "02/13/25 1200  meropenem 2 g in 0.9% NaCl 100 mL IVPB (MB+)         -- IV Every 12 hours (non-standard times) 02/13/25 0952    02/13/25 0900  azithromycin tablet 500 mg         02/16/25 0859 PER G TUBE Daily 02/12/25 1545    02/12/25 1501  vancomycin - pharmacy to dose  (vancomycin IVPB (PEDS and ADULTS))        Placed in "And" Linked Group    -- IV pharmacy to manage frequency 02/12/25 1401          Antifungals (From admission, onward)      None          Antivirals (From admission, onward)      None             Immunization History   Administered Date(s) Administered    COVID-19, MRNA, LN-S, PF (MODERNA FULL 0.5 ML DOSE) 03/31/2021, 05/01/2021    Hepatitis A, Adult 09/19/2005    Influenza - Quadrivalent - PF *Preferred* (6 months and older) 10/21/2021    Influenza - Trivalent - Afluria, Fluzone MDV 09/19/2005, 10/13/2011, 11/26/2019    PPD Test 07/24/2024    Tdap 03/21/2024       Family History       Problem Relation (Age of Onset)    Cancer Sister    Diabetes Mother, Sister, Brother, Maternal Aunt    Heart disease Maternal Aunt    Hyperlipidemia Mother, Sister, Brother    Hypertension Mother, Sister, Brother    Stroke Mother          Social History     Socioeconomic History    Marital status: Single   Tobacco Use    Smoking status: Every Day     Current packs/day: 1.50     Average packs/day: 1.5 packs/day for 35.0 years (52.5 ttl pk-yrs)     Types: Cigarettes    Smokeless tobacco: Never   Substance and Sexual Activity    Alcohol use: Not Currently    Drug use: Not Currently     Social Drivers of Health     Financial Resource Strain: Low Risk  (12/31/2024)    Overall Financial Resource Strain (CARDIA)     Difficulty of Paying Living Expenses: Not hard at all   Food Insecurity: No Food Insecurity (12/31/2024)    Hunger Vital Sign     Worried About Running Out of Food in the Last Year: Never true     Ran Out of Food in the Last Year: Never true   Transportation Needs: No Transportation Needs (12/31/2024)    " TRANSPORTATION NEEDS     Transportation : No   Physical Activity: Inactive (12/31/2024)    Exercise Vital Sign     Days of Exercise per Week: 0 days     Minutes of Exercise per Session: 0 min   Stress: No Stress Concern Present (12/31/2024)    [employer] - Occupational Stress Questionnaire     Feeling of Stress : Only a little   Housing Stability: Unknown (12/31/2024)    Housing Stability Vital Sign     Unable to Pay for Housing in the Last Year: No     Homeless in the Last Year: No     Review of Systems   Constitutional:  Negative for chills and fever.   Respiratory:  Negative for cough and shortness of breath.    Gastrointestinal:  Negative for abdominal pain, diarrhea, nausea and vomiting.   All other systems reviewed and are negative.    Objective:     Vital Signs (Most Recent):  Temp: 97.7 °F (36.5 °C) (02/13/25 1100)  Pulse: 85 (02/13/25 1100)  Resp: (!) 24 (02/13/25 1100)  BP: 109/75 (02/13/25 1100)  SpO2: 99 % (02/13/25 1100) Vital Signs (24h Range):  Temp:  [97.7 °F (36.5 °C)-98.7 °F (37.1 °C)] 97.7 °F (36.5 °C)  Pulse:  [] 85  Resp:  [14-28] 24  SpO2:  [91 %-100 %] 99 %  BP: ()/(62-92) 109/75     Weight: 57.2 kg (126 lb)  Body mass index is 22.32 kg/m².    Estimated Creatinine Clearance: 37.6 mL/min (based on SCr of 1.3 mg/dL).     Physical Exam  Constitutional:       General: She is not in acute distress.     Appearance: She is not ill-appearing or toxic-appearing.   Eyes:      General:         Right eye: No discharge.         Left eye: No discharge.   Pulmonary:      Effort: Pulmonary effort is normal. No respiratory distress.      Breath sounds: No rhonchi.   Abdominal:      General: There is no distension.      Palpations: Abdomen is soft.      Tenderness: There is no abdominal tenderness.      Comments: PEG - without cellulitic changes   Genitourinary:     Comments: horton  Musculoskeletal:      Comments: contracted   Skin:     General: Skin is warm and dry.    Neurological:      Mental Status: She is alert.          Significant Labs:   Microbiology Results (last 7 days)       Procedure Component Value Units Date/Time    MRSA/SA Rapid ID by PCR from Blood culture [[id number]] Collected: 02/12/25 1112    Order Status: No result Updated: 02/13/25 1029    Blood culture x two cultures. Draw prior to antibiotics. [[id number]] Collected: 02/12/25 1112    Order Status: Completed Specimen: Blood from Peripheral, Forearm, Left Updated: 02/13/25 1027     Blood Culture, Routine Gram stain aer bottle: Gram positive cocci in clusters resembling Staph      Gram stain lucy bottle: Gram positive cocci in clusters resembling Staph      Results called to and read back by: Ana Espinoza RN      02/13/2025  10:26    Narrative:      Aerobic and anaerobic    Blood culture x two cultures. Draw prior to antibiotics. [[id number]] Collected: 02/12/25 1112    Order Status: Completed Specimen: Blood from Peripheral, Forearm, Left Updated: 02/13/25 1026     Blood Culture, Routine Gram stain aer bottle: Gram positive cocci in clusters resembling Staph      Gram stain lucy bottle: Gram positive cocci in clusters resembling Staph      Results called to and read back by: Dari Espinoza RN      02/13/2025  10:25    Narrative:      Aerobic and anaerobic    Culture, Respiratory with Gram Stain [[id number]]     Order Status: No result Specimen: Respiratory from Sputum, Induced     Influenza A & B by Molecular [[id number]] Collected: 02/12/25 1050    Order Status: Sent Specimen: Nasopharyngeal Swab Updated: 02/12/25 1051            Significant Imaging: I have reviewed all pertinent imaging results/findings within the past 24 hours.

## 2025-02-13 NOTE — ASSESSMENT & PLAN NOTE
Presents with NICOLASA.  -continue to monitor renal fx  -administer IV fluids as tolerated    2/13/2025: Improvement in renal fx with eGFR of 46.8.

## 2025-02-13 NOTE — PLAN OF CARE
[place] - Med Surg  Initial Discharge Assessment        Primary Care Provider: Alireza Sosa MD     Admission Diagnosis: Dehydration [E86.0]  Chest pain [R07.9]  Diverticulitis [K57.92]     Admission Date: 12/30/2024  Expected Discharge Date: 1/18/2025     Transition of Care Barriers: Underinsured     Payor: MEDICAID / Plan: [place] [employer] / Product Type: Managed Medicaid /      Extended Emergency Contact Information  Primary Emergency Contact: Aneta Martinez   [facility]  Home Phone: [phone]  Work Phone: [phone]  Mobile Phone: [phone]  Relation: Daughter  Secondary Emergency Contact: jose manuel viera  Mobile Phone: [phone]  Relation: Daughter  Preferred language: English   needed? No     Discharge Plan A: Home with family, Home Health, Community Services  Discharge Plan B: Home with family, Home Health, Community Services        [id number] [address] AT [address] [address]  Phone: [phone] Fax: [phone]        Initial Assessment (most recent)         Adult Discharge Assessment - 02/13/25 1621                    Discharge Assessment     Assessment Type Discharge Planning Assessment      Confirmed/corrected address, phone number and insurance Yes      Confirmed Demographics Correct on Facesheet      Source of Information family      If unable to respond/provide information was family/caregiver contacted? Yes      Contact Name/Number Aneta Martinez, dtr cp# [phone] and Emily's wife (Shira Mccurdy)      When was your last doctors appointment? --   sometimes in November,2024 after Thanksgiving     Does patient/caregiver understand observation status Yes      Communicated ANA with patient/caregiver Date not available/Unable to determine      Reason For Admission Hyperkalemia      People in Home child(sonali), adult;other relative(s)      Do you expect to  return to your current living situation? Yes      Do you have help at home or someone to help you manage your care at home? Yes      Who are your caregiver(s) and their phone number(s)? Emily Martinez, [facility]# [phone], Shira Mccurdy and Kostas Bowman In home care givers and [facility]      Prior to hospitilization cognitive status: Unable to Assess      Current cognitive status: Unable to Assess      Walking or Climbing Stairs Difficulty yes      Walking or Climbing Stairs ambulation difficulty, dependent      Mobility Management Hospital bed, kayce lift, BSC, and wheelchair      Dressing/Bathing Difficulty yes      Dressing/Bathing bathing difficulty, dependent;dressing difficulty, dependent      Dressing/Bathing Management Patient is dependent      Home Layout Able to live on 1st floor      Equipment Currently Used at Home wheelchair;hospital bed;feeding device;lift device      Readmission within 30 days? Yes      Patient currently being followed by outpatient case management? No      Do you currently have service(s) that help you manage your care at home? Yes      Name and Contact number of agency [facility] and [facility] caregiver services hrs 9-3:00 pm      Is the pt/caregiver preference to resume services with current agency Yes      Do you take prescription medications? Yes      Do you have prescription coverage? Yes      Coverage Medicaid - [place] [employer]      Do you have any problems affording any of your prescribed medications? TBD      Is the patient taking medications as prescribed? yes      Who is going to help you get home at discharge? EMS      How do you get to doctors appointments? family or friend will provide      Are you on dialysis? No      Do you take coumadin? No   Eliquis     Discharge Plan A Home with family;Home Health;Community Services      Discharge Plan B Home with family;Home Health;Community Services      DME Needed Upon Discharge  other (see comments)   TBD      Discharge Plan discussed with: Adult children      Transition of Care Barriers Underinsured            Physical Activity     On average, how many days per week do you engage in moderate to strenuous exercise (like a brisk walk)? 0 days      On average, how many minutes do you engage in exercise at this level? 0 min            Financial Resource Strain     How hard is it for you to pay for the very basics like food, housing, medical care, and heating? Patient unable to answer            Housing Stability     In the last 12 months, was there a time when you were not able to pay the mortgage or rent on time? No      At any time in the past 12 months, were you homeless or living in a shelter (including now)? No            Transportation Needs     Has the lack of transportation kept you from medical appointments, meetings, work or from getting things needed for daily living? No            Food Insecurity     Within the past 12 months, you worried that your food would run out before you got the money to buy more. Patient unable to answer      Within the past 12 months, the food you bought just didn't last and you didn't have money to get more. Patient unable to answer            Stress     Do you feel stress - tense, restless, nervous, or anxious, or unable to sleep at night because your mind is troubled all the time - these days? Patient unable to answer            Social Isolation     How often do you feel lonely or isolated from those around you?  Patient unable to answer            Alcohol Use     Q1: How often do you have a drink containing alcohol? Never      Q2: How many drinks containing alcohol do you have on a typical day when you are drinking? Patient does not drink      Q3: How often do you have six or more drinks on one occasion? Never            Utilities     In the past 12 months has the electric, gas, oil, or water company threatened to shut off services in your home? No            Health Literacy      "How often do you need to have someone help you when you read instructions, pamphlets, or other written material from your doctor or pharmacy? Patient unable to respond            OTHER     Name(s) of People in Home Aneta aimee Martinez and her wife (Shira Mccurdy)                           Readmission Assessment (most recent)         Readmission Assessment - 02/13/25 1616                    Readmission     Was this a planned readmission? No      Why were you hospitalized in the last 30 days? C-difficile Colitis      Why were you readmitted? New medical problem      When you left the hospital how did you feel? unable to respond appropriately      When you left the hospital where did you go? Long Term Acute Care   [facility]     Did patient/caregiver refused recommended DC plan? No      Tell me about what happened between when you left the hospital and the day you returned. high sodium      When did you start not feeling well? " few days ago"      Did you try to manage your symptoms your self? No      Did you call anyone? Yes      Who did you call? Other (comments)   EMS     Did you try to see or did see a doctor or nurse before you came? No      Did you have  a follow-up appointment on discharge? Yes                                      SAMI met with patient to complete DPA but patient is confused and not able to answer any questions.  SAMI spoke with her daughter (Aneta) whom patient also lives with.  Per medical records and Aneta, patient recently discharged from [facility] on 1/31/2025 located in [facility] in [place], [place].  Per daughter, patient will return home once medically stable.  Aneta conveyed that patient has personal care attendants through the medicaid waiver program and home health services ([facility]) from 9-3:00 pm weekly.  SAMI provided dgtr with name and contact # for any further questions or concerns.       Discharge Plan A and " Plan B have been determined by review of patient's clinical status, future medical and therapeutic needs, and coverage/benefits for post-acute care in coordination with multidisciplinary team members.     Kalin Ibrahim, SILVER  [facility]  [phone]

## 2025-02-13 NOTE — SUBJECTIVE & OBJECTIVE
Past Medical History:   Diagnosis Date    Diabetes mellitus type I     Hyperlipidemia     Hypertension     Right sided weakness 2019       Past Surgical History:   Procedure Laterality Date     SECTION         Review of patient's allergies indicates:   Allergen Reactions    Sulfa (sulfonamide antibiotics) Itching    Sulfur        Family History       Problem Relation (Age of Onset)    Cancer Sister    Diabetes Mother, Sister, Brother, Maternal Aunt    Heart disease Maternal Aunt    Hyperlipidemia Mother, Sister, Brother    Hypertension Mother, Sister, Brother    Stroke Mother          Tobacco Use    Smoking status: Every Day     Current packs/day: 1.50     Average packs/day: 1.5 packs/day for 35.0 years (52.5 ttl pk-yrs)     Types: Cigarettes    Smokeless tobacco: Never   Substance and Sexual Activity    Alcohol use: Not Currently    Drug use: Not Currently    Sexual activity: Not on file      Review of Systems  Objective:     Vital Signs (Most Recent):  Temp: 97.7 °F (36.5 °C) (25 1100)  Pulse: 86 (25 1300)  Resp: 14 (25 1300)  BP: (!) 151/74 (25 1300)  SpO2: 98 % (25 1300) Vital Signs (24h Range):  Temp:  [97.7 °F (36.5 °C)-98.4 °F (36.9 °C)] 97.7 °F (36.5 °C)  Pulse:  [] 86  Resp:  [14-27] 14  SpO2:  [91 %-100 %] 98 %  BP: ()/(58-92) 151/74   Weight: 57.2 kg (126 lb)  Body mass index is 22.32 kg/m².      Intake/Output Summary (Last 24 hours) at 2025 1522  Last data filed at 2025 1300  Gross per 24 hour   Intake 2911.1 ml   Output 1430 ml   Net 1481.1 ml          Physical Exam  Vitals and nursing note reviewed. Exam conducted with a chaperone present.   Constitutional:       General: She is not in acute distress.     Appearance: Normal appearance. She is normal weight. She is not toxic-appearing or diaphoretic.      Comments: Oriented to person and place.   HENT:      Head: Normocephalic and atraumatic.      Right Ear: External ear normal.      Left  Ear: External ear normal.      Nose: Nose normal.      Mouth/Throat:      Mouth: Mucous membranes are dry.   Eyes:      General: No scleral icterus.        Right eye: No discharge.         Left eye: No discharge.   Cardiovascular:      Rate and Rhythm: Regular rhythm. Tachycardia present.      Heart sounds: No murmur heard.     No friction rub. No gallop.   Pulmonary:      Effort: Pulmonary effort is normal. No respiratory distress.      Breath sounds: Normal breath sounds. No stridor. No wheezing or rales.   Abdominal:      General: There is no distension.      Palpations: Abdomen is soft. There is no mass.      Tenderness: There is no abdominal tenderness. There is no guarding or rebound.      Hernia: No hernia is present.   Musculoskeletal:         General: No swelling, tenderness, deformity or signs of injury. Normal range of motion.      Right lower leg: No edema.      Left lower leg: No edema.   Skin:     Capillary Refill: Capillary refill takes less than 2 seconds.      Findings: No bruising, erythema or lesion.   Neurological:      Mental Status: She is alert.      Comments: Oriented to person and place. Following commands appropriately. Left sided weakness.            Vents:     Lines/Drains/Airways       Drain  Duration                  Gastrostomy/Enterostomy 01/02/25 1530 feeding 41 days         Urethral Catheter 02/12/25 1330 Double-lumen 16 Fr. 1 day              Peripheral Intravenous Line  Duration                  Peripheral IV - Single Lumen 20 G 1 1/4 in Anterior;Right Upper Arm -- days         Peripheral IV - Single Lumen 02/12/25 0000 20 G Anterior;Distal;Left Forearm 1 day         Peripheral IV - Single Lumen 02/12/25 1234 20 G Posterior;Right Wrist 1 day         Peripheral IV - Single Lumen 02/13/25 0000 18 G 1 3/4 in No Anterior;Left;Proximal Upper Arm <1 day                  Significant Labs:    CBC/Anemia Profile:  Recent Labs   Lab 02/12/25  1112 02/12/25  1122 02/12/25  1407  02/12/25  1626 02/13/25 0418   WBC 17.87*  --   --   --  15.41*   HGB 10.6*  --   --   --  9.1*   HCT 37.4   < > 29* 30* 29.4*   *  --   --   --  423   MCV 98  --   --   --  92   RDW 18.9*  --   --   --  18.4*    < > = values in this interval not displayed.        Chemistries:  Recent Labs   Lab 02/12/25  1112 02/12/25  1327 02/12/25  1406 02/12/25  1421 02/13/25  0004 02/13/25  0418 02/13/25  0922 02/13/25  1200   *  --   --    < > 161* 159* 157* 159*   K 6.5*  --   --    < > 5.0 4.2 4.1 4.5   *  --   --    < > 126* 125* 124* 124*   CO2 17*  --   --    < > 24 26 25 25   *  --   --    < > 95* 95* 89* 81*   CREATININE 2.4*  --   --    < > 1.4 1.3 1.3 1.3   CALCIUM 9.7  --   --    < > 9.9 9.5 9.4 9.6   ALBUMIN 2.4*  --   --   --   --   --   --   --    PROT 7.7  --   --   --   --   --   --   --    BILITOT 0.3  --   --   --   --   --   --   --    ALKPHOS 119  --   --   --   --   --   --   --    ALT 18  --   --   --   --   --   --   --    AST 23  --   --   --   --   --   --   --    MG  --   --  3.0*  --   --   --   --   --    PHOS  --    < >  --    < > 3.5 4.0  --  4.5    < > = values in this interval not displayed.       All pertinent labs within the past 24 hours have been reviewed.    Significant Imaging: I have reviewed all pertinent imaging results/findings within the past 24 hours.

## 2025-02-13 NOTE — PLAN OF CARE
MICU DAILY GOALS     Family/Goals of care/Code Status   Code Status: Full Code    24H Vital Sign Range  Temp:  [97.7 °F (36.5 °C)-98.4 °F (36.9 °C)]   Pulse:  []   Resp:  [14-27]   BP: ()/(58-92)   SpO2:  [91 %-100 %]      Shift Events (include procedures and significant events)   No acute events throughout shift    AWAKE RASS: Goal -    Actual -      Restraint necessity: Not necessary   BREATHE SBT: Not intubated    Coordinate A & B, analgesics/sedatives Pain: managed   SAT: Not intubated   Delirium CAM-ICU:     Early(intubated/ Progressive (non-intubated) Mobility MOVE Screen (INTUBATED ONLY): Not intubated    Activity: Activity Management: Rolling - L1   Feeding/Nutrition Diet order: Diet/Nutrition Received: NPO,     Thrombus DVT prophylaxis: VTE Core Measure: Provider determined low risk VTE   HOB Elevation Head of Bed (HOB) Positioning: HOB at 30 degrees   Ulcer Prophylaxis GI: yes   Glucose control managed Glycemic Management: blood glucose monitored   Skin Skin assessment:     Sacrum intact/not altered? No  Heels intact/not altered? No  Surgical wound? No    CHECK ONE!   (no altered skin or altered skin) and sub boxes:  [] No Altered Skin Integrity Present    []Prevention Measures Documented    [] Altered Skin Integrity Present or Discovered   [] LDA present in EPIC, daily doc completed              [] LDA added if not in EPIC (describe wound).                    When describing wound, do not stage, use descriptive words only.    [] Wound Image Taken (required on admit,                   transfer/discharge and every Tuesday)    Wound Care Consulted? Yes   Bowel Function no issues    Indwelling Catheter Necessity      Urethral Catheter 02/12/25 1330 Double-lumen 16 Fr.-Reason for Continuing Urinary Catheterization: Critically ill in ICU and requiring hourly monitoring of intake/output          De-escalation Antibiotics No        VS and assessment per flow sheet, patient progressing towards goals as  tolerated, plan of care reviewed with family, all concerns addressed, will continue to monitor.

## 2025-02-13 NOTE — PROGRESS NOTES
[facility] - [facility]  Critical Care Medicine  Progress Note    Patient Name: Emily Martinez  MRN: [id number]  Admission Date: 2/12/2025  Hospital Length of Stay: 1 days  Code Status: Full Code  Attending Provider: Noman Louie MD  Primary Care Provider: Alireza Sosa MD   Principal Problem: <principal problem not specified>    Subjective:     HPI:  Patient is a 58 y/o female with PMHx of diabetes (for which she takes insulin 3 times a day), history of CVA with chronic left hemiplegia, hypertension, CKD 4 presents by EMS for altered mental status. Patient brought to ED due to concerns for AMS and abdominal pain. Patient lives with daughter. Per daughter, patient has been complaining of abdominal pain and became less verbal since last night at 9pm. Decreased fluid intake for the past two days. Usually takes glucose readings at home, but machine had broke and replacement was delayed for today due to being back ordered. Patient recently discharged from hospital for UTI with E coli ESBL.  Currently considering sepsis vs pneumonia vs UTI as etiology of AMS.     Hospital/ICU Course:  2/13/2025: Improvement in glucose and improvement of major electrolyte disturbances. IV fluid replenishment with 5% dextrose .45% normal saline. Lantus increased to 10 U daily. Able to speak and responds to questions. Oriented to person and place. Following commands appropriately.     Interval History/Significant Events: Improvement in mental status. Increased dosage of long acting insulin. Switched to dextrose 5% .45% NS.    Review of Systems  Objective:     Vital Signs (Most Recent):  Temp: 97.7 °F (36.5 °C) (02/13/25 1100)  Pulse: 86 (02/13/25 1300)  Resp: 14 (02/13/25 1300)  BP: (!) 151/74 (02/13/25 1300)  SpO2: 98 % (02/13/25 1300) Vital Signs (24h Range):  Temp:  [97.7 °F (36.5 °C)-98.4 °F (36.9 °C)] 97.7 °F (36.5 °C)  Pulse:  [] 86  Resp:  [14-27] 14  SpO2:  [91 %-100 %] 98 %  BP: ()/(58-92) 151/74   Weight:  57.2 kg (126 lb)  Body mass index is 22.32 kg/m².      Intake/Output Summary (Last 24 hours) at 2/13/2025 1508  Last data filed at 2/13/2025 1300  Gross per 24 hour   Intake 2911.1 ml   Output 1430 ml   Net 1481.1 ml          Physical Exam  Vitals and nursing note reviewed. Exam conducted with a chaperone present.   Constitutional:       Appearance: Normal appearance. She is normal weight. She is ill-appearing. She is not diaphoretic.      Comments: Oriented to person and place.   HENT:      Head: Normocephalic and atraumatic.      Right Ear: External ear normal.      Left Ear: External ear normal.      Nose: Nose normal.      Mouth/Throat:      Mouth: Mucous membranes are dry.   Eyes:      General: No scleral icterus.        Right eye: No discharge.         Left eye: No discharge.   Cardiovascular:      Rate and Rhythm: Regular rhythm. Tachycardia present.      Heart sounds: No murmur heard.     No friction rub. No gallop.   Pulmonary:      Effort: Pulmonary effort is normal. No respiratory distress.      Breath sounds: Normal breath sounds. No stridor. No wheezing or rales.   Abdominal:      General: There is no distension.      Palpations: Abdomen is soft. There is no mass.      Tenderness: There is abdominal tenderness. There is no guarding or rebound.      Hernia: No hernia is present.   Musculoskeletal:         General: No swelling, tenderness, deformity or signs of injury. Normal range of motion.      Right lower leg: No edema.      Left lower leg: No edema.   Skin:     Capillary Refill: Capillary refill takes less than 2 seconds.      Findings: No bruising, erythema or lesion.   Neurological:      Mental Status: She is alert. She is disoriented.      Comments: Nonverbal. Does not track with eyes. Following commands appropriately.            Vents:     Lines/Drains/Airways       Drain  Duration                  Gastrostomy/Enterostomy 01/02/25 1530 feeding 41 days         Urethral Catheter 02/12/25 1330  Double-lumen 16 Fr. 1 day              Peripheral Intravenous Line  Duration                  Peripheral IV - Single Lumen 20 G 1 1/4 in Anterior;Right Upper Arm -- days         Peripheral IV - Single Lumen 02/12/25 0000 20 G Anterior;Distal;Left Forearm 1 day         Peripheral IV - Single Lumen 02/12/25 1234 20 G Posterior;Right Wrist 1 day         Peripheral IV - Single Lumen 02/13/25 0000 18 G 1 3/4 in No Anterior;Left;Proximal Upper Arm <1 day                  Significant Labs:    CBC/Anemia Profile:  Recent Labs   Lab 02/12/25  1112 02/12/25  1122 02/12/25  1407 02/12/25  1626 02/13/25  0418   WBC 17.87*  --   --   --  15.41*   HGB 10.6*  --   --   --  9.1*   HCT 37.4   < > 29* 30* 29.4*   *  --   --   --  423   MCV 98  --   --   --  92   RDW 18.9*  --   --   --  18.4*    < > = values in this interval not displayed.        Chemistries:  Recent Labs   Lab 02/12/25  1112 02/12/25  1327 02/12/25  1406 02/12/25  1421 02/13/25  0004 02/13/25  0418 02/13/25  0922 02/13/25  1200   *  --   --    < > 161* 159* 157* 159*   K 6.5*  --   --    < > 5.0 4.2 4.1 4.5   *  --   --    < > 126* 125* 124* 124*   CO2 17*  --   --    < > 24 26 25 25   *  --   --    < > 95* 95* 89* 81*   CREATININE 2.4*  --   --    < > 1.4 1.3 1.3 1.3   CALCIUM 9.7  --   --    < > 9.9 9.5 9.4 9.6   ALBUMIN 2.4*  --   --   --   --   --   --   --    PROT 7.7  --   --   --   --   --   --   --    BILITOT 0.3  --   --   --   --   --   --   --    ALKPHOS 119  --   --   --   --   --   --   --    ALT 18  --   --   --   --   --   --   --    AST 23  --   --   --   --   --   --   --    MG  --   --  3.0*  --   --   --   --   --    PHOS  --    < >  --    < > 3.5 4.0  --  4.5    < > = values in this interval not displayed.       All pertinent labs within the past 24 hours have been reviewed.    Significant Imaging:  I have reviewed all pertinent imaging results/findings within the past 24 hours.    [facility]  Recent Labs   Lab 02/12/25  112  "  PH 7.331   PO2 52.4   PCO2 42.8   HCO3 21.4     Assessment/Plan:     Neuro  AMS (altered mental status)  Patient presents with AMS and family member stating recent abdominal pain. Can consider UTI vs sepsis vs HHS vs PNA.    - Being treated with meropenem for concern of PNA.   - Will perform UA for evaluation of UTI.  - elevated glucose with slightly elevated beta-hydroxybutyrate. Being given IV fluids and insulin.  - CT abdomenpelvis wo contrast showing concern for "Questionable developing infectious consolidation within the right lower lobe versus atelectasis or scarring. "  Although findings may be related to previous PNA, will cover with abx for possible new PNA with vancomycin and meropenem.    Cerebrovascular accident (CVA)  Left sided hemiplegia. Negative CT head for any acute intracranial abnormalities on arrival to ED on 2/12/2025.    Pulmonary  Aspiration pneumonia  CT abdomen pelvis concerning for "Questionable developing infectious consolidation within the right lower lobe versus atelectasis or scarring." Family denies any recent aspiration events.     Renal/  Hyperkalemia  Patient presents with hyperkalemia of 6.5.   -Monitor K daily  - Treat with IV fluids and shifting with insulin drip    Hypernatremia  Presents with Na of 154.  - Treated with IV fluids  - Monitor Na daily    NICOLASA (acute kidney injury)  Presents with NICOLASA.  -continue to monitor renal fx  -administer IV fluids as tolerated    2/13/2025: Improvement in renal fx with eGFR of 46.8.     Endocrine  Type 2 diabetes mellitus with stage 4 chronic kidney disease, with long-term current use of insulin  Hx of DM  -Treating with insulin  - Monitor glucose   -Monitor electrolytes  - Receiving IV fluids as necessary    Lantus currently at 10U daily.       Critical Care Daily Checklist:    A: Awake: RASS Goal/Actual Goal:    Actual:     B: Spontaneous Breathing Trial Performed?     C: SAT & SBT Coordinated?  N/A                      D: Delirium: " CAM-ICU     E: Early Mobility Performed? No   F: Feeding Goal: Goals: 1. Initiate tube feeds within 24-48 hours of admission    2. Advance tube feeds to goal rate within 72 hours of initiation.  Status: Nutrition Goal Status: new   Current Diet Order   Procedures    Diet NPO      AS: Analgesia/Sedation acetaminophen   T: Thromboembolic Prophylaxis    H: HOB > 300 Yes   U: Stress Ulcer Prophylaxis (if needed) famotidine   G: Glucose Control Lantus 10U daily   B: Bowel Function Stool Occurrence: 1   I: Indwelling Catheter (Lines & Hernandez) Necessity PIV x4, gastrostomy. Urethral catheter   D: De-escalation of Antimicrobials/Pharmacotherapies     Plan for the day/ETD Stabilization and monitoring of glucose and electrolytes    Code Status:  Family/Goals of Care: Full Code         Critical secondary to Patient has a condition that poses threat to life and bodily function: [facility]      Critical care was time spent personally by me on the following activities: development of treatment plan with patient or surrogate and bedside caregivers, discussions with consultants, evaluation of patient's response to treatment, examination of patient, ordering and performing treatments and interventions, ordering and review of laboratory studies, ordering and review of radiographic studies, pulse oximetry, re-evaluation of patient's condition. This critical care time did not overlap with that of any other provider or involve time for any procedures.     Rubi Foley MD  Critical Care Medicine  [facility] - Medical ICU

## 2025-02-13 NOTE — CONSULTS
[facility] - Medical ICU  Adult Nutrition  Consult Note    SUMMARY   Recommendations    TF RECS: Initiate TF at trickle, slowly advancing to goal rate of Glucerna 1.2 @ 50 ml/hr to provide 1200 ml TFV, 1440 kcals, 72g PRO, 137 CHO, 19g Fiber, 966 mL water- FWF per MD    Consider adding MVI, Vitamin C, Zinc to aid in wound healing    Monitor labs, meds, skin, weight    Goals:   1. Initiate tube feeds within 24-48 hours of admission      2. Advance tube feeds to goal rate within 72 hours of initiation.  Nutrition Goal Status: new  Communication of RD Recs: other (comment) (poc)    Assessment and Plan    Nutrition Problem  Inadequate Enteral nutrition infusion    Related to (etiology):   No nutrition infusing     Signs and Symptoms (as evidenced by):   G-tube dependent patient w/o nutrition infusing at this time      Interventions/Recommendations (treatment strategy):  Collaboration with other providers  Enteral Nutrition     Nutrition Diagnosis Status:   New        Malnutrition Assessment     Skin (Micronutrient): wounds unhealed (PI to both feet and skin tear to buttocks)       Weight Loss (Malnutrition): greater than 20% in 1 year (79#/38.5% wt loss x 10 months (significant))                         Reason for Assessment    Reason For Assessment: consult  Diagnosis: other (see comments) (no principal problem)    General Information Comments: PMHx of diabetes T1 (for which she takes insulin 3 times a day), history of CVA with chronic left hemiplegia, hypertension, CKD 4 presents by EMS for altered mental status.     RD consulted for TF recs. G-tube in place. Pt's not intubated/sedated. PI to both feet and skin tear to buttocks noted. Pt resting peacefully during assessment. RD saw pt and daughter (caregiver) at bedside. States her mom was on Glucerna 1.5 continuous at home- TF since around Sept. 2024. Concerned about BG with TF. Was directed to give insulin periodically but finds its elevated often. States at one  "point she was refusing TF w/ nurse caregiver during the day. Per chart review 79#/38.5% wt loss x 10 months (significant). Daughter attest to this weight and is concerned about malnutrition. RN came in check on pt at the end of assessment, unable to complete NFPE at this time. RD suspects malnourished. RD to monitor    Nutrition Discharge Planning: At home TF Regimen    Nutrition Related Social Determinants of Health: SDOH: Adequate food in home environment    Nutrition/Diet History    Spiritual, Cultural Beliefs, Congregation Practices, Values that Affect Care: no  Food Allergies: NKFA    Anthropometrics    Height: 5' 3" (160 cm)  Height (inches): 63 in  Weight: 57.2 kg (126 lb)  Weight (lb): 126 lb  Weight Method: Estimated  Ideal Body Weight (IBW), Female: 115 lb  % Ideal Body Weight, Female (lb): 109.57 %  BMI (Calculated): 22.3  BMI Grade: 18.5-24.9 - normal       Lab/Procedures/Meds    Pertinent Labs Reviewed: reviewed  Pertinent Labs Comments: Na: 159, GFR: 46.8, Glu 146  Pertinent Medications Reviewed: reviewed  Pertinent Medications Comments: Atorvastatin, famotidine, bowel reg    Estimated/Assessed Needs    Weight Used For Calorie Calculations: 57.2 kg (126 lb 1.7 oz)  Energy Calorie Requirements (kcal): 7728-5047 (25-35 kcal/kg)  Energy Need Method: Kcal/kg  Protein Requirements: 45-68 (.8-1.2 CKD 4 w/ pressure injuries)  Weight Used For Protein Calculations: 57.2 kg (126 lb 1.7 oz)     Estimated Fluid Requirement Method: RDA Method  RDA Method (mL): 1430         Nutrition Prescription Ordered    Current Diet Order: NPO    Evaluation of Received Nutrient/Fluid Intake    I/O: +2.3 L since admit  Comments: LBM 2/13  % Intake of Estimated Energy Needs: 0 - 25 %  % Meal Intake: NPO    Nutrition Risk    Level of Risk/Frequency of Follow-up: moderate (f/u 1-2x/week)       Monitor and Evaluation    Food and Nutrient Intake: enteral nutrition intake  Food and Nutrient Adminstration: enteral and parenteral nutrition " administration  Anthropometric Measurements: height/length, weight, weight change, body mass index  Biochemical Data, Medical Tests and Procedures: electrolyte and renal panel, gastrointestinal profile, glucose/endocrine profile, inflammatory profile, lipid profile  Nutrition-Focused Physical Findings: overall appearance, extremities, muscles and bones, head and eyes, skin       Nutrition Follow-Up    RD Follow-up?: Yes

## 2025-02-13 NOTE — SUBJECTIVE & OBJECTIVE
Interval History/Significant Events: Improvement in mental status. Increased dosage of long acting insulin. Switched to dextrose 5% .45% NS.    Review of Systems  Objective:     Vital Signs (Most Recent):  Temp: 97.7 °F (36.5 °C) (02/13/25 1100)  Pulse: 86 (02/13/25 1300)  Resp: 14 (02/13/25 1300)  BP: (!) 151/74 (02/13/25 1300)  SpO2: 98 % (02/13/25 1300) Vital Signs (24h Range):  Temp:  [97.7 °F (36.5 °C)-98.4 °F (36.9 °C)] 97.7 °F (36.5 °C)  Pulse:  [] 86  Resp:  [14-27] 14  SpO2:  [91 %-100 %] 98 %  BP: ()/(58-92) 151/74   Weight: 57.2 kg (126 lb)  Body mass index is 22.32 kg/m².      Intake/Output Summary (Last 24 hours) at 2/13/2025 1508  Last data filed at 2/13/2025 1300  Gross per 24 hour   Intake 2911.1 ml   Output 1430 ml   Net 1481.1 ml          Physical Exam  Vitals and nursing note reviewed. Exam conducted with a chaperone present.   Constitutional:       Appearance: Normal appearance. She is normal weight. She is ill-appearing. She is not diaphoretic.      Comments: Oriented to person and place.   HENT:      Head: Normocephalic and atraumatic.      Right Ear: External ear normal.      Left Ear: External ear normal.      Nose: Nose normal.      Mouth/Throat:      Mouth: Mucous membranes are dry.   Eyes:      General: No scleral icterus.        Right eye: No discharge.         Left eye: No discharge.   Cardiovascular:      Rate and Rhythm: Regular rhythm. Tachycardia present.      Heart sounds: No murmur heard.     No friction rub. No gallop.   Pulmonary:      Effort: Pulmonary effort is normal. No respiratory distress.      Breath sounds: Normal breath sounds. No stridor. No wheezing or rales.   Abdominal:      General: There is no distension.      Palpations: Abdomen is soft. There is no mass.      Tenderness: There is abdominal tenderness. There is no guarding or rebound.      Hernia: No hernia is present.   Musculoskeletal:         General: No swelling, tenderness, deformity or signs of  injury. Normal range of motion.      Right lower leg: No edema.      Left lower leg: No edema.   Skin:     Capillary Refill: Capillary refill takes less than 2 seconds.      Findings: No bruising, erythema or lesion.   Neurological:      Mental Status: She is alert. She is disoriented.      Comments: Nonverbal. Does not track with eyes. Following commands appropriately.            Vents:     Lines/Drains/Airways       Drain  Duration                  Gastrostomy/Enterostomy 01/02/25 1530 feeding 41 days         Urethral Catheter 02/12/25 1330 Double-lumen 16 Fr. 1 day              Peripheral Intravenous Line  Duration                  Peripheral IV - Single Lumen 20 G 1 1/4 in Anterior;Right Upper Arm -- days         Peripheral IV - Single Lumen 02/12/25 0000 20 G Anterior;Distal;Left Forearm 1 day         Peripheral IV - Single Lumen 02/12/25 1234 20 G Posterior;Right Wrist 1 day         Peripheral IV - Single Lumen 02/13/25 0000 18 G 1 3/4 in No Anterior;Left;Proximal Upper Arm <1 day                  Significant Labs:    CBC/Anemia Profile:  Recent Labs   Lab 02/12/25  1112 02/12/25  1122 02/12/25  1407 02/12/25  1626 02/13/25  0418   WBC 17.87*  --   --   --  15.41*   HGB 10.6*  --   --   --  9.1*   HCT 37.4   < > 29* 30* 29.4*   *  --   --   --  423   MCV 98  --   --   --  92   RDW 18.9*  --   --   --  18.4*    < > = values in this interval not displayed.        Chemistries:  Recent Labs   Lab 02/12/25  1112 02/12/25  1327 02/12/25  1406 02/12/25  1421 02/13/25  0004 02/13/25  0418 02/13/25  0922 02/13/25  1200   *  --   --    < > 161* 159* 157* 159*   K 6.5*  --   --    < > 5.0 4.2 4.1 4.5   *  --   --    < > 126* 125* 124* 124*   CO2 17*  --   --    < > 24 26 25 25   *  --   --    < > 95* 95* 89* 81*   CREATININE 2.4*  --   --    < > 1.4 1.3 1.3 1.3   CALCIUM 9.7  --   --    < > 9.9 9.5 9.4 9.6   ALBUMIN 2.4*  --   --   --   --   --   --   --    PROT 7.7  --   --   --   --   --   --    --    BILITOT 0.3  --   --   --   --   --   --   --    ALKPHOS 119  --   --   --   --   --   --   --    ALT 18  --   --   --   --   --   --   --    AST 23  --   --   --   --   --   --   --    MG  --   --  3.0*  --   --   --   --   --    PHOS  --    < >  --    < > 3.5 4.0  --  4.5    < > = values in this interval not displayed.       All pertinent labs within the past 24 hours have been reviewed.    Significant Imaging:  I have reviewed all pertinent imaging results/findings within the past 24 hours.

## 2025-02-13 NOTE — PROGRESS NOTES
Pharmacokinetic Assessment Follow Up: IV Vancomycin    Vancomycin serum concentration assessment(s):    The random level was drawn correctly and can be used to guide therapy at this time. The measurement is within the desired definitive target range of 15 to 20 mcg/mL.    Patient in NICOLASA on admission, now resolving. Will schedule regimen    Vancomycin Regimen Plan:    Change regimen to Vancomycin 750 mg IV every 24 hours with next serum trough concentration measured at 1600 prior to 3rd dose on 2/16    Drug levels (last 3 results):  Recent Labs   Lab Result Units 02/13/25  1200   Vancomycin, Random ug/mL 17.0       Pharmacy will continue to follow and monitor vancomycin.    Please contact pharmacy at extension [phone] for questions regarding this assessment.    Thank you for the consult,   Leslie Way       Patient brief summary:  Emily Martinez is a 59 y.o. female initiated on antimicrobial therapy with IV Vancomycin for treatment of bacteremia    The patient's current regimen is pulse dose with NICOLASA    Drug Allergies:   Review of patient's allergies indicates:   Allergen Reactions    Sulfa (sulfonamide antibiotics) Itching    Sulfur        Actual Body Weight:   57.2 kg     Renal Function:   Estimated Creatinine Clearance: 40.7 mL/min (based on SCr of 1.2 mg/dL).,     Dialysis Method (if applicable):  N/A    CBC (last 72 hours):  Recent Labs   Lab Result Units 02/12/25  1112 02/13/25  0418   WBC K/uL 17.87* 15.41*   Hemoglobin g/dL 10.6* 9.1*   Hematocrit % 37.4 29.4*   Platelets K/uL 513* 423   Gran % % 79.5* 73.0   Lymph % % 11.8* 14.5*   Mono % % 7.8 10.2   Eosinophil % % 0.1 1.4   Basophil % % 0.3 0.5   Differential Method  Automated Automated       Metabolic Panel (last 72 hours):  Recent Labs   Lab Result Units 02/12/25  1112 02/12/25  1327 02/12/25  1329 02/12/25  1406 02/12/25  1421 02/12/25  1620 02/12/25  1830 02/12/25  2056 02/13/25  0004 02/13/25  0418 02/13/25  0922 02/13/25  1200 02/13/25  1328   Sodium  mmol/L 154*  --   --   --  154* 155* 156* 160* 161* 159* 157* 159* 157*   Potassium mmol/L 6.5*  --   --   --  4.9 5.1 4.2 4.4 5.0 4.2 4.1 4.5 5.0   Chloride mmol/L 118*  --   --   --  120* 122* 125* 122* 126* 125* 124* 124* 125*   CO2 mmol/L 17*  --   --   --  15* 17* 18* 26 24 26 25 25 23   Glucose mg/dL 1,131*  --   --   --  943* 809* 655* 506* 232* 146* 187* 240* 263*   Glucose, UA   --   --  4+*  --   --   --   --   --   --   --   --   --   --    BUN mg/dL 110*  --   --   --  108* 107* 95* 101* 95* 95* 89* 81* 82*   Creatinine mg/dL 2.4*  --   --   --  2.0* 2.0* 1.6* 1.6* 1.4 1.3 1.3 1.3 1.2   Albumin g/dL 2.4*  --   --   --   --   --   --   --   --   --   --   --   --    Total Bilirubin mg/dL 0.3  --   --   --   --   --   --   --   --   --   --   --   --    Alkaline Phosphatase U/L 119  --   --   --   --   --   --   --   --   --   --   --   --    AST U/L 23  --   --   --   --   --   --   --   --   --   --   --   --    ALT U/L 18  --   --   --   --   --   --   --   --   --   --   --   --    Magnesium mg/dL  --   --   --  3.0*  --   --   --   --   --   --   --   --   --    Phosphorus mg/dL  --  4.6*  --   --   --  3.5  --  4.0 3.5 4.0  --  4.5  --        Vancomycin Administrations:  vancomycin given in the last 96 hours                     vancomycin 1,500 mg in 0.9% NaCl 250 mL IVPB (admixture device) (mg) 1,500 mg New Bag 02/12/25 1228                    Microbiologic Results:  Microbiology Results (last 7 days)       Procedure Component Value Units Date/Time    Blood culture [[id number]]     Order Status: Sent Specimen: Blood     Blood culture [[id number]]     Order Status: Sent Specimen: Blood     MRSA/SA Rapid ID by PCR from Blood culture [[id number]] Collected: 02/12/25 1112    Order Status: Completed Updated: 02/13/25 1159     Staph aureus ID by PCR Negative     Methicillin Resistant ID by PCR Negative    Narrative:      Aerobic and anaerobic    Blood culture x two cultures. Draw prior to antibiotics.  [[id number]] Collected: 02/12/25 1112    Order Status: Completed Specimen: Blood from Peripheral, Forearm, Left Updated: 02/13/25 1027     Blood Culture, Routine Gram stain aer bottle: Gram positive cocci in clusters resembling Staph      Gram stain lucy bottle: Gram positive cocci in clusters resembling Staph      Results called to and read back by: Ana Espinoza RN      02/13/2025  10:26    Narrative:      Aerobic and anaerobic    Blood culture x two cultures. Draw prior to antibiotics. [[id number]] Collected: 02/12/25 1112    Order Status: Completed Specimen: Blood from Peripheral, Forearm, Left Updated: 02/13/25 1026     Blood Culture, Routine Gram stain aer bottle: Gram positive cocci in clusters resembling Staph      Gram stain lucy bottle: Gram positive cocci in clusters resembling Staph      Results called to and read back by: Dari Espinoza RN      02/13/2025  10:25    Narrative:      Aerobic and anaerobic    Culture, Respiratory with Gram Stain [[id number]]     Order Status: No result Specimen: Respiratory from Sputum, Induced     Influenza A & B by Molecular [[id number]] Collected: 02/12/25 1050    Order Status: Sent Specimen: Nasopharyngeal Swab Updated: 02/12/25 1051

## 2025-02-13 NOTE — PLAN OF CARE
02/13/25 1702   Post-Acute Status   Post-Acute Authorization Home Health   Home Health Status Pending medical clearance/testing   Discharge Delays None known at this time   Discharge Plan   Discharge Plan A Home with family;Home Health;Community Services   Discharge Plan B Community Services;Home with family;Home Health      spoke with Aneta Martinez dtr ph# [phone], who conveyed that patient will return home upon discharge.  Patient has patient care attendants ([facility]) and home health with [facility].  Patient has the following dme: hospital bed, kayce lift, BSC, and wheelchair.  aimee Cornell expressed no concerns or need at time of assessment.  CM dept will continue while patient remains in the hospital.    Discharge Plan A and Plan B have been determined by review of patient's clinical status, future medical and therapeutic needs, and coverage/benefits for post-acute care in coordination with multidisciplinary team members.      Kalin Ibrahim, SILVER  [facility]  [phone]

## 2025-02-13 NOTE — PLAN OF CARE
Recommendations    TF RECS: Initiate TF at trickle, slowly advancing to goal rate of Glucerna 1.2 @ 50 ml/hr to provide 1200 ml TFV, 1440 kcals, 72g PRO, 137 CHO, 19g Fiber, 966 mL water- FWF per MD    Consider adding MVI, Vitamin C, Zinc to aid in wound healing    Monitor labs, meds, skin, weight    Goals:   1. Initiate tube feeds within 24-48 hours of admission      2. Advance tube feeds to goal rate within 72 hours of initiation.  Nutrition Goal Status: new  Communication of RD Recs: other (comment) (poc)

## 2025-02-13 NOTE — ASSESSMENT & PLAN NOTE
Hx of DM  -Treating with insulin  - Monitor glucose   -Monitor electrolytes  - Receiving IV fluids as necessary    Lantus currently at 10U daily.

## 2025-02-13 NOTE — ASSESSMENT & PLAN NOTE
I independently reviewed patient's lab work and images as documented. 58 yo female with DM, prior CVA with L hemiplegia and CKD admitted for AMS found to have HHS. ID consulted for prior MDRO organisms. Current admission course notable for blood cx on admit with GPC resembling staph (obtained same time/same location), PCR negative for staph aureus. CT with large stool burden, no obstructive uropathy, and RLL atelectasis/?consolidation. UA unrevealing. Pt currently has formed stools. Suspect blood cx on admit to be a contaminant. Leukocytosis noted, suspect 2/2 to HHS.     Recommendations:  -de-escalate to ceftriaxone x 5d for CAP/potential aspiration, last day 2/16  -repeat blood cx x 2, if negative x 48hr, stop vancomycin   -aspiration precautions

## 2025-02-13 NOTE — HPI
58 yo female with DM, prior CVA with L hemiplegia and CKD admitted for AMS found to have HHS. ID consulted for prior MDRO organisms. Current admission course notable for blood cx on admit with GPC resembling staph (obtained same time/same location), PCR negative for staph aureus. CT with large stool burden, no obstructive uropathy, and RLL atelectasis. UA unrevealing. Pt denied diarrhea, cough or sob. Pt is currently on azithro, tulio and vanc. Per chart review, pt had positive ESBL Ecoli ucx 12/30 and Cdiff in January 2025, completed a course of erta/PO vanco. She was recently admitted last month for diverticulitis/contained perf and completed ertapenem at that time.

## 2025-02-13 NOTE — PLAN OF CARE
MICU DAILY GOALS     Family/Goals of care/Code Status   Code Status: Full Code    24H Vital Sign Range  Temp:  [97.8 °F (36.6 °C)-100 °F (37.8 °C)]   Pulse:  []   Resp:  [17-28]   BP: ()/(62-80)   SpO2:  [91 %-100 %]      Shift Events (include procedures and significant events)   Insulin drip in use for glucose control.Glucose no longer critical,so bmp's are now every 4 hrs    AWAKE RASS: Goal -    Actual -      Restraint necessity: Not necessary   BREATHE SBT: Not intubated    Coordinate A & B, analgesics/sedatives Pain: managed   SAT: Not intubated   Delirium CAM-ICU:     Early(intubated/ Progressive (non-intubated) Mobility MOVE Screen (INTUBATED ONLY): Not intubated    Activity: Activity Management: Rolling - L1   Feeding/Nutrition Diet order: Diet/Nutrition Received: NPO,     Thrombus DVT prophylaxis: VTE Core Measure: (SCDs) Sequential compression device initiated/maintained (to be placed)   HOB Elevation Head of Bed (HOB) Positioning: HOB at 30 degrees   Ulcer Prophylaxis GI: yes   Glucose control managed Glycemic Management: blood glucose monitored (continous insulin gtt with hrly glucose checks but q 2 bmp until under 500 then q 4 bmps with hrly sugars checks to follow)   Skin Skin assessment:     Sacrum intact/not altered? No  Heels intact/not altered? No  Surgical wound? No    CHECK ONE!   (no altered skin or altered skin) and sub boxes:  [] No Altered Skin Integrity Present    []Prevention Measures Documented    [x] Altered Skin Integrity Present or Discovered   [x] LDA present in EPIC, daily doc completed              [] LDA added if not in EPIC (describe wound).                    When describing wound, do not stage, use descriptive words only.    [] Wound Image Taken (required on admit,                   transfer/discharge and every Tuesday)    Wound Care Consulted? Yes   Bowel Function no issues    Indwelling Catheter Necessity            De-escalation Antibiotics No        VS and  assessment per flow sheet, patient progressing towards goals as tolerated, plan of care reviewed with Emily Martinez, all concerns addressed, will continue to monitor.

## 2025-02-14 LAB
ANION GAP SERPL CALC-SCNC: 10 MMOL/L (ref 8–16)
ANION GAP SERPL CALC-SCNC: 9 MMOL/L (ref 8–16)
ANION GAP SERPL CALC-SCNC: 9 MMOL/L (ref 8–16)
BASOPHILS # BLD AUTO: 0.06 K/UL (ref 0–0.2)
BASOPHILS NFR BLD: 0.5 % (ref 0–1.9)
BUN SERPL-MCNC: 50 MG/DL (ref 8–23)
BUN SERPL-MCNC: 61 MG/DL (ref 8–23)
BUN SERPL-MCNC: 63 MG/DL (ref 8–23)
CALCIUM SERPL-MCNC: 8.3 MG/DL (ref 8.7–10.5)
CALCIUM SERPL-MCNC: 9 MG/DL (ref 8.7–10.5)
CALCIUM SERPL-MCNC: 9 MG/DL (ref 8.7–10.5)
CHLORIDE SERPL-SCNC: 122 MMOL/L (ref 95–110)
CHLORIDE SERPL-SCNC: 123 MMOL/L (ref 95–110)
CHLORIDE SERPL-SCNC: 126 MMOL/L (ref 95–110)
CO2 SERPL-SCNC: 17 MMOL/L (ref 23–29)
CO2 SERPL-SCNC: 20 MMOL/L (ref 23–29)
CO2 SERPL-SCNC: 22 MMOL/L (ref 23–29)
CREAT SERPL-MCNC: 0.8 MG/DL (ref 0.5–1.4)
CREAT SERPL-MCNC: 1 MG/DL (ref 0.5–1.4)
CREAT SERPL-MCNC: 1 MG/DL (ref 0.5–1.4)
DIFFERENTIAL METHOD BLD: ABNORMAL
EOSINOPHIL # BLD AUTO: 0.8 K/UL (ref 0–0.5)
EOSINOPHIL NFR BLD: 6.8 % (ref 0–8)
ERYTHROCYTE [DISTWIDTH] IN BLOOD BY AUTOMATED COUNT: 18.6 % (ref 11.5–14.5)
EST. GFR  (NO RACE VARIABLE): >60 ML/MIN/1.73 M^2
GLUCOSE SERPL-MCNC: 105 MG/DL (ref 70–110)
GLUCOSE SERPL-MCNC: 110 MG/DL (ref 70–110)
GLUCOSE SERPL-MCNC: 96 MG/DL (ref 70–110)
HCT VFR BLD AUTO: 28.7 % (ref 37–48.5)
HGB BLD-MCNC: 9 G/DL (ref 12–16)
IMM GRANULOCYTES # BLD AUTO: 0.05 K/UL (ref 0–0.04)
IMM GRANULOCYTES NFR BLD AUTO: 0.5 % (ref 0–0.5)
LYMPHOCYTES # BLD AUTO: 1.7 K/UL (ref 1–4.8)
LYMPHOCYTES NFR BLD: 15.6 % (ref 18–48)
MAGNESIUM SERPL-MCNC: 2.6 MG/DL (ref 1.6–2.6)
MAGNESIUM SERPL-MCNC: 2.6 MG/DL (ref 1.6–2.6)
MCH RBC QN AUTO: 28.8 PG (ref 27–31)
MCHC RBC AUTO-ENTMCNC: 31.4 G/DL (ref 32–36)
MCV RBC AUTO: 92 FL (ref 82–98)
MONOCYTES # BLD AUTO: 0.8 K/UL (ref 0.3–1)
MONOCYTES NFR BLD: 7.3 % (ref 4–15)
NEUTROPHILS # BLD AUTO: 7.6 K/UL (ref 1.8–7.7)
NEUTROPHILS NFR BLD: 69.3 % (ref 38–73)
NRBC BLD-RTO: 0 /100 WBC
OSMOLALITY SERPL: 334 MOSM/KG (ref 275–295)
PHOSPHATE SERPL-MCNC: 3.7 MG/DL (ref 2.7–4.5)
PHOSPHATE SERPL-MCNC: 3.9 MG/DL (ref 2.7–4.5)
PHOSPHATE SERPL-MCNC: 3.9 MG/DL (ref 2.7–4.5)
PLATELET # BLD AUTO: 363 K/UL (ref 150–450)
PMV BLD AUTO: 11.8 FL (ref 9.2–12.9)
POCT GLUCOSE: 104 MG/DL (ref 70–110)
POCT GLUCOSE: 114 MG/DL (ref 70–110)
POCT GLUCOSE: 123 MG/DL (ref 70–110)
POCT GLUCOSE: 126 MG/DL (ref 70–110)
POCT GLUCOSE: 127 MG/DL (ref 70–110)
POCT GLUCOSE: 161 MG/DL (ref 70–110)
POCT GLUCOSE: 97 MG/DL (ref 70–110)
POTASSIUM SERPL-SCNC: 4 MMOL/L (ref 3.5–5.1)
POTASSIUM SERPL-SCNC: 4.1 MMOL/L (ref 3.5–5.1)
POTASSIUM SERPL-SCNC: 4.7 MMOL/L (ref 3.5–5.1)
RBC # BLD AUTO: 3.12 M/UL (ref 4–5.4)
SODIUM SERPL-SCNC: 148 MMOL/L (ref 136–145)
SODIUM SERPL-SCNC: 153 MMOL/L (ref 136–145)
SODIUM SERPL-SCNC: 157 MMOL/L (ref 136–145)
WBC # BLD AUTO: 11.03 K/UL (ref 3.9–12.7)

## 2025-02-14 PROCEDURE — 63700000 PHARM REV CODE 250 ALT 637 W/O HCPCS

## 2025-02-14 PROCEDURE — 83735 ASSAY OF MAGNESIUM: CPT

## 2025-02-14 PROCEDURE — 94761 N-INVAS EAR/PLS OXIMETRY MLT: CPT

## 2025-02-14 PROCEDURE — 20600001 HC STEP DOWN PRIVATE ROOM

## 2025-02-14 PROCEDURE — 80048 BASIC METABOLIC PNL TOTAL CA: CPT | Mod: 91

## 2025-02-14 PROCEDURE — 25000003 PHARM REV CODE 250

## 2025-02-14 PROCEDURE — 25000003 PHARM REV CODE 250: Performed by: INTERNAL MEDICINE

## 2025-02-14 PROCEDURE — 85025 COMPLETE CBC W/AUTO DIFF WBC: CPT

## 2025-02-14 PROCEDURE — 99233 SBSQ HOSP IP/OBS HIGH 50: CPT | Mod: ,,, | Performed by: INTERNAL MEDICINE

## 2025-02-14 PROCEDURE — 83930 ASSAY OF BLOOD OSMOLALITY: CPT | Performed by: PHYSICIAN ASSISTANT

## 2025-02-14 PROCEDURE — 84100 ASSAY OF PHOSPHORUS: CPT | Mod: 91 | Performed by: PHYSICIAN ASSISTANT

## 2025-02-14 PROCEDURE — 63600175 PHARM REV CODE 636 W HCPCS

## 2025-02-14 PROCEDURE — 80048 BASIC METABOLIC PNL TOTAL CA: CPT

## 2025-02-14 PROCEDURE — 63600175 PHARM REV CODE 636 W HCPCS: Performed by: INTERNAL MEDICINE

## 2025-02-14 RX ORDER — INSULIN GLARGINE 100 [IU]/ML
5 INJECTION, SOLUTION SUBCUTANEOUS DAILY
Status: DISCONTINUED | OUTPATIENT
Start: 2025-02-15 | End: 2025-02-19 | Stop reason: HOSPADM

## 2025-02-14 RX ORDER — ZINC SULFATE 50(220)MG
220 CAPSULE ORAL DAILY
Status: DISCONTINUED | OUTPATIENT
Start: 2025-02-14 | End: 2025-02-19 | Stop reason: HOSPADM

## 2025-02-14 RX ORDER — FAMOTIDINE 20 MG/1
20 TABLET, FILM COATED ORAL 2 TIMES DAILY
Status: DISCONTINUED | OUTPATIENT
Start: 2025-02-14 | End: 2025-02-19 | Stop reason: HOSPADM

## 2025-02-14 RX ORDER — B-COMPLEX WITH VITAMIN C
1 TABLET ORAL DAILY
Status: DISCONTINUED | OUTPATIENT
Start: 2025-02-14 | End: 2025-02-19 | Stop reason: HOSPADM

## 2025-02-14 RX ORDER — CEFTRIAXONE 2 G/1
2 INJECTION, POWDER, FOR SOLUTION INTRAMUSCULAR; INTRAVENOUS
Status: COMPLETED | OUTPATIENT
Start: 2025-02-14 | End: 2025-02-16

## 2025-02-14 RX ORDER — ASCORBIC ACID 250 MG
250 TABLET ORAL DAILY
Status: DISCONTINUED | OUTPATIENT
Start: 2025-02-14 | End: 2025-02-14

## 2025-02-14 RX ADMIN — ZINC SULFATE 220 MG (50 MG) CAPSULE 220 MG: CAPSULE at 08:02

## 2025-02-14 RX ADMIN — ENOXAPARIN SODIUM 40 MG: 40 INJECTION SUBCUTANEOUS at 04:02

## 2025-02-14 RX ADMIN — INSULIN GLARGINE 10 UNITS: 100 INJECTION, SOLUTION SUBCUTANEOUS at 08:02

## 2025-02-14 RX ADMIN — AZITHROMYCIN DIHYDRATE 500 MG: 250 TABLET ORAL at 08:02

## 2025-02-14 RX ADMIN — SODIUM CHLORIDE: 450 INJECTION, SOLUTION INTRAVENOUS at 06:02

## 2025-02-14 RX ADMIN — FAMOTIDINE 20 MG: 20 TABLET ORAL at 08:02

## 2025-02-14 RX ADMIN — Medication 1 TABLET: at 08:02

## 2025-02-14 RX ADMIN — SODIUM CHLORIDE: 450 INJECTION, SOLUTION INTRAVENOUS at 12:02

## 2025-02-14 RX ADMIN — ATORVASTATIN CALCIUM 40 MG: 40 TABLET, FILM COATED ORAL at 08:02

## 2025-02-14 RX ADMIN — VANCOMYCIN HYDROCHLORIDE 750 MG: 750 INJECTION, POWDER, LYOPHILIZED, FOR SOLUTION INTRAVENOUS at 04:02

## 2025-02-14 RX ADMIN — CEFTRIAXONE 2 G: 2 INJECTION, POWDER, FOR SOLUTION INTRAMUSCULAR; INTRAVENOUS at 12:02

## 2025-02-14 RX ADMIN — CEFTRIAXONE 2 G: 2 INJECTION, POWDER, FOR SOLUTION INTRAMUSCULAR; INTRAVENOUS at 10:02

## 2025-02-14 NOTE — HOSPITAL COURSE
"Type 2 diabetes mellitus with stage 4 chronic kidney disease, with long-term current use of insulin  Hyperosmolar Hyperglycemic State    Patient's FSGs are uncontrolled due to hyperglycemia on current medication regimen.  Last A1c reviewed-         Lab Results   Component Value Date     LABA1C 13.0 (H) 04/13/2016     HGBA1C 5.8 (H) 12/30/2024      Most recent fingerstick glucose reviewed-          Recent Labs   Lab 02/17/25  2122 02/18/25  0508 02/18/25  0838 02/18/25  1252   POCTGLUCOSE 101 168* 180* 156*         Current correctional scale  Medium  Maintain anti-hyperglycemic dose as follows-   Antihyperglycemics (From admission, onward)        Start     Stop Route Frequency Ordered     02/15/25 0900   insulin glargine U-100 (Lantus) pen 5 Units         -- SubQ Daily 02/14/25 1006     02/13/25 1059   insulin aspart U-100 pen 0-10 Units         -- SubQ Every 4 hours PRN 02/13/25 0959             - Treated with insulin with improvement of symptoms  - Continued sub w insulin with stable mentation  - Discharged on home insulin  - Discharge home with HH        Hypernatremia  Hypernatremia is likely due to Dehydration. The patient's most recent sodium results are listed below.        Recent Labs     02/16/25  0715 02/17/25  0830 02/18/25  0617   * 146* 145         - Aim to correct the sodium by 8-10mEq in 24 hours.   - Plan to correct their hypernatremia with Select IV fluids: 1/2 NS  and FWF  - Will plan to trend the patient's sodium: Daily  - The patient's hypernatremia is improving       AMS (altered mental status)  Patient presents with AMS and family member stating recent abdominal pain. Can consider UTI vs sepsis vs HHS vs PNA.     - Being treated with meropenem for concern of PNA.   - Will perform UA for evaluation of UTI.  - elevated glucose with slightly elevated beta-hydroxybutyrate. Being given IV fluids and insulin.  - CT abdomenpelvis wo contrast showing concern for "Questionable developing infectious " "consolidation within the right lower lobe versus atelectasis or scarring. "  Although findings may be related to previous PNA, will cover with abx for possible new PNA with vancomycin and meropenem.  - AMS has resolved since 2/13/2025.       Aspiration pneumonia  CT abdomen pelvis concerning for "Questionable developing infectious consolidation within the right lower lobe versus atelectasis or scarring." Family denies any recent aspiration events.     - s/p meropenem       Hyperkalemia  Hyperkalemia is likely due to NICOLASA.The patients most recent potassium results are listed below.       Recent Labs     02/16/25  0715 02/17/25  0830   K 4.1 4.2         - Monitor for arrhythmias with EKG and/or continuous telemetry.   - Monitor potassium: Daily  - The patient's hyperkalemia is resolved       NICOLASA (acute kidney injury)  NICOLASA is likely due to pre-renal azotemia due to intravascular volume depletion. Baseline creatinine is  normal . Most recent creatinine and eGFR are listed below.       Recent Labs     02/16/25  0715 02/17/25  0830   CREATININE 0.8 0.7   EGFRNORACEVR >60.0 >60.0         - NICOLASA is resolved with volume resolution      Patient deemed appropriate for discharge. I personally saw, examined, and evaluated patient prior to departure. Plan discussed with patient, who was agreeable and amenable; medications were discussed and reviewed, outpatient follow-up scheduled, ER precautions were given, all questions were answered to the patient's satisfaction, and Emily Martinez was subsequently discharged.    "

## 2025-02-14 NOTE — ASSESSMENT & PLAN NOTE
NICOLASA is likely due to pre-renal azotemia due to intravascular volume depletion. Baseline creatinine is  normal . Most recent creatinine and eGFR are listed below.  Recent Labs     02/14/25  0000 02/14/25  0355 02/14/25  1200   CREATININE 1.0 1.0 0.8   EGFRNORACEVR >60.0 >60.0 >60.0      Plan  - NICOLASA is resolved  - Avoid nephrotoxins and renally dose meds for GFR listed above  - Monitor urine output, serial BMP, and adjust therapy as needed

## 2025-02-14 NOTE — ASSESSMENT & PLAN NOTE
Patient presents with hyperkalemia of 6.5.   -Monitor K daily  - Treat with IV fluids and shifting with insulin drip    2/14/2025: Hyperkalemia has since resolved. Insulin drip discontinued.   -Continue monitoring K.

## 2025-02-14 NOTE — PLAN OF CARE
MICU DAILY GOALS     Family/Goals of care/Code Status   Code Status: Full Code    24H Vital Sign Range  Temp:  [97.7 °F (36.5 °C)-97.9 °F (36.6 °C)]   Pulse:  [80-89]   Resp:  [14-34]   BP: ()/(59-82)   SpO2:  [99 %-100 %]      Shift Events (include procedures and significant events)   No acute events throughout shift    AWAKE RASS: Goal -    Actual -      Restraint necessity: Removing medical devices   BREATHE SBT: Not intubated    Coordinate A & B, analgesics/sedatives Pain: managed   SAT: Not intubated   Delirium CAM-ICU:     Early(intubated/ Progressive (non-intubated) Mobility MOVE Screen (INTUBATED ONLY): Not intubated    Activity: Activity Management: Rolling - L1   Feeding/Nutrition Diet order: Diet/Nutrition Received: NPO, tube feeding,     Thrombus DVT prophylaxis: VTE Core Measure: Pharmacological prophylaxis initiated/maintained   HOB Elevation Head of Bed (HOB) Positioning: HOB at 30 degrees   Ulcer Prophylaxis GI: yes   Glucose control managed Glycemic Management: blood glucose monitored   Skin Skin assessment:     Sacrum intact/not altered? No  Heels intact/not altered? No  Surgical wound? No    CHECK ONE!   (no altered skin or altered skin) and sub boxes:  [] No Altered Skin Integrity Present    []Prevention Measures Documented    [] Altered Skin Integrity Present or Discovered   [] LDA present in EPIC, daily doc completed              [] LDA added if not in EPIC (describe wound).                    When describing wound, do not stage, use descriptive words only.    [] Wound Image Taken (required on admit,                   transfer/discharge and every Tuesday)    Wound Care Consulted? Yes   Bowel Function no issues    Indwelling Catheter Necessity      Urethral Catheter 02/12/25 1330 Double-lumen 16 Fr.-Reason for Continuing Urinary Catheterization: Critically ill in ICU and requiring hourly monitoring of intake/output          De-escalation Antibiotics No        VS and assessment per flow  sheet, patient progressing towards goals as tolerated, plan of care reviewed with family, all concerns addressed, will continue to monitor.

## 2025-02-14 NOTE — PHARMACY MED REC
"        Admission Medication History     The home medication history was taken by Cori Nielsen.    You may go to "Admission" then "Reconcile Home Medications" tabs to review and/or act upon these items.     The home medication list has been updated by the Pharmacy department.   Please read ALL comments highlighted in yellow.   Please address this information as you see fit.    Feel free to contact us if you have any questions or require assistance.      The medications listed below were removed from the home medication list. Please reorder if appropriate:  Patient reports no longer taking the following medication(s):  Proair   Proventil   Plavix   Pepcid   Neurontin   Novolog  Glucophage   Percocet   Xarelto      Medications listed below were obtained from: Patient/family and Analytic software- [employer] Medications   Medication Sig    acetaminophen (TYLENOL) 650 MG TbSR Take 1 tablet (650 mg total) by mouth every 8 (eight) hours.       aspirin (ECOTRIN) 81 MG EC tablet Take 1 tablet (81 mg total) by mouth once daily.      atorvastatin (LIPITOR) 40 MG tablet TAKE 1 TABLET(40 MG) BY MOUTH once daily.      capsicum 0.075% (ZOSTRIX) 0.075 % topical cream Apply topically 3 (three) times daily.      clopidogreL (PLAVIX) 75 mg tablet Take 1 tablet (75 mg total) by mouth once daily.      diphenhydrAMINE (BENADRYL) 50 MG capsule Take 1 capsule (50 mg total) by mouth every 6 (six) hours as needed for Itching.      fluticasone propionate (FLONASE) 50 mcg/actuation nasal spray Spray 1 spray (50 mcg total) by Each Nostril route once daily.      food supplemt, lactose-reduced (ENSURE MAX PROTEIN) Liqd Take 118 mLs by mouth 3 (three) times daily.      insulin glargine U-100, Lantus, 100 unit/mL (3 mL) SubQ InPn  Inject 5 Units into the skin every evening.      insulin lispro protamin-lispro 100 unit/mL (50-50) InPn Inject 10 units into the skin once daily..      miconazole NITRATE 2 % (MICOTIN) 2 % top powder Apply topically 2 " (two) times daily.      multivitamin Tab Take 1 tablet by Per G Tube route once daily.      white petrolatum 41 % Oint Apply topically 2 (two) times a day.       Coir Nielsen  [id number]          .

## 2025-02-14 NOTE — ASSESSMENT & PLAN NOTE
Presents with NICOLASA.  -continue to monitor renal fx  -administer IV fluids as tolerated    2/13/2025: Improvement in renal fx with eGFR of 46.8.   2/14/2025: Improvement in renal fx with eGFR of > 60.

## 2025-02-14 NOTE — ASSESSMENT & PLAN NOTE
Hypernatremia is likely due to Dehydration. The patient's most recent sodium results are listed below.  Recent Labs     02/14/25  0000 02/14/25  0355 02/14/25  1200   * 153* 148*     Plan  - Aim to correct the sodium by 8-10mEq in 24 hours.   - Plan to correct their hypernatremia with Select IV fluids: 1/2 NS    - Will plan to trend the patient's sodium: Daily  - The patient's hypernatremia is improving

## 2025-02-14 NOTE — PROGRESS NOTES
[facility] Medical ICU  Critical Care Medicine  Progress Note    Patient Name: Emily Martinez  MRN: [id number]  Admission Date: 2025  Hospital Length of Stay: 2 days  Code Status: Full Code  Attending Provider: Noman Louie MD  Primary Care Provider: Alireza Sosa MD   Principal Problem: <principal problem not specified>    Subjective:     HPI:  Patient is a 60 y/o female with PMHx of diabetes (for which she takes insulin 3 times a day), history of CVA with chronic left hemiplegia, hypertension, CKD 4 presents by EMS for altered mental status. Patient brought to ED due to concerns for AMS and abdominal pain. Patient lives with daughter. Per daughter, patient has been complaining of abdominal pain and became less verbal since last night at 9pm. Decreased fluid intake for the past two days. Usually takes glucose readings at home, but machine had broke and replacement was delayed for today due to being back ordered. Patient recently discharged from hospital for UTI with E coli ESBL.  Currently considering sepsis vs pneumonia vs UTI as etiology of AMS.     Hospital/ICU Course:  2025: Improvement in glucose and improvement of major electrolyte disturbances. IV fluid replenishment with 5% dextrose .45% normal saline. Lantus increased to 10 U daily. Able to speak and responds to questions. Oriented to person and place. Following commands appropriately.     2025: At basline mental status. Further improvement of electrolytes with IV .45% NS and lantus 5U. Tube feeds set as recommended by nutrition.     Past Medical History:   Diagnosis Date    Diabetes mellitus type I     Hyperlipidemia     Hypertension     Right sided weakness 2019       Past Surgical History:   Procedure Laterality Date     SECTION         Review of patient's allergies indicates:   Allergen Reactions    Sulfa (sulfonamide antibiotics) Itching    Sulfur        Family History       Problem Relation (Age of Onset)     Cancer Sister    Diabetes Mother, Sister, Brother, Maternal Aunt    Heart disease Maternal Aunt    Hyperlipidemia Mother, Sister, Brother    Hypertension Mother, Sister, Brother    Stroke Mother          Tobacco Use    Smoking status: Every Day     Current packs/day: 1.50     Average packs/day: 1.5 packs/day for 35.0 years (52.5 ttl pk-yrs)     Types: Cigarettes    Smokeless tobacco: Never   Substance and Sexual Activity    Alcohol use: Not Currently    Drug use: Not Currently    Sexual activity: Not on file      Review of Systems  Objective:     Vital Signs (Most Recent):  Temp: 97.7 °F (36.5 °C) (02/13/25 1100)  Pulse: 86 (02/13/25 1300)  Resp: 14 (02/13/25 1300)  BP: (!) 151/74 (02/13/25 1300)  SpO2: 98 % (02/13/25 1300) Vital Signs (24h Range):  Temp:  [97.7 °F (36.5 °C)-98.4 °F (36.9 °C)] 97.7 °F (36.5 °C)  Pulse:  [] 86  Resp:  [14-27] 14  SpO2:  [91 %-100 %] 98 %  BP: ()/(58-92) 151/74   Weight: 57.2 kg (126 lb)  Body mass index is 22.32 kg/m².      Intake/Output Summary (Last 24 hours) at 2/13/2025 1522  Last data filed at 2/13/2025 1300  Gross per 24 hour   Intake 2911.1 ml   Output 1430 ml   Net 1481.1 ml          Physical Exam  Vitals and nursing note reviewed. Exam conducted with a chaperone present.   Constitutional:       General: She is not in acute distress.     Appearance: Normal appearance. She is normal weight. She is not toxic-appearing or diaphoretic.      Comments: Oriented to person and place.   HENT:      Head: Normocephalic and atraumatic.      Right Ear: External ear normal.      Left Ear: External ear normal.      Nose: Nose normal.      Mouth/Throat:      Mouth: Mucous membranes are dry.   Eyes:      General: No scleral icterus.        Right eye: No discharge.         Left eye: No discharge.   Cardiovascular:      Rate and Rhythm: Regular rhythm. Tachycardia present.      Heart sounds: No murmur heard.     No friction rub. No gallop.   Pulmonary:      Effort: Pulmonary effort  is normal. No respiratory distress.      Breath sounds: Normal breath sounds. No stridor. No wheezing or rales.   Abdominal:      General: There is no distension.      Palpations: Abdomen is soft. There is no mass.      Tenderness: There is no abdominal tenderness. There is no guarding or rebound.      Hernia: No hernia is present.   Musculoskeletal:         General: No swelling, tenderness, deformity or signs of injury. Normal range of motion.      Right lower leg: No edema.      Left lower leg: No edema.   Skin:     Capillary Refill: Capillary refill takes less than 2 seconds.      Findings: No bruising, erythema or lesion.   Neurological:      Mental Status: She is alert.      Comments: Oriented to person and place. Following commands appropriately. Left sided weakness.            Vents:     Lines/Drains/Airways       Drain  Duration                  Gastrostomy/Enterostomy 01/02/25 1530 feeding 41 days         Urethral Catheter 02/12/25 1330 Double-lumen 16 Fr. 1 day              Peripheral Intravenous Line  Duration                  Peripheral IV - Single Lumen 20 G 1 1/4 in Anterior;Right Upper Arm -- days         Peripheral IV - Single Lumen 02/12/25 0000 20 G Anterior;Distal;Left Forearm 1 day         Peripheral IV - Single Lumen 02/12/25 1234 20 G Posterior;Right Wrist 1 day         Peripheral IV - Single Lumen 02/13/25 0000 18 G 1 3/4 in No Anterior;Left;Proximal Upper Arm <1 day                  Significant Labs:    CBC/Anemia Profile:  Recent Labs   Lab 02/12/25  1112 02/12/25  1122 02/12/25  1407 02/12/25  1626 02/13/25  0418   WBC 17.87*  --   --   --  15.41*   HGB 10.6*  --   --   --  9.1*   HCT 37.4   < > 29* 30* 29.4*   *  --   --   --  423   MCV 98  --   --   --  92   RDW 18.9*  --   --   --  18.4*    < > = values in this interval not displayed.        Chemistries:  Recent Labs   Lab 02/12/25  1112 02/12/25  1327 02/12/25  1406 02/12/25  1421 02/13/25  0004 02/13/25  0418 02/13/25  0922  "02/13/25  1200   *  --   --    < > 161* 159* 157* 159*   K 6.5*  --   --    < > 5.0 4.2 4.1 4.5   *  --   --    < > 126* 125* 124* 124*   CO2 17*  --   --    < > 24 26 25 25   *  --   --    < > 95* 95* 89* 81*   CREATININE 2.4*  --   --    < > 1.4 1.3 1.3 1.3   CALCIUM 9.7  --   --    < > 9.9 9.5 9.4 9.6   ALBUMIN 2.4*  --   --   --   --   --   --   --    PROT 7.7  --   --   --   --   --   --   --    BILITOT 0.3  --   --   --   --   --   --   --    ALKPHOS 119  --   --   --   --   --   --   --    ALT 18  --   --   --   --   --   --   --    AST 23  --   --   --   --   --   --   --    MG  --   --  3.0*  --   --   --   --   --    PHOS  --    < >  --    < > 3.5 4.0  --  4.5    < > = values in this interval not displayed.       All pertinent labs within the past 24 hours have been reviewed.    Significant Imaging: I have reviewed all pertinent imaging results/findings within the past 24 hours.    ABG  Recent Labs   Lab 02/12/25  1122   PH 7.331   PO2 52.4   PCO2 42.8   HCO3 21.4     Assessment/Plan:     Neuro  AMS (altered mental status)  Patient presents with AMS and family member stating recent abdominal pain. Can consider UTI vs sepsis vs HHS vs PNA.    - Being treated with meropenem for concern of PNA.   - Will perform UA for evaluation of UTI.  - elevated glucose with slightly elevated beta-hydroxybutyrate. Being given IV fluids and insulin.  - CT abdomenpelvis wo contrast showing concern for "Questionable developing infectious consolidation within the right lower lobe versus atelectasis or scarring. "  Although findings may be related to previous PNA, will cover with abx for possible new PNA with vancomycin and meropenem.    AMS has resolved since 2/13/2025.    Cerebrovascular accident (CVA)  Left sided hemiplegia. Negative CT head for any acute intracranial abnormalities on arrival to ED on 2/12/2025.    Pulmonary  Aspiration pneumonia  CT abdomen pelvis concerning for "Questionable developing " "infectious consolidation within the right lower lobe versus atelectasis or scarring." Family denies any recent aspiration events.     2/14/2025: Improving leukocytosis. Being treated with IV Vancomycin. New blood cultures obtained yesterday due to contaminants in first BC.     Renal/  Hyperkalemia  Patient presents with hyperkalemia of 6.5.   -Monitor K daily  - Treat with IV fluids and shifting with insulin drip    2/14/2025: Hyperkalemia has since resolved. Insulin drip discontinued.   -Continue monitoring K.      Hypernatremia  Presents with Na of 154.  - Treated with IV fluids  - Monitor Na daily    NICOLASA (acute kidney injury)  Presents with NICOLASA.  -continue to monitor renal fx  -administer IV fluids as tolerated    2/13/2025: Improvement in renal fx with eGFR of 46.8.   2/14/2025: Improvement in renal fx with eGFR of > 60.    Endocrine  Type 2 diabetes mellitus with stage 4 chronic kidney disease, with long-term current use of insulin  Hx of DM  -Treating with insulin  - Monitor glucose   -Monitor electrolytes  - Receiving IV fluids as necessary    Lantus currently at 5U daily.       Critical Care Daily Checklist:    A: Awake: RASS Goal/Actual Goal:    Actual:     B: Spontaneous Breathing Trial Performed?     C: SAT & SBT Coordinated?  N/A                      D: Delirium: CAM-ICU     E: Early Mobility Performed? No   F: Feeding Goal: Goals: 1. Initiate tube feeds within 24-48 hours of admission    2. Advance tube feeds to goal rate within 72 hours of initiation.  Status: Nutrition Goal Status: new   Current Diet Order   Procedures    Diet NPO      AS: Analgesia/Sedation acteaminophen   T: Thromboembolic Prophylaxis enoxaparin   H: HOB > 300 Yes   U: Stress Ulcer Prophylaxis (if needed) famotidine   G: Glucose Control Dextrose and glucagon prn   B: Bowel Function Stool Occurrence: 1   I: Indwelling Catheter (Lines & Hernandez) Necessity PIV x2, gastrostomy, urethral catheter   D: De-escalation of " Antimicrobials/Pharmacotherapies     Plan for the day/ETD stepdown    Code Status:  Family/Goals of Care: Full Code         Critical secondary to Patient has a condition that poses threat to life and bodily function: [facility]      Critical care was time spent personally by me on the following activities: development of treatment plan with patient or surrogate and bedside caregivers, discussions with consultants, evaluation of patient's response to treatment, examination of patient, ordering and performing treatments and interventions, ordering and review of laboratory studies, ordering and review of radiographic studies, pulse oximetry, re-evaluation of patient's condition. This critical care time did not overlap with that of any other provider or involve time for any procedures.     Rubi Foley MD  Critical Care Medicine  [facility] - Medical ICU

## 2025-02-14 NOTE — ASSESSMENT & PLAN NOTE
"CT abdomen pelvis concerning for "Questionable developing infectious consolidation within the right lower lobe versus atelectasis or scarring." Family denies any recent aspiration events.     2/14/2025: Improving leukocytosis. Being treated with IV Vancomycin. New blood cultures obtained yesterday due to contaminants in first BC.   "

## 2025-02-14 NOTE — RESIDENT HANDOFF
Handoff     Primary Team: [facility] [place] CRITICAL CARE MEDICINE TEAM 1 Room Number: [id number] [id number]     Patient Name: Emily Martinez MRN: [id number]     Date of Birth: [id number] Allergies: Sulfa (sulfonamide antibiotics) and Sulfur     Age: 59 y.o. Admit Date: 2/12/2025     Sex: female  BMI: Body mass index is 22.32 kg/m².     Code Status: Full Code        Illness Level (current clinical status): Watcher - No    Reason for Admission: <principal problem not specified>    Brief HPI (pertinent PMH and diagnosis or differential diagnosis): Patient is a 60 y/o female with PMHx of diabetes (for which she takes insulin 3 times a day), history of CVA with chronic left hemiplegia, hypertension, CKD 4 presents by EMS for altered mental status. Admitted to ICU for HHS.       Hospital Course (updated, brief assessment by system or problem, significant events):   Treated with insulin drip and IV fluids for hyperglycemia of 1,100, hypernatremia and hyperkalemia. Upon euglycemia, insulin drip was discontinued and placed on lantus. IV fluids changed to .45% NS for repletion of free fluid. As of 2/14/2025, hypernatremia improved to 148, K WNL. No anion gap. Treating with vancomycin until new blood cultures are avaliable (previous blood cultures with presumed contaminants). Improvement of leukocytosis. Currently, free water deficit of around .6L. The infusion with .45% NS reduced to 50ml/hr. Receiving tube feeds with 300 mL q6hr free water flushes.     Tasks (specific, using if-then statements):   - Monitor electrolytes and glucose.    -Reduce free water intake as improvement of free water deficit continues. Serum osmolality ordered. Currently receiving free water through infusion with .45 NS and 300 mL q6hr free water flushes through feeding tube.     -discontinue vancomycin when repeat of blood cultures return.     -Monitor urine output.       Discharge Disposition: Home or Self Care    Mentored By: Noman Louie MD

## 2025-02-14 NOTE — PLAN OF CARE
MICU DAILY GOALS     Family/Goals of care/Code Status   Code Status: Full Code    24H Vital Sign Range  Temp:  [97.7 °F (36.5 °C)-97.9 °F (36.6 °C)]   Pulse:  [81-89]   Resp:  [14-34]   BP: ()/(58-92)   SpO2:  [98 %-100 %]      Shift Events (include procedures and significant events)   No acute events throughout shift    AWAKE RASS: Goal -    Actual -      Restraint necessity: Removing medical devices   BREATHE SBT: Not intubated    Coordinate A & B, analgesics/sedatives Pain: managed   SAT: Not intubated   Delirium CAM-ICU:     Early(intubated/ Progressive (non-intubated) Mobility MOVE Screen (INTUBATED ONLY): Not intubated    Activity: Activity Management: Rolling - L1   Feeding/Nutrition Diet order: Diet/Nutrition Received: NPO,     Thrombus DVT prophylaxis: VTE Core Measure: Provider determined low risk VTE   HOB Elevation Head of Bed (HOB) Positioning: HOB elevated   Ulcer Prophylaxis GI: yes   Glucose control managed Glycemic Management: blood glucose monitored   Skin Skin assessment:     Sacrum intact/not altered? NO  Heels intact/not altered? NO  Surgical wound? Yes    CHECK ONE!   (no altered skin or altered skin) and sub boxes:  [] No Altered Skin Integrity Present    [x]Prevention Measures Documented    [x] Altered Skin Integrity Present or Discovered   [x] LDA present in EPIC, daily doc completed              [x] LDA added if not in EPIC (describe wound).                    When describing wound, do not stage, use descriptive words only.    [x] Wound Image Taken (required on admit,                   transfer/discharge and every Tuesday)    Wound Care Consulted? Yes   Bowel Function no issues    Indwelling Catheter Necessity      Urethral Catheter 02/12/25 1330 Double-lumen 16 Fr.-Reason for Continuing Urinary Catheterization: Critically ill in ICU and requiring hourly monitoring of intake/output          De-escalation Antibiotics Yes        VS and assessment per flow sheet, patient progressing  towards goals as tolerated, plan of care reviewed with    , all concerns addressed, will continue to monitor.

## 2025-02-14 NOTE — HPI
58 y/o female with PMHx of diabetes (for which she takes insulin 3 times a day), history of CVA with chronic left hemiplegia, hypertension, CKD 4 presents by EMS for altered mental status. Patient brought to ED due to concerns for AMS and abdominal pain. Patient lives with daughter. Per daughter, patient has been complaining of abdominal pain and became less verbal since last night at 9pm. Decreased fluid intake for the past two days. Usually takes glucose readings at home, but machine had broke and replacement was delayed for today due to being back ordered. Patient recently discharged from hospital for UTI with E coli ESBL. Currently considering sepsis vs pneumonia vs UTI as etiology of AMS.

## 2025-02-14 NOTE — ASSESSMENT & PLAN NOTE
Patient's FSGs are uncontrolled due to hyperglycemia on current medication regimen.  Last A1c reviewed-   Lab Results   Component Value Date    LABA1C 13.0 (H) 04/13/2016    HGBA1C 5.8 (H) 12/30/2024     Most recent fingerstick glucose reviewed-   Recent Labs   Lab 02/14/25  0000 02/14/25  0353 02/14/25  0732 02/14/25  1141   POCTGLUCOSE 126* 104 97 123*     Current correctional scale  Medium  Maintain anti-hyperglycemic dose as follows-   Antihyperglycemics (From admission, onward)      Start     Stop Route Frequency Ordered    02/15/25 0900  insulin glargine U-100 (Lantus) pen 5 Units         -- SubQ Daily 02/14/25 1006    02/13/25 1059  insulin aspart U-100 pen 0-10 Units         -- SubQ Every 4 hours PRN 02/13/25 0959          Hold Oral hypoglycemics while patient is in the hospital.

## 2025-02-14 NOTE — ASSESSMENT & PLAN NOTE
"Patient presents with AMS and family member stating recent abdominal pain. Can consider UTI vs sepsis vs HHS vs PNA.     - Being treated with meropenem for concern of PNA.   - Will perform UA for evaluation of UTI.  - elevated glucose with slightly elevated beta-hydroxybutyrate. Being given IV fluids and insulin.  - CT abdomenpelvis wo contrast showing concern for "Questionable developing infectious consolidation within the right lower lobe versus atelectasis or scarring. "  Although findings may be related to previous PNA, will cover with abx for possible new PNA with vancomycin and meropenem.   - AMS has resolved since 2/13/2025.  "

## 2025-02-14 NOTE — ASSESSMENT & PLAN NOTE
"Patient presents with AMS and family member stating recent abdominal pain. Can consider UTI vs sepsis vs HHS vs PNA.    - Being treated with meropenem for concern of PNA.   - Will perform UA for evaluation of UTI.  - elevated glucose with slightly elevated beta-hydroxybutyrate. Being given IV fluids and insulin.  - CT abdomenpelvis wo contrast showing concern for "Questionable developing infectious consolidation within the right lower lobe versus atelectasis or scarring. "  Although findings may be related to previous PNA, will cover with abx for possible new PNA with vancomycin and meropenem.    AMS has resolved since 2/13/2025.  "

## 2025-02-14 NOTE — ASSESSMENT & PLAN NOTE
Hyperkalemia is likely due to NICOLASA.The patients most recent potassium results are listed below.  Recent Labs     02/14/25  0000 02/14/25  0355 02/14/25  1200   K 4.0 4.1 4.7     Plan  - Monitor for arrhythmias with EKG and/or continuous telemetry.   - Monitor potassium: Daily  - The patient's hyperkalemia is resolved

## 2025-02-14 NOTE — ASSESSMENT & PLAN NOTE
Hx of DM  -Treating with insulin  - Monitor glucose   -Monitor electrolytes  - Receiving IV fluids as necessary    Lantus currently at 5U daily.

## 2025-02-15 LAB
POCT GLUCOSE: 138 MG/DL (ref 70–110)
POCT GLUCOSE: 142 MG/DL (ref 70–110)
POCT GLUCOSE: 145 MG/DL (ref 70–110)

## 2025-02-15 PROCEDURE — 25000003 PHARM REV CODE 250: Performed by: INTERNAL MEDICINE

## 2025-02-15 PROCEDURE — 63700000 PHARM REV CODE 250 ALT 637 W/O HCPCS

## 2025-02-15 PROCEDURE — 63600175 PHARM REV CODE 636 W HCPCS: Performed by: INTERNAL MEDICINE

## 2025-02-15 PROCEDURE — 63600175 PHARM REV CODE 636 W HCPCS

## 2025-02-15 PROCEDURE — 25000003 PHARM REV CODE 250

## 2025-02-15 PROCEDURE — 20600001 HC STEP DOWN PRIVATE ROOM

## 2025-02-15 RX ADMIN — CEFTRIAXONE 2 G: 2 INJECTION, POWDER, FOR SOLUTION INTRAMUSCULAR; INTRAVENOUS at 10:02

## 2025-02-15 RX ADMIN — VANCOMYCIN HYDROCHLORIDE 750 MG: 750 INJECTION, POWDER, LYOPHILIZED, FOR SOLUTION INTRAVENOUS at 05:02

## 2025-02-15 RX ADMIN — SENNOSIDES AND DOCUSATE SODIUM 1 TABLET: 50; 8.6 TABLET ORAL at 09:02

## 2025-02-15 RX ADMIN — ZINC SULFATE 220 MG (50 MG) CAPSULE 220 MG: CAPSULE at 09:02

## 2025-02-15 RX ADMIN — Medication 1 TABLET: at 09:02

## 2025-02-15 RX ADMIN — ENOXAPARIN SODIUM 40 MG: 40 INJECTION SUBCUTANEOUS at 05:02

## 2025-02-15 RX ADMIN — ATORVASTATIN CALCIUM 40 MG: 40 TABLET, FILM COATED ORAL at 09:02

## 2025-02-15 RX ADMIN — AZITHROMYCIN DIHYDRATE 500 MG: 250 TABLET ORAL at 09:02

## 2025-02-15 RX ADMIN — FAMOTIDINE 20 MG: 20 TABLET ORAL at 09:02

## 2025-02-15 RX ADMIN — FAMOTIDINE 20 MG: 20 TABLET ORAL at 10:02

## 2025-02-15 RX ADMIN — INSULIN GLARGINE 5 UNITS: 100 INJECTION, SOLUTION SUBCUTANEOUS at 09:02

## 2025-02-15 NOTE — SUBJECTIVE & OBJECTIVE
Interval History:   Stepped down from ICU on yesterday. Na improving to 148. Continue IVF and TF. No complaints.       Review of Systems   All other systems reviewed and are negative.    Objective:     Vital Signs (Most Recent):  Temp: 98 °F (36.7 °C) (02/15/25 1201)  Pulse: 75 (02/15/25 1201)  Resp: 18 (02/15/25 1201)  BP: (!) 130/90 (02/15/25 1201)  SpO2: 98 % (02/15/25 1201) Vital Signs (24h Range):  Temp:  [97.7 °F (36.5 °C)-99 °F (37.2 °C)] 98 °F (36.7 °C)  Pulse:  [73-87] 75  Resp:  [18-24] 18  SpO2:  [98 %-100 %] 98 %  BP: (124-165)/(65-90) 130/90     Weight: 57.2 kg (126 lb)  Body mass index is 22.32 kg/m².    Intake/Output Summary (Last 24 hours) at 2/15/2025 1240  Last data filed at 2/15/2025 1000  Gross per 24 hour   Intake 1324.41 ml   Output 2400 ml   Net -1075.59 ml         Physical Exam  Vitals and nursing note reviewed.   Constitutional:       General: She is not in acute distress.     Appearance: Normal appearance. She is normal weight. She is not toxic-appearing or diaphoretic.      Comments: Oriented to person and place.   HENT:      Head: Normocephalic and atraumatic.      Nose: Nose normal.      Mouth/Throat:      Mouth: Mucous membranes are dry.   Cardiovascular:      Rate and Rhythm: Normal rate and regular rhythm.      Heart sounds: No murmur heard.     No friction rub. No gallop.   Pulmonary:      Effort: Pulmonary effort is normal. No respiratory distress.      Breath sounds: Normal breath sounds. No wheezing.   Abdominal:      General: Abdomen is flat. There is no distension.      Palpations: Abdomen is soft.      Tenderness: There is no abdominal tenderness.      Comments: PEG tube   Musculoskeletal:         General: No swelling. Normal range of motion.      Right lower leg: No edema.      Left lower leg: No edema.   Skin:     Findings: No bruising or erythema.   Neurological:      Mental Status: She is alert.      Comments: Oriented to person and place. Following commands appropriately.  Left sided weakness.             Significant Labs: All pertinent labs within the past 24 hours have been reviewed.  CBC:   Recent Labs   Lab 02/14/25  0355   WBC 11.03   HGB 9.0*   HCT 28.7*        CMP:   Recent Labs   Lab 02/14/25  0000 02/14/25  0355 02/14/25  1200   * 153* 148*   K 4.0 4.1 4.7   * 123* 122*   CO2 22* 20* 17*    96 110   BUN 63* 61* 50*   CREATININE 1.0 1.0 0.8   CALCIUM 9.0 9.0 8.3*   ANIONGAP 9 10 9       Significant Imaging: I have reviewed all pertinent imaging results/findings within the past 24 hours.

## 2025-02-15 NOTE — NURSING
Ordered feedings from nutrition: Glucerna 1.2; Tube feeding restarted at goal rate 50ml/hr, water flush bag attached 300cc q4.

## 2025-02-15 NOTE — PROGRESS NOTES
[facility] - Acute Medical Stepdown  Wound Care    Patient Name:  Emily Martinez   MRN:  [id number]  Date: 2/15/2025  Diagnosis: <principal problem not specified>    History:     Past Medical History:   Diagnosis Date    Diabetes mellitus type I     Hyperlipidemia     Hypertension     Right sided weakness 6/27/2019       Social History[1]    Precautions:     Allergies as of 02/12/2025 - Reviewed 02/12/2025   Allergen Reaction Noted    Sulfa (sulfonamide antibiotics) Itching 10/30/2014    Sulfur  10/30/2014       WOC Assessment Details/Treatment   Attempted to see patient for inpatient wound care consult. Patient refused. Will attempt follow up tomorrow.     02/14/25 1035   WOCN Assessment   WOCN Total Time (mins) 30   Visit Date 02/15/25   Visit Time 1030   Consult Type New   WOCN Speciality Wound   Intervention assessed;chart review     Orders placed.   Jw Henao RN, BSN, [facility]           [1]   Social History  Socioeconomic History    Marital status: Single   Tobacco Use    Smoking status: Every Day     Current packs/day: 1.50     Average packs/day: 1.5 packs/day for 35.0 years (52.5 ttl pk-yrs)     Types: Cigarettes    Smokeless tobacco: Never   Substance and Sexual Activity    Alcohol use: Not Currently    Drug use: Not Currently     Social Drivers of Health     Financial Resource Strain: Patient Unable To Answer (2/13/2025)    Overall Financial Resource Strain (CARDIA)     Difficulty of Paying Living Expenses: Patient unable to answer   Food Insecurity: Patient Unable To Answer (2/13/2025)    Hunger Vital Sign     Worried About Running Out of Food in the Last Year: Patient unable to answer     Ran Out of Food in the Last Year: Patient unable to answer   Transportation Needs: No Transportation Needs (12/31/2024)    TRANSPORTATION NEEDS     Transportation : No   Physical Activity: Inactive (2/13/2025)    Exercise Vital Sign     Days of Exercise per Week: 0 days     Minutes of Exercise per Session: 0 min    Stress: Patient Unable To Answer (2/13/2025)    [employer] - Occupational Stress Questionnaire     Feeling of Stress : Patient unable to answer   Housing Stability: Patient Unable To Answer (2/13/2025)    Housing Stability Vital Sign     Unable to Pay for Housing in the Last Year: Patient unable to answer     Homeless in the Last Year: Patient unable to answer

## 2025-02-15 NOTE — PROGRESS NOTES
[facility] - Acute Medical [facility] Medicine  Progress Note    Patient Name: Emily Martinez  MRN: [id number]  Patient Class: IP- Inpatient   Admission Date: 2/12/2025  Length of Stay: 3 days  Attending Physician: Wili Delatorre MD  Primary Care Provider: Alireza Sosa MD        Subjective     Principal Problem:<principal problem not specified>        HPI:  58 y/o female with PMHx of diabetes (for which she takes insulin 3 times a day), history of CVA with chronic left hemiplegia, hypertension, CKD 4 presents by EMS for altered mental status. Patient brought to ED due to concerns for AMS and abdominal pain. Patient lives with daughter. Per daughter, patient has been complaining of abdominal pain and became less verbal since last night at 9pm. Decreased fluid intake for the past two days. Usually takes glucose readings at home, but machine had broke and replacement was delayed for today due to being back ordered. Patient recently discharged from hospital for UTI with E coli ESBL. Currently considering sepsis vs pneumonia vs UTI as etiology of AMS.     Overview/Hospital Course:  2/13/2025: Improvement in glucose and improvement of major electrolyte disturbances. IV fluid replenishment with 5% dextrose .45% normal saline. Lantus increased to 10 U daily. Able to speak and responds to questions. Oriented to person and place. Following commands appropriately.      2/14/2025: At basline mental status. Further improvement of electrolytes with IV .45% NS and lantus 5U. Tube feeds set as recommended by nutrition.     Interval History:   Stepped down from ICU on yesterday. Na improving to 148. Continue IVF and TF. No complaints.       Review of Systems   All other systems reviewed and are negative.    Objective:     Vital Signs (Most Recent):  Temp: 98 °F (36.7 °C) (02/15/25 1201)  Pulse: 75 (02/15/25 1201)  Resp: 18 (02/15/25 1201)  BP: (!) 130/90 (02/15/25 1201)  SpO2: 98 % (02/15/25 1201) Vital Signs (24h  Range):  Temp:  [97.7 °F (36.5 °C)-99 °F (37.2 °C)] 98 °F (36.7 °C)  Pulse:  [73-87] 75  Resp:  [18-24] 18  SpO2:  [98 %-100 %] 98 %  BP: (124-165)/(65-90) 130/90     Weight: 57.2 kg (126 lb)  Body mass index is 22.32 kg/m².    Intake/Output Summary (Last 24 hours) at 2/15/2025 1240  Last data filed at 2/15/2025 1000  Gross per 24 hour   Intake 1324.41 ml   Output 2400 ml   Net -1075.59 ml         Physical Exam  Vitals and nursing note reviewed.   Constitutional:       General: She is not in acute distress.     Appearance: Normal appearance. She is normal weight. She is not toxic-appearing or diaphoretic.      Comments: Oriented to person and place.   HENT:      Head: Normocephalic and atraumatic.      Nose: Nose normal.      Mouth/Throat:      Mouth: Mucous membranes are dry.   Cardiovascular:      Rate and Rhythm: Normal rate and regular rhythm.      Heart sounds: No murmur heard.     No friction rub. No gallop.   Pulmonary:      Effort: Pulmonary effort is normal. No respiratory distress.      Breath sounds: Normal breath sounds. No wheezing.   Abdominal:      General: Abdomen is flat. There is no distension.      Palpations: Abdomen is soft.      Tenderness: There is no abdominal tenderness.      Comments: PEG tube   Musculoskeletal:         General: No swelling. Normal range of motion.      Right lower leg: No edema.      Left lower leg: No edema.   Skin:     Findings: No bruising or erythema.   Neurological:      Mental Status: She is alert.      Comments: Oriented to person and place. Following commands appropriately. Left sided weakness.             Significant Labs: All pertinent labs within the past 24 hours have been reviewed.  CBC:   Recent Labs   Lab 02/14/25  0355   WBC 11.03   HGB 9.0*   HCT 28.7*        CMP:   Recent Labs   Lab 02/14/25  0000 02/14/25  0355 02/14/25  1200   * 153* 148*   K 4.0 4.1 4.7   * 123* 122*   CO2 22* 20* 17*    96 110   BUN 63* 61* 50*   CREATININE  "1.0 1.0 0.8   CALCIUM 9.0 9.0 8.3*   ANIONGAP 9 10 9       Significant Imaging: I have reviewed all pertinent imaging results/findings within the past 24 hours.    Assessment and Plan     Type 2 diabetes mellitus with stage 4 chronic kidney disease, with long-term current use of insulin  Patient's FSGs are uncontrolled due to hyperglycemia on current medication regimen.  Last A1c reviewed-   Lab Results   Component Value Date    LABA1C 13.0 (H) 04/13/2016    HGBA1C 5.8 (H) 12/30/2024     Most recent fingerstick glucose reviewed-   Recent Labs   Lab 02/14/25  0000 02/14/25  0353 02/14/25  0732 02/14/25  1141   POCTGLUCOSE 126* 104 97 123*     Current correctional scale  Medium  Maintain anti-hyperglycemic dose as follows-   Antihyperglycemics (From admission, onward)      Start     Stop Route Frequency Ordered    02/15/25 0900  insulin glargine U-100 (Lantus) pen 5 Units         -- SubQ Daily 02/14/25 1006    02/13/25 1059  insulin aspart U-100 pen 0-10 Units         -- SubQ Every 4 hours PRN 02/13/25 0959          Hold Oral hypoglycemics while patient is in the hospital.      Hypernatremia  Hypernatremia is likely due to Dehydration. The patient's most recent sodium results are listed below.  Recent Labs     02/14/25  0000 02/14/25  0355 02/14/25  1200   * 153* 148*     Plan  - Aim to correct the sodium by 8-10mEq in 24 hours.   - Plan to correct their hypernatremia with Select IV fluids: 1/2 NS    - Will plan to trend the patient's sodium: Daily  - The patient's hypernatremia is improving    AMS (altered mental status)  Patient presents with AMS and family member stating recent abdominal pain. Can consider UTI vs sepsis vs HHS vs PNA.     - Being treated with meropenem for concern of PNA.   - Will perform UA for evaluation of UTI.  - elevated glucose with slightly elevated beta-hydroxybutyrate. Being given IV fluids and insulin.  - CT abdomenpelvis wo contrast showing concern for "Questionable developing " "infectious consolidation within the right lower lobe versus atelectasis or scarring. "  Although findings may be related to previous PNA, will cover with abx for possible new PNA with vancomycin and meropenem.   - AMS has resolved since 2/13/2025.    Aspiration pneumonia  CT abdomen pelvis concerning for "Questionable developing infectious consolidation within the right lower lobe versus atelectasis or scarring." Family denies any recent aspiration events.      2/14/2025: Improving leukocytosis. Being treated with IV Vancomycin. New blood cultures obtained yesterday due to contaminants in first BC.     Hyperkalemia  Hyperkalemia is likely due to NICOLASA.The patients most recent potassium results are listed below.  Recent Labs     02/14/25  0000 02/14/25  0355 02/14/25  1200   K 4.0 4.1 4.7     Plan  - Monitor for arrhythmias with EKG and/or continuous telemetry.   - Monitor potassium: Daily  - The patient's hyperkalemia is resolved    NICOLASA (acute kidney injury)  NICOLASA is likely due to pre-renal azotemia due to intravascular volume depletion. Baseline creatinine is  normal . Most recent creatinine and eGFR are listed below.  Recent Labs     02/14/25  0000 02/14/25  0355 02/14/25  1200   CREATININE 1.0 1.0 0.8   EGFRNORACEVR >60.0 >60.0 >60.0      Plan  - NICOLASA is resolved  - Avoid nephrotoxins and renally dose meds for GFR listed above  - Monitor urine output, serial BMP, and adjust therapy as needed      VTE Risk Mitigation (From admission, onward)           Ordered     enoxaparin injection 40 mg  Every 24 hours         02/13/25 1724     IP VTE HIGH RISK PATIENT  Once         02/12/25 1355     Place sequential compression device  Until discontinued         02/12/25 1355     Place sequential compression device  Until discontinued         02/12/25 1236                    Discharge Planning   ANA: 2/18/2025     Code Status: Full Code   Medical Readiness for Discharge Date:   Discharge Plan A: Home with family, Home Health, " Community Services   Discharge Delays: None known at this time                    Wili Delatorre MD  Department of Hospital Medicine   [facility] - Acute Medical Stepdown

## 2025-02-16 LAB
ANION GAP SERPL CALC-SCNC: 7 MMOL/L (ref 8–16)
BACTERIA BLD CULT: ABNORMAL
BASOPHILS # BLD AUTO: 0.02 K/UL (ref 0–0.2)
BASOPHILS NFR BLD: 0.2 % (ref 0–1.9)
BUN SERPL-MCNC: 20 MG/DL (ref 8–23)
CALCIUM SERPL-MCNC: 8.4 MG/DL (ref 8.7–10.5)
CHLORIDE SERPL-SCNC: 120 MMOL/L (ref 95–110)
CO2 SERPL-SCNC: 19 MMOL/L (ref 23–29)
CREAT SERPL-MCNC: 0.8 MG/DL (ref 0.5–1.4)
DIFFERENTIAL METHOD BLD: ABNORMAL
EOSINOPHIL # BLD AUTO: 0.4 K/UL (ref 0–0.5)
EOSINOPHIL NFR BLD: 4.8 % (ref 0–8)
ERYTHROCYTE [DISTWIDTH] IN BLOOD BY AUTOMATED COUNT: 18.3 % (ref 11.5–14.5)
EST. GFR  (NO RACE VARIABLE): >60 ML/MIN/1.73 M^2
GLUCOSE SERPL-MCNC: 180 MG/DL (ref 70–110)
HCT VFR BLD AUTO: 29.5 % (ref 37–48.5)
HGB BLD-MCNC: 8.9 G/DL (ref 12–16)
IMM GRANULOCYTES # BLD AUTO: 0.04 K/UL (ref 0–0.04)
IMM GRANULOCYTES NFR BLD AUTO: 0.5 % (ref 0–0.5)
LACTATE SERPL-SCNC: 1.7 MMOL/L (ref 0.5–2.2)
LYMPHOCYTES # BLD AUTO: 1.6 K/UL (ref 1–4.8)
LYMPHOCYTES NFR BLD: 19.6 % (ref 18–48)
MAGNESIUM SERPL-MCNC: 2.1 MG/DL (ref 1.6–2.6)
MCH RBC QN AUTO: 27.8 PG (ref 27–31)
MCHC RBC AUTO-ENTMCNC: 30.2 G/DL (ref 32–36)
MCV RBC AUTO: 92 FL (ref 82–98)
MONOCYTES # BLD AUTO: 0.5 K/UL (ref 0.3–1)
MONOCYTES NFR BLD: 5.7 % (ref 4–15)
NEUTROPHILS # BLD AUTO: 5.6 K/UL (ref 1.8–7.7)
NEUTROPHILS NFR BLD: 69.2 % (ref 38–73)
NRBC BLD-RTO: 1 /100 WBC
PHOSPHATE SERPL-MCNC: 3.6 MG/DL (ref 2.7–4.5)
PLATELET # BLD AUTO: 368 K/UL (ref 150–450)
PMV BLD AUTO: 11.5 FL (ref 9.2–12.9)
POCT GLUCOSE: 129 MG/DL (ref 70–110)
POCT GLUCOSE: 146 MG/DL (ref 70–110)
POCT GLUCOSE: 169 MG/DL (ref 70–110)
POCT GLUCOSE: 195 MG/DL (ref 70–110)
POCT GLUCOSE: 214 MG/DL (ref 70–110)
POCT GLUCOSE: 240 MG/DL (ref 70–110)
POTASSIUM SERPL-SCNC: 4.1 MMOL/L (ref 3.5–5.1)
RBC # BLD AUTO: 3.2 M/UL (ref 4–5.4)
SODIUM SERPL-SCNC: 146 MMOL/L (ref 136–145)
WBC # BLD AUTO: 8.05 K/UL (ref 3.9–12.7)

## 2025-02-16 PROCEDURE — 63600175 PHARM REV CODE 636 W HCPCS

## 2025-02-16 PROCEDURE — 83605 ASSAY OF LACTIC ACID: CPT | Performed by: STUDENT IN AN ORGANIZED HEALTH CARE EDUCATION/TRAINING PROGRAM

## 2025-02-16 PROCEDURE — 25000003 PHARM REV CODE 250

## 2025-02-16 PROCEDURE — 85025 COMPLETE CBC W/AUTO DIFF WBC: CPT

## 2025-02-16 PROCEDURE — 20600001 HC STEP DOWN PRIVATE ROOM

## 2025-02-16 PROCEDURE — 84100 ASSAY OF PHOSPHORUS: CPT

## 2025-02-16 PROCEDURE — 63600175 PHARM REV CODE 636 W HCPCS: Performed by: INTERNAL MEDICINE

## 2025-02-16 PROCEDURE — 80048 BASIC METABOLIC PNL TOTAL CA: CPT | Performed by: STUDENT IN AN ORGANIZED HEALTH CARE EDUCATION/TRAINING PROGRAM

## 2025-02-16 PROCEDURE — 83735 ASSAY OF MAGNESIUM: CPT

## 2025-02-16 PROCEDURE — 25000003 PHARM REV CODE 250: Performed by: INTERNAL MEDICINE

## 2025-02-16 RX ADMIN — INSULIN ASPART 2 UNITS: 100 INJECTION, SOLUTION INTRAVENOUS; SUBCUTANEOUS at 12:02

## 2025-02-16 RX ADMIN — CEFTRIAXONE 2 G: 2 INJECTION, POWDER, FOR SOLUTION INTRAMUSCULAR; INTRAVENOUS at 11:02

## 2025-02-16 RX ADMIN — SODIUM CHLORIDE: 450 INJECTION, SOLUTION INTRAVENOUS at 02:02

## 2025-02-16 RX ADMIN — SENNOSIDES AND DOCUSATE SODIUM 1 TABLET: 50; 8.6 TABLET ORAL at 08:02

## 2025-02-16 RX ADMIN — ZINC SULFATE 220 MG (50 MG) CAPSULE 220 MG: CAPSULE at 08:02

## 2025-02-16 RX ADMIN — FAMOTIDINE 20 MG: 20 TABLET ORAL at 08:02

## 2025-02-16 RX ADMIN — ATORVASTATIN CALCIUM 40 MG: 40 TABLET, FILM COATED ORAL at 08:02

## 2025-02-16 RX ADMIN — INSULIN ASPART 1 UNITS: 100 INJECTION, SOLUTION INTRAVENOUS; SUBCUTANEOUS at 05:02

## 2025-02-16 RX ADMIN — INSULIN ASPART 4 UNITS: 100 INJECTION, SOLUTION INTRAVENOUS; SUBCUTANEOUS at 08:02

## 2025-02-16 RX ADMIN — ENOXAPARIN SODIUM 40 MG: 40 INJECTION SUBCUTANEOUS at 05:02

## 2025-02-16 RX ADMIN — INSULIN GLARGINE 5 UNITS: 100 INJECTION, SOLUTION SUBCUTANEOUS at 08:02

## 2025-02-16 RX ADMIN — POLYETHYLENE GLYCOL 3350 17 G: 17 POWDER, FOR SOLUTION ORAL at 08:02

## 2025-02-16 RX ADMIN — Medication 1 TABLET: at 08:02

## 2025-02-16 NOTE — PLAN OF CARE
A/o- self, ra, vss, R wrist restraint for safety prevent of pulling at lines. Hernandez in place, incontinent of bowel, see skin LARISSA for skin issues. She is progressing to goals and plan of care.

## 2025-02-16 NOTE — ASSESSMENT & PLAN NOTE
Hypernatremia is likely due to Dehydration. The patient's most recent sodium results are listed below.  Recent Labs     02/14/25  0355 02/14/25  1200 02/16/25  0715   * 148* 146*       Plan  - Aim to correct the sodium by 8-10mEq in 24 hours.   - Plan to correct their hypernatremia with Select IV fluids: 1/2 NS    - Will plan to trend the patient's sodium: Daily  - The patient's hypernatremia is improving

## 2025-02-16 NOTE — PLAN OF CARE
Problem: Infection  Goal: Absence of Infection Signs and Symptoms  Outcome: Progressing     Problem: Diabetes Comorbidity  Goal: Blood Glucose Level Within Targeted Range  Outcome: Progressing     Problem: Acute Kidney Injury/Impairment  Goal: Fluid and Electrolyte Balance  Outcome: Progressing  Goal: Improved Oral Intake  Outcome: Progressing  Goal: Effective Renal Function  Outcome: Progressing     Problem: Restraint, Nonviolent  Goal: Absence of Harm or Injury  Outcome: Progressing     Problem: Skin Injury Risk Increased  Goal: Skin Health and Integrity  Outcome: Progressing      Patient alert and oriented to self. Room air. VSS. Q2 turns. Glucerna 1.2 at goal 50ml/hr. Water flush 150cc 4x daily. Hernandez remains in place. R. Wrist restraint. Bed alarm set. Bed low and locked position.

## 2025-02-16 NOTE — PROGRESS NOTES
[facility] - Acute Medical [facility] Medicine  Progress Note    Patient Name: Emily Martinez  MRN: [id number]  Patient Class: IP- Inpatient   Admission Date: 2/12/2025  Length of Stay: 4 days  Attending Physician: Wili Delatorre MD  Primary Care Provider: Alireza Sosa MD        Subjective     Principal Problem:<principal problem not specified>        HPI:  58 y/o female with PMHx of diabetes (for which she takes insulin 3 times a day), history of CVA with chronic left hemiplegia, hypertension, CKD 4 presents by EMS for altered mental status. Patient brought to ED due to concerns for AMS and abdominal pain. Patient lives with daughter. Per daughter, patient has been complaining of abdominal pain and became less verbal since last night at 9pm. Decreased fluid intake for the past two days. Usually takes glucose readings at home, but machine had broke and replacement was delayed for today due to being back ordered. Patient recently discharged from hospital for UTI with E coli ESBL. Currently considering sepsis vs pneumonia vs UTI as etiology of AMS.     Overview/Hospital Course:  2/13/2025: Improvement in glucose and improvement of major electrolyte disturbances. IV fluid replenishment with 5% dextrose .45% normal saline. Lantus increased to 10 U daily. Able to speak and responds to questions. Oriented to person and place. Following commands appropriately.      2/14/2025: At basline mental status. Further improvement of electrolytes with IV .45% NS and lantus 5U. Tube feeds set as recommended by nutrition.     Interval History:   Episode of abdominal pain this morning. KUB unremarkable. Off wrist restraints. Pending placement.       Review of Systems   All other systems reviewed and are negative.    Objective:     Vital Signs (Most Recent):  Temp: 98 °F (36.7 °C) (02/16/25 1153)  Pulse: 76 (02/16/25 1153)  Resp: 18 (02/16/25 1153)  BP: 121/85 (02/16/25 1153)  SpO2: 98 % (02/16/25 1300) Vital Signs (24h  Range):  Temp:  [98 °F (36.7 °C)-98.3 °F (36.8 °C)] 98 °F (36.7 °C)  Pulse:  [75-88] 76  Resp:  [12-18] 18  SpO2:  [98 %-100 %] 98 %  BP: (107-142)/(59-85) 121/85     Weight: 57.2 kg (126 lb)  Body mass index is 22.32 kg/m².    Intake/Output Summary (Last 24 hours) at 2/16/2025 1333  Last data filed at 2/16/2025 1224  Gross per 24 hour   Intake 2694.6 ml   Output 1200 ml   Net 1494.6 ml         Physical Exam  Vitals and nursing note reviewed.   Constitutional:       General: She is not in acute distress.     Appearance: Normal appearance. She is normal weight. She is not toxic-appearing or diaphoretic.      Comments: Oriented to person and place.   HENT:      Head: Normocephalic and atraumatic.      Nose: Nose normal.      Mouth/Throat:      Mouth: Mucous membranes are moist.   Cardiovascular:      Rate and Rhythm: Normal rate and regular rhythm.      Heart sounds: No murmur heard.     No friction rub. No gallop.   Pulmonary:      Effort: Pulmonary effort is normal. No respiratory distress.      Breath sounds: Normal breath sounds. No wheezing.   Abdominal:      General: Abdomen is flat. There is no distension.      Palpations: Abdomen is soft.      Tenderness: There is no abdominal tenderness.      Comments: PEG tube   Musculoskeletal:         General: No swelling. Normal range of motion.      Right lower leg: No edema.      Left lower leg: No edema.   Skin:     Findings: No bruising or erythema.   Neurological:      Mental Status: She is alert.      Comments: Oriented to person and place. Following commands appropriately. Left sided weakness.             Significant Labs: All pertinent labs within the past 24 hours have been reviewed.  CBC:   Recent Labs   Lab 02/16/25  0715   WBC 8.05   HGB 8.9*   HCT 29.5*        CMP:   Recent Labs   Lab 02/16/25  0715   *   K 4.1   *   CO2 19*   *   BUN 20   CREATININE 0.8   CALCIUM 8.4*   ANIONGAP 7*       Significant Imaging: I have reviewed all  "pertinent imaging results/findings within the past 24 hours.    Assessment and Plan     Type 2 diabetes mellitus with stage 4 chronic kidney disease, with long-term current use of insulin  Patient's FSGs are uncontrolled due to hyperglycemia on current medication regimen.  Last A1c reviewed-   Lab Results   Component Value Date    LABA1C 13.0 (H) 04/13/2016    HGBA1C 5.8 (H) 12/30/2024     Most recent fingerstick glucose reviewed-   Recent Labs   Lab 02/16/25  0014 02/16/25  0536 02/16/25  0726 02/16/25  1152   POCTGLUCOSE 240* 195* 214* 169*       Current correctional scale  Medium  Maintain anti-hyperglycemic dose as follows-   Antihyperglycemics (From admission, onward)      Start     Stop Route Frequency Ordered    02/15/25 0900  insulin glargine U-100 (Lantus) pen 5 Units         -- SubQ Daily 02/14/25 1006    02/13/25 1059  insulin aspart U-100 pen 0-10 Units         -- SubQ Every 4 hours PRN 02/13/25 0959          Hold Oral hypoglycemics while patient is in the hospital.      Hypernatremia  Hypernatremia is likely due to Dehydration. The patient's most recent sodium results are listed below.  Recent Labs     02/14/25  0355 02/14/25  1200 02/16/25  0715   * 148* 146*       Plan  - Aim to correct the sodium by 8-10mEq in 24 hours.   - Plan to correct their hypernatremia with Select IV fluids: 1/2 NS    - Will plan to trend the patient's sodium: Daily  - The patient's hypernatremia is improving    AMS (altered mental status)  Patient presents with AMS and family member stating recent abdominal pain. Can consider UTI vs sepsis vs HHS vs PNA.     - Being treated with meropenem for concern of PNA.   - Will perform UA for evaluation of UTI.  - elevated glucose with slightly elevated beta-hydroxybutyrate. Being given IV fluids and insulin.  - CT abdomenpelvis wo contrast showing concern for "Questionable developing infectious consolidation within the right lower lobe versus atelectasis or scarring. "  Although " "findings may be related to previous PNA, will cover with abx for possible new PNA with vancomycin and meropenem.   - AMS has resolved since 2/13/2025.    Aspiration pneumonia  CT abdomen pelvis concerning for "Questionable developing infectious consolidation within the right lower lobe versus atelectasis or scarring." Family denies any recent aspiration events.      2/14/2025: Improving leukocytosis. Being treated with IV Vancomycin. New blood cultures obtained yesterday due to contaminants in first BC.     Hyperkalemia  Hyperkalemia is likely due to NICOLASA.The patients most recent potassium results are listed below.  Recent Labs     02/14/25  0000 02/14/25  0355 02/14/25  1200   K 4.0 4.1 4.7     Plan  - Monitor for arrhythmias with EKG and/or continuous telemetry.   - Monitor potassium: Daily  - The patient's hyperkalemia is resolved    NICOLASA (acute kidney injury)  NICOLASA is likely due to pre-renal azotemia due to intravascular volume depletion. Baseline creatinine is  normal . Most recent creatinine and eGFR are listed below.  Recent Labs     02/14/25  0355 02/14/25  1200 02/16/25  0715   CREATININE 1.0 0.8 0.8   EGFRNORACEVR >60.0 >60.0 >60.0        Plan  - NICOLASA is resolved  - Avoid nephrotoxins and renally dose meds for GFR listed above  - Monitor urine output, serial BMP, and adjust therapy as needed      VTE Risk Mitigation (From admission, onward)           Ordered     enoxaparin injection 40 mg  Every 24 hours         02/13/25 1724     IP VTE HIGH RISK PATIENT  Once         02/12/25 1355     Place sequential compression device  Until discontinued         02/12/25 1355     Place sequential compression device  Until discontinued         02/12/25 1236                    Discharge Planning   ANA: 2/18/2025     Code Status: Full Code   Medical Readiness for Discharge Date:   Discharge Plan A: Home with family, Home Health, Community Services   Discharge Delays: None known at this time                    Wili PORTILLO" MD Juan Ramon  Department of Hospital Medicine   [facility] - Acute Medical Stepdown

## 2025-02-16 NOTE — ASSESSMENT & PLAN NOTE
Patient's FSGs are uncontrolled due to hyperglycemia on current medication regimen.  Last A1c reviewed-   Lab Results   Component Value Date    LABA1C 13.0 (H) 04/13/2016    HGBA1C 5.8 (H) 12/30/2024     Most recent fingerstick glucose reviewed-   Recent Labs   Lab 02/16/25  0014 02/16/25  0536 02/16/25  0726 02/16/25  1152   POCTGLUCOSE 240* 195* 214* 169*       Current correctional scale  Medium  Maintain anti-hyperglycemic dose as follows-   Antihyperglycemics (From admission, onward)    Start     Stop Route Frequency Ordered    02/15/25 0900  insulin glargine U-100 (Lantus) pen 5 Units         -- SubQ Daily 02/14/25 1006    02/13/25 1059  insulin aspart U-100 pen 0-10 Units         -- SubQ Every 4 hours PRN 02/13/25 0959        Hold Oral hypoglycemics while patient is in the hospital.

## 2025-02-16 NOTE — SUBJECTIVE & OBJECTIVE
Interval History:   Episode of abdominal pain this morning. KUB unremarkable. Off wrist restraints. Pending placement.       Review of Systems   All other systems reviewed and are negative.    Objective:     Vital Signs (Most Recent):  Temp: 98 °F (36.7 °C) (02/16/25 1153)  Pulse: 76 (02/16/25 1153)  Resp: 18 (02/16/25 1153)  BP: 121/85 (02/16/25 1153)  SpO2: 98 % (02/16/25 1300) Vital Signs (24h Range):  Temp:  [98 °F (36.7 °C)-98.3 °F (36.8 °C)] 98 °F (36.7 °C)  Pulse:  [75-88] 76  Resp:  [12-18] 18  SpO2:  [98 %-100 %] 98 %  BP: (107-142)/(59-85) 121/85     Weight: 57.2 kg (126 lb)  Body mass index is 22.32 kg/m².    Intake/Output Summary (Last 24 hours) at 2/16/2025 1333  Last data filed at 2/16/2025 1224  Gross per 24 hour   Intake 2694.6 ml   Output 1200 ml   Net 1494.6 ml         Physical Exam  Vitals and nursing note reviewed.   Constitutional:       General: She is not in acute distress.     Appearance: Normal appearance. She is normal weight. She is not toxic-appearing or diaphoretic.      Comments: Oriented to person and place.   HENT:      Head: Normocephalic and atraumatic.      Nose: Nose normal.      Mouth/Throat:      Mouth: Mucous membranes are moist.   Cardiovascular:      Rate and Rhythm: Normal rate and regular rhythm.      Heart sounds: No murmur heard.     No friction rub. No gallop.   Pulmonary:      Effort: Pulmonary effort is normal. No respiratory distress.      Breath sounds: Normal breath sounds. No wheezing.   Abdominal:      General: Abdomen is flat. There is no distension.      Palpations: Abdomen is soft.      Tenderness: There is no abdominal tenderness.      Comments: PEG tube   Musculoskeletal:         General: No swelling. Normal range of motion.      Right lower leg: No edema.      Left lower leg: No edema.   Skin:     Findings: No bruising or erythema.   Neurological:      Mental Status: She is alert.      Comments: Oriented to person and place. Following commands appropriately.  Left sided weakness.             Significant Labs: All pertinent labs within the past 24 hours have been reviewed.  CBC:   Recent Labs   Lab 02/16/25  0715   WBC 8.05   HGB 8.9*   HCT 29.5*        CMP:   Recent Labs   Lab 02/16/25  0715   *   K 4.1   *   CO2 19*   *   BUN 20   CREATININE 0.8   CALCIUM 8.4*   ANIONGAP 7*       Significant Imaging: I have reviewed all pertinent imaging results/findings within the past 24 hours.

## 2025-02-16 NOTE — ASSESSMENT & PLAN NOTE
NICOLASA is likely due to pre-renal azotemia due to intravascular volume depletion. Baseline creatinine is  normal . Most recent creatinine and eGFR are listed below.  Recent Labs     02/14/25  0355 02/14/25  1200 02/16/25  0715   CREATININE 1.0 0.8 0.8   EGFRNORACEVR >60.0 >60.0 >60.0        Plan  - NICOLASA is resolved  - Avoid nephrotoxins and renally dose meds for GFR listed above  - Monitor urine output, serial BMP, and adjust therapy as needed

## 2025-02-17 LAB
ANION GAP SERPL CALC-SCNC: 7 MMOL/L (ref 8–16)
BACTERIA BLD CULT: ABNORMAL
BASOPHILS # BLD AUTO: 0.03 K/UL (ref 0–0.2)
BASOPHILS NFR BLD: 0.3 % (ref 0–1.9)
BUN SERPL-MCNC: 15 MG/DL (ref 8–23)
CALCIUM SERPL-MCNC: 8.6 MG/DL (ref 8.7–10.5)
CHLORIDE SERPL-SCNC: 119 MMOL/L (ref 95–110)
CO2 SERPL-SCNC: 20 MMOL/L (ref 23–29)
CREAT SERPL-MCNC: 0.7 MG/DL (ref 0.5–1.4)
DIFFERENTIAL METHOD BLD: ABNORMAL
EOSINOPHIL # BLD AUTO: 0.4 K/UL (ref 0–0.5)
EOSINOPHIL NFR BLD: 3.8 % (ref 0–8)
ERYTHROCYTE [DISTWIDTH] IN BLOOD BY AUTOMATED COUNT: 18.2 % (ref 11.5–14.5)
EST. GFR  (NO RACE VARIABLE): >60 ML/MIN/1.73 M^2
GLUCOSE SERPL-MCNC: 163 MG/DL (ref 70–110)
HCT VFR BLD AUTO: 27.1 % (ref 37–48.5)
HGB BLD-MCNC: 8.6 G/DL (ref 12–16)
IMM GRANULOCYTES # BLD AUTO: 0.04 K/UL (ref 0–0.04)
IMM GRANULOCYTES NFR BLD AUTO: 0.4 % (ref 0–0.5)
LYMPHOCYTES # BLD AUTO: 2.2 K/UL (ref 1–4.8)
LYMPHOCYTES NFR BLD: 22.3 % (ref 18–48)
MAGNESIUM SERPL-MCNC: 2 MG/DL (ref 1.6–2.6)
MCH RBC QN AUTO: 28.6 PG (ref 27–31)
MCHC RBC AUTO-ENTMCNC: 31.7 G/DL (ref 32–36)
MCV RBC AUTO: 90 FL (ref 82–98)
MONOCYTES # BLD AUTO: 0.7 K/UL (ref 0.3–1)
MONOCYTES NFR BLD: 7.1 % (ref 4–15)
NEUTROPHILS # BLD AUTO: 6.6 K/UL (ref 1.8–7.7)
NEUTROPHILS NFR BLD: 66.1 % (ref 38–73)
NRBC BLD-RTO: 0 /100 WBC
PHOSPHATE SERPL-MCNC: 3.8 MG/DL (ref 2.7–4.5)
PLATELET # BLD AUTO: 369 K/UL (ref 150–450)
PMV BLD AUTO: 11.5 FL (ref 9.2–12.9)
POCT GLUCOSE: 101 MG/DL (ref 70–110)
POCT GLUCOSE: 118 MG/DL (ref 70–110)
POCT GLUCOSE: 124 MG/DL (ref 70–110)
POCT GLUCOSE: 129 MG/DL (ref 70–110)
POCT GLUCOSE: 137 MG/DL (ref 70–110)
POCT GLUCOSE: 152 MG/DL (ref 70–110)
POCT GLUCOSE: 169 MG/DL (ref 70–110)
POTASSIUM SERPL-SCNC: 4.2 MMOL/L (ref 3.5–5.1)
RBC # BLD AUTO: 3.01 M/UL (ref 4–5.4)
SODIUM SERPL-SCNC: 146 MMOL/L (ref 136–145)
WBC # BLD AUTO: 9.99 K/UL (ref 3.9–12.7)

## 2025-02-17 PROCEDURE — 20600001 HC STEP DOWN PRIVATE ROOM

## 2025-02-17 PROCEDURE — 25000003 PHARM REV CODE 250

## 2025-02-17 PROCEDURE — 84100 ASSAY OF PHOSPHORUS: CPT

## 2025-02-17 PROCEDURE — 85025 COMPLETE CBC W/AUTO DIFF WBC: CPT

## 2025-02-17 PROCEDURE — 63600175 PHARM REV CODE 636 W HCPCS: Performed by: INTERNAL MEDICINE

## 2025-02-17 PROCEDURE — 80048 BASIC METABOLIC PNL TOTAL CA: CPT | Performed by: STUDENT IN AN ORGANIZED HEALTH CARE EDUCATION/TRAINING PROGRAM

## 2025-02-17 PROCEDURE — 25000003 PHARM REV CODE 250: Performed by: INTERNAL MEDICINE

## 2025-02-17 PROCEDURE — 36415 COLL VENOUS BLD VENIPUNCTURE: CPT | Performed by: STUDENT IN AN ORGANIZED HEALTH CARE EDUCATION/TRAINING PROGRAM

## 2025-02-17 PROCEDURE — 83735 ASSAY OF MAGNESIUM: CPT

## 2025-02-17 RX ADMIN — ATORVASTATIN CALCIUM 40 MG: 40 TABLET, FILM COATED ORAL at 09:02

## 2025-02-17 RX ADMIN — INSULIN GLARGINE 5 UNITS: 100 INJECTION, SOLUTION SUBCUTANEOUS at 09:02

## 2025-02-17 RX ADMIN — POLYETHYLENE GLYCOL 3350 17 G: 17 POWDER, FOR SOLUTION ORAL at 09:02

## 2025-02-17 RX ADMIN — FAMOTIDINE 20 MG: 20 TABLET ORAL at 09:02

## 2025-02-17 RX ADMIN — ZINC SULFATE 220 MG (50 MG) CAPSULE 220 MG: CAPSULE at 09:02

## 2025-02-17 RX ADMIN — ACETAMINOPHEN 650 MG: 325 TABLET ORAL at 08:02

## 2025-02-17 RX ADMIN — INSULIN ASPART 2 UNITS: 100 INJECTION, SOLUTION INTRAVENOUS; SUBCUTANEOUS at 09:02

## 2025-02-17 RX ADMIN — SENNOSIDES AND DOCUSATE SODIUM 1 TABLET: 50; 8.6 TABLET ORAL at 09:02

## 2025-02-17 RX ADMIN — FAMOTIDINE 20 MG: 20 TABLET ORAL at 08:02

## 2025-02-17 RX ADMIN — Medication 1 TABLET: at 09:02

## 2025-02-17 RX ADMIN — ENOXAPARIN SODIUM 40 MG: 40 INJECTION SUBCUTANEOUS at 06:02

## 2025-02-17 NOTE — SUBJECTIVE & OBJECTIVE
Interval History:   No events overnight. Voiding trial today. Adjust FWF given continue hypernatremia. No complaints.      Review of Systems   All other systems reviewed and are negative.    Objective:     Vital Signs (Most Recent):  Temp: 98 °F (36.7 °C) (02/17/25 1140)  Pulse: 82 (02/17/25 1308)  Resp: (!) 21 (02/17/25 1308)  BP: 116/77 (02/17/25 1140)  SpO2: 98 % (02/17/25 1308) Vital Signs (24h Range):  Temp:  [97.8 °F (36.6 °C)-98 °F (36.7 °C)] 98 °F (36.7 °C)  Pulse:  [76-87] 82  Resp:  [14-24] 21  SpO2:  [97 %-100 %] 98 %  BP: (116-139)/(76-89) 116/77     Weight: 57.2 kg (126 lb)  Body mass index is 22.32 kg/m².    Intake/Output Summary (Last 24 hours) at 2/17/2025 1322  Last data filed at 2/17/2025 0736  Gross per 24 hour   Intake 1099 ml   Output 1376 ml   Net -277 ml         Physical Exam  Vitals and nursing note reviewed.   Constitutional:       General: She is not in acute distress.     Appearance: Normal appearance. She is normal weight. She is not toxic-appearing or diaphoretic.      Comments: Oriented to person and place.   HENT:      Head: Normocephalic and atraumatic.      Nose: Nose normal.      Mouth/Throat:      Mouth: Mucous membranes are moist.   Cardiovascular:      Rate and Rhythm: Normal rate and regular rhythm.      Heart sounds: No murmur heard.     No friction rub. No gallop.   Pulmonary:      Effort: Pulmonary effort is normal. No respiratory distress.      Breath sounds: Normal breath sounds. No wheezing.   Abdominal:      General: Abdomen is flat. There is no distension.      Palpations: Abdomen is soft.      Tenderness: There is no abdominal tenderness.      Comments: PEG tube   Musculoskeletal:         General: No swelling. Normal range of motion.      Right lower leg: No edema.      Left lower leg: No edema.   Skin:     Findings: No bruising or erythema.   Neurological:      Mental Status: She is alert.      Comments: Oriented to person and place. Following commands appropriately.  Left sided weakness.             Significant Labs: All pertinent labs within the past 24 hours have been reviewed.  CBC:   Recent Labs   Lab 02/16/25  0715 02/17/25  0830   WBC 8.05 9.99   HGB 8.9* 8.6*   HCT 29.5* 27.1*    369     CMP:   Recent Labs   Lab 02/16/25  0715 02/17/25  0830   * 146*   K 4.1 4.2   * 119*   CO2 19* 20*   * 163*   BUN 20 15   CREATININE 0.8 0.7   CALCIUM 8.4* 8.6*   ANIONGAP 7* 7*       Significant Imaging: I have reviewed all pertinent imaging results/findings within the past 24 hours.

## 2025-02-17 NOTE — PLAN OF CARE
[facility] - Acute Medical Stepdown      HOME HEALTH ORDERS  FACE TO FACE ENCOUNTER    Patient Name: Emily Martinez  YOB: 1965    PCP: Alireza Sosa MD   PCP Address: [address] [place] [address] ANGEL BRODY [facility]  PCP Phone Number: [phone]  PCP Fax: [phone]    Encounter Date: 2/12/25    Admit to Home Health    Diagnoses:  Active Hospital Problems    Diagnosis  POA    Type 2 diabetes mellitus with stage 4 chronic kidney disease, with long-term current use of insulin [E11.22, N18.4, Z79.4]  Not Applicable     Priority: 1 - High    Hypernatremia [E87.0]  Yes     Priority: 2     AMS (altered mental status) [R41.82]  Yes     Priority: 4     Hyperkalemia [E87.5]  Yes    Aspiration pneumonia [J69.0]  Yes    NICOLASA (acute kidney injury) [N17.9]  Yes      Resolved Hospital Problems    Diagnosis Date Resolved POA    Abdominal pain [R10.9] 02/12/2025 Unknown    Sepsis [A41.9] 02/12/2025 Yes       Follow Up Appointments:  No future appointments.    Allergies:  Review of patient's allergies indicates:   Allergen Reactions    Sulfa (sulfonamide antibiotics) Itching    Sulfur        Medications: Review discharge medications with patient and family and provide education.    Current Medications[1]     Medication List        ASK your doctor about these medications      acetaminophen 650 MG Tbsr  Commonly known as: TYLENOL  Take 1 tablet (650 mg total) by mouth every 8 (eight) hours. For back pain     aspirin 81 MG EC tablet  Commonly known as: ECOTRIN  Take 1 tablet (81 mg total) by mouth once daily.     atorvastatin 40 MG tablet  Commonly known as: LIPITOR  TAKE 1 TABLET(40 MG) BY MOUTH EVERY DAY     blood sugar diagnostic Strp  Commonly known as: TRUE METRIX GLUCOSE TEST STRIP  TO CHECK BLOOD GLUCOSE THREE TIMES DAILY     blood-glucose meter kit  Commonly known as: FREESTYLE SYSTEM KIT  Use daily- TID     capsicum 0.075% 0.075 % topical cream  Commonly known as: ZOSTRIX  Apply topically 3 (three) times  "daily.     clopidogreL 75 mg tablet  Commonly known as: PLAVIX  Take 1 tablet (75 mg total) by mouth once daily.     diaper,brief,adult,disposable Misc  1 each by Misc.(Non-Drug; Combo Route) route 3 (three) times daily.     diphenhydrAMINE 50 MG capsule  Commonly known as: BENADRYL  Take 1 capsule (50 mg total) by mouth every 6 (six) hours as needed for Itching.     fluticasone propionate 50 mcg/actuation nasal spray  Commonly known as: FLONASE  1 spray (50 mcg total) by Each Nostril route once daily.     food supplemt, lactose-reduced Liqd  Commonly known as: ENSURE MAX PROTEIN  Take 118 mLs by mouth 3 (three) times daily.     FREESTYLE LEATHA 14 DAY SENSOR Kit  Generic drug: flash glucose sensor  1 each by Misc.(Non-Drug; Combo Route) route once daily.     insulin glargine U-100 (Lantus) 100 unit/mL (3 mL) Inpn pen  Inject 5 Units into the skin every evening.     insulin lispro protamin-lispro 100 unit/mL (50-50) Inpn  Inject 10 Units into the skin. once daily     lancets Misc  Commonly known as: ONETOUCH ULTRASOFT LANCETS  Use 1 TID as directed for diabetes checking     miconazole NITRATE 2 % 2 % top powder  Commonly known as: MICOTIN  Apply topically 2 (two) times daily.     multivitamin Tab  1 tablet by Per G Tube route once daily.     pen needle, diabetic 29 gauge x 1/2" Ndle  Commonly known as: BD ULTRA-FINE ORIG PEN NEEDLE  USE TO TEST THREE TIMES DAILY MUST LAST 33 DAYS     ULTRA-LIGHT ROLLATOR Misc  Generic drug: walker  1 each by Misc.(Non-Drug; Combo Route) route once daily at 6am.     wheelchair Kelsey  1 each by Misc.(Non-Drug; Combo Route) route 2 (two) times daily. Power Wheel Chair     white petrolatum 41 % Oint  Apply topically 2 (two) times a day.                I have seen and examined this patient within the last 30 days. My clinical findings that support the need for the home health skilled services and home bound status are the following:no   Weakness/numbness causing balance and gait " disturbance due to Weakness/Debility making it taxing to leave home.     Diet:   NPO    Tube Feeds:  Glucerna 1.2 @ 50 ml/hr to provide 1200 ml TFV, 1440 kcals, 72g PRO, 137 CHO, 19g Fiber, 966 mL water    FWF: 300 mL every 6 hours    Labs:  None    Referrals/ Consults  Physical Therapy to evaluate and treat. Evaluate for home safety and equipment needs; Establish/upgrade home exercise program. Perform / instruct on therapeutic exercises, gait training, transfer training, and Range of Motion.  Occupational Therapy to evaluate and treat. Evaluate home environment for safety and equipment needs. Perform/Instruct on transfers, ADL training, ROM, and therapeutic exercises.   to evaluate for community resources/long-range planning.  Aide to provide assistance with personal care, ADLs, and vital signs.    Activities:   activity as tolerated    Nursing:   Agency to admit patient within 24 hours of hospital discharge unless specified on physician order or at patient request    SN to complete comprehensive assessment including routine vital signs. Instruct on disease process and s/s of complications to report to MD. Review/verify medication list sent home with the patient at time of discharge  and instruct patient/caregiver as needed. Frequency may be adjusted depending on start of care date.     Skilled nurse to perform up to 3 visits PRN for symptoms related to diagnosis    Notify MD if SBP > 160 or < 90; DBP > 90 or < 50; HR > 120 or < 50; Temp > 101; O2 < 88%; Other:       Ok to schedule additional visits based on staff availability and patient request on consecutive days within the home health episode.    When multiple disciplines ordered:    Start of Care occurs on Sunday - Wednesday schedule remaining discipline evaluations as ordered on separate consecutive days following the start of care.    Thursday SOC -schedule subsequent evaluations Friday and Monday the following week.     Friday - Saturday SOC -  schedule subsequent discipline evaluations on consecutive days starting Monday of the following week.    For all post-discharge communication and subsequent orders please contact patient's primary care physician. If unable to reach primary care physician or do not receive response within 30 minutes, please [place] [facility] [place] clarification    Miscellaneous   PEG Care:  Instruct patient/caregiver to clean site.  Monitor skin integrity.  Hernandez Care: Instruct patient/caregiver to empty Hernandez bag.  Change Hernandez every month.  Routine Skin for Bedridden Patients: Instruct patient/caregiver to apply moisture barrier cream to all skin folds and wet areas in perineal area daily and after baths and all bowel movements.    Home Health Aide:  Physical Therapy Three times weekly, Occupational Therapy Three times weekly, Medical Social Work Weekly, and Home Health Aide Twice weekly    Wound Care Orders  Yes:    - Buttocks and posterior thighs: bedside nursing to cleanse with bath wipes, pat dry and apply triad bid/prn; no dressings  - Turning every 2 hours  - Heel lift boots  - low air loss mattress        01/16/25 1026        Wound 01/16/25 1026 Moisture associated dermatitis Buttocks   Date First Assessed/Time First Assessed: 01/16/25 1026   Present on Original Admission: No  Primary Wound Type: Moisture associated dermatitis  Location: Buttocks         Dressing Appearance Open to air   Drainage Amount Scant   Appearance Pink;Yellow;Moist   Periwound Area Intact;Moist   Care Cleansed with:;Other (see comments)  (bath wipes)   Dressing Applied;Other (comment)  (triad)   Periwound Care Moisture barrier applied  (triad)        Wound 01/16/25 1026 Moisture associated dermatitis Left posterior;proximal Thigh   Date First Assessed/Time First Assessed: 01/16/25 1026   Present on Original Admission: No  Primary Wound Type: Moisture associated dermatitis  Side: Left  Orientation: posterior;proximal  Location: Thigh          Dressing Appearance Open to air   Drainage Amount Scant   Appearance Pink;Moist   Tissue loss description Partial thickness   Periwound Area Intact;Dry   Care Cleansed with:;Other (see comments)  (bath wipes)   Dressing Applied;Other (comment)  (triad)   Periwound Care Moisture barrier applied  (triad)          I certify that this patient is confined to her home and needs intermittent skilled nursing care, physical therapy, and occupational therapy.               [1]   Current Facility-Administered Medications   Medication Dose Route Frequency Provider Last Rate Last Admin    acetaminophen tablet 650 mg  650 mg Per G Tube Q4H PRN August Bland, DO   650 mg at 02/13/25 1033    atorvastatin tablet 40 mg  40 mg Per G Tube Daily Gold Blandr, DO   40 mg at 02/16/25 0853    B-complex with vitamin C tablet 1 tablet  1 tablet Per G Tube Daily Rubi Foley MD   1 tablet at 02/16/25 0853    dextrose 10 % infusion   Intravenous Continuous PRN Noman Louie MD        dextrose 50% injection 12.5 g  12.5 g Intravenous PRN Noman Louie MD        dextrose 50% injection 25 g  25 g Intravenous PRN Noman Louie MD        enoxaparin injection 40 mg  40 mg Subcutaneous Q24H (prophylaxis, 1700) Noman Louie MD   40 mg at 02/16/25 1732    famotidine tablet 20 mg  20 mg Per G Tube BID Noman Louie MD   20 mg at 02/16/25 2039    glucagon (human recombinant) injection 1 mg  1 mg Intramuscular PRN Noman Louie MD        insulin aspart U-100 pen 0-10 Units  0-10 Units Subcutaneous Q4H PRN Noman Louie MD   4 Units at 02/16/25 0853    insulin glargine U-100 (Lantus) pen 5 Units  5 Units Subcutaneous Daily Parth Jones MD   5 Units at 02/16/25 0854    polyethylene glycol packet 17 g  17 g Per G Tube Daily Noman Louie MD   17 g at 02/16/25 0853    senna-docusate 8.6-50 mg per tablet 1 tablet  1 tablet Per G Tube Daily August Bland, DO   1 tablet at 02/16/25  0853    sodium chloride 0.9% flush 10 mL  10 mL Intravenous PRN August Bland,         zinc sulfate capsule 220 mg  220 mg Per G Tube Daily Rubi Foley MD   220 mg at 02/16/25 0896

## 2025-02-17 NOTE — ASSESSMENT & PLAN NOTE
Hyperkalemia is likely due to NICOLASA.The patients most recent potassium results are listed below.  Recent Labs     02/16/25  0715 02/17/25  0830   K 4.1 4.2       Plan  - Monitor for arrhythmias with EKG and/or continuous telemetry.   - Monitor potassium: Daily  - The patient's hyperkalemia is resolved

## 2025-02-17 NOTE — PLAN OF CARE
Problem: Infection  Goal: Absence of Infection Signs and Symptoms  Outcome: Progressing     Problem: Diabetes Comorbidity  Goal: Blood Glucose Level Within Targeted Range  Outcome: Progressing     Problem: Acute Kidney Injury/Impairment  Goal: Fluid and Electrolyte Balance  Outcome: Progressing  Goal: Improved Oral Intake  Outcome: Progressing  Goal: Effective Renal Function  Outcome: Progressing     Problem: Restraint, Nonviolent  Goal: Absence of Harm or Injury  Outcome: Progressing     Problem: Wound  Goal: Optimal Coping  Outcome: Progressing  Goal: Optimal Functional Ability  Outcome: Progressing  Goal: Absence of Infection Signs and Symptoms  Outcome: Progressing  Goal: Improved Oral Intake  Outcome: Progressing  Goal: Optimal Pain Control and Function  Outcome: Progressing  Goal: Skin Health and Integrity  Outcome: Progressing  Goal: Optimal Wound Healing  Outcome: Progressing     Patient alert and oriented to self. Room air. VSS. Tolerating tube feedings at goal 50ml/hr. Hernandez remains in place, care provided. Fluids d/c. R. Wrist restraint discontinue trial. Complaint of abd pain, Xray of abd ordered. Bed alarm set. Bed low and locked position. Call light within reach.

## 2025-02-17 NOTE — PLAN OF CARE
Pt aaox2. Confusion about date/time and situation noted. More confused at times than others. Glucerna 1.2 going @50ml/hr via peg tube. Peg tube intact and patent. Hernandez cath in place and flowing properly. Cath care provided. Remained free from fall/injuries. Personal items and call light within reach. Bed lowered and locked. Bed alarm active. VSS.     Problem: Infection  Goal: Absence of Infection Signs and Symptoms  Outcome: Progressing     Problem: Adult Inpatient Plan of Care  Goal: Plan of Care Review  Outcome: Progressing  Goal: Patient-Specific Goal (Individualized)  Outcome: Progressing  Goal: Absence of Hospital-Acquired Illness or Injury  Outcome: Progressing  Goal: Optimal Comfort and Wellbeing  Outcome: Progressing  Goal: Readiness for Transition of Care  Outcome: Progressing     Problem: Diabetes Comorbidity  Goal: Blood Glucose Level Within Targeted Range  Outcome: Progressing     Problem: Acute Kidney Injury/Impairment  Goal: Fluid and Electrolyte Balance  Outcome: Progressing  Goal: Improved Oral Intake  Outcome: Progressing  Goal: Effective Renal Function  Outcome: Progressing     Problem: Wound  Goal: Optimal Coping  Outcome: Progressing  Goal: Optimal Functional Ability  Outcome: Progressing  Goal: Absence of Infection Signs and Symptoms  Outcome: Progressing  Goal: Improved Oral Intake  Outcome: Progressing  Goal: Optimal Pain Control and Function  Outcome: Progressing  Goal: Skin Health and Integrity  Outcome: Progressing  Goal: Optimal Wound Healing  Outcome: Progressing     Problem: Fall Injury Risk  Goal: Absence of Fall and Fall-Related Injury  Outcome: Progressing     Problem: Skin Injury Risk Increased  Goal: Skin Health and Integrity  Outcome: Progressing     Problem: Restraint, Nonviolent  Goal: Absence of Harm or Injury  Outcome: Progressing      Azithromycin Counseling:  I discussed with the patient the risks of azithromycin including but not limited to GI upset, allergic reaction, drug rash, diarrhea, and yeast infections.

## 2025-02-17 NOTE — ASSESSMENT & PLAN NOTE
Patient's FSGs are uncontrolled due to hyperglycemia on current medication regimen.  Last A1c reviewed-   Lab Results   Component Value Date    LABA1C 13.0 (H) 04/13/2016    HGBA1C 5.8 (H) 12/30/2024     Most recent fingerstick glucose reviewed-   Recent Labs   Lab 02/16/25  2037 02/16/25  2359 02/17/25  0758 02/17/25  1140   POCTGLUCOSE 146* 118* 169* 124*       Current correctional scale  Medium  Maintain anti-hyperglycemic dose as follows-   Antihyperglycemics (From admission, onward)    Start     Stop Route Frequency Ordered    02/15/25 0900  insulin glargine U-100 (Lantus) pen 5 Units         -- SubQ Daily 02/14/25 1006    02/13/25 1059  insulin aspart U-100 pen 0-10 Units         -- SubQ Every 4 hours PRN 02/13/25 0959        Hold Oral hypoglycemics while patient is in the hospital.

## 2025-02-17 NOTE — ASSESSMENT & PLAN NOTE
Hypernatremia is likely due to Dehydration. The patient's most recent sodium results are listed below.  Recent Labs     02/16/25  0715 02/17/25  0830   * 146*       Plan  - Aim to correct the sodium by 8-10mEq in 24 hours.   - Plan to correct their hypernatremia with [facility] IV fluids: 1/2 NS    - Will plan to trend the patient's sodium: Daily  - The patient's hypernatremia is improving

## 2025-02-17 NOTE — PLAN OF CARE
[facility] - Acute Medical Stepdown  Initial Discharge Assessment       Primary Care Provider: Alireza Sosa MD    Admission Diagnosis: Hyperkalemia [E87.5]  Hypernatremia [E87.0]  Sepsis with encephalopathy without septic shock, due to unspecified organism [A41.9, R65.20, G93.41]  Hyperosmolar hyperglycemic state (HHS) [E11.00]    Admission Date: 2/12/2025  Expected Discharge Date: 2/18/2025    Transition of Care Barriers: Mobility    Payor: MEDICAID / Plan: [employer] CONNECT / Product Type: Managed Medicaid /     Extended Emergency Contact Information  Primary Emergency Contact: Aneta Martinez   [facility]  Home Phone: [phone]  Work Phone: [phone]  Mobile Phone: [phone]  Relation: Daughter  Secondary Emergency Contact: aylinjose manuel  Mobile Phone: [phone]  Relation: Daughter  Preferred language: English   needed? No    Discharge Plan A: Home with family  Discharge Plan B: Home      [id number] [place] [address] AT [address] [address]  Phone: [phone] Fax: [phone]      Initial Assessment (most recent)       Adult Discharge Assessment - 02/17/25 1605          Discharge Assessment    Confirmed/corrected address, phone number and insurance Yes     Confirmed Demographics Correct on Facesheet     Source of Information family     If unable to respond/provide information was family/caregiver contacted? Yes     Contact Name/Number Aneta Martinez (Daughter)  [phone] (Mobile)     When was your last doctors appointment? 11/15/24   Alireza Sosa MD    Communicated ANA with patient/caregiver Yes     People in Home child(sonali), adult     Facility Arrived From: home     Do you expect to return to your current living situation? Yes     Do you have help at home or someone to help you manage your care at home? Yes     Who are your caregiver(s) and their phone number(s)?  Aneta Martinez (Daughter)  [phone] (Mobile)     Prior to hospitilization cognitive status: Not Oriented to Place;Not Oriented to Time     Current cognitive status: Not Oriented to Place;Not Oriented to Time     Walking or Climbing Stairs Difficulty yes     Walking or Climbing Stairs ambulation difficulty, requires equipment     Mobility Management w/c, kayce lift     Dressing/Bathing Difficulty yes     Dressing/Bathing bathing difficulty, assistance 1 person;bathing difficulty, requires equipment     Dressing/Bathing Management shower chair     Home Accessibility wheelchair accessible     Equipment Currently Used at Home wheelchair;hospital bed;lift device     Readmission within 30 days? Yes     Patient currently being followed by outpatient case management? Yes     If yes, name of outpatient case management following: other (comments)   Community waiver program    Do you currently have service(s) that help you manage your care at home? Yes     Name and Contact number of agency [facility]     Is the pt/caregiver preference to resume services with current agency Yes     Do you take prescription medications? Yes     Do you have prescription coverage? Yes     Coverage MEDICAID - [facility] CONNECT -     Do you have any problems affording any of your prescribed medications? No     Is the patient taking medications as prescribed? yes     Who is going to help you get home at discharge? Aneta Martinez (Daughter)  [phone] (Mobile)     How do you get to doctors appointments? family or friend will provide     Are you on dialysis? No     Do you take coumadin? No     Discharge Plan A Home with family     Discharge Plan B Home     DME Needed Upon Discharge  none     Discharge Plan discussed with: Adult children   Aneta Martinez (Daughter)  [phone] (Mobile)    Transition of Care Barriers Mobility        Physical Activity    On average, how many days per week do you engage in moderate to  strenuous exercise (like a brisk walk)? 3 days     On average, how many minutes do you engage in exercise at this level? 20 min        Financial Resource Strain    How hard is it for you to pay for the very basics like food, housing, medical care, and heating? Not hard at all        Housing Stability    In the last 12 months, was there a time when you were not able to pay the mortgage or rent on time? No     At any time in the past 12 months, were you homeless or living in a shelter (including now)? No        Transportation Needs    Has the lack of transportation kept you from medical appointments, meetings, work or from getting things needed for daily living? No        Food Insecurity    Within the past 12 months, you worried that your food would run out before you got the money to buy more. Never true     Within the past 12 months, the food you bought just didn't last and you didn't have money to get more. Never true        Stress    Do you feel stress - tense, restless, nervous, or anxious, or unable to sleep at night because your mind is troubled all the time - these days? Only a little        Social Isolation    How often do you feel lonely or isolated from those around you?  Never        Alcohol Use    Q1: How often do you have a drink containing alcohol? Never     Q2: How many drinks containing alcohol do you have on a typical day when you are drinking? Patient does not drink     Q3: How often do you have six or more drinks on one occasion? Never        Utilities    In the past 12 months has the electric, gas, oil, or water company threatened to shut off services in your home? No        Health Literacy    How often do you need to have someone help you when you read instructions, pamphlets, or other written material from your doctor or pharmacy? Never        OTHER    Name(s) of People in Home Aneta Martinez (Daughter)  [phone] (Mobile)                        02/17/25 1611   Readmission   Was this a  planned readmission? Yes   Why were you hospitalized in the last 30 days? C. difficile colitis   Why were you readmitted? New medical problem   When you left the hospital where did you go? Home with Family   Did patient/caregiver refused recommended DC plan? No   Did you try to manage your symptoms your self? No   Did you call anyone? Yes   Who did you call? PCP   Did you try to see or did see a doctor or nurse before you came? Yes   Were you seen? Yes   Did you have  a follow-up appointment on discharge? Yes   Did you go? Yes            CM spoke with patients daughter  Aneta Martinez via phone   to discuss discharge planning.  Patient resides with her daughter and is dependent with ADLs.  Patient uses a w/c and lift divide as well as a hospital bed.   Patients plan is to  return home with daughter and resume  services with [facility]  [phone] . Patient is current with [facility] for enteral feeds [phone]  Patient will require transportation home.     Case Management will continue to follow and assist with discharge planning.  ANA: 2/19/25      Gabriela Griffin RN  Case Management  [facility]  [phone]

## 2025-02-17 NOTE — ASSESSMENT & PLAN NOTE
NICOLASA is likely due to pre-renal azotemia due to intravascular volume depletion. Baseline creatinine is  normal . Most recent creatinine and eGFR are listed below.  Recent Labs     02/16/25  0715 02/17/25  0830   CREATININE 0.8 0.7   EGFRNORACEVR >60.0 >60.0        Plan  - NICOLASA is resolved  - Avoid nephrotoxins and renally dose meds for GFR listed above  - Monitor urine output, serial BMP, and adjust therapy as needed

## 2025-02-17 NOTE — PLAN OF CARE
Pt AAOx2 with intermittent confusion. VSS, no acute changes this shift. Glucerna running at 50ml/hr with 250ml water bolus q6.  2 loose stools this shift. Barrier cream applied for dermatitis.  Free from falls and injuries. Call bell in reach. Bed locked and low. Safety measures maintained.     Problem: Infection  Goal: Absence of Infection Signs and Symptoms  Outcome: Progressing     Problem: Adult Inpatient Plan of Care  Goal: Plan of Care Review  Outcome: Progressing  Goal: Patient-Specific Goal (Individualized)  Outcome: Progressing  Goal: Absence of Hospital-Acquired Illness or Injury  Outcome: Progressing  Goal: Optimal Comfort and Wellbeing  Outcome: Progressing  Goal: Readiness for Transition of Care  Outcome: Progressing     Problem: Diabetes Comorbidity  Goal: Blood Glucose Level Within Targeted Range  Outcome: Progressing     Problem: Acute Kidney Injury/Impairment  Goal: Fluid and Electrolyte Balance  Outcome: Progressing  Goal: Improved Oral Intake  Outcome: Progressing  Goal: Effective Renal Function  Outcome: Progressing     Problem: Wound  Goal: Optimal Coping  Outcome: Progressing  Goal: Optimal Functional Ability  Outcome: Progressing  Goal: Absence of Infection Signs and Symptoms  Outcome: Progressing  Goal: Improved Oral Intake  Outcome: Progressing  Goal: Optimal Pain Control and Function  Outcome: Progressing  Goal: Skin Health and Integrity  Outcome: Progressing  Goal: Optimal Wound Healing  Outcome: Progressing     Problem: Fall Injury Risk  Goal: Absence of Fall and Fall-Related Injury  Outcome: Progressing     Problem: Skin Injury Risk Increased  Goal: Skin Health and Integrity  Outcome: Progressing     Problem: Restraint, Nonviolent  Goal: Absence of Harm or Injury  Outcome: Progressing

## 2025-02-18 PROBLEM — E44.0 MALNUTRITION OF MODERATE DEGREE: Status: ACTIVE | Noted: 2025-02-18

## 2025-02-18 LAB
ANION GAP SERPL CALC-SCNC: 8 MMOL/L (ref 8–16)
BACTERIA BLD CULT: NORMAL
BACTERIA BLD CULT: NORMAL
BASOPHILS # BLD AUTO: 0.04 K/UL (ref 0–0.2)
BASOPHILS NFR BLD: 0.4 % (ref 0–1.9)
BUN SERPL-MCNC: 12 MG/DL (ref 8–23)
CALCIUM SERPL-MCNC: 8.8 MG/DL (ref 8.7–10.5)
CHLORIDE SERPL-SCNC: 117 MMOL/L (ref 95–110)
CO2 SERPL-SCNC: 20 MMOL/L (ref 23–29)
CREAT SERPL-MCNC: 0.8 MG/DL (ref 0.5–1.4)
DIFFERENTIAL METHOD BLD: ABNORMAL
EOSINOPHIL # BLD AUTO: 0.4 K/UL (ref 0–0.5)
EOSINOPHIL NFR BLD: 3.5 % (ref 0–8)
ERYTHROCYTE [DISTWIDTH] IN BLOOD BY AUTOMATED COUNT: 18.6 % (ref 11.5–14.5)
EST. GFR  (NO RACE VARIABLE): >60 ML/MIN/1.73 M^2
GLUCOSE SERPL-MCNC: 149 MG/DL (ref 70–110)
HCT VFR BLD AUTO: 28.5 % (ref 37–48.5)
HGB BLD-MCNC: 8.8 G/DL (ref 12–16)
IMM GRANULOCYTES # BLD AUTO: 0.06 K/UL (ref 0–0.04)
IMM GRANULOCYTES NFR BLD AUTO: 0.5 % (ref 0–0.5)
LYMPHOCYTES # BLD AUTO: 2.9 K/UL (ref 1–4.8)
LYMPHOCYTES NFR BLD: 26.2 % (ref 18–48)
MAGNESIUM SERPL-MCNC: 2 MG/DL (ref 1.6–2.6)
MCH RBC QN AUTO: 27.8 PG (ref 27–31)
MCHC RBC AUTO-ENTMCNC: 30.9 G/DL (ref 32–36)
MCV RBC AUTO: 90 FL (ref 82–98)
MONOCYTES # BLD AUTO: 0.9 K/UL (ref 0.3–1)
MONOCYTES NFR BLD: 7.9 % (ref 4–15)
NEUTROPHILS # BLD AUTO: 6.7 K/UL (ref 1.8–7.7)
NEUTROPHILS NFR BLD: 61.5 % (ref 38–73)
NRBC BLD-RTO: 0 /100 WBC
PHOSPHATE SERPL-MCNC: 3.8 MG/DL (ref 2.7–4.5)
PLATELET # BLD AUTO: 396 K/UL (ref 150–450)
PMV BLD AUTO: 11.4 FL (ref 9.2–12.9)
POCT GLUCOSE: 140 MG/DL (ref 70–110)
POCT GLUCOSE: 142 MG/DL (ref 70–110)
POCT GLUCOSE: 156 MG/DL (ref 70–110)
POCT GLUCOSE: 168 MG/DL (ref 70–110)
POCT GLUCOSE: 180 MG/DL (ref 70–110)
POTASSIUM SERPL-SCNC: 4.1 MMOL/L (ref 3.5–5.1)
RBC # BLD AUTO: 3.17 M/UL (ref 4–5.4)
SODIUM SERPL-SCNC: 145 MMOL/L (ref 136–145)
WBC # BLD AUTO: 10.91 K/UL (ref 3.9–12.7)

## 2025-02-18 PROCEDURE — 36415 COLL VENOUS BLD VENIPUNCTURE: CPT | Performed by: STUDENT IN AN ORGANIZED HEALTH CARE EDUCATION/TRAINING PROGRAM

## 2025-02-18 PROCEDURE — 84100 ASSAY OF PHOSPHORUS: CPT

## 2025-02-18 PROCEDURE — 20600001 HC STEP DOWN PRIVATE ROOM

## 2025-02-18 PROCEDURE — 25000003 PHARM REV CODE 250

## 2025-02-18 PROCEDURE — 25000003 PHARM REV CODE 250: Performed by: STUDENT IN AN ORGANIZED HEALTH CARE EDUCATION/TRAINING PROGRAM

## 2025-02-18 PROCEDURE — 97535 SELF CARE MNGMENT TRAINING: CPT

## 2025-02-18 PROCEDURE — 25000003 PHARM REV CODE 250: Performed by: INTERNAL MEDICINE

## 2025-02-18 PROCEDURE — 92610 EVALUATE SWALLOWING FUNCTION: CPT

## 2025-02-18 PROCEDURE — 85025 COMPLETE CBC W/AUTO DIFF WBC: CPT

## 2025-02-18 PROCEDURE — 83735 ASSAY OF MAGNESIUM: CPT

## 2025-02-18 PROCEDURE — 63600175 PHARM REV CODE 636 W HCPCS: Performed by: INTERNAL MEDICINE

## 2025-02-18 PROCEDURE — 80048 BASIC METABOLIC PNL TOTAL CA: CPT | Performed by: STUDENT IN AN ORGANIZED HEALTH CARE EDUCATION/TRAINING PROGRAM

## 2025-02-18 RX ORDER — BALSAM PERU/CASTOR OIL
OINTMENT (GRAM) TOPICAL 2 TIMES DAILY
Status: DISCONTINUED | OUTPATIENT
Start: 2025-02-18 | End: 2025-02-19 | Stop reason: HOSPADM

## 2025-02-18 RX ORDER — TRAMADOL HYDROCHLORIDE 50 MG/1
50 TABLET ORAL EVERY 6 HOURS PRN
Status: DISCONTINUED | OUTPATIENT
Start: 2025-02-18 | End: 2025-02-19 | Stop reason: HOSPADM

## 2025-02-18 RX ADMIN — ZINC SULFATE 220 MG (50 MG) CAPSULE 220 MG: CAPSULE at 08:02

## 2025-02-18 RX ADMIN — Medication: at 08:02

## 2025-02-18 RX ADMIN — ATORVASTATIN CALCIUM 40 MG: 40 TABLET, FILM COATED ORAL at 08:02

## 2025-02-18 RX ADMIN — INSULIN GLARGINE 5 UNITS: 100 INJECTION, SOLUTION SUBCUTANEOUS at 08:02

## 2025-02-18 RX ADMIN — FAMOTIDINE 20 MG: 20 TABLET ORAL at 08:02

## 2025-02-18 RX ADMIN — ENOXAPARIN SODIUM 40 MG: 40 INJECTION SUBCUTANEOUS at 05:02

## 2025-02-18 RX ADMIN — TRAMADOL HYDROCHLORIDE 50 MG: 50 TABLET, COATED ORAL at 08:02

## 2025-02-18 RX ADMIN — Medication: at 02:02

## 2025-02-18 RX ADMIN — TRAMADOL HYDROCHLORIDE 50 MG: 50 TABLET, COATED ORAL at 11:02

## 2025-02-18 RX ADMIN — INSULIN ASPART 2 UNITS: 100 INJECTION, SOLUTION INTRAVENOUS; SUBCUTANEOUS at 08:02

## 2025-02-18 RX ADMIN — ACETAMINOPHEN 650 MG: 325 TABLET ORAL at 11:02

## 2025-02-18 RX ADMIN — Medication 1 TABLET: at 08:02

## 2025-02-18 NOTE — PLAN OF CARE
Recommendations    To aid in tolerance and less residuals:   --Recommend TF: Glucerna 1.5 @ 40 ml/hr to provide 960 ml TFV, 1440 kcals, 79g PRO, 128 CHO, 15g Fiber, 729 mL water- FWF per MD     --Recommend Jose BID added as TF modifier to provide 90 kcal, 2.5 g protein, 7 g L-Arginine, 7 g L-Glutamine per serving to aid in wound healing    --RD to monitor skin integrity     --Continue B-complex with vit C, zinc sulfate supplementation    --Recommend weekly weights    --Nursing: please continue to document formula and rate on flowsheet  --RD to monitor rate, tolerance, weight    Goals:   1.  75% nutritional needs met with diet/EN/PN    2. Maintain weight during admission    3. BG within target range  Nutrition Goal Status: goal met  Communication of RD Recs: other (comment) (poc)

## 2025-02-18 NOTE — PLAN OF CARE
Problem: Infection  Goal: Absence of Infection Signs and Symptoms  Outcome: Progressing     Problem: Adult Inpatient Plan of Care  Goal: Plan of Care Review  Outcome: Progressing  Goal: Patient-Specific Goal (Individualized)  Outcome: Progressing  Goal: Absence of Hospital-Acquired Illness or Injury  Outcome: Progressing  Goal: Optimal Comfort and Wellbeing  Outcome: Progressing  Goal: Readiness for Transition of Care  Outcome: Progressing     Problem: Diabetes Comorbidity  Goal: Blood Glucose Level Within Targeted Range  Outcome: Progressing     Problem: Acute Kidney Injury/Impairment  Goal: Fluid and Electrolyte Balance  Outcome: Progressing  Goal: Improved Oral Intake  Outcome: Progressing  Goal: Effective Renal Function  Outcome: Progressing     Problem: Wound  Goal: Optimal Coping  Outcome: Progressing  Goal: Optimal Functional Ability  Outcome: Progressing  Goal: Absence of Infection Signs and Symptoms  Outcome: Progressing  Goal: Improved Oral Intake  Outcome: Progressing  Goal: Optimal Pain Control and Function  Outcome: Progressing  Goal: Skin Health and Integrity  Outcome: Progressing  Goal: Optimal Wound Healing  Outcome: Progressing     Problem: Fall Injury Risk  Goal: Absence of Fall and Fall-Related Injury  Outcome: Progressing     Problem: Skin Injury Risk Increased  Goal: Skin Health and Integrity  Outcome: Progressing     Problem: Restraint, Nonviolent  Goal: Absence of Harm or Injury  Outcome: Progressing

## 2025-02-18 NOTE — PLAN OF CARE
Pt AAOx2, VSS, intermittent confusion. Glucerna 1.2ml decreased to 20ml/hr due to increased residual. 250ml water bolus q6hrs. 60cc of residual pulled back @ 1730. HOB elevated. Tramadol given for leg pain. Triad cream applied to open wounds. Heel boots applied. Repositioned prn. 1 Bm this shift. Bed locked and low, bed alarm set, call hough in reach. Nad present at this time. Safety measures maintained.    Problem: Adult Inpatient Plan of Care  Goal: Plan of Care Review  Outcome: Progressing  Goal: Patient-Specific Goal (Individualized)  Outcome: Progressing  Goal: Absence of Hospital-Acquired Illness or Injury  Outcome: Progressing  Goal: Optimal Comfort and Wellbeing  Outcome: Progressing  Goal: Readiness for Transition of Care  Outcome: Progressing     Problem: Diabetes Comorbidity  Goal: Blood Glucose Level Within Targeted Range  Outcome: Progressing     Problem: Acute Kidney Injury/Impairment  Goal: Fluid and Electrolyte Balance  Outcome: Progressing  Goal: Improved Oral Intake  Outcome: Progressing  Goal: Effective Renal Function  Outcome: Progressing     Problem: Wound  Goal: Optimal Coping  Outcome: Progressing  Goal: Optimal Functional Ability  Outcome: Progressing  Goal: Absence of Infection Signs and Symptoms  Outcome: Progressing  Goal: Improved Oral Intake  Outcome: Progressing  Goal: Optimal Pain Control and Function  Outcome: Progressing  Goal: Skin Health and Integrity  Outcome: Progressing  Goal: Optimal Wound Healing  Outcome: Progressing     Problem: Fall Injury Risk  Goal: Absence of Fall and Fall-Related Injury  Outcome: Progressing     Problem: Skin Injury Risk Increased  Goal: Skin Health and Integrity  Outcome: Progressing     Problem: Restraint, Nonviolent  Goal: Absence of Harm or Injury  Outcome: Progressing

## 2025-02-18 NOTE — CONSULTS
[facility]    Wound Care     Patient Name:  Emily Martinez  MRN:  [id number]  Date: 2/18/2025  Diagnosis: <principal problem not specified>     History:  Past Medical History:   Diagnosis Date    Diabetes mellitus type I     Hyperlipidemia     Hypertension     Right sided weakness 6/27/2019     Social History[1]  Precautions:  Allergies as of 02/12/2025 - Reviewed 02/12/2025   Allergen Reaction Noted    Sulfa (sulfonamide antibiotics) Itching 10/30/2014    Sulfur  10/30/2014       WOC Assessment Details / Treatment:    Patient seen for wound care: New Consult   Chart reviewed for this encounter.   Labs:   WBC (K/uL)   Date Value   02/18/2025 10.91   02/17/2025 9.99     Glucose (mg/dL)   Date Value   02/18/2025 149 (H)   02/17/2025 163 (H)     Albumin (g/dL)   Date Value   02/12/2025 2.4 (L)   01/30/2025 2.6 (L)   01/27/2025 2.5 (L)   01/18/2025 1.6 (L)       Anand Score: 13    Narrative:  Pt seen for WC consultation and agreed to assessment. Pt awake lying in bed. Pt not oriented and yelling out for God to help her during assessment. Incontinent of bowel. Jenni care performed. Turns with moderate assist due to resistance.       Chart reviewed for this encounter.   See Flow Sheet for additional documentation and media.    Left anterior shin: Traumatic skin abrasion. Wound bed moist and maroon in color.     Bilateral feet scattered DTI's: Purple/ raspberry in color. Intact. Will order heel lift boots.     Left groin fold: Cleansed with bath wipe. Wound bed pink and bleeding. Triad applied.     Right hip: Previous foam dressing removed. Wound bed revealing pink moist and also with deep maroon coloring. Possible DTI within wound. Triad applied.     Sacrum extending to bilateral buttocks and left thigh: Removed previous foam border. Cleansed with bath wipe. Wound bed pink and moist likely caused from moisture due to incontinence. Triad applied.       RECOMMENDATIONS:  Bedside nurse assess for acute  changes (purulence, increased redness/swelling, increased drainage, malodor, increased pain, pallor, necrosis) please contact physician on any acute changes.    Left anterior shin: Cleanse with normal saline. Apply foam border. Change Weekly and PRN soiling.     Multiple DTIs to bilateral feet: Apply BPCO BID.   -Will order heel lift boots.     Left groin fold: Apply triad BID and PRN soiling.     Right Hip: Apply triad BID and PRN soiling.     Sacrum extending to bilateral buttocks and left posterior thigh: Apply triad BID and PRN soiling.      - NO silicone foam borders to sacrum       Will order immerse mattress.     Bedside nursing to continue care, dressing changes, & continue monitoring.  Bedside nursing to maintain pressure injury prevention interventions, (PIP).     Recommendations made to primary team for above plan.    Thank you for the consult. Wound Care will continue to follow.       02/18/25 1000        Wound 01/16/25 1026 Moisture associated dermatitis Buttocks   Date First Assessed/Time First Assessed: 01/16/25 1026   Present on Original Admission: No  Primary Wound Type: Moisture associated dermatitis  Location: Buttocks   Wound Image    Dressing Appearance Intact;Moist drainage;Clean   Drainage Amount Small   Drainage Characteristics/Odor Serosanguineous   Appearance Red;Pink;Moist   Tissue loss description Partial thickness   Periwound Area Intact;Edematous   Wound Edges Undefined   Care Cleansed with:;Other (see comments)  (bath wipe)   Dressing Removed;Foam;Applied;Other (comment)  (Triad)        Wound 01/14/25 1400 Incontinence associated dermatitis Left Groin   Date First Assessed/Time First Assessed: 01/14/25 1400   Primary Wound Type: Incontinence associated dermatitis  Side: Left  Location: Groin   Wound Image    Dressing Appearance Open to air   Drainage Amount Scant   Drainage Characteristics/Odor Sanguineous   Appearance Pink;Red;Moist;Bleeding   Tissue loss description Partial thickness    Periwound Area Intact   Wound Edges Irregular   Care Cleansed with:;Other (see comments)  (bath wipe)   Dressing Applied;Other (comment)  (triad)        Wound 01/16/25 1026 Moisture associated dermatitis Left posterior;proximal Thigh   Date First Assessed/Time First Assessed: 01/16/25 1026   Present on Original Admission: No  Primary Wound Type: Moisture associated dermatitis  Side: Left  Orientation: posterior;proximal  Location: Thigh   Wound Image    Dressing Appearance Open to air   Drainage Amount None   Appearance Red;Pink   Tissue loss description Partial thickness   Care Cleansed with:;Other (see comments)  (bath wipe)   Dressing Applied;Other (comment)  (triad)        Wound 02/12/25 1801 Skin Tear Right lateral Buttocks   Date First Assessed/Time First Assessed: 02/12/25 1801   Present on Original Admission: Yes  Primary Wound Type: Skin Tear  Side: Right  Orientation: lateral  Location: Buttocks  Is this injury device related?: Yes   Wound Image    Dressing Appearance Intact;Clean;Moist drainage   Drainage Amount Small   Drainage Characteristics/Odor Serosanguineous   Appearance Pink;Red;Maroon;Moist   Periwound Area Intact   Wound Edges Irregular   Wound Length (cm) 12 cm   Wound Width (cm) 5 cm   Wound Depth (cm) 0.1 cm   Wound Volume (cm^3) 3.142 cm^3   Wound Surface Area (cm^2) 47.12 cm^2   Dressing Removed;Foam;Applied;Other (comment)  (triad)        Wound 02/12/25 1802 Pressure Injury Left dorsal;anterior Foot   Date First Assessed/Time First Assessed: 02/12/25 1802   Present on Original Admission: Yes  Primary Wound Type: Pressure Injury  Side: Left  Orientation: dorsal;anterior  Location: Foot   Wound Image    Pressure Injury Stage DTPI   Dressing Appearance Open to air   Drainage Amount None   Appearance Maroon;Purple   Tissue loss description Not applicable   Periwound Area Intact        Wound Traumatic Left distal;anterior Leg   No Date First Assessed or Time First Assessed found.   Primary  Wound Type: Traumatic  Side: Left  Orientation: distal;anterior  Location: Leg   Wound Image    Dressing Appearance Open to air   Drainage Amount Scant   Drainage Characteristics/Odor Serosanguineous   Appearance Maroon;Red;Moist   Tissue loss description Partial thickness   Periwound Area Intact   Wound Edges Defined        Wound Pressure Injury Right Heel   No Date First Assessed or Time First Assessed found.   Primary Wound Type: Pressure Injury  Side: Right  Location: Heel   Wound Image    Pressure Injury Stage DTPI   Drainage Amount None   Appearance Maroon;Purple;Intact                              [1]   Social History  Socioeconomic History    Marital status: Single   Tobacco Use    Smoking status: Every Day     Current packs/day: 1.50     Average packs/day: 1.5 packs/day for 35.0 years (52.5 ttl pk-yrs)     Types: Cigarettes    Smokeless tobacco: Never   Substance and Sexual Activity    Alcohol use: Not Currently    Drug use: Not Currently     Social Drivers of Health     Financial Resource Strain: Low Risk  (2/17/2025)    Overall Financial Resource Strain (CARDIA)     Difficulty of Paying Living Expenses: Not hard at all   Food Insecurity: No Food Insecurity (2/17/2025)    Hunger Vital Sign     Worried About Running Out of Food in the Last Year: Never true     Ran Out of Food in the Last Year: Never true   Transportation Needs: No Transportation Needs (12/31/2024)    TRANSPORTATION NEEDS     Transportation : No   Physical Activity: Insufficiently Active (2/17/2025)    Exercise Vital Sign     Days of Exercise per Week: 3 days     Minutes of Exercise per Session: 20 min   Stress: No Stress Concern Present (2/17/2025)    [employer] - Occupational Stress Questionnaire     Feeling of Stress : Only a little   Housing Stability: Low Risk  (2/17/2025)    Housing Stability Vital Sign     Unable to Pay for Housing in the Last Year: No     Homeless in the Last Year: No

## 2025-02-18 NOTE — ASSESSMENT & PLAN NOTE
Patient's FSGs are uncontrolled due to hyperglycemia on current medication regimen.  Last A1c reviewed-   Lab Results   Component Value Date    LABA1C 13.0 (H) 04/13/2016    HGBA1C 5.8 (H) 12/30/2024     Most recent fingerstick glucose reviewed-   Recent Labs   Lab 02/17/25  2122 02/18/25  0508 02/18/25  0838 02/18/25  1252   POCTGLUCOSE 101 168* 180* 156*       Current correctional scale  Medium  Maintain anti-hyperglycemic dose as follows-   Antihyperglycemics (From admission, onward)    Start     Stop Route Frequency Ordered    02/15/25 0900  insulin glargine U-100 (Lantus) pen 5 Units         -- SubQ Daily 02/14/25 1006    02/13/25 1059  insulin aspart U-100 pen 0-10 Units         -- SubQ Every 4 hours PRN 02/13/25 0959        Hold Oral hypoglycemics while patient is in the hospital.

## 2025-02-18 NOTE — NURSING
Pt remains aaox2. Feeding resumed @ 20:00 per MD order r/t a residual of 200 cc on previous shift. No residual noted upon the restart of feeding. Ultrasound to RUE veins performed. No results as of now. Urinating spontaneously after removal of horton on previous shift. Peg tube patent, in place , and flowing properly. Feeding tolerated well. Remained free from falls/injuries. Bed lowered and locked. Call light within reach. Bed alarm active. VSS.

## 2025-02-18 NOTE — PROGRESS NOTES
[facility] Acute Medical [facility] Medicine  Progress Note    Patient Name: Emily Martinez  MRN: [id number]  Patient Class: IP- Inpatient   Admission Date: 2/12/2025  Length of Stay: 6 days  Attending Physician: Wili Delatorre MD  Primary Care Provider: Alireza Sosa MD        Subjective     Principal Problem:Hyperosmolar hyperglycemic state (HHS)        HPI:  60 y/o female with PMHx of diabetes (for which she takes insulin 3 times a day), history of CVA with chronic left hemiplegia, hypertension, CKD 4 presents by EMS for altered mental status. Patient brought to ED due to concerns for AMS and abdominal pain. Patient lives with daughter. Per daughter, patient has been complaining of abdominal pain and became less verbal since last night at 9pm. Decreased fluid intake for the past two days. Usually takes glucose readings at home, but machine had broke and replacement was delayed for today due to being back ordered. Patient recently discharged from hospital for UTI with E coli ESBL. Currently considering sepsis vs pneumonia vs UTI as etiology of AMS.     Overview/Hospital Course:  2/13/2025: Improvement in glucose and improvement of major electrolyte disturbances. IV fluid replenishment with 5% dextrose .45% normal saline. Lantus increased to 10 U daily. Able to speak and responds to questions. Oriented to person and place. Following commands appropriately.      2/14/2025: At basline mental status. Further improvement of electrolytes with IV .45% NS and lantus 5U. Tube feeds set as recommended by nutrition.     Interval History:   Patient with continued high residuals on TF. TF regimen adjusted per nutrition recs. SLP consulted to evaluate for oral diet.       Review of Systems   All other systems reviewed and are negative.    Objective:     Vital Signs (Most Recent):  Temp: 98 °F (36.7 °C) (02/18/25 1250)  Pulse: 84 (02/18/25 1250)  Resp: 20 (02/18/25 1250)  BP: 124/83 (02/18/25 1250)  SpO2: 98 %  (02/18/25 1250) Vital Signs (24h Range):  Temp:  [97.6 °F (36.4 °C)-98.3 °F (36.8 °C)] 98 °F (36.7 °C)  Pulse:  [78-88] 84  Resp:  [13-20] 20  SpO2:  [97 %-99 %] 98 %  BP: (107-163)/() 124/83     Weight: 57.2 kg (126 lb)  Body mass index is 22.32 kg/m².    Intake/Output Summary (Last 24 hours) at 2/18/2025 1333  Last data filed at 2/18/2025 0609  Gross per 24 hour   Intake 2205 ml   Output 710 ml   Net 1495 ml         Physical Exam  Vitals and nursing note reviewed.   Constitutional:       General: She is not in acute distress.     Appearance: Normal appearance. She is normal weight. She is not toxic-appearing or diaphoretic.      Comments: Oriented to person and place.   HENT:      Head: Normocephalic and atraumatic.      Nose: Nose normal.      Mouth/Throat:      Mouth: Mucous membranes are moist.   Cardiovascular:      Rate and Rhythm: Normal rate and regular rhythm.      Heart sounds: No murmur heard.     No friction rub. No gallop.   Pulmonary:      Effort: Pulmonary effort is normal. No respiratory distress.      Breath sounds: Normal breath sounds. No wheezing.   Abdominal:      General: Abdomen is flat. There is no distension.      Palpations: Abdomen is soft.      Tenderness: There is no abdominal tenderness.      Comments: PEG tube   Musculoskeletal:         General: No swelling. Normal range of motion.      Right lower leg: No edema.      Left lower leg: No edema.   Skin:     Findings: No bruising or erythema.   Neurological:      Mental Status: She is alert.      Comments: Oriented to person and place. Following commands appropriately. Left sided weakness.             Significant Labs: All pertinent labs within the past 24 hours have been reviewed.  CBC:   Recent Labs   Lab 02/17/25  0830 02/18/25  0617   WBC 9.99 10.91   HGB 8.6* 8.8*   HCT 27.1* 28.5*    396     CMP:   Recent Labs   Lab 02/17/25  0830 02/18/25  0617   * 145   K 4.2 4.1   * 117*   CO2 20* 20*   * 149*    BUN 15 12   CREATININE 0.7 0.8   CALCIUM 8.6* 8.8   ANIONGAP 7* 8       Significant Imaging: I have reviewed all pertinent imaging results/findings within the past 24 hours.    Assessment and Plan     Type 2 diabetes mellitus with stage 4 chronic kidney disease, with long-term current use of insulin  Patient's FSGs are uncontrolled due to hyperglycemia on current medication regimen.  Last A1c reviewed-   Lab Results   Component Value Date    LABA1C 13.0 (H) 04/13/2016    HGBA1C 5.8 (H) 12/30/2024     Most recent fingerstick glucose reviewed-   Recent Labs   Lab 02/17/25  2122 02/18/25  0508 02/18/25  0838 02/18/25  1252   POCTGLUCOSE 101 168* 180* 156*       Current correctional scale  Medium  Maintain anti-hyperglycemic dose as follows-   Antihyperglycemics (From admission, onward)      Start     Stop Route Frequency Ordered    02/15/25 0900  insulin glargine U-100 (Lantus) pen 5 Units         -- SubQ Daily 02/14/25 1006    02/13/25 1059  insulin aspart U-100 pen 0-10 Units         -- SubQ Every 4 hours PRN 02/13/25 0959          Hold Oral hypoglycemics while patient is in the hospital.      Hypernatremia  Hypernatremia is likely due to Dehydration. The patient's most recent sodium results are listed below.  Recent Labs     02/16/25  0715 02/17/25  0830 02/18/25  0617   * 146* 145       Plan  - Aim to correct the sodium by 8-10mEq in 24 hours.   - Plan to correct their hypernatremia with Select IV fluids: 1/2 NS    - Will plan to trend the patient's sodium: Daily  - The patient's hypernatremia is improving    AMS (altered mental status)  Patient presents with AMS and family member stating recent abdominal pain. Can consider UTI vs sepsis vs HHS vs PNA.     - Being treated with meropenem for concern of PNA.   - Will perform UA for evaluation of UTI.  - elevated glucose with slightly elevated beta-hydroxybutyrate. Being given IV fluids and insulin.  - CT abdomenpelvis wo contrast showing concern for  ""Questionable developing infectious consolidation within the right lower lobe versus atelectasis or scarring. "  Although findings may be related to previous PNA, will cover with abx for possible new PNA with vancomycin and meropenem.   - AMS has resolved since 2/13/2025.    Aspiration pneumonia  CT abdomen pelvis concerning for "Questionable developing infectious consolidation within the right lower lobe versus atelectasis or scarring." Family denies any recent aspiration events.      2/14/2025: Improving leukocytosis. Being treated with IV Vancomycin. New blood cultures obtained yesterday due to contaminants in first BC.     Hyperkalemia  Hyperkalemia is likely due to NICOLASA.The patients most recent potassium results are listed below.  Recent Labs     02/16/25  0715 02/17/25  0830   K 4.1 4.2       Plan  - Monitor for arrhythmias with EKG and/or continuous telemetry.   - Monitor potassium: Daily  - The patient's hyperkalemia is resolved    NICOLASA (acute kidney injury)  NICOLASA is likely due to pre-renal azotemia due to intravascular volume depletion. Baseline creatinine is  normal . Most recent creatinine and eGFR are listed below.  Recent Labs     02/16/25  0715 02/17/25  0830   CREATININE 0.8 0.7   EGFRNORACEVR >60.0 >60.0        Plan  - NICOLASA is resolved  - Avoid nephrotoxins and renally dose meds for GFR listed above  - Monitor urine output, serial BMP, and adjust therapy as needed      VTE Risk Mitigation (From admission, onward)           Ordered     enoxaparin injection 40 mg  Every 24 hours         02/13/25 1724     IP VTE HIGH RISK PATIENT  Once         02/12/25 1355     Place sequential compression device  Until discontinued         02/12/25 1355     Place sequential compression device  Until discontinued         02/12/25 1236                    Discharge Planning   ANA: 2/19/2025     Code Status: Full Code   Medical Readiness for Discharge Date:   Discharge Plan A: Home with family   Discharge Delays: None known at " this time                    Wili Delatorre MD  Department of Hospital Medicine   [facility] - Acute Medical Stepdown

## 2025-02-18 NOTE — PT/OT/SLP EVAL
Speech Language Pathology Evaluation  Bedside Swallow    Patient Name:  Emily Martinez   MRN:  [id number]  Admitting Diagnosis: Hyperosmolar hyperglycemic state (HHS)    Recommendations:                 General Recommendations:  Follow up to ensure ongoing diet tolerance  Diet recommendations:  Soft & Bite Sized Diet - IDDSI Level 6, Thin liquids - IDDSI Level 0   Aspiration Precautions: Assistance with meals, Continue alternate means of nutrition via PEG, Eliminate distractions, Feed only when awake/alert, HOB to 90 degrees, and Small bites/sips   General Precautions: Standard, aspiration, fall  Communication strategies:  provide increased time to answer    Assessment:     Emily Martinez is a 59 y.o. female with an SLP diagnosis of a functional oropharyngeal swallow for PO intake of her baseline diet of soft and bite sized solids and thin liquids. Continue with PEG for ongoing supplemental support given hx of poor PO intake per chart review and previous SLP encounters. No family was bedside for education.    History:     Past Medical History:   Diagnosis Date    Diabetes mellitus type I     Hyperlipidemia     Hypertension     Right sided weakness 2019       Past Surgical History:   Procedure Laterality Date     SECTION       HPI:  60 y/o female with PMHx of diabetes (for which she takes insulin 3 times a day), history of CVA with chronic left hemiplegia, hypertension, CKD 4 presents by EMS for altered mental status. Patient brought to ED due to concerns for AMS and abdominal pain. Patient lives with daughter. Per daughter, patient has been complaining of abdominal pain and became less verbal since last night at 9pm. Decreased fluid intake for the past two days. Usually takes glucose readings at home, but machine had broke and replacement was delayed for today due to being back ordered. Patient recently discharged from hospital for UTI with E coli ESBL. Currently considering sepsis vs pneumonia vs UTI  as etiology of AMS.     Prior Intubation HX:  none on file     Modified Barium Swallow: none on file     Chest X-Rays: FINDINGS:  Peg tube is present in the left upper quadrant.  Air throughout nondilated loops of large and small intestines with no zone of transition.  No pneumatosis or free air.  No organomegaly.  Phleboliths in the pelvis.  Bones unremarkable as imaged with rotatory lumbar scoliosis.    Prior diet: Soft and bite sized solids and thin liquids with additional support via PEG tube.    Subjective     Pt seen resting in bed. No family bedside.     Pain/Comfort:  Pain Rating 1: 0/10    Respiratory Status: Room air    Objective:     Oral Musculature Evaluation  Dentition: scattered dentition, teeth in poor condition  Secretion Management: adequate  Mucosal Quality: dry  Mandibular Strength and Mobility: WFL  Voice Prior to PO Intake: adequate intensity and clear    Bedside Swallow Eval:   Consistencies Assessed:  Thin liquids cup edge sips x4 and large cyclical straw sips   Solids 1/2 cracker      Oral Phase:   Prolonged mastication  Oral residue, cleared with liquid wash     Pharyngeal Phase:   no overt clinical signs/symptoms of aspiration  no overt clinical signs/symptoms of pharyngeal dysphagia    Compensatory Strategies  None    Treatment: No family bedside for education. Pt answered simple yes/no questions for the purpose of the evaluation via head nods, voicing remained clear throughout the session. Discussed aspiration precautions and updated whiteboard with recommendations. SLP will follow up. Pt indicated understanding.     Goals:   Multidisciplinary Problems       SLP Goals          Problem: SLP    Goal Priority Disciplines Outcome   SLP Goal     SLP Progressing   Description: Speech language pathology goals:   1. Pt will tolerate the least restrictive diet without signs of aspiration.                        Plan:     Patient to be seen:  3 x/week   Plan of Care expires:     Plan of Care  reviewed with:      SLP Follow-Up:  Yes       Discharge recommendations:  Low Intensity Therapy   Barriers to Discharge:  None    Time Tracking:     SLP Treatment Date:   02/18/25  Speech Start Time:  1537  Speech Stop Time:  1549     Speech Total Time (min):  12 min    Billable Minutes: Eval Swallow and Oral Function 12    02/18/2025

## 2025-02-18 NOTE — PROGRESS NOTES
[facility] - Acute Medical Stepdown  Adult Nutrition  Progress Note    SUMMARY       Recommendations    To aid in tolerance and less residuals:   --Recommend TF: Glucerna 1.5 @ 40 ml/hr to provide 960 ml TFV, 1440 kcals, 79g PRO, 128 CHO, 15g Fiber, 729 mL water- FWF per MD     --Recommend Jose BID added as TF modifier to provide 90 kcal, 2.5 g protein, 7 g L-Arginine, 7 g L-Glutamine per serving to aid in wound healing    --RD to monitor skin integrity     --Continue B-complex with vit C, zinc sulfate supplementation    --Recommend weekly weights    --Nursing: please continue to document formula and rate on flowsheet  --RD to monitor rate, tolerance, weight    Goals:   1.  75% nutritional needs met with diet/EN/PN    2. Maintain weight during admission    3. BG within target range  Nutrition Goal Status: goal met  Communication of RD Recs: other (comment) (poc)    Assessment and Plan  Malnutrition Type:  Context: chronic illness  Level: moderate     Related to (etiology):   Inadequate energy intake     Signs and Symptoms (as evidenced by):   Wounds, weight loss >20% x 12 months, mild muscle and fat depletion      Malnutrition Characteristic Summary:  Weight Loss (Malnutrition): greater than 20% in 1 year        Interventions/Recommendations (treatment strategy):  EN, Jose     Nutrition Diagnosis Status:   New     Nutrition Problem  Inadequate Enteral nutrition infusion     Related to (etiology):   No nutrition infusing      Signs and Symptoms (as evidenced by):   G-tube dependent patient w/o nutrition infusing at this time       Interventions/Recommendations (treatment strategy):  Collaboration with other providers  Enteral Nutrition      Nutrition Diagnosis Status:   Resolved    Malnutrition Assessment  Malnutrition Context: chronic illness  Malnutrition Level: moderate  Skin (Micronutrient): dry, wounds unhealed   Micronutrient Evaluation Summary: suspected deficiency   Weight Loss (Malnutrition): greater than  "20% in 1 year   Orbital Region (Subcutaneous Fat Loss): mild depletion  Upper Arm Region (Subcutaneous Fat Loss): mild depletion  Thoracic and Lumbar Region: well nourished   [place] Region (Muscle Loss): mild depletion  Clavicle Bone Region (Muscle Loss): well nourished  Clavicle and Acromion Bone Region (Muscle Loss): well nourished  Scapular Bone Region (Muscle Loss): well nourished  Dorsal Hand (Muscle Loss): mild depletion  Patellar Region (Muscle Loss): well nourished  Posterior Calf Region (Muscle Loss): well nourished                 Reason for Assessment    Reason For Assessment: RD follow-up  Diagnosis: other (see comments) (no principal problem)  General Information Comments: Patient assessed today for follow up.  Patient remains admitted for altered mental status and abdominal pain.  PEG tube remains in place, TF currently ordered Glucerna 1.2@50 mL/hr- running at goal and  meeting needs.  +BM 2/17 (loose). Noted patient remains with abdominal discomfort.  Spoke with RN at bedside- residuals are a concern for med team (450 mL 2/16, 200 mL 2/17, 120 mL today).  NFPE completed.  Patient meets criteria for malnutrition per ASPEN criteria.  Patient was agitated and tearful during visit asking me to take her out of the hospital  Nutrition Discharge Planning: At home TF Regimen    Nutrition/Diet History    Spiritual, Cultural Beliefs, Islam Practices, Values that Affect Care: no  Food Allergies: NKFA    Anthropometrics    Height: 5' 3" (160 cm)  Height (inches): 63 in  Weight: 57.2 kg (126 lb)  Weight (lb): 126 lb  Weight Method: Estimated  Ideal Body Weight (IBW), Female: 115 lb  % Ideal Body Weight, Female (lb): 109.57 %  BMI (Calculated): 22.3  BMI Grade: 18.5-24.9 - normal       Lab/Procedures/Meds    Pertinent Labs Reviewed: reviewed  Pertinent Labs Comments: H/H 8.8/28.5 (L), -163 x 24 hours  Pertinent Medications Reviewed: reviewed  Pertinent Medications Comments: Atorvastatin, B-complex with " vit C, zinc sulfate, insulin    Estimated/Assessed Needs    Weight Used For Calorie Calculations: 57.2 kg (126 lb 1.7 oz)  Energy Calorie Requirements (kcal): 2726-7519 (25-35 kcal/kg)  Energy Need Method: Kcal/kg  Protein Requirements: 45-68 (.8-1.2 CKD 4 w/ pressure injuries)  Weight Used For Protein Calculations: 57.2 kg (126 lb 1.7 oz)     Estimated Fluid Requirement Method: RDA Method  RDA Method (mL): 1430         Nutrition Prescription Ordered    Current Diet Order: NPO  Current Nutrition Support Formula Ordered: Glucerna 1.2  Current Nutrition Support Rate Ordered: 50 (ml)  Current Nutrition Support Frequency Ordered: Continuous    Evaluation of Received Nutrient/Fluid Intake    Enteral Calories (kcal): 1440  Enteral Protein (gm): 72  Enteral (Free Water) Fluid (mL): 966  % Kcal Needs: 100  % Protein Needs: 100  I/O: -  Comments: -  % Intake of Estimated Energy Needs: 75 - 100 %  % Meal Intake: NPO    Nutrition Risk    Level of Risk/Frequency of Follow-up: moderate (f/u 1-2x/week)     Monitor and Evaluation    Food and Nutrient Intake: enteral nutrition intake  Food and Nutrient Adminstration: enteral and parenteral nutrition administration  Anthropometric Measurements: height/length, weight, weight change, body mass index  Biochemical Data, Medical Tests and Procedures: electrolyte and renal panel, gastrointestinal profile, glucose/endocrine profile, inflammatory profile, lipid profile  Nutrition-Focused Physical Findings: overall appearance, extremities, muscles and bones, head and eyes, skin     Nutrition Follow-Up    RD Follow-up?: Yes

## 2025-02-18 NOTE — ASSESSMENT & PLAN NOTE
Hypernatremia is likely due to Dehydration. The patient's most recent sodium results are listed below.  Recent Labs     02/16/25  0715 02/17/25  0830 02/18/25  0617   * 146* 145       Plan  - Aim to correct the sodium by 8-10mEq in 24 hours.   - Plan to correct their hypernatremia with Select IV fluids: 1/2 NS    - Will plan to trend the patient's sodium: Daily  - The patient's hypernatremia is improving

## 2025-02-18 NOTE — ASSESSMENT & PLAN NOTE
Malnutrition Type:  Context: chronic illness  Level: moderate    Related to (etiology):   Inadequate energy intake    Signs and Symptoms (as evidenced by):   Wounds, weight loss >20% x 12 months, mild muscle and fat depletion     Malnutrition Characteristic Summary:  Weight Loss (Malnutrition): greater than 20% in 1 year      Interventions/Recommendations (treatment strategy):  EN, Jose    Nutrition Diagnosis Status:   New

## 2025-02-18 NOTE — SUBJECTIVE & OBJECTIVE
Interval History:   Patient with continued high residuals on TF. TF regimen adjusted per nutrition recs. SLP consulted to evaluate for oral diet.       Review of Systems   All other systems reviewed and are negative.    Objective:     Vital Signs (Most Recent):  Temp: 98 °F (36.7 °C) (02/18/25 1250)  Pulse: 84 (02/18/25 1250)  Resp: 20 (02/18/25 1250)  BP: 124/83 (02/18/25 1250)  SpO2: 98 % (02/18/25 1250) Vital Signs (24h Range):  Temp:  [97.6 °F (36.4 °C)-98.3 °F (36.8 °C)] 98 °F (36.7 °C)  Pulse:  [78-88] 84  Resp:  [13-20] 20  SpO2:  [97 %-99 %] 98 %  BP: (107-163)/() 124/83     Weight: 57.2 kg (126 lb)  Body mass index is 22.32 kg/m².    Intake/Output Summary (Last 24 hours) at 2/18/2025 1333  Last data filed at 2/18/2025 0609  Gross per 24 hour   Intake 2205 ml   Output 710 ml   Net 1495 ml         Physical Exam  Vitals and nursing note reviewed.   Constitutional:       General: She is not in acute distress.     Appearance: Normal appearance. She is normal weight. She is not toxic-appearing or diaphoretic.      Comments: Oriented to person and place.   HENT:      Head: Normocephalic and atraumatic.      Nose: Nose normal.      Mouth/Throat:      Mouth: Mucous membranes are moist.   Cardiovascular:      Rate and Rhythm: Normal rate and regular rhythm.      Heart sounds: No murmur heard.     No friction rub. No gallop.   Pulmonary:      Effort: Pulmonary effort is normal. No respiratory distress.      Breath sounds: Normal breath sounds. No wheezing.   Abdominal:      General: Abdomen is flat. There is no distension.      Palpations: Abdomen is soft.      Tenderness: There is no abdominal tenderness.      Comments: PEG tube   Musculoskeletal:         General: No swelling. Normal range of motion.      Right lower leg: No edema.      Left lower leg: No edema.   Skin:     Findings: No bruising or erythema.   Neurological:      Mental Status: She is alert.      Comments: Oriented to person and place. Following  commands appropriately. Left sided weakness.             Significant Labs: All pertinent labs within the past 24 hours have been reviewed.  CBC:   Recent Labs   Lab 02/17/25  0830 02/18/25  0617   WBC 9.99 10.91   HGB 8.6* 8.8*   HCT 27.1* 28.5*    396     CMP:   Recent Labs   Lab 02/17/25  0830 02/18/25  0617   * 145   K 4.2 4.1   * 117*   CO2 20* 20*   * 149*   BUN 15 12   CREATININE 0.7 0.8   CALCIUM 8.6* 8.8   ANIONGAP 7* 8       Significant Imaging: I have reviewed all pertinent imaging results/findings within the past 24 hours.

## 2025-02-19 VITALS
WEIGHT: 126 LBS | OXYGEN SATURATION: 99 % | HEIGHT: 63 IN | SYSTOLIC BLOOD PRESSURE: 116 MMHG | TEMPERATURE: 98 F | RESPIRATION RATE: 18 BRPM | DIASTOLIC BLOOD PRESSURE: 81 MMHG | BODY MASS INDEX: 22.32 KG/M2 | HEART RATE: 81 BPM

## 2025-02-19 LAB
ANION GAP SERPL CALC-SCNC: 6 MMOL/L (ref 8–16)
BASOPHILS # BLD AUTO: 0.06 K/UL (ref 0–0.2)
BASOPHILS NFR BLD: 0.5 % (ref 0–1.9)
BUN SERPL-MCNC: 11 MG/DL (ref 8–23)
CALCIUM SERPL-MCNC: 8.3 MG/DL (ref 8.7–10.5)
CHLORIDE SERPL-SCNC: 116 MMOL/L (ref 95–110)
CO2 SERPL-SCNC: 21 MMOL/L (ref 23–29)
CREAT SERPL-MCNC: 0.7 MG/DL (ref 0.5–1.4)
DIFFERENTIAL METHOD BLD: ABNORMAL
EOSINOPHIL # BLD AUTO: 0.4 K/UL (ref 0–0.5)
EOSINOPHIL NFR BLD: 3.5 % (ref 0–8)
ERYTHROCYTE [DISTWIDTH] IN BLOOD BY AUTOMATED COUNT: 18.6 % (ref 11.5–14.5)
EST. GFR  (NO RACE VARIABLE): >60 ML/MIN/1.73 M^2
GLUCOSE SERPL-MCNC: 125 MG/DL (ref 70–110)
HCT VFR BLD AUTO: 27 % (ref 37–48.5)
HGB BLD-MCNC: 8.2 G/DL (ref 12–16)
IMM GRANULOCYTES # BLD AUTO: 0.04 K/UL (ref 0–0.04)
IMM GRANULOCYTES NFR BLD AUTO: 0.4 % (ref 0–0.5)
LYMPHOCYTES # BLD AUTO: 3.2 K/UL (ref 1–4.8)
LYMPHOCYTES NFR BLD: 27.7 % (ref 18–48)
MAGNESIUM SERPL-MCNC: 1.9 MG/DL (ref 1.6–2.6)
MCH RBC QN AUTO: 27.7 PG (ref 27–31)
MCHC RBC AUTO-ENTMCNC: 30.4 G/DL (ref 32–36)
MCV RBC AUTO: 91 FL (ref 82–98)
MONOCYTES # BLD AUTO: 1 K/UL (ref 0.3–1)
MONOCYTES NFR BLD: 8.3 % (ref 4–15)
NEUTROPHILS # BLD AUTO: 6.8 K/UL (ref 1.8–7.7)
NEUTROPHILS NFR BLD: 59.6 % (ref 38–73)
NRBC BLD-RTO: 0 /100 WBC
PHOSPHATE SERPL-MCNC: 3.6 MG/DL (ref 2.7–4.5)
PLATELET # BLD AUTO: 418 K/UL (ref 150–450)
PMV BLD AUTO: 10.6 FL (ref 9.2–12.9)
POCT GLUCOSE: 129 MG/DL (ref 70–110)
POCT GLUCOSE: 130 MG/DL (ref 70–110)
POCT GLUCOSE: 154 MG/DL (ref 70–110)
POCT GLUCOSE: 168 MG/DL (ref 70–110)
POTASSIUM SERPL-SCNC: 4.1 MMOL/L (ref 3.5–5.1)
RBC # BLD AUTO: 2.96 M/UL (ref 4–5.4)
SODIUM SERPL-SCNC: 143 MMOL/L (ref 136–145)
WBC # BLD AUTO: 11.39 K/UL (ref 3.9–12.7)

## 2025-02-19 PROCEDURE — 25000003 PHARM REV CODE 250

## 2025-02-19 PROCEDURE — 83735 ASSAY OF MAGNESIUM: CPT

## 2025-02-19 PROCEDURE — 84100 ASSAY OF PHOSPHORUS: CPT

## 2025-02-19 PROCEDURE — 85025 COMPLETE CBC W/AUTO DIFF WBC: CPT

## 2025-02-19 PROCEDURE — 80048 BASIC METABOLIC PNL TOTAL CA: CPT | Performed by: STUDENT IN AN ORGANIZED HEALTH CARE EDUCATION/TRAINING PROGRAM

## 2025-02-19 PROCEDURE — 25000003 PHARM REV CODE 250: Performed by: STUDENT IN AN ORGANIZED HEALTH CARE EDUCATION/TRAINING PROGRAM

## 2025-02-19 PROCEDURE — 36415 COLL VENOUS BLD VENIPUNCTURE: CPT | Performed by: STUDENT IN AN ORGANIZED HEALTH CARE EDUCATION/TRAINING PROGRAM

## 2025-02-19 PROCEDURE — 25000003 PHARM REV CODE 250: Performed by: INTERNAL MEDICINE

## 2025-02-19 RX ORDER — FLUCONAZOLE 150 MG/1
150 TABLET ORAL ONCE
Status: COMPLETED | OUTPATIENT
Start: 2025-02-19 | End: 2025-02-19

## 2025-02-19 RX ORDER — MICONAZOLE NITRATE 2 G/100G
POWDER TOPICAL 2 TIMES DAILY
Status: DISCONTINUED | OUTPATIENT
Start: 2025-02-19 | End: 2025-02-19 | Stop reason: HOSPADM

## 2025-02-19 RX ADMIN — INSULIN GLARGINE 5 UNITS: 100 INJECTION, SOLUTION SUBCUTANEOUS at 10:02

## 2025-02-19 RX ADMIN — FAMOTIDINE 20 MG: 20 TABLET ORAL at 08:02

## 2025-02-19 RX ADMIN — ATORVASTATIN CALCIUM 40 MG: 40 TABLET, FILM COATED ORAL at 08:02

## 2025-02-19 RX ADMIN — FLUCONAZOLE 150 MG: 150 TABLET ORAL at 10:02

## 2025-02-19 RX ADMIN — MICONAZOLE NITRATE 2 % TOPICAL POWDER: at 10:02

## 2025-02-19 RX ADMIN — Medication 1 TABLET: at 08:02

## 2025-02-19 RX ADMIN — INSULIN ASPART 2 UNITS: 100 INJECTION, SOLUTION INTRAVENOUS; SUBCUTANEOUS at 09:02

## 2025-02-19 RX ADMIN — ZINC SULFATE 220 MG (50 MG) CAPSULE 220 MG: CAPSULE at 08:02

## 2025-02-19 RX ADMIN — Medication: at 08:02

## 2025-02-19 RX ADMIN — TRAMADOL HYDROCHLORIDE 50 MG: 50 TABLET, COATED ORAL at 08:02

## 2025-02-19 NOTE — PLAN OF CARE
[facility] - Acute Medical Stepdown  Discharge Final Note    Primary Care Provider: Alireza Sosa MD    Expected Discharge Date: 2/19/2025  Discharge Plan A and Plan B have been determined by review of patient's clinical status, future medical and therapeutic needs, and coverage/benefits for post-acute care in coordination with multidisciplinary team members.     Final Discharge Note (most recent)       Final Note - 02/19/25 1152          Final Note    Assessment Type Final Discharge Note     Anticipated Discharge Disposition Home-Health Care c     What phone number can be called within the next 1-3 days to see how you are doing after discharge? [phone]     Hospital Resources/Appts/Education Provided Appointments scheduled and added to AVS        Post-Acute Status    Post-Acute Authorization Home Health;IV Infusion     Home Health Status Set-up Complete/Auth obtained     Coverage MEDICAID - [place] [employer] -     IV Infusion Status Set-up Complete/Auth obtained     Patient choice form signed by patient/caregiver List with quality metrics by geographic area provided;List from System Post-Acute Care     Discharge Delays None known at this time                                 Follow-up providers       Alireza Sosa MD   Specialty: Family Medicine   Relationship: PCP - General    [address] [place] [id number]   Phone: [phone]       Next Steps: Go on 2/26/2025    Instructions: follow up 2/26 at 245p    [facility]    [address] [place] [id number] [phone]       Next Steps: Follow up              After-discharge care                Home Medical Care       [facility]   Service: Home Health Services    [address] [facility] [address]   Phone: [phone]                             No future appointments.    1008 am  CM sent an in basket message to Team 2  CHW to schedule PCP follow up    1010 am   CM called and spoke  with Jun with [facility] [phone] and updated that patient is discharging to day and HH orders uploaded via EPIC.  SOC: 2/20/25    1014 am  CM sent an update to [facility] that patient will be discharging  home today    1:01 pm  Transportation scheduled for 2:00 pm, ETA pending       ETA  2:25 pm     KEITH spoke with patients daughter via phone, Jose Martinez [phone]   and discussed discharge plans, patient to DC home with  HH and home infusion for enteral feeds, and follow up appointment[s] scheduled.   Transportation provided by [employer] via [employer].      Gabriela Griffin RN  Case Management  [facility]  [phone]

## 2025-02-19 NOTE — PLAN OF CARE
[facility] - Acute Medical Stepdown      HOME HEALTH ORDERS  FACE TO FACE ENCOUNTER    Patient Name: Emily Martinez  YOB: 1965    PCP: Alireza Sosa MD   PCP Address: [address] [place] [address] ANGEL BRODY [facility]  PCP Phone Number: [phone]  PCP Fax: [phone]    Encounter Date: 2/12/25    Admit to Home Health    Diagnoses:  Active Hospital Problems    Diagnosis  POA    *Hyperosmolar hyperglycemic state (HHS) [E11.00]  Yes    Type 2 diabetes mellitus with stage 4 chronic kidney disease, with long-term current use of insulin [E11.22, N18.4, Z79.4]  Not Applicable     Priority: 1 - High    Hypernatremia [E87.0]  Yes     Priority: 2     AMS (altered mental status) [R41.82]  Yes     Priority: 4     Malnutrition of moderate degree [E44.0]  Unknown    Hyperkalemia [E87.5]  Yes    Aspiration pneumonia [J69.0]  Yes    NICOLASA (acute kidney injury) [N17.9]  Yes      Resolved Hospital Problems    Diagnosis Date Resolved POA    Abdominal pain [R10.9] 02/12/2025 Unknown    Sepsis [A41.9] 02/12/2025 Yes       Follow Up Appointments:  No future appointments.    Allergies:  Review of patient's allergies indicates:   Allergen Reactions    Sulfa (sulfonamide antibiotics) Itching    Sulfur        Medications: Review discharge medications with patient and family and provide education.    Current Medications[1]     Medication List        ASK your doctor about these medications      acetaminophen 650 MG Tbsr  Commonly known as: TYLENOL  Take 1 tablet (650 mg total) by mouth every 8 (eight) hours. For back pain     aspirin 81 MG EC tablet  Commonly known as: ECOTRIN  Take 1 tablet (81 mg total) by mouth once daily.     atorvastatin 40 MG tablet  Commonly known as: LIPITOR  TAKE 1 TABLET(40 MG) BY MOUTH EVERY DAY     blood sugar diagnostic Strp  Commonly known as: TRUE METRIX GLUCOSE TEST STRIP  TO CHECK BLOOD GLUCOSE THREE TIMES DAILY     blood-glucose meter kit  Commonly known as: FREESTYLE SYSTEM KIT  Use daily-  "TID     capsicum 0.075% 0.075 % topical cream  Commonly known as: ZOSTRIX  Apply topically 3 (three) times daily.     clopidogreL 75 mg tablet  Commonly known as: PLAVIX  Take 1 tablet (75 mg total) by mouth once daily.     diaper,brief,adult,disposable Misc  1 each by Misc.(Non-Drug; Combo Route) route 3 (three) times daily.     diphenhydrAMINE 50 MG capsule  Commonly known as: BENADRYL  Take 1 capsule (50 mg total) by mouth every 6 (six) hours as needed for Itching.     fluticasone propionate 50 mcg/actuation nasal spray  Commonly known as: FLONASE  1 spray (50 mcg total) by Each Nostril route once daily.     food supplemt, lactose-reduced Liqd  Commonly known as: ENSURE MAX PROTEIN  Take 118 mLs by mouth 3 (three) times daily.     FREESTYLE LEATHA 14 DAY SENSOR Kit  Generic drug: flash glucose sensor  1 each by Misc.(Non-Drug; Combo Route) route once daily.     insulin glargine U-100 (Lantus) 100 unit/mL (3 mL) Inpn pen  Inject 5 Units into the skin every evening.     insulin lispro protamin-lispro 100 unit/mL (50-50) Inpn  Inject 10 Units into the skin. once daily     lancets Misc  Commonly known as: ONETOUCH ULTRASOFT LANCETS  Use 1 TID as directed for diabetes checking     miconazole NITRATE 2 % 2 % top powder  Commonly known as: MICOTIN  Apply topically 2 (two) times daily.     multivitamin Tab  1 tablet by Per G Tube route once daily.     pen needle, diabetic 29 gauge x 1/2" Ndle  Commonly known as: BD ULTRA-FINE ORIG PEN NEEDLE  USE TO TEST THREE TIMES DAILY MUST LAST 33 DAYS     ULTRA-LIGHT ROLLATOR Misc  Generic drug: walker  1 each by Misc.(Non-Drug; Combo Route) route once daily at 6am.     wheelchair Kelsey  1 each by Misc.(Non-Drug; Combo Route) route 2 (two) times daily. Power Wheel Chair     white petrolatum 41 % Oint  Apply topically 2 (two) times a day.                I have seen and examined this patient within the last 30 days. My clinical findings that support the need for the home health skilled " services and home bound status are the following:no   Weakness/numbness causing balance and gait disturbance due to Weakness/Debility making it taxing to leave home.     Diet:   pureed diet  thin    TF: Glucerna 1.5 @ 40 ml/hr to provide 960 ml TFV, 1440 kcals, 79g PRO, 128 CHO, 15g Fiber, 729 mL water - FWF per MD     FWF - 150mL four time a day    Labs:  None    Referrals/ Consults  Physical Therapy to evaluate and treat. Evaluate for home safety and equipment needs; Establish/upgrade home exercise program. Perform / instruct on therapeutic exercises, gait training, transfer training, and Range of Motion.  Occupational Therapy to evaluate and treat. Evaluate home environment for safety and equipment needs. Perform/Instruct on transfers, ADL training, ROM, and therapeutic exercises.  Aide to provide assistance with personal care, ADLs, and vital signs.    Activities:   activity as tolerated    Nursing:   Agency to admit patient within 24 hours of hospital discharge unless specified on physician order or at patient request    SN to complete comprehensive assessment including routine vital signs. Instruct on disease process and s/s of complications to report to MD. Review/verify medication list sent home with the patient at time of discharge  and instruct patient/caregiver as needed. Frequency may be adjusted depending on start of care date.     Skilled nurse to perform up to 3 visits PRN for symptoms related to diagnosis    Notify MD if SBP > 160 or < 90; DBP > 90 or < 50; HR > 120 or < 50; Temp > 101; O2 < 88%; Other:       Ok to schedule additional visits based on staff availability and patient request on consecutive days within the home health episode.    When multiple disciplines ordered:    Start of Care occurs on Sunday - Wednesday schedule remaining discipline evaluations as ordered on separate consecutive days following the start of care.    Thursday SOC -schedule subsequent evaluations Friday and Monday the  following week.     Friday - Saturday SOC - schedule subsequent discipline evaluations on consecutive days starting Monday of the following week.    For all post-discharge communication and subsequent orders please contact patient's primary care physician. If unable to reach primary care physician or do not receive response within 30 minutes, please contact [facility] for clinical staff order clarification    Miscellaneous   PEG Care:  Instruct patient/caregiver to clean site.  Monitor skin integrity.  Routine Skin for Bedridden Patients: Instruct patient/caregiver to apply moisture barrier cream to all skin folds and wet areas in perineal area daily and after baths and all bowel movements.    Home Health Aide:  Physical Therapy Three times weekly, Occupational Therapy Three times weekly, and Home Health Aide Twice weekly    Wound Care Orders  yes:  See below    Bedside nurse assess for acute changes (purulence, increased redness/swelling, increased drainage, malodor, increased pain, pallor, necrosis) please contact physician on any acute changes.     Left anterior shin: Cleanse with normal saline. Apply foam border. Change Weekly and PRN soiling.      Multiple DTIs to bilateral feet: Apply BPCO BID.   -Will order heel lift boots.      Left groin fold: Apply triad BID and PRN soiling.      Right Hip: Apply triad BID and PRN soiling.      Sacrum extending to bilateral buttocks and left posterior thigh: Apply triad BID and PRN soiling.                  - NO silicone foam borders to sacrum     I certify that this patient is confined to her home and needs intermittent skilled nursing care, physical therapy, and occupational therapy.               [1]   Current Facility-Administered Medications   Medication Dose Route Frequency Provider Last Rate Last Admin    acetaminophen tablet 650 mg  650 mg Per G Tube Q4H PRN August Bland DO   650 mg at 02/18/25 1109    atorvastatin tablet 40 mg  40 mg Per G Tube Daily August Bland, DO    40 mg at 02/19/25 0857    B-complex with vitamin C tablet 1 tablet  1 tablet Per G Tube Daily Rubi Foley MD   1 tablet at 02/19/25 0857    balsam peru-castor oiL Oint   Topical (Top) BID Wili Delatorre MD   Given at 02/19/25 0858    dextrose 10 % infusion   Intravenous Continuous PRN Noman Louie MD        dextrose 50% injection 12.5 g  12.5 g Intravenous PRN Noman Louie MD        dextrose 50% injection 25 g  25 g Intravenous PRN Noman Louie MD        enoxaparin injection 40 mg  40 mg Subcutaneous Q24H (prophylaxis, 1700) Noman Louie MD   40 mg at 02/18/25 1733    famotidine tablet 20 mg  20 mg Per G Tube BID Noman Louie MD   20 mg at 02/19/25 0857    glucagon (human recombinant) injection 1 mg  1 mg Intramuscular PRN Noman Louie MD        insulin aspart U-100 pen 0-10 Units  0-10 Units Subcutaneous Q4H PRN Noman Louie MD   2 Units at 02/19/25 0901    insulin glargine U-100 (Lantus) pen 5 Units  5 Units Subcutaneous Daily Parth Jones MD   5 Units at 02/18/25 0841    sodium chloride 0.9% flush 10 mL  10 mL Intravenous PRN August Bland,         traMADoL tablet 50 mg  50 mg Per G Tube Q6H PRN Wili Delatorre MD   50 mg at 02/19/25 0857    zinc sulfate capsule 220 mg  220 mg Per G Tube Daily Rubi Foley MD   220 mg at 02/19/25 0857

## 2025-02-19 NOTE — NURSING
Pt remains aaox2. Peg tube in place and functioning properly. Feeding tolerated well. No residual noted. Bm x1. Abdominal binder ordered and applied. Remained free from falls/injuries. Bed lowered and locked with bed alarm active. Triad applied to wounds. Boots intact. CBG wnl. VSS.

## 2025-02-23 NOTE — DISCHARGE SUMMARY
[facility] - [facility] Medicine  Discharge Summary      Patient Name: Emily Martinez  MRN: [id number]  SALVADOR: [id number]  Patient Class: IP- Inpatient  Admission Date: 2/12/2025  Hospital Length of Stay: 7 days  Discharge Date and Time: 2/19/2025  2:47 PM  Attending Physician: Yamileth att. providers found   Discharging Provider: Wili Delatorre MD  Primary Care Provider: Alireza Sosa MD  Hospital Medicine Team: [facility]OSP MED D Wili Delatorre MD  Primary Care Team: [facility] HOSP MED D    HPI:   58 y/o female with PMHx of diabetes (for which she takes insulin 3 times a day), history of CVA with chronic left hemiplegia, hypertension, CKD 4 presents by EMS for altered mental status. Patient brought to ED due to concerns for AMS and abdominal pain. Patient lives with daughter. Per daughter, patient has been complaining of abdominal pain and became less verbal since last night at 9pm. Decreased fluid intake for the past two days. Usually takes glucose readings at home, but machine had broke and replacement was delayed for today due to being back ordered. Patient recently discharged from hospital for UTI with E coli ESBL. Currently considering sepsis vs pneumonia vs UTI as etiology of AMS.     * No surgery found *      Hospital Course:   Type 2 diabetes mellitus with stage 4 chronic kidney disease, with long-term current use of insulin  Hyperosmolar Hyperglycemic State    Patient's FSGs are uncontrolled due to hyperglycemia on current medication regimen.  Last A1c reviewed-         Lab Results   Component Value Date     LABA1C 13.0 (H) 04/13/2016     HGBA1C 5.8 (H) 12/30/2024      Most recent fingerstick glucose reviewed-          Recent Labs   Lab 02/17/25  2122 02/18/25  0508 02/18/25  0838 02/18/25  1252   POCTGLUCOSE 101 168* 180* 156*         Current correctional scale  Medium  Maintain anti-hyperglycemic dose as follows-   Antihyperglycemics (From admission, onward)        Start     Stop Route  "Frequency Ordered     02/15/25 0900   insulin glargine U-100 (Lantus) pen 5 Units         -- SubQ Daily 02/14/25 1006     02/13/25 1059   insulin aspart U-100 pen 0-10 Units         -- SubQ Every 4 hours PRN 02/13/25 0959             - Treated with insulin with improvement of symptoms  - Continued sub w insulin with stable mentation  - Discharged on home insulin  - Discharge home with HH        Hypernatremia  Hypernatremia is likely due to Dehydration. The patient's most recent sodium results are listed below.        Recent Labs     02/16/25  0715 02/17/25  0830 02/18/25  0617   * 146* 145         - Aim to correct the sodium by 8-10mEq in 24 hours.   - Plan to correct their hypernatremia with Select IV fluids: 1/2 NS  and FWF  - Will plan to trend the patient's sodium: Daily  - The patient's hypernatremia is improving       AMS (altered mental status)  Patient presents with AMS and family member stating recent abdominal pain. Can consider UTI vs sepsis vs HHS vs PNA.     - Being treated with meropenem for concern of PNA.   - Will perform UA for evaluation of UTI.  - elevated glucose with slightly elevated beta-hydroxybutyrate. Being given IV fluids and insulin.  - CT abdomenpelvis wo contrast showing concern for "Questionable developing infectious consolidation within the right lower lobe versus atelectasis or scarring. "  Although findings may be related to previous PNA, will cover with abx for possible new PNA with vancomycin and meropenem.  - AMS has resolved since 2/13/2025.       Aspiration pneumonia  CT abdomen pelvis concerning for "Questionable developing infectious consolidation within the right lower lobe versus atelectasis or scarring." Family denies any recent aspiration events.     - s/p meropenem       Hyperkalemia  Hyperkalemia is likely due to NICOLASA.The patients most recent potassium results are listed below.       Recent Labs     02/16/25  0715 02/17/25  0830   K 4.1 4.2         - Monitor for " arrhythmias with EKG and/or continuous telemetry.   - Monitor potassium: Daily  - The patient's hyperkalemia is resolved       NICOLASA (acute kidney injury)  NICOLASA is likely due to pre-renal azotemia due to intravascular volume depletion. Baseline creatinine is  normal . Most recent creatinine and eGFR are listed below.       Recent Labs     02/16/25  0715 02/17/25  0830   CREATININE 0.8 0.7   EGFRNORACEVR >60.0 >60.0         - NICOLASA is resolved with volume resolution      Patient deemed appropriate for discharge. I personally saw, examined, and evaluated patient prior to departure. Plan discussed with patient, who was agreeable and amenable; medications were discussed and reviewed, outpatient follow-up scheduled, ER precautions were given, all questions were answered to the patient's satisfaction, and Emily Martinez was subsequently discharged.       Goals of Care Treatment Preferences:  Code Status: Full Code         Consults:   Consults (From admission, onward)          Status Ordering Provider     Inpatient consult to Infectious Diseases  Once        Provider:  (Not yet assigned)    Completed YOBANY CHIU     Inpatient consult to Registered Dietitian/Nutritionist  Once        Provider:  (Not yet assigned)    Completed DELFIN WISEMAN     Inpatient consult to Critical Care Medicine  Once        Provider:  (Not yet assigned)    Completed CHAR ASHER            Type 2 diabetes mellitus with stage 4 chronic kidney disease, with long-term current use of insulin  Patient's FSGs are uncontrolled due to hyperglycemia on current medication regimen.  Last A1c reviewed-   Lab Results   Component Value Date    LABA1C 13.0 (H) 04/13/2016    HGBA1C 5.8 (H) 12/30/2024     Most recent fingerstick glucose reviewed-   Recent Labs   Lab 02/17/25  2122 02/18/25  0508 02/18/25  0838 02/18/25  1252   POCTGLUCOSE 101 168* 180* 156*       Current correctional scale  Medium  Maintain anti-hyperglycemic dose as follows-  "  Antihyperglycemics (From admission, onward)      Start     Stop Route Frequency Ordered    02/15/25 0900  insulin glargine U-100 (Lantus) pen 5 Units         -- SubQ Daily 02/14/25 1006    02/13/25 1059  insulin aspart U-100 pen 0-10 Units         -- SubQ Every 4 hours PRN 02/13/25 0959          Hold Oral hypoglycemics while patient is in the hospital.      Hypernatremia  Hypernatremia is likely due to Dehydration. The patient's most recent sodium results are listed below.  Recent Labs     02/16/25  0715 02/17/25  0830 02/18/25  0617   * 146* 145       Plan  - Aim to correct the sodium by 8-10mEq in 24 hours.   - Plan to correct their hypernatremia with Select IV fluids: 1/2 NS    - Will plan to trend the patient's sodium: Daily  - The patient's hypernatremia is improving    AMS (altered mental status)  Patient presents with AMS and family member stating recent abdominal pain. Can consider UTI vs sepsis vs HHS vs PNA.     - Being treated with meropenem for concern of PNA.   - Will perform UA for evaluation of UTI.  - elevated glucose with slightly elevated beta-hydroxybutyrate. Being given IV fluids and insulin.  - CT abdomenpelvis wo contrast showing concern for "Questionable developing infectious consolidation within the right lower lobe versus atelectasis or scarring. "  Although findings may be related to previous PNA, will cover with abx for possible new PNA with vancomycin and meropenem.   - AMS has resolved since 2/13/2025.    Aspiration pneumonia  CT abdomen pelvis concerning for "Questionable developing infectious consolidation within the right lower lobe versus atelectasis or scarring." Family denies any recent aspiration events.      2/14/2025: Improving leukocytosis. Being treated with IV Vancomycin. New blood cultures obtained yesterday due to contaminants in first BC.     Hyperkalemia  Hyperkalemia is likely due to NICOLASA.The patients most recent potassium results are listed below.  Recent Labs    "  02/16/25  0715 02/17/25  0830   K 4.1 4.2       Plan  - Monitor for arrhythmias with EKG and/or continuous telemetry.   - Monitor potassium: Daily  - The patient's hyperkalemia is resolved    NICOLASA (acute kidney injury)  NICOLASA is likely due to pre-renal azotemia due to intravascular volume depletion. Baseline creatinine is  normal . Most recent creatinine and eGFR are listed below.  Recent Labs     02/16/25  0715 02/17/25  0830   CREATININE 0.8 0.7   EGFRNORACEVR >60.0 >60.0        Plan  - NICOLASA is resolved  - Avoid nephrotoxins and renally dose meds for GFR listed above  - Monitor urine output, serial BMP, and adjust therapy as needed      Final Active Diagnoses:    Diagnosis Date Noted POA    PRINCIPAL PROBLEM:  Hyperosmolar hyperglycemic state (HHS) [E11.00] 06/08/2021 Yes    Type 2 diabetes mellitus with stage 4 chronic kidney disease, with long-term current use of insulin [E11.22, N18.4, Z79.4] 04/01/2019 Not Applicable    Hypernatremia [E87.0] 12/31/2024 Yes    AMS (altered mental status) [R41.82] 06/14/2024 Yes    Malnutrition of moderate degree [E44.0] 02/18/2025 Yes    Hyperkalemia [E87.5] 01/17/2025 Yes    Aspiration pneumonia [J69.0] 09/04/2024 Yes    NICOLASA (acute kidney injury) [N17.9] 06/28/2019 Yes      Problems Resolved During this Admission:    Diagnosis Date Noted Date Resolved POA    Abdominal pain [R10.9] 02/12/2025 02/12/2025 Unknown    Sepsis [A41.9] 09/06/2024 02/12/2025 Yes       Discharged Condition: good    Disposition: Home-Health Care c    Follow Up:   Contact information for follow-up providers       Alireza Sosa MD. Go on 2/26/2025.    Specialty: Family Medicine  Why: follow up 2/26 at 245p  Contact information:  [address] [facility] [address] [phone]               [facility] Follow up.    Contact information:  [address] [place] [address] [phone]                     Contact information for after-discharge care       Home Medical  [facility] .    Service: Home Health Services  Contact information:  [address] [place] [address]  [address]  [facility] [address]  [phone]                                 Patient Instructions:   No discharge procedures on file.    Significant Diagnostic Studies: Labs: All labs within the past 24 hours have been reviewed    Pending Diagnostic Studies:       Procedure Component Value Units Date/Time    Basic metabolic panel - if not done in ED [[id number]] Collected: 02/13/25 0922    Order Status: Sent Lab Status: No result     Specimen: Blood     Phosphorus [[id number]] Collected: 02/13/25 0825    Order Status: Sent Lab Status: No result     Specimen: Blood            Medications:  Reconciled Home Medications:      Medication List        ASK your doctor about these medications      acetaminophen 650 MG Tbsr  Commonly known as: TYLENOL  Take 1 tablet (650 mg total) by mouth every 8 (eight) hours. For back pain     aspirin 81 MG EC tablet  Commonly known as: ECOTRIN  Take 1 tablet (81 mg total) by mouth once daily.     atorvastatin 40 MG tablet  Commonly known as: LIPITOR  TAKE 1 TABLET(40 MG) BY MOUTH EVERY DAY     blood sugar diagnostic Strp  Commonly known as: TRUE METRIX GLUCOSE TEST STRIP  TO CHECK BLOOD GLUCOSE THREE TIMES DAILY     blood-glucose meter kit  Commonly known as: FREESTYLE SYSTEM KIT  Use daily- TID     capsicum 0.075% 0.075 % topical cream  Commonly known as: ZOSTRIX  Apply topically 3 (three) times daily.     clopidogreL 75 mg tablet  Commonly known as: PLAVIX  Take 1 tablet (75 mg total) by mouth once daily.     diaper,brief,adult,disposable Misc  1 each by Misc.(Non-Drug; Combo Route) route 3 (three) times daily.     diphenhydrAMINE 50 MG capsule  Commonly known as: BENADRYL  Take 1 capsule (50 mg total) by mouth every 6 (six) hours as needed for Itching.     fluticasone propionate 50 mcg/actuation nasal spray  Commonly known as: FLONASE  1 spray (50 mcg total) by  "Each Nostril route once daily.     food supplemt, lactose-reduced Liqd  Commonly known as: ENSURE MAX PROTEIN  Take 118 mLs by mouth 3 (three) times daily.     FREESTYLE LEATHA 14 DAY SENSOR Kit  Generic drug: flash glucose sensor  1 each by Misc.(Non-Drug; Combo Route) route once daily.     insulin glargine U-100 (Lantus) 100 unit/mL (3 mL) Inpn pen  Inject 5 Units into the skin every evening.     insulin lispro protamin-lispro 100 unit/mL (50-50) Inpn  Inject 10 Units into the skin. once daily     lancets Misc  Commonly known as: ONETOUCH ULTRASOFT LANCETS  Use 1 TID as directed for diabetes checking     miconazole NITRATE 2 % 2 % top powder  Commonly known as: MICOTIN  Apply topically 2 (two) times daily.     multivitamin Tab  1 tablet by Per G Tube route once daily.     pen needle, diabetic 29 gauge x 1/2" Ndle  Commonly known as: BD ULTRA-FINE ORIG PEN NEEDLE  USE TO TEST THREE TIMES DAILY MUST LAST 33 DAYS     ULTRA-LIGHT ROLLATOR Misc  Generic drug: walker  1 each by Misc.(Non-Drug; Combo Route) route once daily at 6am.     wheelchair Kelsey  1 each by Misc.(Non-Drug; Combo Route) route 2 (two) times daily. Power Wheel Chair     white petrolatum 41 % Oint  Apply topically 2 (two) times a day.              Indwelling Lines/Drains at time of discharge:   Lines/Drains/Airways       Drain  Duration                  Gastrostomy/Enterostomy 01/02/25 1530 feeding 51 days                    Time spent on the discharge of patient: 35 minutes         Wili Delatorre MD  Department of Hospital Medicine  [facility] - Acute Medical Stepdown  "

## 2025-04-10 ENCOUNTER — HOSPITAL ENCOUNTER (EMERGENCY)
Facility: HOSPITAL | Age: 60
Discharge: HOME | End: 2025-04-10
Attending: STUDENT IN AN ORGANIZED HEALTH CARE EDUCATION/TRAINING PROGRAM
Payer: MEDICAID

## 2025-04-10 VITALS
RESPIRATION RATE: 16 BRPM | DIASTOLIC BLOOD PRESSURE: 71 MMHG | SYSTOLIC BLOOD PRESSURE: 110 MMHG | OXYGEN SATURATION: 96 % | HEART RATE: 88 BPM | TEMPERATURE: 97 F

## 2025-04-10 DIAGNOSIS — R73.9 HYPERGLYCEMIA: ICD-10-CM

## 2025-04-10 DIAGNOSIS — Z51.89 VISIT FOR WOUND CHECK: Primary | ICD-10-CM

## 2025-04-10 DIAGNOSIS — M25.551 PAIN OF RIGHT HIP: ICD-10-CM

## 2025-04-10 LAB
ABSOLUTE EOSINOPHIL (OHS): 0.06 K/UL
ABSOLUTE MONOCYTE (OHS): 1.18 K/UL (ref 0.3–1)
ABSOLUTE NEUTROPHIL COUNT (OHS): 8.03 K/UL (ref 1.8–7.7)
ALBUMIN SERPL BCP-MCNC: 1.9 G/DL (ref 3.5–5.2)
ALP SERPL-CCNC: 107 UNIT/L (ref 40–150)
ALT SERPL W/O P-5'-P-CCNC: 12 UNIT/L (ref 10–44)
ANION GAP (OHS): 8 MMOL/L (ref 8–16)
AST SERPL-CCNC: 15 UNIT/L (ref 11–45)
B-OH-BUTYR BLD STRIP-SCNC: 0.1 MMOL/L
BASOPHILS # BLD AUTO: 0.03 K/UL
BASOPHILS NFR BLD AUTO: 0.3 %
BILIRUB SERPL-MCNC: 0.2 MG/DL (ref 0.1–1)
BIPAP: 0
BUN SERPL-MCNC: 25 MG/DL (ref 8–23)
CALCIUM SERPL-MCNC: 8.9 MG/DL (ref 8.7–10.5)
CHLORIDE SERPL-SCNC: 105 MMOL/L (ref 95–110)
CO2 SERPL-SCNC: 24 MMOL/L (ref 23–29)
CORRECTED TEMPERATURE (PCO2): 39 MMHG
CORRECTED TEMPERATURE (PH): 7.48
CORRECTED TEMPERATURE (PO2): 27.4 MMHG
CREAT SERPL-MCNC: 0.8 MG/DL (ref 0.5–1.4)
ERYTHROCYTE [DISTWIDTH] IN BLOOD BY AUTOMATED COUNT: 19.2 % (ref 11.5–14.5)
FIO2: 21 %
GFR SERPLBLD CREATININE-BSD FMLA CKD-EPI: >60 ML/MIN/1.73/M2
GLUCOSE SERPL-MCNC: 310 MG/DL (ref 70–110)
HCT VFR BLD AUTO: 29.4 % (ref 37–48.5)
HGB BLD-MCNC: 9.1 GM/DL (ref 12–16)
IMM GRANULOCYTES # BLD AUTO: 0.07 K/UL (ref 0–0.04)
IMM GRANULOCYTES NFR BLD AUTO: 0.6 % (ref 0–0.5)
LYMPHOCYTES # BLD AUTO: 1.88 K/UL (ref 1–4.8)
MCH RBC QN AUTO: 24.2 PG (ref 27–31)
MCHC RBC AUTO-ENTMCNC: 31 G/DL (ref 32–36)
MCV RBC AUTO: 78 FL (ref 82–98)
NUCLEATED RBC (/100WBC) (OHS): 0 /100 WBC
OHS QRS DURATION: 70 MS
OHS QRS DURATION: 76 MS
OHS QTC CALCULATION: 464 MS
OHS QTC CALCULATION: 467 MS
PCO2 BLDA: 39 MMHG
PH SMN: 7.48 [PH]
PLATELET # BLD AUTO: 713 K/UL (ref 150–450)
PMV BLD AUTO: 9.2 FL (ref 9.2–12.9)
PO2 BLDA: 27.4 MMHG
POC BASE DEFICIT: 5.3 MMOL/L
POC HCO3: 28.4 MMOL/L
POC PERFORMED BY: NORMAL
POC TEMPERATURE: 37 C
POCT GLUCOSE: 411 MG/DL (ref 70–110)
POCT GLUCOSE: 454 MG/DL (ref 70–110)
POTASSIUM SERPL-SCNC: 4.4 MMOL/L (ref 3.5–5.1)
PROT SERPL-MCNC: 6.1 GM/DL (ref 6–8.4)
RBC # BLD AUTO: 3.76 M/UL (ref 4–5.4)
RELATIVE EOSINOPHIL (OHS): 0.5 %
RELATIVE LYMPHOCYTE (OHS): 16.7 % (ref 18–48)
RELATIVE MONOCYTE (OHS): 10.5 % (ref 4–15)
RELATIVE NEUTROPHIL (OHS): 71.4 % (ref 38–73)
SODIUM SERPL-SCNC: 137 MMOL/L (ref 136–145)
SPECIMEN SOURCE: NORMAL
WBC # BLD AUTO: 11.25 K/UL (ref 3.9–12.7)

## 2025-04-10 PROCEDURE — 82010 KETONE BODYS QUAN: CPT | Performed by: EMERGENCY MEDICINE

## 2025-04-10 PROCEDURE — 82962 GLUCOSE BLOOD TEST: CPT

## 2025-04-10 PROCEDURE — 85025 COMPLETE CBC W/AUTO DIFF WBC: CPT | Performed by: EMERGENCY MEDICINE

## 2025-04-10 PROCEDURE — 99900035 HC TECH TIME PER 15 MIN (STAT)

## 2025-04-10 PROCEDURE — 99285 EMERGENCY DEPT VISIT HI MDM: CPT | Mod: 25

## 2025-04-10 PROCEDURE — 25000003 PHARM REV CODE 250: Performed by: EMERGENCY MEDICINE

## 2025-04-10 PROCEDURE — 82803 BLOOD GASES ANY COMBINATION: CPT

## 2025-04-10 PROCEDURE — 25000003 PHARM REV CODE 250: Performed by: PHYSICIAN ASSISTANT

## 2025-04-10 PROCEDURE — 96360 HYDRATION IV INFUSION INIT: CPT

## 2025-04-10 PROCEDURE — 93005 ELECTROCARDIOGRAM TRACING: CPT

## 2025-04-10 PROCEDURE — 80053 COMPREHEN METABOLIC PANEL: CPT | Performed by: STUDENT IN AN ORGANIZED HEALTH CARE EDUCATION/TRAINING PROGRAM

## 2025-04-10 PROCEDURE — 93010 ELECTROCARDIOGRAM REPORT: CPT | Mod: ,,, | Performed by: STUDENT IN AN ORGANIZED HEALTH CARE EDUCATION/TRAINING PROGRAM

## 2025-04-10 RX ORDER — HYDROCODONE BITARTRATE AND ACETAMINOPHEN 5; 325 MG/1; MG/1
1 TABLET ORAL EVERY 6 HOURS PRN
Qty: 8 TABLET | Refills: 0 | Status: SHIPPED | OUTPATIENT
Start: 2025-04-10 | End: 2025-04-12

## 2025-04-10 RX ORDER — HYDROCODONE BITARTRATE AND ACETAMINOPHEN 5; 325 MG/1; MG/1
1 TABLET ORAL
Refills: 0 | Status: COMPLETED | OUTPATIENT
Start: 2025-04-10 | End: 2025-04-10

## 2025-04-10 RX ADMIN — HYDROCODONE BITARTRATE AND ACETAMINOPHEN 1 TABLET: 5; 325 TABLET ORAL at 04:04

## 2025-04-10 RX ADMIN — SODIUM CHLORIDE 1000 ML: 9 INJECTION, SOLUTION INTRAVENOUS at 04:04

## 2025-04-10 NOTE — ED PROVIDER NOTES
"Encounter Date: 4/10/2025       History     Chief Complaint   Patient presents with    Leg Pain     Pt arrives via EMS, recent wound surgical wound cleaned yesterday pain at site, also EMS CBG "HI"     60-year-old female with past medical history hypertension, hyperlipidemia, CVA, DM T1 who presents ED with multiple complaints.  Has pressure ulcers to the right hip as well as the bilateral lower legs.  Patient is bed-bound 2/2 CVA.  Wound care nurse packed and debrided her right hip pressure ulcer yesterday and patient has been having increased pain.  Denies fevers/chills or purulent drainage.  She does mention that she fell from bed yesterday.  Denies any LOC or blood thinner use.  Denies any pain or injuries from the fall states she continues to have pain from her wounds.  EMS noted that her CBG was "high".  She is compliant with her home insulin        Review of patient's allergies indicates:   Allergen Reactions    Sulfa (sulfonamide antibiotics) Itching    Sulfur      Past Medical History:   Diagnosis Date    Diabetes mellitus type I     Hyperlipidemia     Hypertension     Right sided weakness 2019     Past Surgical History:   Procedure Laterality Date     SECTION       Family History   Problem Relation Name Age of Onset    Hypertension Mother      Stroke Mother      Hyperlipidemia Mother      Diabetes Mother      Hypertension Sister      Cancer Sister      Hyperlipidemia Sister      Diabetes Sister      Hypertension Brother      Hyperlipidemia Brother      Diabetes Brother      Heart disease Maternal Aunt      Diabetes Maternal Aunt       Social History[1]  Review of Systems    Physical Exam     Initial Vitals [04/10/25 1235]   BP Pulse Resp Temp SpO2   (!) 142/90 86 18 98.2 °F (36.8 °C) 98 %      MAP       --         Physical Exam    Nursing note and vitals reviewed.  Constitutional:   Chronically ill-appearing   Eyes: Conjunctivae are normal. Pupils are equal, round, and reactive to light. "   Neck: Neck supple.   Normal range of motion.  Cardiovascular:  Normal rate.           Pulmonary/Chest: Breath sounds normal.   Abdominal: Abdomen is soft. There is no abdominal tenderness.   Musculoskeletal:      Cervical back: Normal range of motion and neck supple.      Comments: Chronic contractures to the bilateral lower extremities as well as the left upper extremity.     Neurological: She is alert.   There multiple wounds with dressings in place between the lower legs.  Large Mepilex to the right hip with fall packing there is good granulation tissue with no surrounding erythema purulent drainage or foul odor.   Skin: Skin is warm and dry. Capillary refill takes less than 2 seconds.         ED Course   Procedures  Labs Reviewed   CBC WITH DIFFERENTIAL - Abnormal       Result Value    WBC 11.25      RBC 3.76 (*)     HGB 9.1 (*)     HCT 29.4 (*)     MCV 78 (*)     MCH 24.2 (*)     MCHC 31.0 (*)     RDW 19.2 (*)     Platelet Count 713 (*)     MPV 9.2      Nucleated RBC 0      Neut % 71.4      Lymph % 16.7 (*)     Mono % 10.5      Eos % 0.5      Basophil % 0.3      Imm Grans % 0.6 (*)     Neut # 8.03 (*)     Lymph # 1.88      Mono # 1.18 (*)     Eos # 0.06      Baso # 0.03      Imm Grans # 0.07 (*)    COMPREHENSIVE METABOLIC PANEL - Abnormal    Sodium 137      Potassium 4.4      Chloride 105      CO2 24      Glucose 310 (*)     BUN 25 (*)     Creatinine 0.8      Calcium 8.9      Protein Total 6.1      Albumin 1.9 (*)     Bilirubin Total 0.2            AST 15      ALT 12      Anion Gap 8      eGFR >60     POCT GLUCOSE - Abnormal    POCT Glucose 454 (*)    POCT GLUCOSE - Abnormal    POCT Glucose 411 (*)    BETA - HYDROXYBUTYRATE, SERUM - Normal    Beta-Hydroxybutyrate 0.1     CBC W/ AUTO DIFFERENTIAL    Narrative:     The following orders were created for panel order CBC auto differential.  Procedure                               Abnormality         Status                     ---------                                -----------         ------                     CBC with Differential[[id number]]       Abnormal            Final result                 Please view results for these tests on the individual orders.   URINALYSIS, REFLEX TO URINE CULTURE   POCT GLUCOSE MONITORING CONTINUOUS        ECG Results              EKG 12-lead (Final result)        Collection Time Result Time QRS Duration OHS QTC Calculation    04/10/25 15:01:32 04/10/25 15:21:01 70 464                     Final result by Interface, Lab In [facility] (04/10/25 15:21:06)                   Narrative:    Test Reason :    Vent. Rate :  86 BPM     Atrial Rate :  86 BPM     P-R Int : 120 ms          QRS Dur :  70 ms      QT Int : 388 ms       P-R-T Axes :  88 -23  11 degrees    QTcB Int : 464 ms    Normal sinus rhythm  Low voltage QRS  Cannot rule out Anterior infarct ,age undetermined  Abnormal ECG  When compared with ECG of 10-Apr-2025 12:49,  Questionable change in The axis  Confirmed by Neil Bacon ([id number]) on 4/10/2025 3:20:57 PM    Referred By: AAAREFERRAL SELF           Confirmed By: Neil Bacon                                     EKG 12-lead (Final result)        Collection Time Result Time QRS Duration OHS QTC Calculation    04/10/25 12:49:25 04/10/25 14:09:27 76 467                     Final result by Interface, Lab In [facility] (04/10/25 14:09:30)                   Narrative:    Test Reason : R73.9,    Vent. Rate :  89 BPM     Atrial Rate :  89 BPM     P-R Int : 124 ms          QRS Dur :  76 ms      QT Int : 384 ms       P-R-T Axes :  80  47  33 degrees    QTcB Int : 467 ms    Normal sinus rhythm  Low voltage QRS  Borderline Abnormal ECG  When compared with ECG of 12-Feb-2025 10:40,  No significant change was found    Confirmed by Yon Osman ([id number]) on 4/10/2025 2:09:23 PM    Referred By: AAAREFERRAL SELF           Confirmed By: Yon Osman                                  Imaging Results              CT Head Without Contrast (Final  result)  Result time 04/10/25 17:34:02      Final result by Migue Harmon MD (04/10/25 17:34:02)                   Impression:      1. Allowing for motion artifact, no convincing acute intracranial abnormalities noting sequela of chronic microvascular ischemic change, senescent change, and multifocal encephalomalacia as described.  2. The ventricular system is prominent, similar in configuration to the previous exam.      Electronically signed by: Migue Harmon MD  Date:    04/10/2025  Time:    17:34               Narrative:    EXAMINATION:  CT HEAD WITHOUT CONTRAST    CLINICAL HISTORY:  Head trauma, moderate-severe;    TECHNIQUE:  Low dose axial images were obtained through the head.  Coronal and sagittal reformations were also performed. Contrast was not administered.    COMPARISON:  02/12/2025    FINDINGS:  There is motion artifact.    There is generalized cerebral volume loss.  There is hypoattenuation in a periventricular fashion, likely sequela of chronic microvascular ischemic change.There is stable low attenuation involving the right corona radiata extending to the right centrum semiovale.  Additional punctate foci of encephalomalacia noted within the left basal ganglia, also stable.  There is no evidence of acute major vascular territory infarct, hemorrhage, or mass.  There is stable prominence of the ventricular system.  There are no abnormal extra-axial fluid collections.  The paranasal sinuses and mastoid air cells are clear, and there is no evidence of calvarial fracture.  The visualized soft tissues are unremarkable.                                       X-Ray Chest AP Portable (Final result)  Result time 04/10/25 15:18:44      Final result by Migue Harmon MD (04/10/25 15:18:44)                   Impression:      1. Interstitial findings may reflect edema noting patient is rotated.  No large focal consolidation.      Electronically signed by: Migue Harmon  MD  Date:    04/10/2025  Time:    15:18               Narrative:    EXAMINATION:  XR CHEST AP PORTABLE    CLINICAL HISTORY:  hyperglycemia;    TECHNIQUE:  Single frontal view of the chest was performed.    COMPARISON:  02/12/2025    FINDINGS:  Patient is rotated.  The cardiomediastinal silhouette is not enlarged, magnified by technique..  There is no pleural effusion.  The trachea is midline.  The lungs are symmetrically expanded bilaterally with mildly coarse interstitial attenuation, may reflect edema..  No large focal consolidation seen.  There is no pneumothorax.  The osseous structures are remarkable for degenerative change..                                       Medications   sodium chloride 0.9% bolus 1,000 mL 1,000 mL (0 mLs Intravenous Stopped 4/10/25 1725)   HYDROcodone-acetaminophen 5-325 mg per tablet 1 tablet (1 tablet Oral Given 4/10/25 1618)     Medical Decision Making  60-year-old female presents ED with right hip pain.  Also reports blood sugar high with EMS.  She recently had her chronic pressure wound in the right hip debrided yesterday and has been having worsening pain.  On exam there is good granulation tissue with foam wound packing and Mepilex dressing with no signs of infection.  Likely chronic pain which is exacerbated from the recent debridement done by wound care.  Lab work did show hyperglycemia with a glucose of 411 but no signs of DKA.  She was given IV fluids in the ED. she did report potential fall however daughter denies any falls recently.  Will do a short course of pain meds and discussed PCP follow-up as needed.  Return ED precautions given    Amount and/or Complexity of Data Reviewed  Labs: ordered. Decision-making details documented in ED Course.  Radiology: ordered.    Risk  Prescription drug management.               ED Course as of 04/10/25 1826   Thu Apr 10, 2025   1559 EKG with sinus rhythm, rate 86, no STEMI [NN]   1753 POC PH: 7.482 [HJ]   1753 WBC: 11.25 [HJ]   1754  Beta-Hydroxybutyrate: 0.1 [HJ]   1754 POCT Glucose(!): 411 [HJ]      ED Course User Index  [HJ] Baltazar Tan PA-C  [NN] Radhika Chanel MD                           Clinical Impression:  Final diagnoses:  [R73.9] Hyperglycemia  [Z51.89] Visit for wound check (Primary)  [M25.551] Pain of right hip          ED Disposition Condition    Discharge Stable          ED Prescriptions       Medication Sig Dispense Start Date End Date Auth. Provider    HYDROcodone-acetaminophen (NORCO) 5-325 mg per tablet Take 1 tablet by mouth every 6 (six) hours as needed for Pain. 8 tablet 4/10/2025 4/12/2025 Baltazar Tan PA-C          Follow-up Information       Follow up With Specialties Details Why Contact Info    Alireza Sosa MD Family Medicine Schedule an appointment as soon as possible for a visit   [address] [place] [address]  [phone]                 [1]   Social History  Tobacco Use    Smoking status: Every Day     Current packs/day: 1.50     Average packs/day: 1.5 packs/day for 35.0 years (52.5 ttl pk-yrs)     Types: Cigarettes    Smokeless tobacco: Never   Substance Use Topics    Alcohol use: Not Currently    Drug use: Not Currently        Baltazar Tan PA-C  04/10/25 8385

## 2025-04-10 NOTE — ED NOTES
Patient comes into the emergency department by EMS with complaints of BLE pain. Patient states that she fell last night out of her chair onto bilateral knees. Pt endorsees 10/10 knee pain, cannot remember LOC or hitting head. Patient denies CP, N/V/D, SOB.    LOC: The patient is awake, alert and aware of environment with an appropriate affect, the patient is oriented x 3 and speaking appropriately.   APPEARANCE: Patient appears comfortable and in no acute distress, patient is clean and well groomed.  SKIN: The skin is warm and dry, color consistent with ethnicity, patient has normal skin turgor and moist mucus membranes. Pt has multiple wounds to BLE.  MUSCULOSKELETAL: Patient has hx of CVA with left sided deficits. Pt reports wheelchair bound. No swelling noted, reports bilateral knee pain 10/10.  RESPIRATORY: Airway is open and patent, respirations are spontaneous, patient has a normal effort and rate, no accessory muscle. Denies SOB, currently on RA, no labored breathing noted.  CARDIAC: Pt placed on cardiac monitor. Patient has a normal rate and regular rhythm, no edema noted, capillary refill < 3 seconds. Denies CP.  GASTRO: Soft and non tender to palpation, no distention noted. G-tube to LUQ.  : Pt denies any pain or frequency with urination.  NEURO: Pt opens eyes spontaneously, behavior appropriate to situation, follows commands, facial expression symmetrical, bilateral hand grasp equal and even, purposeful motor response noted, normal sensation in all extremities when touched with a finger.

## 2025-04-10 NOTE — FIRST PROVIDER EVALUATION
Medical screening examination initiated.  I have conducted a focused provider triage encounter, findings are as follows:    Brief history of present illness:  60-year-old female complaint of pain after surgical debridement of lower leg wound.  Blood glucose greater than 500 prior to arrival    Vitals:    04/10/25 1235   BP: (!) 142/90   Pulse: 86   Resp: 18   Temp: 98.2 °F (36.8 °C)   SpO2: 98%       Pertinent physical exam:  Stage 5 decubitus over right hip greater trochanter.  Mepilex overlying.  Packed with multiple sponges  Multiple decubitus mela to bilateral lower extremities at places where knees and ankles in contact with each other    Brief workup plan:  DKA order set    Preliminary workup initiated; this workup will be continued and followed by the physician or advanced practice provider that is assigned to the patient when roomed.

## 2025-04-10 NOTE — DISCHARGE INSTRUCTIONS
Your blood work did not show any signs of DKA.  Your glucose was elevated today however.  I have prescribed you a short course of pain meds to help with your pain from the wound.  You will need to follow-up with your PCP if you continue to have symptoms.  You may return to ED sooner if you develop any new or worsening symptoms.

## 2025-05-07 ENCOUNTER — HOSPITAL ENCOUNTER (INPATIENT)
Facility: HOSPITAL | Age: 60
LOS: 15 days | Discharge: HOSPICE | DRG: 592 | End: 2025-05-22
Attending: STUDENT IN AN ORGANIZED HEALTH CARE EDUCATION/TRAINING PROGRAM | Admitting: STUDENT IN AN ORGANIZED HEALTH CARE EDUCATION/TRAINING PROGRAM
Payer: MEDICAID

## 2025-05-07 DIAGNOSIS — R62.7 FAILURE TO THRIVE IN ADULT: ICD-10-CM

## 2025-05-07 DIAGNOSIS — T14.8XXA WOUND INFECTION: ICD-10-CM

## 2025-05-07 DIAGNOSIS — Z86.73 HISTORY OF CVA (CEREBROVASCULAR ACCIDENT): ICD-10-CM

## 2025-05-07 DIAGNOSIS — T14.8XXA MULTIPLE WOUNDS OF SKIN: Primary | ICD-10-CM

## 2025-05-07 DIAGNOSIS — Z71.89 ADVANCE CARE PLANNING: ICD-10-CM

## 2025-05-07 DIAGNOSIS — L89.214 PRESSURE INJURY OF RIGHT HIP, STAGE 4: ICD-10-CM

## 2025-05-07 DIAGNOSIS — L08.9 WOUND INFECTION: ICD-10-CM

## 2025-05-07 DIAGNOSIS — R10.9 ABDOMINAL PAIN: ICD-10-CM

## 2025-05-07 DIAGNOSIS — R73.9 HYPERGLYCEMIA: ICD-10-CM

## 2025-05-07 PROBLEM — F17.200 TOBACCO DEPENDENCY: Status: ACTIVE | Noted: 2025-05-07

## 2025-05-07 LAB
ABSOLUTE EOSINOPHIL (OHS): 0.01 K/UL
ABSOLUTE MONOCYTE (OHS): 1.31 K/UL (ref 0.3–1)
ABSOLUTE NEUTROPHIL COUNT (OHS): 11.73 K/UL (ref 1.8–7.7)
ALBUMIN SERPL BCP-MCNC: 2.3 G/DL (ref 3.5–5.2)
ALLENS TEST: ABNORMAL
ALLENS TEST: ABNORMAL
ALP SERPL-CCNC: 107 UNIT/L (ref 40–150)
ALT SERPL W/O P-5'-P-CCNC: 5 UNIT/L (ref 10–44)
ANION GAP (OHS): 15 MMOL/L (ref 8–16)
ANION GAP SERPL CALC-SCNC: 15 MMOL/L (ref 8–16)
AST SERPL-CCNC: 10 UNIT/L (ref 11–45)
B-OH-BUTYR BLD STRIP-SCNC: 0.1 MMOL/L
BASOPHILS # BLD AUTO: 0.05 K/UL
BASOPHILS NFR BLD AUTO: 0.3 %
BILIRUB SERPL-MCNC: 0.4 MG/DL (ref 0.1–1)
BILIRUB UR QL STRIP.AUTO: NEGATIVE
BUN SERPL-MCNC: 24 MG/DL (ref 6–30)
BUN SERPL-MCNC: 26 MG/DL (ref 8–23)
CALCIUM SERPL-MCNC: 10.2 MG/DL (ref 8.7–10.5)
CHLORIDE SERPL-SCNC: 103 MMOL/L (ref 95–110)
CHLORIDE SERPL-SCNC: 103 MMOL/L (ref 95–110)
CLARITY UR: CLEAR
CO2 SERPL-SCNC: 24 MMOL/L (ref 23–29)
COLOR UR AUTO: YELLOW
CREAT SERPL-MCNC: 0.9 MG/DL (ref 0.5–1.4)
CREAT SERPL-MCNC: 1.1 MG/DL (ref 0.5–1.4)
CRP SERPL-MCNC: 212.1 MG/L
ERYTHROCYTE [DISTWIDTH] IN BLOOD BY AUTOMATED COUNT: 19.7 % (ref 11.5–14.5)
ERYTHROCYTE [SEDIMENTATION RATE] IN BLOOD BY PHOTOMETRIC METHOD: >120 MM/HR
GFR SERPLBLD CREATININE-BSD FMLA CKD-EPI: 57 ML/MIN/1.73/M2
GLUCOSE SERPL-MCNC: 279 MG/DL (ref 70–110)
GLUCOSE SERPL-MCNC: 293 MG/DL (ref 70–110)
GLUCOSE UR QL STRIP: NEGATIVE
HCO3 UR-SCNC: 27.8 MMOL/L (ref 24–28)
HCT VFR BLD AUTO: 30.8 % (ref 37–48.5)
HCT VFR BLD CALC: 28 %PCV (ref 36–54)
HGB BLD-MCNC: 9.3 GM/DL (ref 12–16)
HGB UR QL STRIP: NEGATIVE
HOLD SPECIMEN: NORMAL
IMM GRANULOCYTES # BLD AUTO: 0.08 K/UL (ref 0–0.04)
IMM GRANULOCYTES NFR BLD AUTO: 0.5 % (ref 0–0.5)
KETONES UR QL STRIP: NEGATIVE
LACTATE SERPL-SCNC: 3.2 MMOL/L (ref 0.5–2.2)
LACTATE SERPL-SCNC: 3.7 MMOL/L (ref 0.5–2.2)
LEUKOCYTE ESTERASE UR QL STRIP: NEGATIVE
LIPASE SERPL-CCNC: 79 U/L (ref 4–60)
LYMPHOCYTES # BLD AUTO: 1.57 K/UL (ref 1–4.8)
MAGNESIUM SERPL-MCNC: 2.1 MG/DL (ref 1.6–2.6)
MCH RBC QN AUTO: 22.9 PG (ref 27–31)
MCHC RBC AUTO-ENTMCNC: 30.2 G/DL (ref 32–36)
MCV RBC AUTO: 76 FL (ref 82–98)
NITRITE UR QL STRIP: NEGATIVE
NUCLEATED RBC (/100WBC) (OHS): 0 /100 WBC
PCO2 BLDA: 50.3 MMHG (ref 35–45)
PH SMN: 7.35 [PH] (ref 7.35–7.45)
PH UR STRIP: 6 [PH]
PHOSPHATE SERPL-MCNC: 2.7 MG/DL (ref 2.7–4.5)
PLATELET # BLD AUTO: 677 K/UL (ref 150–450)
PMV BLD AUTO: 8.6 FL (ref 9.2–12.9)
PO2 BLDA: 16 MMHG (ref 40–60)
POC BE: 2 MMOL/L (ref -2–2)
POC IONIZED CALCIUM: 1.26 MMOL/L (ref 1.06–1.42)
POC SATURATED O2: 19 % (ref 95–100)
POC TCO2 (MEASURED): 28 MMOL/L (ref 23–29)
POC TCO2: 29 MMOL/L (ref 24–29)
POCT GLUCOSE: 301 MG/DL (ref 70–110)
POTASSIUM BLD-SCNC: 3 MMOL/L (ref 3.5–5.1)
POTASSIUM SERPL-SCNC: 3.1 MMOL/L (ref 3.5–5.1)
PROT SERPL-MCNC: 8.2 GM/DL (ref 6–8.4)
PROT UR QL STRIP: NEGATIVE
RBC # BLD AUTO: 4.07 M/UL (ref 4–5.4)
RELATIVE EOSINOPHIL (OHS): 0.1 %
RELATIVE LYMPHOCYTE (OHS): 10.6 % (ref 18–48)
RELATIVE MONOCYTE (OHS): 8.9 % (ref 4–15)
RELATIVE NEUTROPHIL (OHS): 79.6 % (ref 38–73)
SAMPLE: ABNORMAL
SAMPLE: ABNORMAL
SITE: ABNORMAL
SITE: ABNORMAL
SODIUM BLD-SCNC: 141 MMOL/L (ref 136–145)
SODIUM SERPL-SCNC: 142 MMOL/L (ref 136–145)
SP GR UR STRIP: 1.02
UROBILINOGEN UR STRIP-ACNC: NEGATIVE EU/DL
WBC # BLD AUTO: 14.75 K/UL (ref 3.9–12.7)

## 2025-05-07 PROCEDURE — 82962 GLUCOSE BLOOD TEST: CPT

## 2025-05-07 PROCEDURE — 63600175 PHARM REV CODE 636 W HCPCS

## 2025-05-07 PROCEDURE — 82330 ASSAY OF CALCIUM: CPT

## 2025-05-07 PROCEDURE — 96367 TX/PROPH/DG ADDL SEQ IV INF: CPT

## 2025-05-07 PROCEDURE — 87040 BLOOD CULTURE FOR BACTERIA: CPT | Performed by: STUDENT IN AN ORGANIZED HEALTH CARE EDUCATION/TRAINING PROGRAM

## 2025-05-07 PROCEDURE — 84100 ASSAY OF PHOSPHORUS: CPT | Performed by: STUDENT IN AN ORGANIZED HEALTH CARE EDUCATION/TRAINING PROGRAM

## 2025-05-07 PROCEDURE — 84295 ASSAY OF SERUM SODIUM: CPT

## 2025-05-07 PROCEDURE — 93005 ELECTROCARDIOGRAM TRACING: CPT

## 2025-05-07 PROCEDURE — 81003 URINALYSIS AUTO W/O SCOPE: CPT | Performed by: STUDENT IN AN ORGANIZED HEALTH CARE EDUCATION/TRAINING PROGRAM

## 2025-05-07 PROCEDURE — 36415 COLL VENOUS BLD VENIPUNCTURE: CPT

## 2025-05-07 PROCEDURE — 82565 ASSAY OF CREATININE: CPT

## 2025-05-07 PROCEDURE — 96365 THER/PROPH/DIAG IV INF INIT: CPT

## 2025-05-07 PROCEDURE — 21400001 HC TELEMETRY ROOM

## 2025-05-07 PROCEDURE — 84132 ASSAY OF SERUM POTASSIUM: CPT

## 2025-05-07 PROCEDURE — 99900035 HC TECH TIME PER 15 MIN (STAT)

## 2025-05-07 PROCEDURE — 93010 ELECTROCARDIOGRAM REPORT: CPT | Mod: ,,, | Performed by: INTERNAL MEDICINE

## 2025-05-07 PROCEDURE — 82010 KETONE BODYS QUAN: CPT | Performed by: STUDENT IN AN ORGANIZED HEALTH CARE EDUCATION/TRAINING PROGRAM

## 2025-05-07 PROCEDURE — 83605 ASSAY OF LACTIC ACID: CPT | Performed by: STUDENT IN AN ORGANIZED HEALTH CARE EDUCATION/TRAINING PROGRAM

## 2025-05-07 PROCEDURE — 25000003 PHARM REV CODE 250

## 2025-05-07 PROCEDURE — 82803 BLOOD GASES ANY COMBINATION: CPT

## 2025-05-07 PROCEDURE — 85014 HEMATOCRIT: CPT

## 2025-05-07 PROCEDURE — 99285 EMERGENCY DEPT VISIT HI MDM: CPT | Mod: 25

## 2025-05-07 PROCEDURE — 63600175 PHARM REV CODE 636 W HCPCS: Performed by: STUDENT IN AN ORGANIZED HEALTH CARE EDUCATION/TRAINING PROGRAM

## 2025-05-07 PROCEDURE — 82040 ASSAY OF SERUM ALBUMIN: CPT | Performed by: STUDENT IN AN ORGANIZED HEALTH CARE EDUCATION/TRAINING PROGRAM

## 2025-05-07 PROCEDURE — 83036 HEMOGLOBIN GLYCOSYLATED A1C: CPT

## 2025-05-07 PROCEDURE — 83690 ASSAY OF LIPASE: CPT | Performed by: STUDENT IN AN ORGANIZED HEALTH CARE EDUCATION/TRAINING PROGRAM

## 2025-05-07 PROCEDURE — 25500020 PHARM REV CODE 255: Performed by: STUDENT IN AN ORGANIZED HEALTH CARE EDUCATION/TRAINING PROGRAM

## 2025-05-07 PROCEDURE — 83735 ASSAY OF MAGNESIUM: CPT | Performed by: STUDENT IN AN ORGANIZED HEALTH CARE EDUCATION/TRAINING PROGRAM

## 2025-05-07 PROCEDURE — 25000003 PHARM REV CODE 250: Performed by: STUDENT IN AN ORGANIZED HEALTH CARE EDUCATION/TRAINING PROGRAM

## 2025-05-07 PROCEDURE — 86140 C-REACTIVE PROTEIN: CPT | Performed by: STUDENT IN AN ORGANIZED HEALTH CARE EDUCATION/TRAINING PROGRAM

## 2025-05-07 PROCEDURE — 85652 RBC SED RATE AUTOMATED: CPT | Performed by: STUDENT IN AN ORGANIZED HEALTH CARE EDUCATION/TRAINING PROGRAM

## 2025-05-07 PROCEDURE — 85025 COMPLETE CBC W/AUTO DIFF WBC: CPT | Performed by: STUDENT IN AN ORGANIZED HEALTH CARE EDUCATION/TRAINING PROGRAM

## 2025-05-07 RX ORDER — INSULIN ASPART 100 [IU]/ML
0-5 INJECTION, SOLUTION INTRAVENOUS; SUBCUTANEOUS
Status: DISCONTINUED | OUTPATIENT
Start: 2025-05-07 | End: 2025-05-22 | Stop reason: HOSPADM

## 2025-05-07 RX ORDER — MUPIROCIN 20 MG/G
OINTMENT TOPICAL 2 TIMES DAILY
Status: COMPLETED | OUTPATIENT
Start: 2025-05-07 | End: 2025-05-12

## 2025-05-07 RX ORDER — POTASSIUM CHLORIDE 7.45 MG/ML
10 INJECTION INTRAVENOUS ONCE
Status: COMPLETED | OUTPATIENT
Start: 2025-05-07 | End: 2025-05-07

## 2025-05-07 RX ORDER — INSULIN GLARGINE 100 [IU]/ML
10 INJECTION, SOLUTION SUBCUTANEOUS NIGHTLY
Status: DISCONTINUED | OUTPATIENT
Start: 2025-05-07 | End: 2025-05-08

## 2025-05-07 RX ORDER — IBUPROFEN 200 MG
16 TABLET ORAL
Status: DISCONTINUED | OUTPATIENT
Start: 2025-05-07 | End: 2025-05-22 | Stop reason: HOSPADM

## 2025-05-07 RX ORDER — GLUCAGON 1 MG
1 KIT INJECTION
Status: DISCONTINUED | OUTPATIENT
Start: 2025-05-07 | End: 2025-05-22 | Stop reason: HOSPADM

## 2025-05-07 RX ORDER — IBUPROFEN 200 MG
1 TABLET ORAL DAILY
Status: DISCONTINUED | OUTPATIENT
Start: 2025-05-08 | End: 2025-05-22 | Stop reason: HOSPADM

## 2025-05-07 RX ORDER — ATORVASTATIN CALCIUM 80 MG/1
80 TABLET, FILM COATED ORAL NIGHTLY
COMMUNITY

## 2025-05-07 RX ORDER — CEFEPIME HYDROCHLORIDE 1 G/1
1 INJECTION, POWDER, FOR SOLUTION INTRAMUSCULAR; INTRAVENOUS
Status: COMPLETED | OUTPATIENT
Start: 2025-05-07 | End: 2025-05-08

## 2025-05-07 RX ORDER — IBUPROFEN 200 MG
24 TABLET ORAL
Status: DISCONTINUED | OUTPATIENT
Start: 2025-05-07 | End: 2025-05-22 | Stop reason: HOSPADM

## 2025-05-07 RX ORDER — HYDROMORPHONE HYDROCHLORIDE 1 MG/ML
0.5 INJECTION, SOLUTION INTRAMUSCULAR; INTRAVENOUS; SUBCUTANEOUS
Refills: 0 | Status: DISCONTINUED | OUTPATIENT
Start: 2025-05-07 | End: 2025-05-15

## 2025-05-07 RX ORDER — OXYCODONE HYDROCHLORIDE 5 MG/1
5 TABLET ORAL EVERY 4 HOURS PRN
Refills: 0 | Status: DISCONTINUED | OUTPATIENT
Start: 2025-05-07 | End: 2025-05-15

## 2025-05-07 RX ORDER — OXYCODONE HYDROCHLORIDE 5 MG/1
10 TABLET ORAL EVERY 4 HOURS PRN
Refills: 0 | Status: DISCONTINUED | OUTPATIENT
Start: 2025-05-07 | End: 2025-05-15

## 2025-05-07 RX ORDER — DIPHENHYDRAMINE HCL 25 MG
50 CAPSULE ORAL EVERY 6 HOURS PRN
Status: DISCONTINUED | OUTPATIENT
Start: 2025-05-07 | End: 2025-05-08

## 2025-05-07 RX ORDER — ATORVASTATIN CALCIUM 40 MG/1
80 TABLET, FILM COATED ORAL NIGHTLY
Status: DISCONTINUED | OUTPATIENT
Start: 2025-05-07 | End: 2025-05-22 | Stop reason: HOSPADM

## 2025-05-07 RX ORDER — INSULIN ASPART 100 [IU]/ML
5 INJECTION, SOLUTION INTRAVENOUS; SUBCUTANEOUS
Status: DISCONTINUED | OUTPATIENT
Start: 2025-05-08 | End: 2025-05-12

## 2025-05-07 RX ORDER — ASPIRIN 81 MG/1
81 TABLET ORAL DAILY
Status: DISCONTINUED | OUTPATIENT
Start: 2025-05-08 | End: 2025-05-22 | Stop reason: HOSPADM

## 2025-05-07 RX ADMIN — CEFEPIME 1 G: 1 INJECTION, POWDER, FOR SOLUTION INTRAMUSCULAR; INTRAVENOUS at 11:05

## 2025-05-07 RX ADMIN — INSULIN ASPART 1 UNITS: 100 INJECTION, SOLUTION INTRAVENOUS; SUBCUTANEOUS at 11:05

## 2025-05-07 RX ADMIN — SODIUM CHLORIDE 1000 ML: 9 INJECTION, SOLUTION INTRAVENOUS at 01:05

## 2025-05-07 RX ADMIN — MUPIROCIN: 20 OINTMENT TOPICAL at 11:05

## 2025-05-07 RX ADMIN — IOHEXOL 75 ML: 350 INJECTION, SOLUTION INTRAVENOUS at 01:05

## 2025-05-07 RX ADMIN — VANCOMYCIN HYDROCHLORIDE 1500 MG: 1.5 INJECTION, POWDER, LYOPHILIZED, FOR SOLUTION INTRAVENOUS at 01:05

## 2025-05-07 RX ADMIN — OXYCODONE 10 MG: 5 TABLET ORAL at 08:05

## 2025-05-07 RX ADMIN — INSULIN GLARGINE 10 UNITS: 100 INJECTION, SOLUTION SUBCUTANEOUS at 11:05

## 2025-05-07 RX ADMIN — POTASSIUM CHLORIDE 10 MEQ: 7.46 INJECTION, SOLUTION INTRAVENOUS at 03:05

## 2025-05-07 RX ADMIN — ATORVASTATIN CALCIUM 80 MG: 40 TABLET, FILM COATED ORAL at 11:05

## 2025-05-07 NOTE — ED NOTES
Pt has 560cc on bladder scan. Pt states she is unable to urinate. Verbal given by MD to place horton cath. Procedure explained to patient

## 2025-05-07 NOTE — PROGRESS NOTES
"Pharmacokinetic Initial Assessment: IV Vancomycin    Assessment/Plan:    Initiate intravenous vancomycin with loading dose of 1500 mg once followed by a maintenance dose of vancomycin 750 mg IV every 24 hours  Desired empiric serum trough concentration is 10 to 20 mcg/mL  Draw vancomycin trough level 60 min prior to third dose on 5/9/25 at approximately 1300  Pharmacy will continue to follow and monitor vancomycin.      Please contact pharmacy at extension [phone] with any questions regarding this assessment.     Thank you for the consult,   Latrice Hemphill       Patient brief summary:  Eimly Martinez is a 60 y.o. female initiated on antimicrobial therapy with IV Vancomycin for treatment of suspected sepsis    Drug Allergies:   Review of patient's allergies indicates:   Allergen Reactions    Sulfa (sulfonamide antibiotics) Itching    Sulfur        Actual Body Weight:   57.2    Renal Function:   Estimated Creatinine Clearance: 43.9 mL/min (based on SCr of 1.1 mg/dL).,     Dialysis Method (if applicable):  N/A    CBC (last 72 hours):  Recent Labs   Lab Result Units 05/07/25  1301   WBC K/uL 14.75*   HGB gm/dL 9.3*   HCT % 30.8*   Platelet Count K/uL 677*   Lymph % % 10.6*   Mono % % 8.9   Eos % % 0.1   Basophil % % 0.3       Metabolic Panel (last 72 hours):  Recent Labs   Lab Result Units 05/07/25  1301   Sodium mmol/L 142   Potassium mmol/L 3.1*   Chloride mmol/L 103   CO2 mmol/L 24   Glucose mg/dL 293*   BUN mg/dL 26*   Creatinine mg/dL 1.1   Albumin g/dL 2.3*   Bilirubin Total mg/dL 0.4   ALP unit/L 107   AST unit/L 10*   ALT unit/L 5*   Magnesium  mg/dL 2.1   Phosphorus Level mg/dL 2.7       Drug levels (last 3 results):  No results for input(s): "VANCOMYCINRA", "VANCORANDOM", "VANCOMYCINPE", "VANCOPEAK", "VANCOMYCINTR", "VANCOTROUGH" in the last 72 hours.    Microbiologic Results:  Microbiology Results (last 7 days)       Procedure Component Value Units Date/Time    Blood culture #1 **CANNOT BE ORDERED STAT** " [[id number]] Collected: 05/07/25 1301    Order Status: Resulted Specimen: Blood from Peripheral, Forearm, Right Updated: 05/07/25 1308    Blood culture #2 **CANNOT BE ORDERED STAT** [[id number]] Collected: 05/07/25 1301    Order Status: Resulted Specimen: Blood from Peripheral, Forearm, Right Updated: 05/07/25 1300

## 2025-05-07 NOTE — HPI
"Emily Martinez is a 60 y.o. female with DM1, HTN, HLD, and Previous CVA with residual deficits who was BIBA to [facility] ED for eval and treatment of acute generalized abdominal pain for the last 2x days. Per EMS, pt continued to experience persistent abdominal pain this morning accompanied by 4 episodes of emesis prompting her relatives to check her blood sugar at home. EMS notes that family received a reading of "critical high" and subsequently gave pt her insulin.   EMS reports receiving a reading greater than 500 upon their arrival.  She is chronically bed-bound, lives with her daughter, and has dementia (but is currently at her baseline per what daughter told EMS).  Further history therefore limited.  Has multiple chronic pressure ulcers on her sacrum and legs, presented to [facility] ED on April 10 with very similar circumstances as now, but was DC'ed home from the ED after a round of pain meds.    ED course: POCT  on arrival.  VSS WNL.  Exam with multiple pressure ulcers in various stages of healing; R hip wound is purulent.  Oriented to place and self.  Labs WBC 14.75 Hgb 9.3 Plt 677.  ESR CRP Lactate all elevated.  K 3.1.  Imaging: CT reads as wound to the right lateral aspect of her upper thigh appears worse when compared to previous imaging.  ED treatments: Vanc, mupirocin, wet-to-dry dressing, KCl.  They were admitted to [facility] for further evaluation and treatment.      "

## 2025-05-07 NOTE — ED PROVIDER NOTES
"Encounter Date: 2025    SCRIBE #1 NOTE: I, Grace Flores, am scribing for, and in the presence of,  Roxana Pickens DO. I have scribed the following portions of the note - Other sections scribed: HPI, ROS, PE.       History     Chief Complaint   Patient presents with    Abdominal Pain    Hyperglycemia     Pt BIB EMS, c/o abd pain and hyperglycemia x 3 days. Pt's meter reading HI. 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      This 60 y.o female with a medical history of Diabetes mellitus type I, Hyperlipidemia, Hypertension, and Right sided weakness presents to the ED via EMS transportation c/o acute generalized abdominal pain for the last 2x days. Per EMS, pt continued to experience persistent abdominal pain this morning accompanied by 4 episodes of emesis prompting her relatives to check her blood sugar at home. EMS notes that family received a reading of "critical high" and subsequently gave pt her insulin 1 hour ago. EMS reports receiving a reading greater than 500 upon their arrival. Pt presently c/o bilateral knee pain as well as right hip pain. She states that she fell last week. EMS notes that she has a history of Alzheimer's and is currently at her baseline. Of note, pt is currently on Eliquis. There are no other associated symptoms at this time.     The history is provided by the patient and the EMS personnel.     Review of patient's allergies indicates:   Allergen Reactions    Sulfa (sulfonamide antibiotics) Itching    Sulfur      Past Medical History:   Diagnosis Date    Diabetes mellitus type I     Hyperlipidemia     Hypertension     Right sided weakness 2019     Past Surgical History:   Procedure Laterality Date     SECTION       Family History   Problem Relation Name Age of Onset    Hypertension Mother      Stroke Mother      Hyperlipidemia Mother      Diabetes Mother      Hypertension Sister      Cancer Sister      Hyperlipidemia Sister      Diabetes " Sister      Hypertension Brother      Hyperlipidemia Brother      Diabetes Brother      Heart disease Maternal Aunt      Diabetes Maternal Aunt       Social History[1]  Review of Systems   Constitutional:  Negative for fever.        (+) elevated blood glucose levels (greater than 500)   HENT:  Negative for sore throat.    Eyes:  Negative for visual disturbance.   Respiratory:  Negative for shortness of breath.    Cardiovascular:  Negative for chest pain.   Gastrointestinal:  Positive for abdominal pain (generalized) and vomiting (4x episodes).   Genitourinary:  Negative for dysuria.   Musculoskeletal:  Positive for arthralgias (bilateral knee pain; right hip pain). Negative for back pain.   Skin:  Negative for rash.   Neurological:  Negative for weakness.       Physical Exam     Initial Vitals   BP Pulse Resp Temp SpO2   05/07/25 1204 05/07/25 1204 05/07/25 1206 05/07/25 1204 05/07/25 1204   125/86 99 20 98.4 °F (36.9 °C) 98 %      MAP       --                Physical Exam    Nursing note and vitals reviewed.  Constitutional: She appears well-developed and well-nourished. She is not diaphoretic. She has a sickly appearance. She appears ill. She appears distressed.   She is yelling in pain.   HENT:   Head: Normocephalic and atraumatic.   Right Ear: External ear normal.   Left Ear: External ear normal. Mouth/Throat: Mucous membranes are dry.   Eyes: Conjunctivae and EOM are normal. Pupils are equal, round, and reactive to light. Right conjunctiva is not injected. Left conjunctiva is not injected. No scleral icterus.   Neck: Neck supple. No JVD present.   Normal range of motion.   Full passive range of motion without pain.     Cardiovascular:  Normal rate, regular rhythm, S1 normal, S2 normal, normal heart sounds, intact distal pulses and normal pulses.     Exam reveals no gallop and no friction rub.       No murmur heard.  Pulses:       Radial pulses are 2+ on the right side and 2+ on the left side.        Dorsalis  pedis pulses are 2+ on the right side and 2+ on the left side.        Posterior tibial pulses are 2+ on the right side and 2+ on the left side.   Pulmonary/Chest: Effort normal and breath sounds normal. No stridor. No respiratory distress.   Abdominal: Abdomen is soft. She exhibits no distension. There is no abdominal tenderness.   PEG tube in place. There is no rebound and no guarding.   Musculoskeletal:         General: No edema. Normal range of motion.      Cervical back: Full passive range of motion without pain, normal range of motion and neck supple.      Comments: Chronic contracture of bilateral lower extremities present.     Neurological: She is alert. Gait normal.   Residual left sided weakness. Moves all extremities, follows commands, no focal neurologic deficits.      Skin: Skin is warm. No ecchymosis and no rash noted. No erythema.   Large pressure wound to her right hip, bilateral medial aspect of knees, and bilateral lower extremities.  See images in chart         ED Course   Critical Care    Date/Time: 5/7/2025 4:23 PM    Performed by: Roaxna Pickens DO  Authorized by: Roxana Pickens DO  Direct patient critical care time: 30 minutes  Additional history critical care time: 10 minutes  Ordering / reviewing critical care time: 10 minutes  Documentation critical care time: 10 minutes  Total critical care time (exclusive of procedural time) : 60 minutes  Critical care time was exclusive of separately billable procedures and treating other patients and teaching time.  Critical care was necessary to treat or prevent imminent or life-threatening deterioration of the following conditions: metabolic crisis and sepsis.  Critical care was time spent personally by me on the following activities: development of treatment plan with patient or surrogate, evaluation of patient's response to treatment, examination of patient, obtaining history from patient or surrogate, ordering and performing  treatments and interventions, ordering and review of laboratory studies, ordering and review of radiographic studies, pulse oximetry, re-evaluation of patient's condition and review of old charts.        Labs Reviewed   COMPREHENSIVE METABOLIC PANEL - Abnormal       Result Value    Sodium 142      Potassium 3.1 (*)     Chloride 103      CO2 24      Glucose 293 (*)     BUN 26 (*)     Creatinine 1.1      Calcium 10.2      Protein Total 8.2      Albumin 2.3 (*)     Bilirubin Total 0.4            AST 10 (*)     ALT 5 (*)     Anion Gap 15      eGFR 57 (*)    LIPASE - Abnormal    Lipase Level 79 (*)    CBC WITH DIFFERENTIAL - Abnormal    WBC 14.75 (*)     RBC 4.07      HGB 9.3 (*)     HCT 30.8 (*)     MCV 76 (*)     MCH 22.9 (*)     MCHC 30.2 (*)     RDW 19.7 (*)     Platelet Count 677 (*)     MPV 8.6 (*)     Nucleated RBC 0      Neut % 79.6 (*)     Lymph % 10.6 (*)     Mono % 8.9      Eos % 0.1      Basophil % 0.3      Imm Grans % 0.5      Neut # 11.73 (*)     Lymph # 1.57      Mono # 1.31 (*)     Eos # 0.01      Baso # 0.05      Imm Grans # 0.08 (*)    LACTIC ACID, PLASMA - Abnormal    Lactic Acid Level 3.7 (*)     Narrative:     Falsely low lactic acid results can be found in samples containing >=13.0 mg/dL total bilirubin and/or >=3.5 mg/dL direct bilirubin.    C-REACTIVE PROTEIN - Abnormal    .1 (*)    SEDIMENTATION RATE - Abnormal    Sed Rate >120 (*)    LACTIC ACID, PLASMA - Abnormal    Lactic Acid Level 3.2 (*)     Narrative:     Falsely low lactic acid results can be found in samples containing >=13.0 mg/dL total bilirubin and/or >=3.5 mg/dL direct bilirubin.    POCT GLUCOSE - Abnormal    POCT Glucose 301 (*)    ISTAT PROCEDURE - Abnormal    POC PH 7.350      POC PCO2 50.3 (*)     POC PO2 16 (*)     POC HCO3 27.8      POC BE 2      POC SATURATED O2 19      POC TCO2 29      Sample VENOUS      Site Other      Allens Test N/A     ISTAT PROCEDURE - Abnormal    POC Glucose 279 (*)     POC BUN 24      POC  Creatinine 0.9      POC Sodium 141      POC Potassium 3.0 (*)     POC Chloride 103      POC TCO2 (MEASURED) 28      POC Anion Gap 15      POC Ionized Calcium 1.26      POC Hematocrit 28 (*)     Sample VENOUS      Site Other      Allens Test N/A     URINALYSIS, REFLEX TO URINE CULTURE - Normal    Color, UA Yellow      Appearance, UA Clear      pH, UA 6.0      Spec Grav UA 1.020      Protein, UA Negative      Glucose, UA Negative      Ketones, UA Negative      Bilirubin, UA Negative      Blood, UA Negative      Nitrites, UA Negative      Urobilinogen, UA Negative      Leukocyte Esterase, UA Negative     MAGNESIUM - Normal    Magnesium  2.1     PHOSPHORUS - Normal    Phosphorus Level 2.7     BETA - HYDROXYBUTYRATE, SERUM - Normal    Beta-Hydroxybutyrate 0.1     CBC W/ AUTO DIFFERENTIAL    Narrative:     The following orders were created for panel order CBC W/ AUTO DIFFERENTIAL.  Procedure                               Abnormality         Status                     ---------                               -----------         ------                     CBC with Differential[[id number]]       Abnormal            Final result                 Please view results for these tests on the individual orders.   GREY TOP URINE HOLD    Extra Tube Hold for add-ons.       EKG Readings: (Independently Interpreted)   EKG obtained at 1255 shows normal sinus rhythm with a heart rate of 95 beats per minute, left posterior fascicular block.  Normal axis and intervals, Q-waves in leads 3, AVF.  Nonspecific T-wave inversions in inferior leads as well as the lateral precordial leads.  No acute ST segment changes.  Fascicular block new when compared to previous EKG from April.  Low-voltage QRS improved.     ECG Results              EKG 12-lead (Final result)        Collection Time Result Time QRS Duration OHS QTC Calculation    05/07/25 12:55:53 05/08/25 19:11:04 76 469                     Final result by Interface, Lab In [facility] (05/08/25  19:11:12)                   Narrative:    Test Reason : R10.9,    Vent. Rate :  95 BPM     Atrial Rate :  95 BPM     P-R Int : 118 ms          QRS Dur :  76 ms      QT Int : 374 ms       P-R-T Axes :  34 118 -20 degrees    QTcB Int : 469 ms    Normal sinus rhythm  Left posterior fascicular block  Possible Inferior infarct ,age undetermined  Cannot rule out Anterior infarct When compared with ECG of 10-Apr-2025  15:01,  Significant changes have occurred  Confirmed by Zeb Roca ([id number]) on 5/8/2025 7:11:00 PM    Referred By: AAAREFERRAL SELF           Confirmed By: Zeb Gibbons Abelino                                     EKG 12-lead (Final result)  Result time 05/09/25 09:57:12      Final result by Unknown User (05/09/25 09:57:12)                                      Imaging Results               CT Abdomen Pelvis With IV Contrast NO Oral Contrast (Final result)  Result time 05/07/25 14:04:30      Final result by Migue Harmon MD (05/07/25 14:04:30)                   Impression:      This report was flagged in Epic as abnormal.    1. Moderate to large amount of stool throughout the colon may reflect constipation.  Please note, there is lobular thickening at the rectal anal junction, correlation with physical exam advised.  Malignancy not excluded.  2. The stomach is distended with ingested content noting gastrostomy tube in place.  Correlation recommended.  3. The urinary bladder is distended, correlation with any history of urinary retention or outlet obstruction.  4. Thickening of the distal esophagus, correlation with any history of reflux esophagitis, direct visualization as warranted.  5. Open wound along the right lateral aspect of the upper thigh, worsened since the previous exam.  No convincing adjacent osseous erosive or destructive process.  Please see above for full details.  6. Please see above for several additional findings.      Electronically signed by: Migue Harmon  MD  Date:    05/07/2025  Time:    14:04               Narrative:    EXAMINATION:  CT ABDOMEN PELVIS WITH IV CONTRAST    CLINICAL HISTORY:  Abdominal abscess/infection suspected;    TECHNIQUE:  Low dose axial images, sagittal and coronal reformations were obtained from the lung bases to the pubic symphysis following the IV administration of 75 mL of Omnipaque 350 .  Oral contrast was not given.    COMPARISON:  CT 02/12/2025    FINDINGS:  Images of the lower thorax are remarkable for bilateral dependent atelectasis.  There is thickening of the distal esophagus, correlation with any history of reflux esophagitis.  Direct visualization as warranted.    The liver, spleen, pancreas and adrenal glands are grossly unremarkable.  There is layering cholelithiasis/sludge, no secondary findings to suggest acute cholecystitis.  The stomach is distended with liquid content.  There is a gastrostomy tube in place, without apparent complication.  The portal vein, splenic vein, SMV, celiac axis and SMA all are patent.  No significant abdominal lymphadenopathy.    The kidneys enhance symmetrically without hydronephrosis or nephrolithiasis.  The bilateral ureters are unable to be followed in their entirety to the urinary bladder, no definite calculi seen or secondary findings to suggest obstructive uropathy.  The urinary bladder is distended without wall thickening.  The uterus is absent the adnexa is unremarkable.    There is some degree of lobulation about the rectal anal junction, correlation with physical exam recommended.  There is moderate stool in the rectum without rectal wall thickening in the region.  There is moderate stool throughout the remainder of the large bowel.  The terminal ileum is unremarkable.  The appendix is unremarkable.  The small bowel is grossly unremarkable.  There are a few scattered shotty periaortic, pericaval, and mesenteric lymph nodes.  There is atherosclerotic calcification of the aorta and its  "branches.    There is osteopenia.  There are degenerative changes of the pubic symphysis, sacroiliac joints and spine.  No significant inguinal lymphadenopathy.    There is open soft tissue wound along the lateral aspect of the right hip/thigh region.  There is associated air and fluid collection layering along the deep aspect of the subcutaneous fat.  This measures approximately 6.0 x 0.8 cm.  No convincing adjacent osseous erosive or destructive process.                                       X-Ray Chest AP Portable (Final result)  Result time 05/07/25 13:42:29      Final result by Ventura Wilder MD (05/07/25 13:42:29)                   Impression:      No detrimental change when compared with 04/10/2025.  No new focal consolidation.      Electronically signed by: Ventura Wilder MD  Date:    05/07/2025  Time:    13:42               Narrative:    EXAMINATION:  XR CHEST AP PORTABLE    CLINICAL HISTORY:  Provided history is "  Hyperglycemia, unspecified".    TECHNIQUE:  One view of the chest.    COMPARISON:  04/10/2025.    FINDINGS:  Cardiac wires overlie the chest.  Cardiomediastinal silhouette is stable.  Coarse bibasilar interstitial markings as seen previously.  No focal consolidation.  No sizable pleural effusion.  No pneumothorax.                                       Medications   0.45% NaCl infusion ( Intravenous New Bag 5/16/25 1552)   sodium chloride 0.9% bolus 1,000 mL 1,000 mL (0 mLs Intravenous Stopped 5/7/25 1508)   vancomycin 1.5 g in dextrose 5 % 250 mL IVPB (ready to mix) (1,500 mg Intravenous Trough Due As Scheduled Before Dose 5/9/25 1300)   iohexoL (OMNIPAQUE 350) injection 75 mL (75 mLs Intravenous Given 5/7/25 1336)   potassium chloride 10 mEq in 100 mL IVPB (0 mEq Intravenous Stopped 5/7/25 1725)   mupirocin 2 % ointment ( Nasal Given 5/12/25 0812)   ceFEPIme injection 1 g (1 g Intravenous Given 5/8/25 1449)   hydrOXYzine HCL tablet 25 mg (25 mg Oral Given 5/8/25 2222)   hydrOXYzine HCL " tablet 25 mg (25 mg Oral Given 5/9/25 2201)   LORazepam injection 2 mg (2 mg Intravenous Given 5/13/25 9746)   sodium chloride 0.9% bolus 500 mL 500 mL (0 mLs Intravenous Stopped 5/13/25 0351)   meropenem injection 1 g (1 g Intravenous Given 5/21/25 2208)     Medical Decision Making   MDM  This is an emergent evaluation of a 60 y.o. with c/o abdominal pain and elevated blood glucose levels. Initial vitals in the ED   BP: 125/86 [05/07/25 1204]  Pulse: 99 [05/07/25 1204]  Resp: 20 [05/07/25 1206]  Temp: 98.4 °F (36.9 °C) [05/07/25 1204]  SpO2: 98 % [05/07/25 1204] .     Physical exam noted above. DDx includes but is not limited to sepsis, osteomyelitis, hyperglycemia vs HHS, electrolyte abnormality, dehydration, bowel obstruction, UTI. Also considered but clinically less likely to be stroke, meningitis, ACS. Will obtain labs and imaging including CBC, CMP, lactate, UA, blood cultures, lipase, POC glucose, mag, phos, CRP, Sed rate, VBG, beta hydroxybutyrate, EKG, and CT abdomen pelvis. Will also provide pain medication and IV antibiotics as needed. Will continue to monitor and frequently reassess pending results of labs, treatments and final disposition.    Patient is aware of plan and is amenable.     Roxana Pickens D.O  EMERGENCY MEDICINE  1:06 PM 05/07/2025    UPDATE  EKG without acute STEMI. Chest xray appears stable with no focal findings. CT abdomen pelvis shows moderate to large amount of stool throughout the colon, consistent with constipation. Distend stomach. Thickening of the distal esophagus. An open  wound along the right lateral aspect of the upper thigh, worsened since the previous exam.    Labs reveal ESR >120, .1, Lactic acid 3.7. Leukocytosis with WBCs of 14.75. Minimally elevated lipase. CMP with hypo kalemia with a potassium of 3.1 and a glucose of 293.  VBG with a pCO2 of 50.3.  Point of care glucose 301.  Lactic acid 3.2.    Patient was treated with IV fluid hydration as well as IV  vancomycin and potassium replacement.    After review of the patients physical exam, ED testing, and history/symptoms, the patient will be admitted. Case was discussed, Dr. Goyal, who accept the admission to the Hospital Medicine Service.  Admit orders will be completed. The diagnosis, treatment and plan for admission were discussed with the patient and understanding was verbalized. All questions or concerns have been addressed.     Roxana Pickens D.O  EMERGENCY MEDICINE   4:21 PM 05/07/2025       This chart was completed using dictation software, as a result there may be some transcription errors     Amount and/or Complexity of Data Reviewed  Independent Historian: EMS  External Data Reviewed: labs, radiology, ECG and notes.  Labs: ordered. Decision-making details documented in ED Course.  Radiology: ordered and independent interpretation performed. Decision-making details documented in ED Course.  ECG/medicine tests: ordered and independent interpretation performed. Decision-making details documented in ED Course.    Risk  Prescription drug management.  Decision regarding hospitalization.              Scribe Attestation:   Scribe #1: I performed the above scribed service and the documentation accurately describes the services I performed. I attest to the accuracy of the note.                         I, Roxana Pickens, DO , personally performed the services described in this documentation. All medical record entries made by the scribe were at my direction and in my presence. I have reviewed the chart and agree that the record reflects my personal performance and is accurate and complete.      DISCLAIMER: This note was prepared with [employer] voice recognition transcription software.     Clinical Impression:  Final diagnoses:  [R10.9] Abdominal pain  [R73.9] Hyperglycemia  [T14.8XXA, L08.9] Wound infection          ED Disposition Condition    Admit                     [1]   Social History  Tobacco  Use    Smoking status: Every Day     Current packs/day: 1.50     Average packs/day: 1.5 packs/day for 35.0 years (52.5 ttl pk-yrs)     Types: Cigarettes    Smokeless tobacco: Never   Substance Use Topics    Alcohol use: Not Currently    Drug use: Not Currently        Roxana Pickens, DO  05/23/25 0653

## 2025-05-07 NOTE — ED TRIAGE NOTES
Hx stroke with L sided deficits and dementia. Pt came to the ER due to LUQ abd pain x1 day. Pt says pain resolved upon arrival to ER. Pt's cbg is >500, per ems. Pt has wounds on right hip and bilateral legs. Denies fever, chills, nv. Wounds have been documented in media tab and were all redressed

## 2025-05-07 NOTE — NURSING
Patient arrived to floor via wheelchair/stretcher via transporter from ED/PACU.  Patient transferred to bed via x1 person assist/independently.  AAOX4.  Patient was oriented to room, information on communication board, and medication regimen.  Bed low, adequate lighting provided, side rails x2 up, call bell within reach.  Admission assessment completed.  IVF started.  VSS.  Patient denied having any acute distress at this time.  None observed.  Will continue to monitor and follow treatment plan.          [facility] [place]  Nurses Note -- 4 Eyes      5/7/2025       Skin assessed on: Q Shift      [] No Pressure Injuries Present    [x]Prevention Measures Documented    [x] Yes LDA  for Pressure Injury Previously documented     [] Yes New Pressure Injury Discovered   [] LDA for New Pressure Injury Added      Attending RN:  Tyler Taylor LPN     Second RN:  YARITZA Sow

## 2025-05-07 NOTE — PHARMACY MED REC
"Patient can not confirm medications at bedside external rx history was used to confirm medications.    Admission Medication History     The home medication history was taken by Ariadna Monroe.    You may go to "Admission" then "Reconcile Home Medications" tabs to review and/or act upon these items.     The home medication list has been updated by the Pharmacy department.   Please read ALL comments highlighted in yellow.   Please address this information as you see fit.    Feel free to contact us if you have any questions or require assistance.      Medications listed below were obtained from: [employer]  (Not in a hospital admission)          Ariadna Monroe  [phone]                  .          "

## 2025-05-07 NOTE — CLINICAL REVIEW
IP Sepsis Screen (most recent)       Sepsis Screen (IP) - 05/07/25 7230       Is the patient's history or complaint suggestive of a possible infection? Yes  -TR    Are there at least two of the following signs and symptoms present? Yes  -TR    Sepsis signs/symptoms - Tachycardia Tachycardia     >90  -TR    Sepsis signs/symptoms - WBC WBC < 4,000 or WBC > 12,000  -TR    Are any of the following organ dysfunction criteria present and not considered to be due to a chronic condition? Yes   SED >120 -TR    Organ Dysfunction Criteria Lactate > 2.0  -TR    Initiate Sepsis Protocol No  -TR    Reason sepsis not considered Pt. receiving appropriate management   IVAB and IVF initiated in ED; LA elevated x 2; u/a appears non-infectious; blood cx in process -TR              User Key  (r) = Recorded By, (t) = Taken By, (c) = Cosigned By      Initials Name    Che Yang RN

## 2025-05-08 PROBLEM — L89.214 PRESSURE INJURY OF RIGHT HIP, STAGE 4: Status: ACTIVE | Noted: 2025-05-08

## 2025-05-08 LAB
ABSOLUTE EOSINOPHIL (OHS): 0.09 K/UL
ABSOLUTE MONOCYTE (OHS): 1.09 K/UL (ref 0.3–1)
ABSOLUTE NEUTROPHIL COUNT (OHS): 6.67 K/UL (ref 1.8–7.7)
ALBUMIN SERPL BCP-MCNC: 1.9 G/DL (ref 3.5–5.2)
ALP SERPL-CCNC: 85 UNIT/L (ref 40–150)
ALT SERPL W/O P-5'-P-CCNC: 5 UNIT/L (ref 10–44)
ANION GAP (OHS): 8 MMOL/L (ref 8–16)
AST SERPL-CCNC: 13 UNIT/L (ref 11–45)
BASOPHILS # BLD AUTO: 0.03 K/UL
BASOPHILS NFR BLD AUTO: 0.3 %
BILIRUB SERPL-MCNC: 0.4 MG/DL (ref 0.1–1)
BUN SERPL-MCNC: 18 MG/DL (ref 8–23)
CALCIUM SERPL-MCNC: 9.2 MG/DL (ref 8.7–10.5)
CHLORIDE SERPL-SCNC: 106 MMOL/L (ref 95–110)
CO2 SERPL-SCNC: 22 MMOL/L (ref 23–29)
CREAT SERPL-MCNC: 0.8 MG/DL (ref 0.5–1.4)
EAG (OHS): 186 MG/DL (ref 68–131)
ERYTHROCYTE [DISTWIDTH] IN BLOOD BY AUTOMATED COUNT: 19.3 % (ref 11.5–14.5)
GFR SERPLBLD CREATININE-BSD FMLA CKD-EPI: >60 ML/MIN/1.73/M2
GLUCOSE SERPL-MCNC: 322 MG/DL (ref 70–110)
HBA1C MFR BLD: 8.1 % (ref 4–5.6)
HCT VFR BLD AUTO: 26 % (ref 37–48.5)
HGB BLD-MCNC: 7.8 GM/DL (ref 12–16)
IMM GRANULOCYTES # BLD AUTO: 0.05 K/UL (ref 0–0.04)
IMM GRANULOCYTES NFR BLD AUTO: 0.5 % (ref 0–0.5)
LYMPHOCYTES # BLD AUTO: 1.75 K/UL (ref 1–4.8)
MCH RBC QN AUTO: 22.7 PG (ref 27–31)
MCHC RBC AUTO-ENTMCNC: 30 G/DL (ref 32–36)
MCV RBC AUTO: 76 FL (ref 82–98)
NUCLEATED RBC (/100WBC) (OHS): 0 /100 WBC
OHS QRS DURATION: 76 MS
OHS QTC CALCULATION: 469 MS
PLATELET # BLD AUTO: 581 K/UL (ref 150–450)
PMV BLD AUTO: 8.7 FL (ref 9.2–12.9)
POCT GLUCOSE: 104 MG/DL (ref 70–110)
POCT GLUCOSE: 117 MG/DL (ref 70–110)
POCT GLUCOSE: 236 MG/DL (ref 70–110)
POCT GLUCOSE: 257 MG/DL (ref 70–110)
POCT GLUCOSE: 277 MG/DL (ref 70–110)
POCT GLUCOSE: 284 MG/DL (ref 70–110)
POCT GLUCOSE: 297 MG/DL (ref 70–110)
POTASSIUM SERPL-SCNC: 4.4 MMOL/L (ref 3.5–5.1)
PROT SERPL-MCNC: 6.7 GM/DL (ref 6–8.4)
RBC # BLD AUTO: 3.44 M/UL (ref 4–5.4)
RELATIVE EOSINOPHIL (OHS): 0.9 %
RELATIVE LYMPHOCYTE (OHS): 18.1 % (ref 18–48)
RELATIVE MONOCYTE (OHS): 11.3 % (ref 4–15)
RELATIVE NEUTROPHIL (OHS): 68.9 % (ref 38–73)
SODIUM SERPL-SCNC: 136 MMOL/L (ref 136–145)
WBC # BLD AUTO: 9.68 K/UL (ref 3.9–12.7)

## 2025-05-08 PROCEDURE — 84155 ASSAY OF PROTEIN SERUM: CPT | Performed by: STUDENT IN AN ORGANIZED HEALTH CARE EDUCATION/TRAINING PROGRAM

## 2025-05-08 PROCEDURE — 63600175 PHARM REV CODE 636 W HCPCS: Performed by: STUDENT IN AN ORGANIZED HEALTH CARE EDUCATION/TRAINING PROGRAM

## 2025-05-08 PROCEDURE — 36415 COLL VENOUS BLD VENIPUNCTURE: CPT | Performed by: STUDENT IN AN ORGANIZED HEALTH CARE EDUCATION/TRAINING PROGRAM

## 2025-05-08 PROCEDURE — 99223 1ST HOSP IP/OBS HIGH 75: CPT | Mod: ,,,

## 2025-05-08 PROCEDURE — 21400001 HC TELEMETRY ROOM

## 2025-05-08 PROCEDURE — S4991 NICOTINE PATCH NONLEGEND: HCPCS

## 2025-05-08 PROCEDURE — 85025 COMPLETE CBC W/AUTO DIFF WBC: CPT | Performed by: STUDENT IN AN ORGANIZED HEALTH CARE EDUCATION/TRAINING PROGRAM

## 2025-05-08 PROCEDURE — 25000003 PHARM REV CODE 250: Performed by: INTERNAL MEDICINE

## 2025-05-08 PROCEDURE — 25000003 PHARM REV CODE 250: Performed by: STUDENT IN AN ORGANIZED HEALTH CARE EDUCATION/TRAINING PROGRAM

## 2025-05-08 PROCEDURE — 25000003 PHARM REV CODE 250

## 2025-05-08 PROCEDURE — 63600175 PHARM REV CODE 636 W HCPCS

## 2025-05-08 RX ORDER — HYDROXYZINE HYDROCHLORIDE 25 MG/1
25 TABLET, FILM COATED ORAL ONCE
Status: COMPLETED | OUTPATIENT
Start: 2025-05-08 | End: 2025-05-08

## 2025-05-08 RX ORDER — CEFEPIME HYDROCHLORIDE 1 G/1
1 INJECTION, POWDER, FOR SOLUTION INTRAMUSCULAR; INTRAVENOUS
Status: DISCONTINUED | OUTPATIENT
Start: 2025-05-08 | End: 2025-05-13

## 2025-05-08 RX ORDER — INSULIN GLARGINE 100 [IU]/ML
15 INJECTION, SOLUTION SUBCUTANEOUS NIGHTLY
Status: DISCONTINUED | OUTPATIENT
Start: 2025-05-08 | End: 2025-05-10

## 2025-05-08 RX ORDER — DIPHENHYDRAMINE HCL 25 MG
25 CAPSULE ORAL EVERY 6 HOURS PRN
Status: DISCONTINUED | OUTPATIENT
Start: 2025-05-08 | End: 2025-05-22 | Stop reason: HOSPADM

## 2025-05-08 RX ADMIN — INSULIN ASPART 3 UNITS: 100 INJECTION, SOLUTION INTRAVENOUS; SUBCUTANEOUS at 08:05

## 2025-05-08 RX ADMIN — VANCOMYCIN HYDROCHLORIDE 750 MG: 750 INJECTION, POWDER, LYOPHILIZED, FOR SOLUTION INTRAVENOUS at 02:05

## 2025-05-08 RX ADMIN — OXYCODONE 10 MG: 5 TABLET ORAL at 09:05

## 2025-05-08 RX ADMIN — CEFEPIME 1 G: 1 INJECTION, POWDER, FOR SOLUTION INTRAMUSCULAR; INTRAVENOUS at 06:05

## 2025-05-08 RX ADMIN — MUPIROCIN: 20 OINTMENT TOPICAL at 08:05

## 2025-05-08 RX ADMIN — ASPIRIN 81 MG: 81 TABLET, COATED ORAL at 08:05

## 2025-05-08 RX ADMIN — CEFEPIME 1 G: 2 INJECTION, POWDER, FOR SOLUTION INTRAVENOUS at 10:05

## 2025-05-08 RX ADMIN — INSULIN ASPART 5 UNITS: 100 INJECTION, SOLUTION INTRAVENOUS; SUBCUTANEOUS at 12:05

## 2025-05-08 RX ADMIN — OXYCODONE 10 MG: 5 TABLET ORAL at 06:05

## 2025-05-08 RX ADMIN — DIPHENHYDRAMINE HYDROCHLORIDE 50 MG: 25 CAPSULE ORAL at 05:05

## 2025-05-08 RX ADMIN — INSULIN ASPART 5 UNITS: 100 INJECTION, SOLUTION INTRAVENOUS; SUBCUTANEOUS at 08:05

## 2025-05-08 RX ADMIN — CEFEPIME 1 G: 1 INJECTION, POWDER, FOR SOLUTION INTRAMUSCULAR; INTRAVENOUS at 02:05

## 2025-05-08 RX ADMIN — INSULIN GLARGINE 15 UNITS: 100 INJECTION, SOLUTION SUBCUTANEOUS at 09:05

## 2025-05-08 RX ADMIN — ATORVASTATIN CALCIUM 80 MG: 40 TABLET, FILM COATED ORAL at 09:05

## 2025-05-08 RX ADMIN — HYDROXYZINE HYDROCHLORIDE 25 MG: 25 TABLET, FILM COATED ORAL at 10:05

## 2025-05-08 RX ADMIN — INSULIN ASPART 3 UNITS: 100 INJECTION, SOLUTION INTRAVENOUS; SUBCUTANEOUS at 12:05

## 2025-05-08 RX ADMIN — THERA TABS 1 TABLET: TAB at 08:05

## 2025-05-08 RX ADMIN — INSULIN ASPART 5 UNITS: 100 INJECTION, SOLUTION INTRAVENOUS; SUBCUTANEOUS at 05:05

## 2025-05-08 RX ADMIN — NICOTINE 1 PATCH: 14 PATCH TRANSDERMAL at 09:05

## 2025-05-08 RX ADMIN — MUPIROCIN: 20 OINTMENT TOPICAL at 09:05

## 2025-05-08 NOTE — PLAN OF CARE
Patient is awake alert and answers simple questions appropriately. Medicated for right hip pain with po narcotic as ordered. Patient resistant to turns yells out due to BLE limited movement and wounds, dressings in place; pending wound care consult. Tolerating po intake without coughing. Hernandez to gravity. Bed alarm on. Call light in reach. Continue with plan of care as ordered.   Problem: Infection  Goal: Absence of Infection Signs and Symptoms  Outcome: Progressing     Problem: Adult Inpatient Plan of Care  Goal: Plan of Care Review  Outcome: Progressing  Goal: Patient-Specific Goal (Individualized)  Outcome: Progressing  Goal: Optimal Comfort and Wellbeing  Outcome: Progressing  Goal: Readiness for Transition of Care  Outcome: Progressing     Problem: Diabetes Comorbidity  Goal: Blood Glucose Level Within Targeted Range  Outcome: Progressing     Problem: Acute Kidney Injury/Impairment  Goal: Improved Oral Intake  Outcome: Progressing     Problem: Fall Injury Risk  Goal: Absence of Fall and Fall-Related Injury  Outcome: Progressing

## 2025-05-08 NOTE — ASSESSMENT & PLAN NOTE
Last A1c reviewed-   Lab Results   Component Value Date    LABA1C 13.0 (H) 04/13/2016    HGBA1C 5.8 (H) 12/30/2024     Home antihyperglycemic regimen: lispro-protamin 10U daily    Recent Labs     05/07/25  1304   POCTGLUCOSE 301*     Most recent inpatient antihyperglycemic regimen:   Antihyperglycemics (From admission, onward)      Start     Stop Route Frequency Ordered    05/08/25 0715  insulin aspart U-100 pen 5 Units         -- SubQ 3 times daily with meals 05/07/25 2028 05/07/25 2130  insulin glargine U-100 (Lantus) pen 10 Units         -- SubQ Nightly 05/07/25 2028 05/07/25 2127  insulin aspart U-100 pen 0-5 Units         -- SubQ Before meals & nightly PRN 05/07/25 2028          Goal blood glucose range while admitted: 140-180 per the NICE-SUGAR trial and American Diabetes Association guidelines  Diabetic diet 2000 andrey  Will identify any home barriers and ensure home regimen in place, particularly since she has now presented twice in 2 months with BG > 500 per EMS  Diabetic counseling and education (eg nutrition, insulin) to be given prior to discharge

## 2025-05-08 NOTE — ASSESSMENT & PLAN NOTE
Chronic, pressure ulcers.  Most recently debrided by wound care on 4/9.  Various stages of healing currently; R hip ulcer is purulent.  WBC, ESR, CRP, Lactate all elevated, although pt not febrile.  Wet-to-dry dressing applied and Vanc given in ED.    Admit to IP  Wound care consulted  Pt does not appear to be septic but given lab markers will continue Vanc for now; anticipate de-escalating to PO Bactrim/clindamycin/doxycycline tomorrow if VS still WNL.  Pain regimen in place, particularly since pt reports ongoing pain on transfers/turns/movement  Turn q2h  Wound care ordered

## 2025-05-08 NOTE — PLAN OF CARE
Problem: Infection  Goal: Absence of Infection Signs and Symptoms  Outcome: Progressing     Problem: Adult Inpatient Plan of Care  Goal: Plan of Care Review  Outcome: Progressing  Goal: Patient-Specific Goal (Individualized)  Outcome: Progressing     Problem: Diabetes Comorbidity  Goal: Blood Glucose Level Within Targeted Range  Outcome: Progressing     Problem: Acute Kidney Injury/Impairment  Goal: Fluid and Electrolyte Balance  Outcome: Progressing

## 2025-05-08 NOTE — PLAN OF CARE
Case Management Assessment - [facility]    PCP:Alireza Sosa MD    Pharmacy:   [id number]  [place] [address] [place] [address]  Phone: [phone] Fax: [phone]    Patient Arrived From: Home  Existing Help at Home: Yudy Cornell [phone]    Barriers to Discharge: None    Discharge Plan:    A. Home   B. Home with family    CM met with patient at bedside and spoke with yudy Cornell via phone to discuss discharge planning. Patient reside at home with yudy Cornell and uses a wheelchair for mobility. Patient has  services with [facility] health and will continue services once discharge. Patient yudy Cornell will provide needed support and transportation home upon discharge,    CM will continue to follow patient discharge needs.      05/08/25 1246   Discharge Assessment   Assessment Type Discharge Planning Assessment   Confirmed/corrected address, phone number and insurance Yes   Confirmed Demographics Correct on Facesheet   Source of Information patient;family  (Yudy Cornell [phone])   When was your last doctors appointment?   (unknown)   Reason For Admission Hyperglycemia   People in Home child(sonali), adult  (Yudy Cornell [phone])   Facility Arrived From: Home   Do you expect to return to your current living situation? Yes   Do you have help at home or someone to help you manage your care at home? Yes   Who are your caregiver(s) and their phone number(s)? Yudy Cornell [phone]   Prior to hospitilization cognitive status: Alert/Oriented   Current cognitive status: Alert/Oriented   Walking or Climbing Stairs Difficulty yes   Walking or Climbing Stairs ambulation difficulty, requires equipment   Mobility Management Wheelchair   Equipment Currently Used at Home wheelchair;hospital bed   Readmission within 30 days? No   Patient currently being followed by outpatient case  management? No   Do you currently have service(s) that help you manage your care at home? Yes   How Many hours does patient receive services   ([facility])   Name and Contact number of agency [facility]   Is the pt/caregiver preference to resume services with current agency Yes   Do you take prescription medications? Yes   Do you have prescription coverage? Yes   Coverage Medicaid   Do you have any problems affording any of your prescribed medications? No   Is the patient taking medications as prescribed? yes   Who is going to help you get home at discharge? Tiffanie Cornell [phone]   How do you get to doctors appointments? family or friend will provide   Are you on dialysis? No   Do you take coumadin? No   Discharge Plan A Home   Discharge Plan B Home with family  (Tiffanie Diaz [phone])   DME Needed Upon Discharge    (TBD)   Discharge Plan discussed with: Patient;Adult children  (Tiffanie Cornell [phone])   Transition of Care Barriers None   Physical Activity   On average, how many days per week do you engage in moderate to strenuous exercise (like a brisk walk)? 0 days   On average, how many minutes do you engage in exercise at this level? 0 min   Stress   Do you feel stress - tense, restless, nervous, or anxious, or unable to sleep at night because your mind is troubled all the time - these days? Not at all   Alcohol Use   Q1: How often do you have a drink containing alcohol? Never

## 2025-05-08 NOTE — ASSESSMENT & PLAN NOTE
Chronic, pressure ulcers.  Most recently debrided by wound care on 4/9.  Various stages of healing currently; R hip ulcer is purulent.  WBC, ESR, CRP, Lactate all elevated, although pt not febrile.  Wet-to-dry dressing applied and Vanc given in ED.    Admit to IP  Wound care consulted  Pt does not appear to be septic but given lab markers will continue Vanc for now; anticipate de-escalating to PO Bactrim/clindamycin/doxycycline tomorrow if VS still WNL.  Pain regimen in place, particularly since pt reports ongoing pain on transfers/turns/movement  Turn q2h

## 2025-05-08 NOTE — ASSESSMENT & PLAN NOTE
Patient's blood pressure range in the last 24 hours was: BP  Min: 105/62  Max: 144/68.The patient's inpatient anti-hypertensive regimen is listed below:  Current Antihypertensives       Plan  - BP is controlled, no changes needed to their regimen

## 2025-05-08 NOTE — NURSING
Photodocumentation of wounds placed in media, wounds added to LDA.  Dressings to wounds changed, pt screaming the entire time of wound assessment and dressing change, pt did not tolerate well.  PRN pain medication given prior to wound assessment and dressing changes.

## 2025-05-08 NOTE — PROGRESS NOTES
"[facility] Medicine  Progress Note    Patient Name: Emily Martinez  MRN: [id number]  Patient Class: IP- Inpatient   Admission Date: 5/7/2025  Length of Stay: 1 days  Attending Physician: Rahul Isaacs MD  Primary Care Provider: Alireza Sosa MD        Subjective     Principal Problem:Multiple wounds of skin        HPI:  Emily Martinez is a 60 y.o. female with DM1, HTN, HLD, and Previous CVA with residual deficits who was BIBA to [facility] ED for eval and treatment of acute generalized abdominal pain for the last 2x days. Per EMS, pt continued to experience persistent abdominal pain this morning accompanied by 4 episodes of emesis prompting her relatives to check her blood sugar at home. EMS notes that family received a reading of "critical high" and subsequently gave pt her insulin.   EMS reports receiving a reading greater than 500 upon their arrival.  She is chronically bed-bound, lives with her daughter, and has dementia (but is currently at her baseline per what daughter told EMS).  Further history therefore limited.  Has multiple chronic pressure ulcers on her sacrum and legs, presented to [facility] ED on April 10 with very similar circumstances as now, but was DC'ed home from the ED after a round of pain meds.    ED course: POCT  on arrival.  VSS WNL.  Exam with multiple pressure ulcers in various stages of healing; R hip wound is purulent.  Oriented to place and self.  Labs WBC 14.75 Hgb 9.3 Plt 677.  ESR CRP Lactate all elevated.  K 3.1.  Imaging: CT reads as wound to the right lateral aspect of her upper thigh appears worse when compared to previous imaging.  ED treatments: Vanc, mupirocin, wet-to-dry dressing, KCl.  [facility] evaluation and treatment.        Overview/Hospital Course:  No notes on file    Interval History: no acute issues overnight, she is asking to be turned. Daughter reports that she usually has " glucerna 1.5 @ 40 cc/hr.  Has been taking insulin at home however has run out of other medications.  She also reports having wound care at home.    Review of Systems  Objective:     Vital Signs (Most Recent):  Temp: 98 °F (36.7 °C) (05/08/25 1209)  Pulse: 87 (05/08/25 1209)  Resp: 14 (05/08/25 1209)  BP: 112/73 (05/08/25 1209)  SpO2: 99 % (05/08/25 1209) Vital Signs (24h Range):  Temp:  [97.6 °F (36.4 °C)-98.1 °F (36.7 °C)] 98 °F (36.7 °C)  Pulse:  [] 87  Resp:  [14-18] 14  SpO2:  [97 %-100 %] 99 %  BP: (105-144)/(62-80) 112/73     Weight: 57.2 kg (126 lb)  Body mass index is 22.32 kg/m².    Intake/Output Summary (Last 24 hours) at 5/8/2025 1450  Last data filed at 5/8/2025 0900  Gross per 24 hour   Intake 1800 ml   Output 1150 ml   Net 650 ml         Physical Exam  Vitals and nursing note reviewed.   Constitutional:       General: She is not in acute distress.     Appearance: Normal appearance. She is ill-appearing.   HENT:      Head: Normocephalic and atraumatic.   Cardiovascular:      Rate and Rhythm: Normal rate and regular rhythm.      Pulses: Normal pulses.      Heart sounds: No murmur heard.  Pulmonary:      Effort: Pulmonary effort is normal. No respiratory distress.      Breath sounds: Normal breath sounds.   Abdominal:      General: Bowel sounds are normal. There is no distension.      Palpations: Abdomen is soft.      Comments: G-tube in place   Musculoskeletal:      Comments: Multiple ulcers LR extremity, right hip with wound    Skin:     General: Skin is warm and dry.   Neurological:      Mental Status: She is alert. Mental status is at baseline.   Psychiatric:         Mood and Affect: Mood normal.               Significant Labs: All pertinent labs within the past 24 hours have been reviewed.    Significant Imaging: I have reviewed all pertinent imaging results/findings within the past 24 hours.      Assessment & Plan  Multiple wounds of skin  Chronic, pressure ulcers.  Most recently debrided by  wound care on 4/9.  Various stages of healing currently; R hip ulcer is purulent.  WBC, ESR, CRP, Lactate all elevated, although pt not febrile.  Wet-to-dry dressing applied and Vanc given in ED.    Admit to IP  Wound care consulted  Pt does not appear to be septic but given lab markers will continue Vanc for now; anticipate de-escalating to PO Bactrim/clindamycin/doxycycline tomorrow if VS still WNL.  Pain regimen in place, particularly since pt reports ongoing pain on transfers/turns/movement  Turn q2h  Wound care ordered  Ulcer of sacral region, stage 2      Type 2 diabetes mellitus with stage 4 chronic kidney disease, with long-term current use of insulin  Last A1c reviewed-   Lab Results   Component Value Date    LABA1C 13.0 (H) 04/13/2016    HGBA1C 5.8 (H) 12/30/2024     Home antihyperglycemic regimen: lispro-protamin 10U daily    Recent Labs     05/07/25  1304 05/07/25  2100 05/07/25  2348 05/08/25  0819 05/08/25  1212   POCTGLUCOSE 301* 257* 236* 297* 277*     Most recent inpatient antihyperglycemic regimen:   Antihyperglycemics (From admission, onward)      Start     Stop Route Frequency Ordered    05/08/25 2100  insulin glargine U-100 (Lantus) pen 15 Units         -- SubQ Nightly 05/08/25 1449    05/08/25 0715  insulin aspart U-100 pen 5 Units         -- SubQ 3 times daily with meals 05/07/25 2028 05/07/25 2127  insulin aspart U-100 pen 0-5 Units         -- SubQ Before meals & nightly PRN 05/07/25 2028          Unclear if needs more insulin, daughter reports patient is on Glucerna tube feeds at home.  She does report patient has been compliant with her insulin.  Recheck A1c and likely will need to increase in home regimen  Essential hypertension  Patient's blood pressure range in the last 24 hours was: BP  Min: 105/70  Max: 144/68.The patient's inpatient anti-hypertensive regimen is listed below:  Current Antihypertensives       Plan  - BP is controlled, no changes needed to their regimen    Mixed  hyperlipidemia  Stable.  Continue home statin  History of stroke with residual effects  As above    Debility  Patient with Chronic debility due to hemiplegia/hemiparesis. The patient's latest AMPAC (Activity Measure for Post Acute Care) Score is listed below.    AM-PAC Score - How much help does the patient need for each activity listed  Basic Mobility Total Score: 6  Turning over in bed (including adjusting bedclothes, sheets and blankets)?: Unable  Sitting down on and standing up from a chair with arms (e.g., wheelchair, bedside commode, etc.): Unable  Moving from lying on back to sitting on the side of the bed?: Unable  Moving to and from a bed to a chair (including a wheelchair)?: Unable  Need to walk in hospital room?: Unable  Climbing 3-5 steps with a railing?: Unable    Plan  - Progressive mobility protocol initated  - Fall precautions in place    Daughter is requesting PT/OT      Tobacco dependency  Dangers of cigarette smoking were reviewed with patient in detail. Patient was Referred to Tobacco Cessation Program. Nicotine replacement options were discussed. Nicotine replacement was discussed- prescribed  VTE Risk Mitigation (From admission, onward)      None            Discharge Planning   ANA:      Code Status: Full Code   Medical Readiness for Discharge Date:   Discharge Plan A: Home                Please place Justification for DME        Rahul Isaacs MD  Department of Hospital Medicine   [facility]

## 2025-05-08 NOTE — PROGRESS NOTES
Vancomycin consult follow-up:    Patient reviewed, renal function stable, no new levels, continue current therapy; Next levels due: trough due 5/9/2025 at 1300

## 2025-05-08 NOTE — ASSESSMENT & PLAN NOTE
Last A1c reviewed-   Lab Results   Component Value Date    LABA1C 13.0 (H) 04/13/2016    HGBA1C 5.8 (H) 12/30/2024     Home antihyperglycemic regimen: lispro-protamin 10U daily    Recent Labs     05/07/25  1304 05/07/25  2100 05/07/25  2348 05/08/25  0819 05/08/25  1212   POCTGLUCOSE 301* 257* 236* 297* 277*     Most recent inpatient antihyperglycemic regimen:   Antihyperglycemics (From admission, onward)      Start     Stop Route Frequency Ordered    05/08/25 2100  insulin glargine U-100 (Lantus) pen 15 Units         -- SubQ Nightly 05/08/25 1449    05/08/25 0715  insulin aspart U-100 pen 5 Units         -- SubQ 3 times daily with meals 05/07/25 2028 05/07/25 2127  insulin aspart U-100 pen 0-5 Units         -- SubQ Before meals & nightly PRN 05/07/25 2028          Unclear if needs more insulin, daughter reports patient is on Glucerna tube feeds at home.  She does report patient has been compliant with her insulin.  Recheck A1c and likely will need to increase in home regimen

## 2025-05-08 NOTE — ASSESSMENT & PLAN NOTE
Patient with Chronic debility due to hemiplegia/hemiparesis. The patient's latest AMPAC (Activity Measure for Post Acute Care) Score is listed below.    AM-PAC Score - How much help does the patient need for each activity listed  Basic Mobility Total Score: 6  Turning over in bed (including adjusting bedclothes, sheets and blankets)?: Unable  Sitting down on and standing up from a chair with arms (e.g., wheelchair, bedside commode, etc.): Unable  Moving from lying on back to sitting on the side of the bed?: Unable  Moving to and from a bed to a chair (including a wheelchair)?: Unable  Need to walk in hospital room?: Unable  Climbing 3-5 steps with a railing?: Unable    Plan  - Progressive mobility protocol initated  - Fall precautions in place    Daughter is requesting PT/OT

## 2025-05-08 NOTE — CONSULTS
[facility]urg  Wound Care  WOC [facility]    Patient Name:  Emily Martinez   MRN:  [id number]  Date: 5/8/2025  Diagnosis: Multiple wounds of skin    History:     Past Medical History:   Diagnosis Date    Diabetes mellitus type I     Hyperlipidemia     Hypertension     Right sided weakness 6/27/2019       Social History[1]    Precautions:     Allergies as of 05/07/2025 - Reviewed 05/07/2025   Allergen Reaction Noted    Sulfa (sulfonamide antibiotics) Itching 10/30/2014    Sulfur  10/30/2014       WOC Assessment Details/Treatment     Active Problem List with Overview Notes    Diagnosis Date Noted    Tobacco dependency 05/07/2025    Malnutrition of moderate degree 02/18/2025    Cereb infrc due to unsp occls or stenos of unsp cereb artery 02/12/2025    Ulcer of sacral region, stage 2 02/12/2025    Encephalopathy, hypertensive 02/12/2025    Hyperkalemia 01/17/2025    Poor prognosis 01/15/2025    Palliative care encounter 01/15/2025    Advance care planning 01/15/2025    Acute deep vein thrombosis (DVT) of right upper extremity 01/14/2025    Fever 01/13/2025    Severe malnutrition 01/11/2025    Chronic hypotension 01/10/2025    Acute blood loss anemia 01/10/2025    Dehydration 01/09/2025    Acute hypoxemic respiratory failure 01/07/2025    LFT elevation 01/04/2025    Hypophosphatemia 01/02/2025    Atelectasis of both lungs 01/02/2025    Hypernatremia 12/31/2024    History of stroke with residual effects 12/30/2024    Multiple wounds of skin 12/30/2024    C. difficile colitis 12/30/2024    Dysphagia 12/30/2024    Failure to thrive in adult 11/25/2024    Aspiration pneumonia 09/04/2024    Osteomyelitis 07/14/2024    AMS (altered mental status) 06/14/2024    History of CVA (cerebrovascular accident) 06/12/2024    Ischemic cerebral stroke due to intracranial large artery atherosclerosis 04/04/2024    Debility 04/02/2024    Intracranial carotid stenosis, right 04/02/2024    DKA (diabetic ketoacidosis) 03/25/2024    Gait  difficulty 05/04/2022    Right knee pain 04/05/2022    Quadriceps weakness 04/05/2022    Hyperosmolar hyperglycemic state (HHS) 06/08/2021    Muscle weakness of lower extremity 01/09/2020    Encephalopathy 09/06/2019    Right sided weakness 08/13/2019    Overweight (BMI 25.0-29.9) 07/15/2019    Noncompliance with diet and medication regimen 06/30/2019    Cytotoxic cerebral edema 06/28/2019    NICOLASA (acute kidney injury) 06/28/2019    Thrombotic stroke involving right middle cerebral artery 06/27/2019    Numbness and tingling of right lower extremity 04/07/2019    Insomnia 04/06/2019    Small vessel disease, cerebrovascular 04/05/2019    Cocaine abuse 04/05/2019    Weakness of both lower extremities 04/02/2019    Cerebrovascular accident (CVA) 04/01/2019    Type 2 diabetes mellitus with stage 4 chronic kidney disease, with long-term current use of insulin 04/01/2019    Essential hypertension 04/01/2019    Smoker 04/01/2019    Mixed hyperlipidemia 04/01/2019      Consulted for multiple wounds to lower extremities and buttocks  A 60 year old female admitted 5/7/25 from home with complaint of abdominal pain and hyperglycemia. Bedbound with hemiplegia to left side since CVA.   Chronic pressure injuries sacrum and legs  5/8 WBC 9.68 Hgb 7.8 Hct 26.0  Alb 1.9 Weight 57.2 kg  12/30/24 A1C 5.8   On Isoflex mattress; Anand score 8; horton catheter; TF  5/7 5:44 pm 4 Eyes Skin Assessment- Pressure Injury Previously documented  Photodocumentation (from Media)    Right hip    Right buttock    Sacrum    Right shin    Right shin    Right knee    Right knee    Right heel    Left knee    Left knee    Left shin    Left lower leg  Assessment:  Admitted with multiple pressure injuries. Severe hip/knee flexion contractures. Unable to extend legs. Positioned sitting on feet. Crying out when attempting to touch legs or reposition. Complaining of itching and scratching self.   Right hip- Chronic Stage 4 pressure injury draining a copious  amount of creamy drainage. Opening 8 cm(L) 5 cm(W) extending to the bone and tunneling around trochanter. Dressing removed saturated with copious creamy drainage.   Buttocks- Moisture associated skin damage  Right medial knee- Stage 3 pressure injury with red base. Moderate amount of serosanguineous drainage  Right anterior shin- Ulceration clean red base with scant serosanguineous drainage. Unknown etiology of ulceration  Right heel- Unstageable pressure injury with scant serosanguineous drainage  Left medial knee- Stage 3 pressure injury with small amount slough adherent to edge of ulcer. Red base with moderate amount serosanguineous drainage  Left shin- Resurfacing ulceration with red base and scant serosanguineous drainage. Unknown etiology of ulceration.  Treatment/Plan:  Local wound care to right hip every shift with Vashe moistened gauze. Saturate Kerlix roll with Vashe and fill wound/extensive undermining around bone. Cover with dry gauze and secure with Medipore tape.   Local wound care to wounds on lower legs with Mepilex dressing. Cleanse with Vashe and change daily.  Local wound care to buttocks every shift with Remedy cleanser and moisture barrier  Avoid pressure on feet and prevent knees from pressing together with pillow between knees.   Reposition patient every hour side to side due to severe hip/knee flexion contractures.  Recommendations made to primary team. Orders placed.     05/08/2025         [1]   Social History  Socioeconomic History    Marital status: Single   Tobacco Use    Smoking status: Every Day     Current packs/day: 1.50     Average packs/day: 1.5 packs/day for 35.0 years (52.5 ttl pk-yrs)     Types: Cigarettes    Smokeless tobacco: Never   Substance and Sexual Activity    Alcohol use: Not Currently    Drug use: Not Currently     Social Drivers of Health     Financial Resource Strain: Patient Declined (5/7/2025)    Overall Financial Resource Strain (CARDIA)     Difficulty of Paying  Living Expenses: Patient declined   Food Insecurity: Patient Declined (5/7/2025)    Hunger Vital Sign     Worried About Running Out of Food in the Last Year: Patient declined     Ran Out of Food in the Last Year: Patient declined   Transportation Needs: Patient Declined (5/7/2025)    PRAPARE - Transportation     Lack of Transportation (Medical): Patient declined     Lack of Transportation (Non-Medical): Patient declined   Physical Activity: Insufficiently Active (2/17/2025)    Exercise Vital Sign     Days of Exercise per Week: 3 days     Minutes of Exercise per Session: 20 min   Stress: Patient Declined (5/7/2025)    [employer] [place] [employer] - Occupational Stress Questionnaire     Feeling of Stress : Patient declined   Housing Stability: Patient Declined (5/7/2025)    Housing Stability Vital Sign     Unable to Pay for Housing in the Last Year: Patient declined     Homeless in the Last Year: Patient declined

## 2025-05-08 NOTE — ASSESSMENT & PLAN NOTE
Patient's blood pressure range in the last 24 hours was: BP  Min: 105/70  Max: 144/68.The patient's inpatient anti-hypertensive regimen is listed below:  Current Antihypertensives       Plan  - BP is controlled, no changes needed to their regimen

## 2025-05-08 NOTE — ASSESSMENT & PLAN NOTE
Patient with Chronic debility due to hemiplegia/hemiparesis. The patient's latest AMPAC (Activity Measure for Post Acute Care) Score is listed below.    AM-PAC Score - How much help does the patient need for each activity listed       Plan  - Progressive mobility protocol initated  - Fall precautions in place       Additional Notes: Patient consent was obtained to proceed with the visit and recommended plan of care after discussion of all risks and benefits, including the risks of COVID-19 exposure. Detail Level: Simple

## 2025-05-08 NOTE — H&P
"    [facility] Medicine  History & Physical    Patient Name: Emily Martinez  MRN: [id number]  Patient Class: IP- Inpatient  Admission Date: 5/7/2025  Attending Physician: Rahul Isaacs MD   Primary Care Provider: Alireza Sosa MD         Patient information was obtained from patient, past medical records, and ER records.     Subjective:     Principal Problem:Multiple wounds of skin    Chief Complaint:   Chief Complaint   Patient presents with    Abdominal Pain    Hyperglycemia     Pt BIB EMS, c/o abd pain and hyperglycemia x 3 days. Pt's meter reading HI. 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         HPI: Emily Martinez is a 60 y.o. female with DM1, HTN, HLD, and Previous CVA with residual deficits who was BIBA to [facility] ED for eval and treatment of acute generalized abdominal pain for the last 2x days. Per EMS, pt continued to experience persistent abdominal pain this morning accompanied by 4 episodes of emesis prompting her relatives to check her blood sugar at home. EMS notes that family received a reading of "critical high" and subsequently gave pt her insulin.   EMS reports receiving a reading greater than 500 upon their arrival.  She is chronically bed-bound, lives with her daughter, and has dementia (but is currently at her baseline per what daughter told EMS).  Further history therefore limited.  Has multiple chronic pressure ulcers on her sacrum and legs, presented to [facility] ED on April 10 with very similar circumstances as now, but was ID'ed home from the ED after a round of pain meds.    ED course: POCT  on arrival.  VSS WNL.  Exam with multiple pressure ulcers in various stages of healing; R hip wound is purulent.  Oriented to place and self.  Labs WBC 14.75 Hgb 9.3 Plt 677.  ESR CRP Lactate all elevated.  K 3.1.  Imaging: CT reads as wound to the right lateral aspect of her upper thigh appears worse when compared to previous imaging.  ED " "treatments: Vanc, mupirocin, wet-to-dry dressing, KCl.  They were admitted to [facility] for further evaluation and treatment.        Past Medical History:   Diagnosis Date    Diabetes mellitus type I     Hyperlipidemia     Hypertension     Right sided weakness 2019       Past Surgical History:   Procedure Laterality Date     SECTION         Review of patient's allergies indicates:   Allergen Reactions    Sulfa (sulfonamide antibiotics) Itching    Sulfur        No current facility-administered medications on file prior to encounter.     Current Outpatient Medications on File Prior to Encounter   Medication Sig    acetaminophen (TYLENOL) 650 MG TbSR Take 1 tablet (650 mg total) by mouth every 8 (eight) hours. For back pain    aspirin (ECOTRIN) 81 MG EC tablet Take 1 tablet (81 mg total) by mouth once daily.    atorvastatin (LIPITOR) 80 MG tablet Take 80 mg by mouth every evening.    blood sugar diagnostic (TRUE METRIX GLUCOSE TEST STRIP) Strp TO CHECK BLOOD GLUCOSE THREE TIMES DAILY    clopidogreL (PLAVIX) 75 mg tablet Take 1 tablet (75 mg total) by mouth once daily.    diphenhydrAMINE (BENADRYL) 50 MG capsule Take 1 capsule (50 mg total) by mouth every 6 (six) hours as needed for Itching.    insulin glargine U-100, Lantus, 100 unit/mL (3 mL) SubQ InPn pen Inject 5 Units into the skin every evening.    insulin lispro protamin-lispro 100 unit/mL (50-50) InPn Inject 10 Units into the skin. once daily    lancets (ONETOUCH ULTRASOFT LANCETS) Misc Use 1 TID as directed for diabetes checking    multivitamin Tab 1 tablet by Per G Tube route once daily.    pen needle, diabetic (BD ULTRA-FINE ORIG PEN NEEDLE) 29 gauge x 1/2" Ndle USE TO TEST THREE TIMES DAILY MUST LAST 33 DAYS    [DISCONTINUED] atorvastatin (LIPITOR) 40 MG tablet TAKE 1 TABLET(40 MG) BY MOUTH EVERY DAY    [DISCONTINUED] blood-glucose meter (FREESTYLE SYSTEM KIT) kit Use daily- TID    [DISCONTINUED] capsicum 0.075% (ZOSTRIX) " 0.075 % topical cream Apply topically 3 (three) times daily.    [DISCONTINUED] diaper,brief,adult,disposable Misc 1 each by Misc.(Non-Drug; Combo Route) route 3 (three) times daily.    [DISCONTINUED] flash glucose sensor (FREESTYLE LEATHA 14 DAY SENSOR) Kit 1 each by Misc.(Non-Drug; Combo Route) route once daily.    [DISCONTINUED] fluticasone propionate (FLONASE) 50 mcg/actuation nasal spray 1 spray (50 mcg total) by Each Nostril route once daily.    [DISCONTINUED] food supplemt, lactose-reduced (ENSURE MAX PROTEIN) Liqd Take 118 mLs by mouth 3 (three) times daily.    [DISCONTINUED] miconazole NITRATE 2 % (MICOTIN) 2 % top powder Apply topically 2 (two) times daily.    [DISCONTINUED] walker (ULTRA-LIGHT ROLLATOR) Misc 1 each by Misc.(Non-Drug; Combo Route) route once daily at 6am.    [DISCONTINUED] wheelchair Kelsey 1 each by Misc.(Non-Drug; Combo Route) route 2 (two) times daily. Power Wheel Chair     Family History       Problem Relation (Age of Onset)    Cancer Sister    Diabetes Mother, Sister, Brother, Maternal Aunt    Heart disease Maternal Aunt    Hyperlipidemia Mother, Sister, Brother    Hypertension Mother, Sister, Brother    Stroke Mother          Tobacco Use    Smoking status: Every Day     Current packs/day: 1.50     Average packs/day: 1.5 packs/day for 35.0 years (52.5 ttl pk-yrs)     Types: Cigarettes    Smokeless tobacco: Never   Substance and Sexual Activity    Alcohol use: Not Currently    Drug use: Not Currently    Sexual activity: Not on file     Review of Systems   Unable to perform ROS: Dementia     Objective:     Vital Signs (Most Recent):  Temp: 98.1 °F (36.7 °C) (05/07/25 1803)  Pulse: 97 (05/07/25 1803)  Resp: 18 (05/07/25 1803)  BP: 122/75 (05/07/25 1803)  SpO2: 99 % (05/07/25 1803) Vital Signs (24h Range):  Temp:  [98.1 °F (36.7 °C)-98.4 °F (36.9 °C)] 98.1 °F (36.7 °C)  Pulse:  [] 97  Resp:  [17-20] 18  SpO2:  [97 %-100 %] 99 %  BP: (121-144)/(68-86) 122/75     Weight: 57.2 kg (126  lb)  Body mass index is 22.32 kg/m².     Physical Exam  Constitutional:       General: She is not in acute distress.     Appearance: She is underweight. She is ill-appearing (chronically). She is not toxic-appearing.   HENT:      Head: Normocephalic.   Cardiovascular:      Rate and Rhythm: Normal rate and regular rhythm.      Pulses: Normal pulses.      Heart sounds: Normal heart sounds.   Pulmonary:      Effort: Pulmonary effort is normal.      Breath sounds: Rales present.   Abdominal:      General: Abdomen is flat. Bowel sounds are normal.      Tenderness: There is no abdominal tenderness. There is no guarding.   Skin:     General: Skin is warm and dry.      Capillary Refill: Capillary refill takes less than 2 seconds.      Coloration: Skin is not jaundiced.      Comments: Multiple ulcers to legs and sacrum in various stages of healing; R hip ulcer is purulent   Neurological:      General: No focal deficit present.      Mental Status: She is alert and oriented to person, place, and time.   Psychiatric:         Mood and Affect: Mood normal.         Behavior: Behavior normal.                Significant Labs: All pertinent labs within the past 24 hours have been reviewed.  CBC:   Recent Labs   Lab 05/07/25  1301 05/07/25  1312   WBC 14.75*  --    HGB 9.3*  --    HCT 30.8* 28*   *  --      CMP:   Recent Labs   Lab 05/07/25  1301      K 3.1*      CO2 24   *   BUN 26*   CREATININE 1.1   CALCIUM 10.2   PROT 8.2   ALBUMIN 2.3*   BILITOT 0.4   ALKPHOS 107   AST 10*   ALT 5*   ANIONGAP 15       Significant Imaging: I have reviewed all pertinent imaging results/findings within the past 24 hours.      Assessment/Plan:     Assessment & Plan  Multiple wounds of skin  Chronic, pressure ulcers.  Most recently debrided by wound care on 4/9.  Various stages of healing currently; R hip ulcer is purulent.  WBC, ESR, CRP, Lactate all elevated, although pt not febrile.  Wet-to-dry dressing applied and Vanc  given in ED.    Admit to IP  Wound care consulted  Pt does not appear to be septic but given lab markers will continue Vanc for now; anticipate de-escalating to PO Bactrim/clindamycin/doxycycline tomorrow if VS still WNL.  Pain regimen in place, particularly since pt reports ongoing pain on transfers/turns/movement  Turn q2h  Ulcer of sacral region, stage 2      Type 2 diabetes mellitus with stage 4 chronic kidney disease, with long-term current use of insulin  Last A1c reviewed-   Lab Results   Component Value Date    LABA1C 13.0 (H) 04/13/2016    HGBA1C 5.8 (H) 12/30/2024     Home antihyperglycemic regimen: lispro-protamin 10U daily    Recent Labs     05/07/25  1304   POCTGLUCOSE 301*     Most recent inpatient antihyperglycemic regimen:   Antihyperglycemics (From admission, onward)      Start     Stop Route Frequency Ordered    05/08/25 0715  insulin aspart U-100 pen 5 Units         -- SubQ 3 times daily with meals 05/07/25 2028 05/07/25 2130  insulin glargine U-100 (Lantus) pen 10 Units         -- SubQ Nightly 05/07/25 2028 05/07/25 2127  insulin aspart U-100 pen 0-5 Units         -- SubQ Before meals & nightly PRN 05/07/25 2028          Goal blood glucose range while admitted: 140-180 per the NICE-SUGAR trial and American Diabetes Association guidelines  Diabetic diet 2000 andrey  Will identify any home barriers and ensure home regimen in place, particularly since she has now presented twice in 2 months with BG > 500 per EMS  Diabetic counseling and education (eg nutrition, insulin) to be given prior to discharge    Essential hypertension  Patient's blood pressure range in the last 24 hours was: BP  Min: 105/62  Max: 144/68.The patient's inpatient anti-hypertensive regimen is listed below:  Current Antihypertensives       Plan  - BP is controlled, no changes needed to their regimen    Mixed hyperlipidemia  Stable.  Continue home statin  History of stroke with residual effects  As above    Debility  Patient  with Chronic debility due to hemiplegia/hemiparesis. The patient's latest AMPAC (Activity Measure for Post Acute Care) Score is listed below.    AM-PAC Score - How much help does the patient need for each activity listed       Plan  - Progressive mobility protocol initated  - Fall precautions in place      Tobacco dependency  Dangers of cigarette smoking were reviewed with patient in detail. Patient was Referred to Tobacco Cessation Program. Nicotine replacement options were discussed. Nicotine replacement was discussed- prescribed  VTE Risk Mitigation (From admission, onward)      None                       Pharmacokinetic Initial Assessment: IV Vancomycin    Assessment/Plan:    Initiate intravenous vancomycin with loading dose of 1500 mg once followed by a maintenance dose of vancomycin 750 mg IV every 24 hours  Desired empiric serum trough concentration is 10 to 20 mcg/mL  Draw vancomycin trough level 60 min prior to third dose on 5/9/25 at approximately 1300  Pharmacy will continue to follow and monitor vancomycin.      Please contact pharmacy at extension [phone] with any questions regarding this assessment.     Thank you for the consult,   Latrice Hemphill       Patient brief summary:  Emily Martinez is a 60 y.o. female initiated on antimicrobial therapy with IV Vancomycin for treatment of suspected sepsis    Drug Allergies:   Review of patient's allergies indicates:   Allergen Reactions    Sulfa (sulfonamide antibiotics) Itching    Sulfur        Actual Body Weight:   57.2    Renal Function:   Estimated Creatinine Clearance: 43.9 mL/min (based on SCr of 1.1 mg/dL).,     Dialysis Method (if applicable):  N/A    CBC (last 72 hours):  Recent Labs   Lab Result Units 05/07/25  1301   WBC K/uL 14.75*   HGB gm/dL 9.3*   HCT % 30.8*   Platelet Count K/uL 677*   Lymph % % 10.6*   Mono % % 8.9   Eos % % 0.1   Basophil % % 0.3       Metabolic Panel (last 72 hours):  Recent Labs   Lab Result Units 05/07/25  1301   Sodium  "mmol/L 142   Potassium mmol/L 3.1*   Chloride mmol/L 103   CO2 mmol/L 24   Glucose mg/dL 293*   BUN mg/dL 26*   Creatinine mg/dL 1.1   Albumin g/dL 2.3*   Bilirubin Total mg/dL 0.4   ALP unit/L 107   AST unit/L 10*   ALT unit/L 5*   Magnesium  mg/dL 2.1   Phosphorus Level mg/dL 2.7       Drug levels (last 3 results):  No results for input(s): "VANCOMYCINRA", "VANCORANDOM", "VANCOMYCINPE", "VANCOPEAK", "VANCOMYCINTR", "VANCOTROUGH" in the last 72 hours.    Microbiologic Results:  Microbiology Results (last 7 days)       Procedure Component Value Units Date/Time    Blood culture #1 **CANNOT BE ORDERED STAT** [[id number]] Collected: 05/07/25 1301    Order Status: Resulted Specimen: Blood from Peripheral, Forearm, Right Updated: 05/07/25 1308    Blood culture #2 **CANNOT BE ORDERED STAT** [[id number]] Collected: 05/07/25 1301    Order Status: Resulted Specimen: Blood from Peripheral, Forearm, Right Updated: 05/07/25 1308              Josiah Goyal MD  Department of Hospital Medicine  [facility]          "

## 2025-05-08 NOTE — SUBJECTIVE & OBJECTIVE
"Past Medical History:   Diagnosis Date    Diabetes mellitus type I     Hyperlipidemia     Hypertension     Right sided weakness 2019       Past Surgical History:   Procedure Laterality Date     SECTION         Review of patient's allergies indicates:   Allergen Reactions    Sulfa (sulfonamide antibiotics) Itching    Sulfur        No current facility-administered medications on file prior to encounter.     Current Outpatient Medications on File Prior to Encounter   Medication Sig    acetaminophen (TYLENOL) 650 MG TbSR Take 1 tablet (650 mg total) by mouth every 8 (eight) hours. For back pain    aspirin (ECOTRIN) 81 MG EC tablet Take 1 tablet (81 mg total) by mouth once daily.    atorvastatin (LIPITOR) 80 MG tablet Take 80 mg by mouth every evening.    blood sugar diagnostic (TRUE METRIX GLUCOSE TEST STRIP) Strp TO CHECK BLOOD GLUCOSE THREE TIMES DAILY    clopidogreL (PLAVIX) 75 mg tablet Take 1 tablet (75 mg total) by mouth once daily.    diphenhydrAMINE (BENADRYL) 50 MG capsule Take 1 capsule (50 mg total) by mouth every 6 (six) hours as needed for Itching.    insulin glargine U-100, Lantus, 100 unit/mL (3 mL) SubQ InPn pen Inject 5 Units into the skin every evening.    insulin lispro protamin-lispro 100 unit/mL (50-50) InPn Inject 10 Units into the skin. once daily    lancets (ONETOUCH ULTRASOFT LANCETS) Misc Use 1 TID as directed for diabetes checking    multivitamin Tab 1 tablet by Per G Tube route once daily.    pen needle, diabetic (BD ULTRA-FINE ORIG PEN NEEDLE) 29 gauge x 1/2" Ndle USE TO TEST THREE TIMES DAILY MUST LAST 33 DAYS    [DISCONTINUED] atorvastatin (LIPITOR) 40 MG tablet TAKE 1 TABLET(40 MG) BY MOUTH EVERY DAY    [DISCONTINUED] blood-glucose meter (FREESTYLE SYSTEM KIT) kit Use daily- TID    [DISCONTINUED] capsicum 0.075% (ZOSTRIX) 0.075 % topical cream Apply topically 3 (three) times daily.    [DISCONTINUED] diaper,brief,adult,disposable Misc 1 each by Misc.(Non-Drug; Combo Route) " route 3 (three) times daily.    [DISCONTINUED] flash glucose sensor (FREESTYLE LEATHA 14 DAY SENSOR) Kit 1 each by Misc.(Non-Drug; Combo Route) route once daily.    [DISCONTINUED] fluticasone propionate (FLONASE) 50 mcg/actuation nasal spray 1 spray (50 mcg total) by Each Nostril route once daily.    [DISCONTINUED] food supplemt, lactose-reduced (ENSURE MAX PROTEIN) Liqd Take 118 mLs by mouth 3 (three) times daily.    [DISCONTINUED] miconazole NITRATE 2 % (MICOTIN) 2 % top powder Apply topically 2 (two) times daily.    [DISCONTINUED] walker (ULTRA-LIGHT ROLLATOR) Misc 1 each by Misc.(Non-Drug; Combo Route) route once daily at 6am.    [DISCONTINUED] wheelchair Kelsey 1 each by Misc.(Non-Drug; Combo Route) route 2 (two) times daily. Power Wheel Chair     Family History       Problem Relation (Age of Onset)    Cancer Sister    Diabetes Mother, Sister, Brother, Maternal Aunt    Heart disease Maternal Aunt    Hyperlipidemia Mother, Sister, Brother    Hypertension Mother, Sister, Brother    Stroke Mother          Tobacco Use    Smoking status: Every Day     Current packs/day: 1.50     Average packs/day: 1.5 packs/day for 35.0 years (52.5 ttl pk-yrs)     Types: Cigarettes    Smokeless tobacco: Never   Substance and Sexual Activity    Alcohol use: Not Currently    Drug use: Not Currently    Sexual activity: Not on file     Review of Systems   Unable to perform ROS: Dementia     Objective:     Vital Signs (Most Recent):  Temp: 98.1 °F (36.7 °C) (05/07/25 1803)  Pulse: 97 (05/07/25 1803)  Resp: 18 (05/07/25 1803)  BP: 122/75 (05/07/25 1803)  SpO2: 99 % (05/07/25 1803) Vital Signs (24h Range):  Temp:  [98.1 °F (36.7 °C)-98.4 °F (36.9 °C)] 98.1 °F (36.7 °C)  Pulse:  [] 97  Resp:  [17-20] 18  SpO2:  [97 %-100 %] 99 %  BP: (121-144)/(68-86) 122/75     Weight: 57.2 kg (126 lb)  Body mass index is 22.32 kg/m².     Physical Exam  Constitutional:       General: She is not in acute distress.     Appearance: She is underweight. She  is ill-appearing (chronically). She is not toxic-appearing.   HENT:      Head: Normocephalic.   Cardiovascular:      Rate and Rhythm: Normal rate and regular rhythm.      Pulses: Normal pulses.      Heart sounds: Normal heart sounds.   Pulmonary:      Effort: Pulmonary effort is normal.      Breath sounds: Rales present.   Abdominal:      General: Abdomen is flat. Bowel sounds are normal.      Tenderness: There is no abdominal tenderness. There is no guarding.   Skin:     General: Skin is warm and dry.      Capillary Refill: Capillary refill takes less than 2 seconds.      Coloration: Skin is not jaundiced.      Comments: Multiple ulcers to legs and sacrum in various stages of healing; R hip ulcer is purulent   Neurological:      General: No focal deficit present.      Mental Status: She is alert and oriented to person, place, and time.   Psychiatric:         Mood and Affect: Mood normal.         Behavior: Behavior normal.                Significant Labs: All pertinent labs within the past 24 hours have been reviewed.  CBC:   Recent Labs   Lab 05/07/25  1301 05/07/25  1312   WBC 14.75*  --    HGB 9.3*  --    HCT 30.8* 28*   *  --      CMP:   Recent Labs   Lab 05/07/25  1301      K 3.1*      CO2 24   *   BUN 26*   CREATININE 1.1   CALCIUM 10.2   PROT 8.2   ALBUMIN 2.3*   BILITOT 0.4   ALKPHOS 107   AST 10*   ALT 5*   ANIONGAP 15       Significant Imaging: I have reviewed all pertinent imaging results/findings within the past 24 hours.

## 2025-05-08 NOTE — NURSING
[facility]  Nurses Note -- 4 Eyes      5-7-25      Skin assessed on: Q Shift      [] No Pressure Injuries Present    []Prevention Measures Documented    [x] Yes LDA  for Pressure Injury Previously documented     [] Yes New Pressure Injury Discovered   [] LDA for New Pressure Injury Added      Attending RN:  Na Durand RN     Second RN:  Tyler Taylor LPN

## 2025-05-08 NOTE — SUBJECTIVE & OBJECTIVE
Interval History: no acute issues overnight, she is asking to be turned. Daughter reports that she usually has glucerna 1.5 @ 40 cc/hr.  Has been taking insulin at home however has run out of other medications.  She also reports having wound care at home.    Review of Systems  Objective:     Vital Signs (Most Recent):  Temp: 98 °F (36.7 °C) (05/08/25 1209)  Pulse: 87 (05/08/25 1209)  Resp: 14 (05/08/25 1209)  BP: 112/73 (05/08/25 1209)  SpO2: 99 % (05/08/25 1209) Vital Signs (24h Range):  Temp:  [97.6 °F (36.4 °C)-98.1 °F (36.7 °C)] 98 °F (36.7 °C)  Pulse:  [] 87  Resp:  [14-18] 14  SpO2:  [97 %-100 %] 99 %  BP: (105-144)/(62-80) 112/73     Weight: 57.2 kg (126 lb)  Body mass index is 22.32 kg/m².    Intake/Output Summary (Last 24 hours) at 5/8/2025 1450  Last data filed at 5/8/2025 0900  Gross per 24 hour   Intake 1800 ml   Output 1150 ml   Net 650 ml         Physical Exam  Vitals and nursing note reviewed.   Constitutional:       General: She is not in acute distress.     Appearance: Normal appearance. She is ill-appearing.   HENT:      Head: Normocephalic and atraumatic.   Cardiovascular:      Rate and Rhythm: Normal rate and regular rhythm.      Pulses: Normal pulses.      Heart sounds: No murmur heard.  Pulmonary:      Effort: Pulmonary effort is normal. No respiratory distress.      Breath sounds: Normal breath sounds.   Abdominal:      General: Bowel sounds are normal. There is no distension.      Palpations: Abdomen is soft.      Comments: G-tube in place   Musculoskeletal:      Comments: Multiple ulcers LR extremity, right hip with wound    Skin:     General: Skin is warm and dry.   Neurological:      Mental Status: She is alert. Mental status is at baseline.   Psychiatric:         Mood and Affect: Mood normal.               Significant Labs: All pertinent labs within the past 24 hours have been reviewed.    Significant Imaging: I have reviewed all pertinent imaging results/findings within the past 24  hours.

## 2025-05-09 LAB
ABSOLUTE EOSINOPHIL (OHS): 0.15 K/UL
ABSOLUTE MONOCYTE (OHS): 1.1 K/UL (ref 0.3–1)
ABSOLUTE NEUTROPHIL COUNT (OHS): 5.42 K/UL (ref 1.8–7.7)
ALBUMIN SERPL BCP-MCNC: 2.1 G/DL (ref 3.5–5.2)
ALP SERPL-CCNC: 98 UNIT/L (ref 40–150)
ALT SERPL W/O P-5'-P-CCNC: 7 UNIT/L (ref 10–44)
ANION GAP (OHS): 10 MMOL/L (ref 8–16)
AST SERPL-CCNC: 16 UNIT/L (ref 11–45)
BASOPHILS # BLD AUTO: 0.02 K/UL
BASOPHILS NFR BLD AUTO: 0.2 %
BILIRUB SERPL-MCNC: 0.4 MG/DL (ref 0.1–1)
BUN SERPL-MCNC: 17 MG/DL (ref 8–23)
CALCIUM SERPL-MCNC: 9.3 MG/DL (ref 8.7–10.5)
CHLORIDE SERPL-SCNC: 105 MMOL/L (ref 95–110)
CO2 SERPL-SCNC: 21 MMOL/L (ref 23–29)
CREAT SERPL-MCNC: 0.8 MG/DL (ref 0.5–1.4)
ERYTHROCYTE [DISTWIDTH] IN BLOOD BY AUTOMATED COUNT: 19.5 % (ref 11.5–14.5)
GFR SERPLBLD CREATININE-BSD FMLA CKD-EPI: >60 ML/MIN/1.73/M2
GLUCOSE SERPL-MCNC: 143 MG/DL (ref 70–110)
HCT VFR BLD AUTO: 27.1 % (ref 37–48.5)
HGB BLD-MCNC: 8.3 GM/DL (ref 12–16)
IMM GRANULOCYTES # BLD AUTO: 0.04 K/UL (ref 0–0.04)
IMM GRANULOCYTES NFR BLD AUTO: 0.5 % (ref 0–0.5)
LYMPHOCYTES # BLD AUTO: 1.7 K/UL (ref 1–4.8)
MCH RBC QN AUTO: 23.1 PG (ref 27–31)
MCHC RBC AUTO-ENTMCNC: 30.6 G/DL (ref 32–36)
MCV RBC AUTO: 75 FL (ref 82–98)
NUCLEATED RBC (/100WBC) (OHS): 0 /100 WBC
PLATELET # BLD AUTO: 570 K/UL (ref 150–450)
PMV BLD AUTO: 8.6 FL (ref 9.2–12.9)
POCT GLUCOSE: 148 MG/DL (ref 70–110)
POCT GLUCOSE: 168 MG/DL (ref 70–110)
POCT GLUCOSE: 174 MG/DL (ref 70–110)
POCT GLUCOSE: 98 MG/DL (ref 70–110)
POTASSIUM SERPL-SCNC: 4.4 MMOL/L (ref 3.5–5.1)
PROT SERPL-MCNC: 7.5 GM/DL (ref 6–8.4)
RBC # BLD AUTO: 3.6 M/UL (ref 4–5.4)
RELATIVE EOSINOPHIL (OHS): 1.8 %
RELATIVE LYMPHOCYTE (OHS): 20.2 % (ref 18–48)
RELATIVE MONOCYTE (OHS): 13 % (ref 4–15)
RELATIVE NEUTROPHIL (OHS): 64.3 % (ref 38–73)
SODIUM SERPL-SCNC: 136 MMOL/L (ref 136–145)
VANCOMYCIN TROUGH SERPL-MCNC: 15.7 UG/ML (ref 10–22)
WBC # BLD AUTO: 8.43 K/UL (ref 3.9–12.7)

## 2025-05-09 PROCEDURE — 25000003 PHARM REV CODE 250: Performed by: INTERNAL MEDICINE

## 2025-05-09 PROCEDURE — 85025 COMPLETE CBC W/AUTO DIFF WBC: CPT | Performed by: STUDENT IN AN ORGANIZED HEALTH CARE EDUCATION/TRAINING PROGRAM

## 2025-05-09 PROCEDURE — 97166 OT EVAL MOD COMPLEX 45 MIN: CPT

## 2025-05-09 PROCEDURE — 25000003 PHARM REV CODE 250

## 2025-05-09 PROCEDURE — 80053 COMPREHEN METABOLIC PANEL: CPT | Performed by: STUDENT IN AN ORGANIZED HEALTH CARE EDUCATION/TRAINING PROGRAM

## 2025-05-09 PROCEDURE — 80202 ASSAY OF VANCOMYCIN: CPT | Performed by: STUDENT IN AN ORGANIZED HEALTH CARE EDUCATION/TRAINING PROGRAM

## 2025-05-09 PROCEDURE — 21400001 HC TELEMETRY ROOM

## 2025-05-09 PROCEDURE — S4991 NICOTINE PATCH NONLEGEND: HCPCS

## 2025-05-09 PROCEDURE — 97162 PT EVAL MOD COMPLEX 30 MIN: CPT

## 2025-05-09 PROCEDURE — 63600175 PHARM REV CODE 636 W HCPCS: Performed by: STUDENT IN AN ORGANIZED HEALTH CARE EDUCATION/TRAINING PROGRAM

## 2025-05-09 PROCEDURE — 25000003 PHARM REV CODE 250: Performed by: STUDENT IN AN ORGANIZED HEALTH CARE EDUCATION/TRAINING PROGRAM

## 2025-05-09 RX ORDER — HYDROXYZINE HYDROCHLORIDE 25 MG/1
25 TABLET, FILM COATED ORAL ONCE
Status: COMPLETED | OUTPATIENT
Start: 2025-05-09 | End: 2025-05-09

## 2025-05-09 RX ORDER — POLYETHYLENE GLYCOL 3350 17 G/17G
17 POWDER, FOR SOLUTION ORAL DAILY
Status: DISCONTINUED | OUTPATIENT
Start: 2025-05-09 | End: 2025-05-14

## 2025-05-09 RX ADMIN — INSULIN ASPART 5 UNITS: 100 INJECTION, SOLUTION INTRAVENOUS; SUBCUTANEOUS at 05:05

## 2025-05-09 RX ADMIN — VANCOMYCIN HYDROCHLORIDE 750 MG: 750 INJECTION, POWDER, LYOPHILIZED, FOR SOLUTION INTRAVENOUS at 04:05

## 2025-05-09 RX ADMIN — DIPHENHYDRAMINE HYDROCHLORIDE 25 MG: 25 CAPSULE ORAL at 04:05

## 2025-05-09 RX ADMIN — POLYETHYLENE GLYCOL 3350 17 G: 17 POWDER, FOR SOLUTION ORAL at 10:05

## 2025-05-09 RX ADMIN — OXYCODONE 10 MG: 5 TABLET ORAL at 04:05

## 2025-05-09 RX ADMIN — OXYCODONE 10 MG: 5 TABLET ORAL at 10:05

## 2025-05-09 RX ADMIN — INSULIN ASPART 5 UNITS: 100 INJECTION, SOLUTION INTRAVENOUS; SUBCUTANEOUS at 12:05

## 2025-05-09 RX ADMIN — THERA TABS 1 TABLET: TAB at 10:05

## 2025-05-09 RX ADMIN — CEFEPIME 1 G: 2 INJECTION, POWDER, FOR SOLUTION INTRAVENOUS at 04:05

## 2025-05-09 RX ADMIN — NICOTINE 1 PATCH: 14 PATCH TRANSDERMAL at 10:05

## 2025-05-09 RX ADMIN — CEFEPIME 1 G: 2 INJECTION, POWDER, FOR SOLUTION INTRAVENOUS at 07:05

## 2025-05-09 RX ADMIN — HYDROXYZINE HYDROCHLORIDE 25 MG: 25 TABLET, FILM COATED ORAL at 10:05

## 2025-05-09 RX ADMIN — MUPIROCIN: 20 OINTMENT TOPICAL at 10:05

## 2025-05-09 RX ADMIN — CEFEPIME 1 G: 2 INJECTION, POWDER, FOR SOLUTION INTRAVENOUS at 10:05

## 2025-05-09 RX ADMIN — INSULIN ASPART 5 UNITS: 100 INJECTION, SOLUTION INTRAVENOUS; SUBCUTANEOUS at 10:05

## 2025-05-09 RX ADMIN — ASPIRIN 81 MG: 81 TABLET, COATED ORAL at 10:05

## 2025-05-09 RX ADMIN — ATORVASTATIN CALCIUM 80 MG: 40 TABLET, FILM COATED ORAL at 10:05

## 2025-05-09 NOTE — PLAN OF CARE
Problem: Physical Therapy  Goal: Physical Therapy Goal  Outcome: Met     Pt with h/o CVA, failure to thrive with PEG placement, BLE hip/knee flexion contractures, and multiple wounds.  Pt bedbound and nonambulatory since April or July 2024 per chart.  Pt has a hospital bed, would benefit from low air loss mattress due to multiple wounds.  Pt may have a standard w/c at home, would benefit from an evaluation to see if a custom w/c is appropriate.  A kayce lift may also be beneficial if family is having difficulty transferring pt.  Pt will continue to need 24 hr care, family may want to consider NH placement.  Per chart, pt was receiving HH services.  Pt to resume HH services for skilled nursing services and nursing aide.  Pt at [facility], no further acute skilled therapy services indicated at this time.  PT orders to be discontinued.

## 2025-05-09 NOTE — PLAN OF CARE
Problem: Occupational Therapy  Goal: Occupational Therapy Goal  Description: No goals set 2/2 pt performing at/near baseline.      Outcome: Met   Pt performing skills at baseline with no further OT needs; please reconsult if necessary. Recommending low intensity therapy for equipment recommendations and family training. DME like needed low air loss mattress, kayce lift, custom w/c evaluation.

## 2025-05-09 NOTE — PLAN OF CARE
Kusum [phone] from [place]  called  office and stated that patient will discharge home with [facility].  attempted to return the call to confirm, Kusum did not answer. A voicemail was left.     call patient daughter Aneta [phone] to confirm, there was no answer.  left a message for return phone call.

## 2025-05-09 NOTE — PROGRESS NOTES
Pharmacokinetic Assessment Follow Up: IV Vancomycin    Vancomycin serum concentration assessment(s):    The trough level was drawn correctly and can be used to guide therapy at this time. The measurement is within the desired definitive target range of 10 to 20 mcg/mL.    Vancomycin Regimen Plan:    Continue regimen to Vancomycin 750 mg IV every 24 hours with next serum trough concentration measured at 16:00 prior to 3rd dose on 5/11    Drug levels (last 3 results):  Recent Labs   Lab Result Units 05/09/25  1543   Vancomycin Trough ug/ml 15.7       Pharmacy will continue to follow and monitor vancomycin.    Please contact pharmacy at extension [phone] for questions regarding this assessment.    Thank you for the consult,   Bekah Caruso       Patient brief summary:  Emily Martinez is a 60 y.o. female initiated on antimicrobial therapy with IV Vancomycin for treatment of Sepsis of Unknown Source    The patient's current regimen is Vancomycin 750mg q24hrs    Drug Allergies:   Review of patient's allergies indicates:   Allergen Reactions    Sulfa (sulfonamide antibiotics) Itching    Sulfur        Actual Body Weight:   57.2 kg    Renal Function:   Estimated Creatinine Clearance: 60.3 mL/min (based on SCr of 0.8 mg/dL).,     Dialysis Method (if applicable):  N/A    CBC (last 72 hours):  Recent Labs   Lab Result Units 05/07/25  1301 05/08/25  0937 05/09/25  0613   WBC K/uL 14.75* 9.68 8.43   HGB gm/dL 9.3* 7.8* 8.3*   Hemoglobin A1c % 8.1*  --   --    HCT % 30.8* 26.0* 27.1*   Platelet Count K/uL 677* 581* 570*   Lymph % % 10.6* 18.1 20.2   Mono % % 8.9 11.3 13.0   Eos % % 0.1 0.9 1.8   Basophil % % 0.3 0.3 0.2       Metabolic Panel (last 72 hours):  Recent Labs   Lab Result Units 05/07/25  1301 05/07/25  1508 05/08/25  0938 05/09/25  0611   Sodium mmol/L 142  --  136 136   Potassium mmol/L 3.1*  --  4.4 4.4   Chloride mmol/L 103  --  106 105   CO2 mmol/L 24  --  22* 21*   Glucose mg/dL 293*  --  322* 143*   Glucose, UA    --  Negative  --   --    BUN mg/dL 26*  --  18 17   Creatinine mg/dL 1.1  --  0.8 0.8   Albumin g/dL 2.3*  --  1.9* 2.1*   Bilirubin Total mg/dL 0.4  --  0.4 0.4   ALP unit/L 107  --  85 98   AST unit/L 10*  --  13 16   ALT unit/L 5*  --  5* 7*   Magnesium  mg/dL 2.1  --   --   --    Phosphorus Level mg/dL 2.7  --   --   --        Vancomycin Administrations:  vancomycin given in the last 96 hours                     vancomycin 750 mg in 0.9% NaCl 250 mL IVPB (admixture device) (mg) 750 mg New Bag 05/09/25 1659    vancomycin 750 mg in 0.9% NaCl 250 mL IVPB (admixture device) (mg) 750 mg New Bag 05/08/25 1452    vancomycin 1.5 g in dextrose 5 % 250 mL IVPB (ready to mix) (mg) 1,500 mg New Bag 05/07/25 1348                    Microbiologic Results:  Microbiology Results (last 7 days)       Procedure Component Value Units Date/Time    Blood culture #2 **CANNOT BE ORDERED STAT** [[id number]]  (Normal) Collected: 05/07/25 1301    Order Status: Completed Specimen: Blood from Peripheral, Forearm, Right Updated: 05/09/25 1402     Blood Culture No Growth After 48 Hours    Blood culture #1 **CANNOT BE ORDERED STAT** [[id number]]  (Normal) Collected: 05/07/25 1301    Order Status: Completed Specimen: Blood from Peripheral, Forearm, Right Updated: 05/09/25 1402     Blood Culture No Growth After 48 Hours

## 2025-05-09 NOTE — PT/OT/SLP EVAL
"Occupational Therapy   Evaluation and Discharge Note    Name: Emily Martinez  MRN: [id number]  Admitting Diagnosis: Multiple wounds of skin  Recent Surgery: * No surgery found *      Recommendations:     Discharge Recommendations: Low Intensity Therapy (for equipment recommendations and family training)  Discharge Equipment Recommendations: lift device (low air loss mattress, custom w/c evaluation)  Barriers to discharge:  Inaccessible home environment, Decreased caregiver support    Assessment:     Emily Martinez is a 60 y.o. female with a medical diagnosis of Multiple wounds of skin. At this time, patient is functioning at their prior level of function and does not require further acute OT services.     Plan:     During this hospitalization, patient does not require further acute OT services.  Please re-consult if situation changes.    Plan of Care Reviewed with: patient    Subjective     Chief Complaint: Pt reports back is itchy, pain with movement BLE  Patient/Family Comments/goals: None stated    Occupational Profile:  Living Environment: Pt lives with daughter and grandchildren in [address] but reports steps in the house, performs bed baths  Previous level of function: Pt states she "gets around" and "uses w/c to get out of bed" but unable to state how she transfers bed<>w/c. Pt reported no kayce lift  Roles and Routines: Mother, grandmother  Equipment Used at home: wheelchair, hospital bed  Assistance upon Discharge: Daughter    Pain/Comfort:  Pain Rating 1:  (reporting back is itchy)  Location - Side 1: Bilateral  Location - Orientation 1: generalized  Location 1: leg  Pain Addressed 1: Reposition, Distraction, Cessation of Activity, Nurse notified  Pain Rating Post-Intervention 1:  (increased pain with movement)    Patients cultural, spiritual, Sikhism conflicts given the current situation:      Objective:     Communicated with: castro prior to session.  Patient found left sidelying with bed alarm, horton " catheter, telemetry, peripheral IV upon OT entry to room.    General Precautions: Standard, fall  Orthopedic Precautions: N/A  Braces: N/A  Respiratory Status: Room air     Occupational Performance:    Bed Mobility:    Patient completed Rolling/Turning to Left with  dependent  Patient completed Rolling/Turning to Right with dependent  Patient completed Scooting/Bridging with dependent  Patient completed Supine to Sit with dependent  Patient completed Sit to Supine with dependent    Functional Mobility/Transfers:  Functional Mobility: Mod to max A static seated EOB, L laeral lean and unable to self correct    Activities of Daily Living:  Lower Body Dressing: total assistance to don/doff B pressure relief boots with increased pain response noted    Cognitive/Visual Perceptual:  Cognitive/Psychosocial Skills:     -       Oriented to: Person, Place, Loosely to situation  -       Follows Commands/attention:Follows one step commands   -       Communication: able to make some needs/wants known but at times frantic/garbled speech  -       Memory: No Apparent Deficits noted  -       Safety awareness/insight to disability: somewhat impaired  -       Mood/Affect/Coping skills/emotional control: anxious with movement, fear of pain    Physical Exam:  Postural examination/scapula alignment:    -      Rounded shoulders, forward head, B hip knee contractures  Skin integrity: Multiple pressure injuries present  Motor Planning:    -       Impaired LUE/BLE, fairly WFL RUE  Dominant hand:    -       R handed  Upper Extremity Range of Motion: RUE WFL , LUE minimal to no AROM  Upper Extremity Strength:  RUE grossly 3 to 3+   Strength:  RUE 4+/5  Fine Motor Coordination:    -       Intact R hand  Gross motor coordination:  h/o L hemiparesis with flexor synergy pattern LUE, BLE contractures with no AROM noted    AMPAC 6 Click ADL:  AMPAC Total Score: 6    Treatment & Education:  Pt educated on role of OT and POC.   Pt performing skills  as listed above.     Patient left left sidelying with all lines intact, call button in reach, bed alarm on, and nsg notified    GOALS:   Multidisciplinary Problems       Occupational Therapy Goals       Not on file              Multidisciplinary Problems (Resolved)          Problem: Occupational Therapy    Goal Priority Disciplines Outcome Interventions   Occupational Therapy Goal   (Resolved)     OT, PT/OT Met    Description: No goals set 2/2 pt performing at/near baseline.                             History:     Past Medical History:   Diagnosis Date    Diabetes mellitus type I     Hyperlipidemia     Hypertension     Right sided weakness 2019         Past Surgical History:   Procedure Laterality Date     SECTION         Time Tracking:     OT Date of Treatment: 25  OT Start Time: 1101  OT Stop Time: 1118  OT Total Time (min): 17 min    Billable Minutes:Evaluation 17    2025

## 2025-05-09 NOTE — PT/OT/SLP EVAL
Physical Therapy Evaluation and Discharge Note    Patient Name:  Emily Martinez   MRN:  [id number]    Recommendations:     Discharge Recommendations:  (continue HH services for skilled nursing services and nursing aide; Family may want to consider NH placement.)  Discharge Equipment Recommendations:  (kayce lift, low air loss mattress, and custom w/c evaluation???)   Barriers to discharge: None    Assessment:     Emily Martinez is a 60 y.o. female admitted with a medical diagnosis of Multiple wounds of skin.  At this time, patient is functioning at their prior level of function and does not require further acute PT services.     Recent Surgery: * No surgery found *      Plan:     During this hospitalization, patient does not require further acute PT services.  Please re-consult if situation changes.      Subjective     Chief Complaint: Pt reported that her back is itching.   Patient/Family Comments/goals: Pt agreeable to PT eval with encouragement.   Pain/Comfort:  Pain Rating 1:  (Pt c/o BLE pain when touched.)  Pain Addressed 1: Reposition, Distraction, Cessation of Activity, Nurse notified      Living Environment:  Pt lives with dtr and grandchildren in a [facility] with no concerns at entry.   Prior to admission, patients level of function was bedbound and nonambulatory ~April or July 2024 per chart.  Equipment used at home: hospital bed, wheelchair.  Upon discharge, patient will have assistance from dtr.    Objective:     Communicated with nurse Hanley prior to session.  Patient found left sidelying with bed alarm, horton catheter, PEG Tube, pressure relief boots, peripheral IV, telemetry upon PT entry to room.    General Precautions: Standard, fall, diabetic    Orthopedic Precautions: (B hip/knee flex contractures)   Braces: N/A  Respiratory Status: Room air    Exams:  Cognitive Exam:  Patient was able to follow some simple commands.  It was difficult to understand pt's speech.    Gross Motor Coordination:  impaired  Postural Exam:  Patient presented with the following abnormalities:    -       Pt found L sidelying and in fetal position with BLE in hip/knee flexion contractures.  Skin Integrity/Edema:      -       Skin integrity: Pt admitted with multiple wounds, R hip, R buttock, sacrum, BLE shin, B knee, and R heel.  Please see wound care consult.   -       Edema: None noted BLE  BLE ROM: difficult PROM 2* pain; Pt keeping BLE in hip/knee flexion contractures.  BLE Strength: unable to formally assess 2* pt screaming in pain    Functional Mobility:Pt with h/o CVA, failure to thrive with PEG placement, BLE hip/knee flexion contractures, and multiple wounds. Pt bedbound and nonambulatory since April or July 2024 per chart. Pt has a hospital bed, would benefit from low air loss mattress due to multiple wounds. Pt may have a standard w/c at home, would benefit from an evaluation to see if a custom w/c is appropriate. A kayce lift may also be beneficial if family is having difficulty transferring pt. Pt will continue to need 24 hr care, family may want to consider NH placement. Per chart, pt was receiving HH services. Pt to resume HH services for skilled nursing services and nursing aide. Pt at [facility], no further acute skilled therapy services indicated at this time. PT orders to be discontinued.   Bed Mobility:     Rolling Left: dependence and of 2 persons  Rolling Right: dependence and of 2 persons  Scooting: dependence and of 2 persons  Bridging: dependence and of 2 persons  Supine to Sit: dependence and of 2 persons  Sit to Supine: dependence and of 2 persons  Balance: Pt with poor static sit balance.  Pt tolerated brief sitting EOB for application of moisturizer on her back 2* c/o itchiness.  Pt with L lateral trunk lean.      AM-PAC 6 CLICK MOBILITY  Total Score:8       Patient left left sidelying, HOB elevated, pillow between LE and pillow under LUE with all lines intact, call button in reach, bed alarm on, and nurse  Dayan notified.  B pressure relief boots readjusted.     GOALS:   Multidisciplinary Problems       Physical Therapy Goals       Not on file              Multidisciplinary Problems (Resolved)          Problem: Physical Therapy    Goal Priority Disciplines Outcome Interventions   Physical Therapy Goal   (Resolved)     PT, PT/OT Met                        History:     Past Medical History:   Diagnosis Date    Diabetes mellitus type I     Hyperlipidemia     Hypertension     Right sided weakness 2019       Past Surgical History:   Procedure Laterality Date     SECTION         Time Tracking:     PT Received On: 25  PT Start Time: 1102     PT Stop Time: 1115  PT Total Time (min): 13 min     Billable Minutes: Evaluation 13 min co-tx with OT      2025

## 2025-05-09 NOTE — ASSESSMENT & PLAN NOTE
Malnutrition Type:  Context: chronic illness  Level: moderate    Related to (etiology):   Increased demand for nutrition     Signs and Symptoms (as evidenced by):   Delayed wound healing      Malnutrition Characteristic Summary:  Weight Loss (Malnutrition): 20% in 1 year      Interventions(treatment strategy):  1. Continue current EN regimen as tolerated. 2. Continue to provide current PO diet as tolerated with fluid restriction per MD. 3. Continue to provide Jose BID to promote wound healing. 4. Monitor weight/labs. 5. RD to follow and monitor intake    Nutrition Diagnosis Status:   Continues

## 2025-05-09 NOTE — ASSESSMENT & PLAN NOTE
Last A1c reviewed-   Lab Results   Component Value Date    LABA1C 13.0 (H) 04/13/2016    HGBA1C 8.1 (H) 05/07/2025     Home antihyperglycemic regimen: lispro-protamin 10U daily    Recent Labs     05/08/25  0819 05/08/25  1212 05/08/25  1659 05/08/25  1941 05/09/25  0804 05/09/25  1138   POCTGLUCOSE 297* 277* 104 117* 168* 174*     Most recent inpatient antihyperglycemic regimen:   Antihyperglycemics (From admission, onward)      Start     Stop Route Frequency Ordered    05/08/25 2100  insulin glargine U-100 (Lantus) pen 15 Units         -- SubQ Nightly 05/08/25 1449    05/08/25 0715  insulin aspart U-100 pen 5 Units         -- SubQ 3 times daily with meals 05/07/25 2028 05/07/25 2127  insulin aspart U-100 pen 0-5 Units         -- SubQ Before meals & nightly PRN 05/07/25 2028          Unclear if needs more insulin, daughter reports patient is on Glucerna tube feeds at home.  She does report patient has been compliant with her insulin.  A1c 8.5. Needs increased insulin regimen at discharge

## 2025-05-09 NOTE — NURSING
[facility]  Nurses Note -- 4 Eyes      5/8/2025       Skin assessed on: Q Shift      [] No Pressure Injuries Present    [x]Prevention Measures Documented    [x] Yes LDA  for Pressure Injury Previously documented     [] Yes New Pressure Injury Discovered   [] LDA for New Pressure Injury Added      Attending RN:  Na Durand, RN     Second RN:  Debbie MARTINEZ

## 2025-05-09 NOTE — NURSING
OM-WB  Rapid Response Nurse Intervention/ Task    Date of Visit: 05/09/2025  Time of Visit: 0600       INTERVENTIONS/ TASK Completed:     PIV placed to R FA using US guidance. Pt tolerated well. LDA placed in chart and primary RN, Na, Notified.

## 2025-05-09 NOTE — PLAN OF CARE
Problem: Infection  Goal: Absence of Infection Signs and Symptoms  Outcome: Progressing     Problem: Adult Inpatient Plan of Care  Goal: Plan of Care Review  Outcome: Progressing  Goal: Patient-Specific Goal (Individualized)  Outcome: Progressing  Goal: Optimal Comfort and Wellbeing  Outcome: Progressing  Goal: Readiness for Transition of Care  Outcome: Progressing     Problem: Acute Kidney Injury/Impairment  Goal: Improved Oral Intake  Outcome: Progressing     Problem: Wound  Goal: Optimal Coping  Outcome: Progressing  Goal: Optimal Functional Ability  Outcome: Progressing  Goal: Optimal Pain Control and Function  Outcome: Progressing     Problem: Fall Injury Risk  Goal: Absence of Fall and Fall-Related Injury  Outcome: Progressing

## 2025-05-09 NOTE — SUBJECTIVE & OBJECTIVE
Interval History: no acute issues, tolerating tube feeds so far. Wound care consulted.     Review of Systems  Objective:     Vital Signs (Most Recent):  Temp: 97.7 °F (36.5 °C) (05/09/25 1139)  Pulse: 96 (05/09/25 1139)  Resp: 20 (05/09/25 1139)  BP: 110/70 (05/09/25 1139)  SpO2: 99 % (05/09/25 1139) Vital Signs (24h Range):  Temp:  [97 °F (36.1 °C)-99.2 °F (37.3 °C)] 97.7 °F (36.5 °C)  Pulse:  [] 96  Resp:  [16-20] 20  SpO2:  [96 %-100 %] 99 %  BP: (110-144)/(60-85) 110/70     Weight: 57.2 kg (126 lb)  Body mass index is 22.32 kg/m².    Intake/Output Summary (Last 24 hours) at 5/9/2025 1241  Last data filed at 5/9/2025 0830  Gross per 24 hour   Intake 900 ml   Output 600 ml   Net 300 ml         Physical Exam  Vitals and nursing note reviewed.   Constitutional:       General: She is not in acute distress.     Appearance: Normal appearance. She is ill-appearing.   HENT:      Head: Normocephalic and atraumatic.   Cardiovascular:      Rate and Rhythm: Normal rate and regular rhythm.      Pulses: Normal pulses.      Heart sounds: No murmur heard.  Pulmonary:      Effort: Pulmonary effort is normal. No respiratory distress.      Breath sounds: Normal breath sounds.   Abdominal:      General: Bowel sounds are normal. There is no distension.      Palpations: Abdomen is soft.      Comments: G-tube in place   Musculoskeletal:      Comments: Multiple ulcers LR extremity, right hip with wound    Skin:     General: Skin is warm and dry.   Neurological:      Mental Status: She is alert. Mental status is at baseline.   Psychiatric:         Mood and Affect: Mood normal.               Significant Labs: All pertinent labs within the past 24 hours have been reviewed.    Significant Imaging: I have reviewed all pertinent imaging results/findings within the past 24 hours.

## 2025-05-09 NOTE — ASSESSMENT & PLAN NOTE
Patient's blood pressure range in the last 24 hours was: BP  Min: 110/70  Max: 144/78.The patient's inpatient anti-hypertensive regimen is listed below:  Current Antihypertensives       Plan  - BP is controlled, no changes needed to their regimen

## 2025-05-09 NOTE — PROGRESS NOTES
"[facility]  Progress Note    Patient Name: Emily Martinez  MRN: [id number]  Patient Class: IP- Inpatient   Admission Date: 5/7/2025  Length of Stay: 2 days  Attending Physician: Rahul Isaacs MD  Primary Care Provider: Alireza Sosa MD        Subjective     Principal Problem:Multiple wounds of skin        HPI:  Emily Martinez is a 60 y.o. female with DM1, HTN, HLD, and Previous CVA with residual deficits who was BIBA to [facility] ED for eval and treatment of acute generalized abdominal pain for the last 2x days. Per EMS, pt continued to experience persistent abdominal pain this morning accompanied by 4 episodes of emesis prompting her relatives to check her blood sugar at home. EMS notes that family received a reading of "critical high" and subsequently gave pt her insulin.   EMS reports receiving a reading greater than 500 upon their arrival.  She is chronically bed-bound, lives with her daughter, and has dementia (but is currently at her baseline per what daughter told EMS).  Further history therefore limited.  Has multiple chronic pressure ulcers on her sacrum and legs, presented to [facility] ED on April 10 with very similar circumstances as now, but was DC'ed home from the ED after a round of pain meds.    ED course: POCT  on arrival.  VSS WNL.  Exam with multiple pressure ulcers in various stages of healing; R hip wound is purulent.  Oriented to place and self.  Labs WBC 14.75 Hgb 9.3 Plt 677.  ESR CRP Lactate all elevated.  K 3.1.  Imaging: CT reads as wound to the right lateral aspect of her upper thigh appears worse when compared to previous imaging.  ED treatments: Vanc, mupirocin, wet-to-dry dressing, KCl.  They were admitted to [facility] for further evaluation and treatment.        Overview/Hospital Course:  No notes on file    Interval History: no acute issues, tolerating tube feeds so far. Wound care consulted.     Review of " Systems  Objective:     Vital Signs (Most Recent):  Temp: 97.7 °F (36.5 °C) (05/09/25 1139)  Pulse: 96 (05/09/25 1139)  Resp: 20 (05/09/25 1139)  BP: 110/70 (05/09/25 1139)  SpO2: 99 % (05/09/25 1139) Vital Signs (24h Range):  Temp:  [97 °F (36.1 °C)-99.2 °F (37.3 °C)] 97.7 °F (36.5 °C)  Pulse:  [] 96  Resp:  [16-20] 20  SpO2:  [96 %-100 %] 99 %  BP: (110-144)/(60-85) 110/70     Weight: 57.2 kg (126 lb)  Body mass index is 22.32 kg/m².    Intake/Output Summary (Last 24 hours) at 5/9/2025 1241  Last data filed at 5/9/2025 0830  Gross per 24 hour   Intake 900 ml   Output 600 ml   Net 300 ml         Physical Exam  Vitals and nursing note reviewed.   Constitutional:       General: She is not in acute distress.     Appearance: Normal appearance. She is ill-appearing.   HENT:      Head: Normocephalic and atraumatic.   Cardiovascular:      Rate and Rhythm: Normal rate and regular rhythm.      Pulses: Normal pulses.      Heart sounds: No murmur heard.  Pulmonary:      Effort: Pulmonary effort is normal. No respiratory distress.      Breath sounds: Normal breath sounds.   Abdominal:      General: Bowel sounds are normal. There is no distension.      Palpations: Abdomen is soft.      Comments: G-tube in place   Musculoskeletal:      Comments: Multiple ulcers LR extremity, right hip with wound    Skin:     General: Skin is warm and dry.   Neurological:      Mental Status: She is alert. Mental status is at baseline.   Psychiatric:         Mood and Affect: Mood normal.               Significant Labs: All pertinent labs within the past 24 hours have been reviewed.    Significant Imaging: I have reviewed all pertinent imaging results/findings within the past 24 hours.      Assessment & Plan  Multiple wounds of skin  Chronic, pressure ulcers.  Most recently debrided by wound care on 4/9.  Various stages of healing currently; R hip ulcer is purulent.  WBC, ESR, CRP, Lactate all elevated, although pt not febrile.  Wet-to-dry  dressing applied and Vanc given in ED.    Admit to IP  Wound care consulted  Pt does not appear to be septic but given lab markers will continue Vanc for now; anticipate de-escalating to PO Bactrim/clindamycin/doxycycline tomorrow if VS still WNL.  Pain regimen in place, particularly since pt reports ongoing pain on transfers/turns/movement  Turn q2h  Wound care ordered  Ulcer of sacral region, stage 2      Type 2 diabetes mellitus with stage 4 chronic kidney disease, with long-term current use of insulin  Last A1c reviewed-   Lab Results   Component Value Date    LABA1C 13.0 (H) 04/13/2016    HGBA1C 8.1 (H) 05/07/2025     Home antihyperglycemic regimen: lispro-protamin 10U daily    Recent Labs     05/08/25  0819 05/08/25  1212 05/08/25  1659 05/08/25  1941 05/09/25  0804 05/09/25  1138   POCTGLUCOSE 297* 277* 104 117* 168* 174*     Most recent inpatient antihyperglycemic regimen:   Antihyperglycemics (From admission, onward)      Start     Stop Route Frequency Ordered    05/08/25 2100  insulin glargine U-100 (Lantus) pen 15 Units         -- SubQ Nightly 05/08/25 1449    05/08/25 0715  insulin aspart U-100 pen 5 Units         -- SubQ 3 times daily with meals 05/07/25 2028 05/07/25 2127  insulin aspart U-100 pen 0-5 Units         -- SubQ Before meals & nightly PRN 05/07/25 2028          Unclear if needs more insulin, daughter reports patient is on Glucerna tube feeds at home.  She does report patient has been compliant with her insulin.  A1c 8.5. Needs increased insulin regimen at discharge  Essential hypertension  Patient's blood pressure range in the last 24 hours was: BP  Min: 110/70  Max: 144/78.The patient's inpatient anti-hypertensive regimen is listed below:  Current Antihypertensives       Plan  - BP is controlled, no changes needed to their regimen    Mixed hyperlipidemia  Stable.  Continue home statin  History of stroke with residual effects  As above    Debility  Patient with Chronic debility due to  hemiplegia/hemiparesis. The patient's latest AMPAC (Activity Measure for Post Acute Care) Score is listed below.    AM-PAC Score - How much help does the patient need for each activity listed  Basic Mobility Total Score: 6  Turning over in bed (including adjusting bedclothes, sheets and blankets)?: Unable  Sitting down on and standing up from a chair with arms (e.g., wheelchair, bedside commode, etc.): Unable  Moving from lying on back to sitting on the side of the bed?: Unable  Moving to and from a bed to a chair (including a wheelchair)?: Unable  Need to walk in hospital room?: Unable  Climbing 3-5 steps with a railing?: Unable    Plan  - Progressive mobility protocol initated  - Fall precautions in place    Daughter is requesting PT/OT      Tobacco dependency  Dangers of cigarette smoking were reviewed with patient in detail. Patient was Referred to Tobacco Cessation Program. Nicotine replacement options were discussed. Nicotine replacement was discussed- prescribed  Moderate malnutrition  Nutrition consulted. Most recent weight and BMI monitored-     Measurements:  Wt Readings from Last 1 Encounters:   05/09/25 57.2 kg (126 lb)   Body mass index is 22.32 kg/m².    Patient has been screened and assessed by RD.    Malnutrition Type:  Context: chronic illness  Level: moderate    Malnutrition Characteristic Summary:  Weight Loss (Malnutrition): 20% in 1 year    Interventions/Recommendations (treatment strategy):  1. Enteral Nutrition Management  2. Collaboration by nutrition professional with other providers    Pressure injury of right hip, stage 4      VTE Risk Mitigation (From admission, onward)      None            Discharge Planning   ANA: 5/9/2025     Code Status: Full Code   Medical Readiness for Discharge Date:   Discharge Plan A: Home                        Rahul Isaacs MD  Department of Hospital Medicine   [facility] Surg

## 2025-05-09 NOTE — PROGRESS NOTES
[facility] [place] - Med Surg  Adult Nutrition  Progress Note    SUMMARY       Recommendations    1. Enteral Nutrition Management:   Tube feeding recommendations of Glucerna 1.5 with goal rate of 45ml/hr   Start TF at rate of 10, increase by 10ml q 8 hours until goal rate is reached.   Recommend Jose BID to promote wound healing   TF to provide: 1620kcals, 89gPRO, 144gCHO, 820ml h2o   Flush with 100mls of FWF q 6 hours or per MD order     2. Monitor labs and weights   3. Collaboration by nutrition professional with other providers    Goals: Patient to receive/consume >75% of EEN/EPN prior to RD follow up  Nutrition Goal Status: new  Communication of RD Recs: other (comment) (POC)    Nutrition Discharge Planning    Nutrition Discharge Planning: Too early to determine, pending clinical course    Assessment and Plan    Endocrine  Moderate malnutrition  Malnutrition Type:  Context: chronic illness  Level: moderate    Related to (etiology):   Increased demand for nutrition     Signs and Symptoms (as evidenced by):   Delayed wound healing      Malnutrition Characteristic Summary:  Weight Loss (Malnutrition): 20% in 1 year      Interventions(treatment strategy):  1. Enteral Nutrition Management  2. Collaboration by nutrition professional with other providers    Nutrition Diagnosis Status:   Continues         Malnutrition Assessment  Malnutrition Context: chronic illness  Malnutrition Level: moderate  Skin (Micronutrient): wounds unhealed       Weight Loss (Malnutrition): 20% in 1 year                         Reason for Assessment    Reason For Assessment: identified at risk by screening criteria  Diagnosis: diabetes diagnosis/complications (multiple wounds of skin)  Patient Active Problem List   Diagnosis    Cerebrovascular accident (CVA)    Type 2 diabetes mellitus with stage 4 chronic kidney disease, with long-term current use of insulin    Essential hypertension    Smoker    Mixed hyperlipidemia    Weakness of both lower  extremities    Small vessel disease, cerebrovascular    Cocaine abuse    Insomnia    Numbness and tingling of right lower extremity    Thrombotic stroke involving right middle cerebral artery    Cytotoxic cerebral edema    NICOLASA (acute kidney injury)    Noncompliance with diet and medication regimen    Overweight (BMI 25.0-29.9)    Muscle weakness of lower extremity    Right knee pain    Quadriceps weakness    Gait difficulty    History of stroke with residual effects    Multiple wounds of skin    C. difficile colitis    Dysphagia    Hypernatremia    Hypophosphatemia    Atelectasis of both lungs    LFT elevation    Acute hypoxemic respiratory failure    Dehydration    Chronic hypotension    Acute blood loss anemia    Severe malnutrition    Fever    Acute deep vein thrombosis (DVT) of right upper extremity    Poor prognosis    Palliative care encounter    Debility    Advance care planning    Hyperkalemia    Aspiration pneumonia    Cereb infrc due to unsp occls or stenos of unsp cereb artery    Encephalopathy    Failure to thrive in adult    AMS (altered mental status)    History of CVA (cerebrovascular accident)    Hyperosmolar hyperglycemic state (HHS)    Intracranial carotid stenosis, right    Ischemic cerebral stroke due to intracranial large artery atherosclerosis    Osteomyelitis    Ulcer of sacral region, stage 2    Encephalopathy, hypertensive    DKA (diabetic ketoacidosis)    Right sided weakness    Moderate malnutrition    Tobacco dependency    Pressure injury of right hip, stage 4     Past Medical History:   Diagnosis Date    Diabetes mellitus type I     Hyperlipidemia     Hypertension     Right sided weakness 6/27/2019       General Information Comments:   5/9/25: Patient has a low sodium, 1500ml fluid restrition oral diet ordered at this time.  Patient also with PEG in place.  Family reported that patient uses the PEG at home with TF routine of Glucerna 1.5 with goal rate of 40ml/hr. Patient recently ran  "out of DM medications.  Patient admitted with abdominal pain, nausea and vomiting.  Patient with multiple pressure injuries to BLE and buttock regions.  PMH of CVA: bed bound with heliplasia of the left side.  RD to provide EN support recommendations if they should be needed.  Oral diet texture per SLP recommendations.  Labs reviewed.  NKFA.  LBM: 5/9/25.  RD to recommend kiran BID to promote wound healing.  Patient is positive for weight loss and loss of skin intergrity.    Nutrition/Diet History    Nutrition Intake History: Glucerna 1.5 with goal rate of 40ml/hr (home routine)  Spiritual, Cultural Beliefs, Voodoo Practices, Values that Affect Care: no  Food Allergies: NKFA  Factors Affecting Nutritional Intake: chewing difficulties/inability to chew food, difficulty/impaired swallowing, other (see comments) (PEG)  Nutrition Related Social Determinants of Health: SDOH: Unable to assess at this time.     Anthropometrics    Height: 5' 3" (160 cm)  Height (inches): 63 in  Weight: 57.2 kg (126 lb)  Weight (lb): 126 lb  Weight Method: Bed Scale  Ideal Body Weight (IBW), Female: 115 lb  % Ideal Body Weight, Female (lb): 109.57 %  BMI (Calculated): 22.3  BMI Grade: 18.5-24.9 - normal     Wt Readings from Last 20 Encounters:   05/09/25 57.2 kg (126 lb)   02/12/25 57.2 kg (126 lb)   01/15/25 57.3 kg (126 lb 5.2 oz)   03/19/24 93.4 kg (205 lb 12.8 oz)   02/15/24 94.9 kg (209 lb 3.2 oz)   12/21/23 93.1 kg (205 lb 3.2 oz)   11/16/23 92.5 kg (204 lb)   10/17/23 94.7 kg (208 lb 12.8 oz)   09/12/23 93 kg (205 lb)   07/20/23 89.5 kg (197 lb 6.4 oz)   06/20/23 90.8 kg (200 lb 3.2 oz)   05/23/23 88 kg (194 lb)   05/09/23 91.1 kg (200 lb 12.8 oz)   04/06/23 90.3 kg (199 lb)   03/09/23 92.4 kg (203 lb 9.6 oz)   03/02/23 92.5 kg (204 lb)   02/09/23 93.9 kg (207 lb)   12/29/22 89.8 kg (198 lb)   12/06/22 91.4 kg (201 lb 6.4 oz)   11/03/22 88.5 kg (195 lb)       Lab/Procedures/Meds    Pertinent Labs Reviewed: reviewed  BMP  Lab " Results   Component Value Date     05/09/2025    K 4.4 05/09/2025     05/09/2025    CO2 21 (L) 05/09/2025    BUN 17 05/09/2025    CREATININE 0.8 05/09/2025    CALCIUM 9.3 05/09/2025    ANIONGAP 10 05/09/2025    EGFRNORACEVR >60 05/09/2025     Lab Results   Component Value Date    LABA1C 13.0 (H) 04/13/2016    HGBA1C 8.1 (H) 05/07/2025     Lab Results   Component Value Date    CALCIUM 9.3 05/09/2025    PHOS 2.7 05/07/2025     Glucose   Date Value Ref Range Status   05/09/2025 143 (H) 70 - 110 mg/dL Final   02/19/2025 125 (H) 70 - 110 mg/dL Final       Pertinent Medications Reviewed: reviewed  Scheduled Meds:   aspirin  81 mg Oral Daily    atorvastatin  80 mg Oral QHS    ceFEPime IV (PEDS and ADULTS)  1 g Intravenous Q8H    insulin aspart U-100  5 Units Subcutaneous TIDWM    insulin glargine U-100  15 Units Subcutaneous QHS    multivitamin  1 tablet Per G Tube Daily    mupirocin   Nasal BID    nicotine  1 patch Transdermal Daily    polyethylene glycol  17 g Oral Daily    vancomycin (VANCOCIN) IV (PEDS and ADULTS)  750 mg Intravenous Q24H     Continuous Infusions:  PRN Meds:.  Current Facility-Administered Medications:     dextrose 50%, 12.5 g, Intravenous, PRN    dextrose 50%, 25 g, Intravenous, PRN    diphenhydrAMINE, 25 mg, Oral, Q6H PRN    glucagon (human recombinant), 1 mg, Intramuscular, PRN    glucose, 16 g, Oral, PRN    glucose, 24 g, Oral, PRN    HYDROmorphone, 0.5 mg, Intravenous, Q3H PRN    insulin aspart U-100, 0-5 Units, Subcutaneous, QID (AC + HS) PRN    oxyCODONE, 10 mg, Oral, Q4H PRN    oxyCODONE, 5 mg, Oral, Q4H PRN    Pharmacy to dose Vancomycin consult, , , Once **AND** vancomycin - pharmacy to dose, , Intravenous, pharmacy to manage frequency    Estimated/Assessed Needs    Weight Used For Calorie Calculations: 57.2 kg (126 lb 1.7 oz)  Energy Calorie Requirements (kcal): 25-35kcals/kg (1430-2002kcals/day)  Energy Need Method: Kcal/kg  Protein Requirements: 1.5-2.0g/kg  (86-115g/day)  Weight Used For Protein Calculations: 57.2 kg (126 lb 1.7 oz)  Fluid Requirements (mL): 1ml/kcal  Estimated Fluid Requirement Method: RDA Method  RDA Method (mL): 25  CHO Requirement: 225-250      Nutrition Prescription Ordered    Current Diet Order: Low sodium, 1500ml fluid restriction  Current Nutrition Support Formula Ordered: Glucerna 1.5  Current Nutrition Support Rate Ordered: 40 (ml)  Current Nutrition Support Frequency Ordered: continuous  Oral Nutrition Supplement: Jose BID    Evaluation of Received Nutrient/Fluid Intake    Enteral Calories (kcal): 1440  Enteral Protein (gm): 79  Enteral (Free Water) Fluid (mL): 729  I/O: 5/9/2025: +950ml since admit  Energy Calories Required: not meeting needs  Protein Required: not meeting needs  Fluid Required: not meeting needs  Comments: LBM: 5/9/25  Tolerance: other (see comments) (monitoring oral/EN tolerance)  % Intake of Estimated Energy Needs: Other: monitoring   % Meal Intake: Other: monitoring     PES Statement  Inadequate protein energy intake related to Wound healing as evidenced by Wounds  Status: New    Nutrition Risk    Level of Risk/Frequency of Follow-up: moderate (Follow up: 1-2x per week)     Monitor and Evaluation    Monitor and Evaluation: Energy intake, Protein intake, Diet order, Weight, Enteral and parenteral nutrition administration, Beliefs and attitudes, Electrolyte and renal panel, Gastrointestinal profile, Glucose/endocrine profile, Inflammatory profile, Lipid profile, Nutrition focused physical findings, Skin     Nutrition Follow-Up    RD Follow-up?: Yes  Daja Frederick, MS, RDN, LDN

## 2025-05-09 NOTE — NURSING
Report received and care assumed. Discussed plan of care and safety with patient . No evident of learning.Reviewed call system. No acute distress noted  [facility]  Nurses Note -- 4 Eyes      5/9/2025       Skin assessed on: Q Shift      [] No Pressure Injuries Present    [x]Prevention Measures Documented    [x] Yes LDA  for Pressure Injury Previously documented     [] Yes New Pressure Injury Discovered   [] LDA for New Pressure Injury Added      Attending RN:  Dayan García, RN     Second RN:  Na uDrand RN

## 2025-05-09 NOTE — PLAN OF CARE
Nutrition Plan of Care:    Recommendations     1. Enteral Nutrition Management:   Tube feeding recommendations of Glucerna 1.5 with goal rate of 45ml/hr   Start TF at rate of 10, increase by 10ml q 8 hours until goal rate is reached.   Recommend Jose BID to promote wound healing   TF to provide: 1620kcals, 89gPRO, 144gCHO, 820ml h2o   Flush with 100mls of FWF q 6 hours or per MD order     2. Monitor labs and weights   3. Collaboration by nutrition professional with other providers     Goals: Patient to receive/consume >75% of EEN/EPN prior to RD follow up  Nutrition Goal Status: new  Communication of RD Recs: other (comment) (POC)     Nutrition Discharge Planning     Nutrition Discharge Planning: Too early to determine, pending clinical course     Assessment and Plan     Endocrine  Moderate malnutrition  Malnutrition Type:  Context: chronic illness  Level: moderate     Related to (etiology):   Increased demand for nutrition      Signs and Symptoms (as evidenced by):   Delayed wound healing       Malnutrition Characteristic Summary:  Weight Loss (Malnutrition): 20% in 1 year        Interventions(treatment strategy):  1. Enteral Nutrition Management  2. Collaboration by nutrition professional with other providers     Nutrition Diagnosis Status:   Continues      Daja Frederick, MS, RDN, LDN

## 2025-05-10 LAB
ABSOLUTE EOSINOPHIL (OHS): 0.14 K/UL
ABSOLUTE MONOCYTE (OHS): 1.32 K/UL (ref 0.3–1)
ABSOLUTE NEUTROPHIL COUNT (OHS): 7.12 K/UL (ref 1.8–7.7)
ALBUMIN SERPL BCP-MCNC: 1.7 G/DL (ref 3.5–5.2)
ALP SERPL-CCNC: 89 UNIT/L (ref 40–150)
ALT SERPL W/O P-5'-P-CCNC: 7 UNIT/L (ref 10–44)
ANION GAP (OHS): 8 MMOL/L (ref 8–16)
AST SERPL-CCNC: 10 UNIT/L (ref 11–45)
BASOPHILS # BLD AUTO: 0.04 K/UL
BASOPHILS NFR BLD AUTO: 0.4 %
BILIRUB SERPL-MCNC: 0.3 MG/DL (ref 0.1–1)
BUN SERPL-MCNC: 18 MG/DL (ref 8–23)
CALCIUM SERPL-MCNC: 8.9 MG/DL (ref 8.7–10.5)
CHLORIDE SERPL-SCNC: 107 MMOL/L (ref 95–110)
CO2 SERPL-SCNC: 24 MMOL/L (ref 23–29)
CREAT SERPL-MCNC: 0.8 MG/DL (ref 0.5–1.4)
ERYTHROCYTE [DISTWIDTH] IN BLOOD BY AUTOMATED COUNT: 19.3 % (ref 11.5–14.5)
GFR SERPLBLD CREATININE-BSD FMLA CKD-EPI: >60 ML/MIN/1.73/M2
GLUCOSE SERPL-MCNC: 219 MG/DL (ref 70–110)
HCT VFR BLD AUTO: 24.5 % (ref 37–48.5)
HGB BLD-MCNC: 7.4 GM/DL (ref 12–16)
IMM GRANULOCYTES # BLD AUTO: 0.07 K/UL (ref 0–0.04)
IMM GRANULOCYTES NFR BLD AUTO: 0.7 % (ref 0–0.5)
LYMPHOCYTES # BLD AUTO: 1.55 K/UL (ref 1–4.8)
MCH RBC QN AUTO: 22.2 PG (ref 27–31)
MCHC RBC AUTO-ENTMCNC: 30.2 G/DL (ref 32–36)
MCV RBC AUTO: 74 FL (ref 82–98)
NUCLEATED RBC (/100WBC) (OHS): 0 /100 WBC
PLATELET # BLD AUTO: 559 K/UL (ref 150–450)
PMV BLD AUTO: 8.7 FL (ref 9.2–12.9)
POCT GLUCOSE: 134 MG/DL (ref 70–110)
POCT GLUCOSE: 202 MG/DL (ref 70–110)
POCT GLUCOSE: 258 MG/DL (ref 70–110)
POCT GLUCOSE: 294 MG/DL (ref 70–110)
POTASSIUM SERPL-SCNC: 4.7 MMOL/L (ref 3.5–5.1)
PROT SERPL-MCNC: 6.5 GM/DL (ref 6–8.4)
RBC # BLD AUTO: 3.33 M/UL (ref 4–5.4)
RELATIVE EOSINOPHIL (OHS): 1.4 %
RELATIVE LYMPHOCYTE (OHS): 15.1 % (ref 18–48)
RELATIVE MONOCYTE (OHS): 12.9 % (ref 4–15)
RELATIVE NEUTROPHIL (OHS): 69.5 % (ref 38–73)
SODIUM SERPL-SCNC: 139 MMOL/L (ref 136–145)
WBC # BLD AUTO: 10.24 K/UL (ref 3.9–12.7)

## 2025-05-10 PROCEDURE — S4991 NICOTINE PATCH NONLEGEND: HCPCS

## 2025-05-10 PROCEDURE — 63600175 PHARM REV CODE 636 W HCPCS

## 2025-05-10 PROCEDURE — 25000003 PHARM REV CODE 250: Performed by: INTERNAL MEDICINE

## 2025-05-10 PROCEDURE — 80053 COMPREHEN METABOLIC PANEL: CPT | Performed by: STUDENT IN AN ORGANIZED HEALTH CARE EDUCATION/TRAINING PROGRAM

## 2025-05-10 PROCEDURE — 36415 COLL VENOUS BLD VENIPUNCTURE: CPT | Performed by: STUDENT IN AN ORGANIZED HEALTH CARE EDUCATION/TRAINING PROGRAM

## 2025-05-10 PROCEDURE — 25000003 PHARM REV CODE 250

## 2025-05-10 PROCEDURE — 85025 COMPLETE CBC W/AUTO DIFF WBC: CPT | Performed by: STUDENT IN AN ORGANIZED HEALTH CARE EDUCATION/TRAINING PROGRAM

## 2025-05-10 PROCEDURE — 63600175 PHARM REV CODE 636 W HCPCS: Performed by: STUDENT IN AN ORGANIZED HEALTH CARE EDUCATION/TRAINING PROGRAM

## 2025-05-10 PROCEDURE — 21400001 HC TELEMETRY ROOM

## 2025-05-10 PROCEDURE — 25000003 PHARM REV CODE 250: Performed by: STUDENT IN AN ORGANIZED HEALTH CARE EDUCATION/TRAINING PROGRAM

## 2025-05-10 RX ORDER — INSULIN GLARGINE 100 [IU]/ML
15 INJECTION, SOLUTION SUBCUTANEOUS DAILY
Status: DISCONTINUED | OUTPATIENT
Start: 2025-05-10 | End: 2025-05-15

## 2025-05-10 RX ADMIN — DIPHENHYDRAMINE HYDROCHLORIDE 25 MG: 25 CAPSULE ORAL at 12:05

## 2025-05-10 RX ADMIN — INSULIN GLARGINE 15 UNITS: 100 INJECTION, SOLUTION SUBCUTANEOUS at 09:05

## 2025-05-10 RX ADMIN — OXYCODONE 10 MG: 5 TABLET ORAL at 10:05

## 2025-05-10 RX ADMIN — INSULIN ASPART 5 UNITS: 100 INJECTION, SOLUTION INTRAVENOUS; SUBCUTANEOUS at 12:05

## 2025-05-10 RX ADMIN — INSULIN ASPART 3 UNITS: 100 INJECTION, SOLUTION INTRAVENOUS; SUBCUTANEOUS at 08:05

## 2025-05-10 RX ADMIN — MUPIROCIN: 20 OINTMENT TOPICAL at 10:05

## 2025-05-10 RX ADMIN — CEFEPIME 1 G: 2 INJECTION, POWDER, FOR SOLUTION INTRAVENOUS at 10:05

## 2025-05-10 RX ADMIN — INSULIN ASPART 5 UNITS: 100 INJECTION, SOLUTION INTRAVENOUS; SUBCUTANEOUS at 04:05

## 2025-05-10 RX ADMIN — CEFEPIME 1 G: 2 INJECTION, POWDER, FOR SOLUTION INTRAVENOUS at 02:05

## 2025-05-10 RX ADMIN — ATORVASTATIN CALCIUM 80 MG: 40 TABLET, FILM COATED ORAL at 10:05

## 2025-05-10 RX ADMIN — DIPHENHYDRAMINE HYDROCHLORIDE 25 MG: 25 CAPSULE ORAL at 06:05

## 2025-05-10 RX ADMIN — OXYCODONE 10 MG: 5 TABLET ORAL at 05:05

## 2025-05-10 RX ADMIN — MUPIROCIN: 20 OINTMENT TOPICAL at 08:05

## 2025-05-10 RX ADMIN — POLYETHYLENE GLYCOL 3350 17 G: 17 POWDER, FOR SOLUTION ORAL at 08:05

## 2025-05-10 RX ADMIN — ASPIRIN 81 MG: 81 TABLET, COATED ORAL at 08:05

## 2025-05-10 RX ADMIN — HYDROMORPHONE HYDROCHLORIDE 0.5 MG: 1 INJECTION, SOLUTION INTRAMUSCULAR; INTRAVENOUS; SUBCUTANEOUS at 08:05

## 2025-05-10 RX ADMIN — DIPHENHYDRAMINE HYDROCHLORIDE 25 MG: 25 CAPSULE ORAL at 10:05

## 2025-05-10 RX ADMIN — NICOTINE 1 PATCH: 14 PATCH TRANSDERMAL at 08:05

## 2025-05-10 RX ADMIN — VANCOMYCIN HYDROCHLORIDE 750 MG: 750 INJECTION, POWDER, LYOPHILIZED, FOR SOLUTION INTRAVENOUS at 04:05

## 2025-05-10 RX ADMIN — INSULIN ASPART 3 UNITS: 100 INJECTION, SOLUTION INTRAVENOUS; SUBCUTANEOUS at 12:05

## 2025-05-10 RX ADMIN — THERA TABS 1 TABLET: TAB at 08:05

## 2025-05-10 RX ADMIN — OXYCODONE 10 MG: 5 TABLET ORAL at 12:05

## 2025-05-10 RX ADMIN — CEFEPIME 1 G: 2 INJECTION, POWDER, FOR SOLUTION INTRAVENOUS at 08:05

## 2025-05-10 RX ADMIN — INSULIN ASPART 5 UNITS: 100 INJECTION, SOLUTION INTRAVENOUS; SUBCUTANEOUS at 08:05

## 2025-05-10 RX ADMIN — OXYCODONE 10 MG: 5 TABLET ORAL at 01:05

## 2025-05-10 NOTE — PROGRESS NOTES
Vancomycin Consult Follow-Up:  Patient reviewed, renal function stable, cultures reviewed, no new levels, continue current therapy; Next levels due: 5/11 @ 16:00

## 2025-05-10 NOTE — NURSING
[facility]  Nurses Note -- 4 Eyes      5/10/2025       Skin assessed on: Q Shift      [] No Pressure Injuries Present    [x]Prevention Measures Documented    [x] Yes LDA  for Pressure Injury Previously documented     [] Yes New Pressure Injury Discovered   [] LDA for New Pressure Injury Added      Attending RN:  Tyler Taylor LPN     Second RN:  JUAN Monzon

## 2025-05-10 NOTE — ASSESSMENT & PLAN NOTE
Chronic, pressure ulcers.  Most recently debrided by wound care on 4/9.  Various stages of healing currently; R hip ulcer is purulent.  WBC, ESR, CRP, Lactate all elevated, although pt not febrile.  Wet-to-dry dressing applied and Vanc given in ED.    Admit to IP  Wound care consulted  Concern with worsening right hip wound and tunneling noted on CT -- and wound care  Plan for MRI to further assess/rule out bone involvement  Pain regimen in place, particularly since pt reports ongoing pain on transfers/turns/movement  Turn q2h  Wound care ordered

## 2025-05-10 NOTE — SUBJECTIVE & OBJECTIVE
Interval History:discussed with patient and daughter about right hip wound with purulent drainage and concerns for infection and possible bone involvement. Discussed plans for MRI.     Review of Systems  Objective:     Vital Signs (Most Recent):  Temp: 98.6 °F (37 °C) (05/10/25 1044)  Pulse: 99 (05/10/25 1045)  Resp: 18 (05/10/25 1315)  BP: 111/76 (05/10/25 1044)  SpO2: 97 % (05/10/25 1044) Vital Signs (24h Range):  Temp:  [97.4 °F (36.3 °C)-98.8 °F (37.1 °C)] 98.6 °F (37 °C)  Pulse:  [] 99  Resp:  [18] 18  SpO2:  [96 %-99 %] 97 %  BP: (103-133)/(64-86) 111/76     Weight: 57.2 kg (126 lb)  Body mass index is 22.32 kg/m².    Intake/Output Summary (Last 24 hours) at 5/10/2025 1447  Last data filed at 5/10/2025 1218  Gross per 24 hour   Intake 860 ml   Output 900 ml   Net -40 ml         Physical Exam  Vitals and nursing note reviewed.   Constitutional:       General: She is not in acute distress.     Appearance: Normal appearance. She is ill-appearing.   HENT:      Head: Normocephalic and atraumatic.   Cardiovascular:      Rate and Rhythm: Normal rate and regular rhythm.      Pulses: Normal pulses.      Heart sounds: No murmur heard.  Pulmonary:      Effort: Pulmonary effort is normal. No respiratory distress.      Breath sounds: Normal breath sounds.   Abdominal:      General: Bowel sounds are normal. There is no distension.      Palpations: Abdomen is soft.      Comments: G-tube in place   Musculoskeletal:      Comments: Multiple ulcers LR extremity, right hip with wound    Skin:     General: Skin is warm and dry.   Neurological:      Mental Status: She is alert. Mental status is at baseline.   Psychiatric:         Mood and Affect: Mood normal.               Significant Labs: All pertinent labs within the past 24 hours have been reviewed.    Significant Imaging: I have reviewed all pertinent imaging results/findings within the past 24 hours.

## 2025-05-10 NOTE — NURSING
[facility] [place]  Nurses Note -- 4 Eyes      5/10/2025       Skin assessed on: Q Shift      [] No Pressure Injuries Present    [x]Prevention Measures Documented    [x] Yes LDA  for Pressure Injury Previously documented     [] Yes New Pressure Injury Discovered   [] LDA for New Pressure Injury Added      Attending RN:  Na Durand, RN     Second RN:  Dayan García RN

## 2025-05-10 NOTE — HOSPITAL COURSE
Ms. Martinez is a 59 yo F admitted with hyperglycemia and multiple pressure wounds. Started on IV antibiotics and given IVF, started on insulin. Wound care consulted. Concern for tunneling of right hip wound. CT with no bone involvement however concerning due to tunneling. MRI ordered showing large lateral soft tissue ulceration with suspected early osteomyelitis of the greater trochanter.  ID consulted.  Deep wound culture obtained.  Growing ESBL E coli.  On Meropenem along with Vancomycin.  Episodes of vomiting and tube feeds held as she does have some oral intake.  ID recommending Meropenem until 5/21.  Continued wound care.    Has completed Abx course. Unclear if can get home health wound care with medicaid, given previous stroke deficits- perhaps, will discuss with CM. ?home hospice.

## 2025-05-10 NOTE — ASSESSMENT & PLAN NOTE
Last A1c reviewed-   Lab Results   Component Value Date    LABA1C 13.0 (H) 04/13/2016    HGBA1C 8.1 (H) 05/07/2025     Home antihyperglycemic regimen: lispro-protamin 10U daily    Recent Labs     05/09/25  0804 05/09/25  1138 05/09/25  1623 05/09/25  1921 05/10/25  0735 05/10/25  1046   POCTGLUCOSE 168* 174* 148* 98 258* 294*     Most recent inpatient antihyperglycemic regimen:   Antihyperglycemics (From admission, onward)      Start     Stop Route Frequency Ordered    05/10/25 0915  insulin glargine U-100 (Lantus) pen 15 Units         -- SubQ Daily 05/10/25 0908    05/08/25 0715  insulin aspart U-100 pen 5 Units         -- SubQ 3 times daily with meals 05/07/25 2028 05/07/25 2127  insulin aspart U-100 pen 0-5 Units         -- SubQ Before meals & nightly PRN 05/07/25 2028          Unclear if needs more insulin, daughter reports patient is on Glucerna tube feeds at home.  She does report patient has been compliant with her insulin.  A1c 8.5. Needs increased insulin regimen at discharge

## 2025-05-10 NOTE — PLAN OF CARE
Problem: Adult Inpatient Plan of Care  Goal: Plan of Care Review  Outcome: Progressing  Goal: Patient-Specific Goal (Individualized)  Outcome: Progressing     Problem: Diabetes Comorbidity  Goal: Blood Glucose Level Within Targeted Range  Outcome: Progressing     Problem: Acute Kidney Injury/Impairment  Goal: Fluid and Electrolyte Balance  Outcome: Progressing

## 2025-05-10 NOTE — ASSESSMENT & PLAN NOTE
Nutrition consulted. Most recent weight and BMI monitored-     Measurements:  Wt Readings from Last 1 Encounters:   05/09/25 57.2 kg (126 lb)   Body mass index is 22.32 kg/m².    Patient has been screened and assessed by RD.    Malnutrition Type:  Context: chronic illness  Level: moderate    Malnutrition Characteristic Summary:  Weight Loss (Malnutrition): 20% in 1 year    Interventions/Recommendations (treatment strategy):  1. Enteral Nutrition Management  2. Collaboration by nutrition professional with other providers

## 2025-05-10 NOTE — ASSESSMENT & PLAN NOTE
Patient's blood pressure range in the last 24 hours was: BP  Min: 103/70  Max: 133/86.The patient's inpatient anti-hypertensive regimen is listed below:  Current Antihypertensives       Plan  - BP is controlled, no changes needed to their regimen

## 2025-05-10 NOTE — PLAN OF CARE
Problem: Infection  Goal: Absence of Infection Signs and Symptoms  Outcome: Progressing     Problem: Adult Inpatient Plan of Care  Goal: Plan of Care Review  Outcome: Progressing  Goal: Absence of Hospital-Acquired Illness or Injury  Outcome: Progressing  Goal: Readiness for Transition of Care  Outcome: Progressing     Problem: Diabetes Comorbidity  Goal: Blood Glucose Level Within Targeted Range  Outcome: Progressing     Problem: Acute Kidney Injury/Impairment  Goal: Fluid and Electrolyte Balance  Outcome: Progressing     Problem: Wound  Goal: Optimal Coping  Outcome: Progressing     Problem: Fall Injury Risk  Goal: Absence of Fall and Fall-Related Injury  Outcome: Progressing     Problem: Pain Acute  Goal: Optimal Pain Control and Function  Outcome: Progressing

## 2025-05-10 NOTE — ASSESSMENT & PLAN NOTE
Patient with Chronic debility due to hemiplegia/hemiparesis. The patient's latest AMPAC (Activity Measure for Post Acute Care) Score is listed below.    AM-PAC Score - How much help does the patient need for each activity listed  Basic Mobility Total Score: 8  Turning over in bed (including adjusting bedclothes, sheets and blankets)?: A lot  Sitting down on and standing up from a chair with arms (e.g., wheelchair, bedside commode, etc.): Unable  Moving from lying on back to sitting on the side of the bed?: A lot  Moving to and from a bed to a chair (including a wheelchair)?: Unable  Need to walk in hospital room?: Unable  Climbing 3-5 steps with a railing?: Unable    Plan  - Progressive mobility protocol initated  - Fall precautions in place    Daughter is requesting PT/OT

## 2025-05-10 NOTE — PROGRESS NOTES
"[facility]  Progress Note    Patient Name: Emily Martinez  MRN: [id number]  Patient Class: IP- Inpatient   Admission Date: 5/7/2025  Length of Stay: 3 days  Attending Physician: Rahul Isaacs MD  Primary Care Provider: Alireza Sosa MD        Subjective     Principal Problem:Multiple wounds of skin        HPI:  Emily Martinez is a 60 y.o. female with DM1, HTN, HLD, and Previous CVA with residual deficits who was BIBA to [facility] ED for eval and treatment of acute generalized abdominal pain for the last 2x days. Per EMS, pt continued to experience persistent abdominal pain this morning accompanied by 4 episodes of emesis prompting her relatives to check her blood sugar at home. EMS notes that family received a reading of "critical high" and subsequently gave pt her insulin.   EMS reports receiving a reading greater than 500 upon their arrival.  She is chronically bed-bound, lives with her daughter, and has dementia (but is currently at her baseline per what daughter told EMS).  Further history therefore limited.  Has multiple chronic pressure ulcers on her sacrum and legs, presented to [facility] ED on April 10 with very similar circumstances as now, but was DC'ed home from the ED after a round of pain meds.    ED course: POCT  on arrival.  VSS WNL.  Exam with multiple pressure ulcers in various stages of healing; R hip wound is purulent.  Oriented to place and self.  Labs WBC 14.75 Hgb 9.3 Plt 677.  ESR CRP Lactate all elevated.  K 3.1.  Imaging: CT reads as wound to the right lateral aspect of her upper thigh appears worse when compared to previous imaging.  ED treatments: Vanc, mupirocin, wet-to-dry dressing, KCl.  They were admitted to [facility] for further evaluation and treatment.        Overview/Hospital Course:  Ms. Martinez is a 59 yo F admitted with hyperglycemia and multiple pressure wounds. Started on IV antibiotics and given IVF, started on " insulin. Wound care consulted. Concern for tunneling of right hip wound. CT with no bone involvement however concerning due to tunneling. MRI ordered. Discussed with daughter.    Interval History:discussed with patient and daughter about right hip wound with purulent drainage and concerns for infection and possible bone involvement. Discussed plans for MRI.     Review of Systems  Objective:     Vital Signs (Most Recent):  Temp: 98.6 °F (37 °C) (05/10/25 1044)  Pulse: 99 (05/10/25 1045)  Resp: 18 (05/10/25 1315)  BP: 111/76 (05/10/25 1044)  SpO2: 97 % (05/10/25 1044) Vital Signs (24h Range):  Temp:  [97.4 °F (36.3 °C)-98.8 °F (37.1 °C)] 98.6 °F (37 °C)  Pulse:  [] 99  Resp:  [18] 18  SpO2:  [96 %-99 %] 97 %  BP: (103-133)/(64-86) 111/76     Weight: 57.2 kg (126 lb)  Body mass index is 22.32 kg/m².    Intake/Output Summary (Last 24 hours) at 5/10/2025 1447  Last data filed at 5/10/2025 1218  Gross per 24 hour   Intake 860 ml   Output 900 ml   Net -40 ml         Physical Exam  Vitals and nursing note reviewed.   Constitutional:       General: She is not in acute distress.     Appearance: Normal appearance. She is ill-appearing.   HENT:      Head: Normocephalic and atraumatic.   Cardiovascular:      Rate and Rhythm: Normal rate and regular rhythm.      Pulses: Normal pulses.      Heart sounds: No murmur heard.  Pulmonary:      Effort: Pulmonary effort is normal. No respiratory distress.      Breath sounds: Normal breath sounds.   Abdominal:      General: Bowel sounds are normal. There is no distension.      Palpations: Abdomen is soft.      Comments: G-tube in place   Musculoskeletal:      Comments: Multiple ulcers LR extremity, right hip with wound    Skin:     General: Skin is warm and dry.   Neurological:      Mental Status: She is alert. Mental status is at baseline.   Psychiatric:         Mood and Affect: Mood normal.               Significant Labs: All pertinent labs within the past 24 hours have been  reviewed.    Significant Imaging: I have reviewed all pertinent imaging results/findings within the past 24 hours.      Assessment & Plan  Multiple wounds of skin  Chronic, pressure ulcers.  Most recently debrided by wound care on 4/9.  Various stages of healing currently; R hip ulcer is purulent.  WBC, ESR, CRP, Lactate all elevated, although pt not febrile.  Wet-to-dry dressing applied and Vanc given in ED.    Admit to IP  Wound care consulted  Concern with worsening right hip wound and tunneling noted on CT -- and wound care  Plan for MRI to further assess/rule out bone involvement  Pain regimen in place, particularly since pt reports ongoing pain on transfers/turns/movement  Turn q2h  Wound care ordered  Ulcer of sacral region, stage 2      Type 2 diabetes mellitus with stage 4 chronic kidney disease, with long-term current use of insulin  Last A1c reviewed-   Lab Results   Component Value Date    LABA1C 13.0 (H) 04/13/2016    HGBA1C 8.1 (H) 05/07/2025     Home antihyperglycemic regimen: lispro-protamin 10U daily    Recent Labs     05/09/25  0804 05/09/25  1138 05/09/25  1623 05/09/25  1921 05/10/25  0735 05/10/25  1046   POCTGLUCOSE 168* 174* 148* 98 258* 294*     Most recent inpatient antihyperglycemic regimen:   Antihyperglycemics (From admission, onward)      Start     Stop Route Frequency Ordered    05/10/25 0915  insulin glargine U-100 (Lantus) pen 15 Units         -- SubQ Daily 05/10/25 0908    05/08/25 0715  insulin aspart U-100 pen 5 Units         -- SubQ 3 times daily with meals 05/07/25 2028 05/07/25 2127  insulin aspart U-100 pen 0-5 Units         -- SubQ Before meals & nightly PRN 05/07/25 2028          Unclear if needs more insulin, daughter reports patient is on Glucerna tube feeds at home.  She does report patient has been compliant with her insulin.  A1c 8.5. Needs increased insulin regimen at discharge  Essential hypertension  Patient's blood pressure range in the last 24 hours was: BP  Min:  103/70  Max: 133/86.The patient's inpatient anti-hypertensive regimen is listed below:  Current Antihypertensives       Plan  - BP is controlled, no changes needed to their regimen    Mixed hyperlipidemia  Stable.  Continue home statin  History of stroke with residual effects  As above    Debility  Patient with Chronic debility due to hemiplegia/hemiparesis. The patient's latest AMPAC (Activity Measure for Post Acute Care) Score is listed below.    AM-PAC Score - How much help does the patient need for each activity listed  Basic Mobility Total Score: 8  Turning over in bed (including adjusting bedclothes, sheets and blankets)?: A lot  Sitting down on and standing up from a chair with arms (e.g., wheelchair, bedside commode, etc.): Unable  Moving from lying on back to sitting on the side of the bed?: A lot  Moving to and from a bed to a chair (including a wheelchair)?: Unable  Need to walk in hospital room?: Unable  Climbing 3-5 steps with a railing?: Unable    Plan  - Progressive mobility protocol initated  - Fall precautions in place    Daughter is requesting PT/OT      Tobacco dependency  Dangers of cigarette smoking were reviewed with patient in detail. Patient was Referred to Tobacco Cessation Program. Nicotine replacement options were discussed. Nicotine replacement was discussed- prescribed  Moderate malnutrition  Nutrition consulted. Most recent weight and BMI monitored-     Measurements:  Wt Readings from Last 1 Encounters:   05/09/25 57.2 kg (126 lb)   Body mass index is 22.32 kg/m².    Patient has been screened and assessed by RD.    Malnutrition Type:  Context: chronic illness  Level: moderate    Malnutrition Characteristic Summary:  Weight Loss (Malnutrition): 20% in 1 year    Interventions/Recommendations (treatment strategy):  1. Enteral Nutrition Management  2. Collaboration by nutrition professional with other providers    Pressure injury of right hip, stage 4      VTE Risk Mitigation (From  admission, onward)      None            Discharge Planning   ANA: 5/9/2025     Code Status: Full Code   Medical Readiness for Discharge Date:   Discharge Plan A: Home                        Rahul Isaacs MD  Department of Hospital Medicine   [facility]

## 2025-05-11 PROBLEM — D50.9 MICROCYTIC ANEMIA: Status: ACTIVE | Noted: 2025-05-11

## 2025-05-11 LAB
ABSOLUTE EOSINOPHIL (OHS): 0.16 K/UL
ABSOLUTE MONOCYTE (OHS): 1.63 K/UL (ref 0.3–1)
ABSOLUTE NEUTROPHIL COUNT (OHS): 10.22 K/UL (ref 1.8–7.7)
ALBUMIN SERPL BCP-MCNC: 1.6 G/DL (ref 3.5–5.2)
ALP SERPL-CCNC: 86 UNIT/L (ref 40–150)
ALT SERPL W/O P-5'-P-CCNC: <5 UNIT/L (ref 10–44)
ANION GAP (OHS): 9 MMOL/L (ref 8–16)
AST SERPL-CCNC: 15 UNIT/L (ref 11–45)
BASOPHILS # BLD AUTO: 0.03 K/UL
BASOPHILS NFR BLD AUTO: 0.2 %
BILIRUB SERPL-MCNC: 0.3 MG/DL (ref 0.1–1)
BUN SERPL-MCNC: 18 MG/DL (ref 8–23)
CALCIUM SERPL-MCNC: 9 MG/DL (ref 8.7–10.5)
CHLORIDE SERPL-SCNC: 106 MMOL/L (ref 95–110)
CO2 SERPL-SCNC: 26 MMOL/L (ref 23–29)
CREAT SERPL-MCNC: 0.8 MG/DL (ref 0.5–1.4)
ERYTHROCYTE [DISTWIDTH] IN BLOOD BY AUTOMATED COUNT: 19.4 % (ref 11.5–14.5)
FERRITIN SERPL-MCNC: 278.2 NG/ML (ref 20–300)
GFR SERPLBLD CREATININE-BSD FMLA CKD-EPI: >60 ML/MIN/1.73/M2
GLUCOSE SERPL-MCNC: 205 MG/DL (ref 70–110)
HCT VFR BLD AUTO: 22.3 % (ref 37–48.5)
HGB BLD-MCNC: 6.8 GM/DL (ref 12–16)
HGB BLD-MCNC: 7.1 GM/DL (ref 12–16)
IMM GRANULOCYTES # BLD AUTO: 0.07 K/UL (ref 0–0.04)
IMM GRANULOCYTES NFR BLD AUTO: 0.5 % (ref 0–0.5)
INDIRECT COOMBS: NORMAL
IRON SATN MFR SERPL: ABNORMAL %
IRON SERPL-MCNC: <10 UG/DL (ref 30–160)
LACTATE SERPL-SCNC: 1 MMOL/L (ref 0.5–2.2)
LYMPHOCYTES # BLD AUTO: 1.82 K/UL (ref 1–4.8)
MCH RBC QN AUTO: 22.5 PG (ref 27–31)
MCHC RBC AUTO-ENTMCNC: 30.5 G/DL (ref 32–36)
MCV RBC AUTO: 74 FL (ref 82–98)
NUCLEATED RBC (/100WBC) (OHS): 0 /100 WBC
PLATELET # BLD AUTO: 538 K/UL (ref 150–450)
PMV BLD AUTO: 8.9 FL (ref 9.2–12.9)
POCT GLUCOSE: 145 MG/DL (ref 70–110)
POCT GLUCOSE: 154 MG/DL (ref 70–110)
POCT GLUCOSE: 203 MG/DL (ref 70–110)
POCT GLUCOSE: 328 MG/DL (ref 70–110)
POTASSIUM SERPL-SCNC: 4.6 MMOL/L (ref 3.5–5.1)
PROT SERPL-MCNC: 6.1 GM/DL (ref 6–8.4)
RBC # BLD AUTO: 3.02 M/UL (ref 4–5.4)
RELATIVE EOSINOPHIL (OHS): 1.1 %
RELATIVE LYMPHOCYTE (OHS): 13.1 % (ref 18–48)
RELATIVE MONOCYTE (OHS): 11.7 % (ref 4–15)
RELATIVE NEUTROPHIL (OHS): 73.4 % (ref 38–73)
RH BLD: NORMAL
SODIUM SERPL-SCNC: 141 MMOL/L (ref 136–145)
SPECIMEN OUTDATE: NORMAL
TIBC SERPL-MCNC: 213 UG/DL (ref 250–450)
TRANSFERRIN SERPL-MCNC: 144 MG/DL (ref 200–375)
VANCOMYCIN TROUGH SERPL-MCNC: 12.4 UG/ML (ref 10–22)
WBC # BLD AUTO: 13.93 K/UL (ref 3.9–12.7)

## 2025-05-11 PROCEDURE — 82728 ASSAY OF FERRITIN: CPT | Performed by: STUDENT IN AN ORGANIZED HEALTH CARE EDUCATION/TRAINING PROGRAM

## 2025-05-11 PROCEDURE — 86901 BLOOD TYPING SEROLOGIC RH(D): CPT | Performed by: STUDENT IN AN ORGANIZED HEALTH CARE EDUCATION/TRAINING PROGRAM

## 2025-05-11 PROCEDURE — 80202 ASSAY OF VANCOMYCIN: CPT | Performed by: STUDENT IN AN ORGANIZED HEALTH CARE EDUCATION/TRAINING PROGRAM

## 2025-05-11 PROCEDURE — 36415 COLL VENOUS BLD VENIPUNCTURE: CPT | Performed by: STUDENT IN AN ORGANIZED HEALTH CARE EDUCATION/TRAINING PROGRAM

## 2025-05-11 PROCEDURE — 85025 COMPLETE CBC W/AUTO DIFF WBC: CPT | Performed by: STUDENT IN AN ORGANIZED HEALTH CARE EDUCATION/TRAINING PROGRAM

## 2025-05-11 PROCEDURE — 83605 ASSAY OF LACTIC ACID: CPT | Performed by: STUDENT IN AN ORGANIZED HEALTH CARE EDUCATION/TRAINING PROGRAM

## 2025-05-11 PROCEDURE — 83540 ASSAY OF IRON: CPT | Performed by: STUDENT IN AN ORGANIZED HEALTH CARE EDUCATION/TRAINING PROGRAM

## 2025-05-11 PROCEDURE — 63600175 PHARM REV CODE 636 W HCPCS: Performed by: STUDENT IN AN ORGANIZED HEALTH CARE EDUCATION/TRAINING PROGRAM

## 2025-05-11 PROCEDURE — 25000003 PHARM REV CODE 250: Performed by: STUDENT IN AN ORGANIZED HEALTH CARE EDUCATION/TRAINING PROGRAM

## 2025-05-11 PROCEDURE — 21400001 HC TELEMETRY ROOM

## 2025-05-11 PROCEDURE — S4991 NICOTINE PATCH NONLEGEND: HCPCS

## 2025-05-11 PROCEDURE — 25000003 PHARM REV CODE 250

## 2025-05-11 PROCEDURE — 82310 ASSAY OF CALCIUM: CPT | Performed by: STUDENT IN AN ORGANIZED HEALTH CARE EDUCATION/TRAINING PROGRAM

## 2025-05-11 PROCEDURE — 25000003 PHARM REV CODE 250: Performed by: INTERNAL MEDICINE

## 2025-05-11 PROCEDURE — 85018 HEMOGLOBIN: CPT | Performed by: STUDENT IN AN ORGANIZED HEALTH CARE EDUCATION/TRAINING PROGRAM

## 2025-05-11 RX ORDER — LORAZEPAM 2 MG/ML
2 INJECTION INTRAMUSCULAR ONCE
Status: COMPLETED | OUTPATIENT
Start: 2025-05-11 | End: 2025-05-13

## 2025-05-11 RX ORDER — FAMOTIDINE 40 MG/5ML
20 POWDER, FOR SUSPENSION ORAL 2 TIMES DAILY
Status: DISCONTINUED | OUTPATIENT
Start: 2025-05-11 | End: 2025-05-15

## 2025-05-11 RX ORDER — MICONAZOLE NITRATE 2 G/100G
POWDER TOPICAL 2 TIMES DAILY
Status: DISCONTINUED | OUTPATIENT
Start: 2025-05-11 | End: 2025-05-22 | Stop reason: HOSPADM

## 2025-05-11 RX ORDER — INSULIN GLARGINE 100 [IU]/ML
5 INJECTION, SOLUTION SUBCUTANEOUS NIGHTLY
Status: DISCONTINUED | OUTPATIENT
Start: 2025-05-11 | End: 2025-05-12

## 2025-05-11 RX ADMIN — INSULIN ASPART 5 UNITS: 100 INJECTION, SOLUTION INTRAVENOUS; SUBCUTANEOUS at 08:05

## 2025-05-11 RX ADMIN — FAMOTIDINE 20 MG: 40 POWDER, FOR SUSPENSION ORAL at 10:05

## 2025-05-11 RX ADMIN — MICONAZOLE NITRATE: 20 POWDER TOPICAL at 10:05

## 2025-05-11 RX ADMIN — CEFEPIME 1 G: 2 INJECTION, POWDER, FOR SOLUTION INTRAVENOUS at 07:05

## 2025-05-11 RX ADMIN — INSULIN ASPART 5 UNITS: 100 INJECTION, SOLUTION INTRAVENOUS; SUBCUTANEOUS at 12:05

## 2025-05-11 RX ADMIN — CEFEPIME 1 G: 2 INJECTION, POWDER, FOR SOLUTION INTRAVENOUS at 10:05

## 2025-05-11 RX ADMIN — INSULIN GLARGINE 15 UNITS: 100 INJECTION, SOLUTION SUBCUTANEOUS at 08:05

## 2025-05-11 RX ADMIN — CEFEPIME 1 G: 2 INJECTION, POWDER, FOR SOLUTION INTRAVENOUS at 02:05

## 2025-05-11 RX ADMIN — FAMOTIDINE 20 MG: 40 POWDER, FOR SUSPENSION ORAL at 08:05

## 2025-05-11 RX ADMIN — NICOTINE 1 PATCH: 14 PATCH TRANSDERMAL at 08:05

## 2025-05-11 RX ADMIN — THERA TABS 1 TABLET: TAB at 08:05

## 2025-05-11 RX ADMIN — ASPIRIN 81 MG: 81 TABLET, COATED ORAL at 08:05

## 2025-05-11 RX ADMIN — INSULIN GLARGINE 5 UNITS: 100 INJECTION, SOLUTION SUBCUTANEOUS at 10:05

## 2025-05-11 RX ADMIN — INSULIN ASPART 2 UNITS: 100 INJECTION, SOLUTION INTRAVENOUS; SUBCUTANEOUS at 12:05

## 2025-05-11 RX ADMIN — INSULIN ASPART 4 UNITS: 100 INJECTION, SOLUTION INTRAVENOUS; SUBCUTANEOUS at 08:05

## 2025-05-11 RX ADMIN — INSULIN ASPART 5 UNITS: 100 INJECTION, SOLUTION INTRAVENOUS; SUBCUTANEOUS at 04:05

## 2025-05-11 RX ADMIN — POLYETHYLENE GLYCOL 3350 17 G: 17 POWDER, FOR SOLUTION ORAL at 08:05

## 2025-05-11 RX ADMIN — MUPIROCIN: 20 OINTMENT TOPICAL at 08:05

## 2025-05-11 RX ADMIN — MUPIROCIN: 20 OINTMENT TOPICAL at 10:05

## 2025-05-11 RX ADMIN — OXYCODONE 10 MG: 5 TABLET ORAL at 05:05

## 2025-05-11 RX ADMIN — DIPHENHYDRAMINE HYDROCHLORIDE 25 MG: 25 CAPSULE ORAL at 02:05

## 2025-05-11 RX ADMIN — VANCOMYCIN HYDROCHLORIDE 750 MG: 750 INJECTION, POWDER, LYOPHILIZED, FOR SOLUTION INTRAVENOUS at 04:05

## 2025-05-11 RX ADMIN — ATORVASTATIN CALCIUM 80 MG: 40 TABLET, FILM COATED ORAL at 10:05

## 2025-05-11 NOTE — PROGRESS NOTES
Pharmacokinetic Assessment Follow Up: IV Vancomycin    Vancomycin serum concentration assessment(s):    The trough level was drawn correctly and can be used to guide therapy at this time. The measurement is within the desired definitive target range of 10 to 20 mcg/mL.    Vancomycin Regimen Plan:    Continue regimen to Vancomycin 750mg mg IV every 24 hours with next serum trough concentration measured at 1600 prior to 3rd dose on 5-13-25    Drug levels (last 3 results):  Recent Labs   Lab Result Units 05/09/25  1543 05/11/25  1637   Vancomycin Trough ug/ml 15.7 12.4       Pharmacy will continue to follow and monitor vancomycin.    Please contact pharmacy at extension [phone] for questions regarding this assessment.    Thank you for the consult,   Radha Morales       Patient brief summary:  Emily Martinez is a 60 y.o. female initiated on antimicrobial therapy with IV Vancomycin for treatment of sepsis    The patient's current regimen is Vancomycin 750mg ivpb q24h    Drug Allergies:   Review of patient's allergies indicates:   Allergen Reactions    Sulfa (sulfonamide antibiotics) Itching    Sulfur        Actual Body Weight:   57.2 kg    Renal Function:   Estimated Creatinine Clearance: 60.3 mL/min (based on SCr of 0.8 mg/dL).,     Dialysis Method (if applicable):  N/A    CBC (last 72 hours):  Recent Labs   Lab Result Units 05/09/25  0613 05/10/25  0701 05/11/25  0421 05/11/25  0621   WBC K/uL 8.43 10.24 13.93*  --    HGB gm/dL 8.3* 7.4* 6.8* 7.1*   HCT % 27.1* 24.5* 22.3*  --    Platelet Count K/uL 570* 559* 538*  --    Lymph % % 20.2 15.1* 13.1*  --    Mono % % 13.0 12.9 11.7  --    Eos % % 1.8 1.4 1.1  --    Basophil % % 0.2 0.4 0.2  --        Metabolic Panel (last 72 hours):  Recent Labs   Lab Result Units 05/09/25  0611 05/10/25  0701 05/11/25  0421   Sodium mmol/L 136 139 141   Potassium mmol/L 4.4 4.7 4.6   Chloride mmol/L 105 107 106   CO2 mmol/L 21* 24 26   Glucose mg/dL 143* 219* 205*   BUN mg/dL 17 18 18    Creatinine mg/dL 0.8 0.8 0.8   Albumin g/dL 2.1* 1.7* 1.6*   Bilirubin Total mg/dL 0.4 0.3 0.3   ALP unit/L 98 89 86   AST unit/L 16 10* 15   ALT unit/L 7* 7* <5*       Vancomycin Administrations:  vancomycin given in the last 96 hours                     vancomycin 750 mg in 0.9% NaCl 250 mL IVPB (admixture device) (mg) 750 mg New Bag 05/11/25 1654     750 mg New Bag 05/10/25 1616     750 mg New Bag 05/09/25 1659    vancomycin 750 mg in 0.9% NaCl 250 mL IVPB (admixture device) (mg) 750 mg New Bag 05/08/25 1452                    Microbiologic Results:  Microbiology Results (last 7 days)       Procedure Component Value Units Date/Time    Blood culture #2 **CANNOT BE ORDERED STAT** [[id number]]  (Normal) Collected: 05/07/25 1301    Order Status: Completed Specimen: Blood from Peripheral, Forearm, Right Updated: 05/11/25 1402     Blood Culture No Growth After 96 hours    Blood culture #1 **CANNOT BE ORDERED STAT** [[id number]]  (Normal) Collected: 05/07/25 1301    Order Status: Completed Specimen: Blood from Peripheral, Forearm, Right Updated: 05/11/25 1402     Blood Culture No Growth After 96 hours

## 2025-05-11 NOTE — NURSING
[facility] [place]  Nurses Note -- 4 Eyes      5/11/2025       Skin assessed on: Q Shift      [] No Pressure Injuries Present    [x]Prevention Measures Documented    [x] Yes LDA  for Pressure Injury Previously documented     [] Yes New Pressure Injury Discovered   [] LDA for New Pressure Injury Added      Attending RN:  Tyler Taylor LPN     Second RN:  AKASH Gomes

## 2025-05-11 NOTE — ASSESSMENT & PLAN NOTE
Patient's blood pressure range in the last 24 hours was: BP  Min: 104/72  Max: 131/79.The patient's inpatient anti-hypertensive regimen is listed below:  Current Antihypertensives       Plan  - BP is controlled, no changes needed to their regimen

## 2025-05-11 NOTE — NURSING
Pt laying supine, diagonal alignment. Pt AA&O x4. Pt yelling and screaming out because her arm was itching around IV site. Repositioned and turned pt. PEG tube feeding infusing at 30 mL/hr, pt tolerating well. Hernandez catheter intact and clean, drainage bag holding 100 mL of yellow urine. IV lines flushed and saline locked. LE dressings clean and intact. Pt refused sacral wound care due to pain, will reattempt at later hour. PEG tube placement assessed before administering medication, pt tolerated well. Bed in lowest position, personal items and call light within reach, instructed to call for help if needed.    [facility] [place]  Nurses Note -- 4 Eyes        May 10, 2025        Skin assessed on: Q Shift Change        [] No Pressure Injuries Present                 []Prevention Measures Documented     [x] Yes LDA  for Pressure Injury Previously documented      [] Yes New Pressure Injury Discovered              [] LDA for New Pressure Injury Added        Attending RN:  Eliseo Diane LPN      Second RN:  AKASH Scott

## 2025-05-11 NOTE — ASSESSMENT & PLAN NOTE
Last A1c reviewed-   Lab Results   Component Value Date    LABA1C 13.0 (H) 04/13/2016    HGBA1C 8.1 (H) 05/07/2025     Home antihyperglycemic regimen: lispro-protamin 10U daily    Recent Labs     05/10/25  0735 05/10/25  1046 05/10/25  1628 05/10/25  1923 05/11/25  0806 05/11/25  1201   POCTGLUCOSE 258* 294* 134* 202* 328* 203*     Most recent inpatient antihyperglycemic regimen:   Antihyperglycemics (From admission, onward)      Start     Stop Route Frequency Ordered    05/11/25 2100  insulin glargine U-100 (Lantus) pen 5 Units         -- SubQ Nightly 05/11/25 0621    05/10/25 0915  insulin glargine U-100 (Lantus) pen 15 Units         -- SubQ Daily 05/10/25 0908    05/08/25 0715  insulin aspart U-100 pen 5 Units         -- SubQ 3 times daily with meals 05/07/25 2028 05/07/25 2127  insulin aspart U-100 pen 0-5 Units         -- SubQ Before meals & nightly PRN 05/07/25 2028          Unclear if needs more insulin, daughter reports patient is on Glucerna tube feeds at home.  She does report patient has been compliant with her insulin.  A1c 8.5. Needs increased insulin regimen at discharge

## 2025-05-11 NOTE — ASSESSMENT & PLAN NOTE
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 Most recent hemoglobin and hematocrit are listed below.  Recent Labs     05/09/25  0613 05/10/25  0701 05/11/25  0421 05/11/25  0621   HGB 8.3* 7.4* 6.8* 7.1*   HCT 27.1* 24.5* 22.3*  --      Plan  - Monitor serial CBC: Daily  - Transfuse PRBC if patient becomes hemodynamically unstable, symptomatic or H/H drops below 7/21.  - Patient has not received any PRBC transfusions to date  - Patient's anemia is currently stable  - check iron studies

## 2025-05-11 NOTE — SUBJECTIVE & OBJECTIVE
Interval History:  Still pending MRI.  Patient reports itching everywhere.  She keeps scratching at her PEG tube.    Review of Systems  Objective:     Vital Signs (Most Recent):  Temp: 98.5 °F (36.9 °C) (05/11/25 1204)  Pulse: 102 (05/11/25 1204)  Resp: 18 (05/11/25 1204)  BP: 123/79 (05/11/25 1204)  SpO2: 97 % (05/11/25 1204) Vital Signs (24h Range):  Temp:  [97.4 °F (36.3 °C)-100.3 °F (37.9 °C)] 98.5 °F (36.9 °C)  Pulse:  [] 102  Resp:  [17-18] 18  SpO2:  [94 %-98 %] 97 %  BP: (104-131)/(72-88) 123/79     Weight: 57.2 kg (126 lb)  Body mass index is 22.32 kg/m².    Intake/Output Summary (Last 24 hours) at 5/11/2025 1506  Last data filed at 5/11/2025 1331  Gross per 24 hour   Intake 485 ml   Output 1675 ml   Net -1190 ml         Physical Exam  Vitals and nursing note reviewed.   Constitutional:       General: She is not in acute distress.     Appearance: Normal appearance. She is ill-appearing.   HENT:      Head: Normocephalic and atraumatic.   Cardiovascular:      Rate and Rhythm: Normal rate and regular rhythm.      Pulses: Normal pulses.      Heart sounds: No murmur heard.  Pulmonary:      Effort: Pulmonary effort is normal. No respiratory distress.      Breath sounds: Normal breath sounds.   Abdominal:      General: Bowel sounds are normal. There is no distension.      Palpations: Abdomen is soft.      Comments: G-tube in place   Musculoskeletal:      Comments: Multiple ulcers LR extremity, right hip with wound    Skin:     General: Skin is warm and dry.   Neurological:      Mental Status: She is alert. Mental status is at baseline.   Psychiatric:         Mood and Affect: Mood normal.               Significant Labs: All pertinent labs within the past 24 hours have been reviewed.    Significant Imaging: I have reviewed all pertinent imaging results/findings within the past 24 hours.

## 2025-05-11 NOTE — PROGRESS NOTES
"[facility] Medicine  Progress Note    Patient Name: Emily Martinez  MRN: [id number]  Patient Class: IP- Inpatient   Admission Date: 5/7/2025  Length of Stay: 4 days  Attending Physician: Rahul Isaacs MD  Primary Care Provider: Alireza Sosa MD        Subjective     Principal Problem:Multiple wounds of skin        HPI:  Emily Martienz is a 60 y.o. female with DM1, HTN, HLD, and Previous CVA with residual deficits who was BIBA to [facility] ED for eval and treatment of acute generalized abdominal pain for the last 2x days. Per EMS, pt continued to experience persistent abdominal pain this morning accompanied by 4 episodes of emesis prompting her relatives to check her blood sugar at home. EMS notes that family received a reading of "critical high" and subsequently gave pt her insulin.   EMS reports receiving a reading greater than 500 upon their arrival.  She is chronically bed-bound, lives with her daughter, and has dementia (but is currently at her baseline per what daughter told EMS).  Further history therefore limited.  Has multiple chronic pressure ulcers on her sacrum and legs, presented to [facility] ED on April 10 with very similar circumstances as now, but was DC'ed home from the ED after a round of pain meds.    ED course: POCT  on arrival.  VSS WNL.  Exam with multiple pressure ulcers in various stages of healing; R hip wound is purulent.  Oriented to place and self.  Labs WBC 14.75 Hgb 9.3 Plt 677.  ESR CRP Lactate all elevated.  K 3.1.  Imaging: CT reads as wound to the right lateral aspect of her upper thigh appears worse when compared to previous imaging.  ED treatments: Vanc, mupirocin, wet-to-dry dressing, KCl.  They were admitted to [facility] for further evaluation and treatment.        Overview/Hospital Course:  Ms. Martinez is a 61 yo F admitted with hyperglycemia and multiple pressure wounds. Started on IV antibiotics and given IVF, started on " insulin. Wound care consulted. Concern for tunneling of right hip wound. CT with no bone involvement however concerning due to tunneling. MRI ordered. Discussed with daughter.    Interval History:  Still pending MRI.  Patient reports itching everywhere.  She keeps scratching at her PEG tube.    Review of Systems  Objective:     Vital Signs (Most Recent):  Temp: 98.5 °F (36.9 °C) (05/11/25 1204)  Pulse: 102 (05/11/25 1204)  Resp: 18 (05/11/25 1204)  BP: 123/79 (05/11/25 1204)  SpO2: 97 % (05/11/25 1204) Vital Signs (24h Range):  Temp:  [97.4 °F (36.3 °C)-100.3 °F (37.9 °C)] 98.5 °F (36.9 °C)  Pulse:  [] 102  Resp:  [17-18] 18  SpO2:  [94 %-98 %] 97 %  BP: (104-131)/(72-88) 123/79     Weight: 57.2 kg (126 lb)  Body mass index is 22.32 kg/m².    Intake/Output Summary (Last 24 hours) at 5/11/2025 1506  Last data filed at 5/11/2025 1331  Gross per 24 hour   Intake 485 ml   Output 1675 ml   Net -1190 ml         Physical Exam  Vitals and nursing note reviewed.   Constitutional:       General: She is not in acute distress.     Appearance: Normal appearance. She is ill-appearing.   HENT:      Head: Normocephalic and atraumatic.   Cardiovascular:      Rate and Rhythm: Normal rate and regular rhythm.      Pulses: Normal pulses.      Heart sounds: No murmur heard.  Pulmonary:      Effort: Pulmonary effort is normal. No respiratory distress.      Breath sounds: Normal breath sounds.   Abdominal:      General: Bowel sounds are normal. There is no distension.      Palpations: Abdomen is soft.      Comments: G-tube in place   Musculoskeletal:      Comments: Multiple ulcers LR extremity, right hip with wound    Skin:     General: Skin is warm and dry.   Neurological:      Mental Status: She is alert. Mental status is at baseline.   Psychiatric:         Mood and Affect: Mood normal.               Significant Labs: All pertinent labs within the past 24 hours have been reviewed.    Significant Imaging: I have reviewed all  pertinent imaging results/findings within the past 24 hours.      Assessment & Plan  Multiple wounds of skin  Chronic, pressure ulcers.  Most recently debrided by wound care on 4/9.  Various stages of healing currently; R hip ulcer is purulent.  WBC, ESR, CRP, Lactate all elevated, although pt not febrile.  Wet-to-dry dressing applied and Vanc given in ED.    Admit to IP  Wound care consulted  Concern with worsening right hip wound and tunneling noted on CT -- and wound care  Plan for MRI to further assess/rule out bone involvement - still pending  Pain regimen in place, particularly since pt reports ongoing pain on transfers/turns/movement  Turn q2h  Wound care ordered  Ulcer of sacral region, stage 2      Type 2 diabetes mellitus with stage 4 chronic kidney disease, with long-term current use of insulin  Last A1c reviewed-   Lab Results   Component Value Date    LABA1C 13.0 (H) 04/13/2016    HGBA1C 8.1 (H) 05/07/2025     Home antihyperglycemic regimen: lispro-protamin 10U daily    Recent Labs     05/10/25  0735 05/10/25  1046 05/10/25  1628 05/10/25  1923 05/11/25  0806 05/11/25  1201   POCTGLUCOSE 258* 294* 134* 202* 328* 203*     Most recent inpatient antihyperglycemic regimen:   Antihyperglycemics (From admission, onward)      Start     Stop Route Frequency Ordered    05/11/25 2100  insulin glargine U-100 (Lantus) pen 5 Units         -- SubQ Nightly 05/11/25 0621    05/10/25 0915  insulin glargine U-100 (Lantus) pen 15 Units         -- SubQ Daily 05/10/25 0908    05/08/25 0715  insulin aspart U-100 pen 5 Units         -- SubQ 3 times daily with meals 05/07/25 2028 05/07/25 2127  insulin aspart U-100 pen 0-5 Units         -- SubQ Before meals & nightly PRN 05/07/25 2028          Unclear if needs more insulin, daughter reports patient is on Glucerna tube feeds at home.  She does report patient has been compliant with her insulin.  A1c 8.5. Needs increased insulin regimen at discharge  Essential  hypertension  Patient's blood pressure range in the last 24 hours was: BP  Min: 104/72  Max: 131/79.The patient's inpatient anti-hypertensive regimen is listed below:  Current Antihypertensives       Plan  - BP is controlled, no changes needed to their regimen    Mixed hyperlipidemia  Stable.  Continue home statin  History of stroke with residual effects  As above    Debility  Patient with Chronic debility due to hemiplegia/hemiparesis. The patient's latest AMPAC (Activity Measure for Post Acute Care) Score is listed below.    AM-PAC Score - How much help does the patient need for each activity listed  Basic Mobility Total Score: 8  Turning over in bed (including adjusting bedclothes, sheets and blankets)?: A lot  Sitting down on and standing up from a chair with arms (e.g., wheelchair, bedside commode, etc.): Unable  Moving from lying on back to sitting on the side of the bed?: A lot  Moving to and from a bed to a chair (including a wheelchair)?: Unable  Need to walk in hospital room?: Unable  Climbing 3-5 steps with a railing?: Unable    Plan  - Progressive mobility protocol initated  - Fall precautions in place    Daughter is requesting PT/OT      Tobacco dependency  Dangers of cigarette smoking were reviewed with patient in detail. Patient was Referred to Tobacco Cessation Program. Nicotine replacement options were discussed. Nicotine replacement was discussed- prescribed  Moderate malnutrition  Nutrition consulted. Most recent weight and BMI monitored-     Measurements:  Wt Readings from Last 1 Encounters:   05/09/25 57.2 kg (126 lb)   Body mass index is 22.32 kg/m².    Patient has been screened and assessed by RD.    Malnutrition Type:  Context: chronic illness  Level: moderate    Malnutrition Characteristic Summary:  Weight Loss (Malnutrition): 20% in 1 year    Interventions/Recommendations (treatment strategy):  1. Enteral Nutrition Management  2. Collaboration by nutrition professional with other  providers    Pressure injury of right hip, stage 4      Microcytic anemia  Anemia is likely due to chronic blood loss, Iron deficiency, and chronic disease due to infection. Most recent hemoglobin and hematocrit are listed below.  Recent Labs     05/09/25  0613 05/10/25  0701 05/11/25  0421 05/11/25  0621   HGB 8.3* 7.4* 6.8* 7.1*   HCT 27.1* 24.5* 22.3*  --      Plan  - Monitor serial CBC: Daily  - Transfuse PRBC if patient becomes hemodynamically unstable, symptomatic or H/H drops below 7/21.  - Patient has not received any PRBC transfusions to date  - Patient's anemia is currently stable  - check iron studies  VTE Risk Mitigation (From admission, onward)      None        DVT Px on hold due to drop in Hb    Discharge Planning   ANA: 5/9/2025     Code Status: Full Code   Medical Readiness for Discharge Date:   Discharge Plan A: Home                        Rahul Isaacs MD  Department of Hospital Medicine   [facility] Surg

## 2025-05-11 NOTE — ASSESSMENT & PLAN NOTE
Chronic, pressure ulcers.  Most recently debrided by wound care on 4/9.  Various stages of healing currently; R hip ulcer is purulent.  WBC, ESR, CRP, Lactate all elevated, although pt not febrile.  Wet-to-dry dressing applied and Vanc given in ED.    Admit to IP  Wound care consulted  Concern with worsening right hip wound and tunneling noted on CT -- and wound care  Plan for MRI to further assess/rule out bone involvement - still pending  Pain regimen in place, particularly since pt reports ongoing pain on transfers/turns/movement  Turn q2h  Wound care ordered

## 2025-05-12 LAB
ABSOLUTE EOSINOPHIL (OHS): 0.06 K/UL
ABSOLUTE MONOCYTE (OHS): 1.18 K/UL (ref 0.3–1)
ABSOLUTE NEUTROPHIL COUNT (OHS): 15.63 K/UL (ref 1.8–7.7)
ALBUMIN SERPL BCP-MCNC: 1.5 G/DL (ref 3.5–5.2)
ALP SERPL-CCNC: 92 UNIT/L (ref 40–150)
ALT SERPL W/O P-5'-P-CCNC: 7 UNIT/L (ref 10–44)
ANION GAP (OHS): 7 MMOL/L (ref 8–16)
AST SERPL-CCNC: 18 UNIT/L (ref 11–45)
BACTERIA BLD CULT: NORMAL
BACTERIA BLD CULT: NORMAL
BASOPHILS # BLD AUTO: 0.02 K/UL
BASOPHILS NFR BLD AUTO: 0.1 %
BILIRUB SERPL-MCNC: 0.3 MG/DL (ref 0.1–1)
BUN SERPL-MCNC: 23 MG/DL (ref 8–23)
CALCIUM SERPL-MCNC: 8.8 MG/DL (ref 8.7–10.5)
CHLORIDE SERPL-SCNC: 109 MMOL/L (ref 95–110)
CO2 SERPL-SCNC: 23 MMOL/L (ref 23–29)
CREAT SERPL-MCNC: 0.8 MG/DL (ref 0.5–1.4)
ERYTHROCYTE [DISTWIDTH] IN BLOOD BY AUTOMATED COUNT: 19.6 % (ref 11.5–14.5)
GFR SERPLBLD CREATININE-BSD FMLA CKD-EPI: >60 ML/MIN/1.73/M2
GLUCOSE SERPL-MCNC: 282 MG/DL (ref 70–110)
HCT VFR BLD AUTO: 24.5 % (ref 37–48.5)
HGB BLD-MCNC: 7.5 GM/DL (ref 12–16)
IMM GRANULOCYTES # BLD AUTO: 0.16 K/UL (ref 0–0.04)
IMM GRANULOCYTES NFR BLD AUTO: 0.9 % (ref 0–0.5)
LYMPHOCYTES # BLD AUTO: 1.68 K/UL (ref 1–4.8)
MCH RBC QN AUTO: 22.7 PG (ref 27–31)
MCHC RBC AUTO-ENTMCNC: 30.6 G/DL (ref 32–36)
MCV RBC AUTO: 74 FL (ref 82–98)
NUCLEATED RBC (/100WBC) (OHS): 0 /100 WBC
PLATELET # BLD AUTO: 453 K/UL (ref 150–450)
PMV BLD AUTO: 9.3 FL (ref 9.2–12.9)
POCT GLUCOSE: 149 MG/DL (ref 70–110)
POCT GLUCOSE: 219 MG/DL (ref 70–110)
POCT GLUCOSE: 284 MG/DL (ref 70–110)
POCT GLUCOSE: 351 MG/DL (ref 70–110)
POTASSIUM SERPL-SCNC: 4.8 MMOL/L (ref 3.5–5.1)
PROT SERPL-MCNC: 6.3 GM/DL (ref 6–8.4)
RBC # BLD AUTO: 3.31 M/UL (ref 4–5.4)
RELATIVE EOSINOPHIL (OHS): 0.3 %
RELATIVE LYMPHOCYTE (OHS): 9 % (ref 18–48)
RELATIVE MONOCYTE (OHS): 6.3 % (ref 4–15)
RELATIVE NEUTROPHIL (OHS): 83.4 % (ref 38–73)
SODIUM SERPL-SCNC: 139 MMOL/L (ref 136–145)
WBC # BLD AUTO: 18.73 K/UL (ref 3.9–12.7)

## 2025-05-12 PROCEDURE — 25000003 PHARM REV CODE 250: Performed by: STUDENT IN AN ORGANIZED HEALTH CARE EDUCATION/TRAINING PROGRAM

## 2025-05-12 PROCEDURE — S4991 NICOTINE PATCH NONLEGEND: HCPCS

## 2025-05-12 PROCEDURE — 84075 ASSAY ALKALINE PHOSPHATASE: CPT | Performed by: STUDENT IN AN ORGANIZED HEALTH CARE EDUCATION/TRAINING PROGRAM

## 2025-05-12 PROCEDURE — 25000003 PHARM REV CODE 250

## 2025-05-12 PROCEDURE — 85025 COMPLETE CBC W/AUTO DIFF WBC: CPT | Performed by: STUDENT IN AN ORGANIZED HEALTH CARE EDUCATION/TRAINING PROGRAM

## 2025-05-12 PROCEDURE — 63600175 PHARM REV CODE 636 W HCPCS: Performed by: STUDENT IN AN ORGANIZED HEALTH CARE EDUCATION/TRAINING PROGRAM

## 2025-05-12 PROCEDURE — 21400001 HC TELEMETRY ROOM

## 2025-05-12 PROCEDURE — 36415 COLL VENOUS BLD VENIPUNCTURE: CPT | Performed by: STUDENT IN AN ORGANIZED HEALTH CARE EDUCATION/TRAINING PROGRAM

## 2025-05-12 RX ORDER — LORAZEPAM 2 MG/ML
1 INJECTION INTRAMUSCULAR ONCE AS NEEDED
Status: DISCONTINUED | OUTPATIENT
Start: 2025-05-12 | End: 2025-05-22 | Stop reason: HOSPADM

## 2025-05-12 RX ORDER — INSULIN GLARGINE 100 [IU]/ML
10 INJECTION, SOLUTION SUBCUTANEOUS NIGHTLY
Status: DISCONTINUED | OUTPATIENT
Start: 2025-05-12 | End: 2025-05-15

## 2025-05-12 RX ORDER — INSULIN ASPART 100 [IU]/ML
10 INJECTION, SOLUTION INTRAVENOUS; SUBCUTANEOUS
Status: DISCONTINUED | OUTPATIENT
Start: 2025-05-12 | End: 2025-05-15

## 2025-05-12 RX ADMIN — MICONAZOLE NITRATE: 20 POWDER TOPICAL at 08:05

## 2025-05-12 RX ADMIN — CEFEPIME 1 G: 2 INJECTION, POWDER, FOR SOLUTION INTRAVENOUS at 03:05

## 2025-05-12 RX ADMIN — POLYETHYLENE GLYCOL 3350 17 G: 17 POWDER, FOR SOLUTION ORAL at 08:05

## 2025-05-12 RX ADMIN — THERA TABS 1 TABLET: TAB at 08:05

## 2025-05-12 RX ADMIN — INSULIN ASPART 5 UNITS: 100 INJECTION, SOLUTION INTRAVENOUS; SUBCUTANEOUS at 11:05

## 2025-05-12 RX ADMIN — INSULIN GLARGINE 10 UNITS: 100 INJECTION, SOLUTION SUBCUTANEOUS at 08:05

## 2025-05-12 RX ADMIN — INSULIN ASPART 3 UNITS: 100 INJECTION, SOLUTION INTRAVENOUS; SUBCUTANEOUS at 08:05

## 2025-05-12 RX ADMIN — ASPIRIN 81 MG: 81 TABLET, COATED ORAL at 08:05

## 2025-05-12 RX ADMIN — FAMOTIDINE 20 MG: 40 POWDER, FOR SUSPENSION ORAL at 09:05

## 2025-05-12 RX ADMIN — CEFEPIME 1 G: 2 INJECTION, POWDER, FOR SOLUTION INTRAVENOUS at 06:05

## 2025-05-12 RX ADMIN — VANCOMYCIN HYDROCHLORIDE 750 MG: 750 INJECTION, POWDER, LYOPHILIZED, FOR SOLUTION INTRAVENOUS at 03:05

## 2025-05-12 RX ADMIN — ATORVASTATIN CALCIUM 80 MG: 40 TABLET, FILM COATED ORAL at 08:05

## 2025-05-12 RX ADMIN — MUPIROCIN: 20 OINTMENT TOPICAL at 08:05

## 2025-05-12 RX ADMIN — INSULIN ASPART 5 UNITS: 100 INJECTION, SOLUTION INTRAVENOUS; SUBCUTANEOUS at 08:05

## 2025-05-12 RX ADMIN — INSULIN ASPART 2 UNITS: 100 INJECTION, SOLUTION INTRAVENOUS; SUBCUTANEOUS at 06:05

## 2025-05-12 RX ADMIN — INSULIN ASPART 10 UNITS: 100 INJECTION, SOLUTION INTRAVENOUS; SUBCUTANEOUS at 06:05

## 2025-05-12 RX ADMIN — NICOTINE 1 PATCH: 14 PATCH TRANSDERMAL at 08:05

## 2025-05-12 RX ADMIN — INSULIN GLARGINE 15 UNITS: 100 INJECTION, SOLUTION SUBCUTANEOUS at 08:05

## 2025-05-12 RX ADMIN — OXYCODONE 10 MG: 5 TABLET ORAL at 03:05

## 2025-05-12 NOTE — PLAN OF CARE
11:42 CM attempted to contact patient yudy Cornell [phone] to confirm hospice placement with [facility]. CM left a voice message with name and number for a return call.    11:45 CM attempted to contact yudy Hung [phone] for an update on patient discharge planning. CM left a voice message with name and number for a return call.

## 2025-05-12 NOTE — NURSING
[facility]  Nurses Note -- 4 Eyes  Pt in bed, alert and awake, PEG tube running 40ml/hr, disoriented to time, place and situation. Bed in low position, wheels locked. Side rails up. Call light within reach.    5/11/2025       Skin assessed on: Q Shift      [] No Pressure Injuries Present    []Prevention Measures Documented    [x] Yes LDA  for Pressure Injury Previously documented     [] Yes New Pressure Injury Discovered   [] LDA for New Pressure Injury Added      Attending RN:  Balta Mckeon RN     Second RN:  AKASH Scott

## 2025-05-12 NOTE — PLAN OF CARE
Problem: Infection  Goal: Absence of Infection Signs and Symptoms  Outcome: Progressing     Problem: Adult Inpatient Plan of Care  Goal: Plan of Care Review  Outcome: Progressing  Goal: Patient-Specific Goal (Individualized)  Outcome: Progressing  Goal: Absence of Hospital-Acquired Illness or Injury  Outcome: Progressing  Goal: Optimal Comfort and Wellbeing  Outcome: Progressing  Goal: Readiness for Transition of Care  Outcome: Progressing     Problem: Diabetes Comorbidity  Goal: Blood Glucose Level Within Targeted Range  Outcome: Progressing     Problem: Acute Kidney Injury/Impairment  Goal: Fluid and Electrolyte Balance  Outcome: Progressing  Goal: Improved Oral Intake  Outcome: Progressing  Goal: Effective Renal Function  Outcome: Progressing     Problem: Wound  Goal: Optimal Coping  Outcome: Progressing  Goal: Optimal Functional Ability  Outcome: Progressing  Goal: Absence of Infection Signs and Symptoms  Outcome: Progressing  Goal: Improved Oral Intake  Outcome: Progressing  Goal: Optimal Pain Control and Function  Outcome: Progressing  Goal: Skin Health and Integrity  Outcome: Progressing  Goal: Optimal Wound Healing  Outcome: Progressing     Problem: Skin Injury Risk Increased  Goal: Skin Health and Integrity  Outcome: Progressing     Problem: Fall Injury Risk  Goal: Absence of Fall and Fall-Related Injury  Outcome: Progressing     Problem: Pain Acute  Goal: Optimal Pain Control and Function  Outcome: Progressing

## 2025-05-12 NOTE — ASSESSMENT & PLAN NOTE
Last A1c reviewed-   Lab Results   Component Value Date    LABA1C 13.0 (H) 04/13/2016    HGBA1C 8.1 (H) 05/07/2025     Home antihyperglycemic regimen: lispro-protamin 10U daily    Recent Labs     05/11/25  0806 05/11/25  1201 05/11/25  1616 05/11/25  1949 05/12/25  0702 05/12/25  1128   POCTGLUCOSE 328* 203* 154* 145* 284* 351*     Most recent inpatient antihyperglycemic regimen:   Antihyperglycemics (From admission, onward)      Start     Stop Route Frequency Ordered    05/12/25 2100  insulin glargine U-100 (Lantus) pen 10 Units         -- SubQ Nightly 05/12/25 1146    05/12/25 1645  insulin aspart U-100 pen 10 Units         -- SubQ 3 times daily with meals 05/12/25 1146    05/10/25 0915  insulin glargine U-100 (Lantus) pen 15 Units         -- SubQ Daily 05/10/25 0908    05/07/25 2127  insulin aspart U-100 pen 0-5 Units         -- SubQ Before meals & nightly PRN 05/07/25 2028          Unclear if needs more insulin, daughter reports patient is on Glucerna tube feeds at home.  She does report patient has been compliant with her insulin.  A1c 8.5. Needs increased insulin regimen at discharge

## 2025-05-12 NOTE — SUBJECTIVE & OBJECTIVE
Interval History:  apparently refused MRI last night, attempting again today.     Review of Systems  Objective:     Vital Signs (Most Recent):  Temp: 97.7 °F (36.5 °C) (05/12/25 1130)  Pulse: (!) 116 (05/12/25 1130)  Resp: 20 (05/12/25 1130)  BP: 129/66 (05/12/25 1130)  SpO2: 95 % (05/12/25 1130) Vital Signs (24h Range):  Temp:  [97.7 °F (36.5 °C)-101.4 °F (38.6 °C)] 97.7 °F (36.5 °C)  Pulse:  [] 116  Resp:  [16-20] 20  SpO2:  [95 %-98 %] 95 %  BP: (104-129)/(65-87) 129/66     Weight: 57.2 kg (126 lb)  Body mass index is 22.32 kg/m².    Intake/Output Summary (Last 24 hours) at 5/12/2025 1348  Last data filed at 5/12/2025 1344  Gross per 24 hour   Intake 1040 ml   Output 501 ml   Net 539 ml         Physical Exam  Vitals and nursing note reviewed.   Constitutional:       General: She is not in acute distress.     Appearance: Normal appearance. She is ill-appearing.   HENT:      Head: Normocephalic and atraumatic.   Cardiovascular:      Rate and Rhythm: Normal rate and regular rhythm.      Pulses: Normal pulses.      Heart sounds: No murmur heard.  Pulmonary:      Effort: Pulmonary effort is normal. No respiratory distress.      Breath sounds: Normal breath sounds.   Abdominal:      General: Bowel sounds are normal. There is no distension.      Palpations: Abdomen is soft.      Comments: G-tube in place   Musculoskeletal:      Comments: Multiple ulcers LR extremity, right hip with wound    Skin:     General: Skin is warm and dry.   Neurological:      Mental Status: She is alert. Mental status is at baseline.   Psychiatric:         Mood and Affect: Mood normal.               Significant Labs: All pertinent labs within the past 24 hours have been reviewed.    Significant Imaging: I have reviewed all pertinent imaging results/findings within the past 24 hours.

## 2025-05-12 NOTE — PROGRESS NOTES
"[facility] Medicine  Progress Note    Patient Name: Emily Martinez  MRN: [id number]  Patient Class: IP- Inpatient   Admission Date: 5/7/2025  Length of Stay: 5 days  Attending Physician: Rahul Isaacs MD  Primary Care Provider: Alireza Sosa MD        Subjective     Principal Problem:Multiple wounds of skin        HPI:  Emily Martinez is a 60 y.o. female with DM1, HTN, HLD, and Previous CVA with residual deficits who was BIBA to [facility] ED for eval and treatment of acute generalized abdominal pain for the last 2x days. Per EMS, pt continued to experience persistent abdominal pain this morning accompanied by 4 episodes of emesis prompting her relatives to check her blood sugar at home. EMS notes that family received a reading of "critical high" and subsequently gave pt her insulin.   EMS reports receiving a reading greater than 500 upon their arrival.  She is chronically bed-bound, lives with her daughter, and has dementia (but is currently at her baseline per what daughter told EMS).  Further history therefore limited.  Has multiple chronic pressure ulcers on her sacrum and legs, presented to [facility] ED on April 10 with very similar circumstances as now, but was DC'ed home from the ED after a round of pain meds.    ED course: POCT  on arrival.  VSS WNL.  Exam with multiple pressure ulcers in various stages of healing; R hip wound is purulent.  Oriented to place and self.  Labs WBC 14.75 Hgb 9.3 Plt 677.  ESR CRP Lactate all elevated.  K 3.1.  Imaging: CT reads as wound to the right lateral aspect of her upper thigh appears worse when compared to previous imaging.  ED treatments: Vanc, mupirocin, wet-to-dry dressing, KCl.  They were admitted to [facility] for further evaluation and treatment.        Overview/Hospital Course:  Ms. Martinez is a 59 yo F admitted with hyperglycemia and multiple pressure wounds. Started on IV antibiotics and given IVF, started on " insulin. Wound care consulted. Concern for tunneling of right hip wound. CT with no bone involvement however concerning due to tunneling. MRI ordered. Discussed with daughter.    Interval History:  apparently refused MRI last night, attempting again today.     Review of Systems  Objective:     Vital Signs (Most Recent):  Temp: 97.7 °F (36.5 °C) (05/12/25 1130)  Pulse: (!) 116 (05/12/25 1130)  Resp: 20 (05/12/25 1130)  BP: 129/66 (05/12/25 1130)  SpO2: 95 % (05/12/25 1130) Vital Signs (24h Range):  Temp:  [97.7 °F (36.5 °C)-101.4 °F (38.6 °C)] 97.7 °F (36.5 °C)  Pulse:  [] 116  Resp:  [16-20] 20  SpO2:  [95 %-98 %] 95 %  BP: (104-129)/(65-87) 129/66     Weight: 57.2 kg (126 lb)  Body mass index is 22.32 kg/m².    Intake/Output Summary (Last 24 hours) at 5/12/2025 1348  Last data filed at 5/12/2025 1344  Gross per 24 hour   Intake 1040 ml   Output 501 ml   Net 539 ml         Physical Exam  Vitals and nursing note reviewed.   Constitutional:       General: She is not in acute distress.     Appearance: Normal appearance. She is ill-appearing.   HENT:      Head: Normocephalic and atraumatic.   Cardiovascular:      Rate and Rhythm: Normal rate and regular rhythm.      Pulses: Normal pulses.      Heart sounds: No murmur heard.  Pulmonary:      Effort: Pulmonary effort is normal. No respiratory distress.      Breath sounds: Normal breath sounds.   Abdominal:      General: Bowel sounds are normal. There is no distension.      Palpations: Abdomen is soft.      Comments: G-tube in place   Musculoskeletal:      Comments: Multiple ulcers LR extremity, right hip with wound    Skin:     General: Skin is warm and dry.   Neurological:      Mental Status: She is alert. Mental status is at baseline.   Psychiatric:         Mood and Affect: Mood normal.               Significant Labs: All pertinent labs within the past 24 hours have been reviewed.    Significant Imaging: I have reviewed all pertinent imaging results/findings  within the past 24 hours.      Assessment & Plan  Multiple wounds of skin  Chronic, pressure ulcers.  Most recently debrided by wound care on 4/9.  Various stages of healing currently; R hip ulcer is purulent.  WBC, ESR, CRP, Lactate all elevated, although pt not febrile.  Wet-to-dry dressing applied and Vanc given in ED.    Admit to IP  Wound care consulted  Concern with worsening right hip wound and tunneling noted on CT -- and wound care  Plan for MRI to further assess/rule out bone involvement - still pending  Pain regimen in place, particularly since pt reports ongoing pain on transfers/turns/movement  Turn q2h  Wound care ordered  Ulcer of sacral region, stage 2      Type 2 diabetes mellitus with stage 4 chronic kidney disease, with long-term current use of insulin  Last A1c reviewed-   Lab Results   Component Value Date    LABA1C 13.0 (H) 04/13/2016    HGBA1C 8.1 (H) 05/07/2025     Home antihyperglycemic regimen: lispro-protamin 10U daily    Recent Labs     05/11/25  0806 05/11/25  1201 05/11/25  1616 05/11/25  1949 05/12/25  0702 05/12/25  1128   POCTGLUCOSE 328* 203* 154* 145* 284* 351*     Most recent inpatient antihyperglycemic regimen:   Antihyperglycemics (From admission, onward)      Start     Stop Route Frequency Ordered    05/12/25 2100  insulin glargine U-100 (Lantus) pen 10 Units         -- SubQ Nightly 05/12/25 1146    05/12/25 1645  insulin aspart U-100 pen 10 Units         -- SubQ 3 times daily with meals 05/12/25 1146    05/10/25 0915  insulin glargine U-100 (Lantus) pen 15 Units         -- SubQ Daily 05/10/25 0908    05/07/25 2127  insulin aspart U-100 pen 0-5 Units         -- SubQ Before meals & nightly PRN 05/07/25 2028          Unclear if needs more insulin, daughter reports patient is on Glucerna tube feeds at home.  She does report patient has been compliant with her insulin.  A1c 8.5. Needs increased insulin regimen at discharge  Essential hypertension  Patient's blood pressure range in  the last 24 hours was: BP  Min: 104/65  Max: 129/66.The patient's inpatient anti-hypertensive regimen is listed below:  Current Antihypertensives       Plan  - BP is controlled, no changes needed to their regimen    Mixed hyperlipidemia  Stable.  Continue home statin  History of stroke with residual effects  As above    Debility  Patient with Chronic debility due to hemiplegia/hemiparesis. The patient's latest AMPAC (Activity Measure for Post Acute Care) Score is listed below.    AM-PAC Score - How much help does the patient need for each activity listed  Basic Mobility Total Score: 8  Turning over in bed (including adjusting bedclothes, sheets and blankets)?: A lot  Sitting down on and standing up from a chair with arms (e.g., wheelchair, bedside commode, etc.): Unable  Moving from lying on back to sitting on the side of the bed?: A lot  Moving to and from a bed to a chair (including a wheelchair)?: Unable  Need to walk in hospital room?: Unable  Climbing 3-5 steps with a railing?: Unable    Plan  - Progressive mobility protocol initated  - Fall precautions in place    Daughter is requesting PT/OT      Tobacco dependency  Dangers of cigarette smoking were reviewed with patient in detail. Patient was Referred to Tobacco Cessation Program. Nicotine replacement options were discussed. Nicotine replacement was discussed- prescribed  Moderate malnutrition  Nutrition consulted. Most recent weight and BMI monitored-     Measurements:  Wt Readings from Last 1 Encounters:   05/09/25 57.2 kg (126 lb)   Body mass index is 22.32 kg/m².    Patient has been screened and assessed by RD.    Malnutrition Type:  Context: chronic illness  Level: moderate    Malnutrition Characteristic Summary:  Weight Loss (Malnutrition): 20% in 1 year    Interventions/Recommendations (treatment strategy):  1. Enteral Nutrition Management  2. Collaboration by nutrition professional with other providers    Pressure injury of right hip, stage  4      Microcytic anemia  Anemia is likely due to chronic blood loss, Iron deficiency, and chronic disease due to infection. Most recent hemoglobin and hematocrit are listed below.  Recent Labs     05/10/25  0701 05/11/25  0421 05/11/25  0621 05/12/25  0734   HGB 7.4* 6.8* 7.1* 7.5*   HCT 24.5* 22.3*  --  24.5*     Plan  - Monitor serial CBC: Daily  - Transfuse PRBC if patient becomes hemodynamically unstable, symptomatic or H/H drops below 7/21.  - Patient has not received any PRBC transfusions to date  - Patient's anemia is currently stable  - check iron studies - has mixed picture. Suspect iron def +chronic disease  VTE Risk Mitigation (From admission, onward)      None            Discharge Planning   ANA: 5/9/2025     Code Status: Full Code   Medical Readiness for Discharge Date:   Discharge Plan A: Home                        Rahul Isaacs MD  Department of Hospital Medicine   [facility]

## 2025-05-12 NOTE — PLAN OF CARE
Pt in bed, CHG wipes given, with horton on gravity. PEG tube running 40 ml/hr, dressing change done R hip. Turned and kept heels ward on.     Problem: Adult Inpatient Plan of Care  Goal: Absence of Hospital-Acquired Illness or Injury  Outcome: Progressing  Intervention: Identify and Manage Fall Risk  Flowsheets (Taken 5/12/2025 0439)  Safety Promotion/Fall Prevention:   assistive device/personal item within reach   lighting adjusted   medications reviewed   nonskid shoes/socks when out of bed   room near unit station   side rails raised x 2   bed alarm set  Intervention: Prevent Skin Injury  Flowsheets (Taken 5/12/2025 0439)  Body Position: turned     Problem: Diabetes Comorbidity  Goal: Blood Glucose Level Within Targeted Range  Outcome: Progressing  Intervention: Monitor and Manage Glycemia  Flowsheets (Taken 5/12/2025 0439)  Glycemic Management: blood glucose monitored     Problem: Fall Injury Risk  Goal: Absence of Fall and Fall-Related Injury  Outcome: Progressing

## 2025-05-12 NOTE — PLAN OF CARE
Changes in medical condition or discharge plan:    Patient admitted for multiple wounds of skin. Patient was consulted by wound care on 05/07/25 for multiple wounds of the lower extremity and buttocks. 05/11/25 patient had a temperature patient will be seen by infectious disease for antibiotics recommendations.    Today patient is scheduled for an hip MRI.      11:42 CM attempted to contact patient yudy Cornell [phone] to confirm hospice placement with [facility]. CM left a voice message with name and number for a return call.     11:45 CM attempted to contact daughter Abhilash [phone] for an update on patient discharge planning. CM left a voice message with name and number for a return call.     KEITH and  Completed rounds at patient bedside.    Does patient need new DME? None    Follow up appts needed: Patient will need follow-up appointment with PCP.    Medically stable: Patient is not medically stable.    Estimated Discharge Date: Patient estimated discharge date is 05/14/25.     05/12/25 1421   Discharge Reassessment   Assessment Type Discharge Planning Reassessment   Did the patient's condition or plan change since previous assessment? Yes   Discharge Plan discussed with: Patient   Discharge Plan A Home Health   Discharge Plan B Home Health   DME Needed Upon Discharge    (TBD)   Transition of Care Barriers None   Post-Acute Status   Post-Acute Authorization Home Health   [facility] [place] [facility] [place] [facility]   Hospital Resources/Appts/Education Provided Appointments scheduled and added to AVS   Discharge Delays None known at this time

## 2025-05-12 NOTE — ASSESSMENT & PLAN NOTE
Patient's blood pressure range in the last 24 hours was: BP  Min: 104/65  Max: 129/66.The patient's inpatient anti-hypertensive regimen is listed below:  Current Antihypertensives       Plan  - BP is controlled, no changes needed to their regimen

## 2025-05-12 NOTE — PROGRESS NOTES
Vancomycin consult follow-up:    Patient reviewed, renal function stable, no new levels, continue current therapy; Next levels due: trough due 5/13/2025 at 1600

## 2025-05-12 NOTE — ASSESSMENT & PLAN NOTE
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 Most recent hemoglobin and hematocrit are listed below.  Recent Labs     05/10/25  0701 05/11/25  0421 05/11/25  0621 05/12/25  0734   HGB 7.4* 6.8* 7.1* 7.5*   HCT 24.5* 22.3*  --  24.5*     Plan  - Monitor serial CBC: Daily  - Transfuse PRBC if patient becomes hemodynamically unstable, symptomatic or H/H drops below 7/21.  - Patient has not received any PRBC transfusions to date  - Patient's anemia is currently stable  - check iron studies - has mixed picture. Suspect iron def +chronic disease

## 2025-05-13 PROBLEM — E87.5 HYPERKALEMIA: Status: RESOLVED | Noted: 2025-01-17 | Resolved: 2025-05-13

## 2025-05-13 PROBLEM — R50.9 FEVER: Status: RESOLVED | Noted: 2025-01-13 | Resolved: 2025-05-13

## 2025-05-13 PROBLEM — Z71.89 ACP (ADVANCE CARE PLANNING): Status: ACTIVE | Noted: 2025-05-13

## 2025-05-13 LAB
ABSOLUTE EOSINOPHIL (OHS): 0.27 K/UL
ABSOLUTE EOSINOPHIL (OHS): 0.32 K/UL
ABSOLUTE MONOCYTE (OHS): 1.42 K/UL (ref 0.3–1)
ABSOLUTE MONOCYTE (OHS): 2.1 K/UL (ref 0.3–1)
ABSOLUTE NEUTROPHIL COUNT (OHS): 16.13 K/UL (ref 1.8–7.7)
ABSOLUTE NEUTROPHIL COUNT (OHS): 16.9 K/UL (ref 1.8–7.7)
ALBUMIN SERPL BCP-MCNC: 1.4 G/DL (ref 3.5–5.2)
ALP SERPL-CCNC: 85 UNIT/L (ref 40–150)
ALT SERPL W/O P-5'-P-CCNC: 10 UNIT/L (ref 10–44)
ANION GAP (OHS): 7 MMOL/L (ref 8–16)
AST SERPL-CCNC: 21 UNIT/L (ref 11–45)
BASOPHILS # BLD AUTO: 0.04 K/UL
BASOPHILS # BLD AUTO: 0.05 K/UL
BASOPHILS NFR BLD AUTO: 0.2 %
BASOPHILS NFR BLD AUTO: 0.3 %
BILIRUB SERPL-MCNC: 0.2 MG/DL (ref 0.1–1)
BUN SERPL-MCNC: 23 MG/DL (ref 8–23)
CALCIUM SERPL-MCNC: 8.6 MG/DL (ref 8.7–10.5)
CHLORIDE SERPL-SCNC: 111 MMOL/L (ref 95–110)
CO2 SERPL-SCNC: 23 MMOL/L (ref 23–29)
CREAT SERPL-MCNC: 0.8 MG/DL (ref 0.5–1.4)
ERYTHROCYTE [DISTWIDTH] IN BLOOD BY AUTOMATED COUNT: 19.5 % (ref 11.5–14.5)
ERYTHROCYTE [DISTWIDTH] IN BLOOD BY AUTOMATED COUNT: 19.9 % (ref 11.5–14.5)
GFR SERPLBLD CREATININE-BSD FMLA CKD-EPI: >60 ML/MIN/1.73/M2
GLUCOSE SERPL-MCNC: 181 MG/DL (ref 70–110)
HCT VFR BLD AUTO: 21.4 % (ref 37–48.5)
HCT VFR BLD AUTO: 24.1 % (ref 37–48.5)
HGB BLD-MCNC: 6.6 GM/DL (ref 12–16)
HGB BLD-MCNC: 7.2 GM/DL (ref 12–16)
IMM GRANULOCYTES # BLD AUTO: 0.21 K/UL (ref 0–0.04)
IMM GRANULOCYTES # BLD AUTO: 0.22 K/UL (ref 0–0.04)
IMM GRANULOCYTES NFR BLD AUTO: 1 % (ref 0–0.5)
IMM GRANULOCYTES NFR BLD AUTO: 1.1 % (ref 0–0.5)
LYMPHOCYTES # BLD AUTO: 1.8 K/UL (ref 1–4.8)
LYMPHOCYTES # BLD AUTO: 2.06 K/UL (ref 1–4.8)
MCH RBC QN AUTO: 22.4 PG (ref 27–31)
MCH RBC QN AUTO: 22.4 PG (ref 27–31)
MCHC RBC AUTO-ENTMCNC: 29.9 G/DL (ref 32–36)
MCHC RBC AUTO-ENTMCNC: 30.8 G/DL (ref 32–36)
MCV RBC AUTO: 73 FL (ref 82–98)
MCV RBC AUTO: 75 FL (ref 82–98)
NUCLEATED RBC (/100WBC) (OHS): 0 /100 WBC
NUCLEATED RBC (/100WBC) (OHS): 1 /100 WBC
PLATELET # BLD AUTO: 466 K/UL (ref 150–450)
PLATELET # BLD AUTO: 504 K/UL (ref 150–450)
PLATELET BLD QL SMEAR: ABNORMAL
PMV BLD AUTO: 9.3 FL (ref 9.2–12.9)
PMV BLD AUTO: 9.5 FL (ref 9.2–12.9)
POCT GLUCOSE: 154 MG/DL (ref 70–110)
POCT GLUCOSE: 175 MG/DL (ref 70–110)
POCT GLUCOSE: 188 MG/DL (ref 70–110)
POCT GLUCOSE: 204 MG/DL (ref 70–110)
POCT GLUCOSE: 236 MG/DL (ref 70–110)
POCT GLUCOSE: 62 MG/DL (ref 70–110)
POTASSIUM SERPL-SCNC: 4.9 MMOL/L (ref 3.5–5.1)
PROT SERPL-MCNC: 6.2 GM/DL (ref 6–8.4)
RBC # BLD AUTO: 2.94 M/UL (ref 4–5.4)
RBC # BLD AUTO: 3.21 M/UL (ref 4–5.4)
RELATIVE EOSINOPHIL (OHS): 1.4 %
RELATIVE EOSINOPHIL (OHS): 1.5 %
RELATIVE LYMPHOCYTE (OHS): 9.1 % (ref 18–48)
RELATIVE LYMPHOCYTE (OHS): 9.5 % (ref 18–48)
RELATIVE MONOCYTE (OHS): 7.1 % (ref 4–15)
RELATIVE MONOCYTE (OHS): 9.7 % (ref 4–15)
RELATIVE NEUTROPHIL (OHS): 78.1 % (ref 38–73)
RELATIVE NEUTROPHIL (OHS): 81 % (ref 38–73)
SODIUM SERPL-SCNC: 141 MMOL/L (ref 136–145)
VANCOMYCIN TROUGH SERPL-MCNC: 15.5 UG/ML (ref 10–22)
WBC # BLD AUTO: 19.88 K/UL (ref 3.9–12.7)
WBC # BLD AUTO: 21.64 K/UL (ref 3.9–12.7)

## 2025-05-13 PROCEDURE — 85025 COMPLETE CBC W/AUTO DIFF WBC: CPT | Performed by: HOSPITALIST

## 2025-05-13 PROCEDURE — 80202 ASSAY OF VANCOMYCIN: CPT | Performed by: STUDENT IN AN ORGANIZED HEALTH CARE EDUCATION/TRAINING PROGRAM

## 2025-05-13 PROCEDURE — 99223 1ST HOSP IP/OBS HIGH 75: CPT | Mod: ,,, | Performed by: SURGERY

## 2025-05-13 PROCEDURE — 25000003 PHARM REV CODE 250

## 2025-05-13 PROCEDURE — 99223 1ST HOSP IP/OBS HIGH 75: CPT | Mod: 25,,, | Performed by: REGISTERED NURSE

## 2025-05-13 PROCEDURE — 80053 COMPREHEN METABOLIC PANEL: CPT | Performed by: STUDENT IN AN ORGANIZED HEALTH CARE EDUCATION/TRAINING PROGRAM

## 2025-05-13 PROCEDURE — 99497 ADVNCD CARE PLAN 30 MIN: CPT | Mod: 25,,, | Performed by: REGISTERED NURSE

## 2025-05-13 PROCEDURE — S4991 NICOTINE PATCH NONLEGEND: HCPCS

## 2025-05-13 PROCEDURE — 36415 COLL VENOUS BLD VENIPUNCTURE: CPT | Performed by: STUDENT IN AN ORGANIZED HEALTH CARE EDUCATION/TRAINING PROGRAM

## 2025-05-13 PROCEDURE — 25000003 PHARM REV CODE 250: Performed by: STUDENT IN AN ORGANIZED HEALTH CARE EDUCATION/TRAINING PROGRAM

## 2025-05-13 PROCEDURE — 63600175 PHARM REV CODE 636 W HCPCS: Performed by: STUDENT IN AN ORGANIZED HEALTH CARE EDUCATION/TRAINING PROGRAM

## 2025-05-13 PROCEDURE — 25000003 PHARM REV CODE 250: Performed by: INTERNAL MEDICINE

## 2025-05-13 PROCEDURE — 63600175 PHARM REV CODE 636 W HCPCS: Performed by: HOSPITALIST

## 2025-05-13 PROCEDURE — 21400001 HC TELEMETRY ROOM

## 2025-05-13 PROCEDURE — 36415 COLL VENOUS BLD VENIPUNCTURE: CPT | Performed by: HOSPITALIST

## 2025-05-13 PROCEDURE — 85025 COMPLETE CBC W/AUTO DIFF WBC: CPT | Performed by: STUDENT IN AN ORGANIZED HEALTH CARE EDUCATION/TRAINING PROGRAM

## 2025-05-13 RX ORDER — CEFEPIME HYDROCHLORIDE 1 G/1
1 INJECTION, POWDER, FOR SOLUTION INTRAMUSCULAR; INTRAVENOUS
Status: DISCONTINUED | OUTPATIENT
Start: 2025-05-13 | End: 2025-05-15

## 2025-05-13 RX ADMIN — POLYETHYLENE GLYCOL 3350 17 G: 17 POWDER, FOR SOLUTION ORAL at 08:05

## 2025-05-13 RX ADMIN — MICONAZOLE NITRATE: 20 POWDER TOPICAL at 08:05

## 2025-05-13 RX ADMIN — INSULIN ASPART 10 UNITS: 100 INJECTION, SOLUTION INTRAVENOUS; SUBCUTANEOUS at 08:05

## 2025-05-13 RX ADMIN — THERA TABS 1 TABLET: TAB at 08:05

## 2025-05-13 RX ADMIN — NICOTINE 1 PATCH: 14 PATCH TRANSDERMAL at 08:05

## 2025-05-13 RX ADMIN — FAMOTIDINE 20 MG: 40 POWDER, FOR SUSPENSION ORAL at 08:05

## 2025-05-13 RX ADMIN — INSULIN ASPART 10 UNITS: 100 INJECTION, SOLUTION INTRAVENOUS; SUBCUTANEOUS at 12:05

## 2025-05-13 RX ADMIN — INSULIN GLARGINE 10 UNITS: 100 INJECTION, SOLUTION SUBCUTANEOUS at 08:05

## 2025-05-13 RX ADMIN — VANCOMYCIN HYDROCHLORIDE 750 MG: 750 INJECTION, POWDER, LYOPHILIZED, FOR SOLUTION INTRAVENOUS at 05:05

## 2025-05-13 RX ADMIN — INSULIN ASPART 1 UNITS: 100 INJECTION, SOLUTION INTRAVENOUS; SUBCUTANEOUS at 08:05

## 2025-05-13 RX ADMIN — ASPIRIN 81 MG: 81 TABLET, COATED ORAL at 08:05

## 2025-05-13 RX ADMIN — CEFEPIME 1 G: 1 INJECTION, POWDER, FOR SOLUTION INTRAMUSCULAR; INTRAVENOUS at 08:05

## 2025-05-13 RX ADMIN — ATORVASTATIN CALCIUM 80 MG: 40 TABLET, FILM COATED ORAL at 08:05

## 2025-05-13 RX ADMIN — INSULIN GLARGINE 15 UNITS: 100 INJECTION, SOLUTION SUBCUTANEOUS at 08:05

## 2025-05-13 RX ADMIN — CEFEPIME 1 G: 1 INJECTION, POWDER, FOR SOLUTION INTRAMUSCULAR; INTRAVENOUS at 02:05

## 2025-05-13 RX ADMIN — CEFEPIME 1 G: 2 INJECTION, POWDER, FOR SOLUTION INTRAVENOUS at 06:05

## 2025-05-13 RX ADMIN — CEFEPIME 1 G: 2 INJECTION, POWDER, FOR SOLUTION INTRAVENOUS at 01:05

## 2025-05-13 RX ADMIN — SODIUM CHLORIDE 500 ML: 9 INJECTION, SOLUTION INTRAVENOUS at 02:05

## 2025-05-13 RX ADMIN — LORAZEPAM 2 MG: 2 INJECTION INTRAMUSCULAR; INTRAVENOUS at 04:05

## 2025-05-13 NOTE — ASSESSMENT & PLAN NOTE
"- daughter shares 1st stroke was near pt's birthday ([birth year]) 2019 and most recent and potentially most debilitating stroke was near [facility] 1 year ago   - since pt has been bed bound and required full care and had marked decline in cognitive function with daughter reporting "dementia"; hospice appropriate if/when aligns with GOC   "

## 2025-05-13 NOTE — ASSESSMENT & PLAN NOTE
- pt with multiple wounds including sacral and hip that have been managed by home health wound care, per daughter   - now with osteomyelitis found on MRI this admission  - contributes to picture of pt's overall debility and nutritional status and appropriateness for hospice if/when aligns with GOC

## 2025-05-13 NOTE — SUBJECTIVE & OBJECTIVE
Interval History: sleeping comfortably today    Review of Systems   HENT:  Negative for ear discharge and ear pain.    Eyes:  Negative for discharge and itching.   Endocrine: Negative for cold intolerance and heat intolerance.   Neurological:  Negative for seizures and syncope.     Objective:     Vital Signs (Most Recent):  Temp: 97.9 °F (36.6 °C) (05/13/25 1153)  Pulse: 90 (05/13/25 1153)  Resp: 18 (05/13/25 1153)  BP: (!) 102/59 (05/13/25 1153)  SpO2: 96 % (05/13/25 1153) Vital Signs (24h Range):  Temp:  [97.9 °F (36.6 °C)-99.4 °F (37.4 °C)] 97.9 °F (36.6 °C)  Pulse:  [] 90  Resp:  [17-20] 18  SpO2:  [93 %-96 %] 96 %  BP: (100-115)/(59-74) 102/59     Weight: 57.2 kg (126 lb)  Body mass index is 22.32 kg/m².    Intake/Output Summary (Last 24 hours) at 5/13/2025 1258  Last data filed at 5/13/2025 1000  Gross per 24 hour   Intake 455 ml   Output 602 ml   Net -147 ml         Physical Exam  Vitals and nursing note reviewed.   Constitutional:       General: She is not in acute distress.     Appearance: Normal appearance. She is ill-appearing.   HENT:      Head: Normocephalic and atraumatic.   Cardiovascular:      Rate and Rhythm: Normal rate and regular rhythm.      Pulses: Normal pulses.      Heart sounds: No murmur heard.  Pulmonary:      Effort: Pulmonary effort is normal. No respiratory distress.      Breath sounds: Normal breath sounds.   Abdominal:      General: Bowel sounds are normal. There is no distension.      Palpations: Abdomen is soft.      Comments: G-tube in place   Musculoskeletal:      Comments: Multiple ulcers lower extremity, right hip with wound    Skin:     General: Skin is warm and dry.   Neurological:      Mental Status: She is alert.               Significant Labs: All pertinent labs within the past 24 hours have been reviewed.  BMP:   Recent Labs   Lab 05/13/25  0652   *      K 4.9   *   CO2 23   BUN 23   CREATININE 0.8   CALCIUM 8.6*     CBC:   Recent Labs   Lab  05/12/25  0734 05/13/25  0652 05/13/25  1050   WBC 18.73* 21.64* 19.88*   HGB 7.5* 6.6* 7.2*   HCT 24.5* 21.4* 24.1*   * 504* 466*       Significant Imaging: I have reviewed all pertinent imaging results/findings within the past 24 hours.

## 2025-05-13 NOTE — PLAN OF CARE
Problem: Infection  Goal: Absence of Infection Signs and Symptoms  5/13/2025 1803 by Beatrice Casey LPN  Reactivated  5/13/2025 1803 by Beatrice Casey LPN  Outcome: Met  Intervention: Prevent or Manage Infection  5/13/2025 1804 by Beatrice Casey LPN  [place] (Taken 5/13/2025 1804)  Infection Management: aseptic technique maintained  5/13/2025 1802 by Beatrice Casey LPN  [place] (Taken 5/13/2025 1802)  Infection Management: aseptic technique maintained     Problem: Adult Inpatient Plan of Care  Goal: Plan of Care Review  5/13/2025 1804 by Isidore, Beatrice, LPN  Flowsheets (Taken 5/13/2025 1804)  Plan of Care Reviewed With: patient  5/13/2025 1803 by Beatrice Casey LPN  Reactivated  5/13/2025 1803 by Beatrice Casey LPN  Outcome: Met  5/13/2025 1802 by Beatrice Casey LPN  [place] (Taken 5/13/2025 1802)  Plan of Care Reviewed With:   patient   child   grandchild(sonali)  Goal: Patient-Specific Goal (Individualized)  5/13/2025 1803 by Beatrice Casey LPN  Reactivated  5/13/2025 1803 by Beatrice Casey LPN  Outcome: Met  Goal: Absence of Hospital-Acquired Illness or Injury  5/13/2025 1803 by Beatrice Casey LPN  Reactivated  5/13/2025 1803 by Beatrice Casey LPN  Outcome: Met  Intervention: Identify and Manage Fall Risk  5/13/2025 1804 by Beatrice Casey LPN  [place] (Taken 5/13/2025 1804)  Safety Promotion/Fall Prevention:   assistive device/personal item within reach   bed alarm set   room near unit station  5/13/2025 1802 by Isidore, Beatrice, LPN  Flowsheets (Taken 5/13/2025 1802)  Safety Promotion/Fall Prevention:   assistive device/personal item within reach   bed alarm set   Fall Risk reviewed with patient/family   patient expresses understanding of fall risk and prevention   observed patient noncompliance with fall prevention instructions  Intervention: Prevent Skin Injury  Flowsheets (Taken 5/13/2025 1804)  Body Position: position changed independently  Skin Protection:  incontinence pads utilized  Device Skin Pressure Protection: absorbent pad utilized/changed  Intervention: Prevent and Manage VTE (Venous Thromboembolism) Risk  Flowsheets (Taken 5/13/2025 1804)  VTE Prevention/Management:   ROM (active) performed   ROM (passive) performed  Intervention: Prevent Infection  Flowsheets (Taken 5/13/2025 1804)  Infection Prevention: environmental surveillance performed  Goal: Optimal Comfort and Wellbeing  5/13/2025 1803 by Beatrice Casey LPN  Reactivated  5/13/2025 1803 by Beatrice Casey LPN  Outcome: Met  Intervention: Monitor Pain and Promote Comfort  Flowsheets (Taken 5/13/2025 1804)  Pain Management Interventions: care clustered  Intervention: Provide Person-Centered Care  Flowsheets (Taken 5/13/2025 1804)  Trust Relationship/Rapport:   care explained   questions answered  Goal: Readiness for Transition of Care  5/13/2025 1803 by Beatrice Casey LPN  Reactivated  5/13/2025 1803 by Beatrice Casey LPN  Outcome: Met     Problem: Diabetes Comorbidity  Goal: Blood Glucose Level Within Targeted Range  5/13/2025 1803 by Beatrice Casey LPN  Reactivated  5/13/2025 1803 by Beatrice Casey LPN  Outcome: Met  Intervention: Monitor and Manage Glycemia  Flowsheets (Taken 5/13/2025 1804)  Glycemic Management: blood glucose monitored     Problem: Acute Kidney Injury/Impairment  Goal: Fluid and Electrolyte Balance  5/13/2025 1803 by Beatrice Casey LPN  Reactivated  5/13/2025 1803 by Beatrice Casey LPN  Outcome: Met  Intervention: Monitor and Manage Fluid and Electrolyte Balance  Flowsheets (Taken 5/13/2025 1804)  Fluid/Electrolyte Management: fluids provided  Goal: Improved Oral Intake  5/13/2025 1803 by Beatrice Casey LPN  Reactivated  5/13/2025 1803 by Beatrice Casey LPN  Outcome: Met  Goal: Effective Renal Function  5/13/2025 1803 by Beatrice Casey LPN  Reactivated  5/13/2025 1803 by Beatrice Casey LPN  Outcome: Met     Problem: Wound  Goal: Optimal  Coping  5/13/2025 1803 by Beatrice Casey LPN  Reactivated  5/13/2025 1803 by Beatrice Casey LPN  Outcome: Met  Intervention: Support Patient and Family Response  Flowsheets (Taken 5/13/2025 1804)  Supportive Measures: active listening utilized  Goal: Optimal Functional Ability  5/13/2025 1803 by Beatrice Casey LPN  Reactivated  5/13/2025 1803 by Beatrice Casey LPN  Outcome: Met  Intervention: Optimize Functional Ability  Flowsheets (Taken 5/13/2025 1804)  Activity Management:   Arm raise - L1   Rolling - L1  Goal: Absence of Infection Signs and Symptoms  5/13/2025 1803 by Beatrice Casey LPN  Reactivated  5/13/2025 1803 by Beatrice Casey LPN  Outcome: Met  Goal: Improved Oral Intake  5/13/2025 1803 by Beatrice Casey LPN  Reactivated  5/13/2025 1803 by Beatrice Casey LPN  Outcome: Met  Goal: Optimal Pain Control and Function  5/13/2025 1803 by Beatrice Casey LPN  Reactivated  5/13/2025 1803 by Beatrice Casey LPN  Outcome: Met  Goal: Skin Health and Integrity  5/13/2025 1803 by Beatrice Casey LPN  Reactivated  5/13/2025 1803 by Beatrice Casey LPN  Outcome: Met  Goal: Optimal Wound Healing  5/13/2025 1803 by Beatrice Casey LPN  Reactivated  5/13/2025 1803 by Beatrice Casey LPN  Outcome: Met     Problem: Skin Injury Risk Increased  Goal: Skin Health and Integrity  5/13/2025 1803 by Beatrice Casey LPN  Reactivated  5/13/2025 1803 by Beatrice Casey LPN  Outcome: Met     Problem: Fall Injury Risk  Goal: Absence of Fall and Fall-Related Injury  5/13/2025 1803 by Beatrice Casey LPN  Reactivated  5/13/2025 1803 by Isidore, Beatrice, LPN  Outcome: Met     Problem: Occupational Therapy  Goal: Occupational Therapy Goal  Description: No goals set 2/2 pt performing at/near baseline.      Reactivated     Problem: Physical Therapy  Goal: Physical Therapy Goal  Reactivated     Problem: Pain Acute  Goal: Optimal Pain Control and Function  5/13/2025 1803 by Beatrice Casey,  LPN  Reactivated  5/13/2025 1803 by Beatrice Casey LPN  Outcome: Met     Problem: Coping Ineffective  Goal: Effective Coping  5/13/2025 1803 by Beatrice Casey LPN  Reactivated  5/13/2025 1803 by Beatrice Casey LPN  Outcome: Met

## 2025-05-13 NOTE — PLAN OF CARE
KEITH spoke with patient daughter Aenta, to confirm d/c with [facility]. Daughter stated her mom will not be discharging with [facility]. Patient daughter stated she took time off from work to care for her mother.    KEITH asked daughter if the DrSuzette recommend SNF placement would she consider the placement. Daughter Aneta stated her mother will not live in a nursing facility and she would only consider SNF placement for 20 days and return home.

## 2025-05-13 NOTE — NURSING
MRI ready for the pt and pt has PRN ativan IV for MRI but  /70 MAP 81, informed Dr Tinoco, ordered Bolus 500ml x 1hr and per mD yñaez to give ativan after bolus, Bp now 109/67, Prn ativan given prior sending to MRI.

## 2025-05-13 NOTE — ASSESSMENT & PLAN NOTE
- pt is bed bound, and dependent for all ADLs; pt also with PEG, malnutrition, and multiple wounds   - PPS score 30%; hospice appropriate along with hx of CVA and severe protein calorie malnutrition

## 2025-05-13 NOTE — NURSING
Pt taken to MRI via stretcher, IV L hand intact, PRN ativan given prior to MRI. Not in distress. Hernandez catheter on gravity.

## 2025-05-13 NOTE — SUBJECTIVE & OBJECTIVE
Past Medical History:   Diagnosis Date    Diabetes mellitus type I     Hyperlipidemia     Hypertension     Right sided weakness 2019       Past Surgical History:   Procedure Laterality Date     SECTION         Review of patient's allergies indicates:   Allergen Reactions    Sulfa (sulfonamide antibiotics) Itching    Sulfur        Medications:  Continuous Infusions:  Scheduled Meds:   aspirin  81 mg Oral Daily    atorvastatin  80 mg Oral QHS    ceFEPime IV (PEDS and ADULTS)  1 g Intravenous Q6H    famotidine  20 mg Per G Tube BID    insulin aspart U-100  10 Units Subcutaneous TIDWM    insulin glargine U-100  10 Units Subcutaneous QHS    insulin glargine U-100  15 Units Subcutaneous Daily    miconazole NITRATE 2 %   Topical (Top) BID    multivitamin  1 tablet Per G Tube Daily    nicotine  1 patch Transdermal Daily    polyethylene glycol  17 g Oral Daily    vancomycin (VANCOCIN) IV (PEDS and ADULTS)  750 mg Intravenous Q24H     PRN Meds:  Current Facility-Administered Medications:     dextrose 50%, 12.5 g, Intravenous, PRN    dextrose 50%, 25 g, Intravenous, PRN    diphenhydrAMINE, 25 mg, Oral, Q6H PRN    glucagon (human recombinant), 1 mg, Intramuscular, PRN    glucose, 16 g, Oral, PRN    glucose, 24 g, Oral, PRN    HYDROmorphone, 0.5 mg, Intravenous, Q3H PRN    insulin aspart U-100, 0-5 Units, Subcutaneous, QID (AC + HS) PRN    lorazepam, 1 mg, Intravenous, Once PRN    oxyCODONE, 10 mg, Oral, Q4H PRN    oxyCODONE, 5 mg, Oral, Q4H PRN    Pharmacy to dose Vancomycin consult, , , Once **AND** vancomycin - pharmacy to dose, , Intravenous, pharmacy to manage frequency    Family History       Problem Relation (Age of Onset)    Cancer Sister    Diabetes Mother, Sister, Brother, Maternal Aunt    Heart disease Maternal Aunt    Hyperlipidemia Mother, Sister, Brother    Hypertension Mother, Sister, Brother    Stroke Mother          Tobacco Use    Smoking status: Every Day     Current packs/day: 1.50     Average  packs/day: 1.5 packs/day for 35.0 years (52.5 ttl pk-yrs)     Types: Cigarettes    Smokeless tobacco: Never   Substance and Sexual Activity    Alcohol use: Not Currently    Drug use: Not Currently    Sexual activity: Not on file       Review of Systems   Unable to perform ROS: Other (pt minimally verbal during visit; denied pain)     Objective:     Vital Signs (Most Recent):  Temp: 97.9 °F (36.6 °C) (05/13/25 1153)  Pulse: 90 (05/13/25 1153)  Resp: 18 (05/13/25 1153)  BP: (!) 102/59 (05/13/25 1153)  SpO2: 96 % (05/13/25 1153) Vital Signs (24h Range):  Temp:  [97.9 °F (36.6 °C)-99.4 °F (37.4 °C)] 97.9 °F (36.6 °C)  Pulse:  [] 90  Resp:  [17-20] 18  SpO2:  [93 %-96 %] 96 %  BP: (100-115)/(59-74) 102/59     Weight: 57.2 kg (126 lb)  Body mass index is 22.32 kg/m².       Physical Exam  Vitals and nursing note reviewed.   Constitutional:       General: She is sleeping.      Comments: Able to arouse, but quickly resumed sleeping    HENT:      Head: Atraumatic.      Comments: Temporal wasting  Pulmonary:      Effort: Pulmonary effort is normal. No respiratory distress.   Musculoskeletal:      Comments: Pt lying with arms and legs curled towards midline, unclear if contractures present    Neurological:      Mental Status: She is lethargic.      Comments: Pt aroused to her name; able to confirm she has 2 children; did not respond to other questions to assess orientation further               Advance Care Planning   Advance Directives:   Living Will: No    LaPOST: No    Do Not Resuscitate Status: No    Medical Power of : No (pt confirms daughters share assistance with medical decision; 2 daughters share legal surrogate decision making, see ACP)      Decision Making:  Family answered questions  Goals of Care: The family endorses that what is most important right now is to focus on spending time at home, avoiding the hospital, and symptom/pain control    Accordingly, we have decided that the best plan to meet the  patient's goals includes continuing with treatment and further discussion of hospice with home palliative care.          Significant Labs: All pertinent labs within the past 24 hours have been reviewed.  CBC:   Recent Labs   Lab 05/13/25  1050   WBC 19.88*   HGB 7.2*   HCT 24.1*   MCV 75*   *     BMP:  Recent Labs   Lab 05/13/25  0652   *      K 4.9   *   CO2 23   BUN 23   CREATININE 0.8   CALCIUM 8.6*     LFT:  Lab Results   Component Value Date    AST 21 05/13/2025    ALKPHOS 85 05/13/2025    BILITOT 0.2 05/13/2025     Albumin:   Albumin   Date Value Ref Range Status   05/13/2025 1.4 (L) 3.5 - 5.2 g/dL Final   02/12/2025 2.4 (L) 3.5 - 5.2 g/dL Final     Protein:   Protein Total   Date Value Ref Range Status   05/13/2025 6.2 6.0 - 8.4 gm/dL Final     Total Protein   Date Value Ref Range Status   02/12/2025 7.7 6.0 - 8.4 g/dL Final     Lactic acid:   Lab Results   Component Value Date    LACTATE 1.0 05/11/2025    LACTATE 3.2 (H) 05/07/2025       Significant Imaging: I have reviewed all pertinent imaging results/findings within the past 24 hours.

## 2025-05-13 NOTE — PROGRESS NOTES
"[facility] Medicine  Progress Note    Patient Name: Emily Martinez  MRN: [id number]  Patient Class: IP- Inpatient   Admission Date: 5/7/2025  Length of Stay: 6 days  Attending Physician: Adan Cartagena MD  Primary Care Provider: Alireza Sosa MD        Subjective     Principal Problem:Multiple wounds of skin        HPI:  Emily Martinez is a 60 y.o. female with DM1, HTN, HLD, and Previous CVA with residual deficits who was BIBA to [facility] ED for eval and treatment of acute generalized abdominal pain for the last 2x days. Per EMS, pt continued to experience persistent abdominal pain this morning accompanied by 4 episodes of emesis prompting her relatives to check her blood sugar at home. EMS notes that family received a reading of "critical high" and subsequently gave pt her insulin.   EMS reports receiving a reading greater than 500 upon their arrival.  She is chronically bed-bound, lives with her daughter, and has dementia (but is currently at her baseline per what daughter told EMS).  Further history therefore limited.  Has multiple chronic pressure ulcers on her sacrum and legs, presented to [facility] ED on April 10 with very similar circumstances as now, but was DC'ed home from the ED after a round of pain meds.    ED course: POCT  on arrival.  VSS WNL.  Exam with multiple pressure ulcers in various stages of healing; R hip wound is purulent.  Oriented to place and self.  Labs WBC 14.75 Hgb 9.3 Plt 677.  ESR CRP Lactate all elevated.  K 3.1.  Imaging: CT reads as wound to the right lateral aspect of her upper thigh appears worse when compared to previous imaging.  ED treatments: Vanc, mupirocin, wet-to-dry dressing, KCl.  They were admitted to [facility] for further evaluation and treatment.        Overview/Hospital Course:  Ms. Martinez is a 61 yo F admitted with hyperglycemia and multiple pressure wounds. Started on IV antibiotics and given IVF, started " on insulin. Wound care consulted. Concern for tunneling of right hip wound. CT with no bone involvement however concerning due to tunneling. MRI ordered showing large lateral soft tissue ulceration with suspected early osteomyelitis of the greater trochanter.     Interval History: sleeping comfortably today    Review of Systems   HENT:  Negative for ear discharge and ear pain.    Eyes:  Negative for discharge and itching.   Endocrine: Negative for cold intolerance and heat intolerance.   Neurological:  Negative for seizures and syncope.     Objective:     Vital Signs (Most Recent):  Temp: 97.9 °F (36.6 °C) (05/13/25 1153)  Pulse: 90 (05/13/25 1153)  Resp: 18 (05/13/25 1153)  BP: (!) 102/59 (05/13/25 1153)  SpO2: 96 % (05/13/25 1153) Vital Signs (24h Range):  Temp:  [97.9 °F (36.6 °C)-99.4 °F (37.4 °C)] 97.9 °F (36.6 °C)  Pulse:  [] 90  Resp:  [17-20] 18  SpO2:  [93 %-96 %] 96 %  BP: (100-115)/(59-74) 102/59     Weight: 57.2 kg (126 lb)  Body mass index is 22.32 kg/m².    Intake/Output Summary (Last 24 hours) at 5/13/2025 1258  Last data filed at 5/13/2025 1000  Gross per 24 hour   Intake 455 ml   Output 602 ml   Net -147 ml         Physical Exam  Vitals and nursing note reviewed.   Constitutional:       General: She is not in acute distress.     Appearance: Normal appearance. She is ill-appearing.   HENT:      Head: Normocephalic and atraumatic.   Cardiovascular:      Rate and Rhythm: Normal rate and regular rhythm.      Pulses: Normal pulses.      Heart sounds: No murmur heard.  Pulmonary:      Effort: Pulmonary effort is normal. No respiratory distress.      Breath sounds: Normal breath sounds.   Abdominal:      General: Bowel sounds are normal. There is no distension.      Palpations: Abdomen is soft.      Comments: G-tube in place   Musculoskeletal:      Comments: Multiple ulcers lower extremity, right hip with wound    Skin:     General: Skin is warm and dry.   Neurological:      Mental Status: She is  alert.               Significant Labs: All pertinent labs within the past 24 hours have been reviewed.  BMP:   Recent Labs   Lab 05/13/25  0652   *      K 4.9   *   CO2 23   BUN 23   CREATININE 0.8   CALCIUM 8.6*     CBC:   Recent Labs   Lab 05/12/25  0734 05/13/25  0652 05/13/25  1050   WBC 18.73* 21.64* 19.88*   HGB 7.5* 6.6* 7.2*   HCT 24.5* 21.4* 24.1*   * 504* 466*       Significant Imaging: I have reviewed all pertinent imaging results/findings within the past 24 hours.      Assessment & Plan  Multiple wounds of skin  Chronic, pressure ulcers.  Most recently debrided by wound care on 4/9.  Various stages of healing currently; R hip ulcer is purulent.  WBC, ESR, CRP, Lactate all elevated, although pt not febrile.  Wet-to-dry dressing applied and Vanc given in ED.  Wound care consulted  Concern with worsening right hip wound and tunneling noted on CT -- and wound care  MRI with large lateral soft tissue ulceration with suspected early osteomyelitis of the greater trochanter.   Continue ABX's.  Will consult ID for osteomyelitis.  Consult General Surgery for possible debridement with culture.  Ulcer of sacral region, stage 2      Type 2 diabetes mellitus with stage 4 chronic kidney disease, with long-term current use of insulin  Last A1c reviewed-   Lab Results   Component Value Date    LABA1C 13.0 (H) 04/13/2016    HGBA1C 8.1 (H) 05/07/2025     Home antihyperglycemic regimen: lispro-protamin 10U daily    Recent Labs     05/12/25  0702 05/12/25  1128 05/12/25  1627 05/12/25  2041 05/13/25  0758 05/13/25  1106   POCTGLUCOSE 284* 351* 219* 149* 188* 175*     Most recent inpatient antihyperglycemic regimen:   Antihyperglycemics (From admission, onward)      Start     Stop Route Frequency Ordered    05/12/25 2100  insulin glargine U-100 (Lantus) pen 10 Units         -- SubQ Nightly 05/12/25 1146    05/12/25 1645  insulin aspart U-100 pen 10 Units         -- SubQ 3 times daily with meals  05/12/25 1146    05/10/25 0915  insulin glargine U-100 (Lantus) pen 15 Units         -- SubQ Daily 05/10/25 0908    05/07/25 2127  insulin aspart U-100 pen 0-5 Units         -- SubQ Before meals & nightly PRN 05/07/25 2028          Continue current regimen and adjust as necessary.  Essential hypertension  Patient's blood pressure range in the last 24 hours was: BP  Min: 100/64  Max: 115/74.The patient's inpatient anti-hypertensive regimen is listed below:  Current Antihypertensives       Plan  - BP is controlled, no changes needed to their regimen    Mixed hyperlipidemia  Stable.  Continue home statin  History of stroke with residual effects  As above    Debility  Patient with Chronic debility due to hemiplegia/hemiparesis. The patient's latest AMPAC (Activity Measure for Post Acute Care) Score is listed below.    AM-PAC Score - How much help does the patient need for each activity listed  Basic Mobility Total Score: 8  Turning over in bed (including adjusting bedclothes, sheets and blankets)?: A lot  Sitting down on and standing up from a chair with arms (e.g., wheelchair, bedside commode, etc.): Unable  Moving from lying on back to sitting on the side of the bed?: A lot  Moving to and from a bed to a chair (including a wheelchair)?: Unable  Need to walk in hospital room?: Unable  Climbing 3-5 steps with a railing?: Unable    Plan  - Progressive mobility protocol initated  - Fall precautions in place    Daughter is requesting PT/OT      Tobacco dependency  Dangers of cigarette smoking were reviewed with patient in detail. Patient was Referred to Tobacco Cessation Program. Nicotine replacement options were discussed. Nicotine replacement was discussed- prescribed  Moderate malnutrition  Nutrition consulted. Most recent weight and BMI monitored-     Measurements:  Wt Readings from Last 1 Encounters:   05/09/25 57.2 kg (126 lb)   Body mass index is 22.32 kg/m².    Patient has been screened and assessed by  RD.    Malnutrition Type:  Context: chronic illness  Level: moderate    Malnutrition Characteristic Summary:  Weight Loss (Malnutrition): 20% in 1 year    Interventions/Recommendations (treatment strategy):  1. Enteral Nutrition Management  2. Collaboration by nutrition professional with other providers    Pressure injury of right hip, stage 4      Microcytic anemia  Anemia is likely due to chronic blood loss, Iron deficiency, and chronic disease due to infection. Most recent hemoglobin and hematocrit are listed below.  Recent Labs     05/12/25  0734 05/13/25  0652 05/13/25  1050   HGB 7.5* 6.6* 7.2*   HCT 24.5* 21.4* 24.1*     Plan  - Monitor serial CBC: Daily  - Transfuse PRBC if patient becomes hemodynamically unstable, symptomatic or H/H drops below 7/21.  - Patient has not received any PRBC transfusions to date  - Patient's anemia is currently stable  - Suspect iron def +chronic disease  Hgb 6.6 this morning, but repeat Hgb>7.  Continue to monitor.  VTE Risk Mitigation (From admission, onward)      None            Discharge Planning   ANA: 5/14/2025     Code Status: Full Code   Medical Readiness for Discharge Date:   Discharge Plan A: Home Health   Discharge Delays: None known at this time                    Adan Cartagena MD  Department of Hospital Medicine   [facility]

## 2025-05-13 NOTE — ASSESSMENT & PLAN NOTE
5/13/2025 - Consult   - consult received; interval chart reviewed in detail; discussed pt with bedside, RN   - met with patient at bedside; introduction to palliative medicine team and role in current care and admission   - pt difficult to arouse for discussion, unable to assess capacity due to limited interaction (nurse reports pt varies from alert and talkative to sleeping/difficult to arouse)   - pt did confirm having 2 children, listed in EMR as Aneta and Vidhi; pt confirms they both help with medical decisions   - call placed to pt's daughter, Aneta, who pt lives with  - learned more about pt outside of current admission; introduction to palliative medicine team and role in current care and admission   - GOC/ACP discussion  - daughter confirms that pt does not have prior ACP documents; Aneta and Vidhi are pt's legal surrogate decision makers collectively, and Aneta confirms they actively share in decision making for pt   - reviewed prior discussions regarding code status this admission, per notes; Aneta confirms ongoing discussions with Vidhi regarding this and overall GOC; confirmed pt is currently full code, which aligns with Vidhi's wishes which they are currently honoring   - discussed overall pt care needs prior to this admission and plans following admission  - they are hopeful for treatment of reversible/treatable aspects of pt's condition such as wounds and infection; good insight that they may not be completely reversed due to pt's debility and comorbidities   - reviewed some options as discussed with CM such as home with HH, SNF, and hospice; overall goal is to avoid any permanent placement and for pt's care to remain home based long term; Aneta shares that she has taken a recent leave from work to aid in pt's care at home   - family would consider SNF if indicated for treatment of infections/long term IV abx; however, discussed that this would likely not be  indicated for any form of therapy/rehab due to chronic nature of pt's debility   - reviewed option of home health with addition of home palliative care as daughters may differ in readiness for hospice level of care  - philosophy of care of home palliative discussed   - Aneta agreeable to home palliative program for additional support once pt returns home, GOC/ACP discussions outside of acute hospital setting and with family able to be present, and for future transition to hospice   - Aneta shares good understand of hospice, as she was primary caregiver for her grandfather (pt's father), while he was on hospice with [facility], she expressed good insight into philosophy of care of hospice and was very pleased with services from [facility], especially respite and inpatient care at their [facility] facility; family preference for utilizing [facility] if/when hospice was GOC so recommendation for [facility] palliative program for continuity of care   - emotional support provided   - Allowed time for questions/concerns; all addressed; expressed availability of myself/palliative team for additional questions/concerns   - updated HM; discussed discharge planning with CM team ; referral order placed for home palliative

## 2025-05-13 NOTE — ASSESSMENT & PLAN NOTE
Last A1c reviewed-   Lab Results   Component Value Date    LABA1C 13.0 (H) 04/13/2016    HGBA1C 8.1 (H) 05/07/2025     Home antihyperglycemic regimen: lispro-protamin 10U daily    Recent Labs     05/12/25  0702 05/12/25  1128 05/12/25  1627 05/12/25  2041 05/13/25  0758 05/13/25  1106   POCTGLUCOSE 284* 351* 219* 149* 188* 175*     Most recent inpatient antihyperglycemic regimen:   Antihyperglycemics (From admission, onward)      Start     Stop Route Frequency Ordered    05/12/25 2100  insulin glargine U-100 (Lantus) pen 10 Units         -- SubQ Nightly 05/12/25 1146    05/12/25 1645  insulin aspart U-100 pen 10 Units         -- SubQ 3 times daily with meals 05/12/25 1146    05/10/25 0915  insulin glargine U-100 (Lantus) pen 15 Units         -- SubQ Daily 05/10/25 0908    05/07/25 2127  insulin aspart U-100 pen 0-5 Units         -- SubQ Before meals & nightly PRN 05/07/25 2028          Continue current regimen and adjust as necessary.

## 2025-05-13 NOTE — CONSULTS
[facility]  Infectious Disease  Consult Note    Patient Name: Emily Martinez  MRN: [id number]  Admission Date: 5/7/2025  Hospital Length of Stay: 6 days  Attending Physician: Adan Cartagena MD  Primary Care Provider: Alireza Sosa MD     Isolation Status: No active isolations    Patient information was obtained from past medical records and ER records.      Inpatient consult to Infectious Diseases  Consult performed by: Anita Page MD  Consult ordered by: Adan Cartagena MD        Assessment/Plan:     Orthopedic  Pressure injury of right hip, stage 4  Emily Martinez is a 60 year old woman with c. Diff, poorly controlled diabetes, hypertension, stroke and now bedbound with several decubitus wound who was admitted 5/7 for hyperglycemia. Found to have purulent draining wound to R hip, noted to be tunneling. CT initially obtained without evidence of osteomyelitis, but MRI suggest early trochanteric osteomyelitis. General surgery consulted for debridement, but not determined to be necessary. ID was consulted for management of osteomyelitis. She is minimally verbal on evaluation. Ongoing discussions regarding possibility of home hospice with family. Currently on vancomycin and cefepime. Labs notable for increasing leukocytosis since admission.     Recommendations  - can continue vancomycin and cefepime for now  - if she clinically worsens would change cefepime to meropenem   - would see if deep wound cultures can be obtained by wound care nursing to assist with guiding antibiotic therapy       Above discussed with primary team.     Time: 75 minutes   50% of time spent on face-to-face counseling and coordination of care. Counseling included review of test results, diagnosis, and treatment plan with patient and/or family.  I have reviewed hospital notes from   service and other specialty providers as well as outside medical records. I have also reviewed CBC, CMP/BMP,  cultures and imaging  with my interpretation as documented. Patient is high risk of morbidity, on antibiotics requiring intensive monitoring for toxicity.     Thank you for your consult. I will follow-up with patient. Please contact us if you have any additional questions.    Anita Page MD  Infectious Disease  [facility]    Subjective:     Principal Problem: Multiple wounds of skin    HPI: Emily Martinez is a 60 year old woman with c. Diff, poorly controlled diabetes, hypertension, stroke and now bedbound with several decubitus wound who was admitted  for hyperglycemia. Found to have purulent draining wound to R hip, noted to be tunneling. CT initially obtained without evidence of osteomyelitis, but MRI suggest early trochanteric osteomyelitis. General surgery consulted for debridement, but not determined to be necessary. ID was consulted for management of osteomyelitis. She is minimally verbal on evaluation. Ongoing discussions regarding possibility of home hospice with family. Currently on vancomycin and cefepime.        Past Medical History:   Diagnosis Date    Diabetes mellitus type I     Hyperlipidemia     Hypertension     Right sided weakness 2019       Past Surgical History:   Procedure Laterality Date     SECTION         Review of patient's allergies indicates:   Allergen Reactions    Sulfa (sulfonamide antibiotics) Itching    Sulfur        Medications:  Medications Prior to Admission   Medication Sig    acetaminophen (TYLENOL) 650 MG TbSR Take 1 tablet (650 mg total) by mouth every 8 (eight) hours. For back pain    aspirin (ECOTRIN) 81 MG EC tablet Take 1 tablet (81 mg total) by mouth once daily.    atorvastatin (LIPITOR) 80 MG tablet Take 80 mg by mouth every evening.    blood sugar diagnostic (TRUE METRIX GLUCOSE TEST STRIP) Strp TO CHECK BLOOD GLUCOSE THREE TIMES DAILY    clopidogreL (PLAVIX) 75 mg tablet Take 1 tablet (75 mg total) by mouth once daily.    diphenhydrAMINE (BENADRYL) 50 MG  "capsule Take 1 capsule (50 mg total) by mouth every 6 (six) hours as needed for Itching.    insulin glargine U-100, Lantus, 100 unit/mL (3 mL) SubQ InPn pen Inject 5 Units into the skin every evening.    insulin lispro protamin-lispro 100 unit/mL (50-50) InPn Inject 10 Units into the skin. once daily    lancets (ONETOUCH ULTRASOFT LANCETS) Misc Use 1 TID as directed for diabetes checking    multivitamin Tab 1 tablet by Per G Tube route once daily.    pen needle, diabetic (BD ULTRA-FINE ORIG PEN NEEDLE) 29 gauge x 1/2" Ndle USE TO TEST THREE TIMES DAILY MUST LAST 33 DAYS     Antibiotics (From admission, onward)      Start     Stop Route Frequency Ordered    05/13/25 1300  ceFEPIme injection 1 g         -- IV Every 6 hours (non-standard times) 05/13/25 0809    05/09/25 1700  vancomycin 750 mg in 0.9% NaCl 250 mL IVPB (admixture device)         -- IV Every 24 hours (non-standard times) 05/09/25 1518    05/07/25 1417  vancomycin - pharmacy to dose  (vancomycin IVPB (PEDS and ADULTS))        Placed in "And" Linked Group    -- IV pharmacy to manage frequency 05/07/25 1317          Antifungals (From admission, onward)      Start     Stop Route Frequency Ordered    05/11/25 2100  miconazole NITRATE 2 % top powder         -- Top 2 times daily 05/11/25 1405          Antivirals (From admission, onward)      None             Immunization History   Administered Date(s) Administered    COVID-19, MRNA, LN-S, PF (MODERNA FULL 0.5 ML DOSE) 03/31/2021, 05/01/2021    Hepatitis A, Adult 09/19/2005    Influenza - Quadrivalent - PF *Preferred* (6 months and older) 10/21/2021    Influenza - Trivalent - Afluria, Fluzone MDV 09/19/2005, 10/13/2011, 11/26/2019    PPD Test 07/24/2024    Tdap 03/21/2024       Family History       Problem Relation (Age of Onset)    Cancer Sister    Diabetes Mother, Sister, Brother, Maternal Aunt    Heart disease Maternal Aunt    Hyperlipidemia Mother, Sister, Brother    Hypertension Mother, Sister, Brother    " Stroke Mother          Social History     Socioeconomic History    Marital status: Single   Tobacco Use    Smoking status: Every Day     Current packs/day: 1.50     Average packs/day: 1.5 packs/day for 35.0 years (52.5 ttl pk-yrs)     Types: Cigarettes    Smokeless tobacco: Never   Substance and Sexual Activity    Alcohol use: Not Currently    Drug use: Not Currently     Social Drivers of Health     Financial Resource Strain: Patient Declined (5/7/2025)    Overall Financial Resource Strain (CARDIA)     Difficulty of Paying Living Expenses: Patient declined   Food Insecurity: Patient Declined (5/7/2025)    Hunger Vital Sign     Worried About Running Out of Food in the Last Year: Patient declined     Ran Out of Food in the Last Year: Patient declined   Transportation Needs: Patient Declined (5/7/2025)    PRAPARE - Transportation     Lack of Transportation (Medical): Patient declined     Lack of Transportation (Non-Medical): Patient declined   Physical Activity: Inactive (5/8/2025)    Exercise Vital Sign     Days of Exercise per Week: 0 days     Minutes of Exercise per Session: 0 min   Stress: No Stress Concern Present (5/8/2025)    [employer] - Occupational Stress Questionnaire     Feeling of Stress : Not at all   Housing Stability: Patient Declined (5/7/2025)    Housing Stability Vital Sign     Unable to Pay for Housing in the Last Year: Patient declined     Homeless in the Last Year: Patient declined     Review of Systems   Reason unable to perform ROS: mental status.     Objective:     Vital Signs (Most Recent):  Temp: 97.9 °F (36.6 °C) (05/13/25 1153)  Pulse: 90 (05/13/25 1153)  Resp: 18 (05/13/25 1153)  BP: (!) 102/59 (05/13/25 1153)  SpO2: 96 % (05/13/25 1153) Vital Signs (24h Range):  Temp:  [97.9 °F (36.6 °C)-99.4 °F (37.4 °C)] 97.9 °F (36.6 °C)  Pulse:  [] 90  Resp:  [17-20] 18  SpO2:  [93 %-96 %] 96 %  BP: (100-115)/(59-74) 102/59     Weight: 57.2 kg (126 lb)  Body mass  index is 22.32 kg/m².    Estimated Creatinine Clearance: 60.3 mL/min (based on SCr of 0.8 mg/dL).     Physical Exam  Vitals reviewed.   Constitutional:       Appearance: She is ill-appearing.   HENT:      Nose: Nose normal.   Pulmonary:      Effort: Pulmonary effort is normal. No respiratory distress.   Abdominal:      Palpations: Abdomen is soft.      Comments: PEG tube   Musculoskeletal:      Comments: Hip with large ulceration with purulent drainage    Neurological:      Mental Status: She is disoriented.          Significant Labs:   Microbiology Results (last 7 days)       Procedure Component Value Units Date/Time    Blood culture #2 **CANNOT BE ORDERED STAT** [[id number]]  (Normal) Collected: 05/07/25 1301    Order Status: Completed Specimen: Blood from Peripheral, Forearm, Right Updated: 05/12/25 1400     Blood Culture No Growth After 5 Days    Blood culture #1 **CANNOT BE ORDERED STAT** [[id number]]  (Normal) Collected: 05/07/25 1301    Order Status: Completed Specimen: Blood from Peripheral, Forearm, Right Updated: 05/12/25 1400     Blood Culture No Growth After 5 Days            Significant Imaging: I have reviewed all pertinent imaging results/findings within the past 24 hours.

## 2025-05-13 NOTE — PROGRESS NOTES
Pharmacist Renal Dose Adjustment Note    Emily Martinez is a 60 y.o. female being treated with the medication Cefepime    Patient Data:    Vital Signs (Most Recent):  Temp: 98.2 °F (36.8 °C) (05/13/25 0802)  Pulse: 84 (05/13/25 0802)  Resp: 20 (05/13/25 0802)  BP: 113/74 (05/13/25 0802)  SpO2: 95 % (05/13/25 0802) Vital Signs (72h Range):  Temp:  [97.4 °F (36.3 °C)-101.4 °F (38.6 °C)]   Pulse:  []   Resp:  [16-20]   BP: (100-131)/(64-88)   SpO2:  [93 %-98 %]      Recent Labs   Lab 05/11/25  0421 05/12/25  0734 05/13/25  0652   CREATININE 0.8 0.8 0.8     Serum creatinine: 0.8 mg/dL 05/13/25 0652  Estimated creatinine clearance: 60.3 mL/min    Medication:Cefepime 1 g every 8 hours will be changed to Cefepime 1 g every 6 hours.    Pharmacist's Name: Latrice Hemphill  Pharmacist's Extension: [phone]

## 2025-05-13 NOTE — ASSESSMENT & PLAN NOTE
Emily Martinez is a 60 year old woman with c. Diff, poorly controlled diabetes, hypertension, stroke and now bedbound with several decubitus wound who was admitted 5/7 for hyperglycemia. Found to have purulent draining wound to R hip, noted to be tunneling. CT initially obtained without evidence of osteomyelitis, but MRI suggest early trochanteric osteomyelitis. General surgery consulted for debridement, but not determined to be necessary. ID was consulted for management of osteomyelitis. She is minimally verbal on evaluation. Ongoing discussions regarding possibility of home hospice with family. Currently on vancomycin and cefepime. Labs notable for increasing leukocytosis since admission.     Recommendations  - can continue vancomycin and cefepime for now  - if she clinically worsens would change cefepime to meropenem   - would see if deep wound cultures can be obtained by wound care nursing to assist with guiding antibiotic therapy

## 2025-05-13 NOTE — HPI
Emily Martinez is a 60 year old woman with c. Diff, poorly controlled diabetes, hypertension, stroke and now bedbound with several decubitus wound who was admitted 5/7 for hyperglycemia. Found to have purulent draining wound to R hip, noted to be tunneling. CT initially obtained without evidence of osteomyelitis, but MRI suggest early trochanteric osteomyelitis. General surgery consulted for debridement, but not determined to be necessary. ID was consulted for management of osteomyelitis. She is minimally verbal on evaluation. Ongoing discussions regarding possibility of home hospice with family. Currently on vancomycin and cefepime.

## 2025-05-13 NOTE — CONSULTS
[facility]  Palliative Medicine  Consult Note    Patient Name: Emily Martinez  MRN: [id number]  Admission Date: 5/7/2025  Hospital Length of Stay: 6 days  Code Status: Full Code   Attending Provider: Adan Cartagena MD  Consulting Provider: Izzy Avila NP  Primary Care Physician: Alireza Sosa MD  Principal Problem:Multiple wounds of skin    Patient information was obtained from patient, relative(s), past medical records, ER records, and primary team.      Inpatient consult to Palliative Care  Consult performed by: Izzy Avila NP  Consult ordered by: Adan Cartagena MD  Reason for consult: goals of care and advanced care planning        Assessment/Plan:   Palliative Care  Advance Care Planning   5/13/2025 - Consult   - consult received; interval chart reviewed in detail; discussed pt with bedside, RN   - met with patient at bedside; introduction to palliative medicine team and role in current care and admission   - pt difficult to arouse for discussion, unable to assess capacity due to limited interaction (nurse reports pt varies from alert and talkative to sleeping/difficult to arouse)   - pt did confirm having 2 children, listed in EMR as Aneta and Vidhi; pt confirms they both help with medical decisions   - call placed to pt's daughter, Aneta, who pt lives with  - learned more about pt outside of current admission; introduction to palliative medicine team and role in current care and admission   - GOC/ACP discussion  - daughter confirms that pt does not have prior ACP documents; Aneta and Vidhi are pt's legal surrogate decision makers collectively, and Aneta confirms they actively share in decision making for pt   - reviewed prior discussions regarding code status this admission, per notes; Aneta confirms ongoing discussions with Vidhi regarding this and overall GOC; confirmed pt is currently full code, which aligns with Vidhi's wishes which  they are currently honoring   - discussed overall pt care needs prior to this admission and plans following admission  - they are hopeful for treatment of reversible/treatable aspects of pt's condition such as wounds and infection; good insight that they may not be completely reversed due to pt's debility and comorbidities   - reviewed some options as discussed with CM such as home with HH, SNF, and hospice; overall goal is to avoid any permanent placement and for pt's care to remain home based long term; Aneta shares that she has taken a recent leave from work to aid in pt's care at home   - family would consider SNF if indicated for treatment of infections/long term IV abx; however, discussed that this would likely not be indicated for any form of therapy/rehab due to chronic nature of pt's debility   - reviewed option of home health with addition of home palliative care as daughters may differ in readiness for hospice level of care  - philosophy of care of home palliative discussed   - Aneta agreeable to home palliative program for additional support once pt returns home, GOC/ACP discussions outside of acute hospital setting and with family able to be present, and for future transition to hospice   - Aneta shares good understand of hospice, as she was primary caregiver for her grandfather (pt's father), while he was on hospice with [employer], she expressed good insight into philosophy of care of hospice and was very pleased with services from [facility], especially respite and inpatient care at their [facility]; family preference for utilizing [facility] if/when hospice was GOC so recommendation for [facility] palliative program for continuity of care   - emotional support provided   - Allowed time for questions/concerns; all addressed; expressed availability of myself/palliative team for additional questions/concerns   - updated HM; discussed discharge planning with CM team ; referral order placed for  "home palliative     Neuro  History of stroke with residual effects  - daughter shares 1st stroke was near pt's birthday (march) 2019 and most recent and potentially most debilitating stroke was near Easter 1 year ago   - since pt has been bed bound and required full care and had marked decline in cognitive function with daughter reporting "dementia"; hospice appropriate if/when aligns with GOC     Endocrine  Moderate malnutrition  - border lining severe calorie protein malnutrition with 10% weight loss in less than 6 months and no available 6 month weight comparison  - albumin currently 1.4  - pt currently with PEG and tube feeds, but was also having some prior PO intake, although on steady decline per daughter; RD involved this admission   - likely contributing to poor wound healing and contributes to appropriateness for hospice if/when aligns with GOC      Orthopedic  * Multiple wounds of skin  - pt with multiple wounds including sacral and hip that have been managed by home health wound care, per daughter   - now with osteomyelitis found on MRI this admission  - contributes to picture of pt's overall debility and nutritional status and appropriateness for hospice if/when aligns with GOC      Pressure injury of right hip, stage 4  - noted; see multiple wounds     Ulcer of sacral region, stage 2  - noted; see multiple wounds     Other  Tobacco dependency  - daughter confirms that pt stopped smoking over a year ago; due to mental limitations she sometimes forgets and will ask to smoke but is easily redirected     Debility  - pt is bed bound, and dependent for all ADLs; pt also with PEG, malnutrition, and multiple wounds   - PPS score 30%; hospice appropriate along with hx of CVA and severe protein calorie malnutrition         Thank you for your consult. I will follow-up with patient. Please contact us if you have any additional questions.    Subjective:     HPI:   From H&P: "Emily Martinez is a 60 y.o. female with " "DM1, HTN, HLD, and Previous CVA with residual deficits who was BIBA to [facility] ED for eval and treatment of acute generalized abdominal pain for the last 2x days. Per EMS, pt continued to experience persistent abdominal pain this morning accompanied by 4 episodes of emesis prompting her relatives to check her blood sugar at home. EMS notes that family received a reading of "critical high" and subsequently gave pt her insulin.   EMS reports receiving a reading greater than 500 upon their arrival.  She is chronically bed-bound, lives with her daughter, and has dementia (but is currently at her baseline per what daughter told EMS).  Further history therefore limited.  Has multiple chronic pressure ulcers on her sacrum and legs, presented to [facility] ED on April 10 with very similar circumstances as now, but was DC'ed home from the ED after a round of pain meds."     Palliative medicine consulted for goals of care discussion and advance care planning; for details of visit, see advance care planning section of plan.       Hospital Course:  No notes on file      Past Medical History:   Diagnosis Date    Diabetes mellitus type I     Hyperlipidemia     Hypertension     Right sided weakness 2019       Past Surgical History:   Procedure Laterality Date     SECTION         Review of patient's allergies indicates:   Allergen Reactions    Sulfa (sulfonamide antibiotics) Itching    Sulfur        Medications:  Continuous Infusions:  Scheduled Meds:   aspirin  81 mg Oral Daily    atorvastatin  80 mg Oral QHS    ceFEPime IV (PEDS and ADULTS)  1 g Intravenous Q6H    famotidine  20 mg Per G Tube BID    insulin aspart U-100  10 Units Subcutaneous TIDWM    insulin glargine U-100  10 Units Subcutaneous QHS    insulin glargine U-100  15 Units Subcutaneous Daily    miconazole NITRATE 2 %   Topical (Top) BID    multivitamin  1 tablet Per G Tube Daily    nicotine  1 patch Transdermal Daily    polyethylene glycol  17 g Oral " Daily    vancomycin (VANCOCIN) IV (PEDS and ADULTS)  750 mg Intravenous Q24H     PRN Meds:  Current Facility-Administered Medications:     dextrose 50%, 12.5 g, Intravenous, PRN    dextrose 50%, 25 g, Intravenous, PRN    diphenhydrAMINE, 25 mg, Oral, Q6H PRN    glucagon (human recombinant), 1 mg, Intramuscular, PRN    glucose, 16 g, Oral, PRN    glucose, 24 g, Oral, PRN    HYDROmorphone, 0.5 mg, Intravenous, Q3H PRN    insulin aspart U-100, 0-5 Units, Subcutaneous, QID (AC + HS) PRN    lorazepam, 1 mg, Intravenous, Once PRN    oxyCODONE, 10 mg, Oral, Q4H PRN    oxyCODONE, 5 mg, Oral, Q4H PRN    Pharmacy to dose Vancomycin consult, , , Once **AND** vancomycin - pharmacy to dose, , Intravenous, pharmacy to manage frequency    Family History       Problem Relation (Age of Onset)    Cancer Sister    Diabetes Mother, Sister, Brother, Maternal Aunt    Heart disease Maternal Aunt    Hyperlipidemia Mother, Sister, Brother    Hypertension Mother, Sister, Brother    Stroke Mother          Tobacco Use    Smoking status: Every Day     Current packs/day: 1.50     Average packs/day: 1.5 packs/day for 35.0 years (52.5 ttl pk-yrs)     Types: Cigarettes    Smokeless tobacco: Never   Substance and Sexual Activity    Alcohol use: Not Currently    Drug use: Not Currently    Sexual activity: Not on file       Review of Systems   Unable to perform ROS: Other (pt minimally verbal during visit; denied pain)     Objective:     Vital Signs (Most Recent):  Temp: 97.9 °F (36.6 °C) (05/13/25 1153)  Pulse: 90 (05/13/25 1153)  Resp: 18 (05/13/25 1153)  BP: (!) 102/59 (05/13/25 1153)  SpO2: 96 % (05/13/25 1153) Vital Signs (24h Range):  Temp:  [97.9 °F (36.6 °C)-99.4 °F (37.4 °C)] 97.9 °F (36.6 °C)  Pulse:  [] 90  Resp:  [17-20] 18  SpO2:  [93 %-96 %] 96 %  BP: (100-115)/(59-74) 102/59     Weight: 57.2 kg (126 lb)  Body mass index is 22.32 kg/m².       Physical Exam  Vitals and nursing note reviewed.   Constitutional:       General: She is  sleeping.      Comments: Able to arouse, but quickly resumed sleeping    HENT:      Head: Atraumatic.      Comments: Temporal wasting  Pulmonary:      Effort: Pulmonary effort is normal. No respiratory distress.   Musculoskeletal:      Comments: Pt lying with arms and legs curled towards midline, unclear if contractures present    Neurological:      Mental Status: She is lethargic.      Comments: Pt aroused to her name; able to confirm she has 2 children; did not respond to other questions to assess orientation further               Advance Care Planning   Advance Directives:   Living Will: No    [place]POST: No    Do Not Resuscitate Status: No    Medical Power of : No (pt confirms daughters share assistance with medical decision; 2 daughters share legal surrogate decision making, see ACP)      Decision Making:  Family answered questions  Goals of Care: The family endorses that what is most important right now is to focus on spending time at home, avoiding the hospital, and symptom/pain control    Accordingly, we have decided that the best plan to meet the patient's goals includes continuing with treatment and further discussion of hospice with home palliative care.          Significant Labs: All pertinent labs within the past 24 hours have been reviewed.  CBC:   Recent Labs   Lab 05/13/25  1050   WBC 19.88*   HGB 7.2*   HCT 24.1*   MCV 75*   *     BMP:  Recent Labs   Lab 05/13/25  0652   *      K 4.9   *   CO2 23   BUN 23   CREATININE 0.8   CALCIUM 8.6*     LFT:  Lab Results   Component Value Date    AST 21 05/13/2025    ALKPHOS 85 05/13/2025    BILITOT 0.2 05/13/2025     Albumin:   Albumin   Date Value Ref Range Status   05/13/2025 1.4 (L) 3.5 - 5.2 g/dL Final   02/12/2025 2.4 (L) 3.5 - 5.2 g/dL Final     Protein:   Protein Total   Date Value Ref Range Status   05/13/2025 6.2 6.0 - 8.4 gm/dL Final     Total Protein   Date Value Ref Range Status   02/12/2025 7.7 6.0 - 8.4 g/dL Final      Lactic acid:   Lab Results   Component Value Date    LACTATE 1.0 05/11/2025    LACTATE 3.2 (H) 05/07/2025       Significant Imaging: I have reviewed all pertinent imaging results/findings within the past 24 hours.    Total visit time: 81 minutes    70 min visit time including: face to face time in discussion of symptom assessment, and exploring options and burdens of offered treatments.  This also includes non-face to face time preparing to see the patient including chart review, obtaining and/or reviewing separately obtained history, documenting clinical information in the electronic or other health record, independently interpreting results and communicating results to the patient/family/caregiver, family discussions by phone if not able to be present, coordination of care with other specialists, and discharge planning.     16 min ACP time spent: goals of care, advanced care planning, emotional support, formulating and communicating prognosis, exploring burden/ benefit of various approaches of treatment.       Izzy Avila NP  Palliative Medicine  [facility] [place] - Med Surg

## 2025-05-13 NOTE — CONSULTS
"History & Physical    SUBJECTIVE:     History of Present Illness:  Patient is a 60 y.o. female with PMH of HTN, DM, prior CVA with residual right sided hemiplegia who presented to hospital with elevated blood glucose. Multiple pressure ulcers noted at various stages of healing; wound care nurses have been consulted and managing the same. MRI done on 25 notes evidence of osteomyelitis around the right greater trochanter. General Surgery was consulted for further management.     Chief Complaint   Patient presents with    Abdominal Pain    Hyperglycemia     Pt BIB EMS, c/o abd pain and hyperglycemia x 3 days. Pt's meter reading HI. 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        Review of patient's allergies indicates:   Allergen Reactions    Sulfa (sulfonamide antibiotics) Itching    Sulfur        Current Medications[1]    Past Medical History:   Diagnosis Date    Diabetes mellitus type I     Hyperlipidemia     Hypertension     Right sided weakness 2019     Past Surgical History:   Procedure Laterality Date     SECTION       Family History   Problem Relation Name Age of Onset    Hypertension Mother      Stroke Mother      Hyperlipidemia Mother      Diabetes Mother      Hypertension Sister      Cancer Sister      Hyperlipidemia Sister      Diabetes Sister      Hypertension Brother      Hyperlipidemia Brother      Diabetes Brother      Heart disease Maternal Aunt      Diabetes Maternal Aunt       Social History[2]     Review of Systems:  Review of Systems   Unable to perform ROS: Patient nonverbal         OBJECTIVE:     Vital Signs (Most Recent)  Temp: 97.9 °F (36.6 °C) (25 1153)  Pulse: 90 (25 1153)  Resp: 18 (25 1153)  BP: (!) 102/59 (25 1153)  SpO2: 96 % (25 1153)  5' 3" (1.6 m)  57.2 kg (126 lb)     Physical Exam:  Physical Exam  Cardiovascular:      Rate and Rhythm: Normal rate.   Pulmonary:      Effort: Pulmonary effort is normal. No " respiratory distress.   Musculoskeletal:      Comments: Upper and lower extremity joint contractures   Skin:     Comments: Right lateral hip decubitus ulcer with tunneling however pink granulation tissue noted. Some area of sloughing noted in superior portion of wound however no malodor or purulence    Neurological:      Mental Status: She is alert.           Laboratory  CBC: Reviewed  CMP: Reviewed    Diagnostic Results:  MRI: Reviewed      Significant Diagnostic Studies: Labs: CMP   Recent Labs   Lab 05/12/25  0734 05/13/25  0652    141   K 4.8 4.9    111*   CO2 23 23   * 181*   BUN 23 23   CREATININE 0.8 0.8   CALCIUM 8.8 8.6*   PROT 6.3 6.2   ALBUMIN 1.5* 1.4*   BILITOT 0.3 0.2   ALKPHOS 92 85   AST 18 21   ALT 7* 10   ANIONGAP 7* 7*    and CBC   Recent Labs   Lab 05/12/25  0734 05/13/25  0652 05/13/25  1050   WBC 18.73* 21.64* 19.88*   HGB 7.5* 6.6* 7.2*   HCT 24.5* 21.4* 24.1*   * 504* 466*     Radiology:   MRI Hip Without Contrast Right   Final Result      Large lateral soft tissue ulceration with suspected early osteomyelitis of the greater trochanter.         Electronically signed by: William Gordon MD   Date:    05/13/2025   Time:    08:48      CT Abdomen Pelvis With IV Contrast NO Oral Contrast   Final Result   Abnormal      This report was flagged in Epic as abnormal.      1. Moderate to large amount of stool throughout the colon may reflect constipation.  Please note, there is lobular thickening at the rectal anal junction, correlation with physical exam advised.  Malignancy not excluded.   2. The stomach is distended with ingested content noting gastrostomy tube in place.  Correlation recommended.   3. The urinary bladder is distended, correlation with any history of urinary retention or outlet obstruction.   4. Thickening of the distal esophagus, correlation with any history of reflux esophagitis, direct visualization as warranted.   5. Open wound along the right lateral  aspect of the upper thigh, worsened since the previous exam.  No convincing adjacent osseous erosive or destructive process.  Please see above for full details.   6. Please see above for several additional findings.         Electronically signed by: Migue Harmon MD   Date:    05/07/2025   Time:    14:04      X-Ray Chest AP Portable   Final Result      No detrimental change when compared with 04/10/2025.  No new focal consolidation.         Electronically signed by: Ventura Wilder MD   Date:    05/07/2025   Time:    13:42            ASSESSMENT/PLAN:     Ms. Martinez is a 60 year old female with the above past medical history found to have evidence of osteomyelitis of the greater trochanter at the base of decubitus ulcer.     PLAN:Plan     - continue ongoing wound care with wound care nursing.  - no acute surgical intervention at this time  - continue medical optimization  - continue optimizing nutrition. Consider M,Th nutrition labs  - pressure offloading  - ID consultation for management of osteomyelitis  - General Surgery will sign off, please feel free to contact the team with any questions or concerns    Charlene Daniel MD PGY-II  [facility] General Surgery                [1]   Current Facility-Administered Medications   Medication Dose Route Frequency Provider Last Rate Last Admin    aspirin EC tablet 81 mg  81 mg Oral Daily Josiah Goyal MD   81 mg at 05/13/25 0838    atorvastatin tablet 80 mg  80 mg Oral QHS Josiah Goyal MD   80 mg at 05/12/25 2039    ceFEPIme injection 1 g  1 g Intravenous Q6H Adan Cartagena MD        dextrose 50% injection 12.5 g  12.5 g Intravenous PRN Josiah Goyal MD        dextrose 50% injection 25 g  25 g Intravenous PRN Josiah Goyal MD        diphenhydrAMINE capsule 25 mg  25 mg Oral Q6H PRN Alejandrina Rao MD   25 mg at 05/11/25 1401    famotidine 40 mg/5 mL (8 mg/mL) suspension 20 mg  20 mg Per G Tube BID Rahul Isaacs MD   20 mg at 05/13/25 0838    glucagon  (human recombinant) injection 1 mg  1 mg Intramuscular PRN Josiah Goyal MD        glucose chewable tablet 16 g  16 g Oral PRN Josiah Goyal MD        glucose chewable tablet 24 g  24 g Oral PRN Josiah Goyal MD        HYDROmorphone injection 0.5 mg  0.5 mg Intravenous Q3H PRN Josiah Goyal MD   0.5 mg at 05/10/25 0803    insulin aspart U-100 pen 0-5 Units  0-5 Units Subcutaneous QID (AC + HS) PRN Josiah Goyal MD   2 Units at 05/12/25 1829    insulin aspart U-100 pen 10 Units  10 Units Subcutaneous TIDWM Rahul Isaacs MD   10 Units at 05/13/25 0836    insulin glargine U-100 (Lantus) pen 10 Units  10 Units Subcutaneous QHS Rahul Isaacs MD   10 Units at 05/12/25 2042    insulin glargine U-100 (Lantus) pen 15 Units  15 Units Subcutaneous Daily Rahul Isaacs MD   15 Units at 05/13/25 0836    LORazepam injection 1 mg  1 mg Intravenous Once PRN Rahul Isaacs MD        miconazole NITRATE 2 % top powder   Topical (Top) BID Rahul Isaacs MD   Given at 05/13/25 0839    multivitamin tablet  1 tablet Per G Tube Daily Josiah Goyal MD   1 tablet at 05/13/25 0838    nicotine 14 mg/24 hr 1 patch  1 patch Transdermal Daily Josiah Goyal MD   1 patch at 05/13/25 0838    oxyCODONE immediate release tablet 10 mg  10 mg Oral Q4H PRN Josiah Goyal MD   10 mg at 05/12/25 0342    oxyCODONE immediate release tablet 5 mg  5 mg Oral Q4H PRN Josiah Goyal MD        polyethylene glycol packet 17 g  17 g Oral Daily Rahul Isaacs MD   17 g at 05/13/25 0839    vancomycin - pharmacy to dose   Intravenous pharmacy to manage frequency Roaxna Pickens, DO        vancomycin 750 mg in 0.9% NaCl 250 mL IVPB (admixture device)  750 mg Intravenous Q24H Rahul Isaacs MD   Stopped at 05/12/25 1713   [2]   Social History  Tobacco Use    Smoking status: Every Day     Current packs/day: 1.50     Average packs/day: 1.5 packs/day for 35.0 years (52.5 ttl pk-yrs)     Types: Cigarettes     Smokeless tobacco: Never   Substance Use Topics    Alcohol use: Not Currently    Drug use: Not Currently

## 2025-05-13 NOTE — NURSING
[facility] [place]  Nurses Note -- 4 Eyes  Pt in bed, awake and alert, disoriented to time and situation. PEG tube 40ml/hr, turned L side, on room air. Heels boots on. Bed in low position, wheels locked. Side rails up. Bed alarm on. Call light within reach.    5/12/2025       Skin assessed on: Q Shift      [] No Pressure Injuries Present    [x]Prevention Measures Documented  Stage 4 pressure inj R hip  [x] Yes LDA  for Pressure Injury Previously documented     [] Yes New Pressure Injury Discovered   [] LDA for New Pressure Injury Added      Attending RN:  Balta Mckeon, JUAN     Second RN:  JUAN Ritchie

## 2025-05-13 NOTE — SUBJECTIVE & OBJECTIVE
"Past Medical History:   Diagnosis Date    Diabetes mellitus type I     Hyperlipidemia     Hypertension     Right sided weakness 2019       Past Surgical History:   Procedure Laterality Date     SECTION         Review of patient's allergies indicates:   Allergen Reactions    Sulfa (sulfonamide antibiotics) Itching    Sulfur        Medications:  Medications Prior to Admission   Medication Sig    acetaminophen (TYLENOL) 650 MG TbSR Take 1 tablet (650 mg total) by mouth every 8 (eight) hours. For back pain    aspirin (ECOTRIN) 81 MG EC tablet Take 1 tablet (81 mg total) by mouth once daily.    atorvastatin (LIPITOR) 80 MG tablet Take 80 mg by mouth every evening.    blood sugar diagnostic (TRUE METRIX GLUCOSE TEST STRIP) Strp TO CHECK BLOOD GLUCOSE THREE TIMES DAILY    clopidogreL (PLAVIX) 75 mg tablet Take 1 tablet (75 mg total) by mouth once daily.    diphenhydrAMINE (BENADRYL) 50 MG capsule Take 1 capsule (50 mg total) by mouth every 6 (six) hours as needed for Itching.    insulin glargine U-100, Lantus, 100 unit/mL (3 mL) SubQ InPn pen Inject 5 Units into the skin every evening.    insulin lispro protamin-lispro 100 unit/mL (50-50) InPn Inject 10 Units into the skin. once daily    lancets (ONETOUCH ULTRASOFT LANCETS) Misc Use 1 TID as directed for diabetes checking    multivitamin Tab 1 tablet by Per G Tube route once daily.    pen needle, diabetic (BD ULTRA-FINE ORIG PEN NEEDLE) 29 gauge x 1/2" Ndle USE TO TEST THREE TIMES DAILY MUST LAST 33 DAYS     Antibiotics (From admission, onward)      Start     Stop Route Frequency Ordered    25 1300  ceFEPIme injection 1 g         -- IV Every 6 hours (non-standard times) 25 0809    25 1700  vancomycin 750 mg in 0.9% NaCl 250 mL IVPB (admixture device)         -- IV Every 24 hours (non-standard times) 25 1518    25 1417  vancomycin - pharmacy to dose  (vancomycin IVPB (PEDS and ADULTS))        Placed in "And" Linked Group    -- IV " pharmacy to manage frequency 05/07/25 1317          Antifungals (From admission, onward)      Start     Stop Route Frequency Ordered    05/11/25 2100  miconazole NITRATE 2 % top powder         -- Top 2 times daily 05/11/25 1405          Antivirals (From admission, onward)      None             Immunization History   Administered Date(s) Administered    COVID-19, MRNA, LN-S, PF (MODERNA FULL 0.5 ML DOSE) 03/31/2021, 05/01/2021    Hepatitis A, Adult 09/19/2005    Influenza - Quadrivalent - PF *Preferred* (6 months and older) 10/21/2021    Influenza - Trivalent - Afluria, Fluzone MDV 09/19/2005, 10/13/2011, 11/26/2019    PPD Test 07/24/2024    Tdap 03/21/2024       Family History       Problem Relation (Age of Onset)    Cancer Sister    Diabetes Mother, Sister, Brother, Maternal Aunt    Heart disease Maternal Aunt    Hyperlipidemia Mother, Sister, Brother    Hypertension Mother, Sister, Brother    Stroke Mother          Social History     Socioeconomic History    Marital status: Single   Tobacco Use    Smoking status: Every Day     Current packs/day: 1.50     Average packs/day: 1.5 packs/day for 35.0 years (52.5 ttl pk-yrs)     Types: Cigarettes    Smokeless tobacco: Never   Substance and Sexual Activity    Alcohol use: Not Currently    Drug use: Not Currently     Social Drivers of Health     Financial Resource Strain: Patient Declined (5/7/2025)    Overall Financial Resource Strain (CARDIA)     Difficulty of Paying Living Expenses: Patient declined   Food Insecurity: Patient Declined (5/7/2025)    Hunger Vital Sign     Worried About Running Out of Food in the Last Year: Patient declined     Ran Out of Food in the Last Year: Patient declined   Transportation Needs: Patient Declined (5/7/2025)    PRAPARE - Transportation     Lack of Transportation (Medical): Patient declined     Lack of Transportation (Non-Medical): Patient declined   Physical Activity: Inactive (5/8/2025)    Exercise Vital Sign     Days of Exercise per  Week: 0 days     Minutes of Exercise per Session: 0 min   Stress: No Stress Concern Present (5/8/2025)    [employer] - Occupational Stress Questionnaire     Feeling of Stress : Not at all   Housing Stability: Patient Declined (5/7/2025)    Housing Stability Vital Sign     Unable to Pay for Housing in the Last Year: Patient declined     Homeless in the Last Year: Patient declined     Review of Systems   Reason unable to perform ROS: mental status.     Objective:     Vital Signs (Most Recent):  Temp: 97.9 °F (36.6 °C) (05/13/25 1153)  Pulse: 90 (05/13/25 1153)  Resp: 18 (05/13/25 1153)  BP: (!) 102/59 (05/13/25 1153)  SpO2: 96 % (05/13/25 1153) Vital Signs (24h Range):  Temp:  [97.9 °F (36.6 °C)-99.4 °F (37.4 °C)] 97.9 °F (36.6 °C)  Pulse:  [] 90  Resp:  [17-20] 18  SpO2:  [93 %-96 %] 96 %  BP: (100-115)/(59-74) 102/59     Weight: 57.2 kg (126 lb)  Body mass index is 22.32 kg/m².    Estimated Creatinine Clearance: 60.3 mL/min (based on SCr of 0.8 mg/dL).     Physical Exam  Vitals reviewed.   Constitutional:       Appearance: She is ill-appearing.   HENT:      Nose: Nose normal.   Pulmonary:      Effort: Pulmonary effort is normal. No respiratory distress.   Abdominal:      Palpations: Abdomen is soft.      Comments: PEG tube   Musculoskeletal:      Comments: Hip with large ulceration with purulent drainage    Neurological:      Mental Status: She is disoriented.          Significant Labs:   Microbiology Results (last 7 days)       Procedure Component Value Units Date/Time    Blood culture #2 **CANNOT BE ORDERED STAT** [[id number]]  (Normal) Collected: 05/07/25 1301    Order Status: Completed Specimen: Blood from Peripheral, Forearm, Right Updated: 05/12/25 1400     Blood Culture No Growth After 5 Days    Blood culture #1 **CANNOT BE ORDERED STAT** [[id number]]  (Normal) Collected: 05/07/25 1301    Order Status: Completed Specimen: Blood from Peripheral, Forearm, Right Updated:  05/12/25 1400     Blood Culture No Growth After 5 Days            Significant Imaging: I have reviewed all pertinent imaging results/findings within the past 24 hours.   Dressing: telfa dressing

## 2025-05-13 NOTE — NURSING
Pt came back from MRI, alert and awake, VS taken and stable, with IV L hand, intact. Connected back to PEG tube feeding with 40ml/hr rate, boots on, turned left.

## 2025-05-13 NOTE — PROGRESS NOTES
Pharmacokinetic Assessment Follow Up: IV Vancomycin    Vancomycin serum concentration assessment(s):    The trough level was drawn correctly and can be used to guide therapy at this time. The measurement is within the desired definitive target range of 10 to 20 mcg/mL.    Vancomycin Regimen Plan:    Continue regimen to Vancomycin 750 mg IV every 24 hours with next serum trough concentration measured at 1600 prior to 3rd dose on 5/15/2025    Drug levels (last 3 results):  Recent Labs   Lab Result Units 05/11/25  1637 05/13/25  1050   Vancomycin Trough ug/ml 12.4 15.5       Pharmacy will continue to follow and monitor vancomycin.    Please contact pharmacy at extension [phone] for questions regarding this assessment.    Thank you for the consult,   Devendra Corrales Jr       Patient brief summary:  Emily Martinez is a 60 y.o. female initiated on antimicrobial therapy with IV Vancomycin for treatment of sepsis    Drug Allergies:   Review of patient's allergies indicates:   Allergen Reactions    Sulfa (sulfonamide antibiotics) Itching    Sulfur        Actual Body Weight:   57.2 kg    Renal Function:   Estimated Creatinine Clearance: 60.3 mL/min (based on SCr of 0.8 mg/dL).,     Dialysis Method (if applicable):  N/A    CBC (last 72 hours):  Recent Labs   Lab Result Units 05/11/25  0421 05/11/25  0621 05/12/25  0734 05/13/25  0652 05/13/25  1050   WBC K/uL 13.93*  --  18.73* 21.64* 19.88*   HGB gm/dL 6.8* 7.1* 7.5* 6.6* 7.2*   HCT % 22.3*  --  24.5* 21.4* 24.1*   Platelet Count K/uL 538*  --  453* 504* 466*   Lymph % % 13.1*  --  9.0* 9.5* 9.1*   Mono % % 11.7  --  6.3 9.7 7.1   Eos % % 1.1  --  0.3 1.5 1.4   Basophil % % 0.2  --  0.1 0.2 0.3       Metabolic Panel (last 72 hours):  Recent Labs   Lab Result Units 05/11/25  0421 05/12/25  0734 05/13/25  0652   Sodium mmol/L 141 139 141   Potassium mmol/L 4.6 4.8 4.9   Chloride mmol/L 106 109 111*   CO2 mmol/L 26 23 23   Glucose mg/dL 205* 282* 181*   BUN mg/dL 18 23 23    Creatinine mg/dL 0.8 0.8 0.8   Albumin g/dL 1.6* 1.5* 1.4*   Bilirubin Total mg/dL 0.3 0.3 0.2   ALP unit/L 86 92 85   AST unit/L 15 18 21   ALT unit/L <5* 7* 10       Vancomycin Administrations:  vancomycin given in the last 96 hours                     vancomycin 750 mg in 0.9% NaCl 250 mL IVPB (admixture device) (mg) 750 mg New Bag 05/12/25 1543     750 mg New Bag 05/11/25 1654     750 mg New Bag 05/10/25 1616     750 mg New Bag 05/09/25 1659                    Microbiologic Results:  Microbiology Results (last 7 days)       Procedure Component Value Units Date/Time    Blood culture #2 **CANNOT BE ORDERED STAT** [[id number]]  (Normal) Collected: 05/07/25 1301    Order Status: Completed Specimen: Blood from Peripheral, Forearm, Right Updated: 05/12/25 1400     Blood Culture No Growth After 5 Days    Blood culture #1 **CANNOT BE ORDERED STAT** [[id number]]  (Normal) Collected: 05/07/25 1301    Order Status: Completed Specimen: Blood from Peripheral, Forearm, Right Updated: 05/12/25 1400     Blood Culture No Growth After 5 Days

## 2025-05-13 NOTE — HPI
"From H&P: "Emily Martinez is a 60 y.o. female with DM1, HTN, HLD, and Previous CVA with residual deficits who was BIBA to [facility] ED for eval and treatment of acute generalized abdominal pain for the last 2x days. Per EMS, pt continued to experience persistent abdominal pain this morning accompanied by 4 episodes of emesis prompting her relatives to check her blood sugar at home. EMS notes that family received a reading of "critical high" and subsequently gave pt her insulin.   EMS reports receiving a reading greater than 500 upon their arrival.  She is chronically bed-bound, lives with her daughter, and has dementia (but is currently at her baseline per what daughter told EMS).  Further history therefore limited.  Has multiple chronic pressure ulcers on her sacrum and legs, presented to [facility] ED on April 10 with very similar circumstances as now, but was DC'ed home from the ED after a round of pain meds."     Palliative medicine consulted for goals of care discussion and advance care planning; for details of visit, see advance care planning section of plan.     "

## 2025-05-13 NOTE — NURSING
Report received from nixon Gifford RN. Patient resting quietly, no apparent distress noted at this time. Wheels locked in lowest position, call light within reach, Telemetry monitoring [id number] in place.    [facility] [place]  Nurses Note -- 4 Eyes      5/13/2025       Skin assessed on: Q Shift      [] No Pressure Injuries Present    []Prevention Measures Documented    [x] Yes LDA  for Pressure Injury Previously documented     [] Yes New Pressure Injury Discovered   [] LDA for New Pressure Injury Added      Attending RN:  Beatrice Casey LPN     Second RN:  JUAN Gifford

## 2025-05-13 NOTE — ASSESSMENT & PLAN NOTE
- daughter confirms that pt stopped smoking over a year ago; due to mental limitations she sometimes forgets and will ask to smoke but is easily redirected

## 2025-05-13 NOTE — ASSESSMENT & PLAN NOTE
Chronic, pressure ulcers.  Most recently debrided by wound care on 4/9.  Various stages of healing currently; R hip ulcer is purulent.  WBC, ESR, CRP, Lactate all elevated, although pt not febrile.  Wet-to-dry dressing applied and Vanc given in ED.  Wound care consulted  Concern with worsening right hip wound and tunneling noted on CT -- and wound care  MRI with large lateral soft tissue ulceration with suspected early osteomyelitis of the greater trochanter.   Continue ABX's.  Will consult ID for osteomyelitis.  Consult General Surgery for possible debridement with culture.

## 2025-05-13 NOTE — ASSESSMENT & PLAN NOTE
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 Most recent hemoglobin and hematocrit are listed below.  Recent Labs     05/12/25  0734 05/13/25  0652 05/13/25  1050   HGB 7.5* 6.6* 7.2*   HCT 24.5* 21.4* 24.1*     Plan  - Monitor serial CBC: Daily  - Transfuse PRBC if patient becomes hemodynamically unstable, symptomatic or H/H drops below 7/21.  - Patient has not received any PRBC transfusions to date  - Patient's anemia is currently stable  - Suspect iron def +chronic disease  Hgb 6.6 this morning, but repeat Hgb>7.  Continue to monitor.

## 2025-05-13 NOTE — ASSESSMENT & PLAN NOTE
Patient's blood pressure range in the last 24 hours was: BP  Min: 100/64  Max: 115/74.The patient's inpatient anti-hypertensive regimen is listed below:  Current Antihypertensives       Plan  - BP is controlled, no changes needed to their regimen

## 2025-05-13 NOTE — ASSESSMENT & PLAN NOTE
- border lining severe calorie protein malnutrition with 10% weight loss in less than 6 months and no available 6 month weight comparison  - albumin currently 1.4  - pt currently with PEG and tube feeds, but was also having some prior PO intake, although on steady decline per daughter; RD involved this admission   - likely contributing to poor wound healing and contributes to appropriateness for hospice if/when aligns with GOC

## 2025-05-13 NOTE — PLAN OF CARE
Problem: Adult Inpatient Plan of Care  Goal: Absence of Hospital-Acquired Illness or Injury  Outcome: Progressing  Intervention: Identify and Manage Fall Risk  Flowsheets (Taken 5/13/2025 0531)  Safety Promotion/Fall Prevention:   assistive device/personal item within reach   bed alarm set   side rails raised x 2   /camera at bedside   nonskid shoes/socks when out of bed   room near unit station   lighting adjusted  Intervention: Prevent Skin Injury  Flowsheets (Taken 5/13/2025 0531)  Body Position: turned  Skin Protection: skin sealant/moisture barrier applied

## 2025-05-14 LAB
ABSOLUTE EOSINOPHIL (OHS): 0.24 K/UL
ABSOLUTE MONOCYTE (OHS): 1.27 K/UL (ref 0.3–1)
ABSOLUTE NEUTROPHIL COUNT (OHS): 13.54 K/UL (ref 1.8–7.7)
ALBUMIN SERPL BCP-MCNC: 1.5 G/DL (ref 3.5–5.2)
ALP SERPL-CCNC: 91 UNIT/L (ref 40–150)
ALT SERPL W/O P-5'-P-CCNC: 8 UNIT/L (ref 10–44)
ANION GAP (OHS): 9 MMOL/L (ref 8–16)
AST SERPL-CCNC: 17 UNIT/L (ref 11–45)
BASOPHILS # BLD AUTO: 0.05 K/UL
BASOPHILS NFR BLD AUTO: 0.3 %
BILIRUB SERPL-MCNC: 0.2 MG/DL (ref 0.1–1)
BUN SERPL-MCNC: 22 MG/DL (ref 8–23)
CALCIUM SERPL-MCNC: 9 MG/DL (ref 8.7–10.5)
CHLORIDE SERPL-SCNC: 110 MMOL/L (ref 95–110)
CO2 SERPL-SCNC: 24 MMOL/L (ref 23–29)
CREAT SERPL-MCNC: 0.8 MG/DL (ref 0.5–1.4)
ERYTHROCYTE [DISTWIDTH] IN BLOOD BY AUTOMATED COUNT: 19.7 % (ref 11.5–14.5)
GFR SERPLBLD CREATININE-BSD FMLA CKD-EPI: >60 ML/MIN/1.73/M2
GLUCOSE SERPL-MCNC: 185 MG/DL (ref 70–110)
HCT VFR BLD AUTO: 23.7 % (ref 37–48.5)
HGB BLD-MCNC: 7.2 GM/DL (ref 12–16)
IMM GRANULOCYTES # BLD AUTO: 0.12 K/UL (ref 0–0.04)
IMM GRANULOCYTES NFR BLD AUTO: 0.7 % (ref 0–0.5)
LYMPHOCYTES # BLD AUTO: 1.71 K/UL (ref 1–4.8)
MCH RBC QN AUTO: 22.4 PG (ref 27–31)
MCHC RBC AUTO-ENTMCNC: 30.4 G/DL (ref 32–36)
MCV RBC AUTO: 74 FL (ref 82–98)
NUCLEATED RBC (/100WBC) (OHS): 1 /100 WBC
PLATELET # BLD AUTO: 563 K/UL (ref 150–450)
PMV BLD AUTO: 8.7 FL (ref 9.2–12.9)
POCT GLUCOSE: 114 MG/DL (ref 70–110)
POCT GLUCOSE: 131 MG/DL (ref 70–110)
POCT GLUCOSE: 207 MG/DL (ref 70–110)
POCT GLUCOSE: 50 MG/DL (ref 70–110)
POCT GLUCOSE: 52 MG/DL (ref 70–110)
POTASSIUM SERPL-SCNC: 4.3 MMOL/L (ref 3.5–5.1)
PROT SERPL-MCNC: 6.6 GM/DL (ref 6–8.4)
RBC # BLD AUTO: 3.22 M/UL (ref 4–5.4)
RELATIVE EOSINOPHIL (OHS): 1.4 %
RELATIVE LYMPHOCYTE (OHS): 10.1 % (ref 18–48)
RELATIVE MONOCYTE (OHS): 7.5 % (ref 4–15)
RELATIVE NEUTROPHIL (OHS): 80 % (ref 38–73)
SODIUM SERPL-SCNC: 143 MMOL/L (ref 136–145)
WBC # BLD AUTO: 16.93 K/UL (ref 3.9–12.7)

## 2025-05-14 PROCEDURE — 25000003 PHARM REV CODE 250

## 2025-05-14 PROCEDURE — 25000003 PHARM REV CODE 250: Performed by: STUDENT IN AN ORGANIZED HEALTH CARE EDUCATION/TRAINING PROGRAM

## 2025-05-14 PROCEDURE — S4991 NICOTINE PATCH NONLEGEND: HCPCS

## 2025-05-14 PROCEDURE — 87075 CULTR BACTERIA EXCEPT BLOOD: CPT | Performed by: HOSPITALIST

## 2025-05-14 PROCEDURE — 82435 ASSAY OF BLOOD CHLORIDE: CPT | Performed by: STUDENT IN AN ORGANIZED HEALTH CARE EDUCATION/TRAINING PROGRAM

## 2025-05-14 PROCEDURE — 87070 CULTURE OTHR SPECIMN AEROBIC: CPT | Performed by: HOSPITALIST

## 2025-05-14 PROCEDURE — 63600175 PHARM REV CODE 636 W HCPCS: Performed by: HOSPITALIST

## 2025-05-14 PROCEDURE — 99233 SBSQ HOSP IP/OBS HIGH 50: CPT | Mod: ,,,

## 2025-05-14 PROCEDURE — 21400001 HC TELEMETRY ROOM

## 2025-05-14 PROCEDURE — 85025 COMPLETE CBC W/AUTO DIFF WBC: CPT | Performed by: STUDENT IN AN ORGANIZED HEALTH CARE EDUCATION/TRAINING PROGRAM

## 2025-05-14 PROCEDURE — 63600175 PHARM REV CODE 636 W HCPCS: Performed by: STUDENT IN AN ORGANIZED HEALTH CARE EDUCATION/TRAINING PROGRAM

## 2025-05-14 PROCEDURE — 36415 COLL VENOUS BLD VENIPUNCTURE: CPT | Performed by: STUDENT IN AN ORGANIZED HEALTH CARE EDUCATION/TRAINING PROGRAM

## 2025-05-14 RX ORDER — ONDANSETRON HYDROCHLORIDE 2 MG/ML
8 INJECTION, SOLUTION INTRAVENOUS EVERY 6 HOURS PRN
Status: DISCONTINUED | OUTPATIENT
Start: 2025-05-14 | End: 2025-05-22 | Stop reason: HOSPADM

## 2025-05-14 RX ORDER — POLYETHYLENE GLYCOL 3350 17 G/17G
17 POWDER, FOR SOLUTION ORAL 2 TIMES DAILY PRN
Status: DISCONTINUED | OUTPATIENT
Start: 2025-05-14 | End: 2025-05-22 | Stop reason: HOSPADM

## 2025-05-14 RX ADMIN — ONDANSETRON 8 MG: 2 INJECTION INTRAMUSCULAR; INTRAVENOUS at 05:05

## 2025-05-14 RX ADMIN — INSULIN ASPART 10 UNITS: 100 INJECTION, SOLUTION INTRAVENOUS; SUBCUTANEOUS at 08:05

## 2025-05-14 RX ADMIN — CEFEPIME 1 G: 1 INJECTION, POWDER, FOR SOLUTION INTRAMUSCULAR; INTRAVENOUS at 06:05

## 2025-05-14 RX ADMIN — INSULIN ASPART 10 UNITS: 100 INJECTION, SOLUTION INTRAVENOUS; SUBCUTANEOUS at 12:05

## 2025-05-14 RX ADMIN — CEFEPIME 1 G: 1 INJECTION, POWDER, FOR SOLUTION INTRAMUSCULAR; INTRAVENOUS at 01:05

## 2025-05-14 RX ADMIN — FAMOTIDINE 20 MG: 40 POWDER, FOR SUSPENSION ORAL at 08:05

## 2025-05-14 RX ADMIN — CEFEPIME 1 G: 1 INJECTION, POWDER, FOR SOLUTION INTRAMUSCULAR; INTRAVENOUS at 12:05

## 2025-05-14 RX ADMIN — INSULIN GLARGINE 10 UNITS: 100 INJECTION, SOLUTION SUBCUTANEOUS at 11:05

## 2025-05-14 RX ADMIN — THERA TABS 1 TABLET: TAB at 09:05

## 2025-05-14 RX ADMIN — POLYETHYLENE GLYCOL 3350 17 G: 17 POWDER, FOR SOLUTION ORAL at 09:05

## 2025-05-14 RX ADMIN — DEXTROSE MONOHYDRATE 12.5 G: 25 INJECTION, SOLUTION INTRAVENOUS at 04:05

## 2025-05-14 RX ADMIN — NICOTINE 1 PATCH: 14 PATCH TRANSDERMAL at 09:05

## 2025-05-14 RX ADMIN — FAMOTIDINE 20 MG: 40 POWDER, FOR SUSPENSION ORAL at 09:05

## 2025-05-14 RX ADMIN — INSULIN GLARGINE 15 UNITS: 100 INJECTION, SOLUTION SUBCUTANEOUS at 09:05

## 2025-05-14 RX ADMIN — OXYCODONE 10 MG: 5 TABLET ORAL at 12:05

## 2025-05-14 RX ADMIN — MICONAZOLE NITRATE: 20 POWDER TOPICAL at 09:05

## 2025-05-14 RX ADMIN — ASPIRIN 81 MG: 81 TABLET, COATED ORAL at 09:05

## 2025-05-14 RX ADMIN — ATORVASTATIN CALCIUM 80 MG: 40 TABLET, FILM COATED ORAL at 08:05

## 2025-05-14 RX ADMIN — CEFEPIME 1 G: 1 INJECTION, POWDER, FOR SOLUTION INTRAMUSCULAR; INTRAVENOUS at 08:05

## 2025-05-14 RX ADMIN — VANCOMYCIN HYDROCHLORIDE 750 MG: 750 INJECTION, POWDER, LYOPHILIZED, FOR SOLUTION INTRAVENOUS at 06:05

## 2025-05-14 RX ADMIN — MICONAZOLE NITRATE: 20 POWDER TOPICAL at 08:05

## 2025-05-14 NOTE — NURSING
[facility], [facility] - [place]  Nurses Note -- 4 Eyes      5/13/2025       Skin assessed on: Q Shift      [] No Pressure Injuries Present    []Prevention Measures Documented    [x] Yes LDA  for Pressure Injury Previously documented     [] Yes New Pressure Injury Discovered   [] LDA for New Pressure Injury Added      Attending RN:  Stephanie Barthelemy, RN     Second RN:  Beatrice Casey LPN

## 2025-05-14 NOTE — ASSESSMENT & PLAN NOTE
Chronic, pressure ulcers.  Most recently debrided by wound care on 4/9.  Various stages of healing currently; R hip ulcer is purulent.  WBC, ESR, CRP, Lactate all elevated, although pt not febrile.  Wet-to-dry dressing applied and Vanc given in ED.  Wound care consulted  Concern with worsening right hip wound and tunneling noted on CT -- and wound care  MRI with large lateral soft tissue ulceration with suspected early osteomyelitis of the greater trochanter.   Continue ABX's.  Appreciate ID input.  Consult General Surgery for possible debridement with culture.  No need for surgical debridement per Surgery.  Asked Wound Care to get deep wound culture.

## 2025-05-14 NOTE — ASSESSMENT & PLAN NOTE
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 Most recent hemoglobin and hematocrit are listed below.  Recent Labs     05/13/25  0652 05/13/25  1050 05/14/25  0857   HGB 6.6* 7.2* 7.2*   HCT 21.4* 24.1* 23.7*     Plan  - Monitor serial CBC: Daily  - Transfuse PRBC if patient becomes hemodynamically unstable, symptomatic or H/H drops below 7/21.  - Patient has not received any PRBC transfusions to date  - Patient's anemia is currently stable  - Suspect iron def +chronic disease  Hgb 6.6 this morning, but repeat Hgb>7.  Continue to monitor.

## 2025-05-14 NOTE — PROGRESS NOTES
Vancomycin consult follow-up:    Patient reviewed, renal function stable, no new levels, continue current therapy; Next levels due: trough due 5/15/2025 at 0600

## 2025-05-14 NOTE — PLAN OF CARE
Problem: Infection  Goal: Absence of Infection Signs and Symptoms  Outcome: Progressing  Intervention: Prevent or Manage Infection  Flowsheets (Taken 5/14/2025 0503)  Infection Management: aseptic technique maintained  Isolation Precautions: protective     Problem: Adult Inpatient Plan of Care  Goal: Absence of Hospital-Acquired Illness or Injury  Outcome: Progressing  Intervention: Prevent Infection  Flowsheets (Taken 5/14/2025 0503)  Infection Prevention:   cohorting utilized   environmental surveillance performed   hand hygiene promoted   rest/sleep promoted   single patient room provided  Goal: Optimal Comfort and Wellbeing  Outcome: Progressing  Intervention: Monitor Pain and Promote Comfort  Flowsheets (Taken 5/14/2025 0503)  Pain Management Interventions: medication offered     Problem: Fall Injury Risk  Goal: Absence of Fall and Fall-Related Injury  Outcome: Progressing  Intervention: Promote Injury-Free Environment  Flowsheets (Taken 5/14/2025 0503)  Safety Promotion/Fall Prevention:   assistive device/personal item within reach   bed alarm set   instructed to call staff for mobility   lighting adjusted   medications reviewed   nonskid shoes/socks when out of bed   room near unit station   side rails raised x 2

## 2025-05-14 NOTE — SUBJECTIVE & OBJECTIVE
Interval History: no new issues overnight    Review of Systems   HENT:  Negative for ear discharge and ear pain.    Eyes:  Negative for discharge and itching.   Endocrine: Negative for cold intolerance and heat intolerance.   Neurological:  Negative for seizures and syncope.     Objective:     Vital Signs (Most Recent):  Temp: 98.9 °F (37.2 °C) (05/14/25 1546)  Pulse: 95 (05/14/25 1546)  Resp: 18 (05/14/25 1546)  BP: 118/77 (05/14/25 1546)  SpO2: 95 % (05/14/25 1546) Vital Signs (24h Range):  Temp:  [98.7 °F (37.1 °C)-99.2 °F (37.3 °C)] 98.9 °F (37.2 °C)  Pulse:  [90-99] 95  Resp:  [18-20] 18  SpO2:  [95 %-96 %] 95 %  BP: (100-130)/(67-89) 118/77     Weight: 57.2 kg (126 lb)  Body mass index is 22.32 kg/m².    Intake/Output Summary (Last 24 hours) at 5/14/2025 1732  Last data filed at 5/14/2025 1232  Gross per 24 hour   Intake 440 ml   Output 1400 ml   Net -960 ml         Physical Exam  Vitals and nursing note reviewed.   Constitutional:       General: She is not in acute distress.     Appearance: Normal appearance. She is ill-appearing.   HENT:      Head: Normocephalic and atraumatic.   Cardiovascular:      Rate and Rhythm: Normal rate and regular rhythm.      Pulses: Normal pulses.      Heart sounds: No murmur heard.  Pulmonary:      Effort: Pulmonary effort is normal. No respiratory distress.      Breath sounds: Normal breath sounds.   Abdominal:      General: Bowel sounds are normal. There is no distension.      Palpations: Abdomen is soft.      Comments: G-tube in place   Musculoskeletal:      Comments: Multiple ulcers lower extremity, right hip with wound    Skin:     General: Skin is warm and dry.   Neurological:      Mental Status: She is alert.               Significant Labs: All pertinent labs within the past 24 hours have been reviewed.  BMP:   Recent Labs   Lab 05/14/25  0857   *      K 4.3      CO2 24   BUN 22   CREATININE 0.8   CALCIUM 9.0     CBC:   Recent Labs   Lab 05/13/25  0652  05/13/25  1050 05/14/25  0857   WBC 21.64* 19.88* 16.93*   HGB 6.6* 7.2* 7.2*   HCT 21.4* 24.1* 23.7*   * 466* 563*       Significant Imaging: I have reviewed all pertinent imaging results/findings within the past 24 hours.

## 2025-05-14 NOTE — PROGRESS NOTES
[facility] Surg  Wound Care  WOC CRIS    Patient Name:  Emily Martinez   MRN:  [id number]  Date: 5/14/2025  Diagnosis: Multiple wounds of skin    History:     Past Medical History:   Diagnosis Date    Diabetes mellitus type I     Hyperlipidemia     Hypertension     Right sided weakness 6/27/2019       Social History[1]    Precautions:     Allergies as of 05/07/2025 - Reviewed 05/07/2025   Allergen Reaction Noted    Sulfa (sulfonamide antibiotics) Itching 10/30/2014    Sulfur  10/30/2014       WOC Assessment Details/Treatment     Active Problem List with Overview Notes    Diagnosis Date Noted    ACP (advance care planning) 05/13/2025    Microcytic anemia 05/11/2025    Pressure injury of right hip, stage 4 05/08/2025    Tobacco dependency 05/07/2025    Moderate malnutrition 02/18/2025    Cereb infrc due to unsp occls or stenos of unsp cereb artery 02/12/2025    Ulcer of sacral region, stage 2 02/12/2025    Encephalopathy, hypertensive 02/12/2025    Poor prognosis 01/15/2025    Palliative care encounter 01/15/2025    Advance care planning 01/15/2025    Acute deep vein thrombosis (DVT) of right upper extremity 01/14/2025    Severe malnutrition 01/11/2025    Chronic hypotension 01/10/2025    Acute blood loss anemia 01/10/2025    Dehydration 01/09/2025    Acute hypoxemic respiratory failure 01/07/2025    LFT elevation 01/04/2025    Hypophosphatemia 01/02/2025    Atelectasis of both lungs 01/02/2025    Hypernatremia 12/31/2024    History of stroke with residual effects 12/30/2024    Multiple wounds of skin 12/30/2024    C. difficile colitis 12/30/2024    Dysphagia 12/30/2024    Failure to thrive in adult 11/25/2024    Aspiration pneumonia 09/04/2024    Osteomyelitis 07/14/2024    AMS (altered mental status) 06/14/2024    History of CVA (cerebrovascular accident) 06/12/2024    Ischemic cerebral stroke due to intracranial large artery atherosclerosis 04/04/2024    Debility 04/02/2024    Intracranial carotid stenosis,  right 04/02/2024    DKA (diabetic ketoacidosis) 03/25/2024    Gait difficulty 05/04/2022    Right knee pain 04/05/2022    Quadriceps weakness 04/05/2022    Hyperosmolar hyperglycemic state (HHS) 06/08/2021    Muscle weakness of lower extremity 01/09/2020    Encephalopathy 09/06/2019    Right sided weakness 08/13/2019    Overweight (BMI 25.0-29.9) 07/15/2019    Noncompliance with diet and medication regimen 06/30/2019    Cytotoxic cerebral edema 06/28/2019    NICOLAAS (acute kidney injury) 06/28/2019    Thrombotic stroke involving right middle cerebral artery 06/27/2019    Numbness and tingling of right lower extremity 04/07/2019    Insomnia 04/06/2019    Small vessel disease, cerebrovascular 04/05/2019    Cocaine abuse 04/05/2019    Weakness of both lower extremities 04/02/2019    Cerebrovascular accident (CVA) 04/01/2019    Type 2 diabetes mellitus with stage 4 chronic kidney disease, with long-term current use of insulin 04/01/2019    Essential hypertension 04/01/2019    Smoker 04/01/2019    Mixed hyperlipidemia 04/01/2019       requested [facility] nurse assist nursing with obtaining culture of right hip wound  Assessment:  Dressing removed with scant amount of serosanguineous drainage. Last dressing change documented at 0600.   Photodocumentation    Right hip pressure injury- Developing buds of granulation along walls of wound. In base of wound over bone, able to visualize 5 cm area of adherent tan slough. Wound changing in size depending upon position, but with large amount of undermining around trochanter. Today wound undermines 6 cm from 11-3 o'clock.   Treatment/Plan:  Local wound care with Vashe moistened Kerlix roll in opening and covered with dry gauze secured with Medipore tape.   Nursing to continue dressing changes as per treatment plan.   C&S of wound obtained from area of undermining.     05/14/2025       [1]   Social History  Socioeconomic History    Marital status: Single   Tobacco Use    Smoking status:  Every Day     Current packs/day: 1.50     Average packs/day: 1.5 packs/day for 35.0 years (52.5 ttl pk-yrs)     Types: Cigarettes    Smokeless tobacco: Never   Substance and Sexual Activity    Alcohol use: Not Currently    Drug use: Not Currently     Social Drivers of Health     Financial Resource Strain: Patient Declined (5/7/2025)    Overall Financial Resource Strain (CARDIA)     Difficulty of Paying Living Expenses: Patient declined   Food Insecurity: Patient Declined (5/7/2025)    Hunger Vital Sign     Worried About Running Out of Food in the Last Year: Patient declined     Ran Out of Food in the Last Year: Patient declined   Transportation Needs: Patient Declined (5/7/2025)    PRAPARE - Transportation     Lack of Transportation (Medical): Patient declined     Lack of Transportation (Non-Medical): Patient declined   Physical Activity: Inactive (5/8/2025)    Exercise Vital Sign     Days of Exercise per Week: 0 days     Minutes of Exercise per Session: 0 min   Stress: No Stress Concern Present (5/8/2025)    [employer] [place] [employer] - Occupational Stress Questionnaire     Feeling of Stress : Not at all   Housing Stability: Patient Declined (5/7/2025)    Housing Stability Vital Sign     Unable to Pay for Housing in the Last Year: Patient declined     Homeless in the Last Year: Patient declined

## 2025-05-14 NOTE — NURSING
Wound care  performed as ordered. Pt tolerated care well. No signs of distress noted. Pt reported pain 0/10. Pt observed to be resting comfortably in bed. Care ongoing.

## 2025-05-14 NOTE — ASSESSMENT & PLAN NOTE
Patient's blood pressure range in the last 24 hours was: BP  Min: 100/67  Max: 130/89.The patient's inpatient anti-hypertensive regimen is listed below:  Current Antihypertensives       Plan  - BP is controlled, no changes needed to their regimen

## 2025-05-14 NOTE — PROGRESS NOTES
"[facility] Medicine  Progress Note    Patient Name: Emily Martinez  MRN: [id number]  Patient Class: IP- Inpatient   Admission Date: 5/7/2025  Length of Stay: 7 days  Attending Physician: Adan Cartagena MD  Primary Care Provider: Alireza Sosa MD        Subjective     Principal Problem:Multiple wounds of skin        HPI:  Emily Martinez is a 60 y.o. female with DM1, HTN, HLD, and Previous CVA with residual deficits who was BIBA to [facility] ED for eval and treatment of acute generalized abdominal pain for the last 2x days. Per EMS, pt continued to experience persistent abdominal pain this morning accompanied by 4 episodes of emesis prompting her relatives to check her blood sugar at home. EMS notes that family received a reading of "critical high" and subsequently gave pt her insulin.   EMS reports receiving a reading greater than 500 upon their arrival.  She is chronically bed-bound, lives with her daughter, and has dementia (but is currently at her baseline per what daughter told EMS).  Further history therefore limited.  Has multiple chronic pressure ulcers on her sacrum and legs, presented to [facility] ED on April 10 with very similar circumstances as now, but was DC'ed home from the ED after a round of pain meds.    ED course: POCT  on arrival.  VSS WNL.  Exam with multiple pressure ulcers in various stages of healing; R hip wound is purulent.  Oriented to place and self.  Labs WBC 14.75 Hgb 9.3 Plt 677.  ESR CRP Lactate all elevated.  K 3.1.  Imaging: CT reads as wound to the right lateral aspect of her upper thigh appears worse when compared to previous imaging.  ED treatments: Vanc, mupirocin, wet-to-dry dressing, KCl.  They were admitted to [facility] for further evaluation and treatment.        Overview/Hospital Course:  Ms. Martinez is a 61 yo F admitted with hyperglycemia and multiple pressure wounds. Started on IV antibiotics and given IVF, started " on insulin. Wound care consulted. Concern for tunneling of right hip wound. CT with no bone involvement however concerning due to tunneling. MRI ordered showing large lateral soft tissue ulceration with suspected early osteomyelitis of the greater trochanter.  ID consulted.    Interval History: no new issues overnight    Review of Systems   HENT:  Negative for ear discharge and ear pain.    Eyes:  Negative for discharge and itching.   Endocrine: Negative for cold intolerance and heat intolerance.   Neurological:  Negative for seizures and syncope.     Objective:     Vital Signs (Most Recent):  Temp: 98.9 °F (37.2 °C) (05/14/25 1546)  Pulse: 95 (05/14/25 1546)  Resp: 18 (05/14/25 1546)  BP: 118/77 (05/14/25 1546)  SpO2: 95 % (05/14/25 1546) Vital Signs (24h Range):  Temp:  [98.7 °F (37.1 °C)-99.2 °F (37.3 °C)] 98.9 °F (37.2 °C)  Pulse:  [90-99] 95  Resp:  [18-20] 18  SpO2:  [95 %-96 %] 95 %  BP: (100-130)/(67-89) 118/77     Weight: 57.2 kg (126 lb)  Body mass index is 22.32 kg/m².    Intake/Output Summary (Last 24 hours) at 5/14/2025 1732  Last data filed at 5/14/2025 1232  Gross per 24 hour   Intake 440 ml   Output 1400 ml   Net -960 ml         Physical Exam  Vitals and nursing note reviewed.   Constitutional:       General: She is not in acute distress.     Appearance: Normal appearance. She is ill-appearing.   HENT:      Head: Normocephalic and atraumatic.   Cardiovascular:      Rate and Rhythm: Normal rate and regular rhythm.      Pulses: Normal pulses.      Heart sounds: No murmur heard.  Pulmonary:      Effort: Pulmonary effort is normal. No respiratory distress.      Breath sounds: Normal breath sounds.   Abdominal:      General: Bowel sounds are normal. There is no distension.      Palpations: Abdomen is soft.      Comments: G-tube in place   Musculoskeletal:      Comments: Multiple ulcers lower extremity, right hip with wound    Skin:     General: Skin is warm and dry.   Neurological:      Mental Status: She  is alert.               Significant Labs: All pertinent labs within the past 24 hours have been reviewed.  BMP:   Recent Labs   Lab 05/14/25  0857   *      K 4.3      CO2 24   BUN 22   CREATININE 0.8   CALCIUM 9.0     CBC:   Recent Labs   Lab 05/13/25  0652 05/13/25  1050 05/14/25  0857   WBC 21.64* 19.88* 16.93*   HGB 6.6* 7.2* 7.2*   HCT 21.4* 24.1* 23.7*   * 466* 563*       Significant Imaging: I have reviewed all pertinent imaging results/findings within the past 24 hours.      Assessment & Plan  Multiple wounds of skin  Chronic, pressure ulcers.  Most recently debrided by wound care on 4/9.  Various stages of healing currently; R hip ulcer is purulent.  WBC, ESR, CRP, Lactate all elevated, although pt not febrile.  Wet-to-dry dressing applied and Vanc given in ED.  Wound care consulted  Concern with worsening right hip wound and tunneling noted on CT -- and wound care  MRI with large lateral soft tissue ulceration with suspected early osteomyelitis of the greater trochanter.   Continue ABX's.  Appreciate ID input.  Consult General Surgery for possible debridement with culture.  No need for surgical debridement per Surgery.  Asked Wound Care to get deep wound culture.  Ulcer of sacral region, stage 2      Type 2 diabetes mellitus with stage 4 chronic kidney disease, with long-term current use of insulin  Last A1c reviewed-   Lab Results   Component Value Date    LABA1C 13.0 (H) 04/13/2016    HGBA1C 8.1 (H) 05/07/2025     Home antihyperglycemic regimen: lispro-protamin 10U daily    Recent Labs     05/13/25  2039 05/14/25  0733 05/14/25  1024 05/14/25  1546 05/14/25  1549 05/14/25  1637   POCTGLUCOSE 236* 207* 131* 52* 50* 114*     Most recent inpatient antihyperglycemic regimen:   Antihyperglycemics (From admission, onward)      Start     Stop Route Frequency Ordered    05/12/25 2100  insulin glargine U-100 (Lantus) pen 10 Units         -- SubQ Nightly 05/12/25 1146    05/12/25 1645   insulin aspart U-100 pen 10 Units         -- SubQ 3 times daily with meals 05/12/25 1146    05/10/25 0915  insulin glargine U-100 (Lantus) pen 15 Units         -- SubQ Daily 05/10/25 0908    05/07/25 2127  insulin aspart U-100 pen 0-5 Units         -- SubQ Before meals & nightly PRN 05/07/25 2028          Continue current regimen and adjust as necessary.  Essential hypertension  Patient's blood pressure range in the last 24 hours was: BP  Min: 100/67  Max: 130/89.The patient's inpatient anti-hypertensive regimen is listed below:  Current Antihypertensives       Plan  - BP is controlled, no changes needed to their regimen    Mixed hyperlipidemia  Stable.  Continue home statin  History of stroke with residual effects  As above    Debility  Patient with Chronic debility due to hemiplegia/hemiparesis. The patient's latest AMPAC (Activity Measure for Post Acute Care) Score is listed below.    AM-PAC Score - How much help does the patient need for each activity listed  Basic Mobility Total Score: 8  Turning over in bed (including adjusting bedclothes, sheets and blankets)?: A lot  Sitting down on and standing up from a chair with arms (e.g., wheelchair, bedside commode, etc.): Unable  Moving from lying on back to sitting on the side of the bed?: A lot  Moving to and from a bed to a chair (including a wheelchair)?: Unable  Need to walk in hospital room?: Unable  Climbing 3-5 steps with a railing?: Unable    Plan  - Progressive mobility protocol initated  - Fall precautions in place    Daughter is requesting PT/OT      Tobacco dependency  Dangers of cigarette smoking were reviewed with patient in detail. Patient was Referred to Tobacco Cessation Program. Nicotine replacement options were discussed. Nicotine replacement was discussed- prescribed  Moderate malnutrition  Nutrition consulted. Most recent weight and BMI monitored-     Measurements:  Wt Readings from Last 1 Encounters:   05/09/25 57.2 kg (126 lb)   Body mass  index is 22.32 kg/m².    Patient has been screened and assessed by RD.    Malnutrition Type:  Context: chronic illness  Level: moderate    Malnutrition Characteristic Summary:  Weight Loss (Malnutrition): 20% in 1 year    Interventions/Recommendations (treatment strategy):  1. Enteral Nutrition Management  2. Collaboration by nutrition professional with other providers    Pressure injury of right hip, stage 4      Microcytic anemia  Anemia is likely due to chronic blood loss, Iron deficiency, and chronic disease due to infection. Most recent hemoglobin and hematocrit are listed below.  Recent Labs     05/13/25  0652 05/13/25  1050 05/14/25  0857   HGB 6.6* 7.2* 7.2*   HCT 21.4* 24.1* 23.7*     Plan  - Monitor serial CBC: Daily  - Transfuse PRBC if patient becomes hemodynamically unstable, symptomatic or H/H drops below 7/21.  - Patient has not received any PRBC transfusions to date  - Patient's anemia is currently stable  - Suspect iron def +chronic disease  Hgb 6.6 this morning, but repeat Hgb>7.  Continue to monitor.  ACP (advance care planning)      VTE Risk Mitigation (From admission, onward)      None            Discharge Planning   ANA: 5/17/2025     Code Status: Full Code   Medical Readiness for Discharge Date:   Discharge Plan A: Home Health   Discharge Delays: None known at this time                    Adan Cartagena MD  Department of Hospital Medicine   [facility] Surg

## 2025-05-14 NOTE — ASSESSMENT & PLAN NOTE
Last A1c reviewed-   Lab Results   Component Value Date    LABA1C 13.0 (H) 04/13/2016    HGBA1C 8.1 (H) 05/07/2025     Home antihyperglycemic regimen: lispro-protamin 10U daily    Recent Labs     05/13/25  2039 05/14/25  0733 05/14/25  1024 05/14/25  1546 05/14/25  1549 05/14/25  1637   POCTGLUCOSE 236* 207* 131* 52* 50* 114*     Most recent inpatient antihyperglycemic regimen:   Antihyperglycemics (From admission, onward)      Start     Stop Route Frequency Ordered    05/12/25 2100  insulin glargine U-100 (Lantus) pen 10 Units         -- SubQ Nightly 05/12/25 1146    05/12/25 1645  insulin aspart U-100 pen 10 Units         -- SubQ 3 times daily with meals 05/12/25 1146    05/10/25 0915  insulin glargine U-100 (Lantus) pen 15 Units         -- SubQ Daily 05/10/25 0908    05/07/25 2127  insulin aspart U-100 pen 0-5 Units         -- SubQ Before meals & nightly PRN 05/07/25 2028          Continue current regimen and adjust as necessary.

## 2025-05-14 NOTE — NURSING
[facility] [place]  Nurses Note -- 4 Eyes      5/14/2025       Skin assessed on: Q Shift      [] No Pressure Injuries Present    [x]Prevention Measures Documented    [x] Yes LDA  for Pressure Injury Previously documented     [] Yes New Pressure Injury Discovered   [] LDA for New Pressure Injury Added      Attending RN:  Tyler Taylor LPN     Second RN:  JUAN Hernandez

## 2025-05-14 NOTE — PLAN OF CARE
Infectious Diseases Plan of Care Note    Chart reviewed only.     Wound cultures obtained from tunneling hip wound, pending. Continue empiric antibiotics. Will follow.     Anita Page MD  Infectious Diseases Staff

## 2025-05-15 LAB
ABSOLUTE EOSINOPHIL (OHS): 0.1 K/UL
ABSOLUTE MONOCYTE (OHS): 1.7 K/UL (ref 0.3–1)
ABSOLUTE NEUTROPHIL COUNT (OHS): 20.71 K/UL (ref 1.8–7.7)
ALBUMIN SERPL BCP-MCNC: 1.6 G/DL (ref 3.5–5.2)
ALP SERPL-CCNC: 96 UNIT/L (ref 40–150)
ALT SERPL W/O P-5'-P-CCNC: 9 UNIT/L (ref 10–44)
ANION GAP (OHS): 10 MMOL/L (ref 8–16)
AST SERPL-CCNC: 17 UNIT/L (ref 11–45)
BASOPHILS # BLD AUTO: 0.06 K/UL
BASOPHILS NFR BLD AUTO: 0.2 %
BILIRUB SERPL-MCNC: 0.2 MG/DL (ref 0.1–1)
BUN SERPL-MCNC: 23 MG/DL (ref 8–23)
CALCIUM SERPL-MCNC: 9.1 MG/DL (ref 8.7–10.5)
CHLORIDE SERPL-SCNC: 109 MMOL/L (ref 95–110)
CO2 SERPL-SCNC: 29 MMOL/L (ref 23–29)
CREAT SERPL-MCNC: 0.8 MG/DL (ref 0.5–1.4)
ERYTHROCYTE [DISTWIDTH] IN BLOOD BY AUTOMATED COUNT: 19.8 % (ref 11.5–14.5)
GFR SERPLBLD CREATININE-BSD FMLA CKD-EPI: >60 ML/MIN/1.73/M2
GLUCOSE SERPL-MCNC: 112 MG/DL (ref 70–110)
HCT VFR BLD AUTO: 25.1 % (ref 37–48.5)
HGB BLD-MCNC: 7.5 GM/DL (ref 12–16)
IMM GRANULOCYTES # BLD AUTO: 0.22 K/UL (ref 0–0.04)
IMM GRANULOCYTES NFR BLD AUTO: 0.9 % (ref 0–0.5)
LYMPHOCYTES # BLD AUTO: 1.96 K/UL (ref 1–4.8)
MCH RBC QN AUTO: 21.9 PG (ref 27–31)
MCHC RBC AUTO-ENTMCNC: 29.9 G/DL (ref 32–36)
MCV RBC AUTO: 73 FL (ref 82–98)
NUCLEATED RBC (/100WBC) (OHS): 1 /100 WBC
PLATELET # BLD AUTO: 646 K/UL (ref 150–450)
PMV BLD AUTO: 8.7 FL (ref 9.2–12.9)
POCT GLUCOSE: 117 MG/DL (ref 70–110)
POCT GLUCOSE: 138 MG/DL (ref 70–110)
POCT GLUCOSE: 157 MG/DL (ref 70–110)
POCT GLUCOSE: 169 MG/DL (ref 70–110)
POTASSIUM SERPL-SCNC: 3.8 MMOL/L (ref 3.5–5.1)
PROT SERPL-MCNC: 6.9 GM/DL (ref 6–8.4)
RBC # BLD AUTO: 3.43 M/UL (ref 4–5.4)
RELATIVE EOSINOPHIL (OHS): 0.4 %
RELATIVE LYMPHOCYTE (OHS): 7.9 % (ref 18–48)
RELATIVE MONOCYTE (OHS): 6.9 % (ref 4–15)
RELATIVE NEUTROPHIL (OHS): 83.7 % (ref 38–73)
SODIUM SERPL-SCNC: 148 MMOL/L (ref 136–145)
WBC # BLD AUTO: 24.75 K/UL (ref 3.9–12.7)

## 2025-05-15 PROCEDURE — 25000003 PHARM REV CODE 250

## 2025-05-15 PROCEDURE — 85025 COMPLETE CBC W/AUTO DIFF WBC: CPT | Performed by: STUDENT IN AN ORGANIZED HEALTH CARE EDUCATION/TRAINING PROGRAM

## 2025-05-15 PROCEDURE — S4991 NICOTINE PATCH NONLEGEND: HCPCS

## 2025-05-15 PROCEDURE — 25000003 PHARM REV CODE 250: Performed by: STUDENT IN AN ORGANIZED HEALTH CARE EDUCATION/TRAINING PROGRAM

## 2025-05-15 PROCEDURE — 80053 COMPREHEN METABOLIC PANEL: CPT | Performed by: STUDENT IN AN ORGANIZED HEALTH CARE EDUCATION/TRAINING PROGRAM

## 2025-05-15 PROCEDURE — 21400001 HC TELEMETRY ROOM

## 2025-05-15 PROCEDURE — 63600175 PHARM REV CODE 636 W HCPCS: Performed by: HOSPITALIST

## 2025-05-15 PROCEDURE — 25000003 PHARM REV CODE 250: Performed by: INTERNAL MEDICINE

## 2025-05-15 PROCEDURE — 63600175 PHARM REV CODE 636 W HCPCS: Performed by: STUDENT IN AN ORGANIZED HEALTH CARE EDUCATION/TRAINING PROGRAM

## 2025-05-15 PROCEDURE — 36415 COLL VENOUS BLD VENIPUNCTURE: CPT | Performed by: STUDENT IN AN ORGANIZED HEALTH CARE EDUCATION/TRAINING PROGRAM

## 2025-05-15 RX ORDER — ACETAMINOPHEN 325 MG/1
650 TABLET ORAL EVERY 4 HOURS PRN
Status: DISCONTINUED | OUTPATIENT
Start: 2025-05-15 | End: 2025-05-16

## 2025-05-15 RX ORDER — FAMOTIDINE 20 MG/1
20 TABLET, FILM COATED ORAL 2 TIMES DAILY
Status: DISCONTINUED | OUTPATIENT
Start: 2025-05-15 | End: 2025-05-16

## 2025-05-15 RX ORDER — MORPHINE SULFATE 4 MG/ML
2 INJECTION, SOLUTION INTRAMUSCULAR; INTRAVENOUS EVERY 4 HOURS PRN
Status: DISCONTINUED | OUTPATIENT
Start: 2025-05-15 | End: 2025-05-22 | Stop reason: HOSPADM

## 2025-05-15 RX ORDER — INSULIN GLARGINE 100 [IU]/ML
5 INJECTION, SOLUTION SUBCUTANEOUS NIGHTLY
Status: DISCONTINUED | OUTPATIENT
Start: 2025-05-15 | End: 2025-05-22

## 2025-05-15 RX ORDER — MEROPENEM 1 G/1
1 INJECTION, POWDER, FOR SOLUTION INTRAVENOUS
Status: DISCONTINUED | OUTPATIENT
Start: 2025-05-15 | End: 2025-05-16

## 2025-05-15 RX ADMIN — INSULIN GLARGINE 5 UNITS: 100 INJECTION, SOLUTION SUBCUTANEOUS at 08:05

## 2025-05-15 RX ADMIN — THERA TABS 1 TABLET: TAB at 09:05

## 2025-05-15 RX ADMIN — ATORVASTATIN CALCIUM 80 MG: 40 TABLET, FILM COATED ORAL at 08:05

## 2025-05-15 RX ADMIN — MEROPENEM 1 G: 1 INJECTION INTRAVENOUS at 12:05

## 2025-05-15 RX ADMIN — CEFEPIME 1 G: 1 INJECTION, POWDER, FOR SOLUTION INTRAMUSCULAR; INTRAVENOUS at 01:05

## 2025-05-15 RX ADMIN — NICOTINE 1 PATCH: 14 PATCH TRANSDERMAL at 09:05

## 2025-05-15 RX ADMIN — VANCOMYCIN HYDROCHLORIDE 750 MG: 750 INJECTION, POWDER, LYOPHILIZED, FOR SOLUTION INTRAVENOUS at 06:05

## 2025-05-15 RX ADMIN — OXYCODONE 10 MG: 5 TABLET ORAL at 10:05

## 2025-05-15 RX ADMIN — ONDANSETRON 8 MG: 2 INJECTION INTRAMUSCULAR; INTRAVENOUS at 01:05

## 2025-05-15 RX ADMIN — FAMOTIDINE 20 MG: 40 POWDER, FOR SUSPENSION ORAL at 09:05

## 2025-05-15 RX ADMIN — MICONAZOLE NITRATE: 20 POWDER TOPICAL at 08:05

## 2025-05-15 RX ADMIN — ONDANSETRON 8 MG: 2 INJECTION INTRAMUSCULAR; INTRAVENOUS at 02:05

## 2025-05-15 RX ADMIN — MICONAZOLE NITRATE: 20 POWDER TOPICAL at 09:05

## 2025-05-15 RX ADMIN — CEFEPIME 1 G: 1 INJECTION, POWDER, FOR SOLUTION INTRAMUSCULAR; INTRAVENOUS at 06:05

## 2025-05-15 RX ADMIN — ASPIRIN 81 MG: 81 TABLET, COATED ORAL at 09:05

## 2025-05-15 RX ADMIN — FAMOTIDINE 20 MG: 20 TABLET, FILM COATED ORAL at 09:05

## 2025-05-15 RX ADMIN — MEROPENEM 1 G: 1 INJECTION INTRAVENOUS at 08:05

## 2025-05-15 NOTE — PLAN OF CARE
Recommendation:   1. Continue current EN regimen as tolerated.   2. Continue to provide current PO diet as tolerated with fluid restriction per MD.   3. Continue to provide Jose BID to promote wound healing.   4. Monitor weight/labs.   5. RD to follow and monitor intake    Goals:  Patient to receive/consume >75% of EEN/EPN prior to RD follow up    Nutrition Goal Status: goal met  Communication of RD Recs: other (comment) (POC)

## 2025-05-15 NOTE — CLINICAL REVIEW
IP Sepsis Screen (most recent)       Sepsis Screen (IP) - 05/15/25 2936       Is the patient's history or complaint suggestive of a possible infection? Yes  -DD    Are there at least two of the following signs and symptoms present? Yes  -DD    Sepsis signs/symptoms - Tachycardia Tachycardia     >90  -DD    Sepsis signs/symptoms - WBC WBC < 4,000 or WBC > 12,000  -DD    Are any of the following organ dysfunction criteria present and not considered to be due to a chronic condition? Yes  -DD    Organ Dysfunction Criteria - O2 O2 Saturation < 95% on room air  -DD    Initiate Sepsis Protocol No  -DD    Reason sepsis not considered Pt. receiving appropriate management  -DD              User Key  (r) = Recorded By, (t) = Taken By, (c) = Cosigned By      Initials Name    Devon Patterson, JUAN

## 2025-05-15 NOTE — PLAN OF CARE
Infectious Diseases Plan of Care Note    Chart reviewed only.     Hip culture growing presumptive E. Coli. Has history of ESBL E. Coli. Would change cefepime to meropenem and will follow cultures for discharge regimen.     Anita Page MD  Infectious Diseases Staff

## 2025-05-15 NOTE — NURSING
[facility], [facility] [place]  Nurses Note -- 4 Eyes      5/15/2025       Skin assessed on: Q Shift      [] No Pressure Injuries Present    [x]Prevention Measures Documented    [x] Yes LDA  for Pressure Injury Previously documented     [] Yes New Pressure Injury Discovered   [] LDA for New Pressure Injury Added      Attending RN:  Tyler Taylor LPN     Second RN:  JUAN Hernandez

## 2025-05-15 NOTE — PROGRESS NOTES
[facility] Surg  Wound Care  WOC CRIS    Patient Name:  Emily Martinez   MRN:  [id number]  Date: 5/15/2025  Diagnosis: Multiple wounds of skin    History:     Past Medical History:   Diagnosis Date    Diabetes mellitus type I     Hyperlipidemia     Hypertension     Right sided weakness 6/27/2019       Social History[1]    Precautions:     Allergies as of 05/07/2025 - Reviewed 05/07/2025   Allergen Reaction Noted    Sulfa (sulfonamide antibiotics) Itching 10/30/2014    Sulfur  10/30/2014       WOC Assessment Details/Treatment     Active Problem List with Overview Notes    Diagnosis Date Noted    ACP (advance care planning) 05/13/2025    Microcytic anemia 05/11/2025    Pressure injury of right hip, stage 4 05/08/2025    Tobacco dependency 05/07/2025    Moderate malnutrition 02/18/2025    Cereb infrc due to unsp occls or stenos of unsp cereb artery 02/12/2025    Ulcer of sacral region, stage 2 02/12/2025    Encephalopathy, hypertensive 02/12/2025    Poor prognosis 01/15/2025    Palliative care encounter 01/15/2025    Advance care planning 01/15/2025    Acute deep vein thrombosis (DVT) of right upper extremity 01/14/2025    Severe malnutrition 01/11/2025    Chronic hypotension 01/10/2025    Acute blood loss anemia 01/10/2025    Dehydration 01/09/2025    Acute hypoxemic respiratory failure 01/07/2025    LFT elevation 01/04/2025    Hypophosphatemia 01/02/2025    Atelectasis of both lungs 01/02/2025    Hypernatremia 12/31/2024    History of stroke with residual effects 12/30/2024    Multiple wounds of skin 12/30/2024    C. difficile colitis 12/30/2024    Dysphagia 12/30/2024    Failure to thrive in adult 11/25/2024    Aspiration pneumonia 09/04/2024    Osteomyelitis 07/14/2024    AMS (altered mental status) 06/14/2024    History of CVA (cerebrovascular accident) 06/12/2024    Ischemic cerebral stroke due to intracranial large artery atherosclerosis 04/04/2024    Debility 04/02/2024    Intracranial carotid stenosis,  right 04/02/2024    DKA (diabetic ketoacidosis) 03/25/2024    Gait difficulty 05/04/2022    Right knee pain 04/05/2022    Quadriceps weakness 04/05/2022    Hyperosmolar hyperglycemic state (HHS) 06/08/2021    Muscle weakness of lower extremity 01/09/2020    Encephalopathy 09/06/2019    Right sided weakness 08/13/2019    Overweight (BMI 25.0-29.9) 07/15/2019    Noncompliance with diet and medication regimen 06/30/2019    Cytotoxic cerebral edema 06/28/2019    NICOLASA (acute kidney injury) 06/28/2019    Thrombotic stroke involving right middle cerebral artery 06/27/2019    Numbness and tingling of right lower extremity 04/07/2019    Insomnia 04/06/2019    Small vessel disease, cerebrovascular 04/05/2019    Cocaine abuse 04/05/2019    Weakness of both lower extremities 04/02/2019    Cerebrovascular accident (CVA) 04/01/2019    Type 2 diabetes mellitus with stage 4 chronic kidney disease, with long-term current use of insulin 04/01/2019    Essential hypertension 04/01/2019    Smoker 04/01/2019    Mixed hyperlipidemia 04/01/2019      Right hip pressure injury Stage 4 with adherent slough over trochanter. Patient must be repositioned on left side and small amount of right hip flexion to visualize necrosis.   While awaiting results of wound culture collected 5/14, will have PT PulsaVac right hip wound to facilitate debridement of necrotic tissue x 3 days. Change local wound care to Santyl to attempt removal of slough.   If patient still admitted on 5/19, will evaluate effectiveness of hydrotherapy + enzymatic debriding agent.   Nursing to continue same wound care to other multiple wounds and PIP interventions.   Recommendations made to primary team . Orders placed.     05/15/2025       [1]   Social History  Socioeconomic History    Marital status: Single   Tobacco Use    Smoking status: Every Day     Current packs/day: 1.50     Average packs/day: 1.5 packs/day for 35.0 years (52.5 ttl pk-yrs)     Types: Cigarettes    Smokeless  tobacco: Never   Substance and Sexual Activity    Alcohol use: Not Currently    Drug use: Not Currently     Social Drivers of Health     Financial Resource Strain: Patient Declined (5/7/2025)    Overall Financial Resource Strain (CARDIA)     Difficulty of Paying Living Expenses: Patient declined   Food Insecurity: Patient Declined (5/7/2025)    Hunger Vital Sign     Worried About Running Out of Food in the Last Year: Patient declined     Ran Out of Food in the Last Year: Patient declined   Transportation Needs: Patient Declined (5/7/2025)    PRAPARE - Transportation     Lack of Transportation (Medical): Patient declined     Lack of Transportation (Non-Medical): Patient declined   Physical Activity: Inactive (5/8/2025)    Exercise Vital Sign     Days of Exercise per Week: 0 days     Minutes of Exercise per Session: 0 min   Stress: No Stress Concern Present (5/8/2025)    [employer] [place] [employer] - Occupational Stress Questionnaire     Feeling of Stress : Not at all   Housing Stability: Patient Declined (5/7/2025)    Housing Stability Vital Sign     Unable to Pay for Housing in the Last Year: Patient declined     Homeless in the Last Year: Patient declined

## 2025-05-15 NOTE — ASSESSMENT & PLAN NOTE
Nutrition consulted. Most recent weight and BMI monitored-     Measurements:  Wt Readings from Last 1 Encounters:   05/09/25 57.2 kg (126 lb)   Body mass index is 22.32 kg/m².    Patient has been screened and assessed by RD.    Malnutrition Type:  Context: chronic illness  Level: moderate    Malnutrition Characteristic Summary:  Weight Loss (Malnutrition): 20% in 1 year    Interventions/Recommendations (treatment strategy):  1. Continue current EN regimen as tolerated. 2. Continue to provide current PO diet as tolerated with fluid restriction per MD. 3. Continue to provide Jose BID to promote wound healing. 4. Monitor weight/labs. 5. RD to follow and monitor intake

## 2025-05-15 NOTE — NURSING
[facility] [place]  Nurses Note -- 4 Eyes      5/14/2025       Skin assessed on: Q Shift      [] No Pressure Injuries Present    []Prevention Measures Documented    [x] Yes LDA  for Pressure Injury Previously documented     [] Yes New Pressure Injury Discovered   [] LDA for New Pressure Injury Added      Attending RN:  Stephanie Barthelemy, RN     Second RN:  Tyler Taylor LPN

## 2025-05-15 NOTE — ASSESSMENT & PLAN NOTE
Patient's blood pressure range in the last 24 hours was: BP  Min: 104/65  Max: 128/86.The patient's inpatient anti-hypertensive regimen is listed below:  Current Antihypertensives       Plan  - BP is controlled, no changes needed to their regimen

## 2025-05-15 NOTE — ASSESSMENT & PLAN NOTE
Chronic, pressure ulcers.  Most recently debrided by wound care on 4/9.  Various stages of healing currently; R hip ulcer is purulent.  WBC, ESR, CRP, Lactate all elevated, although pt not febrile.  Wet-to-dry dressing applied and Vanc given in ED.  Wound care consulted  Concern with worsening right hip wound and tunneling noted on CT -- and wound care  MRI with large lateral soft tissue ulceration with suspected early osteomyelitis of the greater trochanter.   Continue ABX's.  Appreciate ID input.  Consult General Surgery for possible debridement with culture.  No need for surgical debridement per Surgery.  Asked Wound Care to get deep wound culture.  Deep wound culture obtained by Wound Care nurse.  Currently growing E coli.  ABX's per ID

## 2025-05-15 NOTE — PROGRESS NOTES
[facility]  Adult Nutrition  Progress Note    SUMMARY       Recommendations    Recommendation:   1. Continue current EN regimen as tolerated.   2. Continue to provide current PO diet as tolerated with fluid restriction per MD.   3. Continue to provide Jose BID to promote wound healing.   4. Monitor weight/labs.   5. RD to follow and monitor intake    Goals:   Patient to receive/consume >75% of EEN/EPN prior to RD follow up    Nutrition Goal Status: goal met  Communication of RD Recs: other (comment) (POC)    Nutrition Discharge Planning    Nutrition Discharge Planning: Resume home/ nursing home regimen    Assessment and Plan    Endocrine  Moderate malnutrition  Malnutrition Type:  Context: chronic illness  Level: moderate    Related to (etiology):   Increased demand for nutrition     Signs and Symptoms (as evidenced by):   Delayed wound healing      Malnutrition Characteristic Summary:  Weight Loss (Malnutrition): 20% in 1 year      Interventions(treatment strategy):  1. Continue current EN regimen as tolerated. 2. Continue to provide current PO diet as tolerated with fluid restriction per MD. 3. Continue to provide Jose BID to promote wound healing. 4. Monitor weight/labs. 5. RD to follow and monitor intake    Nutrition Diagnosis Status:   Continues         Malnutrition Assessment  Malnutrition Context: chronic illness  Malnutrition Level: moderate  Skin (Micronutrient): wounds unhealed       Weight Loss (Malnutrition): 20% in 1 year               Reason for Assessment    Reason For Assessment: identified at risk by screening criteria  Diagnosis: diabetes diagnosis/complications (multiple wounds of skin)  General Information Comments: Pt receiving Low Na 2 gm diet with 1500 mL fluid restriction and Jose BID, 25-50% intake recorded. Glucerna 1.5 at 40 mL/hr ordered. PIV, PEG. Anand Score: 9 Patient with multiple pressure injuries to BLE and buttock regions. NFPE not completed 2/2 remote interim  "coverage Patient is positive for weight loss and loss of skin intergrity.    Nutrition/Diet History    Nutrition Intake History: Glucerna 1.5 with goal rate of 40ml/hr (home routine)  Spiritual, Cultural Beliefs, Alevism Practices, Values that Affect Care: no  Food Allergies: NKFA  Factors Affecting Nutritional Intake: chewing difficulties/inability to chew food, difficulty/impaired swallowing, other (see comments) (PEG)    Anthropometrics    Height: 5' 3" (160 cm)  Height (inches): 63 in  Weight: 57.2 kg (126 lb)  Weight (lb): 126 lb  Weight Method: Bed Scale  Ideal Body Weight (IBW), Female: 115 lb  % Ideal Body Weight, Female (lb): 109.57 %  BMI (Calculated): 22.3  BMI Grade: 18.5-24.9 - normal       Lab/Procedures/Meds    Pertinent Labs Reviewed: reviewed  Pertinent Labs Comments: Glu 185, Alb 1.5, ALT 8  Pertinent Medications Reviewed: reviewed  Pertinent Medications Comments: Aspirin, statin, cefepime, famotidine, insulin aspart, insulin glargine, MVI,nicotine patch, vancomycin    Estimated/Assessed Needs    Weight Used For Calorie Calculations: 57.2 kg (126 lb 1.7 oz)  Energy Calorie Requirements (kcal): 25-35kcals/kg (1430-2002kcals/day)  Energy Need Method: Kcal/kg  Protein Requirements: 1.5-2.0g/kg (86-115g/day)  Weight Used For Protein Calculations: 57.2 kg (126 lb 1.7 oz)  Fluid Requirements (mL): 1ml/kcal  Estimated Fluid Requirement Method: RDA Method  RDA Method (mL): 25  CHO Requirement: 225-250      Nutrition Prescription Ordered    Current Diet Order: Low sodium, 1500ml fluid restriction  Current Nutrition Support Formula Ordered: Glucerna 1.5  Current Nutrition Support Rate Ordered: 40 (ml)  Current Nutrition Support Frequency Ordered: continuous  Oral Nutrition Supplement: Jose BID    Evaluation of Received Nutrient/Fluid Intake    Enteral Calories (kcal): 1440  Enteral Protein (gm): 79  Enteral (Free Water) Fluid (mL): 729  I/O: 1455.850  Energy Calories Required: meeting needs  Protein " Required: meeting needs  Fluid Required: meeting needs  Comments: LBM 5/13  Tolerance: other (see comments) (monitoring oral/EN tolerance)  % Intake of Estimated Energy Needs: 75 - 100 %  % Meal Intake: 25 - 50 % with continuous EN      Nutrition Risk    Level of Risk/Frequency of Follow-up:  (1x/week)     Monitor and Evaluation    Monitor and Evaluation: Energy intake, Protein intake, Diet order, Weight, Enteral and parenteral nutrition administration, Beliefs and attitudes, Electrolyte and renal panel, Gastrointestinal profile, Glucose/endocrine profile, Inflammatory profile, Lipid profile, Nutrition focused physical findings, Skin     Nutrition Related Social Determinants of Health: SDOH: Unable to assess at this time.       Nutrition Follow-Up    RD Follow-up?: Yes

## 2025-05-15 NOTE — ASSESSMENT & PLAN NOTE
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 Most recent hemoglobin and hematocrit are listed below.  Recent Labs     05/13/25  1050 05/14/25  0857 05/15/25  0505   HGB 7.2* 7.2* 7.5*   HCT 24.1* 23.7* 25.1*     Plan  - Monitor serial CBC: Daily  - Transfuse PRBC if patient becomes hemodynamically unstable, symptomatic or H/H drops below 7/21.  - Patient has not received any PRBC transfusions to date  - Patient's anemia is currently stable  - Suspect iron def +chronic disease  Hgb 6.6 this morning, but repeat Hgb>7.  Continue to monitor.

## 2025-05-15 NOTE — PROGRESS NOTES
"[facility]  Progress Note    Patient Name: Emily Martinez  MRN: [id number]  Patient Class: IP- Inpatient   Admission Date: 5/7/2025  Length of Stay: 8 days  Attending Physician: Adan Cartagena MD  Primary Care Provider: Alireza Sosa MD        Subjective     Principal Problem:Multiple wounds of skin        HPI:  Emily Martinez is a 60 y.o. female with DM1, HTN, HLD, and Previous CVA with residual deficits who was BIBA to [facility] ED for eval and treatment of acute generalized abdominal pain for the last 2x days. Per EMS, pt continued to experience persistent abdominal pain this morning accompanied by 4 episodes of emesis prompting her relatives to check her blood sugar at home. EMS notes that family received a reading of "critical high" and subsequently gave pt her insulin.   EMS reports receiving a reading greater than 500 upon their arrival.  She is chronically bed-bound, lives with her daughter, and has dementia (but is currently at her baseline per what daughter told EMS).  Further history therefore limited.  Has multiple chronic pressure ulcers on her sacrum and legs, presented to [facility] ED on April 10 with very similar circumstances as now, but was DC'ed home from the ED after a round of pain meds.    ED course: POCT  on arrival.  VSS WNL.  Exam with multiple pressure ulcers in various stages of healing; R hip wound is purulent.  Oriented to place and self.  Labs WBC 14.75 Hgb 9.3 Plt 677.  ESR CRP Lactate all elevated.  K 3.1.  Imaging: CT reads as wound to the right lateral aspect of her upper thigh appears worse when compared to previous imaging.  ED treatments: Vanc, mupirocin, wet-to-dry dressing, KCl.  They were admitted to [facility] for further evaluation and treatment.        Overview/Hospital Course:  Ms. Martinez is a 59 yo F admitted with hyperglycemia and multiple pressure wounds. Started on IV antibiotics and given IVF, started " on insulin. Wound care consulted. Concern for tunneling of right hip wound. CT with no bone involvement however concerning due to tunneling. MRI ordered showing large lateral soft tissue ulceration with suspected early osteomyelitis of the greater trochanter.  ID consulted.    Interval History: episodes of vomiting today.    Review of Systems   HENT:  Negative for ear discharge and ear pain.    Eyes:  Negative for discharge and itching.   Endocrine: Negative for cold intolerance and heat intolerance.   Neurological:  Negative for seizures and syncope.     Objective:     Vital Signs (Most Recent):  Temp: 98.5 °F (36.9 °C) (05/15/25 1520)  Pulse: 91 (05/15/25 1520)  Resp: 18 (05/15/25 1520)  BP: 110/77 (05/15/25 1520)  SpO2: (!) 92 % (05/15/25 1520) Vital Signs (24h Range):  Temp:  [97.9 °F (36.6 °C)-99 °F (37.2 °C)] 98.5 °F (36.9 °C)  Pulse:  [] 91  Resp:  [18-20] 18  SpO2:  [92 %-97 %] 92 %  BP: (104-128)/(65-86) 110/77     Weight: 57.2 kg (126 lb)  Body mass index is 22.32 kg/m².    Intake/Output Summary (Last 24 hours) at 5/15/2025 1706  Last data filed at 5/15/2025 1345  Gross per 24 hour   Intake 360 ml   Output 550 ml   Net -190 ml         Physical Exam  Vitals and nursing note reviewed.   Constitutional:       General: She is not in acute distress.     Appearance: Normal appearance. She is ill-appearing.   HENT:      Head: Normocephalic and atraumatic.   Cardiovascular:      Rate and Rhythm: Normal rate and regular rhythm.      Pulses: Normal pulses.      Heart sounds: No murmur heard.  Pulmonary:      Effort: Pulmonary effort is normal. No respiratory distress.      Breath sounds: Normal breath sounds.   Abdominal:      General: Bowel sounds are normal. There is no distension.      Palpations: Abdomen is soft.      Comments: G-tube in place   Musculoskeletal:      Comments: Multiple ulcers lower extremity, right hip with wound    Skin:     General: Skin is warm and dry.   Neurological:      Mental  Status: She is alert.               Significant Labs: All pertinent labs within the past 24 hours have been reviewed.  BMP:   Recent Labs   Lab 05/15/25  0505   *   *   K 3.8      CO2 29   BUN 23   CREATININE 0.8   CALCIUM 9.1     CBC:   Recent Labs   Lab 05/14/25  0857 05/15/25  0505   WBC 16.93* 24.75*   HGB 7.2* 7.5*   HCT 23.7* 25.1*   * 646*       Significant Imaging: I have reviewed all pertinent imaging results/findings within the past 24 hours.      Assessment & Plan  Multiple wounds of skin  Chronic, pressure ulcers.  Most recently debrided by wound care on 4/9.  Various stages of healing currently; R hip ulcer is purulent.  WBC, ESR, CRP, Lactate all elevated, although pt not febrile.  Wet-to-dry dressing applied and Vanc given in ED.  Wound care consulted  Concern with worsening right hip wound and tunneling noted on CT -- and wound care  MRI with large lateral soft tissue ulceration with suspected early osteomyelitis of the greater trochanter.   Continue ABX's.  Appreciate ID input.  Consult General Surgery for possible debridement with culture.  No need for surgical debridement per Surgery.  Asked Wound Care to get deep wound culture.  Deep wound culture obtained by Wound Care nurse.  Currently growing E coli.  ABX's per ID  Ulcer of sacral region, stage 2      Type 2 diabetes mellitus with stage 4 chronic kidney disease, with long-term current use of insulin  Last A1c reviewed-   Lab Results   Component Value Date    LABA1C 13.0 (H) 04/13/2016    HGBA1C 8.1 (H) 05/07/2025     Home antihyperglycemic regimen: lispro-protamin 10U daily    Recent Labs     05/14/25  1549 05/14/25  1637 05/14/25  1913 05/15/25  0736 05/15/25  1035 05/15/25  1520   POCTGLUCOSE 50* 114* 117* 138* 169* 157*     Most recent inpatient antihyperglycemic regimen:   Antihyperglycemics (From admission, onward)      Start     Stop Route Frequency Ordered    05/15/25 2100  insulin glargine U-100 (Lantus) pen 5  Units         -- SubQ Nightly 05/15/25 0850    05/07/25 2127  insulin aspart U-100 pen 0-5 Units         -- SubQ Before meals & nightly PRN 05/07/25 2028          Some episodes of hypoglycemia.  Adjust insulin.  Essential hypertension  Patient's blood pressure range in the last 24 hours was: BP  Min: 104/65  Max: 128/86.The patient's inpatient anti-hypertensive regimen is listed below:  Current Antihypertensives       Plan  - BP is controlled, no changes needed to their regimen    Mixed hyperlipidemia  Stable.  Continue home statin  History of stroke with residual effects  As above    Debility  Patient with Chronic debility due to hemiplegia/hemiparesis. The patient's latest AMPAC (Activity Measure for Post Acute Care) Score is listed below.    AM-PAC Score - How much help does the patient need for each activity listed  Basic Mobility Total Score: 8  Turning over in bed (including adjusting bedclothes, sheets and blankets)?: A lot  Sitting down on and standing up from a chair with arms (e.g., wheelchair, bedside commode, etc.): Unable  Moving from lying on back to sitting on the side of the bed?: A lot  Moving to and from a bed to a chair (including a wheelchair)?: Unable  Need to walk in hospital room?: Unable  Climbing 3-5 steps with a railing?: Unable    Plan  - Progressive mobility protocol initated  - Fall precautions in place    Daughter is requesting PT/OT      Tobacco dependency  Dangers of cigarette smoking were reviewed with patient in detail. Patient was Referred to Tobacco Cessation Program. Nicotine replacement options were discussed. Nicotine replacement was discussed- prescribed  Moderate malnutrition  Nutrition consulted. Most recent weight and BMI monitored-     Measurements:  Wt Readings from Last 1 Encounters:   05/09/25 57.2 kg (126 lb)   Body mass index is 22.32 kg/m².    Patient has been screened and assessed by RD.    Malnutrition Type:  Context: chronic illness  Level:  moderate    Malnutrition Characteristic Summary:  Weight Loss (Malnutrition): 20% in 1 year    Interventions/Recommendations (treatment strategy):  1. Continue current EN regimen as tolerated. 2. Continue to provide current PO diet as tolerated with fluid restriction per MD. 3. Continue to provide Jose BID to promote wound healing. 4. Monitor weight/labs. 5. RD to follow and monitor intake    Pressure injury of right hip, stage 4      Microcytic anemia  Anemia is likely due to chronic blood loss, Iron deficiency, and chronic disease due to infection. Most recent hemoglobin and hematocrit are listed below.  Recent Labs     05/13/25  1050 05/14/25  0857 05/15/25  0505   HGB 7.2* 7.2* 7.5*   HCT 24.1* 23.7* 25.1*     Plan  - Monitor serial CBC: Daily  - Transfuse PRBC if patient becomes hemodynamically unstable, symptomatic or H/H drops below 7/21.  - Patient has not received any PRBC transfusions to date  - Patient's anemia is currently stable  - Suspect iron def +chronic disease  Hgb 6.6 this morning, but repeat Hgb>7.  Continue to monitor.  ACP (advance care planning)      VTE Risk Mitigation (From admission, onward)      None            Discharge Planning   ANA: 5/17/2025     Code Status: Full Code   Medical Readiness for Discharge Date:   Discharge Plan A: Home   Discharge Delays: None known at this time                    Adan Cartagena MD  Department of Hospital Medicine   [facility]

## 2025-05-15 NOTE — SUBJECTIVE & OBJECTIVE
Interval History: episodes of vomiting today.    Review of Systems   HENT:  Negative for ear discharge and ear pain.    Eyes:  Negative for discharge and itching.   Endocrine: Negative for cold intolerance and heat intolerance.   Neurological:  Negative for seizures and syncope.     Objective:     Vital Signs (Most Recent):  Temp: 98.5 °F (36.9 °C) (05/15/25 1520)  Pulse: 91 (05/15/25 1520)  Resp: 18 (05/15/25 1520)  BP: 110/77 (05/15/25 1520)  SpO2: (!) 92 % (05/15/25 1520) Vital Signs (24h Range):  Temp:  [97.9 °F (36.6 °C)-99 °F (37.2 °C)] 98.5 °F (36.9 °C)  Pulse:  [] 91  Resp:  [18-20] 18  SpO2:  [92 %-97 %] 92 %  BP: (104-128)/(65-86) 110/77     Weight: 57.2 kg (126 lb)  Body mass index is 22.32 kg/m².    Intake/Output Summary (Last 24 hours) at 5/15/2025 1706  Last data filed at 5/15/2025 1345  Gross per 24 hour   Intake 360 ml   Output 550 ml   Net -190 ml         Physical Exam  Vitals and nursing note reviewed.   Constitutional:       General: She is not in acute distress.     Appearance: Normal appearance. She is ill-appearing.   HENT:      Head: Normocephalic and atraumatic.   Cardiovascular:      Rate and Rhythm: Normal rate and regular rhythm.      Pulses: Normal pulses.      Heart sounds: No murmur heard.  Pulmonary:      Effort: Pulmonary effort is normal. No respiratory distress.      Breath sounds: Normal breath sounds.   Abdominal:      General: Bowel sounds are normal. There is no distension.      Palpations: Abdomen is soft.      Comments: G-tube in place   Musculoskeletal:      Comments: Multiple ulcers lower extremity, right hip with wound    Skin:     General: Skin is warm and dry.   Neurological:      Mental Status: She is alert.               Significant Labs: All pertinent labs within the past 24 hours have been reviewed.  BMP:   Recent Labs   Lab 05/15/25  0505   *   *   K 3.8      CO2 29   BUN 23   CREATININE 0.8   CALCIUM 9.1     CBC:   Recent Labs   Lab  05/14/25  0857 05/15/25  0505   WBC 16.93* 24.75*   HGB 7.2* 7.5*   HCT 23.7* 25.1*   * 646*       Significant Imaging: I have reviewed all pertinent imaging results/findings within the past 24 hours.

## 2025-05-15 NOTE — NURSING
Pt vomited a large amount, Zofran given. MD notified.     Wound care performed per order. NAD noted.

## 2025-05-15 NOTE — PLAN OF CARE
Changes in medical condition or discharge plan:    Patient admitted 05/07/25 for multiple wounds. 05/12/25 patient MRI done notes evidence of osteomyelitis around the greater techancter. General surgery was consulted for further management. Patient right lateral hip decubitus ulcer with tunneling pink granulation tissue noted. 05/13/25 Infectious disease consulted, recommendations to continue vancomycin and cefepime for now.  05/14/25 wound cultures obtained for tunneling hip wound. Right hip pressure injury developing buds of granulation all of wounds.    Does patient need new DME? TBD    Follow up appts needed: Patient will need PCP follow-up    Medically stable: patient is not medically stable for discharge.    Estimated Discharge Date: Patient estimated d/c date is 05/19/25     05/15/25 1145   Discharge Reassessment   Assessment Type Discharge Planning Reassessment   Did the patient's condition or plan change since previous assessment? Yes   Discharge Plan discussed with:   (Daughter Aneta [phone])   Discharge Plan A Home   Discharge Plan B Home with family   DME Needed Upon Discharge    (TBD)   Transition of Care Barriers None   Post-Acute Status   Coverage Medicaid   Hospital Resources/Appts/Education Provided Appointments scheduled and added to AVS   Discharge Delays None known at this time

## 2025-05-16 LAB
ABSOLUTE EOSINOPHIL (OHS): 0.07 K/UL
ABSOLUTE MONOCYTE (OHS): 1.81 K/UL (ref 0.3–1)
ABSOLUTE NEUTROPHIL COUNT (OHS): 26.15 K/UL (ref 1.8–7.7)
ALBUMIN SERPL BCP-MCNC: 1.7 G/DL (ref 3.5–5.2)
ALP SERPL-CCNC: 112 UNIT/L (ref 40–150)
ALT SERPL W/O P-5'-P-CCNC: 10 UNIT/L (ref 10–44)
ANION GAP (OHS): 13 MMOL/L (ref 8–16)
AST SERPL-CCNC: 15 UNIT/L (ref 11–45)
BACTERIA SPEC AEROBE CULT: ABNORMAL
BASOPHILS # BLD AUTO: 0.07 K/UL
BASOPHILS NFR BLD AUTO: 0.2 %
BILIRUB SERPL-MCNC: 0.2 MG/DL (ref 0.1–1)
BUN SERPL-MCNC: 24 MG/DL (ref 8–23)
CALCIUM SERPL-MCNC: 9.2 MG/DL (ref 8.7–10.5)
CHLORIDE SERPL-SCNC: 110 MMOL/L (ref 95–110)
CO2 SERPL-SCNC: 27 MMOL/L (ref 23–29)
CREAT SERPL-MCNC: 1 MG/DL (ref 0.5–1.4)
ERYTHROCYTE [DISTWIDTH] IN BLOOD BY AUTOMATED COUNT: 20.3 % (ref 11.5–14.5)
GFR SERPLBLD CREATININE-BSD FMLA CKD-EPI: >60 ML/MIN/1.73/M2
GLUCOSE SERPL-MCNC: 108 MG/DL (ref 70–110)
HCT VFR BLD AUTO: 29.4 % (ref 37–48.5)
HGB BLD-MCNC: 8.5 GM/DL (ref 12–16)
IMM GRANULOCYTES # BLD AUTO: 0.23 K/UL (ref 0–0.04)
IMM GRANULOCYTES NFR BLD AUTO: 0.8 % (ref 0–0.5)
LYMPHOCYTES # BLD AUTO: 1.7 K/UL (ref 1–4.8)
MCH RBC QN AUTO: 21.6 PG (ref 27–31)
MCHC RBC AUTO-ENTMCNC: 28.9 G/DL (ref 32–36)
MCV RBC AUTO: 75 FL (ref 82–98)
NUCLEATED RBC (/100WBC) (OHS): 2 /100 WBC
PLATELET # BLD AUTO: 669 K/UL (ref 150–450)
PMV BLD AUTO: 9 FL (ref 9.2–12.9)
POCT GLUCOSE: 102 MG/DL (ref 70–110)
POCT GLUCOSE: 108 MG/DL (ref 70–110)
POCT GLUCOSE: 116 MG/DL (ref 70–110)
POCT GLUCOSE: 129 MG/DL (ref 70–110)
POTASSIUM SERPL-SCNC: 3.6 MMOL/L (ref 3.5–5.1)
PROT SERPL-MCNC: 7.4 GM/DL (ref 6–8.4)
RBC # BLD AUTO: 3.93 M/UL (ref 4–5.4)
RELATIVE EOSINOPHIL (OHS): 0.2 %
RELATIVE LYMPHOCYTE (OHS): 5.7 % (ref 18–48)
RELATIVE MONOCYTE (OHS): 6 % (ref 4–15)
RELATIVE NEUTROPHIL (OHS): 87.1 % (ref 38–73)
SODIUM SERPL-SCNC: 150 MMOL/L (ref 136–145)
VANCOMYCIN TROUGH SERPL-MCNC: 24 UG/ML (ref 10–22)
WBC # BLD AUTO: 30.03 K/UL (ref 3.9–12.7)

## 2025-05-16 PROCEDURE — 85025 COMPLETE CBC W/AUTO DIFF WBC: CPT | Performed by: STUDENT IN AN ORGANIZED HEALTH CARE EDUCATION/TRAINING PROGRAM

## 2025-05-16 PROCEDURE — 87040 BLOOD CULTURE FOR BACTERIA: CPT | Performed by: HOSPITALIST

## 2025-05-16 PROCEDURE — 80202 ASSAY OF VANCOMYCIN: CPT | Performed by: HOSPITALIST

## 2025-05-16 PROCEDURE — 25000003 PHARM REV CODE 250: Performed by: STUDENT IN AN ORGANIZED HEALTH CARE EDUCATION/TRAINING PROGRAM

## 2025-05-16 PROCEDURE — 63600175 PHARM REV CODE 636 W HCPCS: Performed by: STUDENT IN AN ORGANIZED HEALTH CARE EDUCATION/TRAINING PROGRAM

## 2025-05-16 PROCEDURE — 97164 PT RE-EVAL EST PLAN CARE: CPT

## 2025-05-16 PROCEDURE — 80053 COMPREHEN METABOLIC PANEL: CPT | Performed by: STUDENT IN AN ORGANIZED HEALTH CARE EDUCATION/TRAINING PROGRAM

## 2025-05-16 PROCEDURE — 25000003 PHARM REV CODE 250: Performed by: INTERNAL MEDICINE

## 2025-05-16 PROCEDURE — 97598 DBRDMT OPN WND ADDL 20CM/<: CPT

## 2025-05-16 PROCEDURE — 25000003 PHARM REV CODE 250

## 2025-05-16 PROCEDURE — S4991 NICOTINE PATCH NONLEGEND: HCPCS

## 2025-05-16 PROCEDURE — 97597 DBRDMT OPN WND 1ST 20 CM/<: CPT

## 2025-05-16 PROCEDURE — 36415 COLL VENOUS BLD VENIPUNCTURE: CPT | Performed by: HOSPITALIST

## 2025-05-16 PROCEDURE — 21400001 HC TELEMETRY ROOM

## 2025-05-16 PROCEDURE — 63600175 PHARM REV CODE 636 W HCPCS: Performed by: HOSPITALIST

## 2025-05-16 PROCEDURE — 36415 COLL VENOUS BLD VENIPUNCTURE: CPT | Performed by: STUDENT IN AN ORGANIZED HEALTH CARE EDUCATION/TRAINING PROGRAM

## 2025-05-16 PROCEDURE — 25000003 PHARM REV CODE 250: Performed by: HOSPITALIST

## 2025-05-16 RX ORDER — MEROPENEM 1 G/1
1 INJECTION, POWDER, FOR SOLUTION INTRAVENOUS
Status: DISCONTINUED | OUTPATIENT
Start: 2025-05-16 | End: 2025-05-19

## 2025-05-16 RX ORDER — ACETAMINOPHEN 325 MG/1
650 TABLET ORAL EVERY 4 HOURS PRN
Status: DISCONTINUED | OUTPATIENT
Start: 2025-05-16 | End: 2025-05-22 | Stop reason: HOSPADM

## 2025-05-16 RX ORDER — SODIUM CHLORIDE 450 MG/100ML
INJECTION, SOLUTION INTRAVENOUS CONTINUOUS
Status: DISCONTINUED | OUTPATIENT
Start: 2025-05-16 | End: 2025-05-16

## 2025-05-16 RX ORDER — FAMOTIDINE 20 MG/1
20 TABLET, FILM COATED ORAL DAILY
Status: DISCONTINUED | OUTPATIENT
Start: 2025-05-16 | End: 2025-05-19

## 2025-05-16 RX ORDER — SODIUM CHLORIDE 450 MG/100ML
INJECTION, SOLUTION INTRAVENOUS CONTINUOUS
Status: ACTIVE | OUTPATIENT
Start: 2025-05-16 | End: 2025-05-16

## 2025-05-16 RX ADMIN — THERA TABS 1 TABLET: TAB at 09:05

## 2025-05-16 RX ADMIN — MEROPENEM 1 G: 1 INJECTION INTRAVENOUS at 05:05

## 2025-05-16 RX ADMIN — ACETAMINOPHEN 650 MG: 325 TABLET ORAL at 04:05

## 2025-05-16 RX ADMIN — ASPIRIN 81 MG: 81 TABLET, COATED ORAL at 09:05

## 2025-05-16 RX ADMIN — MORPHINE SULFATE 2 MG: 4 INJECTION INTRAVENOUS at 05:05

## 2025-05-16 RX ADMIN — VANCOMYCIN HYDROCHLORIDE 750 MG: 750 INJECTION, POWDER, LYOPHILIZED, FOR SOLUTION INTRAVENOUS at 06:05

## 2025-05-16 RX ADMIN — MICONAZOLE NITRATE: 20 POWDER TOPICAL at 08:05

## 2025-05-16 RX ADMIN — FAMOTIDINE 20 MG: 20 TABLET, FILM COATED ORAL at 09:05

## 2025-05-16 RX ADMIN — NICOTINE 1 PATCH: 14 PATCH TRANSDERMAL at 09:05

## 2025-05-16 RX ADMIN — SODIUM CHLORIDE: 4.5 INJECTION, SOLUTION INTRAVENOUS at 03:05

## 2025-05-16 RX ADMIN — COLLAGENASE SANTYL: 250 OINTMENT TOPICAL at 09:05

## 2025-05-16 RX ADMIN — ATORVASTATIN CALCIUM 80 MG: 40 TABLET, FILM COATED ORAL at 08:05

## 2025-05-16 RX ADMIN — INSULIN GLARGINE 5 UNITS: 100 INJECTION, SOLUTION SUBCUTANEOUS at 08:05

## 2025-05-16 RX ADMIN — MICONAZOLE NITRATE: 20 POWDER TOPICAL at 09:05

## 2025-05-16 RX ADMIN — ACETAMINOPHEN 650 MG: 325 TABLET ORAL at 10:05

## 2025-05-16 NOTE — ASSESSMENT & PLAN NOTE
Patient with Chronic debility due to hemiplegia/hemiparesis. The patient's latest AMPAC (Activity Measure for Post Acute Care) Score is listed below.    AM-PAC Score - How much help does the patient need for each activity listed  Basic Mobility Total Score: 8  Turning over in bed (including adjusting bedclothes, sheets and blankets)?: A lot  Sitting down on and standing up from a chair with arms (e.g., wheelchair, bedside commode, etc.): Unable  Moving from lying on back to sitting on the side of the bed?: A lot  Moving to and from a bed to a chair (including a wheelchair)?: Unable  Need to walk in hospital room?: Unable  Climbing 3-5 steps with a railing?: Unable    Plan  - Progressive mobility protocol initated  - Fall precautions in place

## 2025-05-16 NOTE — NURSING
[facility]  Nurses Note -- 4 Eyes      5/15/2025       Skin assessed on: Q Shift       []No Pressure Injuries Present    [x]Prevention Measures Documented    [x] Yes LDA  for Pressure Injury Previously documented     [] Yes New Pressure Injury Discovered   [] LDA for New Pressure Injury Added      Attending RN:  Balta Mckeon RN     Second RN:  AKASH Scott

## 2025-05-16 NOTE — NURSING
Daja from lab just called. On 5/14, a right hip wound culture was collected. E coli and ESBL were found. Notified MD. Administered morning meds to patient. Patient tolerated well with no issues. Patient complained of 6/10 pain. Gave PRN tylenol. When reassessed, patient stated pain was 2/10. Did 300mL water bolus. Patient tolerated well. 0.45% NS @ 100mL/hr started. Discontinued tele. PT did right hip wound care for today. Patient tolerated well. Did wound care to bilateral lower legs and feet per wound care order. Did wound care to buttocks per wound care order. Patient complained of 10/10 pain. Gave PRN morphine. When reassessed, patient stated pain was 7/10. Patient AAOx2 and is disoriented to time and situation. Patient not showing signs of distress. Bed in lowest position, wheels locked, call bell in reach, side rails up x3.

## 2025-05-16 NOTE — ASSESSMENT & PLAN NOTE
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 Most recent hemoglobin and hematocrit are listed below.  Recent Labs     05/14/25  0857 05/15/25  0505 05/16/25  0605   HGB 7.2* 7.5* 8.5*   HCT 23.7* 25.1* 29.4*     Plan  - Monitor serial CBC: Daily  - Transfuse PRBC if patient becomes hemodynamically unstable, symptomatic or H/H drops below 7/21.  - Patient has not received any PRBC transfusions to date  - Patient's anemia is currently stable  - Suspect iron def +chronic disease  Hgb 6.6 this morning, but repeat Hgb>7.  Continue to monitor.

## 2025-05-16 NOTE — ASSESSMENT & PLAN NOTE
Patient's blood pressure range in the last 24 hours was: BP  Min: 90/66  Max: 117/81.The patient's inpatient anti-hypertensive regimen is listed below:  Current Antihypertensives       Plan  - BP is controlled, no changes needed to their regimen

## 2025-05-16 NOTE — PLAN OF CARE
Problem: Infection  Goal: Absence of Infection Signs and Symptoms  Outcome: Progressing     Problem: Adult Inpatient Plan of Care  Goal: Plan of Care Review  Outcome: Progressing  Goal: Patient-Specific Goal (Individualized)  Outcome: Progressing  Goal: Absence of Hospital-Acquired Illness or Injury  Outcome: Progressing  Goal: Optimal Comfort and Wellbeing  Outcome: Progressing  Goal: Readiness for Transition of Care  Outcome: Progressing     Problem: Diabetes Comorbidity  Goal: Blood Glucose Level Within Targeted Range  Outcome: Progressing     Problem: Acute Kidney Injury/Impairment  Goal: Fluid and Electrolyte Balance  Outcome: Progressing  Goal: Improved Oral Intake  Outcome: Progressing  Goal: Effective Renal Function  Outcome: Progressing     Problem: Wound  Goal: Optimal Coping  Outcome: Progressing  Goal: Optimal Functional Ability  Outcome: Progressing  Goal: Absence of Infection Signs and Symptoms  Outcome: Progressing  Goal: Improved Oral Intake  Outcome: Progressing  Goal: Optimal Pain Control and Function  Outcome: Progressing  Goal: Skin Health and Integrity  Outcome: Progressing  Goal: Optimal Wound Healing  Outcome: Progressing     Problem: Skin Injury Risk Increased  Goal: Skin Health and Integrity  Outcome: Progressing     Problem: Fall Injury Risk  Goal: Absence of Fall and Fall-Related Injury  Outcome: Progressing     Problem: Pain Acute  Goal: Optimal Pain Control and Function  Outcome: Progressing     Problem: Coping Ineffective  Goal: Effective Coping  Outcome: Progressing

## 2025-05-16 NOTE — NURSING
Daja from lab just called. On 5/14, a right hip wound culture was collected. E coli and ESBL were found. Notified MD.

## 2025-05-16 NOTE — PROGRESS NOTES
Pharmacist Renal Dose Adjustment Note    Emily Martinez is a 60 y.o. female being treated with the medications:  Meropenem 1g Q8h   Famotidine 20 mg Q12h     Patient Data:    Vital Signs (Most Recent):  Temp: 97.4 °F (36.3 °C) (05/16/25 0717)  Pulse: 95 (05/16/25 0727)  Resp: 18 (05/16/25 0717)  BP: 111/75 (05/16/25 0717)  SpO2: (!) 94 % (05/16/25 0717) Vital Signs (72h Range):  Temp:  [97.4 °F (36.3 °C)-99.2 °F (37.3 °C)]   Pulse:  []   Resp:  [18-20]   BP: ()/(59-89)   SpO2:  [91 %-98 %]      Recent Labs   Lab 05/14/25  0857 05/15/25  0505 05/16/25  0605   CREATININE 0.8 0.8 1.0     Serum creatinine: 1 mg/dL 05/16/25 0605  Estimated creatinine clearance: 48.3 mL/min    Medications:   Meropenem 1g Q8h will be changed to Meropenem 1g Q12h per pharmacy renal adjustment protocol   Famotidine 20 mg q12h will be changed to Famotidine 20 mg daily       Pharmacist's Name: Janie Guaman  Pharmacist's Extension: [phone]

## 2025-05-16 NOTE — ASSESSMENT & PLAN NOTE
Chronic, pressure ulcers.  Most recently debrided by wound care on 4/9.  Various stages of healing currently; R hip ulcer is purulent.  WBC, ESR, CRP, Lactate all elevated, although pt not febrile.  Wet-to-dry dressing applied and Vanc given in ED.  Wound care consulted  Concern with worsening right hip wound and tunneling noted on CT -- and wound care  MRI with large lateral soft tissue ulceration with suspected early osteomyelitis of the greater trochanter.   Continue ABX's.  Appreciate ID input.  Consult General Surgery for possible debridement with culture.  No need for surgical debridement per Surgery.  Asked Wound Care to get deep wound culture.  Deep wound culture obtained by Wound Care nurse.  Currently growing ESBL E coli.  ABX's per ID

## 2025-05-16 NOTE — ASSESSMENT & PLAN NOTE
Nutrition consulted. Most recent weight and BMI monitored-     Measurements:  Wt Readings from Last 1 Encounters:   05/09/25 57.2 kg (126 lb)   Body mass index is 22.32 kg/m².    Patient has been screened and assessed by RD.    Malnutrition Type:  Context: chronic illness  Level: moderate

## 2025-05-16 NOTE — SUBJECTIVE & OBJECTIVE
Interval History: complaining of hip pain.  No vomiting today.    Review of Systems   HENT:  Negative for ear discharge and ear pain.    Eyes:  Negative for discharge and itching.   Endocrine: Negative for cold intolerance and heat intolerance.   Neurological:  Negative for seizures and syncope.     Objective:     Vital Signs (Most Recent):  Temp: 99.1 °F (37.3 °C) (05/16/25 1059)  Pulse: 90 (05/16/25 1146)  Resp: 18 (05/16/25 1059)  BP: 117/81 (05/16/25 1059)  SpO2: 96 % (05/16/25 1059) Vital Signs (24h Range):  Temp:  [97.4 °F (36.3 °C)-99.1 °F (37.3 °C)] 99.1 °F (37.3 °C)  Pulse:  [] 90  Resp:  [18] 18  SpO2:  [91 %-96 %] 96 %  BP: ()/(61-81) 117/81     Weight: 57.2 kg (126 lb)  Body mass index is 22.32 kg/m².    Intake/Output Summary (Last 24 hours) at 5/16/2025 1435  Last data filed at 5/16/2025 1336  Gross per 24 hour   Intake 780 ml   Output 750 ml   Net 30 ml         Physical Exam  Vitals and nursing note reviewed.   Constitutional:       General: She is not in acute distress.     Appearance: Normal appearance. She is ill-appearing.   HENT:      Head: Normocephalic and atraumatic.   Cardiovascular:      Rate and Rhythm: Normal rate and regular rhythm.      Pulses: Normal pulses.      Heart sounds: No murmur heard.  Pulmonary:      Effort: Pulmonary effort is normal. No respiratory distress.      Breath sounds: Normal breath sounds.   Abdominal:      General: Bowel sounds are normal. There is no distension.      Palpations: Abdomen is soft.      Comments: G-tube in place   Musculoskeletal:      Comments: Multiple ulcers lower extremity, right hip with wound    Skin:     General: Skin is warm and dry.   Neurological:      Mental Status: She is alert.               Significant Labs: All pertinent labs within the past 24 hours have been reviewed.  BMP:   Recent Labs   Lab 05/16/25  0605      *   K 3.6      CO2 27   BUN 24*   CREATININE 1.0   CALCIUM 9.2     CBC:   Recent Labs   Lab  05/15/25  0505 05/16/25  0605   WBC 24.75* 30.03*   HGB 7.5* 8.5*   HCT 25.1* 29.4*   * 669*       Significant Imaging: I have reviewed all pertinent imaging results/findings within the past 24 hours.

## 2025-05-16 NOTE — PT/OT/SLP EVAL
Rehab Services Wound Care Evaluation    Emily Martinez  [id number]  5/16/2025 [id number] (36 min - 1 Re-Eval and 2 wound care)    Diagnosis:    History of current illness and wound.  Patient is a 60 y.o. female admitted from home with multiple wounds.  Pulsavac wound care orders received for R hip wound.     Past Medical History:   Diagnosis Date    Diabetes mellitus type I     Hyperlipidemia     Hypertension     Right sided weakness 6/27/2019        Social History[1]    Precautions: Standard, Diabetes, and ESBL    Allergies as of 05/07/2025 - Reviewed 05/07/2025   Allergen Reaction Noted    Sulfa (sulfonamide antibiotics) Itching 10/30/2014    Sulfur  10/30/2014        Pre-medication: distractions, rest pauses during treatment, and pain medication taken by patient prior to treatment    Subjective:     Patient did not report pain, however was resisting pulsavac wound care.    Objective:     Patient received pulsavac wound care to R hip with 1000 mL of NS.  R hip wound bed and periwound cleaned with NS moistened gauze.  Cavilon no sting barrier film applied to periwound.  Santyl applied to wound bed with area of slough.  R hip wound packed with NS moistened 4x4 gauze, then covered with dry 4x4 gauze and abdominal pad.  Dressings secured with micropore tape.  Clean technique maintained throughout treatment.      Assessment:    R hip dressings/packing removed with moderated serosanguinous drainage.  R hip wound measured at 6 cm (L) x 4 cm (W) x 3 cm (D).  Pt with undermining around 12 o'clock to 3 o'clock, with 2 cm at 12 o'clock and 5 cm at 3 o'clock.  R hip wound bed with max amount of yellow and tan slough around undermining between 12-3 o'clock.  Other areas presented with pink/red tissues.  No odor present at this time.  Patient with diagnosis of R hip stage 4 pressure injury will benefit from skilled Rehab Services Wound Care to maximize wound healing potential and to assist wound to heal through appropriate  healing phases.  Problems include multiple co-morbidities, diabetes, decreased granulation tissue, necrosis, large surface area, large volume, undermining, infection, and history of poor compliance.    Pt unable to tolerate pulsavac.  Despite max education, pt was resisting wound care.  Pt kept pulling away the pulsavac gun.  Pt attempted to scratch and dug her nails into therapist's hands/arms.  Pt also attempted to bite therapist's hand.  Pt gave therapist the middle finger.  HEIDI Mak updated with change in POC for this weekend.       Photodocumentation: R hip             Plan:    Per HEIDI Mak, will restart pulsavac wound care on Monday 5/19/25 for a 3 days trial.  Nursing to perform dressing changes over the weekend as ordered.             [1]   Social History  Socioeconomic History    Marital status: Single   Tobacco Use    Smoking status: Every Day     Current packs/day: 1.50     Average packs/day: 1.5 packs/day for 35.0 years (52.5 ttl pk-yrs)     Types: Cigarettes    Smokeless tobacco: Never   Substance and Sexual Activity    Alcohol use: Not Currently    Drug use: Not Currently     Social Drivers of Health     Financial Resource Strain: Patient Declined (5/7/2025)    Overall Financial Resource Strain (CARDIA)     Difficulty of Paying Living Expenses: Patient declined   Food Insecurity: Patient Declined (5/7/2025)    Hunger Vital Sign     Worried About Running Out of Food in the Last Year: Patient declined     Ran Out of Food in the Last Year: Patient declined   Transportation Needs: Patient Declined (5/7/2025)    PRAPARE - Transportation     Lack of Transportation (Medical): Patient declined     Lack of Transportation (Non-Medical): Patient declined   Physical Activity: Inactive (5/8/2025)    Exercise Vital Sign     Days of Exercise per Week: 0 days     Minutes of Exercise per Session: 0 min   Stress: No Stress Concern Present (5/8/2025)    [employer] -  Occupational Stress Questionnaire     Feeling of Stress : Not at all   Housing Stability: Patient Declined (5/7/2025)    Housing Stability Vital Sign     Unable to Pay for Housing in the Last Year: Patient declined     Homeless in the Last Year: Patient declined

## 2025-05-16 NOTE — PLAN OF CARE
Pt remains free from falls/injuries. Dressing change done R hip and sacrum Q shift, Prn pain med given once, turned pt. Disoriented to time and situation. Bed in low position, wheels locked. Side rails up. Call light within reach. Bed alarm on and [employer] at bedside. Heel boots in in place.    Problem: Adult Inpatient Plan of Care  Goal: Absence of Hospital-Acquired Illness or Injury  Outcome: Progressing  Intervention: Identify and Manage Fall Risk  Flowsheets (Taken 5/16/2025 0446)  Safety Promotion/Fall Prevention:   assistive device/personal item within reach   lighting adjusted   medications reviewed   nonskid shoes/socks when out of bed   side rails raised x 2   bed alarm set   room near unit station  Intervention: Prevent Skin Injury  Flowsheets (Taken 5/16/2025 0446)  Body Position: turned     Problem: Diabetes Comorbidity  Goal: Blood Glucose Level Within Targeted Range  Outcome: Progressing  Intervention: Monitor and Manage Glycemia  Flowsheets (Taken 5/16/2025 2958)  Glycemic Management: blood glucose monitored     Problem: Fall Injury Risk  Goal: Absence of Fall and Fall-Related Injury  Outcome: Progressing

## 2025-05-16 NOTE — ASSESSMENT & PLAN NOTE
Last A1c reviewed-   Lab Results   Component Value Date    LABA1C 13.0 (H) 04/13/2016    HGBA1C 8.1 (H) 05/07/2025     Home antihyperglycemic regimen: lispro-protamin 10U daily    Recent Labs     05/15/25  0736 05/15/25  1035 05/15/25  1520 05/15/25  1946 05/16/25  0716 05/16/25  1059   POCTGLUCOSE 138* 169* 157* 102 116* 129*     Most recent inpatient antihyperglycemic regimen:   Antihyperglycemics (From admission, onward)      Start     Stop Route Frequency Ordered    05/15/25 2100  insulin glargine U-100 (Lantus) pen 5 Units         -- SubQ Nightly 05/15/25 0850    05/07/25 2127  insulin aspart U-100 pen 0-5 Units         -- SubQ Before meals & nightly PRN 05/07/25 2028          Some episodes of hypoglycemia.  Adjust insulin.

## 2025-05-16 NOTE — NURSING
[facility]  Nurses Note -- 4 Eyes      5/16/2025       Skin assessed on: Q Shift      [] No Pressure Injuries Present    []Prevention Measures Documented    [x] Yes LDA  for Pressure Injury Previously documented     [] Yes New Pressure Injury Discovered   [] LDA for New Pressure Injury Added      Attending RN:  Maria E Dumont LPN     Second RN:  JUAN Gifford

## 2025-05-16 NOTE — PROGRESS NOTES
"[facility]  Progress Note    Patient Name: Emily Martinez  MRN: [id number]  Patient Class: IP- Inpatient   Admission Date: 5/7/2025  Length of Stay: 9 days  Attending Physician: Adan Cartagena MD  Primary Care Provider: Alireza Sosa MD        Subjective     Principal Problem:Multiple wounds of skin        HPI:  Emily Martinez is a 60 y.o. female with DM1, HTN, HLD, and Previous CVA with residual deficits who was BIBA to [facility] ED for eval and treatment of acute generalized abdominal pain for the last 2x days. Per EMS, pt continued to experience persistent abdominal pain this morning accompanied by 4 episodes of emesis prompting her relatives to check her blood sugar at home. EMS notes that family received a reading of "critical high" and subsequently gave pt her insulin.   EMS reports receiving a reading greater than 500 upon their arrival.  She is chronically bed-bound, lives with her daughter, and has dementia (but is currently at her baseline per what daughter told EMS).  Further history therefore limited.  Has multiple chronic pressure ulcers on her sacrum and legs, presented to [facility] ED on April 10 with very similar circumstances as now, but was DC'ed home from the ED after a round of pain meds.    ED course: POCT  on arrival.  VSS WNL.  Exam with multiple pressure ulcers in various stages of healing; R hip wound is purulent.  Oriented to place and self.  Labs WBC 14.75 Hgb 9.3 Plt 677.  ESR CRP Lactate all elevated.  K 3.1.  Imaging: CT reads as wound to the right lateral aspect of her upper thigh appears worse when compared to previous imaging.  ED treatments: Vanc, mupirocin, wet-to-dry dressing, KCl.  They were admitted to [facility] Medicine for further evaluation and treatment.        Overview/Hospital Course:  Ms. Martinez is a 59 yo F admitted with hyperglycemia and multiple pressure wounds. Started on IV antibiotics and given IVF, started " on insulin. Wound care consulted. Concern for tunneling of right hip wound. CT with no bone involvement however concerning due to tunneling. MRI ordered showing large lateral soft tissue ulceration with suspected early osteomyelitis of the greater trochanter.  ID consulted.  Deep wound culture obtained.  Growing ESBL E coli.  On Meropenem along with Vancomycin.  Episodes of vomiting and tube feeds held as she does have some oral intake.    Interval History: complaining of hip pain.  No vomiting today.    Review of Systems   HENT:  Negative for ear discharge and ear pain.    Eyes:  Negative for discharge and itching.   Endocrine: Negative for cold intolerance and heat intolerance.   Neurological:  Negative for seizures and syncope.     Objective:     Vital Signs (Most Recent):  Temp: 99.1 °F (37.3 °C) (05/16/25 1059)  Pulse: 90 (05/16/25 1146)  Resp: 18 (05/16/25 1059)  BP: 117/81 (05/16/25 1059)  SpO2: 96 % (05/16/25 1059) Vital Signs (24h Range):  Temp:  [97.4 °F (36.3 °C)-99.1 °F (37.3 °C)] 99.1 °F (37.3 °C)  Pulse:  [] 90  Resp:  [18] 18  SpO2:  [91 %-96 %] 96 %  BP: ()/(61-81) 117/81     Weight: 57.2 kg (126 lb)  Body mass index is 22.32 kg/m².    Intake/Output Summary (Last 24 hours) at 5/16/2025 1435  Last data filed at 5/16/2025 1336  Gross per 24 hour   Intake 780 ml   Output 750 ml   Net 30 ml         Physical Exam  Vitals and nursing note reviewed.   Constitutional:       General: She is not in acute distress.     Appearance: Normal appearance. She is ill-appearing.   HENT:      Head: Normocephalic and atraumatic.   Cardiovascular:      Rate and Rhythm: Normal rate and regular rhythm.      Pulses: Normal pulses.      Heart sounds: No murmur heard.  Pulmonary:      Effort: Pulmonary effort is normal. No respiratory distress.      Breath sounds: Normal breath sounds.   Abdominal:      General: Bowel sounds are normal. There is no distension.      Palpations: Abdomen is soft.      Comments: G-tube  in place   Musculoskeletal:      Comments: Multiple ulcers lower extremity, right hip with wound    Skin:     General: Skin is warm and dry.   Neurological:      Mental Status: She is alert.               Significant Labs: All pertinent labs within the past 24 hours have been reviewed.  BMP:   Recent Labs   Lab 05/16/25  0605      *   K 3.6      CO2 27   BUN 24*   CREATININE 1.0   CALCIUM 9.2     CBC:   Recent Labs   Lab 05/15/25  0505 05/16/25  0605   WBC 24.75* 30.03*   HGB 7.5* 8.5*   HCT 25.1* 29.4*   * 669*       Significant Imaging: I have reviewed all pertinent imaging results/findings within the past 24 hours.      Assessment & Plan  Multiple wounds of skin  Chronic, pressure ulcers.  Most recently debrided by wound care on 4/9.  Various stages of healing currently; R hip ulcer is purulent.  WBC, ESR, CRP, Lactate all elevated, although pt not febrile.  Wet-to-dry dressing applied and Vanc given in ED.  Wound care consulted  Concern with worsening right hip wound and tunneling noted on CT -- and wound care  MRI with large lateral soft tissue ulceration with suspected early osteomyelitis of the greater trochanter.   Continue ABX's.  Appreciate ID input.  Consult General Surgery for possible debridement with culture.  No need for surgical debridement per Surgery.  Asked Wound Care to get deep wound culture.  Deep wound culture obtained by Wound Care nurse.  Currently growing ESBL E coli.  ABX's per ID  Ulcer of sacral region, stage 2      Type 2 diabetes mellitus with stage 4 chronic kidney disease, with long-term current use of insulin  Last A1c reviewed-   Lab Results   Component Value Date    LABA1C 13.0 (H) 04/13/2016    HGBA1C 8.1 (H) 05/07/2025     Home antihyperglycemic regimen: lispro-protamin 10U daily    Recent Labs     05/15/25  0736 05/15/25  1035 05/15/25  1520 05/15/25  1946 05/16/25  0716 05/16/25  1059   POCTGLUCOSE 138* 169* 157* 102 116* 129*     Most recent  inpatient antihyperglycemic regimen:   Antihyperglycemics (From admission, onward)      Start     Stop Route Frequency Ordered    05/15/25 2100  insulin glargine U-100 (Lantus) pen 5 Units         -- SubQ Nightly 05/15/25 0850    05/07/25 2127  insulin aspart U-100 pen 0-5 Units         -- SubQ Before meals & nightly PRN 05/07/25 2028          Some episodes of hypoglycemia.  Adjust insulin.  Essential hypertension  Patient's blood pressure range in the last 24 hours was: BP  Min: 90/66  Max: 117/81.The patient's inpatient anti-hypertensive regimen is listed below:  Current Antihypertensives       Plan  - BP is controlled, no changes needed to their regimen    Mixed hyperlipidemia  Stable.  Continue home statin  History of stroke with residual effects  As above    Debility  Patient with Chronic debility due to hemiplegia/hemiparesis. The patient's latest AMPAC (Activity Measure for Post Acute Care) Score is listed below.    AM-PAC Score - How much help does the patient need for each activity listed  Basic Mobility Total Score: 8  Turning over in bed (including adjusting bedclothes, sheets and blankets)?: A lot  Sitting down on and standing up from a chair with arms (e.g., wheelchair, bedside commode, etc.): Unable  Moving from lying on back to sitting on the side of the bed?: A lot  Moving to and from a bed to a chair (including a wheelchair)?: Unable  Need to walk in hospital room?: Unable  Climbing 3-5 steps with a railing?: Unable    Plan  - Progressive mobility protocol initated  - Fall precautions in place      Tobacco dependency  Dangers of cigarette smoking were reviewed with patient in detail. Patient was Referred to Tobacco Cessation Program. Nicotine replacement options were discussed. Nicotine replacement was discussed- prescribed  Moderate malnutrition  Nutrition consulted. Most recent weight and BMI monitored-     Measurements:  Wt Readings from Last 1 Encounters:   05/09/25 57.2 kg (126 lb)   Body mass  index is 22.32 kg/m².    Patient has been screened and assessed by RD.    Malnutrition Type:  Context: chronic illness  Level: moderate        Pressure injury of right hip, stage 4      Microcytic anemia  Anemia is likely due to chronic blood loss, Iron deficiency, and chronic disease due to infection. Most recent hemoglobin and hematocrit are listed below.  Recent Labs     05/14/25  0857 05/15/25  0505 05/16/25  0605   HGB 7.2* 7.5* 8.5*   HCT 23.7* 25.1* 29.4*     Plan  - Monitor serial CBC: Daily  - Transfuse PRBC if patient becomes hemodynamically unstable, symptomatic or H/H drops below 7/21.  - Patient has not received any PRBC transfusions to date  - Patient's anemia is currently stable  - Suspect iron def +chronic disease  Hgb 6.6 this morning, but repeat Hgb>7.  Continue to monitor.  ACP (advance care planning)      VTE Risk Mitigation (From admission, onward)      None            Discharge Planning   ANA: 5/19/2025     Code Status: Full Code   Medical Readiness for Discharge Date:   Discharge Plan A: Home   Discharge Delays: None known at this time                    Adan Cartagena MD  Department of Hospital Medicine   [facility] [place] [facility] Surg

## 2025-05-17 LAB
ABSOLUTE EOSINOPHIL (OHS): 0.18 K/UL
ABSOLUTE MONOCYTE (OHS): 1.08 K/UL (ref 0.3–1)
ABSOLUTE NEUTROPHIL COUNT (OHS): 12.73 K/UL (ref 1.8–7.7)
ALBUMIN SERPL BCP-MCNC: 1.6 G/DL (ref 3.5–5.2)
ALP SERPL-CCNC: 104 UNIT/L (ref 40–150)
ALT SERPL W/O P-5'-P-CCNC: 7 UNIT/L (ref 10–44)
ANION GAP (OHS): 9 MMOL/L (ref 8–16)
AST SERPL-CCNC: 18 UNIT/L (ref 11–45)
BASOPHILS # BLD AUTO: 0.03 K/UL
BASOPHILS NFR BLD AUTO: 0.2 %
BILIRUB SERPL-MCNC: 0.2 MG/DL (ref 0.1–1)
BUN SERPL-MCNC: 22 MG/DL (ref 8–23)
CALCIUM SERPL-MCNC: 8.5 MG/DL (ref 8.7–10.5)
CHLORIDE SERPL-SCNC: 109 MMOL/L (ref 95–110)
CO2 SERPL-SCNC: 28 MMOL/L (ref 23–29)
CREAT SERPL-MCNC: 0.9 MG/DL (ref 0.5–1.4)
ERYTHROCYTE [DISTWIDTH] IN BLOOD BY AUTOMATED COUNT: 20.7 % (ref 11.5–14.5)
GFR SERPLBLD CREATININE-BSD FMLA CKD-EPI: >60 ML/MIN/1.73/M2
GLUCOSE SERPL-MCNC: 60 MG/DL (ref 70–110)
HCT VFR BLD AUTO: 25.1 % (ref 37–48.5)
HGB BLD-MCNC: 7.4 GM/DL (ref 12–16)
IMM GRANULOCYTES # BLD AUTO: 0.1 K/UL (ref 0–0.04)
IMM GRANULOCYTES NFR BLD AUTO: 0.6 % (ref 0–0.5)
LYMPHOCYTES # BLD AUTO: 1.65 K/UL (ref 1–4.8)
MCH RBC QN AUTO: 22 PG (ref 27–31)
MCHC RBC AUTO-ENTMCNC: 29.5 G/DL (ref 32–36)
MCV RBC AUTO: 75 FL (ref 82–98)
NUCLEATED RBC (/100WBC) (OHS): 2 /100 WBC
PLATELET # BLD AUTO: 673 K/UL (ref 150–450)
PMV BLD AUTO: 9.2 FL (ref 9.2–12.9)
POCT GLUCOSE: 160 MG/DL (ref 70–110)
POCT GLUCOSE: 71 MG/DL (ref 70–110)
POCT GLUCOSE: 75 MG/DL (ref 70–110)
POTASSIUM SERPL-SCNC: 3.6 MMOL/L (ref 3.5–5.1)
PROT SERPL-MCNC: 6.8 GM/DL (ref 6–8.4)
RBC # BLD AUTO: 3.36 M/UL (ref 4–5.4)
RELATIVE EOSINOPHIL (OHS): 1.1 %
RELATIVE LYMPHOCYTE (OHS): 10.5 % (ref 18–48)
RELATIVE MONOCYTE (OHS): 6.8 % (ref 4–15)
RELATIVE NEUTROPHIL (OHS): 80.8 % (ref 38–73)
SODIUM SERPL-SCNC: 146 MMOL/L (ref 136–145)
VANCOMYCIN SERPL-MCNC: 27.8 UG/ML (ref ?–80)
WBC # BLD AUTO: 15.77 K/UL (ref 3.9–12.7)

## 2025-05-17 PROCEDURE — 80053 COMPREHEN METABOLIC PANEL: CPT | Performed by: HOSPITALIST

## 2025-05-17 PROCEDURE — 25000003 PHARM REV CODE 250

## 2025-05-17 PROCEDURE — 21400001 HC TELEMETRY ROOM

## 2025-05-17 PROCEDURE — 36415 COLL VENOUS BLD VENIPUNCTURE: CPT | Performed by: HOSPITALIST

## 2025-05-17 PROCEDURE — 25000003 PHARM REV CODE 250: Performed by: HOSPITALIST

## 2025-05-17 PROCEDURE — 25000003 PHARM REV CODE 250: Performed by: INTERNAL MEDICINE

## 2025-05-17 PROCEDURE — S4991 NICOTINE PATCH NONLEGEND: HCPCS

## 2025-05-17 PROCEDURE — 63600175 PHARM REV CODE 636 W HCPCS: Performed by: HOSPITALIST

## 2025-05-17 PROCEDURE — 80202 ASSAY OF VANCOMYCIN: CPT | Performed by: HOSPITALIST

## 2025-05-17 PROCEDURE — 85025 COMPLETE CBC W/AUTO DIFF WBC: CPT | Performed by: HOSPITALIST

## 2025-05-17 RX ADMIN — COLLAGENASE SANTYL: 250 OINTMENT TOPICAL at 09:05

## 2025-05-17 RX ADMIN — ATORVASTATIN CALCIUM 80 MG: 40 TABLET, FILM COATED ORAL at 08:05

## 2025-05-17 RX ADMIN — ASPIRIN 81 MG: 81 TABLET, COATED ORAL at 09:05

## 2025-05-17 RX ADMIN — MORPHINE SULFATE 2 MG: 4 INJECTION INTRAVENOUS at 05:05

## 2025-05-17 RX ADMIN — ACETAMINOPHEN 650 MG: 325 TABLET ORAL at 05:05

## 2025-05-17 RX ADMIN — FAMOTIDINE 20 MG: 20 TABLET, FILM COATED ORAL at 09:05

## 2025-05-17 RX ADMIN — MICONAZOLE NITRATE: 20 POWDER TOPICAL at 09:05

## 2025-05-17 RX ADMIN — ACETAMINOPHEN 650 MG: 325 TABLET ORAL at 09:05

## 2025-05-17 RX ADMIN — MEROPENEM 1 G: 1 INJECTION INTRAVENOUS at 05:05

## 2025-05-17 RX ADMIN — NICOTINE 1 PATCH: 14 PATCH TRANSDERMAL at 09:05

## 2025-05-17 RX ADMIN — INSULIN GLARGINE 5 UNITS: 100 INJECTION, SOLUTION SUBCUTANEOUS at 08:05

## 2025-05-17 RX ADMIN — MICONAZOLE NITRATE: 20 POWDER TOPICAL at 08:05

## 2025-05-17 RX ADMIN — THERA TABS 1 TABLET: TAB at 09:05

## 2025-05-17 NOTE — NURSING
[facility]  Nurses Note -- 4 Eyes      5/16/2025       Skin assessed on: Q Shift      [] No Pressure Injuries Present    [x]Prevention Measures Documented    [x] Yes LDA  for Pressure Injury Previously documented     [] Yes New Pressure Injury Discovered   [] LDA for New Pressure Injury Added      Attending RN:  Balta Mckeon RN     Second RN:  AKASH Sanderson

## 2025-05-17 NOTE — NURSING
Administered morning meds to patient. Patient tolerated well with no issues. Did 300mL water bolus in PEG. Did dressing change to PEG tube. Did 300mL water bolus in PEG tube. Did 300mL water bolus in PEG tube. Patient complained of 7/10 pain. Gave PRN morphine. When reassessed, patient stated pain was 5/10. Patient had a BM. Did wound care to bilateral lower legs and feet per wound care order. Did wound care to buttocks per wound care order. Did wound care to right hip per wound care order. Patient AAOx2 and is disoriented to time and situation. Patient not showing signs of distress. Bed in lowest position, wheels locked, call bell in reach, side rails up x3.

## 2025-05-17 NOTE — ASSESSMENT & PLAN NOTE
Last A1c reviewed-   Lab Results   Component Value Date    LABA1C 13.0 (H) 04/13/2016    HGBA1C 8.1 (H) 05/07/2025     Home antihyperglycemic regimen: lispro-protamin 10U daily    Recent Labs     05/15/25  1946 05/16/25  0716 05/16/25  1059 05/16/25  1705 05/17/25  0721 05/17/25  1057   POCTGLUCOSE 102 116* 129* 108 75 71     Most recent inpatient antihyperglycemic regimen:   Antihyperglycemics (From admission, onward)      Start     Stop Route Frequency Ordered    05/15/25 2100  insulin glargine U-100 (Lantus) pen 5 Units         -- SubQ Nightly 05/15/25 0850    05/07/25 2127  insulin aspart U-100 pen 0-5 Units         -- SubQ Before meals & nightly PRN 05/07/25 2028          Some episodes of hypoglycemia.  Adjust insulin.

## 2025-05-17 NOTE — PROGRESS NOTES
Pharmacokinetic Assessment Follow Up: IV Vancomycin    Vancomycin serum concentration assessment(s):    The trough level was drawn correctly and can be used to guide therapy at this time. The measurement is above the desired definitive target range of 10 to 20 mcg/mL.    Vancomycin Regimen Plan:    Discontinue the scheduled vancomycin regimen and re-dose when the random level is less than 20 mcg/mL, next level to be drawn at 5-17-25 on 0700.    Drug levels (last 3 results):  Recent Labs   Lab Result Units 05/16/25  1757   Vancomycin Trough ug/ml 24.0*       Pharmacy will continue to follow and monitor vancomycin.    Please contact pharmacy at extension [phone] for questions regarding this assessment.    Thank you for the consult,   Radha Morales       Patient brief summary:  Emily Martinez is a 60 y.o. female initiated on antimicrobial therapy with IV Vancomycin for treatment of sepsis    The patient's current regimen is Pulse dosing based on random level    Drug Allergies:   Review of patient's allergies indicates:   Allergen Reactions    Sulfa (sulfonamide antibiotics) Itching    Sulfur        Actual Body Weight:   57.2 kg    Renal Function:   Estimated Creatinine Clearance: 48.3 mL/min (based on SCr of 1 mg/dL).,     Dialysis Method (if applicable):  N/A    CBC (last 72 hours):  Recent Labs   Lab Result Units 05/14/25  0857 05/15/25  0505 05/16/25  0605   WBC K/uL 16.93* 24.75* 30.03*   HGB gm/dL 7.2* 7.5* 8.5*   HCT % 23.7* 25.1* 29.4*   Platelet Count K/uL 563* 646* 669*   Lymph % % 10.1* 7.9* 5.7*   Mono % % 7.5 6.9 6.0   Eos % % 1.4 0.4 0.2   Basophil % % 0.3 0.2 0.2       Metabolic Panel (last 72 hours):  Recent Labs   Lab Result Units 05/14/25  0857 05/15/25  0505 05/16/25  0605   Sodium mmol/L 143 148* 150*   Potassium mmol/L 4.3 3.8 3.6   Chloride mmol/L 110 109 110   CO2 mmol/L 24 29 27   Glucose mg/dL 185* 112* 108   BUN mg/dL 22 23 24*   Creatinine mg/dL 0.8 0.8 1.0   Albumin g/dL 1.5* 1.6* 1.7*    Bilirubin Total mg/dL 0.2 0.2 0.2   ALP unit/L 91 96 112   AST unit/L 17 17 15   ALT unit/L 8* 9* 10       Vancomycin Administrations:  vancomycin given in the last 96 hours                     vancomycin 750 mg in 0.9% NaCl 250 mL IVPB (admixture device) (mg) 750 mg New Bag 05/16/25 1813     750 mg New Bag 05/15/25 1810     750 mg New Bag 05/14/25 1803     750 mg New Bag 05/13/25 1723                    Microbiologic Results:  Microbiology Results (last 7 days)       Procedure Component Value Units Date/Time    Blood culture [[id number]]  (Normal) Collected: 05/16/25 0957    Order Status: Completed Specimen: Blood from Peripheral, Antecubital, Right Updated: 05/16/25 1701     Blood Culture No Growth After 6 Hours    Blood culture [[id number]]  (Normal) Collected: 05/16/25 0957    Order Status: Completed Specimen: Blood from Peripheral, Hand, Right Updated: 05/16/25 1701     Blood Culture No Growth After 6 Hours    Aerobic culture [[id number]]  (Abnormal)  (Susceptibility) Collected: 05/14/25 1247    Order Status: Completed Specimen: Wound from Hip, Right Updated: 05/16/25 0806     CULTURE, AEROBIC Moderate Escherichia coli ESBL    Culture, Anaerobe [[id number]] Collected: 05/14/25 1247    Order Status: Completed Specimen: Wound from Hip, Right Updated: 05/16/25 0737     Anaerobe Culture Culture In Progress    Blood culture #2 **CANNOT BE ORDERED STAT** [[id number]]  (Normal) Collected: 05/07/25 1301    Order Status: Completed Specimen: Blood from Peripheral, Forearm, Right Updated: 05/12/25 1400     Blood Culture No Growth After 5 Days    Blood culture #1 **CANNOT BE ORDERED STAT** [[id number]]  (Normal) Collected: 05/07/25 1301    Order Status: Completed Specimen: Blood from Peripheral, Forearm, Right Updated: 05/12/25 1400     Blood Culture No Growth After 5 Days

## 2025-05-17 NOTE — PROGRESS NOTES
"[facility]  Progress Note    Patient Name: Emily Martinez  MRN: [id number]  Patient Class: IP- Inpatient   Admission Date: 5/7/2025  Length of Stay: 10 days  Attending Physician: Adan Cartagena MD  Primary Care Provider: Alireza Sosa MD        Subjective     Principal Problem:Multiple wounds of skin        HPI:  Emily Martinez is a 60 y.o. female with DM1, HTN, HLD, and Previous CVA with residual deficits who was BIBA to [facility] ED for eval and treatment of acute generalized abdominal pain for the last 2x days. Per EMS, pt continued to experience persistent abdominal pain this morning accompanied by 4 episodes of emesis prompting her relatives to check her blood sugar at home. EMS notes that family received a reading of "critical high" and subsequently gave pt her insulin.   EMS reports receiving a reading greater than 500 upon their arrival.  She is chronically bed-bound, lives with her daughter, and has dementia (but is currently at her baseline per what daughter told EMS).  Further history therefore limited.  Has multiple chronic pressure ulcers on her sacrum and legs, presented to [facility] ED on April 10 with very similar circumstances as now, but was DC'ed home from the ED after a round of pain meds.    ED course: POCT  on arrival.  VSS WNL.  Exam with multiple pressure ulcers in various stages of healing; R hip wound is purulent.  Oriented to place and self.  Labs WBC 14.75 Hgb 9.3 Plt 677.  ESR CRP Lactate all elevated.  K 3.1.  Imaging: CT reads as wound to the right lateral aspect of her upper thigh appears worse when compared to previous imaging.  ED treatments: Vanc, mupirocin, wet-to-dry dressing, KCl.  They were admitted to [facility] Medicine for further evaluation and treatment.        Overview/Hospital Course:  Ms. Martinez is a 61 yo F admitted with hyperglycemia and multiple pressure wounds. Started on IV antibiotics and given IVF, started " on insulin. Wound care consulted. Concern for tunneling of right hip wound. CT with no bone involvement however concerning due to tunneling. MRI ordered showing large lateral soft tissue ulceration with suspected early osteomyelitis of the greater trochanter.  ID consulted.  Deep wound culture obtained.  Growing ESBL E coli.  On Meropenem along with Vancomycin.  Episodes of vomiting and tube feeds held as she does have some oral intake.    Interval History: feeling ok today.    Review of Systems   HENT:  Negative for ear discharge and ear pain.    Eyes:  Negative for discharge and itching.   Endocrine: Negative for cold intolerance and heat intolerance.   Neurological:  Negative for seizures and syncope.     Objective:     Vital Signs (Most Recent):  Temp: 97.7 °F (36.5 °C) (05/17/25 1056)  Pulse: 68 (05/17/25 1056)  Resp: 18 (05/17/25 1056)  BP: 123/76 (05/17/25 1056)  SpO2: 99 % (05/17/25 1056) Vital Signs (24h Range):  Temp:  [97.4 °F (36.3 °C)-98.5 °F (36.9 °C)] 97.7 °F (36.5 °C)  Pulse:  [68-88] 68  Resp:  [18-20] 18  SpO2:  [95 %-99 %] 99 %  BP: (103-128)/(66-81) 123/76     Weight: 57.2 kg (126 lb)  Body mass index is 22.32 kg/m².    Intake/Output Summary (Last 24 hours) at 5/17/2025 1329  Last data filed at 5/17/2025 1253  Gross per 24 hour   Intake 770 ml   Output 1400 ml   Net -630 ml         Physical Exam  Vitals and nursing note reviewed.   Constitutional:       General: She is not in acute distress.     Appearance: Normal appearance. She is ill-appearing.   HENT:      Head: Normocephalic and atraumatic.   Cardiovascular:      Rate and Rhythm: Normal rate and regular rhythm.      Pulses: Normal pulses.      Heart sounds: No murmur heard.  Pulmonary:      Effort: Pulmonary effort is normal. No respiratory distress.      Breath sounds: Normal breath sounds.   Abdominal:      General: Bowel sounds are normal. There is no distension.      Palpations: Abdomen is soft.      Comments: G-tube in place    Musculoskeletal:      Comments: Multiple ulcers lower extremity, right hip with wound    Skin:     General: Skin is warm and dry.   Neurological:      Mental Status: She is alert.               Significant Labs: All pertinent labs within the past 24 hours have been reviewed.  BMP:   Recent Labs   Lab 05/17/25  1141   GLU 60*   *   K 3.6      CO2 28   BUN 22   CREATININE 0.9   CALCIUM 8.5*     CBC:   Recent Labs   Lab 05/16/25  0605 05/17/25  1141   WBC 30.03* 15.77*   HGB 8.5* 7.4*   HCT 29.4* 25.1*   * 673*       Significant Imaging: I have reviewed all pertinent imaging results/findings within the past 24 hours.      Assessment & Plan  Multiple wounds of skin  Chronic, pressure ulcers.  Most recently debrided by wound care on 4/9.  Various stages of healing currently; R hip ulcer is purulent.  WBC, ESR, CRP, Lactate all elevated, although pt not febrile.  Wet-to-dry dressing applied and Vanc given in ED.  Wound care consulted  Concern with worsening right hip wound and tunneling noted on CT -- and wound care  MRI with large lateral soft tissue ulceration with suspected early osteomyelitis of the greater trochanter.   Continue ABX's.  Appreciate ID input.  Consult General Surgery for possible debridement with culture.  No need for surgical debridement per Surgery.  Asked Wound Care to get deep wound culture.  Deep wound culture obtained by Wound Care nurse.  Currently growing ESBL E coli.  ABX's per ID  Ulcer of sacral region, stage 2      Type 2 diabetes mellitus with stage 4 chronic kidney disease, with long-term current use of insulin  Last A1c reviewed-   Lab Results   Component Value Date    LABA1C 13.0 (H) 04/13/2016    HGBA1C 8.1 (H) 05/07/2025     Home antihyperglycemic regimen: lispro-protamin 10U daily    Recent Labs     05/15/25  1946 05/16/25  0716 05/16/25  1059 05/16/25  1705 05/17/25  0721 05/17/25  1057   POCTGLUCOSE 102 116* 129* 108 75 71     Most recent inpatient  antihyperglycemic regimen:   Antihyperglycemics (From admission, onward)      Start     Stop Route Frequency Ordered    05/15/25 2100  insulin glargine U-100 (Lantus) pen 5 Units         -- SubQ Nightly 05/15/25 0850    05/07/25 2127  insulin aspart U-100 pen 0-5 Units         -- SubQ Before meals & nightly PRN 05/07/25 2028          Some episodes of hypoglycemia.  Adjust insulin.  Essential hypertension  Patient's blood pressure range in the last 24 hours was: BP  Min: 103/66  Max: 128/81.The patient's inpatient anti-hypertensive regimen is listed below:  Current Antihypertensives       Plan  - BP is controlled, no changes needed to their regimen    Mixed hyperlipidemia  Stable.  Continue home statin  History of stroke with residual effects  As above    Debility  Patient with Chronic debility due to hemiplegia/hemiparesis. The patient's latest AMPAC (Activity Measure for Post Acute Care) Score is listed below.    AM-PAC Score - How much help does the patient need for each activity listed  Basic Mobility Total Score: 8  Turning over in bed (including adjusting bedclothes, sheets and blankets)?: A lot  Sitting down on and standing up from a chair with arms (e.g., wheelchair, bedside commode, etc.): Unable  Moving from lying on back to sitting on the side of the bed?: A lot  Moving to and from a bed to a chair (including a wheelchair)?: Unable  Need to walk in hospital room?: Unable  Climbing 3-5 steps with a railing?: Unable    Plan  - Progressive mobility protocol initated  - Fall precautions in place      Tobacco dependency  Dangers of cigarette smoking were reviewed with patient in detail. Patient was Referred to Tobacco Cessation Program. Nicotine replacement options were discussed. Nicotine replacement was discussed- prescribed  Moderate malnutrition  Nutrition consulted. Most recent weight and BMI monitored-     Measurements:  Wt Readings from Last 1 Encounters:   05/09/25 57.2 kg (126 lb)   Body mass index is  22.32 kg/m².    Patient has been screened and assessed by RD.    Malnutrition Type:  Context: chronic illness  Level: moderate        Pressure injury of right hip, stage 4      Microcytic anemia  Anemia is likely due to chronic blood loss, Iron deficiency, and chronic disease due to infection. Most recent hemoglobin and hematocrit are listed below.  Recent Labs     05/15/25  0505 05/16/25  0605 05/17/25  1141   HGB 7.5* 8.5* 7.4*   HCT 25.1* 29.4* 25.1*     Plan  - Monitor serial CBC: Daily  - Transfuse PRBC if patient becomes hemodynamically unstable, symptomatic or H/H drops below 7/21.  - Patient has not received any PRBC transfusions to date  - Patient's anemia is currently stable  - Suspect iron def +chronic disease  Hgb 6.6 this morning, but repeat Hgb>7.  Continue to monitor.  ACP (advance care planning)      VTE Risk Mitigation (From admission, onward)      None            Discharge Planning   ANA: 5/19/2025     Code Status: Full Code   Medical Readiness for Discharge Date:   Discharge Plan A: Home   Discharge Delays: None known at this time                    Adan Cartagena MD  Department of Hospital Medicine   [facility]

## 2025-05-17 NOTE — SUBJECTIVE & OBJECTIVE
Interval History: feeling ok today.    Review of Systems   HENT:  Negative for ear discharge and ear pain.    Eyes:  Negative for discharge and itching.   Endocrine: Negative for cold intolerance and heat intolerance.   Neurological:  Negative for seizures and syncope.     Objective:     Vital Signs (Most Recent):  Temp: 97.7 °F (36.5 °C) (05/17/25 1056)  Pulse: 68 (05/17/25 1056)  Resp: 18 (05/17/25 1056)  BP: 123/76 (05/17/25 1056)  SpO2: 99 % (05/17/25 1056) Vital Signs (24h Range):  Temp:  [97.4 °F (36.3 °C)-98.5 °F (36.9 °C)] 97.7 °F (36.5 °C)  Pulse:  [68-88] 68  Resp:  [18-20] 18  SpO2:  [95 %-99 %] 99 %  BP: (103-128)/(66-81) 123/76     Weight: 57.2 kg (126 lb)  Body mass index is 22.32 kg/m².    Intake/Output Summary (Last 24 hours) at 5/17/2025 1329  Last data filed at 5/17/2025 1253  Gross per 24 hour   Intake 770 ml   Output 1400 ml   Net -630 ml         Physical Exam  Vitals and nursing note reviewed.   Constitutional:       General: She is not in acute distress.     Appearance: Normal appearance. She is ill-appearing.   HENT:      Head: Normocephalic and atraumatic.   Cardiovascular:      Rate and Rhythm: Normal rate and regular rhythm.      Pulses: Normal pulses.      Heart sounds: No murmur heard.  Pulmonary:      Effort: Pulmonary effort is normal. No respiratory distress.      Breath sounds: Normal breath sounds.   Abdominal:      General: Bowel sounds are normal. There is no distension.      Palpations: Abdomen is soft.      Comments: G-tube in place   Musculoskeletal:      Comments: Multiple ulcers lower extremity, right hip with wound    Skin:     General: Skin is warm and dry.   Neurological:      Mental Status: She is alert.               Significant Labs: All pertinent labs within the past 24 hours have been reviewed.  BMP:   Recent Labs   Lab 05/17/25  1141   GLU 60*   *   K 3.6      CO2 28   BUN 22   CREATININE 0.9   CALCIUM 8.5*     CBC:   Recent Labs   Lab 05/16/25  0605  05/17/25  1141   WBC 30.03* 15.77*   HGB 8.5* 7.4*   HCT 29.4* 25.1*   * 673*       Significant Imaging: I have reviewed all pertinent imaging results/findings within the past 24 hours.

## 2025-05-17 NOTE — ASSESSMENT & PLAN NOTE
Nutrition consulted. Most recent weight and BMI monitored-     Measurements:  Wt Readings from Last 1 Encounters:   05/09/25 57.2 kg (126 lb)   Body mass index is 22.32 kg/m².    Patient has been screened and assessed by RD.    Malnutrition Type:  Context: chronic illness  Level: moderate         Patient: Kaley Mehta    

## 2025-05-17 NOTE — PLAN OF CARE
Pt remains free from falls/injuries. Turned pt. PEG tube in placed, clamped. Dressing changed done as per order. Disoriented to time and situation. Bed in low position, wheels locked, Side rails up. Call light within reach. Bed alarm on. Heel boots on.    Problem: Adult Inpatient Plan of Care  Goal: Absence of Hospital-Acquired Illness or Injury  Outcome: Progressing  Intervention: Identify and Manage Fall Risk  Flowsheets (Taken 5/17/2025 0411)  Safety Promotion/Fall Prevention:   assistive device/personal item within reach   bed alarm set   side rails raised x 2   lighting adjusted   medications reviewed  Intervention: Prevent Skin Injury  Flowsheets (Taken 5/17/2025 0411)  Body Position: turned  Skin Protection:   skin sealant/moisture barrier applied   silicone foam dressing in place     Problem: Diabetes Comorbidity  Goal: Blood Glucose Level Within Targeted Range  Outcome: Progressing  Intervention: Monitor and Manage Glycemia  Flowsheets (Taken 5/17/2025 0411)  Glycemic Management: blood glucose monitored     Problem: Fall Injury Risk  Goal: Absence of Fall and Fall-Related Injury  Outcome: Progressing

## 2025-05-17 NOTE — NURSING
[facility] [place]  Nurses Note -- 4 Eyes      5/17/2025       Skin assessed on: Q Shift      [] No Pressure Injuries Present    []Prevention Measures Documented    [x] Yes LDA  for Pressure Injury Previously documented     [] Yes New Pressure Injury Discovered   [] LDA for New Pressure Injury Added      Attending RN:  Maria E Dumont LPN     Second RN:  JUAN Giffrod

## 2025-05-17 NOTE — ASSESSMENT & PLAN NOTE
Patient's blood pressure range in the last 24 hours was: BP  Min: 103/66  Max: 128/81.The patient's inpatient anti-hypertensive regimen is listed below:  Current Antihypertensives       Plan  - BP is controlled, no changes needed to their regimen

## 2025-05-17 NOTE — SUBJECTIVE & OBJECTIVE
Interval History: awakens with stimulation, denies any discomfort.     Review of Systems   All other systems reviewed and are negative.    Objective:     Vital Signs (Most Recent):  Temp: 97.7 °F (36.5 °C) (05/17/25 1056)  Pulse: 68 (05/17/25 1056)  Resp: 18 (05/17/25 1056)  BP: 123/76 (05/17/25 1056)  SpO2: 99 % (05/17/25 1056) Vital Signs (24h Range):  Temp:  [97.4 °F (36.3 °C)-98.5 °F (36.9 °C)] 97.7 °F (36.5 °C)  Pulse:  [68-88] 68  Resp:  [18-20] 18  SpO2:  [95 %-99 %] 99 %  BP: (103-128)/(66-81) 123/76     Weight: 57.2 kg (126 lb)  Body mass index is 22.32 kg/m².    Estimated Creatinine Clearance: 53.6 mL/min (based on SCr of 0.9 mg/dL).     Physical Exam  Vitals reviewed.   Constitutional:       Appearance: She is ill-appearing.   HENT:      Head: Normocephalic.   Pulmonary:      Effort: Pulmonary effort is normal. No respiratory distress.   Abdominal:      General: There is no distension.      Palpations: Abdomen is soft.      Comments: PEG   Skin:     General: Skin is warm.   Neurological:      Mental Status: She is alert.          Significant Labs:   Microbiology Results (last 7 days)       Procedure Component Value Units Date/Time    Blood culture [[id number]]  (Normal) Collected: 05/16/25 0957    Order Status: Completed Specimen: Blood from Peripheral, Antecubital, Right Updated: 05/17/25 1100     Blood Culture No Growth After 24 Hours    Blood culture [[id number]]  (Normal) Collected: 05/16/25 0957    Order Status: Completed Specimen: Blood from Peripheral, Hand, Right Updated: 05/17/25 1100     Blood Culture No Growth After 24 Hours    Aerobic culture [[id number]]  (Abnormal)  (Susceptibility) Collected: 05/14/25 1247    Order Status: Completed Specimen: Wound from Hip, Right Updated: 05/16/25 0806     CULTURE, AEROBIC Moderate Escherichia coli ESBL    Culture, Anaerobe [[id number]] Collected: 05/14/25 1247    Order Status: Completed Specimen: Wound from Hip, Right Updated: 05/16/25 0737     Anaerobe  Culture Culture In Progress    Blood culture #2 **CANNOT BE ORDERED STAT** [[id number]]  (Normal) Collected: 05/07/25 1301    Order Status: Completed Specimen: Blood from Peripheral, Forearm, Right Updated: 05/12/25 1400     Blood Culture No Growth After 5 Days    Blood culture #1 **CANNOT BE ORDERED STAT** [[id number]]  (Normal) Collected: 05/07/25 1301    Order Status: Completed Specimen: Blood from Peripheral, Forearm, Right Updated: 05/12/25 1400     Blood Culture No Growth After 5 Days            Significant Imaging: I have reviewed all pertinent imaging results/findings within the past 24 hours.

## 2025-05-17 NOTE — ASSESSMENT & PLAN NOTE
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 Most recent hemoglobin and hematocrit are listed below.  Recent Labs     05/15/25  0505 05/16/25  0605 05/17/25  1141   HGB 7.5* 8.5* 7.4*   HCT 25.1* 29.4* 25.1*     Plan  - Monitor serial CBC: Daily  - Transfuse PRBC if patient becomes hemodynamically unstable, symptomatic or H/H drops below 7/21.  - Patient has not received any PRBC transfusions to date  - Patient's anemia is currently stable  - Suspect iron def +chronic disease  Hgb 6.6 this morning, but repeat Hgb>7.  Continue to monitor.

## 2025-05-17 NOTE — PROGRESS NOTES
[facility] - [place], [place] Med Surg  Infectious Disease  Progress Note    Patient Name: Emily Martinez  MRN: [id number]  Admission Date: 5/7/2025  Length of Stay: 10 days  Attending Physician: Adan Cartagena MD  Primary Care Provider: Alireza Sosa MD    Isolation Status: No active isolations  Assessment/Plan:      Orthopedic  Pressure injury of right hip, stage 4  Emily Martinez is a 60 year old woman with c. Diff, poorly controlled diabetes, hypertension, stroke and now bedbound with several decubitus wound who was admitted 5/7 for hyperglycemia. Found to have purulent draining wound to R hip, noted to be tunneling. CT initially obtained without evidence of osteomyelitis, but MRI suggest early trochanteric osteomyelitis. General surgery consulted for debridement, but not determined to be necessary. ID was consulted for management of osteomyelitis. She is minimally verbal on evaluation. Ongoing discussions regarding possibility of home hospice with family. Deep wound cultures obtained by wound care, growing ESBL E. Coli. Currently on vancomycin and meropenem. Labs notable for leukocytosis, decreasing since starting meropenem. Low likelihood of achieving cure in setting of her debility, would not recommend treatment for osteomyelitis.     Recommendations  - can stop vancomycin, has complete 10 days of treatment of SSTI  - continue meropenem x 7 days, can convert to ertapenem 1 gram q24 hours at discharge if going home with IV antibiotics (end 5/21)  - CBC, CMP, CRP q Monday while on ertapenem   - Fax Lab Results to Infectious Diseases Provider: [facility] Fax Number: [phone]        Above discussed with primary team.     Time: 50 minutes   50% of time spent on face-to-face counseling and coordination of care. Counseling included review of test results, diagnosis, and treatment plan with patient and/or family.  I have reviewed hospital notes from HM service and other specialty providers as well as  outside medical records. I have also reviewed CBC, CMP/BMP,  cultures and imaging with my interpretation as documented. Patient is high risk of morbidity, on antibiotics requiring intensive monitoring for toxicity.       Anticipated Disposition: TBD    Thank you for your consult. I will sign off. Please contact us if you have any additional questions.    Anita Page MD  Infectious Disease  [facility] [place]    Subjective:     Principal Problem:Multiple wounds of skin    HPI: mEily Martinez is a 60 year old woman with c. Diff, poorly controlled diabetes, hypertension, stroke and now bedbound with several decubitus wound who was admitted 5/7 for hyperglycemia. Found to have purulent draining wound to R hip, noted to be tunneling. CT initially obtained without evidence of osteomyelitis, but MRI suggest early trochanteric osteomyelitis. General surgery consulted for debridement, but not determined to be necessary. ID was consulted for management of osteomyelitis. She is minimally verbal on evaluation. Ongoing discussions regarding possibility of home hospice with family. Currently on vancomycin and cefepime.      Interval History: awakens with stimulation, denies any discomfort.     Review of Systems   All other systems reviewed and are negative.    Objective:     Vital Signs (Most Recent):  Temp: 97.7 °F (36.5 °C) (05/17/25 1056)  Pulse: 68 (05/17/25 1056)  Resp: 18 (05/17/25 1056)  BP: 123/76 (05/17/25 1056)  SpO2: 99 % (05/17/25 1056) Vital Signs (24h Range):  Temp:  [97.4 °F (36.3 °C)-98.5 °F (36.9 °C)] 97.7 °F (36.5 °C)  Pulse:  [68-88] 68  Resp:  [18-20] 18  SpO2:  [95 %-99 %] 99 %  BP: (103-128)/(66-81) 123/76     Weight: 57.2 kg (126 lb)  Body mass index is 22.32 kg/m².    Estimated Creatinine Clearance: 53.6 mL/min (based on SCr of 0.9 mg/dL).     Physical Exam  Vitals reviewed.   Constitutional:       Appearance: She is ill-appearing.   HENT:      Head: Normocephalic.   Pulmonary:      Effort:  Pulmonary effort is normal. No respiratory distress.   Abdominal:      General: There is no distension.      Palpations: Abdomen is soft.      Comments: PEG   Skin:     General: Skin is warm.   Neurological:      Mental Status: She is alert.          Significant Labs:   Microbiology Results (last 7 days)       Procedure Component Value Units Date/Time    Blood culture [[id number]]  (Normal) Collected: 05/16/25 0957    Order Status: Completed Specimen: Blood from Peripheral, Antecubital, Right Updated: 05/17/25 1100     Blood Culture No Growth After 24 Hours    Blood culture [[id number]]  (Normal) Collected: 05/16/25 0957    Order Status: Completed Specimen: Blood from Peripheral, Hand, Right Updated: 05/17/25 1100     Blood Culture No Growth After 24 Hours    Aerobic culture [[id number]]  (Abnormal)  (Susceptibility) Collected: 05/14/25 1247    Order Status: Completed Specimen: Wound from Hip, Right Updated: 05/16/25 0806     CULTURE, AEROBIC Moderate Escherichia coli ESBL    Culture, Anaerobe [[id number]] Collected: 05/14/25 1247    Order Status: Completed Specimen: Wound from Hip, Right Updated: 05/16/25 0737     Anaerobe Culture Culture In Progress    Blood culture #2 **CANNOT BE ORDERED STAT** [[id number]]  (Normal) Collected: 05/07/25 1301    Order Status: Completed Specimen: Blood from Peripheral, Forearm, Right Updated: 05/12/25 1400     Blood Culture No Growth After 5 Days    Blood culture #1 **CANNOT BE ORDERED STAT** [[id number]]  (Normal) Collected: 05/07/25 1301    Order Status: Completed Specimen: Blood from Peripheral, Forearm, Right Updated: 05/12/25 1400     Blood Culture No Growth After 5 Days            Significant Imaging: I have reviewed all pertinent imaging results/findings within the past 24 hours.

## 2025-05-18 LAB
ABSOLUTE EOSINOPHIL (OHS): 0.28 K/UL
ABSOLUTE MONOCYTE (OHS): 1.03 K/UL (ref 0.3–1)
ABSOLUTE NEUTROPHIL COUNT (OHS): 7.4 K/UL (ref 1.8–7.7)
ALBUMIN SERPL BCP-MCNC: 1.6 G/DL (ref 3.5–5.2)
ALP SERPL-CCNC: 100 UNIT/L (ref 40–150)
ALT SERPL W/O P-5'-P-CCNC: 9 UNIT/L (ref 10–44)
ANION GAP (OHS): 8 MMOL/L (ref 8–16)
AST SERPL-CCNC: 20 UNIT/L (ref 11–45)
BASOPHILS # BLD AUTO: 0.03 K/UL
BASOPHILS NFR BLD AUTO: 0.3 %
BILIRUB SERPL-MCNC: 0.3 MG/DL (ref 0.1–1)
BUN SERPL-MCNC: 19 MG/DL (ref 8–23)
CALCIUM SERPL-MCNC: 8.3 MG/DL (ref 8.7–10.5)
CHLORIDE SERPL-SCNC: 104 MMOL/L (ref 95–110)
CO2 SERPL-SCNC: 25 MMOL/L (ref 23–29)
CREAT SERPL-MCNC: 0.8 MG/DL (ref 0.5–1.4)
ERYTHROCYTE [DISTWIDTH] IN BLOOD BY AUTOMATED COUNT: 20.7 % (ref 11.5–14.5)
GFR SERPLBLD CREATININE-BSD FMLA CKD-EPI: >60 ML/MIN/1.73/M2
GLUCOSE SERPL-MCNC: 106 MG/DL (ref 70–110)
HCT VFR BLD AUTO: 22.8 % (ref 37–48.5)
HGB BLD-MCNC: 6.9 GM/DL (ref 12–16)
IMM GRANULOCYTES # BLD AUTO: 0.08 K/UL (ref 0–0.04)
IMM GRANULOCYTES NFR BLD AUTO: 0.8 % (ref 0–0.5)
LYMPHOCYTES # BLD AUTO: 1.76 K/UL (ref 1–4.8)
MCH RBC QN AUTO: 22.1 PG (ref 27–31)
MCHC RBC AUTO-ENTMCNC: 30.3 G/DL (ref 32–36)
MCV RBC AUTO: 73 FL (ref 82–98)
NUCLEATED RBC (/100WBC) (OHS): 2 /100 WBC
PLATELET # BLD AUTO: 690 K/UL (ref 150–450)
PMV BLD AUTO: 9 FL (ref 9.2–12.9)
POCT GLUCOSE: 115 MG/DL (ref 70–110)
POCT GLUCOSE: 118 MG/DL (ref 70–110)
POCT GLUCOSE: 119 MG/DL (ref 70–110)
POCT GLUCOSE: 186 MG/DL (ref 70–110)
POTASSIUM SERPL-SCNC: 3.5 MMOL/L (ref 3.5–5.1)
PROT SERPL-MCNC: 6.6 GM/DL (ref 6–8.4)
RBC # BLD AUTO: 3.12 M/UL (ref 4–5.4)
RELATIVE EOSINOPHIL (OHS): 2.6 %
RELATIVE LYMPHOCYTE (OHS): 16.6 % (ref 18–48)
RELATIVE MONOCYTE (OHS): 9.7 % (ref 4–15)
RELATIVE NEUTROPHIL (OHS): 70 % (ref 38–73)
SODIUM SERPL-SCNC: 137 MMOL/L (ref 136–145)
VANCOMYCIN SERPL-MCNC: 19.8 UG/ML (ref ?–80)
WBC # BLD AUTO: 10.58 K/UL (ref 3.9–12.7)

## 2025-05-18 PROCEDURE — 63600175 PHARM REV CODE 636 W HCPCS: Performed by: HOSPITALIST

## 2025-05-18 PROCEDURE — S4991 NICOTINE PATCH NONLEGEND: HCPCS

## 2025-05-18 PROCEDURE — 25000003 PHARM REV CODE 250: Performed by: HOSPITALIST

## 2025-05-18 PROCEDURE — 82040 ASSAY OF SERUM ALBUMIN: CPT | Performed by: HOSPITALIST

## 2025-05-18 PROCEDURE — 36415 COLL VENOUS BLD VENIPUNCTURE: CPT | Performed by: HOSPITALIST

## 2025-05-18 PROCEDURE — 25000003 PHARM REV CODE 250: Performed by: INTERNAL MEDICINE

## 2025-05-18 PROCEDURE — 21400001 HC TELEMETRY ROOM

## 2025-05-18 PROCEDURE — 85025 COMPLETE CBC W/AUTO DIFF WBC: CPT | Performed by: HOSPITALIST

## 2025-05-18 PROCEDURE — 25000003 PHARM REV CODE 250

## 2025-05-18 PROCEDURE — 80202 ASSAY OF VANCOMYCIN: CPT | Performed by: HOSPITALIST

## 2025-05-18 RX ADMIN — THERA TABS 1 TABLET: TAB at 08:05

## 2025-05-18 RX ADMIN — MICONAZOLE NITRATE: 20 POWDER TOPICAL at 08:05

## 2025-05-18 RX ADMIN — MEROPENEM 1 G: 1 INJECTION INTRAVENOUS at 05:05

## 2025-05-18 RX ADMIN — FAMOTIDINE 20 MG: 20 TABLET, FILM COATED ORAL at 08:05

## 2025-05-18 RX ADMIN — MICONAZOLE NITRATE: 20 POWDER TOPICAL at 10:05

## 2025-05-18 RX ADMIN — ACETAMINOPHEN 650 MG: 325 TABLET ORAL at 05:05

## 2025-05-18 RX ADMIN — MORPHINE SULFATE 2 MG: 4 INJECTION INTRAVENOUS at 03:05

## 2025-05-18 RX ADMIN — NICOTINE 1 PATCH: 14 PATCH TRANSDERMAL at 08:05

## 2025-05-18 RX ADMIN — ATORVASTATIN CALCIUM 80 MG: 40 TABLET, FILM COATED ORAL at 10:05

## 2025-05-18 RX ADMIN — INSULIN GLARGINE 5 UNITS: 100 INJECTION, SOLUTION SUBCUTANEOUS at 10:05

## 2025-05-18 RX ADMIN — ASPIRIN 81 MG: 81 TABLET, COATED ORAL at 08:05

## 2025-05-18 RX ADMIN — COLLAGENASE SANTYL: 250 OINTMENT TOPICAL at 08:05

## 2025-05-18 NOTE — ASSESSMENT & PLAN NOTE
Last A1c reviewed-   Lab Results   Component Value Date    LABA1C 13.0 (H) 04/13/2016    HGBA1C 8.1 (H) 05/07/2025     Home antihyperglycemic regimen: lispro-protamin 10U daily    Recent Labs     05/16/25  1907 05/17/25  0721 05/17/25  1057 05/17/25  1605 05/17/25  1934 05/18/25  0733   POCTGLUCOSE 118* 75 71 160* 186* 119*     Most recent inpatient antihyperglycemic regimen:   Antihyperglycemics (From admission, onward)      Start     Stop Route Frequency Ordered    05/15/25 2100  insulin glargine U-100 (Lantus) pen 5 Units         -- SubQ Nightly 05/15/25 0850    05/07/25 2127  insulin aspart U-100 pen 0-5 Units         -- SubQ Before meals & nightly PRN 05/07/25 2028          Some episodes of hypoglycemia.  Adjust insulin.

## 2025-05-18 NOTE — PROGRESS NOTES
"[facility] Medicine  Progress Note    Patient Name: Emily Martinez  MRN: [id number]  Patient Class: IP- Inpatient   Admission Date: 5/7/2025  Length of Stay: 11 days  Attending Physician: Adan Cartagena MD  Primary Care Provider: Alireza Sosa MD        Subjective     Principal Problem:Multiple wounds of skin        HPI:  Emily Martinez is a 60 y.o. female with DM1, HTN, HLD, and Previous CVA with residual deficits who was BIBA to [facility] ED for eval and treatment of acute generalized abdominal pain for the last 2x days. Per EMS, pt continued to experience persistent abdominal pain this morning accompanied by 4 episodes of emesis prompting her relatives to check her blood sugar at home. EMS notes that family received a reading of "critical high" and subsequently gave pt her insulin.   EMS reports receiving a reading greater than 500 upon their arrival.  She is chronically bed-bound, lives with her daughter, and has dementia (but is currently at her baseline per what daughter told EMS).  Further history therefore limited.  Has multiple chronic pressure ulcers on her sacrum and legs, presented to [facility] ED on April 10 with very similar circumstances as now, but was DC'ed home from the ED after a round of pain meds.    ED course: POCT  on arrival.  VSS WNL.  Exam with multiple pressure ulcers in various stages of healing; R hip wound is purulent.  Oriented to place and self.  Labs WBC 14.75 Hgb 9.3 Plt 677.  ESR CRP Lactate all elevated.  K 3.1.  Imaging: CT reads as wound to the right lateral aspect of her upper thigh appears worse when compared to previous imaging.  ED treatments: Vanc, mupirocin, wet-to-dry dressing, KCl.  They were admitted to [facility] [place] [facility] for further evaluation and treatment.        Overview/Hospital Course:  Ms. Martinez is a 61 yo F admitted with hyperglycemia and multiple pressure wounds. Started on IV antibiotics and given IVF, started " on insulin. Wound care consulted. Concern for tunneling of right hip wound. CT with no bone involvement however concerning due to tunneling. MRI ordered showing large lateral soft tissue ulceration with suspected early osteomyelitis of the greater trochanter.  ID consulted.  Deep wound culture obtained.  Growing ESBL E coli.  On Meropenem along with Vancomycin.  Episodes of vomiting and tube feeds held as she does have some oral intake.    Interval History: complaining of hip pain.    Review of Systems   HENT:  Negative for ear discharge and ear pain.    Eyes:  Negative for discharge and itching.   Endocrine: Negative for cold intolerance and heat intolerance.   Neurological:  Negative for seizures and syncope.     Objective:     Vital Signs (Most Recent):  Temp: 97.7 °F (36.5 °C) (05/18/25 0715)  Pulse: 76 (05/18/25 0715)  Resp: 18 (05/18/25 0715)  BP: 116/79 (05/18/25 0715)  SpO2: 95 % (05/18/25 0715) Vital Signs (24h Range):  Temp:  [97.7 °F (36.5 °C)-98.4 °F (36.9 °C)] 97.7 °F (36.5 °C)  Pulse:  [68-79] 76  Resp:  [16-18] 18  SpO2:  [93 %-99 %] 95 %  BP: (108-128)/(66-83) 116/79     Weight: 57.2 kg (126 lb)  Body mass index is 22.32 kg/m².    Intake/Output Summary (Last 24 hours) at 5/18/2025 0958  Last data filed at 5/18/2025 0829  Gross per 24 hour   Intake 1860 ml   Output 950 ml   Net 910 ml         Physical Exam  Vitals and nursing note reviewed.   Constitutional:       General: She is not in acute distress.     Appearance: Normal appearance. She is ill-appearing.   HENT:      Head: Normocephalic and atraumatic.   Cardiovascular:      Rate and Rhythm: Normal rate and regular rhythm.      Pulses: Normal pulses.      Heart sounds: No murmur heard.  Pulmonary:      Effort: Pulmonary effort is normal. No respiratory distress.      Breath sounds: Normal breath sounds.   Abdominal:      General: Bowel sounds are normal. There is no distension.      Palpations: Abdomen is soft.      Comments: G-tube in place    Musculoskeletal:      Comments: Multiple ulcers lower extremity, right hip with wound    Skin:     General: Skin is warm and dry.   Neurological:      Mental Status: She is alert.               Significant Labs: All pertinent labs within the past 24 hours have been reviewed.  BMP:   Recent Labs   Lab 05/18/25  0720         K 3.5      CO2 25   BUN 19   CREATININE 0.8   CALCIUM 8.3*     CBC:   Recent Labs   Lab 05/17/25  1141 05/18/25  0720   WBC 15.77* 10.58   HGB 7.4* 6.9*   HCT 25.1* 22.8*   * 690*       Significant Imaging: I have reviewed all pertinent imaging results/findings within the past 24 hours.      Assessment & Plan  Multiple wounds of skin  Chronic, pressure ulcers.  Most recently debrided by wound care on 4/9.  Various stages of healing currently; R hip ulcer is purulent.  WBC, ESR, CRP, Lactate all elevated, although pt not febrile.  Wet-to-dry dressing applied and Vanc given in ED.  Wound care consulted  Concern with worsening right hip wound and tunneling noted on CT -- and wound care  MRI with large lateral soft tissue ulceration with suspected early osteomyelitis of the greater trochanter.   Continue ABX's.  Appreciate ID input.  Consult General Surgery for possible debridement with culture.  No need for surgical debridement per Surgery.  Asked Wound Care to get deep wound culture.  Deep wound culture obtained by Wound Care nurse.  Currently growing ESBL E coli.  ABX's per ID  Ulcer of sacral region, stage 2      Type 2 diabetes mellitus with stage 4 chronic kidney disease, with long-term current use of insulin  Last A1c reviewed-   Lab Results   Component Value Date    LABA1C 13.0 (H) 04/13/2016    HGBA1C 8.1 (H) 05/07/2025     Home antihyperglycemic regimen: lispro-protamin 10U daily    Recent Labs     05/16/25  1907 05/17/25  0721 05/17/25  1057 05/17/25  1605 05/17/25  1934 05/18/25  0733   POCTGLUCOSE 118* 75 71 160* 186* 119*     Most recent inpatient  antihyperglycemic regimen:   Antihyperglycemics (From admission, onward)      Start     Stop Route Frequency Ordered    05/15/25 2100  insulin glargine U-100 (Lantus) pen 5 Units         -- SubQ Nightly 05/15/25 0850    05/07/25 2127  insulin aspart U-100 pen 0-5 Units         -- SubQ Before meals & nightly PRN 05/07/25 2028          Some episodes of hypoglycemia.  Adjust insulin.  Essential hypertension  Patient's blood pressure range in the last 24 hours was: BP  Min: 108/72  Max: 128/83.The patient's inpatient anti-hypertensive regimen is listed below:  Current Antihypertensives       Plan  - BP is controlled, no changes needed to their regimen    Mixed hyperlipidemia  Stable.  Continue home statin  History of stroke with residual effects  As above    Debility  Patient with Chronic debility due to hemiplegia/hemiparesis. The patient's latest AMPAC (Activity Measure for Post Acute Care) Score is listed below.    AM-PAC Score - How much help does the patient need for each activity listed  Basic Mobility Total Score: 8  Turning over in bed (including adjusting bedclothes, sheets and blankets)?: A lot  Sitting down on and standing up from a chair with arms (e.g., wheelchair, bedside commode, etc.): Unable  Moving from lying on back to sitting on the side of the bed?: A lot  Moving to and from a bed to a chair (including a wheelchair)?: Unable  Need to walk in hospital room?: Unable  Climbing 3-5 steps with a railing?: Unable    Plan  - Progressive mobility protocol initated  - Fall precautions in place      Tobacco dependency  Dangers of cigarette smoking were reviewed with patient in detail. Patient was Referred to Tobacco Cessation Program. Nicotine replacement options were discussed. Nicotine replacement was discussed- prescribed  Moderate malnutrition  Nutrition consulted. Most recent weight and BMI monitored-     Measurements:  Wt Readings from Last 1 Encounters:   05/09/25 57.2 kg (126 lb)   Body mass index is  22.32 kg/m².    Patient has been screened and assessed by RD.    Malnutrition Type:  Context: chronic illness  Level: moderate        Pressure injury of right hip, stage 4      Microcytic anemia  Anemia is likely due to chronic blood loss, Iron deficiency, and chronic disease due to infection. Most recent hemoglobin and hematocrit are listed below.  Recent Labs     05/16/25  0605 05/17/25  1141 05/18/25  0720   HGB 8.5* 7.4* 6.9*   HCT 29.4* 25.1* 22.8*     Plan  - Monitor serial CBC: Daily  - Transfuse PRBC if patient becomes hemodynamically unstable, symptomatic or H/H drops below 7/21.  - Patient has not received any PRBC transfusions to date  - Patient's anemia is currently stable  - Suspect iron def +chronic disease  Repeat labs in AM and transfuse if Hgb still <7  ACP (advance care planning)      VTE Risk Mitigation (From admission, onward)      None            Discharge Planning   ANA: 5/19/2025     Code Status: Full Code   Medical Readiness for Discharge Date:   Discharge Plan A: Home   Discharge Delays: None known at this time                    Adan Cartagena MD  Department of Hospital Medicine   [facility] [place] [facility] Surg

## 2025-05-18 NOTE — NURSING
[facility], [facility] [place]  Nurses Note -- 4 Eyes      5/18/2025       Skin assessed on: Q Shift      [] No Pressure Injuries Present    []Prevention Measures Documented    [x] Yes LDA  for Pressure Injury Previously documented     [] Yes New Pressure Injury Discovered   [] LDA for New Pressure Injury Added      Attending RN:  Maria E Dumont LPN     Second RN:  JUAN Gifford

## 2025-05-18 NOTE — PLAN OF CARE
Problem: Adult Inpatient Plan of Care  Goal: Absence of Hospital-Acquired Illness or Injury  Outcome: Progressing  Intervention: Identify and Manage Fall Risk  Flowsheets (Taken 5/18/2025 0351)  Safety Promotion/Fall Prevention:   assistive device/personal item within reach   lighting adjusted   medications reviewed   side rails raised x 2   bed alarm set   room near unit station  Intervention: Prevent Skin Injury  Flowsheets (Taken 5/18/2025 0351)  Body Position: turned  Skin Protection: skin sealant/moisture barrier applied  Device Skin Pressure Protection: tubing/devices free from skin contact     Problem: Diabetes Comorbidity  Goal: Blood Glucose Level Within Targeted Range  Outcome: Progressing  Intervention: Monitor and Manage Glycemia  Flowsheets (Taken 5/18/2025 0351)  Glycemic Management: blood glucose monitored

## 2025-05-18 NOTE — ASSESSMENT & PLAN NOTE
Patient's blood pressure range in the last 24 hours was: BP  Min: 108/72  Max: 128/83.The patient's inpatient anti-hypertensive regimen is listed below:  Current Antihypertensives       Plan  - BP is controlled, no changes needed to their regimen

## 2025-05-18 NOTE — SUBJECTIVE & OBJECTIVE
Interval History: complaining of hip pain.    Review of Systems   HENT:  Negative for ear discharge and ear pain.    Eyes:  Negative for discharge and itching.   Endocrine: Negative for cold intolerance and heat intolerance.   Neurological:  Negative for seizures and syncope.     Objective:     Vital Signs (Most Recent):  Temp: 97.7 °F (36.5 °C) (05/18/25 0715)  Pulse: 76 (05/18/25 0715)  Resp: 18 (05/18/25 0715)  BP: 116/79 (05/18/25 0715)  SpO2: 95 % (05/18/25 0715) Vital Signs (24h Range):  Temp:  [97.7 °F (36.5 °C)-98.4 °F (36.9 °C)] 97.7 °F (36.5 °C)  Pulse:  [68-79] 76  Resp:  [16-18] 18  SpO2:  [93 %-99 %] 95 %  BP: (108-128)/(66-83) 116/79     Weight: 57.2 kg (126 lb)  Body mass index is 22.32 kg/m².    Intake/Output Summary (Last 24 hours) at 5/18/2025 0958  Last data filed at 5/18/2025 0829  Gross per 24 hour   Intake 1860 ml   Output 950 ml   Net 910 ml         Physical Exam  Vitals and nursing note reviewed.   Constitutional:       General: She is not in acute distress.     Appearance: Normal appearance. She is ill-appearing.   HENT:      Head: Normocephalic and atraumatic.   Cardiovascular:      Rate and Rhythm: Normal rate and regular rhythm.      Pulses: Normal pulses.      Heart sounds: No murmur heard.  Pulmonary:      Effort: Pulmonary effort is normal. No respiratory distress.      Breath sounds: Normal breath sounds.   Abdominal:      General: Bowel sounds are normal. There is no distension.      Palpations: Abdomen is soft.      Comments: G-tube in place   Musculoskeletal:      Comments: Multiple ulcers lower extremity, right hip with wound    Skin:     General: Skin is warm and dry.   Neurological:      Mental Status: She is alert.               Significant Labs: All pertinent labs within the past 24 hours have been reviewed.  BMP:   Recent Labs   Lab 05/18/25  0720         K 3.5      CO2 25   BUN 19   CREATININE 0.8   CALCIUM 8.3*     CBC:   Recent Labs   Lab 05/17/25  1141  05/18/25  0720   WBC 15.77* 10.58   HGB 7.4* 6.9*   HCT 25.1* 22.8*   * 690*       Significant Imaging: I have reviewed all pertinent imaging results/findings within the past 24 hours.

## 2025-05-18 NOTE — NURSING
[facility], [facility] [place]  Nurses Note -- 4 Eyes      5/17/2025       Skin assessed on: Q Shift      [] No Pressure Injuries Present    [x]Prevention Measures Documented    [x] Yes LDA  for Pressure Injury Previously documented     [] Yes New Pressure Injury Discovered   [] LDA for New Pressure Injury Added      Attending RN:  Balta Mckeon RN     Second RN: Maria E, LPN

## 2025-05-18 NOTE — NURSING
Administered morning meds to patient. Patient tolerated well with no issues. Did 300mL water bolus in PEG. Did dressing change to PEG tube. Water bolus amount changed from 300mL to 200mL. Did 200mL water bolus in PEG tube. Patient complained of 7/10 pain. Gave PRN morphine. When reassessed, patient stated pain was 5/10. Patient had a BM. Did 200mL water bolus in PEG tube. Did wound care to bilateral lower legs and feet per wound care order. Did wound care to buttocks per wound care order. Did wound care to right hip per wound care order. Patient AAOx2 and is disoriented to time and situation. Patient not showing signs of distress. Bed in lowest position, wheels locked, call bell in reach, side rails up x3.

## 2025-05-18 NOTE — ASSESSMENT & PLAN NOTE
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 Most recent hemoglobin and hematocrit are listed below.  Recent Labs     05/16/25  0605 05/17/25  1141 05/18/25  0720   HGB 8.5* 7.4* 6.9*   HCT 29.4* 25.1* 22.8*     Plan  - Monitor serial CBC: Daily  - Transfuse PRBC if patient becomes hemodynamically unstable, symptomatic or H/H drops below 7/21.  - Patient has not received any PRBC transfusions to date  - Patient's anemia is currently stable  - Suspect iron def +chronic disease  Repeat labs in AM and transfuse if Hgb still <7

## 2025-05-19 LAB
ABSOLUTE EOSINOPHIL (OHS): 0.19 K/UL
ABSOLUTE MONOCYTE (OHS): 0.94 K/UL (ref 0.3–1)
ABSOLUTE NEUTROPHIL COUNT (OHS): 6.49 K/UL (ref 1.8–7.7)
ALBUMIN SERPL BCP-MCNC: 1.6 G/DL (ref 3.5–5.2)
ALP SERPL-CCNC: 104 UNIT/L (ref 40–150)
ALT SERPL W/O P-5'-P-CCNC: 9 UNIT/L (ref 10–44)
ANION GAP (OHS): 8 MMOL/L (ref 8–16)
AST SERPL-CCNC: 16 UNIT/L (ref 11–45)
BACTERIA SPEC ANAEROBE CULT: NORMAL
BASOPHILS # BLD AUTO: 0.03 K/UL
BASOPHILS NFR BLD AUTO: 0.3 %
BILIRUB SERPL-MCNC: 0.3 MG/DL (ref 0.1–1)
BUN SERPL-MCNC: 14 MG/DL (ref 8–23)
CALCIUM SERPL-MCNC: 8.3 MG/DL (ref 8.7–10.5)
CHLORIDE SERPL-SCNC: 109 MMOL/L (ref 95–110)
CO2 SERPL-SCNC: 24 MMOL/L (ref 23–29)
CREAT SERPL-MCNC: 0.7 MG/DL (ref 0.5–1.4)
ERYTHROCYTE [DISTWIDTH] IN BLOOD BY AUTOMATED COUNT: 20.7 % (ref 11.5–14.5)
GFR SERPLBLD CREATININE-BSD FMLA CKD-EPI: >60 ML/MIN/1.73/M2
GLUCOSE SERPL-MCNC: 72 MG/DL (ref 70–110)
HCT VFR BLD AUTO: 24.5 % (ref 37–48.5)
HGB BLD-MCNC: 7.3 GM/DL (ref 12–16)
IMM GRANULOCYTES # BLD AUTO: 0.09 K/UL (ref 0–0.04)
IMM GRANULOCYTES NFR BLD AUTO: 1 % (ref 0–0.5)
LYMPHOCYTES # BLD AUTO: 1.61 K/UL (ref 1–4.8)
MCH RBC QN AUTO: 21.6 PG (ref 27–31)
MCHC RBC AUTO-ENTMCNC: 29.8 G/DL (ref 32–36)
MCV RBC AUTO: 73 FL (ref 82–98)
NUCLEATED RBC (/100WBC) (OHS): 1 /100 WBC
PLATELET # BLD AUTO: 773 K/UL (ref 150–450)
PMV BLD AUTO: 9 FL (ref 9.2–12.9)
POCT GLUCOSE: 79 MG/DL (ref 70–110)
POCT GLUCOSE: 80 MG/DL (ref 70–110)
POCT GLUCOSE: 83 MG/DL (ref 70–110)
POCT GLUCOSE: 98 MG/DL (ref 70–110)
POTASSIUM SERPL-SCNC: 3.6 MMOL/L (ref 3.5–5.1)
PROT SERPL-MCNC: 6.5 GM/DL (ref 6–8.4)
RBC # BLD AUTO: 3.38 M/UL (ref 4–5.4)
RELATIVE EOSINOPHIL (OHS): 2 %
RELATIVE LYMPHOCYTE (OHS): 17.2 % (ref 18–48)
RELATIVE MONOCYTE (OHS): 10.1 % (ref 4–15)
RELATIVE NEUTROPHIL (OHS): 69.4 % (ref 38–73)
SODIUM SERPL-SCNC: 141 MMOL/L (ref 136–145)
WBC # BLD AUTO: 9.35 K/UL (ref 3.9–12.7)

## 2025-05-19 PROCEDURE — 99233 SBSQ HOSP IP/OBS HIGH 50: CPT | Mod: 25,,, | Performed by: REGISTERED NURSE

## 2025-05-19 PROCEDURE — 84075 ASSAY ALKALINE PHOSPHATASE: CPT | Performed by: HOSPITALIST

## 2025-05-19 PROCEDURE — 85025 COMPLETE CBC W/AUTO DIFF WBC: CPT | Performed by: HOSPITALIST

## 2025-05-19 PROCEDURE — 36415 COLL VENOUS BLD VENIPUNCTURE: CPT | Performed by: HOSPITALIST

## 2025-05-19 PROCEDURE — 63600175 PHARM REV CODE 636 W HCPCS: Performed by: HOSPITALIST

## 2025-05-19 PROCEDURE — 94761 N-INVAS EAR/PLS OXIMETRY MLT: CPT

## 2025-05-19 PROCEDURE — 99497 ADVNCD CARE PLAN 30 MIN: CPT | Mod: 25,,, | Performed by: REGISTERED NURSE

## 2025-05-19 PROCEDURE — 25000003 PHARM REV CODE 250

## 2025-05-19 PROCEDURE — 27000207 HC ISOLATION

## 2025-05-19 PROCEDURE — 11000001 HC ACUTE MED/SURG PRIVATE ROOM

## 2025-05-19 PROCEDURE — 97598 DBRDMT OPN WND ADDL 20CM/<: CPT

## 2025-05-19 PROCEDURE — 25000003 PHARM REV CODE 250: Performed by: HOSPITALIST

## 2025-05-19 PROCEDURE — S4991 NICOTINE PATCH NONLEGEND: HCPCS

## 2025-05-19 PROCEDURE — 97597 DBRDMT OPN WND 1ST 20 CM/<: CPT

## 2025-05-19 PROCEDURE — 99232 SBSQ HOSP IP/OBS MODERATE 35: CPT | Mod: ,,,

## 2025-05-19 RX ORDER — FAMOTIDINE 20 MG/1
20 TABLET, FILM COATED ORAL 2 TIMES DAILY
Status: DISCONTINUED | OUTPATIENT
Start: 2025-05-19 | End: 2025-05-22 | Stop reason: HOSPADM

## 2025-05-19 RX ORDER — MEROPENEM 1 G/1
1 INJECTION, POWDER, FOR SOLUTION INTRAVENOUS
Status: COMPLETED | OUTPATIENT
Start: 2025-05-19 | End: 2025-05-21

## 2025-05-19 RX ADMIN — MEROPENEM 1 G: 1 INJECTION INTRAVENOUS at 02:05

## 2025-05-19 RX ADMIN — MORPHINE SULFATE 2 MG: 4 INJECTION INTRAVENOUS at 06:05

## 2025-05-19 RX ADMIN — MEROPENEM 1 G: 1 INJECTION INTRAVENOUS at 08:05

## 2025-05-19 RX ADMIN — FAMOTIDINE 20 MG: 20 TABLET, FILM COATED ORAL at 08:05

## 2025-05-19 RX ADMIN — THERA TABS 1 TABLET: TAB at 09:05

## 2025-05-19 RX ADMIN — INSULIN GLARGINE 5 UNITS: 100 INJECTION, SOLUTION SUBCUTANEOUS at 08:05

## 2025-05-19 RX ADMIN — NICOTINE 1 PATCH: 14 PATCH TRANSDERMAL at 09:05

## 2025-05-19 RX ADMIN — MICONAZOLE NITRATE: 20 POWDER TOPICAL at 09:05

## 2025-05-19 RX ADMIN — MICONAZOLE NITRATE: 20 POWDER TOPICAL at 08:05

## 2025-05-19 RX ADMIN — ASPIRIN 81 MG: 81 TABLET, COATED ORAL at 09:05

## 2025-05-19 RX ADMIN — MORPHINE SULFATE 2 MG: 4 INJECTION INTRAVENOUS at 02:05

## 2025-05-19 RX ADMIN — ATORVASTATIN CALCIUM 80 MG: 40 TABLET, FILM COATED ORAL at 08:05

## 2025-05-19 RX ADMIN — MORPHINE SULFATE 2 MG: 4 INJECTION INTRAVENOUS at 10:05

## 2025-05-19 RX ADMIN — MEROPENEM 1 G: 1 INJECTION INTRAVENOUS at 05:05

## 2025-05-19 RX ADMIN — COLLAGENASE SANTYL: 250 OINTMENT TOPICAL at 09:05

## 2025-05-19 RX ADMIN — FAMOTIDINE 20 MG: 20 TABLET, FILM COATED ORAL at 09:05

## 2025-05-19 NOTE — ASSESSMENT & PLAN NOTE
Patient's blood pressure range in the last 24 hours was: BP  Min: 112/72  Max: 137/84.The patient's inpatient anti-hypertensive regimen is listed below:  Current Antihypertensives       Plan  - BP is controlled, no changes needed to their regimen

## 2025-05-19 NOTE — PROGRESS NOTES
Pharmacist Renal Dose Adjustment Note    Emily Martinez is a 60 y.o. female being treated with the medication Famotidine    Patient Data:    Vital Signs (Most Recent):  Temp: 98.4 °F (36.9 °C) (05/19/25 0730)  Pulse: 76 (05/19/25 0730)  Resp: 18 (05/19/25 0730)  BP: 137/84 (05/19/25 0730)  SpO2: 97 % (05/19/25 0730) Vital Signs (72h Range):  Temp:  [97.4 °F (36.3 °C)-99.1 °F (37.3 °C)]   Pulse:  [68-92]   Resp:  [16-20]   BP: (103-137)/(66-85)   SpO2:  [93 %-99 %]      Recent Labs   Lab 05/17/25  1141 05/18/25  0720 05/19/25  0707   CREATININE 0.9 0.8 0.7     Serum creatinine: 0.7 mg/dL 05/19/25 0707  Estimated creatinine clearance: 68.9 mL/min    Medication:Famotidine 20 mg every 24 hours will be changed to  Famotidine 20 mg every 12 hours     Pharmacist's Name: Latrice Hemphill  Pharmacist's Extension: [phone]

## 2025-05-19 NOTE — PROGRESS NOTES
[facility] Surg  Palliative Medicine  Progress Note    Patient Name: Emily Martinez  MRN: [id number]  Admission Date: 5/7/2025  Hospital Length of Stay: 12 days  Code Status: Full Code   Attending Provider: Adan Cartagena MD  Consulting Provider: Izzy Avila NP  Primary Care Physician: Alireza Sosa MD  Principal Problem:Multiple wounds of skin    Patient information was obtained from patient, relative(s), and primary team.      Assessment/Plan:     Endocrine  Moderate malnutrition  - border lining severe calorie protein malnutrition with 10% weight loss in less than 6 months and no available 6 month weight comparison  - albumin currently 1.4  - pt currently with PEG and tube feeds, but was also having some prior PO intake, although on steady decline per daughter; RD involved this admission   - likely contributing to poor wound healing and contributes to appropriateness for hospice if/when aligns with GOC      Orthopedic  * Multiple wounds of skin  - pt with multiple wounds including sacral and hip that have been managed by home health wound care, per daughter   - now with osteomyelitis found on MRI this admission  - contributes to picture of pt's overall debility and nutritional status and appropriateness for hospice if/when aligns with GOC      Pressure injury of right hip, stage 4  - noted; see multiple wounds     Palliative Care  ACP (advance care planning)  5/19/2025  - interval chart reviewed; discussed pt with HM and CM   - spoke with pt's daughter Aneta by phone; she confirms discussion of GOC with pt's other daughter as well   - reviewed pt requiring IV abx for 2 more days, at which time HM feels pt will be medically ready for discharge; SNF not indicated for continued IV abx, and was not recommended in regards to acute therapy needs, so plan would be for discharge home which daughter is agreeable to   - again reviewed pts qualification for either home palliative with home health vs  home hospice care; pts level of care and co-morbidities are very hospice appropriate and pt and family would benefit from additional services and expertise  - daughter is agreeable to informational discussion with [facility] (pref due to prior care of family members) to further discuss palliative vs hospice; will require agreement by pt's other daughter Vidhi as well   - Aneta was on her way to attend children's high school graduation, requested call to coordinate meeting 5/20 AM, which was shared with team   - later visited pt at bedside; shared the above information, which pt seemed to be agreeable to and plans to discuss with her daughter   - pt continues to advocate that her 2 daughters, Aneta and Vidhi, are preferred MPOA and for GOC to return home when able   - Ms. Diaz shared upset when she realized today was her grand children's graduation and shared that they have 3 children in the family graduating today   - emotional support provided; answered all questions  - updated HM and CM; discussed pt with hospice team     5/13/2025 - Consult   - consult received; interval chart reviewed in detail; discussed pt with bedside, RN   - met with patient at bedside; introduction to palliative medicine team and role in current care and admission   - pt difficult to arouse for discussion, unable to assess capacity due to limited interaction (nurse reports pt varies from alert and talkative to sleeping/difficult to arouse)   - pt did confirm having 2 children, listed in EMR as Aneta and Vidhi; pt confirms they both help with medical decisions   - call placed to pt's daughter, Aneta, who pt lives with  - learned more about pt outside of current admission; introduction to palliative medicine team and role in current care and admission   - GOC/ACP discussion  - daughter confirms that pt does not have prior ACP documents; Aneta and Vidhi are pt's legal surrogate decision makers collectively,  and Aneta confirms they actively share in decision making for pt   - reviewed prior discussions regarding code status this admission, per notes; Aneta confirms ongoing discussions with Vidhi regarding this and overall GOC; confirmed pt is currently full code, which aligns with Vidhi's wishes which they are currently honoring   - discussed overall pt care needs prior to this admission and plans following admission  - they are hopeful for treatment of reversible/treatable aspects of pt's condition such as wounds and infection; good insight that they may not be completely reversed due to pt's debility and comorbidities   - reviewed some options as discussed with CM such as home with HH, SNF, and hospice; overall goal is to avoid any permanent placement and for pt's care to remain home based long term; Aneta shares that she has taken a recent leave from work to aid in pt's care at home   - family would consider SNF if indicated for treatment of infections/long term IV abx; however, discussed that this would likely not be indicated for any form of therapy/rehab due to chronic nature of pt's debility   - reviewed option of home health with addition of home palliative care as daughters may differ in readiness for hospice level of care  - philosophy of care of home palliative discussed   - Aneta agreeable to home palliative program for additional support once pt returns home, GOC/ACP discussions outside of acute hospital setting and with family able to be present, and for future transition to hospice   - Aneta shares good understand of hospice, as she was primary caregiver for her grandfather (pt's father), while he was on hospice with [facility], she expressed good insight into philosophy of care of hospice and was very pleased with services from [facility], especially respite and inpatient care at their [facility]; family preference for utilizing [facility] if/when hospice was GOC so  "recommendation for [facility] palliative program for continuity of care   - emotional support provided   - Allowed time for questions/concerns; all addressed; expressed availability of myself/palliative team for additional questions/concerns   - updated HM; discussed discharge planning with CM team ; referral order placed for home palliative     Other  Tobacco dependency  - daughter confirms that pt stopped smoking over a year ago; due to mental limitations she sometimes forgets and will ask to smoke but is easily redirected     Debility  - pt is bed bound, and dependent for all ADLs; pt also with PEG, malnutrition, and multiple wounds   - PPS score 30%; hospice appropriate along with hx of CVA and severe protein calorie malnutrition         I will follow-up with patient. Please contact us if you have any additional questions.    Subjective:     Chief Complaint:   Chief Complaint   Patient presents with    Abdominal Pain    Hyperglycemia     Pt BIB EMS, c/o abd pain and hyperglycemia x 3 days. Pt's meter reading HI. 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        HPI:   From H&P: "Emily Martinez is a 60 y.o. female with DM1, HTN, HLD, and Previous CVA with residual deficits who was BIBA to [facility] ED for eval and treatment of acute generalized abdominal pain for the last 2x days. Per EMS, pt continued to experience persistent abdominal pain this morning accompanied by 4 episodes of emesis prompting her relatives to check her blood sugar at home. EMS notes that family received a reading of "critical high" and subsequently gave pt her insulin.   EMS reports receiving a reading greater than 500 upon their arrival.  She is chronically bed-bound, lives with her daughter, and has dementia (but is currently at her baseline per what daughter told EMS).  Further history therefore limited.  Has multiple chronic pressure ulcers on her sacrum and legs, presented to [facility] ED on April 10 with very " "similar circumstances as now, but was DC'ed home from the ED after a round of pain meds."     Palliative medicine consulted for goals of care discussion and advance care planning; for details of visit, see advance care planning section of plan.       Hospital Course:  No notes on file    Medications:  Continuous Infusions:  Scheduled Meds:   aspirin  81 mg Oral Daily    atorvastatin  80 mg Oral QHS    collagenase   Topical (Top) Daily    famotidine  20 mg Oral BID    insulin glargine U-100  5 Units Subcutaneous QHS    meropenem IV (PEDS and ADULTS)  1 g Intravenous Q8H    miconazole NITRATE 2 %   Topical (Top) BID    multivitamin  1 tablet Per G Tube Daily    nicotine  1 patch Transdermal Daily     PRN Meds:  Current Facility-Administered Medications:     acetaminophen, 650 mg, Oral, Q4H PRN    dextrose 50%, 12.5 g, Intravenous, PRN    dextrose 50%, 25 g, Intravenous, PRN    diphenhydrAMINE, 25 mg, Oral, Q6H PRN    glucagon (human recombinant), 1 mg, Intramuscular, PRN    glucose, 16 g, Oral, PRN    glucose, 24 g, Oral, PRN    insulin aspart U-100, 0-5 Units, Subcutaneous, QID (AC + HS) PRN    lorazepam, 1 mg, Intravenous, Once PRN    morphine, 2 mg, Intravenous, Q4H PRN    ondansetron, 8 mg, Intravenous, Q6H PRN    polyethylene glycol, 17 g, Oral, BID PRN    Objective:     Vital Signs (Most Recent):  Temp: 98.4 °F (36.9 °C) (05/19/25 1121)  Pulse: 74 (05/19/25 1121)  Resp: 16 (05/19/25 1427)  BP: (!) 133/90 (05/19/25 1121)  SpO2: 97 % (05/19/25 1121) Vital Signs (24h Range):  Temp:  [97.6 °F (36.4 °C)-98.5 °F (36.9 °C)] 98.4 °F (36.9 °C)  Pulse:  [71-80] 74  Resp:  [16-20] 16  SpO2:  [97 %-99 %] 97 %  BP: (112-137)/(70-90) 133/90     Weight: 57.2 kg (126 lb)  Body mass index is 22.32 kg/m².       Physical Exam  Vitals and nursing note reviewed.   Constitutional:       General: She is awake. She is not in acute distress.     Appearance: She is ill-appearing.   HENT:      Head: Atraumatic.      Comments: Temporal " wasting  Pulmonary:      Effort: Pulmonary effort is normal. No respiratory distress.   Musculoskeletal:      Comments: Pt lying with arms and legs curled towards midline, unclear if contractures present    Neurological:      Mental Status: She is alert.      Comments: Pt oriented to person, location, and situation of being ill and in the hospital    Psychiatric:         Behavior: Behavior is cooperative.          Advance Care Planning   Advance Directives:   Living Will: No    LaPOST: No    Do Not Resuscitate Status: No    Medical Power of : No (pt confirms 2 daughters, Aneta and Darius are preferred MPOA , also legal surrogate decision makers)    Goals of Care: What is most important right now is to focus on spending time at home, avoiding the hospital, symptom/pain control. Accordingly, we have decided that the best plan to meet the patient's goals includes continuing with treatment.         Significant Labs: All pertinent labs within the past 24 hours have been reviewed.  CBC:   Recent Labs   Lab 05/19/25  0707   WBC 9.35   HGB 7.3*   HCT 24.5*   MCV 73*   *     BMP:  Recent Labs   Lab 05/19/25  0707   GLU 72      K 3.6      CO2 24   BUN 14   CREATININE 0.7   CALCIUM 8.3*     LFT:  Lab Results   Component Value Date    AST 16 05/19/2025    ALKPHOS 104 05/19/2025    BILITOT 0.3 05/19/2025     Albumin:   Albumin   Date Value Ref Range Status   05/19/2025 1.6 (L) 3.5 - 5.2 g/dL Final   02/12/2025 2.4 (L) 3.5 - 5.2 g/dL Final     Protein:   Protein Total   Date Value Ref Range Status   05/19/2025 6.5 6.0 - 8.4 gm/dL Final     Total Protein   Date Value Ref Range Status   02/12/2025 7.7 6.0 - 8.4 g/dL Final     Lactic acid:   Lab Results   Component Value Date    LACTATE 1.0 05/11/2025    LACTATE 3.2 (H) 05/07/2025       Significant Imaging: I have reviewed all pertinent imaging results/findings within the past 24 hours.    Total visit time: 55 minutes    35 min visit time  including: face to face time in discussion of symptom assessment, and exploring options and burdens of offered treatments.  This also includes non-face to face time preparing to see the patient including chart review, obtaining and/or reviewing separately obtained history, documenting clinical information in the electronic or other health record, independently interpreting results and communicating results to the patient/family/caregiver, family discussions by phone if not able to be present, coordination of care with other specialists, and discharge planning.     20 min ACP time spent: goals of care, advanced care planning, emotional support, formulating and communicating prognosis, exploring burden/ benefit of various approaches of treatment.     Izzy Avila NP  Palliative Medicine  [facility] - Med Surg

## 2025-05-19 NOTE — SUBJECTIVE & OBJECTIVE
Medications:  Continuous Infusions:  Scheduled Meds:   aspirin  81 mg Oral Daily    atorvastatin  80 mg Oral QHS    collagenase   Topical (Top) Daily    famotidine  20 mg Oral BID    insulin glargine U-100  5 Units Subcutaneous QHS    meropenem IV (PEDS and ADULTS)  1 g Intravenous Q8H    miconazole NITRATE 2 %   Topical (Top) BID    multivitamin  1 tablet Per G Tube Daily    nicotine  1 patch Transdermal Daily     PRN Meds:  Current Facility-Administered Medications:     acetaminophen, 650 mg, Oral, Q4H PRN    dextrose 50%, 12.5 g, Intravenous, PRN    dextrose 50%, 25 g, Intravenous, PRN    diphenhydrAMINE, 25 mg, Oral, Q6H PRN    glucagon (human recombinant), 1 mg, Intramuscular, PRN    glucose, 16 g, Oral, PRN    glucose, 24 g, Oral, PRN    insulin aspart U-100, 0-5 Units, Subcutaneous, QID (AC + HS) PRN    lorazepam, 1 mg, Intravenous, Once PRN    morphine, 2 mg, Intravenous, Q4H PRN    ondansetron, 8 mg, Intravenous, Q6H PRN    polyethylene glycol, 17 g, Oral, BID PRN    Objective:     Vital Signs (Most Recent):  Temp: 98.4 °F (36.9 °C) (05/19/25 1121)  Pulse: 74 (05/19/25 1121)  Resp: 16 (05/19/25 1427)  BP: (!) 133/90 (05/19/25 1121)  SpO2: 97 % (05/19/25 1121) Vital Signs (24h Range):  Temp:  [97.6 °F (36.4 °C)-98.5 °F (36.9 °C)] 98.4 °F (36.9 °C)  Pulse:  [71-80] 74  Resp:  [16-20] 16  SpO2:  [97 %-99 %] 97 %  BP: (112-137)/(70-90) 133/90     Weight: 57.2 kg (126 lb)  Body mass index is 22.32 kg/m².       Physical Exam  Vitals and nursing note reviewed.   Constitutional:       General: She is awake. She is not in acute distress.     Appearance: She is ill-appearing.   HENT:      Head: Atraumatic.      Comments: Temporal wasting  Pulmonary:      Effort: Pulmonary effort is normal. No respiratory distress.   Musculoskeletal:      Comments: Pt lying with arms and legs curled towards midline, unclear if contractures present    Neurological:      Mental Status: She is alert.      Comments: Pt oriented to person,  location, and situation of being ill and in the hospital    Psychiatric:         Behavior: Behavior is cooperative.          Advance Care Planning   Advance Directives:   Living Will: No    LaPOST: No    Do Not Resuscitate Status: No    Medical Power of : No (pt confirms 2 daughters, Aneta and Darius are preferred MPOA , also legal surrogate decision makers)    Goals of Care: What is most important right now is to focus on spending time at home, avoiding the hospital, symptom/pain control. Accordingly, we have decided that the best plan to meet the patient's goals includes continuing with treatment.         Significant Labs: All pertinent labs within the past 24 hours have been reviewed.  CBC:   Recent Labs   Lab 05/19/25  0707   WBC 9.35   HGB 7.3*   HCT 24.5*   MCV 73*   *     BMP:  Recent Labs   Lab 05/19/25  0707   GLU 72      K 3.6      CO2 24   BUN 14   CREATININE 0.7   CALCIUM 8.3*     LFT:  Lab Results   Component Value Date    AST 16 05/19/2025    ALKPHOS 104 05/19/2025    BILITOT 0.3 05/19/2025     Albumin:   Albumin   Date Value Ref Range Status   05/19/2025 1.6 (L) 3.5 - 5.2 g/dL Final   02/12/2025 2.4 (L) 3.5 - 5.2 g/dL Final     Protein:   Protein Total   Date Value Ref Range Status   05/19/2025 6.5 6.0 - 8.4 gm/dL Final     Total Protein   Date Value Ref Range Status   02/12/2025 7.7 6.0 - 8.4 g/dL Final     Lactic acid:   Lab Results   Component Value Date    LACTATE 1.0 05/11/2025    LACTATE 3.2 (H) 05/07/2025       Significant Imaging: I have reviewed all pertinent imaging results/findings within the past 24 hours.

## 2025-05-19 NOTE — PROGRESS NOTES
[facility] Surg  Wound Care  WOC CRIS    Patient Name:  Emily Martinez   MRN:  [id number]  Date: 5/19/2025  Diagnosis: Multiple wounds of skin    History:     Past Medical History:   Diagnosis Date    Diabetes mellitus type I     Hyperlipidemia     Hypertension     Right sided weakness 6/27/2019       Social History[1]    Precautions:     Allergies as of 05/07/2025 - Reviewed 05/07/2025   Allergen Reaction Noted    Sulfa (sulfonamide antibiotics) Itching 10/30/2014    Sulfur  10/30/2014       WOC Assessment Details/Treatment     Active Problem List with Overview Notes    Diagnosis Date Noted    ACP (advance care planning) 05/13/2025    Microcytic anemia 05/11/2025    Pressure injury of right hip, stage 4 05/08/2025    Tobacco dependency 05/07/2025    Moderate malnutrition 02/18/2025    Cereb infrc due to unsp occls or stenos of unsp cereb artery 02/12/2025    Ulcer of sacral region, stage 2 02/12/2025    Encephalopathy, hypertensive 02/12/2025    Poor prognosis 01/15/2025    Palliative care encounter 01/15/2025    Advance care planning 01/15/2025    Acute deep vein thrombosis (DVT) of right upper extremity 01/14/2025    Severe malnutrition 01/11/2025    Chronic hypotension 01/10/2025    Acute blood loss anemia 01/10/2025    Dehydration 01/09/2025    Acute hypoxemic respiratory failure 01/07/2025    LFT elevation 01/04/2025    Hypophosphatemia 01/02/2025    Atelectasis of both lungs 01/02/2025    Hypernatremia 12/31/2024    History of stroke with residual effects 12/30/2024    Multiple wounds of skin 12/30/2024    C. difficile colitis 12/30/2024    Dysphagia 12/30/2024    Failure to thrive in adult 11/25/2024    Aspiration pneumonia 09/04/2024    Osteomyelitis 07/14/2024    AMS (altered mental status) 06/14/2024    History of CVA (cerebrovascular accident) 06/12/2024    Ischemic cerebral stroke due to intracranial large artery atherosclerosis 04/04/2024    Debility 04/02/2024    Intracranial carotid stenosis,  right 04/02/2024    DKA (diabetic ketoacidosis) 03/25/2024    Gait difficulty 05/04/2022    Right knee pain 04/05/2022    Quadriceps weakness 04/05/2022    Hyperosmolar hyperglycemic state (HHS) 06/08/2021    Muscle weakness of lower extremity 01/09/2020    Encephalopathy 09/06/2019    Right sided weakness 08/13/2019    Overweight (BMI 25.0-29.9) 07/15/2019    Noncompliance with diet and medication regimen 06/30/2019    Cytotoxic cerebral edema 06/28/2019    NICOLASA (acute kidney injury) 06/28/2019    Thrombotic stroke involving right middle cerebral artery 06/27/2019    Numbness and tingling of right lower extremity 04/07/2019    Insomnia 04/06/2019    Small vessel disease, cerebrovascular 04/05/2019    Cocaine abuse 04/05/2019    Weakness of both lower extremities 04/02/2019    Cerebrovascular accident (CVA) 04/01/2019    Type 2 diabetes mellitus with stage 4 chronic kidney disease, with long-term current use of insulin 04/01/2019    Essential hypertension 04/01/2019    Smoker 04/01/2019    Mixed hyperlipidemia 04/01/2019        Assessment:  Visited patient during PulsaVac treatment of right hip per PT. Thick adherent necrotic tissue over trochanter softening and .   Patient not receptive to treatment of wound, but no indication of pain during treatment.   Photodocumentation      Treatment/Plan:  Removed a small amount of loose necrotic tissue from wound. Dressing with Santyl replaced per PT.   To continue daily PT PulsaVac treatment through Wednesday.   05/19/2025       [1]   Social History  Socioeconomic History    Marital status: Single   Tobacco Use    Smoking status: Every Day     Current packs/day: 1.50     Average packs/day: 1.5 packs/day for 35.0 years (52.5 ttl pk-yrs)     Types: Cigarettes    Smokeless tobacco: Never   Substance and Sexual Activity    Alcohol use: Not Currently    Drug use: Not Currently     Social Drivers of Health     Financial Resource Strain: Patient Declined (5/7/2025)     Overall Financial Resource Strain (CARDIA)     Difficulty of Paying Living Expenses: Patient declined   Food Insecurity: Patient Declined (5/7/2025)    Hunger Vital Sign     Worried About Running Out of Food in the Last Year: Patient declined     Ran Out of Food in the Last Year: Patient declined   Transportation Needs: Patient Declined (5/7/2025)    PRAPARE - Transportation     Lack of Transportation (Medical): Patient declined     Lack of Transportation (Non-Medical): Patient declined   Physical Activity: Inactive (5/8/2025)    Exercise Vital Sign     Days of Exercise per Week: 0 days     Minutes of Exercise per Session: 0 min   Stress: No Stress Concern Present (5/8/2025)    [employer] [place] [employer] - Occupational Stress Questionnaire     Feeling of Stress : Not at all   Housing Stability: Patient Declined (5/7/2025)    Housing Stability Vital Sign     Unable to Pay for Housing in the Last Year: Patient declined     Homeless in the Last Year: Patient declined

## 2025-05-19 NOTE — NURSING
.[facility] [place]  Nurses Note -- 4 Eyes      5/19/2025       Skin assessed on: Q Shift      [] No Pressure Injuries Present    []Prevention Measures Documented    [x] Yes LDA  for Pressure Injury Previously documented     [] Yes New Pressure Injury Discovered   [] LDA for New Pressure Injury Added      Attending RN:  Guerda Sterling RN     Second RN:  JUAN Perera

## 2025-05-19 NOTE — PLAN OF CARE
Changes in medical condition or discharge plan: Patient's H/H low, may need a unit of blood. ID is recommending 7 days of IV abx.  Plan is for home hospice or home palliative and home health. CM Mgr to reach out to [facility]ati[facility] to coordinate an informational.     Does patient need new DME? If needed, hospice will provide.     Follow up appts needed: NA    Medically stable: No    Estimated Discharge Date: 5/21/25 05/19/25 1316   Discharge Reassessment   Assessment Type Discharge Planning Reassessment   Did the patient's condition or plan change since previous assessment? Yes   Discharge Plan A Hospice/home   Discharge Plan B Home with family   DME Needed Upon Discharge  none   Transition of Care Barriers None   Why the patient remains in the hospital Requires continued medical care   Post-Acute Status   Post-Acute Authorization Hospice   Coverage Medicaid   Hospital Resources/Appts/Education Provided Appointments scheduled and added to AVS   Discharge Delays None known at this time

## 2025-05-19 NOTE — SUBJECTIVE & OBJECTIVE
Interval History: no events overnight.    Review of Systems   HENT:  Negative for ear discharge and ear pain.    Eyes:  Negative for discharge and itching.   Endocrine: Negative for cold intolerance and heat intolerance.   Neurological:  Negative for seizures and syncope.     Objective:     Vital Signs (Most Recent):  Temp: 98.4 °F (36.9 °C) (05/19/25 1121)  Pulse: 74 (05/19/25 1121)  Resp: 16 (05/19/25 1427)  BP: (!) 133/90 (05/19/25 1121)  SpO2: 97 % (05/19/25 1121) Vital Signs (24h Range):  Temp:  [97.6 °F (36.4 °C)-98.5 °F (36.9 °C)] 98.4 °F (36.9 °C)  Pulse:  [71-80] 74  Resp:  [16-20] 16  SpO2:  [97 %-99 %] 97 %  BP: (112-137)/(70-90) 133/90     Weight: 57.2 kg (126 lb)  Body mass index is 22.32 kg/m².    Intake/Output Summary (Last 24 hours) at 5/19/2025 1532  Last data filed at 5/19/2025 1452  Gross per 24 hour   Intake 900 ml   Output 2700 ml   Net -1800 ml         Physical Exam  Vitals and nursing note reviewed.   Constitutional:       General: She is not in acute distress.     Appearance: Normal appearance. She is ill-appearing.   HENT:      Head: Normocephalic and atraumatic.   Cardiovascular:      Rate and Rhythm: Normal rate and regular rhythm.      Pulses: Normal pulses.      Heart sounds: No murmur heard.  Pulmonary:      Effort: Pulmonary effort is normal. No respiratory distress.      Breath sounds: Normal breath sounds.   Abdominal:      General: Bowel sounds are normal. There is no distension.      Palpations: Abdomen is soft.      Comments: G-tube in place   Musculoskeletal:      Comments: Multiple ulcers lower extremity, right hip with wound    Skin:     General: Skin is warm and dry.   Neurological:      Mental Status: She is alert.               Significant Labs: All pertinent labs within the past 24 hours have been reviewed.  BMP:   Recent Labs   Lab 05/19/25  0707   GLU 72      K 3.6      CO2 24   BUN 14   CREATININE 0.7   CALCIUM 8.3*     CBC:   Recent Labs   Lab 05/18/25  0720  05/19/25  0707   WBC 10.58 9.35   HGB 6.9* 7.3*   HCT 22.8* 24.5*   * 773*       Significant Imaging: I have reviewed all pertinent imaging results/findings within the past 24 hours.

## 2025-05-19 NOTE — ASSESSMENT & PLAN NOTE
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 Most recent hemoglobin and hematocrit are listed below.  Recent Labs     05/17/25  1141 05/18/25  0720 05/19/25  0707   HGB 7.4* 6.9* 7.3*   HCT 25.1* 22.8* 24.5*     Plan  - Monitor serial CBC: Daily  - Transfuse PRBC if patient becomes hemodynamically unstable, symptomatic or H/H drops below 7/21.  - Patient has not received any PRBC transfusions to date  - Patient's anemia is currently stable  - Suspect iron def +chronic disease  Repeat labs in AM and transfuse if Hgb still <7

## 2025-05-19 NOTE — ASSESSMENT & PLAN NOTE
Last A1c reviewed-   Lab Results   Component Value Date    LABA1C 13.0 (H) 04/13/2016    HGBA1C 8.1 (H) 05/07/2025     Home antihyperglycemic regimen: lispro-protamin 10U daily    Recent Labs     05/17/25  1934 05/18/25  0733 05/18/25  1048 05/18/25  1940 05/19/25  0731 05/19/25  1123   POCTGLUCOSE 186* 119* 115* 80 83 79     Most recent inpatient antihyperglycemic regimen:   Antihyperglycemics (From admission, onward)      Start     Stop Route Frequency Ordered    05/15/25 2100  insulin glargine U-100 (Lantus) pen 5 Units         -- SubQ Nightly 05/15/25 0850    05/07/25 2127  insulin aspart U-100 pen 0-5 Units         -- SubQ Before meals & nightly PRN 05/07/25 2028          Some episodes of hypoglycemia.  Adjust insulin.

## 2025-05-19 NOTE — PROGRESS NOTES
Pharmacist Renal Dose Adjustment Note    Emily Martinez is a 60 y.o. female being treated with the medication Meropenem    Patient Data:    Vital Signs (Most Recent):  Temp: 98.4 °F (36.9 °C) (05/19/25 0730)  Pulse: 76 (05/19/25 0730)  Resp: 18 (05/19/25 0730)  BP: 137/84 (05/19/25 0730)  SpO2: 97 % (05/19/25 0730) Vital Signs (72h Range):  Temp:  [97.4 °F (36.3 °C)-99.1 °F (37.3 °C)]   Pulse:  [68-92]   Resp:  [16-20]   BP: (103-137)/(66-85)   SpO2:  [93 %-99 %]      Recent Labs   Lab 05/17/25  1141 05/18/25  0720 05/19/25  0707   CREATININE 0.9 0.8 0.7     Serum creatinine: 0.7 mg/dL 05/19/25 0707  Estimated creatinine clearance: 68.9 mL/min    Medication:Meropenem 1g every 12 hours will be changed to Meropenem 1g every 8 hours     Pharmacist's Name: Latrice Hemphill  Pharmacist's Extension: [phone]

## 2025-05-19 NOTE — ASSESSMENT & PLAN NOTE
Chronic, pressure ulcers.  Most recently debrided by wound care on 4/9.  Various stages of healing currently; R hip ulcer is purulent.  WBC, ESR, CRP, Lactate all elevated, although pt not febrile.  Wet-to-dry dressing applied and Vanc given in ED.  Wound care consulted  Concern with worsening right hip wound and tunneling noted on CT -- and wound care  MRI with large lateral soft tissue ulceration with suspected early osteomyelitis of the greater trochanter.   Continue ABX's.  Appreciate ID input.  Consult General Surgery for possible debridement with culture.  No need for surgical debridement per Surgery.  Asked Wound Care to get deep wound culture.  Deep wound culture obtained by Wound Care nurse.  Currently growing ESBL E coli.  ABX's per ID  To complete 7 days of Meropenem on 5/21.  Continue wound care.

## 2025-05-19 NOTE — ASSESSMENT & PLAN NOTE
5/19/2025  - interval chart reviewed; discussed pt with HM and CM   - spoke with pt's daughter Aneta by phone; she confirms discussion of GOC with pt's other daughter as well   - reviewed pt requiring IV abx for 2 more days, at which time HM feels pt will be medically ready for discharge; SNF not indicated for continued IV abx, and was not recommended in regards to acute therapy needs, so plan would be for discharge home which daughter is agreeable to   - again reviewed pts qualification for either home palliative with home health vs home hospice care; pts level of care and co-morbidities are very hospice appropriate and pt and family would benefit from additional services and expertise  - daughter is agreeable to informational discussion with [facility] (pref due to prior care of family members) to further discuss palliative vs hospice; will require agreement by pt's other daughter Vidhi as well   - Aneta was on her way to attend children's high school graduation, requested call to coordinate meeting 5/20 AM, which was shared with team   - later visited pt at bedside; shared the above information, which pt seemed to be agreeable to and plans to discuss with her daughter   - pt continues to advocate that her 2 daughters, Aneta and Vidhi, are preferred MPOA and for GOC to return home when able   - Ms. Diaz shared upset when she realized today was her grand children's graduation and shared that they have 3 children in the family graduating today   - emotional support provided; answered all questions  - updated HM and CM; discussed pt with hospice team     5/13/2025 - Consult   - consult received; interval chart reviewed in detail; discussed pt with bedside, RN   - met with patient at bedside; introduction to palliative medicine team and role in current care and admission   - pt difficult to arouse for discussion, unable to assess capacity due to limited interaction (nurse reports pt varies from  alert and talkative to sleeping/difficult to arouse)   - pt did confirm having 2 children, listed in EMR as Aneta and Vidhi; pt confirms they both help with medical decisions   - call placed to pt's daughter, Aneta, who pt lives with  - learned more about pt outside of current admission; introduction to palliative medicine team and role in current care and admission   - GOC/ACP discussion  - daughter confirms that pt does not have prior ACP documents; Aneta and Vidhi are pt's legal surrogate decision makers collectively, and Aneta confirms they actively share in decision making for pt   - reviewed prior discussions regarding code status this admission, per notes; Aneta confirms ongoing discussions with Vidhi regarding this and overall GOC; confirmed pt is currently full code, which aligns with Vidhi's wishes which they are currently honoring   - discussed overall pt care needs prior to this admission and plans following admission  - they are hopeful for treatment of reversible/treatable aspects of pt's condition such as wounds and infection; good insight that they may not be completely reversed due to pt's debility and comorbidities   - reviewed some options as discussed with CM such as home with HH, SNF, and hospice; overall goal is to avoid any permanent placement and for pt's care to remain home based long term; Aneta shares that she has taken a recent leave from work to aid in pt's care at home   - family would consider SNF if indicated for treatment of infections/long term IV abx; however, discussed that this would likely not be indicated for any form of therapy/rehab due to chronic nature of pt's debility   - reviewed option of home health with addition of home palliative care as daughters may differ in readiness for hospice level of care  - philosophy of care of home palliative discussed   - Aneta agreeable to home palliative program for additional support once pt  returns home, GOC/ACP discussions outside of acute hospital setting and with family able to be present, and for future transition to hospice   - Aneta shares good understand of hospice, as she was primary caregiver for her grandfather (pt's father), while he was on hospice with [facility], she expressed good insight into philosophy of care of hospice and was very pleased with services from [facility], especially respite and inpatient care at their [facility]; family preference for utilizing [facility] if/when hospice was GOC so recommendation for [facility] palliative program for continuity of care   - emotional support provided   - Allowed time for questions/concerns; all addressed; expressed availability of myself/palliative team for additional questions/concerns   - updated HM; discussed discharge planning with CM team ; referral order placed for home palliative

## 2025-05-19 NOTE — PT/OT/SLP PROGRESS
Rehab Services Wound Care Progress Note    Emily Martinez  [id number]  5/19/2025 [id number] (34 min - 2 wound care)      Diagnosis:    History of current illness and wound.  Patient is a 60 y.o. female admitted from home with multiple wounds.  Pulsavac wound care orders received for R hip wound.      Precautions: Standard, Diabetes, and ESBL    Allergies as of 05/07/2025 - Reviewed 05/07/2025   Allergen Reaction Noted    Sulfa (sulfonamide antibiotics) Itching 10/30/2014    Sulfur  10/30/2014        Pre-medication: distractions, rest pauses during treatment, and pain medication taken by patient prior to treatment    Subjective:     Patient did not report pain, however was resisting pulsavac wound care.     Objective:     Patient received pulsavac wound care to R hip with 1000 mL of NS.  Minimal bleeding today.  Debridement of loosen slough removed with scissors by HEIDI Mak at bedside.  R hip wound bed and periwound cleaned with NS moistened gauze.  Cavilon no sting barrier film applied to periwound.  Santyl applied to wound bed with area of slough.  R hip wound packed with NS moistened 4x4 gauze, then covered with dry 4x4 gauze and abdominal pad.  Dressings secured with micropore tape.  Clean technique maintained throughout treatment.       Assessment:    R hip dressings/packing removed with moderated serosanguinous drainage.  R hip wound bed with max amount of loosen yellow and tan slough around undermining between 12-3 o'clock.  Other areas presented with pink/red tissues.  No odor present at this time.  Patient with diagnosis of R hip stage 4 pressure injury will benefit from skilled Rehab Services Wound Care to maximize wound healing potential and to assist wound to heal through appropriate healing phases.  Problems include multiple co-morbidities, diabetes, decreased granulation tissue, necrosis, large surface area, large volume, undermining, infection, and history of poor compliance.  Patient tolerated  today's treatment without any adverse effects.  Goals remain appropriate.     Pt unable to tolerate pulsavac.  Despite max education, pt was resisting wound care.  Pt kept grabbing therapist's hand.  Pt attempted to scratch and dug her nails into therapist's hands/arms.  Pt also attempted to bite therapist's hand.  Haley Johnson, CNS present during wound care treatment.      Photodocumentation: R hip         Plan:    Continue with Plan of Care for Monday, Tuesday, and Wednesday this week.

## 2025-05-19 NOTE — PROGRESS NOTES
"[facility]  Progress Note    Patient Name: Emily Martinez  MRN: [id number]  Patient Class: IP- Inpatient   Admission Date: 5/7/2025  Length of Stay: 12 days  Attending Physician: Adan Cartagena MD  Primary Care Provider: Alireza Sosa MD        Subjective     Principal Problem:Multiple wounds of skin        HPI:  Emily Martinez is a 60 y.o. female with DM1, HTN, HLD, and Previous CVA with residual deficits who was BIBA to [facility] ED for eval and treatment of acute generalized abdominal pain for the last 2x days. Per EMS, pt continued to experience persistent abdominal pain this morning accompanied by 4 episodes of emesis prompting her relatives to check her blood sugar at home. EMS notes that family received a reading of "critical high" and subsequently gave pt her insulin.   EMS reports receiving a reading greater than 500 upon their arrival.  She is chronically bed-bound, lives with her daughter, and has dementia (but is currently at her baseline per what daughter told EMS).  Further history therefore limited.  Has multiple chronic pressure ulcers on her sacrum and legs, presented to [facility] ED on April 10 with very similar circumstances as now, but was DC'ed home from the ED after a round of pain meds.    ED course: POCT  on arrival.  VSS WNL.  Exam with multiple pressure ulcers in various stages of healing; R hip wound is purulent.  Oriented to place and self.  Labs WBC 14.75 Hgb 9.3 Plt 677.  ESR CRP Lactate all elevated.  K 3.1.  Imaging: CT reads as wound to the right lateral aspect of her upper thigh appears worse when compared to previous imaging.  ED treatments: Vanc, mupirocin, wet-to-dry dressing, KCl.  They were admitted to [facility] for further evaluation and treatment.        Overview/Hospital Course:  Ms. Martinez is a 59 yo F admitted with hyperglycemia and multiple pressure wounds. Started on IV antibiotics and given IVF, started " on insulin. Wound care consulted. Concern for tunneling of right hip wound. CT with no bone involvement however concerning due to tunneling. MRI ordered showing large lateral soft tissue ulceration with suspected early osteomyelitis of the greater trochanter.  ID consulted.  Deep wound culture obtained.  Growing ESBL E coli.  On Meropenem along with Vancomycin.  Episodes of vomiting and tube feeds held as she does have some oral intake.  ID recommending Meropenem until 5/21.    Interval History: no events overnight.    Review of Systems   HENT:  Negative for ear discharge and ear pain.    Eyes:  Negative for discharge and itching.   Endocrine: Negative for cold intolerance and heat intolerance.   Neurological:  Negative for seizures and syncope.     Objective:     Vital Signs (Most Recent):  Temp: 98.4 °F (36.9 °C) (05/19/25 1121)  Pulse: 74 (05/19/25 1121)  Resp: 16 (05/19/25 1427)  BP: (!) 133/90 (05/19/25 1121)  SpO2: 97 % (05/19/25 1121) Vital Signs (24h Range):  Temp:  [97.6 °F (36.4 °C)-98.5 °F (36.9 °C)] 98.4 °F (36.9 °C)  Pulse:  [71-80] 74  Resp:  [16-20] 16  SpO2:  [97 %-99 %] 97 %  BP: (112-137)/(70-90) 133/90     Weight: 57.2 kg (126 lb)  Body mass index is 22.32 kg/m².    Intake/Output Summary (Last 24 hours) at 5/19/2025 1532  Last data filed at 5/19/2025 1452  Gross per 24 hour   Intake 900 ml   Output 2700 ml   Net -1800 ml         Physical Exam  Vitals and nursing note reviewed.   Constitutional:       General: She is not in acute distress.     Appearance: Normal appearance. She is ill-appearing.   HENT:      Head: Normocephalic and atraumatic.   Cardiovascular:      Rate and Rhythm: Normal rate and regular rhythm.      Pulses: Normal pulses.      Heart sounds: No murmur heard.  Pulmonary:      Effort: Pulmonary effort is normal. No respiratory distress.      Breath sounds: Normal breath sounds.   Abdominal:      General: Bowel sounds are normal. There is no distension.      Palpations: Abdomen is  soft.      Comments: G-tube in place   Musculoskeletal:      Comments: Multiple ulcers lower extremity, right hip with wound    Skin:     General: Skin is warm and dry.   Neurological:      Mental Status: She is alert.               Significant Labs: All pertinent labs within the past 24 hours have been reviewed.  BMP:   Recent Labs   Lab 05/19/25  0707   GLU 72      K 3.6      CO2 24   BUN 14   CREATININE 0.7   CALCIUM 8.3*     CBC:   Recent Labs   Lab 05/18/25  0720 05/19/25  0707   WBC 10.58 9.35   HGB 6.9* 7.3*   HCT 22.8* 24.5*   * 773*       Significant Imaging: I have reviewed all pertinent imaging results/findings within the past 24 hours.      Assessment & Plan  Multiple wounds of skin  Chronic, pressure ulcers.  Most recently debrided by wound care on 4/9.  Various stages of healing currently; R hip ulcer is purulent.  WBC, ESR, CRP, Lactate all elevated, although pt not febrile.  Wet-to-dry dressing applied and Vanc given in ED.  Wound care consulted  Concern with worsening right hip wound and tunneling noted on CT -- and wound care  MRI with large lateral soft tissue ulceration with suspected early osteomyelitis of the greater trochanter.   Continue ABX's.  Appreciate ID input.  Consult General Surgery for possible debridement with culture.  No need for surgical debridement per Surgery.  Asked Wound Care to get deep wound culture.  Deep wound culture obtained by Wound Care nurse.  Currently growing ESBL E coli.  ABX's per ID  To complete 7 days of Meropenem on 5/21.  Continue wound care.  Ulcer of sacral region, stage 2      Type 2 diabetes mellitus with stage 4 chronic kidney disease, with long-term current use of insulin  Last A1c reviewed-   Lab Results   Component Value Date    LABA1C 13.0 (H) 04/13/2016    HGBA1C 8.1 (H) 05/07/2025     Home antihyperglycemic regimen: lispro-protamin 10U daily    Recent Labs     05/17/25  1934 05/18/25  0733 05/18/25  1048 05/18/25  1940  05/19/25  0731 05/19/25  1123   POCTGLUCOSE 186* 119* 115* 80 83 79     Most recent inpatient antihyperglycemic regimen:   Antihyperglycemics (From admission, onward)      Start     Stop Route Frequency Ordered    05/15/25 2100  insulin glargine U-100 (Lantus) pen 5 Units         -- SubQ Nightly 05/15/25 0850    05/07/25 2127  insulin aspart U-100 pen 0-5 Units         -- SubQ Before meals & nightly PRN 05/07/25 2028          Some episodes of hypoglycemia.  Adjust insulin.  Essential hypertension  Patient's blood pressure range in the last 24 hours was: BP  Min: 112/72  Max: 137/84.The patient's inpatient anti-hypertensive regimen is listed below:  Current Antihypertensives       Plan  - BP is controlled, no changes needed to their regimen    Mixed hyperlipidemia  Stable.  Continue home statin  History of stroke with residual effects  As above    Debility  Patient with Chronic debility due to hemiplegia/hemiparesis. The patient's latest AMPAC (Activity Measure for Post Acute Care) Score is listed below.    AM-PAC Score - How much help does the patient need for each activity listed  Basic Mobility Total Score: 8  Turning over in bed (including adjusting bedclothes, sheets and blankets)?: A lot  Sitting down on and standing up from a chair with arms (e.g., wheelchair, bedside commode, etc.): Unable  Moving from lying on back to sitting on the side of the bed?: A lot  Moving to and from a bed to a chair (including a wheelchair)?: Unable  Need to walk in hospital room?: Unable  Climbing 3-5 steps with a railing?: Unable    Plan  - Progressive mobility protocol initated  - Fall precautions in place      Tobacco dependency  Dangers of cigarette smoking were reviewed with patient in detail. Patient was Referred to Tobacco Cessation Program. Nicotine replacement options were discussed. Nicotine replacement was discussed- prescribed  Moderate malnutrition  Nutrition consulted. Most recent weight and BMI monitored-      Measurements:  Wt Readings from Last 1 Encounters:   05/09/25 57.2 kg (126 lb)   Body mass index is 22.32 kg/m².    Patient has been screened and assessed by RD.    Malnutrition Type:  Context: chronic illness  Level: moderate        Pressure injury of right hip, stage 4      Microcytic anemia  Anemia is likely due to chronic blood loss, Iron deficiency, and chronic disease due to infection. Most recent hemoglobin and hematocrit are listed below.  Recent Labs     05/17/25  1141 05/18/25  0720 05/19/25  0707   HGB 7.4* 6.9* 7.3*   HCT 25.1* 22.8* 24.5*     Plan  - Monitor serial CBC: Daily  - Transfuse PRBC if patient becomes hemodynamically unstable, symptomatic or H/H drops below 7/21.  - Patient has not received any PRBC transfusions to date  - Patient's anemia is currently stable  - Suspect iron def +chronic disease  Repeat labs in AM and transfuse if Hgb still <7  ACP (advance care planning)      VTE Risk Mitigation (From admission, onward)      None            Discharge Planning   ANA: 5/21/2025     Code Status: Full Code   Medical Readiness for Discharge Date:   Discharge Plan A: Hospice/home   Discharge Delays: None known at this time                    Adan Cartagena MD  Department of Hospital Medicine   [facility]

## 2025-05-20 LAB
POCT GLUCOSE: 109 MG/DL (ref 70–110)
POCT GLUCOSE: 150 MG/DL (ref 70–110)
POCT GLUCOSE: 72 MG/DL (ref 70–110)
POCT GLUCOSE: 92 MG/DL (ref 70–110)

## 2025-05-20 PROCEDURE — 11000001 HC ACUTE MED/SURG PRIVATE ROOM

## 2025-05-20 PROCEDURE — 25000003 PHARM REV CODE 250: Performed by: HOSPITALIST

## 2025-05-20 PROCEDURE — 97598 DBRDMT OPN WND ADDL 20CM/<: CPT

## 2025-05-20 PROCEDURE — 25000003 PHARM REV CODE 250: Performed by: INTERNAL MEDICINE

## 2025-05-20 PROCEDURE — 27000207 HC ISOLATION

## 2025-05-20 PROCEDURE — 63600175 PHARM REV CODE 636 W HCPCS: Performed by: HOSPITALIST

## 2025-05-20 PROCEDURE — 25000003 PHARM REV CODE 250

## 2025-05-20 PROCEDURE — S4991 NICOTINE PATCH NONLEGEND: HCPCS

## 2025-05-20 PROCEDURE — 97597 DBRDMT OPN WND 1ST 20 CM/<: CPT

## 2025-05-20 PROCEDURE — 99231 SBSQ HOSP IP/OBS SF/LOW 25: CPT | Mod: ,,,

## 2025-05-20 RX ORDER — MIRTAZAPINE 7.5 MG/1
7.5 TABLET, FILM COATED ORAL NIGHTLY PRN
Status: DISCONTINUED | OUTPATIENT
Start: 2025-05-20 | End: 2025-05-22 | Stop reason: HOSPADM

## 2025-05-20 RX ADMIN — ASPIRIN 81 MG: 81 TABLET, COATED ORAL at 09:05

## 2025-05-20 RX ADMIN — NICOTINE 1 PATCH: 14 PATCH TRANSDERMAL at 09:05

## 2025-05-20 RX ADMIN — INSULIN GLARGINE 5 UNITS: 100 INJECTION, SOLUTION SUBCUTANEOUS at 09:05

## 2025-05-20 RX ADMIN — ONDANSETRON 8 MG: 2 INJECTION INTRAMUSCULAR; INTRAVENOUS at 09:05

## 2025-05-20 RX ADMIN — MIRTAZAPINE 7.5 MG: 7.5 TABLET, FILM COATED ORAL at 10:05

## 2025-05-20 RX ADMIN — FAMOTIDINE 20 MG: 20 TABLET, FILM COATED ORAL at 09:05

## 2025-05-20 RX ADMIN — MEROPENEM 1 G: 1 INJECTION INTRAVENOUS at 01:05

## 2025-05-20 RX ADMIN — MICONAZOLE NITRATE: 20 POWDER TOPICAL at 09:05

## 2025-05-20 RX ADMIN — THERA TABS 1 TABLET: TAB at 09:05

## 2025-05-20 RX ADMIN — ATORVASTATIN CALCIUM 80 MG: 40 TABLET, FILM COATED ORAL at 09:05

## 2025-05-20 RX ADMIN — DIPHENHYDRAMINE HYDROCHLORIDE 25 MG: 25 CAPSULE ORAL at 09:05

## 2025-05-20 RX ADMIN — COLLAGENASE SANTYL: 250 OINTMENT TOPICAL at 09:05

## 2025-05-20 RX ADMIN — MEROPENEM 1 G: 1 INJECTION INTRAVENOUS at 09:05

## 2025-05-20 RX ADMIN — MORPHINE SULFATE 2 MG: 4 INJECTION INTRAVENOUS at 01:05

## 2025-05-20 RX ADMIN — ACETAMINOPHEN 650 MG: 325 TABLET ORAL at 01:05

## 2025-05-20 RX ADMIN — MEROPENEM 1 G: 1 INJECTION INTRAVENOUS at 05:05

## 2025-05-20 NOTE — PROGRESS NOTES
"[facility]  Progress Note    Patient Name: Emily Martinez  MRN: [id number]  Patient Class: IP- Inpatient   Admission Date: 5/7/2025  Length of Stay: 13 days  Attending Physician: Adan Cartagena MD  Primary Care Provider: Alireza Sosa MD        Subjective     Principal Problem:Multiple wounds of skin        HPI:  Emily Martinez is a 60 y.o. female with DM1, HTN, HLD, and Previous CVA with residual deficits who was BIBA to [facility] ED for eval and treatment of acute generalized abdominal pain for the last 2x days. Per EMS, pt continued to experience persistent abdominal pain this morning accompanied by 4 episodes of emesis prompting her relatives to check her blood sugar at home. EMS notes that family received a reading of "critical high" and subsequently gave pt her insulin.   EMS reports receiving a reading greater than 500 upon their arrival.  She is chronically bed-bound, lives with her daughter, and has dementia (but is currently at her baseline per what daughter told EMS).  Further history therefore limited.  Has multiple chronic pressure ulcers on her sacrum and legs, presented to [facility] ED on April 10 with very similar circumstances as now, but was DC'ed home from the ED after a round of pain meds.    ED course: POCT  on arrival.  VSS WNL.  Exam with multiple pressure ulcers in various stages of healing; R hip wound is purulent.  Oriented to place and self.  Labs WBC 14.75 Hgb 9.3 Plt 677.  ESR CRP Lactate all elevated.  K 3.1.  Imaging: CT reads as wound to the right lateral aspect of her upper thigh appears worse when compared to previous imaging.  ED treatments: Vanc, mupirocin, wet-to-dry dressing, KCl.  They were admitted to [facility] for further evaluation and treatment.        Overview/Hospital Course:  Ms. Martinez is a 61 yo F admitted with hyperglycemia and multiple pressure wounds. Started on IV antibiotics and given IVF, started " on insulin. Wound care consulted. Concern for tunneling of right hip wound. CT with no bone involvement however concerning due to tunneling. MRI ordered showing large lateral soft tissue ulceration with suspected early osteomyelitis of the greater trochanter.  ID consulted.  Deep wound culture obtained.  Growing ESBL E coli.  On Meropenem along with Vancomycin.  Episodes of vomiting and tube feeds held as she does have some oral intake.  ID recommending Meropenem until 5/21.    Interval History: about the same with no issues.    Review of Systems   HENT:  Negative for ear discharge and ear pain.    Eyes:  Negative for discharge and itching.   Endocrine: Negative for cold intolerance and heat intolerance.   Neurological:  Negative for seizures and syncope.     Objective:     Vital Signs (Most Recent):  Temp: 97.6 °F (36.4 °C) (05/20/25 1055)  Pulse: 75 (05/20/25 1055)  Resp: 18 (05/20/25 1055)  BP: 122/85 (05/20/25 1055)  SpO2: 99 % (05/20/25 1055) Vital Signs (24h Range):  Temp:  [97.6 °F (36.4 °C)-98.9 °F (37.2 °C)] 97.6 °F (36.4 °C)  Pulse:  [74-87] 75  Resp:  [16-18] 18  SpO2:  [94 %-99 %] 99 %  BP: (118-146)/(81-91) 122/85     Weight: 57.2 kg (126 lb)  Body mass index is 22.32 kg/m².    Intake/Output Summary (Last 24 hours) at 5/20/2025 1323  Last data filed at 5/20/2025 1158  Gross per 24 hour   Intake 480 ml   Output 1250 ml   Net -770 ml         Physical Exam  Vitals and nursing note reviewed.   Constitutional:       General: She is not in acute distress.     Appearance: Normal appearance. She is ill-appearing.   HENT:      Head: Normocephalic and atraumatic.   Cardiovascular:      Rate and Rhythm: Normal rate and regular rhythm.      Pulses: Normal pulses.      Heart sounds: No murmur heard.  Pulmonary:      Effort: Pulmonary effort is normal. No respiratory distress.      Breath sounds: Normal breath sounds.   Abdominal:      General: Bowel sounds are normal. There is no distension.      Palpations: Abdomen  is soft.      Comments: G-tube in place   Musculoskeletal:      Comments: Multiple ulcers lower extremity, right hip with wound    Skin:     General: Skin is warm and dry.   Neurological:      Mental Status: She is alert.               Significant Labs: All pertinent labs within the past 24 hours have been reviewed.  BMP:   Recent Labs   Lab 05/19/25  0707   GLU 72      K 3.6      CO2 24   BUN 14   CREATININE 0.7   CALCIUM 8.3*     CBC:   Recent Labs   Lab 05/19/25  0707   WBC 9.35   HGB 7.3*   HCT 24.5*   *       Significant Imaging: I have reviewed all pertinent imaging results/findings within the past 24 hours.      Assessment & Plan  Multiple wounds of skin  Chronic, pressure ulcers.  Most recently debrided by wound care on 4/9.  Various stages of healing currently; R hip ulcer is purulent.  WBC, ESR, CRP, Lactate all elevated, although pt not febrile.  Wet-to-dry dressing applied and Vanc given in ED.  Wound care consulted  Concern with worsening right hip wound and tunneling noted on CT -- and wound care  MRI with large lateral soft tissue ulceration with suspected early osteomyelitis of the greater trochanter.   Continue ABX's.  Appreciate ID input.  Consult General Surgery for possible debridement with culture.  No need for surgical debridement per Surgery.  Asked Wound Care to get deep wound culture.  Deep wound culture obtained by Wound Care nurse.  Currently growing ESBL E coli.  ABX's per ID  To complete 7 days of Meropenem on 5/21.  Continue wound care.  Ulcer of sacral region, stage 2      Type 2 diabetes mellitus with stage 4 chronic kidney disease, with long-term current use of insulin  Last A1c reviewed-   Lab Results   Component Value Date    LABA1C 13.0 (H) 04/13/2016    HGBA1C 8.1 (H) 05/07/2025     Home antihyperglycemic regimen: lispro-protamin 10U daily    Recent Labs     05/18/25  1940 05/19/25  0731 05/19/25  1123 05/19/25  1554 05/20/25  0807 05/20/25  1100   POCTGLUCOSE  80 83 79 98 72 92     Most recent inpatient antihyperglycemic regimen:   Antihyperglycemics (From admission, onward)      Start     Stop Route Frequency Ordered    05/15/25 2100  insulin glargine U-100 (Lantus) pen 5 Units         -- SubQ Nightly 05/15/25 0850    05/07/25 2127  insulin aspart U-100 pen 0-5 Units         -- SubQ Before meals & nightly PRN 05/07/25 2028          Some episodes of hypoglycemia.  Adjust insulin.  Essential hypertension  Patient's blood pressure range in the last 24 hours was: BP  Min: 118/83  Max: 146/91.The patient's inpatient anti-hypertensive regimen is listed below:  Current Antihypertensives       Plan  - BP is controlled, no changes needed to their regimen    Mixed hyperlipidemia  Stable.  Continue home statin  History of stroke with residual effects  As above    Debility  Patient with Chronic debility due to hemiplegia/hemiparesis. The patient's latest AMPAC (Activity Measure for Post Acute Care) Score is listed below.    AM-PAC Score - How much help does the patient need for each activity listed  Basic Mobility Total Score: 8  Turning over in bed (including adjusting bedclothes, sheets and blankets)?: A lot  Sitting down on and standing up from a chair with arms (e.g., wheelchair, bedside commode, etc.): Unable  Moving from lying on back to sitting on the side of the bed?: A lot  Moving to and from a bed to a chair (including a wheelchair)?: Unable  Need to walk in hospital room?: Unable  Climbing 3-5 steps with a railing?: Unable    Plan  - Progressive mobility protocol initated  - Fall precautions in place      Tobacco dependency  Dangers of cigarette smoking were reviewed with patient in detail. Patient was Referred to Tobacco Cessation Program. Nicotine replacement options were discussed. Nicotine replacement was discussed- prescribed  Moderate malnutrition  Nutrition consulted. Most recent weight and BMI monitored-     Measurements:  Wt Readings from Last 1 Encounters:    05/09/25 57.2 kg (126 lb)   Body mass index is 22.32 kg/m².    Patient has been screened and assessed by RD.    Malnutrition Type:  Context: chronic illness  Level: moderate        Pressure injury of right hip, stage 4      Microcytic anemia  Anemia is likely due to chronic blood loss, Iron deficiency, and chronic disease due to infection. Most recent hemoglobin and hematocrit are listed below.  Recent Labs     05/18/25  0720 05/19/25  0707   HGB 6.9* 7.3*   HCT 22.8* 24.5*     Plan  - Monitor serial CBC: Daily  - Transfuse PRBC if patient becomes hemodynamically unstable, symptomatic or H/H drops below 7/21.  - Patient has not received any PRBC transfusions to date  - Patient's anemia is currently stable  - Suspect iron def +chronic disease  Repeat labs in AM and transfuse if Hgb still <7  ACP (advance care planning)      VTE Risk Mitigation (From admission, onward)      None            Discharge Planning   ANA: 5/21/2025     Code Status: Full Code   Medical Readiness for Discharge Date:   Discharge Plan A: Hospice/home   Discharge Delays: None known at this time                    Adan Cartagena MD  Department of Hospital Medicine   [facility]

## 2025-05-20 NOTE — ASSESSMENT & PLAN NOTE
Patient's blood pressure range in the last 24 hours was: BP  Min: 118/83  Max: 146/91.The patient's inpatient anti-hypertensive regimen is listed below:  Current Antihypertensives       Plan  - BP is controlled, no changes needed to their regimen

## 2025-05-20 NOTE — ASSESSMENT & PLAN NOTE
Last A1c reviewed-   Lab Results   Component Value Date    LABA1C 13.0 (H) 04/13/2016    HGBA1C 8.1 (H) 05/07/2025     Home antihyperglycemic regimen: lispro-protamin 10U daily    Recent Labs     05/18/25  1940 05/19/25  0731 05/19/25  1123 05/19/25  1554 05/20/25  0807 05/20/25  1100   POCTGLUCOSE 80 83 79 98 72 92     Most recent inpatient antihyperglycemic regimen:   Antihyperglycemics (From admission, onward)      Start     Stop Route Frequency Ordered    05/15/25 2100  insulin glargine U-100 (Lantus) pen 5 Units         -- SubQ Nightly 05/15/25 0850    05/07/25 2127  insulin aspart U-100 pen 0-5 Units         -- SubQ Before meals & nightly PRN 05/07/25 2028          Some episodes of hypoglycemia.  Adjust insulin.

## 2025-05-20 NOTE — SUBJECTIVE & OBJECTIVE
Interval History: about the same with no issues.    Review of Systems   HENT:  Negative for ear discharge and ear pain.    Eyes:  Negative for discharge and itching.   Endocrine: Negative for cold intolerance and heat intolerance.   Neurological:  Negative for seizures and syncope.     Objective:     Vital Signs (Most Recent):  Temp: 97.6 °F (36.4 °C) (05/20/25 1055)  Pulse: 75 (05/20/25 1055)  Resp: 18 (05/20/25 1055)  BP: 122/85 (05/20/25 1055)  SpO2: 99 % (05/20/25 1055) Vital Signs (24h Range):  Temp:  [97.6 °F (36.4 °C)-98.9 °F (37.2 °C)] 97.6 °F (36.4 °C)  Pulse:  [74-87] 75  Resp:  [16-18] 18  SpO2:  [94 %-99 %] 99 %  BP: (118-146)/(81-91) 122/85     Weight: 57.2 kg (126 lb)  Body mass index is 22.32 kg/m².    Intake/Output Summary (Last 24 hours) at 5/20/2025 1323  Last data filed at 5/20/2025 1158  Gross per 24 hour   Intake 480 ml   Output 1250 ml   Net -770 ml         Physical Exam  Vitals and nursing note reviewed.   Constitutional:       General: She is not in acute distress.     Appearance: Normal appearance. She is ill-appearing.   HENT:      Head: Normocephalic and atraumatic.   Cardiovascular:      Rate and Rhythm: Normal rate and regular rhythm.      Pulses: Normal pulses.      Heart sounds: No murmur heard.  Pulmonary:      Effort: Pulmonary effort is normal. No respiratory distress.      Breath sounds: Normal breath sounds.   Abdominal:      General: Bowel sounds are normal. There is no distension.      Palpations: Abdomen is soft.      Comments: G-tube in place   Musculoskeletal:      Comments: Multiple ulcers lower extremity, right hip with wound    Skin:     General: Skin is warm and dry.   Neurological:      Mental Status: She is alert.               Significant Labs: All pertinent labs within the past 24 hours have been reviewed.  BMP:   Recent Labs   Lab 05/19/25  0707   GLU 72      K 3.6      CO2 24   BUN 14   CREATININE 0.7   CALCIUM 8.3*     CBC:   Recent Labs   Lab  05/19/25  0707   WBC 9.35   HGB 7.3*   HCT 24.5*   *       Significant Imaging: I have reviewed all pertinent imaging results/findings within the past 24 hours.

## 2025-05-20 NOTE — ASSESSMENT & PLAN NOTE
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 Most recent hemoglobin and hematocrit are listed below.  Recent Labs     05/18/25  0720 05/19/25  0707   HGB 6.9* 7.3*   HCT 22.8* 24.5*     Plan  - Monitor serial CBC: Daily  - Transfuse PRBC if patient becomes hemodynamically unstable, symptomatic or H/H drops below 7/21.  - Patient has not received any PRBC transfusions to date  - Patient's anemia is currently stable  - Suspect iron def +chronic disease  Repeat labs in AM and transfuse if Hgb still <7

## 2025-05-20 NOTE — PLAN OF CARE
KEITH received a secured message from CARMEN Magana asking if family spoke with Sourav for an informational meeting.    2:52 CM spoke with Emil with [employer], and she informed me that she had spoken with the patient's daughter Aneta. She stated that Kaity will be meeting the daughter at the hospital at 10:00 am for an informational meeting.     KEITH attempted to call the daughter to confirm this information, but there was no answer. KEITH left a voicemail and provided her contact number for a call back.

## 2025-05-20 NOTE — PROGRESS NOTES
[facility]  Wound Care  WOC CRIS    Patient Name:  Emily Martinez   MRN:  [id number]  Date: 5/20/2025  Diagnosis: Multiple wounds of skin    History:     Past Medical History:   Diagnosis Date    Diabetes mellitus type I     Hyperlipidemia     Hypertension     Right sided weakness 6/27/2019       Social History[1]    Precautions:     Allergies as of 05/07/2025 - Reviewed 05/07/2025   Allergen Reaction Noted    Sulfa (sulfonamide antibiotics) Itching 10/30/2014    Sulfur  10/30/2014       WOC Assessment Details/Treatment     Active Problem List with Overview Notes    Diagnosis Date Noted    ACP (advance care planning) 05/13/2025    Microcytic anemia 05/11/2025    Pressure injury of right hip, stage 4 05/08/2025    Tobacco dependency 05/07/2025    Moderate malnutrition 02/18/2025    Cereb infrc due to unsp occls or stenos of unsp cereb artery 02/12/2025    Ulcer of sacral region, stage 2 02/12/2025    Encephalopathy, hypertensive 02/12/2025    Poor prognosis 01/15/2025    Palliative care encounter 01/15/2025    Advance care planning 01/15/2025    Acute deep vein thrombosis (DVT) of right upper extremity 01/14/2025    Severe malnutrition 01/11/2025    Chronic hypotension 01/10/2025    Acute blood loss anemia 01/10/2025    Dehydration 01/09/2025    Acute hypoxemic respiratory failure 01/07/2025    LFT elevation 01/04/2025    Hypophosphatemia 01/02/2025    Atelectasis of both lungs 01/02/2025    Hypernatremia 12/31/2024    History of stroke with residual effects 12/30/2024    Multiple wounds of skin 12/30/2024    C. difficile colitis 12/30/2024    Dysphagia 12/30/2024    Failure to thrive in adult 11/25/2024    Aspiration pneumonia 09/04/2024    Osteomyelitis 07/14/2024    AMS (altered mental status) 06/14/2024    History of CVA (cerebrovascular accident) 06/12/2024    Ischemic cerebral stroke due to intracranial large artery atherosclerosis 04/04/2024    Debility 04/02/2024    Intracranial carotid stenosis,  right 04/02/2024    DKA (diabetic ketoacidosis) 03/25/2024    Gait difficulty 05/04/2022    Right knee pain 04/05/2022    Quadriceps weakness 04/05/2022    Hyperosmolar hyperglycemic state (HHS) 06/08/2021    Muscle weakness of lower extremity 01/09/2020    Encephalopathy 09/06/2019    Right sided weakness 08/13/2019    Overweight (BMI 25.0-29.9) 07/15/2019    Noncompliance with diet and medication regimen 06/30/2019    Cytotoxic cerebral edema 06/28/2019    NICOLASA (acute kidney injury) 06/28/2019    Thrombotic stroke involving right middle cerebral artery 06/27/2019    Numbness and tingling of right lower extremity 04/07/2019    Insomnia 04/06/2019    Small vessel disease, cerebrovascular 04/05/2019    Cocaine abuse 04/05/2019    Weakness of both lower extremities 04/02/2019    Cerebrovascular accident (CVA) 04/01/2019    Type 2 diabetes mellitus with stage 4 chronic kidney disease, with long-term current use of insulin 04/01/2019    Essential hypertension 04/01/2019    Smoker 04/01/2019    Mixed hyperlipidemia 04/01/2019      Assessment:  Right hip wound assessed during PulsaVac treatment per PT. Wound cleaning up with hydrotherapy + Santyl. Softening and  slough. Wound remains large with undermining around trochanter.   Treatment/Plan:  PT to PulsaVac right hip again tomorrow. To resume wound care daily per nursing and continue Santyl to debride wound.     05/20/2025       [1]   Social History  Socioeconomic History    Marital status: Single   Tobacco Use    Smoking status: Every Day     Current packs/day: 1.50     Average packs/day: 1.5 packs/day for 35.0 years (52.5 ttl pk-yrs)     Types: Cigarettes    Smokeless tobacco: Never   Substance and Sexual Activity    Alcohol use: Not Currently    Drug use: Not Currently     Social Drivers of Health     Financial Resource Strain: Patient Declined (5/7/2025)    Overall Financial Resource Strain (CARDIA)     Difficulty of Paying Living Expenses: Patient declined    Food Insecurity: Patient Declined (5/7/2025)    Hunger Vital Sign     Worried About Running Out of Food in the Last Year: Patient declined     Ran Out of Food in the Last Year: Patient declined   Transportation Needs: Patient Declined (5/7/2025)    PRAPARE - Transportation     Lack of Transportation (Medical): Patient declined     Lack of Transportation (Non-Medical): Patient declined   Physical Activity: Inactive (5/8/2025)    Exercise Vital Sign     Days of Exercise per Week: 0 days     Minutes of Exercise per Session: 0 min   Stress: No Stress Concern Present (5/8/2025)    [place] [employer] - Occupational Stress Questionnaire     Feeling of Stress : Not at all   Housing Stability: Patient Declined (5/7/2025)    Housing Stability Vital Sign     Unable to Pay for Housing in the Last Year: Patient declined     Homeless in the Last Year: Patient declined

## 2025-05-20 NOTE — PLAN OF CARE
Pt remains free from falls/injuries. Wound care done per order on sacrum, with clean dry and intact dressing R hip. Turned pt. PEG tube in abdomen, clamped. Bed alarm on. Heel boots in place. Pt swallows pills without signs of aspiration.    Problem: Adult Inpatient Plan of Care  Goal: Absence of Hospital-Acquired Illness or Injury  Outcome: Progressing  Intervention: Identify and Manage Fall Risk  Flowsheets (Taken 5/20/2025 0455)  Safety Promotion/Fall Prevention:   assistive device/personal item within reach   bed alarm set   side rails raised x 2   room near unit station  Intervention: Prevent Skin Injury  Flowsheets (Taken 5/20/2025 0455)  Body Position: turned  Skin Protection:   silicone foam dressing in place   skin sealant/moisture barrier applied     Problem: Diabetes Comorbidity  Goal: Blood Glucose Level Within Targeted Range  Outcome: Progressing  Intervention: Monitor and Manage Glycemia  Flowsheets (Taken 5/20/2025 0455)  Glycemic Management: blood glucose monitored     Problem: Fall Injury Risk  Goal: Absence of Fall and Fall-Related Injury  Outcome: Progressing

## 2025-05-20 NOTE — PT/OT/SLP PROGRESS
Rehab Services Wound Care Progress Note     Emily Martinez  [id number]  5/20/2025 [id number] (24 min - 2 wound care)        Diagnosis:     History of current illness and wound.  Patient is a 60 y.o. female admitted from home with multiple wounds.  Pulsavac wound care orders received for R hip wound.      Precautions: Standard, Diabetes, and ESBL           Allergies as of 05/07/2025 - Reviewed 05/07/2025   Allergen Reaction Noted    Sulfa (sulfonamide antibiotics) Itching 10/30/2014    Sulfur   10/30/2014         Pre-medication: distractions, rest pauses during treatment, and pain medication taken by patient prior to treatment     Subjective:      Patient did not report pain, however with just minimal resistance to pulsavac wound care today.      Objective:      Patient received pulsavac wound care to R hip with 1000 mL of NS.  Minimal bleeding today.  R hip wound bed and periwound cleaned with NS moistened gauze.  Cavilon no sting barrier film applied to periwound.  Santyl applied to wound bed with area of slough.  R hip wound packed with NS moistened 4x4 gauze, then covered with dry 4x4 gauze and abdominal pad.  Dressings secured with micropore tape.  Clean technique maintained throughout treatment.       Assessment:     R hip dressings/packing removed with moderated serosanguinous drainage.  R hip wound bed with max amount of loosen yellow and tan slough around undermining between 12-3 o'clock.  Other areas presented with pink/red tissues.  No odor present at this time.  Patient with diagnosis of R hip stage 4 pressure injury will benefit from skilled Rehab Services Wound Care to maximize wound healing potential and to assist wound to heal through appropriate healing phases.  Problems include multiple co-morbidities, diabetes, decreased granulation tissue, necrosis, large surface area, large volume, undermining, infection, and history of poor compliance.  Patient tolerated today's treatment without any adverse  effects.  Goals remain appropriate.     Pt able to tolerate pulsavac a little better today, less grabbing of therapist's hand.        Photodocumentation: R hip         Plan:     Continue with Plan of Care for Monday, Tuesday, and Wednesday this week.

## 2025-05-20 NOTE — NURSING
[facility]  Nurses Note -- 4 Eyes      5/19/2025       Skin assessed on: Q Shift      [] No Pressure Injuries Present    [x]Prevention Measures Documented    [x] Yes LDA  for Pressure Injury Previously documented     [] Yes New Pressure Injury Discovered   [] LDA for New Pressure Injury Added      Attending RN:  Balta Mckeon RN     Second RN:  JUAN Croft

## 2025-05-21 LAB
BACTERIA BLD CULT: NORMAL
BACTERIA BLD CULT: NORMAL
POCT GLUCOSE: 115 MG/DL (ref 70–110)
POCT GLUCOSE: 122 MG/DL (ref 70–110)
POCT GLUCOSE: 91 MG/DL (ref 70–110)
POCT GLUCOSE: 93 MG/DL (ref 70–110)
POCT GLUCOSE: 99 MG/DL (ref 70–110)

## 2025-05-21 PROCEDURE — S4991 NICOTINE PATCH NONLEGEND: HCPCS

## 2025-05-21 PROCEDURE — 25000003 PHARM REV CODE 250

## 2025-05-21 PROCEDURE — 11000001 HC ACUTE MED/SURG PRIVATE ROOM

## 2025-05-21 PROCEDURE — 63600175 PHARM REV CODE 636 W HCPCS: Performed by: HOSPITALIST

## 2025-05-21 PROCEDURE — 25000003 PHARM REV CODE 250: Performed by: HOSPITALIST

## 2025-05-21 PROCEDURE — 27000207 HC ISOLATION

## 2025-05-21 PROCEDURE — 97598 DBRDMT OPN WND ADDL 20CM/<: CPT

## 2025-05-21 PROCEDURE — 97597 DBRDMT OPN WND 1ST 20 CM/<: CPT

## 2025-05-21 PROCEDURE — 25000003 PHARM REV CODE 250: Performed by: INTERNAL MEDICINE

## 2025-05-21 RX ADMIN — MICONAZOLE NITRATE: 20 POWDER TOPICAL at 10:05

## 2025-05-21 RX ADMIN — ATORVASTATIN CALCIUM 80 MG: 40 TABLET, FILM COATED ORAL at 10:05

## 2025-05-21 RX ADMIN — MEROPENEM 1 G: 1 INJECTION INTRAVENOUS at 01:05

## 2025-05-21 RX ADMIN — MICONAZOLE NITRATE: 20 POWDER TOPICAL at 08:05

## 2025-05-21 RX ADMIN — THERA TABS 1 TABLET: TAB at 08:05

## 2025-05-21 RX ADMIN — FAMOTIDINE 20 MG: 20 TABLET, FILM COATED ORAL at 10:05

## 2025-05-21 RX ADMIN — MEROPENEM 1 G: 1 INJECTION INTRAVENOUS at 05:05

## 2025-05-21 RX ADMIN — COLLAGENASE SANTYL: 250 OINTMENT TOPICAL at 09:05

## 2025-05-21 RX ADMIN — DIPHENHYDRAMINE HYDROCHLORIDE 25 MG: 25 CAPSULE ORAL at 10:05

## 2025-05-21 RX ADMIN — ASPIRIN 81 MG: 81 TABLET, COATED ORAL at 08:05

## 2025-05-21 RX ADMIN — FAMOTIDINE 20 MG: 20 TABLET, FILM COATED ORAL at 08:05

## 2025-05-21 RX ADMIN — MORPHINE SULFATE 2 MG: 4 INJECTION INTRAVENOUS at 06:05

## 2025-05-21 RX ADMIN — INSULIN GLARGINE 5 UNITS: 100 INJECTION, SOLUTION SUBCUTANEOUS at 10:05

## 2025-05-21 RX ADMIN — MEROPENEM 1 G: 1 INJECTION INTRAVENOUS at 10:05

## 2025-05-21 RX ADMIN — NICOTINE 1 PATCH: 14 PATCH TRANSDERMAL at 08:05

## 2025-05-21 RX ADMIN — MORPHINE SULFATE 2 MG: 4 INJECTION INTRAVENOUS at 01:05

## 2025-05-21 RX ADMIN — DIPHENHYDRAMINE HYDROCHLORIDE 25 MG: 25 CAPSULE ORAL at 06:05

## 2025-05-21 NOTE — SUBJECTIVE & OBJECTIVE
"Interval History: complaining of hip pain.    Review of Systems   HENT:  Negative for ear discharge and ear pain.    Eyes:  Negative for discharge and itching.   Endocrine: Negative for cold intolerance and heat intolerance.   Neurological:  Negative for seizures and syncope.     Objective:     Vital Signs (Most Recent):  Temp: 98.4 °F (36.9 °C) (05/21/25 1123)  Pulse: 87 (05/21/25 1123)  Resp: 17 (05/21/25 1326)  BP: 112/75 (05/21/25 1123)  SpO2: 97 % (05/21/25 1123) Vital Signs (24h Range):  Temp:  [98.2 °F (36.8 °C)-99 °F (37.2 °C)] 98.4 °F (36.9 °C)  Pulse:  [74-92] 87  Resp:  [16-18] 17  SpO2:  [97 %-100 %] 97 %  BP: (110-122)/(74-83) 112/75     Weight: 57.2 kg (126 lb)  Body mass index is 22.32 kg/m².    Intake/Output Summary (Last 24 hours) at 5/21/2025 1417  Last data filed at 5/21/2025 0830  Gross per 24 hour   Intake 360 ml   Output 620 ml   Net -260 ml         Physical Exam  Vitals and nursing note reviewed.   Constitutional:       General: She is not in acute distress.     Appearance: Normal appearance. She is ill-appearing.   HENT:      Head: Normocephalic and atraumatic.   Cardiovascular:      Rate and Rhythm: Normal rate and regular rhythm.      Pulses: Normal pulses.      Heart sounds: No murmur heard.  Pulmonary:      Effort: Pulmonary effort is normal. No respiratory distress.      Breath sounds: Normal breath sounds.   Abdominal:      General: Bowel sounds are normal. There is no distension.      Palpations: Abdomen is soft.      Comments: G-tube in place   Musculoskeletal:      Comments: Multiple ulcers lower extremity, right hip with wound    Skin:     General: Skin is warm and dry.   Neurological:      Mental Status: She is alert.               Significant Labs: All pertinent labs within the past 24 hours have been reviewed.  BMP:   No results for input(s): "GLU", "NA", "K", "CL", "CO2", "BUN", "CREATININE", "CALCIUM", "MG" in the last 48 hours.    CBC:   No results for input(s): "WBC", "HGB", " ""HCT", "PLT" in the last 48 hours.      Significant Imaging: I have reviewed all pertinent imaging results/findings within the past 24 hours.  "

## 2025-05-21 NOTE — PROGRESS NOTES
"[facility] Medicine  Progress Note    Patient Name: Emily Martinez  MRN: [id number]  Patient Class: IP- Inpatient   Admission Date: 5/7/2025  Length of Stay: 14 days  Attending Physician: Adan Cartagena MD  Primary Care Provider: Alireza Sosa MD        Subjective     Principal Problem:Multiple wounds of skin        HPI:  Emily Martinez is a 60 y.o. female with DM1, HTN, HLD, and Previous CVA with residual deficits who was BIBA to [facility] ED for eval and treatment of acute generalized abdominal pain for the last 2x days. Per EMS, pt continued to experience persistent abdominal pain this morning accompanied by 4 episodes of emesis prompting her relatives to check her blood sugar at home. EMS notes that family received a reading of "critical high" and subsequently gave pt her insulin.   EMS reports receiving a reading greater than 500 upon their arrival.  She is chronically bed-bound, lives with her daughter, and has dementia (but is currently at her baseline per what daughter told EMS).  Further history therefore limited.  Has multiple chronic pressure ulcers on her sacrum and legs, presented to [facility] ED on April 10 with very similar circumstances as now, but was DC'ed home from the ED after a round of pain meds.    ED course: POCT  on arrival.  VSS WNL.  Exam with multiple pressure ulcers in various stages of healing; R hip wound is purulent.  Oriented to place and self.  Labs WBC 14.75 Hgb 9.3 Plt 677.  ESR CRP Lactate all elevated.  K 3.1.  Imaging: CT reads as wound to the right lateral aspect of her upper thigh appears worse when compared to previous imaging.  ED treatments: Vanc, mupirocin, wet-to-dry dressing, KCl.  They were admitted to [facility] Medicine for further evaluation and treatment.        Overview/Hospital Course:  Ms. Martinez is a 59 yo F admitted with hyperglycemia and multiple pressure wounds. Started on IV antibiotics and given IVF, started " on insulin. Wound care consulted. Concern for tunneling of right hip wound. CT with no bone involvement however concerning due to tunneling. MRI ordered showing large lateral soft tissue ulceration with suspected early osteomyelitis of the greater trochanter.  ID consulted.  Deep wound culture obtained.  Growing ESBL E coli.  On Meropenem along with Vancomycin.  Episodes of vomiting and tube feeds held as she does have some oral intake.  ID recommending Meropenem until 5/21.  Continued wound care.    Interval History: complaining of hip pain.    Review of Systems   HENT:  Negative for ear discharge and ear pain.    Eyes:  Negative for discharge and itching.   Endocrine: Negative for cold intolerance and heat intolerance.   Neurological:  Negative for seizures and syncope.     Objective:     Vital Signs (Most Recent):  Temp: 98.4 °F (36.9 °C) (05/21/25 1123)  Pulse: 87 (05/21/25 1123)  Resp: 17 (05/21/25 1326)  BP: 112/75 (05/21/25 1123)  SpO2: 97 % (05/21/25 1123) Vital Signs (24h Range):  Temp:  [98.2 °F (36.8 °C)-99 °F (37.2 °C)] 98.4 °F (36.9 °C)  Pulse:  [74-92] 87  Resp:  [16-18] 17  SpO2:  [97 %-100 %] 97 %  BP: (110-122)/(74-83) 112/75     Weight: 57.2 kg (126 lb)  Body mass index is 22.32 kg/m².    Intake/Output Summary (Last 24 hours) at 5/21/2025 1417  Last data filed at 5/21/2025 0830  Gross per 24 hour   Intake 360 ml   Output 620 ml   Net -260 ml         Physical Exam  Vitals and nursing note reviewed.   Constitutional:       General: She is not in acute distress.     Appearance: Normal appearance. She is ill-appearing.   HENT:      Head: Normocephalic and atraumatic.   Cardiovascular:      Rate and Rhythm: Normal rate and regular rhythm.      Pulses: Normal pulses.      Heart sounds: No murmur heard.  Pulmonary:      Effort: Pulmonary effort is normal. No respiratory distress.      Breath sounds: Normal breath sounds.   Abdominal:      General: Bowel sounds are normal. There is no distension.       "Palpations: Abdomen is soft.      Comments: G-tube in place   Musculoskeletal:      Comments: Multiple ulcers lower extremity, right hip with wound    Skin:     General: Skin is warm and dry.   Neurological:      Mental Status: She is alert.               Significant Labs: All pertinent labs within the past 24 hours have been reviewed.  BMP:   No results for input(s): "GLU", "NA", "K", "CL", "CO2", "BUN", "CREATININE", "CALCIUM", "MG" in the last 48 hours.    CBC:   No results for input(s): "WBC", "HGB", "HCT", "PLT" in the last 48 hours.      Significant Imaging: I have reviewed all pertinent imaging results/findings within the past 24 hours.      Assessment & Plan  Multiple wounds of skin  Chronic, pressure ulcers.  Most recently debrided by wound care on 4/9.  Various stages of healing currently; R hip ulcer is purulent.  WBC, ESR, CRP, Lactate all elevated, although pt not febrile.  Wet-to-dry dressing applied and Vanc given in ED.  Wound care consulted  Concern with worsening right hip wound and tunneling noted on CT -- and wound care  MRI with large lateral soft tissue ulceration with suspected early osteomyelitis of the greater trochanter.   Continue ABX's.  Appreciate ID input.  Consult General Surgery for possible debridement with culture.  No need for surgical debridement per Surgery.  Asked Wound Care to get deep wound culture.  Deep wound culture obtained by Wound Care nurse.  Currently growing ESBL E coli.  ABX's per ID  To complete 7 days of Meropenem on 5/21.  Continue wound care.  Will complete ABX's today.  Home with either HH vs home palliative vs home hospice tomorrow.  Ulcer of sacral region, stage 2      Type 2 diabetes mellitus with stage 4 chronic kidney disease, with long-term current use of insulin  Last A1c reviewed-   Lab Results   Component Value Date    LABA1C 13.0 (H) 04/13/2016    HGBA1C 8.1 (H) 05/07/2025     Home antihyperglycemic regimen: lispro-protamin 10U daily    Recent Labs     " 05/20/25  0807 05/20/25  1100 05/20/25  1603 05/20/25 2014 05/21/25  0719 05/21/25  1123   POCTGLUCOSE 72 92 150* 109 91 93     Most recent inpatient antihyperglycemic regimen:   Antihyperglycemics (From admission, onward)      Start     Stop Route Frequency Ordered    05/15/25 2100  insulin glargine U-100 (Lantus) pen 5 Units         -- SubQ Nightly 05/15/25 0850    05/07/25 2127  insulin aspart U-100 pen 0-5 Units         -- SubQ Before meals & nightly PRN 05/07/25 2028          Some episodes of hypoglycemia.  Adjust insulin.  Essential hypertension  Patient's blood pressure range in the last 24 hours was: BP  Min: 110/74  Max: 122/83.The patient's inpatient anti-hypertensive regimen is listed below:  Current Antihypertensives       Plan  - BP is controlled, no changes needed to their regimen    Mixed hyperlipidemia  Stable.  Continue home statin  History of stroke with residual effects  As above    Debility  Patient with Chronic debility due to hemiplegia/hemiparesis. The patient's latest AMPAC (Activity Measure for Post Acute Care) Score is listed below.    AM-PAC Score - How much help does the patient need for each activity listed  Basic Mobility Total Score: 8  Turning over in bed (including adjusting bedclothes, sheets and blankets)?: A lot  Sitting down on and standing up from a chair with arms (e.g., wheelchair, bedside commode, etc.): Unable  Moving from lying on back to sitting on the side of the bed?: A lot  Moving to and from a bed to a chair (including a wheelchair)?: Unable  Need to walk in hospital room?: Unable  Climbing 3-5 steps with a railing?: Unable    Plan  - Progressive mobility protocol initated  - Fall precautions in place      Tobacco dependency  Dangers of cigarette smoking were reviewed with patient in detail. Patient was Referred to Tobacco Cessation Program. Nicotine replacement options were discussed. Nicotine replacement was discussed- prescribed  Moderate malnutrition  Nutrition  consulted. Most recent weight and BMI monitored-     Measurements:  Wt Readings from Last 1 Encounters:   05/09/25 57.2 kg (126 lb)   Body mass index is 22.32 kg/m².    Patient has been screened and assessed by RD.    Malnutrition Type:  Context: chronic illness  Level: moderate        Pressure injury of right hip, stage 4      Microcytic anemia  Anemia is likely due to chronic blood loss, Iron deficiency, and chronic disease due to infection. Most recent hemoglobin and hematocrit are listed below.  Recent Labs     05/19/25  0707   HGB 7.3*   HCT 24.5*     Plan  - Monitor serial CBC: Daily  - Transfuse PRBC if patient becomes hemodynamically unstable, symptomatic or H/H drops below 7/21.  - Patient has not received any PRBC transfusions to date  - Patient's anemia is currently stable  - Suspect iron def +chronic disease  Repeat labs in AM and transfuse if Hgb still <7  ACP (advance care planning)      VTE Risk Mitigation (From admission, onward)      None            Discharge Planning   ANA: 5/22/2025     Code Status: Full Code   Medical Readiness for Discharge Date:   Discharge Plan A: Hospice/home   Discharge Delays: None known at this time                    Adan Cartagena MD  Department of Hospital Medicine   [facility] [place] [facility]

## 2025-05-21 NOTE — PROGRESS NOTES
[facility] Surg  Wound Care  WOC CRIS    Patient Name:  Emily Martinez   MRN:  [id number]  Date: 5/21/2025  Diagnosis: Multiple wounds of skin    History:     Past Medical History:   Diagnosis Date    Diabetes mellitus type I     Hyperlipidemia     Hypertension     Right sided weakness 6/27/2019       Social History[1]    Precautions:     Allergies as of 05/07/2025 - Reviewed 05/07/2025   Allergen Reaction Noted    Sulfa (sulfonamide antibiotics) Itching 10/30/2014    Sulfur  10/30/2014       WOC Assessment Details/Treatment     Active Problem List with Overview Notes    Diagnosis Date Noted    ACP (advance care planning) 05/13/2025    Microcytic anemia 05/11/2025    Pressure injury of right hip, stage 4 05/08/2025    Tobacco dependency 05/07/2025    Moderate malnutrition 02/18/2025    Cereb infrc due to unsp occls or stenos of unsp cereb artery 02/12/2025    Ulcer of sacral region, stage 2 02/12/2025    Encephalopathy, hypertensive 02/12/2025    Poor prognosis 01/15/2025    Palliative care encounter 01/15/2025    Advance care planning 01/15/2025    Acute deep vein thrombosis (DVT) of right upper extremity 01/14/2025    Severe malnutrition 01/11/2025    Chronic hypotension 01/10/2025    Acute blood loss anemia 01/10/2025    Dehydration 01/09/2025    Acute hypoxemic respiratory failure 01/07/2025    LFT elevation 01/04/2025    Hypophosphatemia 01/02/2025    Atelectasis of both lungs 01/02/2025    Hypernatremia 12/31/2024    History of stroke with residual effects 12/30/2024    Multiple wounds of skin 12/30/2024    C. difficile colitis 12/30/2024    Dysphagia 12/30/2024    Failure to thrive in adult 11/25/2024    Aspiration pneumonia 09/04/2024    Osteomyelitis 07/14/2024    AMS (altered mental status) 06/14/2024    History of CVA (cerebrovascular accident) 06/12/2024    Ischemic cerebral stroke due to intracranial large artery atherosclerosis 04/04/2024    Debility 04/02/2024    Intracranial carotid stenosis,  right 04/02/2024    DKA (diabetic ketoacidosis) 03/25/2024    Gait difficulty 05/04/2022    Right knee pain 04/05/2022    Quadriceps weakness 04/05/2022    Hyperosmolar hyperglycemic state (HHS) 06/08/2021    Muscle weakness of lower extremity 01/09/2020    Encephalopathy 09/06/2019    Right sided weakness 08/13/2019    Overweight (BMI 25.0-29.9) 07/15/2019    Noncompliance with diet and medication regimen 06/30/2019    Cytotoxic cerebral edema 06/28/2019    NICOLASA (acute kidney injury) 06/28/2019    Thrombotic stroke involving right middle cerebral artery 06/27/2019    Numbness and tingling of right lower extremity 04/07/2019    Insomnia 04/06/2019    Small vessel disease, cerebrovascular 04/05/2019    Cocaine abuse 04/05/2019    Weakness of both lower extremities 04/02/2019    Cerebrovascular accident (CVA) 04/01/2019    Type 2 diabetes mellitus with stage 4 chronic kidney disease, with long-term current use of insulin 04/01/2019    Essential hypertension 04/01/2019    Smoker 04/01/2019    Mixed hyperlipidemia 04/01/2019     Assessment:  PT completed the 3rd PulsaVac therapy to the right hip Stage 4 pressure injury this pm. Wound much  with less necrosis over trochanter.   Photodocumentation    Right hip wound- Stage 4 pressure injury with small amount tan necrosis remaining on trochanter. Remainder of wound beefy red.   Treatment/Plan/Recommendations:  Discontinue PulsaVac treatment. Nursing to continue daily wound care to right hip with Santyl to continue debridement of necrosis.   When discharged home, due to multiple pressure injuries and severe flexion contractures, recommend low air loss mattress for pressure redistribution on hospital bed.   Recommendations made to primary team. Orders placed for wound care.    05/21/2025       [1]   Social History  Socioeconomic History    Marital status: Single   Tobacco Use    Smoking status: Every Day     Current packs/day: 1.50     Average packs/day: 1.5 packs/day  for 35.0 years (52.5 ttl pk-yrs)     Types: Cigarettes    Smokeless tobacco: Never   Substance and Sexual Activity    Alcohol use: Not Currently    Drug use: Not Currently     Social Drivers of Health     Financial Resource Strain: Patient Declined (5/7/2025)    Overall Financial Resource Strain (CARDIA)     Difficulty of Paying Living Expenses: Patient declined   Food Insecurity: Patient Declined (5/7/2025)    Hunger Vital Sign     Worried About Running Out of Food in the Last Year: Patient declined     Ran Out of Food in the Last Year: Patient declined   Transportation Needs: Patient Declined (5/7/2025)    PRAPARE - Transportation     Lack of Transportation (Medical): Patient declined     Lack of Transportation (Non-Medical): Patient declined   Physical Activity: Inactive (5/8/2025)    Exercise Vital Sign     Days of Exercise per Week: 0 days     Minutes of Exercise per Session: 0 min   Stress: No Stress Concern Present (5/8/2025)    [place] [employer] - Occupational Stress Questionnaire     Feeling of Stress : Not at all   Housing Stability: Patient Declined (5/7/2025)    Housing Stability Vital Sign     Unable to Pay for Housing in the Last Year: Patient declined     Homeless in the Last Year: Patient declined

## 2025-05-21 NOTE — ASSESSMENT & PLAN NOTE
Patient's blood pressure range in the last 24 hours was: BP  Min: 110/74  Max: 122/83.The patient's inpatient anti-hypertensive regimen is listed below:  Current Antihypertensives       Plan  - BP is controlled, no changes needed to their regimen

## 2025-05-21 NOTE — PLAN OF CARE
05/21/25 1336   Rounds   Attendance Provider;;Charge nurse   Discharge Plan A Hospice/home   Transition of Care Barriers None

## 2025-05-21 NOTE — ASSESSMENT & PLAN NOTE
Chronic, pressure ulcers.  Most recently debrided by wound care on 4/9.  Various stages of healing currently; R hip ulcer is purulent.  WBC, ESR, CRP, Lactate all elevated, although pt not febrile.  Wet-to-dry dressing applied and Vanc given in ED.  Wound care consulted  Concern with worsening right hip wound and tunneling noted on CT -- and wound care  MRI with large lateral soft tissue ulceration with suspected early osteomyelitis of the greater trochanter.   Continue ABX's.  Appreciate ID input.  Consult General Surgery for possible debridement with culture.  No need for surgical debridement per Surgery.  Asked Wound Care to get deep wound culture.  Deep wound culture obtained by Wound Care nurse.  Currently growing ESBL E coli.  ABX's per ID  To complete 7 days of Meropenem on 5/21.  Continue wound care.  Will complete ABX's today.  Home with either HH vs home palliative vs home hospice tomorrow.

## 2025-05-21 NOTE — NURSING
[facility], [facility]  Nurses Note -- 4 Eyes      5/20/2025       Skin assessed on: Q Shift      [] No Pressure Injuries Present    [x]Prevention Measures Documented    [x] Yes LDA  for Pressure Injury Previously documented     [] Yes New Pressure Injury Discovered   [] LDA for New Pressure Injury Added      Attending RN:  Julien Heart RN     Second RN:  AKASH Scott

## 2025-05-21 NOTE — PLAN OF CARE
Problem: Infection  Goal: Absence of Infection Signs and Symptoms  Outcome: Progressing     Problem: Adult Inpatient Plan of Care  Goal: Plan of Care Review  Outcome: Progressing  Goal: Patient-Specific Goal (Individualized)  Outcome: Progressing     Problem: Acute Kidney Injury/Impairment  Goal: Fluid and Electrolyte Balance  Outcome: Progressing      Statement Selected

## 2025-05-21 NOTE — PT/OT/SLP PROGRESS
Rehab Services Wound Care Progress Note - Discontinue Pulsavac     Emily Martinez  [id number]  5/21/2025 [id number] (30 min - 2 wound care)        Diagnosis:     History of current illness and wound.  Patient is a 60 y.o. female admitted from home with multiple wounds.  Pulsavac wound care orders received for R hip wound.      Precautions: Standard, Diabetes, and ESBL               Allergies as of 05/07/2025 - Reviewed 05/07/2025   Allergen Reaction Noted    Sulfa (sulfonamide antibiotics) Itching 10/30/2014    Sulfur   10/30/2014         Pre-medication: distractions, rest pauses during treatment, and pain medication taken by patient prior to treatment     Subjective:      Patient did not report pain, however resisting pulsavac wound care today.      Objective:      Patient received pulsavac wound care to R hip with 1000 mL of NS.  Minimal bleeding today.  R hip wound bed and periwound cleaned with NS moistened gauze.  Cavilon no sting barrier film applied to periwound.  Santyl applied to wound bed with area of slough.  R hip wound packed with NS moistened 4x4 gauze, then covered with dry 4x4 gauze and abdominal pad.  Dressings secured with micropore tape.  Clean technique maintained throughout treatment.       Assessment:     R hip dressings/packing removed with min-moderate serous drainage and scant blood.  R hip wound bed with max amount of loosen yellow and tan slough around undermining between 12-3 o'clock.  Other areas presented with pink/red tissues.  No odor present at this time.  Patient with diagnosis of R hip stage 4 pressure injury.  Problems include multiple co-morbidities, diabetes, decreased granulation tissue, necrosis, large surface area, large volume, undermining, infection, and history of poor compliance.  Patient tolerated today's treatment without any adverse effects.       Pt did not tolerate pulsavac wound care today, again grabbing therapist's hand.      Photodocumentation: R hip            Plan:    3 days trial of pulsavac wound care completed today.  HEIDI Mak updated and ok to discontinue pulsavac wound care.  Nurse to continue dressing changes as ordered.

## 2025-05-21 NOTE — PLAN OF CARE
Pt alert able to make needs known, tolerates medications well by mouth, no signs or symptoms of adverse reactions noted, repositioned self q 2hrs, pain controlled by PRN pain medication, plan of care explained, diet tolerated, pt denies n,v,d, wound care completed this shift, remains free from falls and hospital acquired pressure injuries, safety maintained. Will continue following plan of care.      Problem: Adult Inpatient Plan of Care  Goal: Plan of Care Review  Outcome: Progressing  Goal: Patient-Specific Goal (Individualized)  Outcome: Progressing  Goal: Absence of Hospital-Acquired Illness or Injury  Outcome: Progressing  Goal: Optimal Comfort and Wellbeing  Outcome: Progressing  Goal: Readiness for Transition of Care  Outcome: Progressing

## 2025-05-21 NOTE — ASSESSMENT & PLAN NOTE
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 Most recent hemoglobin and hematocrit are listed below.  Recent Labs     05/19/25  0707   HGB 7.3*   HCT 24.5*     Plan  - Monitor serial CBC: Daily  - Transfuse PRBC if patient becomes hemodynamically unstable, symptomatic or H/H drops below 7/21.  - Patient has not received any PRBC transfusions to date  - Patient's anemia is currently stable  - Suspect iron def +chronic disease  Repeat labs in AM and transfuse if Hgb still <7

## 2025-05-21 NOTE — ASSESSMENT & PLAN NOTE
Last A1c reviewed-   Lab Results   Component Value Date    LABA1C 13.0 (H) 04/13/2016    HGBA1C 8.1 (H) 05/07/2025     Home antihyperglycemic regimen: lispro-protamin 10U daily    Recent Labs     05/20/25  0807 05/20/25  1100 05/20/25  1603 05/20/25 2014 05/21/25  0719 05/21/25  1123   POCTGLUCOSE 72 92 150* 109 91 93     Most recent inpatient antihyperglycemic regimen:   Antihyperglycemics (From admission, onward)      Start     Stop Route Frequency Ordered    05/15/25 2100  insulin glargine U-100 (Lantus) pen 5 Units         -- SubQ Nightly 05/15/25 0850    05/07/25 2127  insulin aspart U-100 pen 0-5 Units         -- SubQ Before meals & nightly PRN 05/07/25 2028          Some episodes of hypoglycemia.  Adjust insulin.

## 2025-05-21 NOTE — NURSING
[facility], [facility] [place]  Nurses Note -- 4 Eyes      5/21/2025       Skin assessed on: Q Shift      [] No Pressure Injuries Present    [x]Prevention Measures Documented    [x] Yes LDA  for Pressure Injury Previously documented     [] Yes New Pressure Injury Discovered   [] LDA for New Pressure Injury Added      Attending RN:  Tyler Taylor LPN     Second RN:  JUAN Pleitez

## 2025-05-21 NOTE — PLAN OF CARE
KEITH spoke with Kaity from [facility] to confirm the hospice informational meeting took place. Kaity stated that patient's family signed consents for hospice care, and medical equipment is scheduled for delivery today.    CM contacted patient's daughter Aneta to confirm she signed consents for Hospice Care. Aneta confirmed patient will be discharged home with [facility].

## 2025-05-22 VITALS
RESPIRATION RATE: 18 BRPM | HEART RATE: 80 BPM | DIASTOLIC BLOOD PRESSURE: 70 MMHG | HEIGHT: 63 IN | OXYGEN SATURATION: 98 % | BODY MASS INDEX: 22.32 KG/M2 | SYSTOLIC BLOOD PRESSURE: 105 MMHG | WEIGHT: 126 LBS | TEMPERATURE: 98 F

## 2025-05-22 LAB
POCT GLUCOSE: 53 MG/DL (ref 70–110)
POCT GLUCOSE: 65 MG/DL (ref 70–110)

## 2025-05-22 PROCEDURE — 63600175 PHARM REV CODE 636 W HCPCS: Performed by: HOSPITALIST

## 2025-05-22 PROCEDURE — 25000003 PHARM REV CODE 250

## 2025-05-22 PROCEDURE — S4991 NICOTINE PATCH NONLEGEND: HCPCS

## 2025-05-22 PROCEDURE — 25000003 PHARM REV CODE 250: Performed by: HOSPITALIST

## 2025-05-22 RX ADMIN — COLLAGENASE SANTYL: 250 OINTMENT TOPICAL at 09:05

## 2025-05-22 RX ADMIN — THERA TABS 1 TABLET: TAB at 12:05

## 2025-05-22 RX ADMIN — ASPIRIN 81 MG: 81 TABLET, COATED ORAL at 12:05

## 2025-05-22 RX ADMIN — NICOTINE 1 PATCH: 14 PATCH TRANSDERMAL at 09:05

## 2025-05-22 RX ADMIN — MORPHINE SULFATE 2 MG: 4 INJECTION INTRAVENOUS at 12:05

## 2025-05-22 RX ADMIN — MORPHINE SULFATE 2 MG: 4 INJECTION INTRAVENOUS at 06:05

## 2025-05-22 RX ADMIN — DEXTROSE MONOHYDRATE 12.5 G: 25 INJECTION, SOLUTION INTRAVENOUS at 07:05

## 2025-05-22 RX ADMIN — FAMOTIDINE 20 MG: 20 TABLET, FILM COATED ORAL at 12:05

## 2025-05-22 RX ADMIN — MICONAZOLE NITRATE: 20 POWDER TOPICAL at 09:05

## 2025-05-22 NOTE — ASSESSMENT & PLAN NOTE
Patient's blood pressure range in the last 24 hours was: BP  Min: 110/74  Max: 127/90.The patient's inpatient anti-hypertensive regimen is listed below:  Current Antihypertensives       Plan  - BP is controlled, no changes needed to their regimen

## 2025-05-22 NOTE — PLAN OF CARE
Pt alert able to make needs known, tolerates medications well by mouth, no signs or symptoms of adverse reactions noted, plan of care explained, diet tolerated, pt denies n,v,d, wound care completed this shift, safety maintained. Will continue following plan of care.      Problem: Adult Inpatient Plan of Care  Goal: Plan of Care Review  Outcome: Progressing  Goal: Patient-Specific Goal (Individualized)  Outcome: Progressing  Goal: Absence of Hospital-Acquired Illness or Injury  Outcome: Progressing  Goal: Optimal Comfort and Wellbeing  Outcome: Progressing  Goal: Readiness for Transition of Care  Outcome: Progressing

## 2025-05-22 NOTE — PROGRESS NOTES
"[facility]  Progress Note    Patient Name: Emily Martinez  MRN: [id number]  Patient Class: IP- Inpatient   Admission Date: 5/7/2025  Length of Stay: 15 days  Attending Physician: Alberto Kohler MD  Primary Care Provider: Alireza Sosa MD        Subjective     Principal Problem:Multiple wounds of skin        HPI:  Emily Martinez is a 60 y.o. female with DM1, HTN, HLD, and Previous CVA with residual deficits who was BIBA to [facility] ED for eval and treatment of acute generalized abdominal pain for the last 2x days. Per EMS, pt continued to experience persistent abdominal pain this morning accompanied by 4 episodes of emesis prompting her relatives to check her blood sugar at home. EMS notes that family received a reading of "critical high" and subsequently gave pt her insulin.   EMS reports receiving a reading greater than 500 upon their arrival.  She is chronically bed-bound, lives with her daughter, and has dementia (but is currently at her baseline per what daughter told EMS).  Further history therefore limited.  Has multiple chronic pressure ulcers on her sacrum and legs, presented to [facility] ED on April 10 with very similar circumstances as now, but was DC'ed home from the ED after a round of pain meds.    ED course: POCT  on arrival.  VSS WNL.  Exam with multiple pressure ulcers in various stages of healing; R hip wound is purulent.  Oriented to place and self.  Labs WBC 14.75 Hgb 9.3 Plt 677.  ESR CRP Lactate all elevated.  K 3.1.  Imaging: CT reads as wound to the right lateral aspect of her upper thigh appears worse when compared to previous imaging.  ED treatments: Vanc, mupirocin, wet-to-dry dressing, KCl.  They were admitted to [facility] for further evaluation and treatment.        Overview/Hospital Course:  Ms. Martinez is a 59 yo F admitted with hyperglycemia and multiple pressure wounds. Started on IV antibiotics and given IVF, started on " insulin. Wound care consulted. Concern for tunneling of right hip wound. CT with no bone involvement however concerning due to tunneling. MRI ordered showing large lateral soft tissue ulceration with suspected early osteomyelitis of the greater trochanter.  ID consulted.  Deep wound culture obtained.  Growing ESBL E coli.  On Meropenem along with Vancomycin.  Episodes of vomiting and tube feeds held as she does have some oral intake.  ID recommending Meropenem until 5/21.  Continued wound care.    Has completed Abx course. Unclear if can get home health wound care with medicaid, given previous stroke deficits- perhaps, will discuss with CM. ?home hospice.    Interval History:  NAEON.  No new issues.   CC-   All questions answered and updates on care given.       ROS:  General: Negative for fevers   Cardiac: Negative for chest pain   Pulmonary: Negative for wheezing  GI: Negative for abdominal distention      Vitals:    05/21/25 2346 05/22/25 0458 05/22/25 0608 05/22/25 0720   BP: 125/82 116/81  (!) 127/90   BP Location: Right arm Right arm  Left arm   Patient Position: Lying Lying  Lying   Pulse: 83 83  84   Resp: 18 17 18 16   Temp: 98.3 °F (36.8 °C)   97.8 °F (36.6 °C)   TempSrc: Axillary   Axillary   SpO2: 98% 99%  98%   Weight:       Height:              Body mass index is 22.32 kg/m².      PHYSICAL EXAM:  GENERAL APPEARANCE: alert and cooperative.     HEAD: NC/AT  CARDIAC: There is no cyanosis or pallor.   LUNGS: No apparent wheezing or stridor.  ABDOMEN: Non-distended. No guarding.  NEUROLOGICAL: CVA stoke deficits, known  SKIN: multiple wounds  PSYCHIATRIC: No tangential speech. No Hyperactive features.        Recent Results (from the past 24 hours)   POCT glucose    Collection Time: 05/21/25 11:23 AM   Result Value Ref Range    POCT Glucose 93 70 - 110 mg/dL   POCT glucose    Collection Time: 05/21/25  4:27 PM   Result Value Ref Range    POCT Glucose 115 (H) 70 - 110 mg/dL   POCT glucose    Collection Time:  05/21/25  8:18 PM   Result Value Ref Range    POCT Glucose 99 70 - 110 mg/dL   POCT glucose    Collection Time: 05/22/25  7:21 AM   Result Value Ref Range    POCT Glucose 53 (L) 70 - 110 mg/dL       Microbiology Results (last 7 days)       Procedure Component Value Units Date/Time    Blood culture [[id number]]  (Normal) Collected: 05/16/25 0957    Order Status: Completed Specimen: Blood from Peripheral, Antecubital, Right Updated: 05/21/25 1100     Blood Culture No Growth After 5 Days    Blood culture [[id number]]  (Normal) Collected: 05/16/25 0957    Order Status: Completed Specimen: Blood from Peripheral, Hand, Right Updated: 05/21/25 1100     Blood Culture No Growth After 5 Days    Culture, Anaerobe [[id number]] Collected: 05/14/25 1247    Order Status: Completed Specimen: Wound from Hip, Right Updated: 05/19/25 0838     Anaerobe Culture No Anaerobes Isolated    Aerobic culture [[id number]]  (Abnormal)  (Susceptibility) Collected: 05/14/25 1247    Order Status: Completed Specimen: Wound from Hip, Right Updated: 05/16/25 0806     CULTURE, AEROBIC Moderate Escherichia coli ESBL             Imaging Results               CT Abdomen Pelvis With IV Contrast NO Oral Contrast (Final result)  Result time 05/07/25 14:04:30      Final result by Migue Harmon MD (05/07/25 14:04:30)                   Impression:      This report was flagged in Epic as abnormal.    1. Moderate to large amount of stool throughout the colon may reflect constipation.  Please note, there is lobular thickening at the rectal anal junction, correlation with physical exam advised.  Malignancy not excluded.  2. The stomach is distended with ingested content noting gastrostomy tube in place.  Correlation recommended.  3. The urinary bladder is distended, correlation with any history of urinary retention or outlet obstruction.  4. Thickening of the distal esophagus, correlation with any history of reflux esophagitis, direct visualization as  warranted.  5. Open wound along the right lateral aspect of the upper thigh, worsened since the previous exam.  No convincing adjacent osseous erosive or destructive process.  Please see above for full details.  6. Please see above for several additional findings.      Electronically signed by: Migue Harmon MD  Date:    05/07/2025  Time:    14:04               Narrative:    EXAMINATION:  CT ABDOMEN PELVIS WITH IV CONTRAST    CLINICAL HISTORY:  Abdominal abscess/infection suspected;    TECHNIQUE:  Low dose axial images, sagittal and coronal reformations were obtained from the lung bases to the pubic symphysis following the IV administration of 75 mL of Omnipaque 350 .  Oral contrast was not given.    COMPARISON:  CT 02/12/2025    FINDINGS:  Images of the lower thorax are remarkable for bilateral dependent atelectasis.  There is thickening of the distal esophagus, correlation with any history of reflux esophagitis.  Direct visualization as warranted.    The liver, spleen, pancreas and adrenal glands are grossly unremarkable.  There is layering cholelithiasis/sludge, no secondary findings to suggest acute cholecystitis.  The stomach is distended with liquid content.  There is a gastrostomy tube in place, without apparent complication.  The portal vein, splenic vein, SMV, celiac axis and SMA all are patent.  No significant abdominal lymphadenopathy.    The kidneys enhance symmetrically without hydronephrosis or nephrolithiasis.  The bilateral ureters are unable to be followed in their entirety to the urinary bladder, no definite calculi seen or secondary findings to suggest obstructive uropathy.  The urinary bladder is distended without wall thickening.  The uterus is absent the adnexa is unremarkable.    There is some degree of lobulation about the rectal anal junction, correlation with physical exam recommended.  There is moderate stool in the rectum without rectal wall thickening in the region.  There is moderate  "stool throughout the remainder of the large bowel.  The terminal ileum is unremarkable.  The appendix is unremarkable.  The small bowel is grossly unremarkable.  There are a few scattered shotty periaortic, pericaval, and mesenteric lymph nodes.  There is atherosclerotic calcification of the aorta and its branches.    There is osteopenia.  There are degenerative changes of the pubic symphysis, sacroiliac joints and spine.  No significant inguinal lymphadenopathy.    There is open soft tissue wound along the lateral aspect of the right hip/thigh region.  There is associated air and fluid collection layering along the deep aspect of the subcutaneous fat.  This measures approximately 6.0 x 0.8 cm.  No convincing adjacent osseous erosive or destructive process.                                       X-Ray Chest AP Portable (Final result)  Result time 05/07/25 13:42:29      Final result by Ventura Wilder MD (05/07/25 13:42:29)                   Impression:      No detrimental change when compared with 04/10/2025.  No new focal consolidation.      Electronically signed by: Ventura Wilder MD  Date:    05/07/2025  Time:    13:42               Narrative:    EXAMINATION:  XR CHEST AP PORTABLE    CLINICAL HISTORY:  Provided history is "  Hyperglycemia, unspecified".    TECHNIQUE:  One view of the chest.    COMPARISON:  04/10/2025.    FINDINGS:  Cardiac wires overlie the chest.  Cardiomediastinal silhouette is stable.  Coarse bibasilar interstitial markings as seen previously.  No focal consolidation.  No sizable pleural effusion.  No pneumothorax.                                               Assessment & Plan  Multiple wounds of skin  Chronic, pressure ulcers.  Most recently debrided by wound care on 4/9.  Various stages of healing currently; R hip ulcer is purulent.  WBC, ESR, CRP, Lactate all elevated, although pt not febrile.  Wet-to-dry dressing applied and Vanc given in ED.  Wound care consulted  Concern with " worsening right hip wound and tunneling noted on CT -- and wound care  MRI with large lateral soft tissue ulceration with suspected early osteomyelitis of the greater trochanter.   Continue ABX's.  Appreciate ID input.  Consult General Surgery for possible debridement with culture.  No need for surgical debridement per Surgery.  Asked Wound Care to get deep wound culture.  Deep wound culture obtained by Wound Care nurse.  Currently growing ESBL E coli.  ABX's per ID  To complete 7 days of Meropenem on 5/21.  Continue wound care.  Will complete ABX's today.  Home with either HH vs home palliative vs home hospice tomorrow.  Ulcer of sacral region, stage 2      Type 2 diabetes mellitus with stage 4 chronic kidney disease, with long-term current use of insulin  Last A1c reviewed-   Lab Results   Component Value Date    LABA1C 13.0 (H) 04/13/2016    HGBA1C 8.1 (H) 05/07/2025     Home antihyperglycemic regimen: lispro-protamin 10U daily    Recent Labs     05/20/25 2014 05/21/25  0719 05/21/25  1123 05/21/25  1627 05/21/25 2018 05/22/25  0721   POCTGLUCOSE 109 91 93 115* 99 53*     Most recent inpatient antihyperglycemic regimen:   Antihyperglycemics (From admission, onward)      Start     Stop Route Frequency Ordered    05/15/25 2100  insulin glargine U-100 (Lantus) pen 5 Units         -- SubQ Nightly 05/15/25 0850    05/07/25 2127  insulin aspart U-100 pen 0-5 Units         -- SubQ Before meals & nightly PRN 05/07/25 2028          Some episodes of hypoglycemia.  Adjust insulin.  Essential hypertension  Patient's blood pressure range in the last 24 hours was: BP  Min: 110/74  Max: 127/90.The patient's inpatient anti-hypertensive regimen is listed below:  Current Antihypertensives       Plan  - BP is controlled, no changes needed to their regimen    Mixed hyperlipidemia  Stable.  Continue home statin  History of stroke with residual effects  As above    Debility  Patient with Chronic debility due to  "hemiplegia/hemiparesis. The patient's latest AMPAC (Activity Measure for Post Acute Care) Score is listed below.    AM-PAC Score - How much help does the patient need for each activity listed  Basic Mobility Total Score: 8  Turning over in bed (including adjusting bedclothes, sheets and blankets)?: A lot  Sitting down on and standing up from a chair with arms (e.g., wheelchair, bedside commode, etc.): Unable  Moving from lying on back to sitting on the side of the bed?: A lot  Moving to and from a bed to a chair (including a wheelchair)?: Unable  Need to walk in hospital room?: Unable  Climbing 3-5 steps with a railing?: Unable    Plan  - Progressive mobility protocol initated  - Fall precautions in place      Tobacco dependency  Dangers of cigarette smoking were reviewed with patient in detail. Patient was Referred to Tobacco Cessation Program. Nicotine replacement options were discussed. Nicotine replacement was discussed- prescribed  Moderate malnutrition  Nutrition consulted. Most recent weight and BMI monitored-     Measurements:  Wt Readings from Last 1 Encounters:   05/09/25 57.2 kg (126 lb)   Body mass index is 22.32 kg/m².    Patient has been screened and assessed by RD.    Malnutrition Type:  Context: chronic illness  Level: moderate        Pressure injury of right hip, stage 4      Microcytic anemia  Anemia is likely due to chronic blood loss, Iron deficiency, and chronic disease due to infection. Most recent hemoglobin and hematocrit are listed below.  No results for input(s): "HGB", "HCT" in the last 72 hours.    Plan  - Monitor serial CBC: Daily  - Transfuse PRBC if patient becomes hemodynamically unstable, symptomatic or H/H drops below 7/21.  - Patient has not received any PRBC transfusions to date  - Patient's anemia is currently stable  - Suspect iron def +chronic disease  Repeat labs in AM and transfuse if Hgb still <7  ACP (advance care planning)      VTE Risk Mitigation (From admission, onward) "      None            Discharge Planning   ANA: 5/22/2025     Code Status: Full Code   Medical Readiness for Discharge Date:   Discharge Plan A: Hospice/home   Discharge Delays: None known at this time                    Alberto Kohler MD  Department of Hospital Medicine   [facility]

## 2025-05-22 NOTE — ASSESSMENT & PLAN NOTE
Last A1c reviewed-   Lab Results   Component Value Date    LABA1C 13.0 (H) 04/13/2016    HGBA1C 8.1 (H) 05/07/2025     Home antihyperglycemic regimen: lispro-protamin 10U daily    Recent Labs     05/20/25 2014 05/21/25  0719 05/21/25  1123 05/21/25  1627 05/21/25 2018 05/22/25  0721   POCTGLUCOSE 109 91 93 115* 99 53*     Most recent inpatient antihyperglycemic regimen:   Antihyperglycemics (From admission, onward)      Start     Stop Route Frequency Ordered    05/15/25 2100  insulin glargine U-100 (Lantus) pen 5 Units         -- SubQ Nightly 05/15/25 0850    05/07/25 2127  insulin aspart U-100 pen 0-5 Units         -- SubQ Before meals & nightly PRN 05/07/25 2028          Some episodes of hypoglycemia.  Adjust insulin.

## 2025-05-22 NOTE — PROGRESS NOTES
[facility]  Adult Nutrition  Progress Note    SUMMARY       Recommendations    Recommendation:   1. Continue current EN regimen as tolerated.   2. Continue to provide current PO diet as tolerated with fluid restriction per MD.   3. Continue to provide Jose BID to promote wound healing.   4. Monitor weight/labs.   5. RD to follow and monitor intake    Goals:   Patient to receive/consume >75% of EEN/EPN prior to RD follow up    Nutrition Goal Status: goal met  Communication of RD Recs: other (comment) (POC)    Nutrition Discharge Planning    Nutrition Discharge Planning: Resume home/ nursing home regimen    Assessment and Plan    Endocrine  Moderate malnutrition  Malnutrition Type:  Context: chronic illness  Level: moderate    Related to (etiology):   Increased demand for nutrition     Signs and Symptoms (as evidenced by):   Delayed wound healing      Malnutrition Characteristic Summary:  Weight Loss (Malnutrition): 20% in 1 year      Interventions(treatment strategy):  1. Continue current EN regimen as tolerated. 2. Continue to provide current PO diet as tolerated with fluid restriction per MD. 3. Continue to provide Jose BID to promote wound healing. 4. Monitor weight/labs. 5. RD to follow and monitor intake    Nutrition Diagnosis Status:   Continues         Malnutrition Assessment  Malnutrition Context: chronic illness  Malnutrition Level: moderate  Skin (Micronutrient): wounds unhealed       Weight Loss (Malnutrition): 20% in 1 year               Reason for Assessment    Reason For Assessment: RD follow-up  Diagnosis: diabetes diagnosis/complications (multiple wounds of skin)  General Information Comments: Pt receiving Low Na 2 gm diet with 1500 mL fluid restriction and Jose BID, 25-50% intake recorded. Glucerna 1.5 at 40 mL/hr ordered. PIV, PEG. Anand Score: 9 Patient with multiple pressure injuries to BLE and buttock regions. NFPE not completed 2/2 remote interim coverage Patient is positive for  "weight loss and loss of skin intergrity.    Follow up 5/21/25:  Patient is on a low sodium oral diet with po intake noted between 25-50%.  TF on hold/not running at this time.  Patient with nausea.  Labs reviewed.  NKFA.  RD recommend to continue with kiran, commercial beverages, and EN support as tolerated.  RD to continue to monitor at follow ups.       Nutrition/Diet History    Nutrition Intake History: Glucerna 1.5 with goal rate of 40ml/hr (home routine)  Spiritual, Cultural Beliefs, Orthodoxy Practices, Values that Affect Care: no  Food Allergies: NKFA  Factors Affecting Nutritional Intake: chewing difficulties/inability to chew food, difficulty/impaired swallowing, other (see comments) (PEG)    Anthropometrics    Height: 5' 3" (160 cm)  Height (inches): 63 in  Weight: 57.2 kg (126 lb)  Weight (lb): 126 lb  Weight Method: Bed Scale  Ideal Body Weight (IBW), Female: 115 lb  % Ideal Body Weight, Female (lb): 109.57 %  BMI (Calculated): 22.3  BMI Grade: 18.5-24.9 - normal       Lab/Procedures/Meds    Pertinent Labs Reviewed: reviewed  BMP  Lab Results   Component Value Date     05/19/2025    K 3.6 05/19/2025     05/19/2025    CO2 24 05/19/2025    BUN 14 05/19/2025    CREATININE 0.7 05/19/2025    CALCIUM 8.3 (L) 05/19/2025    ANIONGAP 8 05/19/2025    EGFRNORACEVR >60 05/19/2025     Lab Results   Component Value Date    LABA1C 13.0 (H) 04/13/2016    HGBA1C 8.1 (H) 05/07/2025     Lab Results   Component Value Date    CALCIUM 8.3 (L) 05/19/2025    PHOS 2.7 05/07/2025       Pertinent Medications Reviewed: reviewed  Pertinent Medications Comments: Aspirin, statin, cefepime, famotidine, insulin aspart, insulin glargine, MVI,nicotine patch, vancomycin  Scheduled Meds:   aspirin  81 mg Oral Daily    atorvastatin  80 mg Oral QHS    collagenase   Topical (Top) Daily    famotidine  20 mg Oral BID    insulin glargine U-100  5 Units Subcutaneous QHS    meropenem IV (PEDS and ADULTS)  1 g Intravenous Q8H    " miconazole NITRATE 2 %   Topical (Top) BID    multivitamin  1 tablet Per G Tube Daily    nicotine  1 patch Transdermal Daily     Continuous Infusions:  PRN Meds:.  Current Facility-Administered Medications:     acetaminophen, 650 mg, Oral, Q4H PRN    dextrose 50%, 12.5 g, Intravenous, PRN    dextrose 50%, 25 g, Intravenous, PRN    diphenhydrAMINE, 25 mg, Oral, Q6H PRN    glucagon (human recombinant), 1 mg, Intramuscular, PRN    glucose, 16 g, Oral, PRN    glucose, 24 g, Oral, PRN    insulin aspart U-100, 0-5 Units, Subcutaneous, QID (AC + HS) PRN    lorazepam, 1 mg, Intravenous, Once PRN    mirtazapine, 7.5 mg, Oral, Nightly PRN    morphine, 2 mg, Intravenous, Q4H PRN    ondansetron, 8 mg, Intravenous, Q6H PRN    polyethylene glycol, 17 g, Oral, BID PRN    Estimated/Assessed Needs    Weight Used For Calorie Calculations: 57.2 kg (126 lb 1.7 oz)  Energy Calorie Requirements (kcal): 25-35kcals/kg (1430-2002kcals/day)  Energy Need Method: Kcal/kg  Protein Requirements: 1.5-2.0g/kg (86-115g/day)  Weight Used For Protein Calculations: 57.2 kg (126 lb 1.7 oz)  Fluid Requirements (mL): 1ml/kcal  Estimated Fluid Requirement Method: RDA Method  RDA Method (mL): 25  CHO Requirement: 225-250      Nutrition Prescription Ordered    Current Diet Order: Low sodium, 1500ml fluid restriction  Current Nutrition Support Formula Ordered: Glucerna 1.5  Current Nutrition Support Rate Ordered: 40 (ml)  Current Nutrition Support Frequency Ordered: continuous  Oral Nutrition Supplement: Jose BID    Evaluation of Received Nutrient/Fluid Intake    Enteral Calories (kcal): 1440  Enteral Protein (gm): 79  Enteral (Free Water) Fluid (mL): 729  % Kcal Needs: 25-50%  % Protein Needs: 25-50%  I/O: 5/21/25: -2152ml since admit  Energy Calories Required: not meeting needs  Protein Required: not meeting needs  Fluid Required: not meeting needs  Comments: LBM: 5/20/25  Tolerance:  (nausea)  % Intake of Estimated Energy Needs: 25 - 50 %  % Meal Intake:  25 - 50 %       Nutrition Risk    Level of Risk/Frequency of Follow-up:  (1x/week)     Monitor and Evaluation    Monitor and Evaluation: Energy intake, Protein intake, Diet order, Weight, Enteral and parenteral nutrition administration, Beliefs and attitudes, Electrolyte and renal panel, Gastrointestinal profile, Glucose/endocrine profile, Inflammatory profile, Lipid profile, Nutrition focused physical findings, Skin     Nutrition Related Social Determinants of Health: SDOH: Unable to assess at this time.       Nutrition Follow-Up    RD Follow-up?: Yes  Daja Frederick, MS, RDN, LDN

## 2025-05-22 NOTE — ASSESSMENT & PLAN NOTE
"Anemia is likely due to chronic blood loss, Iron deficiency, and chronic disease due to infection. Most recent hemoglobin and hematocrit are listed below.  No results for input(s): "HGB", "HCT" in the last 72 hours.    Plan  - Monitor serial CBC: Daily  - Transfuse PRBC if patient becomes hemodynamically unstable, symptomatic or H/H drops below 7/21.  - Patient has not received any PRBC transfusions to date  - Patient's anemia is currently stable  - Suspect iron def +chronic disease  Repeat labs in AM and transfuse if Hgb still <7  "

## 2025-05-22 NOTE — NURSING
Report received from night nurse Pricilla,RN. Visualized patient and assessed patient's overall condition and appearance. No acute distress noted. Plan of care ongoing.    [facility]  Nurses Note -- 4 Eyes      5/22/2025       Skin assessed on: Q Shift      [] No Pressure Injuries Present    [x]Prevention Measures Documented    [x] Yes LDA  for Pressure Injury Previously documented     [] Yes New Pressure Injury Discovered   [] LDA for New Pressure Injury Added      Attending RN:  Selina Redd LPN     Second RN:  Julien HeartRN

## 2025-05-22 NOTE — NURSING
In preparation for discharge, d/c'd pt's saline lock, applied pressure to site, secured gauze and coban, no redness or swelling noted. Instructions given.  Pt denies any complaints or concerns at this time. Pt was transported off the unit to the ambulance for discharge.

## 2025-05-22 NOTE — PLAN OF CARE
Case Management Final Discharge Note    Discharge Disposition: Home with [facility]    New DME ordered / company name: none    Relevant SDOH / Transition of Care Barriers:  none    Person available to provide assistance at home when needed and their contact information: Aneta [phone]    Scheduled followup appointment: none    Referrals placed: none    Transportation: Ambulance transportation has been set up.    ADT 30 order placed for Ambulance Transportation.  Requested  time: 1:16 pm    If transportation does not arrive at ETA time nurse will be instructed to follow protocol for transportation below:  How can I get in touch directly with dispatch, if needed?                 Non-emergent dispatch: [phone]      CM spoke with patient daughter Aneta to confirm address and if some one will be home to receive patient. Aneta stated she is home.    Patient and family educated on discharge services and updated on DC plan. Bedside RN Selian Redd notified, patient clear to discharge from Case Management Perspective.      05/22/25 1220   Final Note   Assessment Type Final Discharge Note   Anticipated Discharge Disposition   ([facility])   What phone number can be called within the next 1-3 days to see how you are doing after discharge? [phone]   Hospital Resources/Appts/Education Provided Provided patient/caregiver with written discharge plan information   Post-Acute Status   Post-Acute Authorization Hospice   Hospice Status Set-up Complete/Auth obtained   Discharge Delays None known at this time

## 2025-05-22 NOTE — PLAN OF CARE
[facility]  Department of Hospital Medicine  [address] [place] [address]  [phone] after hours  [phone] fax    HOSPICE  ORDERS    05/22/2025    Admit to Hospice:  Home Service     Diagnoses:   Active Hospital Problems    Diagnosis  POA    *Multiple wounds of skin [T14.8XXA]  Yes    ACP (advance care planning) [Z71.89]  Not Applicable    Microcytic anemia [D50.9]  Yes    Pressure injury of right hip, stage 4 [L89.214]  Yes    Tobacco dependency [F17.200]  Yes    Moderate malnutrition [E44.0]  Yes    Ulcer of sacral region, stage 2 [L98.429]  Yes    History of stroke with residual effects [I69.30]  Not Applicable    Debility [R53.81]  Yes    Type 2 diabetes mellitus with stage 4 chronic kidney disease, with long-term current use of insulin [E11.22, N18.4, Z79.4]  Not Applicable    Mixed hyperlipidemia [E78.2]  Yes     Chronic    Essential hypertension [I10]  Yes     Chronic      Resolved Hospital Problems   No resolved problems to display.       Hospice Qualifying Diagnoses:        Patient has a life expectancy < 6 months due to:  Primary Hospice Diagnosis:  CVA with residual deficits resulting in FTT   Comorbid Conditions Contributing to Decline:  DM1, HTN, HLD, and Previous CVA with residual deficits, multiple wounds    Vital Signs: Routine per Hospice Protocol.    Code Status: FULL, cont conversations    Allergies:   Review of patient's allergies indicates:   Allergen Reactions    Sulfa (sulfonamide antibiotics) Itching    Sulfur        Diet: regular diet    Activities: As tolerated    Goals of Care Treatment Preferences:  Code Status: Full Code          What is most important right now is to focus on spending time at home, avoiding the hospital, symptom/pain control.  Accordingly, we have decided that the best plan to meet the patient's goals includes continuing with treatment.      Nursing: Per Hospice Routine.          Routine Skin for Bedridden Patients:  "Apply moisture barrier cream to all skin folds and wet areas in perineal area daily and after baths and all bowel movements.      Oxygen: no      Medications:          Medication List        CONTINUE taking these medications      acetaminophen 650 MG Tbsr  Commonly known as: TYLENOL  Take 1 tablet (650 mg total) by mouth every 8 (eight) hours. For back pain     aspirin 81 MG EC tablet  Commonly known as: ECOTRIN  Take 1 tablet (81 mg total) by mouth once daily.     atorvastatin 80 MG tablet  Commonly known as: LIPITOR  Take 80 mg by mouth every evening.     blood sugar diagnostic Strp  Commonly known as: TRUE METRIX GLUCOSE TEST STRIP  TO CHECK BLOOD GLUCOSE THREE TIMES DAILY     clopidogreL 75 mg tablet  Commonly known as: PLAVIX  Take 1 tablet (75 mg total) by mouth once daily.     diphenhydrAMINE 50 MG capsule  Commonly known as: BENADRYL  Take 1 capsule (50 mg total) by mouth every 6 (six) hours as needed for Itching.     insulin glargine U-100 (Lantus) 100 unit/mL (3 mL) Inpn pen  Inject 5 Units into the skin every evening.     insulin lispro protamin-lispro 100 unit/mL (50-50) Inpn  Inject 10 Units into the skin. once daily     lancets Misc  Commonly known as: ONETOUCH ULTRASOFT LANCETS  Use 1 TID as directed for diabetes checking     multivitamin Tab  1 tablet by Per G Tube route once daily.     pen needle, diabetic 29 gauge x 1/2" Ndle  Commonly known as: BD ULTRA-FINE ORIG PEN NEEDLE  USE TO TEST THREE TIMES DAILY MUST LAST 33 DAYS                DIABETES CARE:      Fingerstick blood sugar AC and HS     Fingerstick blood sugar every 6 hours if patient is unable to eat    Report CBG < 60 or > 350 to physician.         Insulin Sliding Scale         Glucose  Novolog Insulin Subcutaneous        0 - 60   Orange juice or glucose tablet      No insulin   201-250  2 units   251-300  4 units   301-350  6 units   351-400  8 units   >400   10 units then call physician      Future Orders:  Hospice Medical " Director may dictate new orders for comfortable care measures & sign death certificate.        _________________________________  Alberto SIMA Kohler MD  05/22/2025

## 2025-05-22 NOTE — NURSING
[facility]  Nurses Note -- 4 Eyes      5/21/2025       Skin assessed on: Q Shift      [] No Pressure Injuries Present    [x]Prevention Measures Documented    [x] Yes LDA  for Pressure Injury Previously documented     [] Yes New Pressure Injury Discovered   [] LDA for New Pressure Injury Added      Attending RN:  Julien Heart RN     Second RN:  AKASH Scott